# Patient Record
Sex: FEMALE | Race: WHITE | NOT HISPANIC OR LATINO | Employment: OTHER | URBAN - METROPOLITAN AREA
[De-identification: names, ages, dates, MRNs, and addresses within clinical notes are randomized per-mention and may not be internally consistent; named-entity substitution may affect disease eponyms.]

---

## 2017-01-03 DIAGNOSIS — I10 HYPERTENSION GOAL BP (BLOOD PRESSURE) < 140/90: Primary | ICD-10-CM

## 2017-01-03 RX ORDER — TRIAMTERENE AND HYDROCHLOROTHIAZIDE 37.5; 25 MG/1; MG/1
1 CAPSULE ORAL EVERY MORNING
Qty: 90 CAPSULE | Refills: 3 | Status: SHIPPED | OUTPATIENT
Start: 2017-01-03 | End: 2018-04-04

## 2017-01-03 NOTE — TELEPHONE ENCOUNTER
triamterene-hydrochlorothiazide (DYAZIDE) 37.5-25 MG per capsule    Last Written Prescription Date: 1/13/2016.  Last Fill Quantity: 90, # refills: 3  Last Office Visit with G, P or [facility] prescribing provider: 3/23/2016.       POTASSIUM   Date Value Ref Range Status   07/17/2016 3.9 3.4 - 5.3 mmol/L Final     CREATININE   Date Value Ref Range Status   07/17/2016 1.22* 0.52 - 1.04 mg/dL Final     BP Readings from Last 3 Encounters:   07/18/16 130/80   03/23/16 117/75   01/17/16 167/84

## 2017-01-03 NOTE — TELEPHONE ENCOUNTER
Prescription approved per [facility] Refill Protocol.    Patient scheduled an annual physical on 1/25/17.     Kenia Muller RN  [facility]

## 2017-02-08 DIAGNOSIS — I10 HYPERTENSION GOAL BP (BLOOD PRESSURE) < 140/90: Primary | ICD-10-CM

## 2017-02-08 DIAGNOSIS — G47.00 PERSISTENT DISORDER OF INITIATING OR MAINTAINING SLEEP: Primary | ICD-10-CM

## 2017-02-08 DIAGNOSIS — F32.5 MAJOR DEPRESSION IN COMPLETE REMISSION (H): ICD-10-CM

## 2017-02-08 RX ORDER — ZOLPIDEM TARTRATE 5 MG/1
5 TABLET ORAL
Qty: 30 TABLET | Refills: 4 | Status: CANCELLED | OUTPATIENT
Start: 2017-02-08

## 2017-02-08 RX ORDER — ZOLPIDEM TARTRATE 5 MG/1
5 TABLET ORAL
Qty: 5 TABLET | Refills: 0 | Status: SHIPPED | OUTPATIENT
Start: 2017-02-08 | End: 2017-02-13

## 2017-02-08 NOTE — TELEPHONE ENCOUNTER
"Pt states this will only last her until 2/12/2017 and is requesting Dr. Oliva review it when he returns to office because she \"hates Dr. Qiu and doesn't want him touching her medications.\" Pt informed Dr. Oliva will not be back until 2/13/2017, pt informed no other provider will manage this for him in the interim. Pt made an venkat on 2/22/2017   "

## 2017-02-08 NOTE — TELEPHONE ENCOUNTER
Parnate Tab 10Mg      Last Written Prescription Date:  1/15/16  Last Fill Quantity: 270,   # refills: 3  Last Office Visit with [facility], P or  Health prescribing provider: 3/23/16  Future Office visit:       Routing refill request to provider for review/approval because:  Drug not on the [facility], [facility] or  [email or web address] refill protocol or controlled substance        Atacand Tab 4MG      Last Written Prescription Date: 1/15/16  Last Fill Quantity: 90, # refills: 3  Last Office Visit with [facility], [facility] or  [email or web address] prescribing provider: 3/23/16       POTASSIUM   Date Value Ref Range Status   07/17/2016 3.9 3.4 - 5.3 mmol/L Final     CREATININE   Date Value Ref Range Status   07/17/2016 1.22* 0.52 - 1.04 mg/dL Final     BP Readings from Last 3 Encounters:   07/18/16 130/80   03/23/16 117/75   01/17/16 167/84

## 2017-02-08 NOTE — TELEPHONE ENCOUNTER
Ambien Oral Tablet 5 MG      Last Written Prescription Date:  8/24/16  Last Fill Quantity: 30,   # refills: 4  [facility] with [facility] or [facility] prescribing provider: 3/23/16  Future Office visit:       Routing refill request to provider for review/approval because:  Drug not on the [facility], [facility] or [facility] refill protocol or controlled substance

## 2017-02-08 NOTE — TELEPHONE ENCOUNTER
Routing refill request to provider for review/approval because:  Drug not on the [facility] refill protocol     Reviewed  and no concerns. Rx was last dispensed on 1/2/17 #30    Routing to provider pool, Dr. Oliva is out of office until 2/13/17.    Jessica Samuels RN  [facility]

## 2017-02-08 NOTE — TELEPHONE ENCOUNTER
Oxazepam Oral Capsule 15MG      Last Written Prescription Date:  11/16/16  Last Fill Quantity: 90,   # refills: 0  Last Office Visit with [facility], [facility] or [facility] prescribing provider: 3/23/16  Future Office visit:       Routing refill request to provider for review/approval because:  Drug not on the [facility], [facility] or [facility] refill protocol or controlled substance

## 2017-02-08 NOTE — TELEPHONE ENCOUNTER
Please call patient  She has not sen Dr Oliva in almost a year and Needs  to address the long term risk of this medication with him  I'll OK 5 pills but she needs a better long term plan for insomnia/

## 2017-02-09 RX ORDER — CANDESARTAN CILEXETIL 4 MG/1
4 TABLET ORAL DAILY
Qty: 90 TABLET | Refills: 0 | Status: SHIPPED | OUTPATIENT
Start: 2017-02-09 | End: 2017-03-31

## 2017-02-09 RX ORDER — TRANYLCYPROMINE SULFATE 10 MG/1
10 TABLET, FILM COATED ORAL 3 TIMES DAILY
Qty: 270 TABLET | Refills: 0 | Status: SHIPPED | OUTPATIENT
Start: 2017-02-09 | End: 2017-02-22

## 2017-02-09 NOTE — TELEPHONE ENCOUNTER
Prescription approved for atacand per [facility] Refill Protocol.    Routing refill request for parnate to provider for review/approval because:  Drug not on the [facility] refill protocol     Routing to provider pool, Dr. Oliva is out of office until 2/13/17  Pt. Has an appt scheduled with Dr. Oliva on 2/22/17 for annual.    Jessica Samuels RN  [facility]

## 2017-02-10 NOTE — TELEPHONE ENCOUNTER
Dr. Oliva, patient is requesting a refill of medication.    oxazepam (SERAX) 15 MG capsule    Last Visit: 03/23/16  Last Rx: 11/16/16      not listed on refill protocol, nurse unable to refill.      Thank you,   Kenia Muller RN  [facility]

## 2017-02-12 DIAGNOSIS — G47.00 PERSISTENT DISORDER OF INITIATING OR MAINTAINING SLEEP: Primary | ICD-10-CM

## 2017-02-12 RX ORDER — PREDNISONE 10 MG/1
TABLET ORAL
COMMUNITY
Start: 2016-03-24 | End: 2018-04-04

## 2017-02-12 RX ORDER — TRIAMTERENE/HYDROCHLOROTHIAZID 37.5-25 MG
TABLET ORAL
Refills: 3 | COMMUNITY
Start: 2017-01-04 | End: 2018-04-04

## 2017-02-12 RX ORDER — OXAZEPAM 15 MG/1
15 CAPSULE ORAL
Qty: 90 CAPSULE | Refills: 0 | Status: SHIPPED | OUTPATIENT
Start: 2017-02-12 | End: 2017-02-22

## 2017-02-12 RX ORDER — ZOLPIDEM TARTRATE 5 MG/1
5 TABLET ORAL
Qty: 30 TABLET | Refills: 0 | OUTPATIENT
Start: 2017-02-12

## 2017-02-13 DIAGNOSIS — G47.00 PERSISTENT DISORDER OF INITIATING OR MAINTAINING SLEEP: ICD-10-CM

## 2017-02-13 RX ORDER — ZOLPIDEM TARTRATE 5 MG/1
5 TABLET ORAL
Qty: 7 TABLET | Refills: 0 | Status: SHIPPED | OUTPATIENT
Start: 2017-02-13 | End: 2017-02-22

## 2017-02-13 NOTE — TELEPHONE ENCOUNTER
Pt would like to request more Ambien. She had called back on 2/8 and Dr Qiu filled it again for 5 tablets. She will be running out by Friday and won't be able to see Dr. Oliva until her appt which is scheduled for 2/22    Prior message states she had mentioned that she would be out by 2/12 (which was yesterday) but in this current phone call, she stated she will be out by Friday, 2/17    Pt can be reached @ [phone] alcon

## 2017-02-13 NOTE — TELEPHONE ENCOUNTER
Dr. Oliva,  Pt. Was given this script on 2/8/17  zolpidem (AMBIEN) 5 MG tablet 5 tablet 0 2/8/2017  No      Sig: Take 1 tablet (5 mg) by mouth nightly as needed     Class: Local Print     Route: Oral     Order: [id number]     Per  pt. Filled rc on 2/8/17.    Jessica Samuels RN  [facility]

## 2017-02-22 ENCOUNTER — OFFICE VISIT (OUTPATIENT)
Dept: FAMILY MEDICINE | Facility: CLINIC | Age: 72
End: 2017-02-22
Payer: COMMERCIAL

## 2017-02-22 VITALS
BODY MASS INDEX: 24.33 KG/M2 | OXYGEN SATURATION: 98 % | HEIGHT: 63 IN | TEMPERATURE: 97.4 F | SYSTOLIC BLOOD PRESSURE: 110 MMHG | HEART RATE: 88 BPM | DIASTOLIC BLOOD PRESSURE: 66 MMHG | WEIGHT: 137.3 LBS

## 2017-02-22 DIAGNOSIS — I10 HYPERTENSION GOAL BP (BLOOD PRESSURE) < 140/90: ICD-10-CM

## 2017-02-22 DIAGNOSIS — F32.5 MAJOR DEPRESSION IN COMPLETE REMISSION (H): ICD-10-CM

## 2017-02-22 DIAGNOSIS — L57.8 SUN-DAMAGED SKIN: ICD-10-CM

## 2017-02-22 DIAGNOSIS — G47.00 PERSISTENT DISORDER OF INITIATING OR MAINTAINING SLEEP: Chronic | ICD-10-CM

## 2017-02-22 DIAGNOSIS — Z00.00 ENCOUNTER FOR ROUTINE ADULT HEALTH EXAMINATION WITHOUT ABNORMAL FINDINGS: Primary | ICD-10-CM

## 2017-02-22 PROCEDURE — 99213 OFFICE O/P EST LOW 20 MIN: CPT | Mod: 25 | Performed by: FAMILY MEDICINE

## 2017-02-22 PROCEDURE — 99397 PER PM REEVAL EST PAT 65+ YR: CPT | Performed by: FAMILY MEDICINE

## 2017-02-22 PROCEDURE — 86803 HEPATITIS C AB TEST: CPT | Performed by: FAMILY MEDICINE

## 2017-02-22 PROCEDURE — 80061 LIPID PANEL: CPT | Performed by: FAMILY MEDICINE

## 2017-02-22 PROCEDURE — 36415 COLL VENOUS BLD VENIPUNCTURE: CPT | Performed by: FAMILY MEDICINE

## 2017-02-22 PROCEDURE — 80053 COMPREHEN METABOLIC PANEL: CPT | Performed by: FAMILY MEDICINE

## 2017-02-22 RX ORDER — OXAZEPAM 15 MG/1
15 CAPSULE ORAL
Qty: 90 CAPSULE | Refills: 1 | Status: SHIPPED | OUTPATIENT
Start: 2017-02-22 | End: 2018-01-23

## 2017-02-22 RX ORDER — TRIAMCINOLONE ACETONIDE 1 MG/G
CREAM TOPICAL
Qty: 15 G | Refills: 1 | Status: SHIPPED
Start: 2017-02-22 | End: 2019-06-05

## 2017-02-22 RX ORDER — TRANYLCYPROMINE SULFATE 10 MG/1
10 TABLET, FILM COATED ORAL 3 TIMES DAILY
Qty: 270 TABLET | Refills: 3 | Status: SHIPPED
Start: 2017-02-22 | End: 2018-04-04

## 2017-02-22 RX ORDER — ZOLPIDEM TARTRATE 5 MG/1
5 TABLET ORAL
Qty: 30 TABLET | Refills: 5 | Status: SHIPPED | OUTPATIENT
Start: 2017-02-22 | End: 2017-09-27

## 2017-02-22 NOTE — MR AVS SNAPSHOT
After Visit Summary   2/22/2017    Jann Davidson    MRN: [id number]           Patient Information     Date Of Birth          1945        Visit Information        Provider Department      2/22/2017 4:00 PM Mehran Oliva MD [facility]        Today's Diagnoses     Encounter for routine adult health examination without abnormal findings    -  1    Persistent disorder of initiating or maintaining sleep        Major depression in complete remission        Sun-damaged skin        Hypertension goal BP (blood pressure) < 140/90          Care Instructions      Preventive Health Recommendations  Female Ages 65 +    Yearly exam:     See your health care provider every year in order to  o Review health changes.   o Discuss preventive care.    o Review your medicines if your doctor has prescribed any.      You no longer need a yearly Pap test unless you've had an abnormal Pap test in the past 10 years. If you have vaginal symptoms, such as bleeding or discharge, be sure to talk with your provider about a Pap test.      Every 1 to 2 years, have a mammogram.  If you are over 69, talk with your health care provider about whether or not you want to continue having screening mammograms.      Every 10 years, have a colonoscopy. Or, have a yearly FIT test (stool test). These exams will check for colon cancer.       Have a cholesterol test every 5 years, or more often if your doctor advises it.       Have a diabetes test (fasting glucose) every three years. If you are at risk for diabetes, you should have this test more often.       At age 65, have a bone density scan (DEXA) to check for osteoporosis (brittle bone disease).    Shots:    Get a flu shot each year.    Get a tetanus shot every 10 years.    Talk to your doctor about your pneumonia vaccines. There are now two you should receive - Pneumovax (PPSV 23) and Prevnar (PCV 13).    Talk to your doctor about the shingles vaccine.    Talk to  your doctor about the hepatitis B vaccine.    Nutrition:     Eat at least 5 servings of fruits and vegetables each day.      Eat whole-grain bread, whole-wheat pasta and brown rice instead of white grains and rice.      Talk to your provider about Calcium and Vitamin D.     Lifestyle    Exercise at least 150 minutes a week (30 minutes a day, 5 days a week). This will help you control your weight and prevent disease.      Limit alcohol to one drink per day.      No smoking.       Wear sunscreen to prevent skin cancer.       See your dentist twice a year for an exam and cleaning.      See your eye doctor every 1 to 2 years to screen for conditions such as glaucoma, macular degeneration, cataracts, etc         Follow-ups after your visit        Who to contact     If you have questions or need follow up information about today's clinic visit or your schedule please contact [facility] directly at [phone].  Normal or non-critical lab and imaging results will be communicated to you by MyChart, letter or phone within 4 business days after the clinic has received the results. If you do not hear from us within 7 days, please contact the clinic through [employer] or phone. If you have a critical or abnormal lab result, we will notify you by phone as soon as possible.  Submit refill requests through [employer] or call your pharmacy and they will forward the refill request to us. Please allow 3 business days for your refill to be completed.          Additional Information About Your Visit        MyChart Information     [employer] gives you secure access to your electronic health record. If you see a primary care provider, you can also send messages to your care team and make appointments. If you have questions, please call your primary care clinic.  If you do not have a primary care provider, please call [phone] and they will assist you.        Care EveryWhere ID     This is your Care EveryWhere ID. This could  "be used by other organizations to access your [place] medical records  [id number]        Your Vitals Were     Pulse Temperature Height Pulse Oximetry BMI (Body Mass Index)       88 97.4  F (36.3  C) (Oral) 5' 3\" (1.6 m) 98% 24.32 kg/m2        Blood Pressure from Last 3 Encounters:   02/22/17 110/66   07/18/16 130/80   03/23/16 117/75    Weight from Last 3 Encounters:   02/22/17 137 lb 4.8 oz (62.3 kg)   01/17/16 130 lb (59 kg)   01/15/16 139 lb (63 kg)              We Performed the Following     Comprehensive metabolic panel     Hepatitis C antibody     Lipid panel reflex to direct LDL     OFFICE/OUTPT VISIT,SHERWIN JUDGE III          Where to get your medicines      These medications were sent to [facility]  [address] [address]     Phone:  [phone]     PARNATE 10 MG tablet    triamcinolone 0.1 % cream         Some of these will need a paper prescription and others can be bought over the counter.  Ask your nurse if you have questions.     Bring a paper prescription for each of these medications     oxazepam 15 MG capsule    zolpidem 5 MG tablet          Primary Care Provider Office Phone # Fax #    Mehran Oliva [phone]       [facility] [address]  [facility] [address]        Thank you!     Thank you for choosing [facility]  for your care. Our goal is always to provide you with excellent care. Hearing back from our patients is one way we can continue to improve our services. Please take a few minutes to complete the written survey that you may receive in the mail after your visit with us. Thank you!             Your Updated Medication List - Protect others around you: Learn how to safely use, store and throw away your medicines at www.disposemymeds.org.          This list is accurate as of: 2/22/17  7:20 PM.  Always use your most recent med list.                   Brand Name Dispense " Instructions for use    ATACAND 4 MG Tabs tablet   Generic drug:  candesartan     90 tablet    Take 1 tablet (4 mg) by mouth daily FLY       azithromycin 250 MG tablet    ZITHROMAX    6 tablet    Two tablets first day, then one tablet daily for four days.       FLUZONE HIGH-DOSE 0.5 ML injection   Generic drug:  influenza Vac Split High-Dose          HYDROcodone-acetaminophen 5-325 MG per tablet    NORCO    15 tablet    Take 1-2 tablets by mouth every 4 hours as needed       oxazepam 15 MG capsule    SERAX    90 capsule    Take 1 capsule (15 mg) by mouth nightly as needed for sleep or anxiety       PARNATE 10 MG tablet   Generic drug:  tranylcypromine     270 tablet    Take 1 tablet (10 mg) by mouth 3 times daily Needs BRAND name medication only. FLY       polyethylene glycol Packet    MIRALAX/GLYCOLAX     Take 1 packet by mouth daily       predniSONE 10 MG tablet    DELTASONE         PREVNAR 13 Susp injection   Generic drug:  pneumococcal          triamcinolone 0.1 % cream    KENALOG    15 g    Apply  topically 2 times daily as needed.       * triamterene-hydrochlorothiazide 37.5-25 MG per capsule    DYAZIDE    90 capsule    Take 1 capsule by mouth every morning Take 1 capsule by mouth every morning. Needs to be [place] [address]  for this medication. FLY.       * triamterene-hydrochlorothiazide 37.5-25 MG per tablet    MAXZIDE-25     TK 1 T PO QAM       zolpidem 5 MG tablet    AMBIEN    30 tablet    Take 1 tablet (5 mg) by mouth nightly as needed       * Notice:  This list has 2 medication(s) that are the same as other medications prescribed for you. Read the directions carefully, and ask your doctor or other care provider to review them with you.

## 2017-02-22 NOTE — NURSING NOTE
"Chief Complaint   Patient presents with     Physical       Initial /66 (BP Location: Right arm, Patient Position: Chair, Cuff Size: Adult Regular)  Pulse 88  Temp 97.4  F (36.3  C) (Oral)  Ht 5' 3\" (1.6 m)  Wt 137 lb 4.8 oz (62.3 kg)  SpO2 98%  BMI 24.32 kg/m2 Estimated body mass index is 24.32 kg/(m^2) as calculated from the following:    Height as of this encounter: 5' 3\" (1.6 m).    Weight as of this encounter: 137 lb 4.8 oz (62.3 kg).  Medication Reconciliation: complete   Ashly Díaz MA      "

## 2017-02-22 NOTE — PROGRESS NOTES
SUBJECTIVE:     CC: Jann Davidson is an 71 year old woman who presents for preventive health visit.     Patient says that she does not want a medicare physical, and that she is willing to pay for it. She would like blood work completed.     Patient says that she has been taking her zolpidem for sleep, and it has helped her to fall asleep. However, she says that she frequently wakes up in the middle of the night to urinate, and then she can't fall back asleep. She is wondering if it would be possible for her to try long acting zolpidem to see if that helps her to fall asleep if she wakes up in the middle of the night. Patient is distressed because she requested refills on the zolpidem and was only given a refill of 5 tablets, and would like a note saying that she needs her zolpidem.    Patient says that she had terrible pain in her groin and thighs that she sustained lifting 40 pounds of water and turning, and she pulled a muscle, and went to the emergency department. Patient reports that while in the hospital it was found that she had a lung nodule. History of smoking, and she stopped 44 years ago.    Patient says that she needs a refill on her kenalog cream. She says that she has been having problems with dry skin in her eye lashes and on her upper eyelids. Patient wears makeup but says that she does a good job of clearing off the makeup and washing her eyelids.    Patient has been having annual mammograms due to a family history of breast cancer through her mother.    Patient would like to discuss the benefits of medical marijuana.    Patient requests screening for hepatitis C.    Patient is up to date on her eye exam.    Healthy Habits:    Do you get at least three servings of calcium containing foods daily (dairy, green leafy vegetables, etc.)? yes    Amount of exercise or daily activities, outside of work: 1 day(s) per week    Problems taking medications regularly No    Medication side effects: No    Have  you had an eye exam in the past two years? yes    Do you see a dentist twice per year? yes    Do you have sleep apnea, excessive snoring or daytime drowsiness?no    Lab work, scar tissue in lung, medication concerns, dry skin in eyelashes, medical marijuana, Hep C test     Today's PHQ-2 Score:   PHQ-2 ( 1999 Pfizer) 2/22/2017 3/23/2016   Q1: Little interest or pleasure in doing things 0 0   Q2: Feeling down, depressed or hopeless 0 0   PHQ-2 Score 0 0       Abuse: Current or Past(Physical, Sexual or Emotional)- No  Do you feel safe in your environment - Yes    Social History   Substance Use Topics     Smoking status: Former Smoker     Types: Cigarettes     Quit date: 10/30/1972     Smokeless tobacco: Never Used     Alcohol use Yes      Comment: couple glasses of wine daily      The patient does not drink >3 drinks per day nor >7 drinks per week.    Recent Labs   Lab Test  01/15/16   1624  10/08/14   1708  04/11/12   1800   CHOL  230*  210*  257*   HDL  125  126  171*   LDL  96  76  104   TRIG  47  42  50   CHOLHDLRATIO   --   1.7  1.8   NHDL  105   --    --        Reviewed orders with patient.  Reviewed health maintenance and updated orders accordingly - Yes    Mammo Decision Support:  Patient over age 50, mutual decision to screen reflected in health maintenance.    Pertinent mammograms are reviewed under the imaging tab.  History of abnormal Pap smear: No, no longer getting them  All Histories reviewed and updated in Epic.    ROS:  Positive for insomnia. Positive for muscular groin pain.    Denies headache, chest pain, shortness of breath, cough, heartburn, bowel issues, bladder issues, neck pain, back pain, hip pain, knee pain, ankle pain, or foot pain. Remainder of ROS is negative unless otherwise noted above or in HPI.    This document serves as a record of the services and decisions personally performed and made by Mehran Oliva MD. It was created on his behalf by Junior Arrieta, a trained medical  scribe. The creation of this document is based the provider's statements to the medical scribe.  Junior Donaldlashaunpatrick 4:26 PM February 22, 2017    BP Readings from Last 3 Encounters:   02/22/17 110/66   07/18/16 130/80   03/23/16 117/75    Wt Readings from Last 3 Encounters:   02/22/17 62.3 kg (137 lb 4.8 oz)   01/17/16 59 kg (130 lb)   01/15/16 63 kg (139 lb)        Patient Active Problem List   Diagnosis     Major depression in complete remission (H)     Atopic rhinitis     Hypertension goal BP (blood pressure) < 140/90     CKD (chronic kidney disease) stage 3, GFR 30-59 ml/min     CARDIOVASCULAR SCREENING; LDL GOAL LESS THAN 130     Recurrent sinusitis     CTS (carpal tunnel syndrome)     Persistent disorder of initiating or maintaining sleep     Actinic keratosis     Multiple pulmonary nodules     Past Surgical History   Procedure Laterality Date     Colonoscopy  10/03     Breast biopsy, rt/lt       Breat Biopsy RT/LT     Reconstruct eyelid       Colonoscopy  4/8/2014     Procedure: COLONOSCOPY;  COLONOSCOPY ;  Surgeon: Gaurav Shaffer MD;  Location:  GI       Social History   Substance Use Topics     Smoking status: Former Smoker     Types: Cigarettes     Quit date: 10/30/1972     Smokeless tobacco: Never Used     Alcohol use Yes      Comment: couple glasses of wine daily      Family History   Problem Relation Age of Onset     Breast Cancer Mother      CANCER Mother      DIABETES Paternal Grandmother          Current Outpatient Prescriptions   Medication Sig Dispense Refill     zolpidem (AMBIEN) 5 MG tablet Take 1 tablet (5 mg) by mouth nightly as needed 7 tablet 0     oxazepam (SERAX) 15 MG capsule Take 1 capsule (15 mg) by mouth nightly as needed for sleep or anxiety 90 capsule 0     predniSONE (DELTASONE) 10 MG tablet        triamterene-hydrochlorothiazide (MAXZIDE-25) 37.5-25 MG per tablet TK 1 T PO QAM  3     ATACAND 4 MG TABS tablet Take 1 tablet (4 mg) by mouth daily FYL 90 tablet 0     PARNATE  "10 MG tablet Take 1 tablet (10 mg) by mouth 3 times daily Needs BRAND name medication only.  tablet 0     triamterene-hydrochlorothiazide (DYAZIDE) 37.5-25 MG per capsule Take 1 capsule by mouth every morning Take 1 capsule by mouth every morning. Needs to be [place] [id number]  for this medication. FLY. 90 capsule 3     FLUZONE HIGH-DOSE 0.5 ML injection   0     PREVNAR 13 SUSP   0     azithromycin (ZITHROMAX) 250 MG tablet Two tablets first day, then one tablet daily for four days. 6 tablet 0     HYDROcodone-acetaminophen (NORCO) 5-325 MG per tablet Take 1-2 tablets by mouth every 4 hours as needed 15 tablet 0     triamcinolone (KENALOG) 0.1 % cream Apply  topically 2 times daily as needed. 15 g 1     polyethylene glycol (MIRALAX/GLYCOLAX) packet Take 1 packet by mouth daily       Allergies   Allergen Reactions     Wheat Bran      OBJECTIVE:     /66 (BP Location: Right arm, Patient Position: Chair, Cuff Size: Adult Regular)  Pulse 88  Temp 97.4  F (36.3  C) (Oral)  Ht 5' 3\" (1.6 m)  Wt 137 lb 4.8 oz (62.3 kg)  SpO2 98%  BMI 24.32 kg/m2  EXAM:  GENERAL APPEARANCE: healthy, alert and no distress  EYES: Eyes grossly normal to inspection, PERRL and conjunctivae and sclerae normal. EOMI.  HENT: ear canals and TM's normal, nose and mouth without ulcers or lesions, oropharynx clear and oral mucous membranes moist  NECK: no adenopathy, no asymmetry, masses, or scars and thyroid normal to palpation. TMJ normal. No bruits.  RESP: lungs clear to auscultation - no rales, rhonchi or wheezes  BREAST: normal without masses, tenderness or nipple discharge and no palpable axillary masses or adenopathy  CV: regular rate and rhythm, normal S1 S2, no S3 or S4, no murmur, click or rub, no peripheral edema and peripheral pulses strong  ABDOMEN: soft, nontender, no hepatosplenomegaly, no masses and bowel sounds normal  MS: no musculoskeletal defects are noted and gait is age appropriate without ataxia. Calves " nontender.  SKIN: no suspicious lesions or rashes  NEURO: Normal strength and tone, sensory exam grossly normal, mentation intact and speech normal. Knee reflexes normal. No tremors.  PSYCH: mentation appears normal and affect normal/bright  LYMPH: No lymphadenopathy in the anterior or posterior neck, supraclavicular, axillary or inguinal areas. No hepato-splenomegaly noted.    ASSESSMENT/PLAN:     (Z00.00) Encounter for routine adult health examination without abnormal findings  (primary encounter diagnosis)  Comment: Routine physical and labs completed today.  Plan: Hepatitis C antibody        Follow up with results from lab.    (G47.00) Persistent disorder of initiating or maintaining sleep  Comment: Somewhat controlled on oxazepam and zolpidem. Patient is uninterested in changing treatment plan at this time. Discussed/ understands hi risk of tolerance/rebound.  Plan: oxazepam (SERAX) 15 MG capsule, zolpidem         (AMBIEN) 5 MG tablet        Continue on current medications. Patient will call in 6 months for refills, and should be granted them as she cannot sleep without them. Follow up in person in 1 year.    (F32.5) Major depression in complete remission  Comment: Stable and unchanged since last visit. Controlled on parnate.  Plan: PARNATE 10 MG tablet        Continue on current medication. Follow up as needed.    (L57.8) Sun-damaged skin  Comment: Controlled on triamcinolone cream.  Plan: triamcinolone (KENALOG) 0.1 % cream        Continue on current medication as needed. Follow up as needed.    (I10) Hypertension goal BP (blood pressure) < 140/90  Comment: At goal. Controlled on triamterene-hydrochlorothiazide. Labs completed today.  Plan: Comprehensive metabolic panel, Lipid panel         reflex to direct LDL        Continue on current medication. Follow up with results from lab.    Patient Instructions     Preventive Health Recommendations  Female Ages 65 +    Yearly exam:     See your health care provider  every year in order to  o Review health changes.   o Discuss preventive care.    o Review your medicines if your doctor has prescribed any.      You no longer need a yearly Pap test unless you've had an abnormal Pap test in the past 10 years. If you have vaginal symptoms, such as bleeding or discharge, be sure to talk with your provider about a Pap test.      Every 1 to 2 years, have a mammogram.  If you are over 69, talk with your health care provider about whether or not you want to continue having screening mammograms.      Every 10 years, have a colonoscopy. Or, have a yearly FIT test (stool test). These exams will check for colon cancer.       Have a cholesterol test every 5 years, or more often if your doctor advises it.       Have a diabetes test (fasting glucose) every three years. If you are at risk for diabetes, you should have this test more often.       At age 65, have a bone density scan (DEXA) to check for osteoporosis (brittle bone disease).    Shots:    Get a flu shot each year.    Get a tetanus shot every 10 years.    Talk to your doctor about your pneumonia vaccines. There are now two you should receive - Pneumovax (PPSV 23) and Prevnar (PCV 13).    Talk to your doctor about the shingles vaccine.    Talk to your doctor about the hepatitis B vaccine.    Nutrition:     Eat at least 5 servings of fruits and vegetables each day.      Eat whole-grain bread, whole-wheat pasta and brown rice instead of white grains and rice.      Talk to your provider about Calcium and Vitamin D.     Lifestyle    Exercise at least 150 minutes a week (30 minutes a day, 5 days a week). This will help you control your weight and prevent disease.      Limit alcohol to one drink per day.      No smoking.       Wear sunscreen to prevent skin cancer.       See your dentist twice a year for an exam and cleaning.      See your eye doctor every 1 to 2 years to screen for conditions such as glaucoma, macular degeneration, cataracts,  "etc       COUNSELING:   Reviewed preventive health counseling, as reflected in patient instructions     reports that she quit smoking about 44 years ago. Her smoking use included Cigarettes. She has never used smokeless tobacco.  Estimated body mass index is 24.32 kg/(m^2) as calculated from the following:    Height as of this encounter: 5' 3\" (1.6 m).    Weight as of this encounter: 137 lb 4.8 oz (62.3 kg).       Counseling Resources:  ATP IV Guidelines  Pooled Cohorts Equation Calculator  Breast Cancer Risk Calculator  FRAX Risk Assessment  ICSI Preventive Guidelines  Dietary Guidelines for Americans, 2010  USDA's MyPlate  ASA Prophylaxis  Lung CA Screening    The information in this document, created by the medical scribe for me, accurately reflects the services I personally performed and the decisions made by me. I have reviewed and approved this document for accuracy prior to leaving the patient care area.   Mehran Oliva MD 4:25 PM February 22, 2017  [facility]  "

## 2017-02-23 LAB
ALBUMIN SERPL-MCNC: 4.2 G/DL (ref 3.4–5)
ALP SERPL-CCNC: 73 U/L (ref 40–150)
ALT SERPL W P-5'-P-CCNC: 32 U/L (ref 0–50)
ANION GAP SERPL CALCULATED.3IONS-SCNC: 8 MMOL/L (ref 3–14)
AST SERPL W P-5'-P-CCNC: 37 U/L (ref 0–45)
BILIRUB SERPL-MCNC: 0.4 MG/DL (ref 0.2–1.3)
BUN SERPL-MCNC: 27 MG/DL (ref 7–30)
CALCIUM SERPL-MCNC: 9.6 MG/DL (ref 8.5–10.1)
CHLORIDE SERPL-SCNC: 111 MMOL/L (ref 94–109)
CHOLEST SERPL-MCNC: 238 MG/DL
CO2 SERPL-SCNC: 24 MMOL/L (ref 20–32)
CREAT SERPL-MCNC: 1.65 MG/DL (ref 0.52–1.04)
GFR SERPL CREATININE-BSD FRML MDRD: 31 ML/MIN/1.7M2
GLUCOSE SERPL-MCNC: 82 MG/DL (ref 70–99)
HCV AB SERPL QL IA: NORMAL
HDLC SERPL-MCNC: 132 MG/DL
LDLC SERPL CALC-MCNC: 97 MG/DL
NONHDLC SERPL-MCNC: 106 MG/DL
POTASSIUM SERPL-SCNC: 4.2 MMOL/L (ref 3.4–5.3)
PROT SERPL-MCNC: 7.8 G/DL (ref 6.8–8.8)
SODIUM SERPL-SCNC: 143 MMOL/L (ref 133–144)
TRIGL SERPL-MCNC: 43 MG/DL

## 2017-02-23 ASSESSMENT — PATIENT HEALTH QUESTIONNAIRE - PHQ9: SUM OF ALL RESPONSES TO PHQ QUESTIONS 1-9: 1

## 2017-02-27 NOTE — PROGRESS NOTES
Benjy Forte: Your recent results are back and show a slight decrease of your kidney function. I recommend scheduling a followup appointment with your nephrologist. Please let me know if you have any questions; nice to see you again.     Mehran

## 2017-03-31 DIAGNOSIS — I10 HYPERTENSION GOAL BP (BLOOD PRESSURE) < 140/90: ICD-10-CM

## 2017-03-31 RX ORDER — CANDESARTAN CILEXETIL 4 MG/1
4 TABLET ORAL DAILY
Qty: 90 TABLET | Refills: 3 | Status: SHIPPED | OUTPATIENT
Start: 2017-03-31 | End: 2018-10-30

## 2017-06-14 ENCOUNTER — HOSPITAL ENCOUNTER (OUTPATIENT)
Dept: MAMMOGRAPHY | Facility: CLINIC | Age: 72
Discharge: HOME | End: 2017-06-14
Attending: FAMILY MEDICINE | Admitting: FAMILY MEDICINE
Payer: MEDICARE

## 2017-06-14 DIAGNOSIS — Z12.31 VISIT FOR SCREENING MAMMOGRAM: ICD-10-CM

## 2017-06-14 PROCEDURE — 77063 BREAST TOMOSYNTHESIS BI: CPT

## 2017-06-28 ENCOUNTER — TRANSFERRED RECORDS (OUTPATIENT)
Dept: HEALTH INFORMATION MANAGEMENT | Facility: CLINIC | Age: 72
End: 2017-06-28

## 2017-07-12 ENCOUNTER — TRANSFERRED RECORDS (OUTPATIENT)
Dept: HEALTH INFORMATION MANAGEMENT | Facility: CLINIC | Age: 72
End: 2017-07-12

## 2017-07-12 LAB
CREAT SERPL-MCNC: 1.74 MG/DL (ref 0.6–0.93)
GFR SERPL CREATININE-BSD FRML MDRD: 29 ML/MIN/1.73M2
GLUCOSE SERPL-MCNC: 92 MG/DL (ref 65–99)
POTASSIUM SERPL-SCNC: 4.2 MMOL/L (ref 3.5–5.3)

## 2017-08-18 DIAGNOSIS — R91.8 MULTIPLE PULMONARY NODULES: Primary | ICD-10-CM

## 2017-09-06 ENCOUNTER — TRANSFERRED RECORDS (OUTPATIENT)
Dept: HEALTH INFORMATION MANAGEMENT | Facility: CLINIC | Age: 72
End: 2017-09-06

## 2017-09-06 LAB
CREAT SERPL-MCNC: 1.67 MG/DL (ref 0.6–0.93)
GFR SERPL CREATININE-BSD FRML MDRD: 30 ML/MIN/1.73M2
GLUCOSE SERPL-MCNC: 106 MG/DL (ref 65–99)
POTASSIUM SERPL-SCNC: 4.1 MMOL/L (ref 3.5–5.3)

## 2017-09-08 ENCOUNTER — TELEPHONE (OUTPATIENT)
Dept: FAMILY MEDICINE | Facility: CLINIC | Age: 72
End: 2017-09-08

## 2017-09-08 DIAGNOSIS — R91.8 MULTIPLE PULMONARY NODULES: Primary | ICD-10-CM

## 2017-09-08 NOTE — TELEPHONE ENCOUNTER
Dr. Oliva,     Patient called regarding repeat chest CT with contrast. She scheduled it as a follow up to 7/17/16 scan for pulmonary nodules.     She is calling because she has been seeing Dr. Luna, with nephrology and her Creatinine level and GFR are abnormal.     The repeat CT is ordered with contrast and she is wondering if contrast is needed or if she can do the scan without it. She is also willing to wait to have the repeat chest CT until her creatinine level is back to normal.       She denies difficulty breathing, shortness or breath or pain.       Kenia Muller RN  [facility]

## 2017-09-13 ENCOUNTER — HOSPITAL ENCOUNTER (OUTPATIENT)
Dept: CT IMAGING | Facility: CLINIC | Age: 72
Discharge: HOME | End: 2017-09-13
Attending: FAMILY MEDICINE | Admitting: FAMILY MEDICINE
Payer: MEDICARE

## 2017-09-13 DIAGNOSIS — R91.8 MULTIPLE PULMONARY NODULES: ICD-10-CM

## 2017-09-13 PROCEDURE — 71250 CT THORAX DX C-: CPT

## 2017-09-18 ENCOUNTER — TELEPHONE (OUTPATIENT)
Dept: FAMILY MEDICINE | Facility: CLINIC | Age: 72
End: 2017-09-18

## 2017-09-18 NOTE — TELEPHONE ENCOUNTER
report released to [employer]; recommendation is to repeat lung CT 3-4 months to make sure one of the nodules is not enlarging. Please notify patient, thanks Mehran

## 2017-09-18 NOTE — PROGRESS NOTES
Benjy Forte: Your recent results are stable except for possibly one of the nodules; recommend followup CT scan in 3-4 months. Contact if questions.    Mehran

## 2017-09-18 NOTE — TELEPHONE ENCOUNTER
Jann asked if you would call her as she just got the results back from her CT scan and she wants to know your thoughts on it if you think she should see a specialist or where she should go from here. She can be reached at [phone].

## 2017-09-19 NOTE — TELEPHONE ENCOUNTER
See mychart encounter regarding this encounter closed    Denise Fuentes RN   [facility]

## 2017-09-20 DIAGNOSIS — R91.8 PULMONARY NODULES: Primary | ICD-10-CM

## 2017-09-20 DIAGNOSIS — R91.8 MULTIPLE PULMONARY NODULES: Primary | ICD-10-CM

## 2017-09-22 ENCOUNTER — MYC MEDICAL ADVICE (OUTPATIENT)
Dept: FAMILY MEDICINE | Facility: CLINIC | Age: 72
End: 2017-09-22

## 2017-09-22 NOTE — TELEPHONE ENCOUNTER
Dr. Oliva,     Please see patient's mychart message below.     Kenia Muller RN  [facility]

## 2017-09-25 NOTE — TELEPHONE ENCOUNTER
Pt notified with recommendation from Dr. Oliva (verbal)    Lynette Sosa, LINHN, RN  [facility]

## 2017-09-27 DIAGNOSIS — G47.00 PERSISTENT DISORDER OF INITIATING OR MAINTAINING SLEEP: Chronic | ICD-10-CM

## 2017-09-27 RX ORDER — ZOLPIDEM TARTRATE 5 MG/1
5 TABLET ORAL
Qty: 30 TABLET | Refills: 5 | Status: SHIPPED | OUTPATIENT
Start: 2017-09-27 | End: 2018-03-07

## 2017-09-27 NOTE — TELEPHONE ENCOUNTER
Controlled Substance Refill Request for AMBIEN ORAL TAB 5MG  Problem List Complete:     checked in past 6 months?    LAST REFILL: 2/22/17 #30/5  LOV: 2/22/17

## 2017-09-27 NOTE — TELEPHONE ENCOUNTER
Routing refill request to provider for review/approval because:  Drug not on the [facility] refill protocol      check last fill was 8/16/17 for 30 tabs    Denise Fuentes RN   [facility]

## 2017-10-18 ENCOUNTER — TRANSFERRED RECORDS (OUTPATIENT)
Dept: HEALTH INFORMATION MANAGEMENT | Facility: CLINIC | Age: 72
End: 2017-10-18

## 2018-01-10 ENCOUNTER — HOSPITAL ENCOUNTER (OUTPATIENT)
Dept: CT IMAGING | Facility: CLINIC | Age: 73
Discharge: HOME | End: 2018-01-10
Attending: INTERNAL MEDICINE | Admitting: INTERNAL MEDICINE
Payer: MEDICARE

## 2018-01-10 DIAGNOSIS — R91.8 OTHER NONSPECIFIC ABNORMAL FINDING OF LUNG FIELD (CODE): ICD-10-CM

## 2018-01-10 PROCEDURE — 71250 CT THORAX DX C-: CPT

## 2018-01-15 ENCOUNTER — TELEPHONE (OUTPATIENT)
Dept: FAMILY MEDICINE | Facility: CLINIC | Age: 73
End: 2018-01-15

## 2018-01-15 ENCOUNTER — TRANSFERRED RECORDS (OUTPATIENT)
Dept: HEALTH INFORMATION MANAGEMENT | Facility: CLINIC | Age: 73
End: 2018-01-15

## 2018-01-15 NOTE — TELEPHONE ENCOUNTER
Return call to patient - attempted to provide reassurance that results are non-emergent - reviewed she needs to contact Dr. Nieto's office for interpretation and plan - she states she has an appointment 1/15/2018 but is concerned about getting there due to weather - she is extremely anxious and asks repeatedly for results even after stating and communicating this with her multiple times    She is finally agreeable to plan of follow up with Dr. Nieto in office visit or by phone    Closing encounter - no further actions needed at this time    Vahid Fernando RN

## 2018-01-15 NOTE — TELEPHONE ENCOUNTER
Reason for Call:  Request for results:    Name of test or procedure: Jann had a CT scan done on the 10th said that they were from the pulmunologist and has not heard the results yet and they have not yet been released to her my chart. She is very anxious and asked if someone from our office could give her a call with the results.     Date of test of procedure: 1/10/2018    Location of the test or procedure: at the hospital    OK to leave the result message on voice mail or with a family member? Not Applicable    Phone number Patient can be reached at:  Home number on file [phone] (home)    Additional comments: Has another appointment at 4:00 today, so if she could be called before them.    Call taken on 1/15/2018 at 11:35 AM by Karla Whiteside

## 2018-01-23 DIAGNOSIS — G47.00 PERSISTENT DISORDER OF INITIATING OR MAINTAINING SLEEP: Chronic | ICD-10-CM

## 2018-01-23 NOTE — TELEPHONE ENCOUNTER
oxazepam (SERAX) 15 MG capsule      Last Written Prescription Date:  2/22/17  Last Fill Quantity: 90,   # refills: 1  Last Office Visit: 2/22/17  Future Office visit:       Routing refill request to provider for review/approval because:  Drug not on the [facility], [facility] or [facility] refill protocol or controlled substance

## 2018-01-25 NOTE — TELEPHONE ENCOUNTER
Dr. Oliva--    Patient requesting refill of Oxazepam 15 mg capsules.    Per last office visit note on 2/2/17, patient to follow up in clinic in one year. Called patient as courtesy request to schedule, she reports she will call back to schedule an appointment.    Kenia Muller RN  [facility]     appt booked for 7/31/17 already

## 2018-01-26 RX ORDER — OXAZEPAM 15 MG/1
15 CAPSULE ORAL
Qty: 30 CAPSULE | Refills: 0 | Status: SHIPPED | OUTPATIENT
Start: 2018-01-26 | End: 2018-03-07

## 2018-03-07 DIAGNOSIS — G47.00 PERSISTENT DISORDER OF INITIATING OR MAINTAINING SLEEP: Chronic | ICD-10-CM

## 2018-03-07 RX ORDER — OXAZEPAM 15 MG/1
15 CAPSULE ORAL
Qty: 30 CAPSULE | Refills: 0 | Status: SHIPPED | OUTPATIENT
Start: 2018-03-07 | End: 2018-04-09

## 2018-03-07 RX ORDER — ZOLPIDEM TARTRATE 5 MG/1
5 TABLET ORAL
Qty: 30 TABLET | Refills: 5 | Status: SHIPPED | OUTPATIENT
Start: 2018-03-07 | End: 2018-09-10

## 2018-03-07 NOTE — TELEPHONE ENCOUNTER
Scripts for oxazepam and ambien was faxed to the [facility] in [place].

## 2018-03-07 NOTE — TELEPHONE ENCOUNTER
Controlled Substance Refill Request for zolpidem (AMBIEN) 5 MG tablet  Problem List Complete:  No     PROVIDER TO CONSIDER COMPLETION OF PROBLEM LIST AND OVERVIEW/CONTROLLED SUBSTANCE AGREEMENT    Last Written Prescription Date:  9/27/17  Last Fill Quantity: 30,   # refills: 5    Last Office Visit with [facility] primary care provider: 2/22/17    Future Office visit:   Next 5 appointments (look out 90 days)     Mar 21, 2018  4:30 PM CDT   Office Visit with Mehran Oliva MD   [facility] ([facility]    [address] [place] [id number] [phone]                  Controlled substance agreement on file: No.     Processing:  n/a   checked in past 6 months?  No, route to RN        Controlled Substance Refill Request for oxazepam (SERAX) 15 MG   Problem List Complete:  No     PROVIDER TO CONSIDER COMPLETION OF PROBLEM LIST AND OVERVIEW/CONTROLLED SUBSTANCE AGREEMENT    Last Written Prescription Date:  1/26/18  Last Fill Quantity: 30,   # refills: 0    Last Office Visit with [facility] primary care provider: 3/21/18    Future Office visit:   Next 5 appointments (look out 90 days)     Mar 21, 2018  4:30 PM CDT   Office Visit with Mehran Oliva MD   [facility] ([facility])    [address] [place] [id number] [phone]                  Controlled substance agreement on file: No.     Processing:  n/a   checked in past 6 months?  No, route to RN

## 2018-03-07 NOTE — TELEPHONE ENCOUNTER
Left detailed voice message for patient with instructions to call the clinic with any questions.    CHARMAINE Myers  [facility]

## 2018-03-28 ENCOUNTER — OFFICE VISIT (OUTPATIENT)
Dept: FAMILY MEDICINE | Facility: CLINIC | Age: 73
End: 2018-03-28
Payer: COMMERCIAL

## 2018-03-28 DIAGNOSIS — F32.5 MAJOR DEPRESSION IN COMPLETE REMISSION (H): ICD-10-CM

## 2018-03-28 DIAGNOSIS — G47.00 PERSISTENT DISORDER OF INITIATING OR MAINTAINING SLEEP: Primary | ICD-10-CM

## 2018-03-28 PROCEDURE — 99214 OFFICE O/P EST MOD 30 MIN: CPT | Performed by: FAMILY MEDICINE

## 2018-03-28 NOTE — LETTER
My Depression Action Plan  Name: Jann Davidson   Date of Birth 1945  Date: 3/6/2018    My doctor: Mehran Oliva   My clinic: [address] [address] [phone]          GREEN    ZONE   Good Control    What it looks like:     Things are going generally well. You have normal up s and down s. You may even feel depressed from time to time, but bad moods usually last less than a day.   What you need to do:  1. Continue to care for yourself (see self care plan)  2. Check your depression survival kit and update it as needed  3. Follow your physician s recommendations including any medication.  4. Do not stop taking medication unless you consult with your physician first.           YELLOW         ZONE Getting Worse    What it looks like:     Depression is starting to interfere with your life.     It may be hard to get out of bed; you may be starting to isolate yourself from others.    Symptoms of depression are starting to last most all day and this has happened for several days.     You may have suicidal thoughts but they are not constant.   What you need to do:     1. Call your care team, your response to treatment will improve if you keep your care team informed of your progress. Yellow periods are signs an adjustment may need to be made.     2. Continue your self-care, even if you have to fake it!    3. Talk to someone in your support network    4. Open up your depression survival kit           RED    ZONE Medical Alert - Get Help    What it looks like:     Depression is seriously interfering with your life.     You may experience these or other symptoms: You can t get out of bed most days, can t work or engage in other necessary activities, you have trouble taking care of basic hygiene, or basic responsibilities, thoughts of suicide or death that will not go away, self-injurious behavior.     What you need to do:  1. Call your  care team and request a same-day appointment. If they are not available (weekends or after hours) call your local crisis line, emergency room or 911.      Electronically signed by: Ellen Goldberg, March 6, 2018    Depression Self Care Plan / Survival Kit    Self-Care for Depression  Here s the deal. Your body and mind are really not as separate as most people think.  What you do and think affects how you feel and how you feel influences what you do and think. This means if you do things that people who feel good do, it will help you feel better.  Sometimes this is all it takes.  There is also a place for medication and therapy depending on how severe your depression is, so be sure to consult with your medical provider and/ or Behavioral Health Consultant if your symptoms are worsening or not improving.     In order to better manage my stress, I will:    Exercise  Get some form of exercise, every day. This will help reduce pain and release endorphins, the  feel good  chemicals in your brain. This is almost as good as taking antidepressants!  This is not the same as joining a gym and then never going! (they count on that by the way ) It can be as simple as just going for a walk or doing some gardening, anything that will get you moving.      Hygiene   Maintain good hygiene (Get out of bed in the morning, Make your bed, Brush your teeth, Take a shower, and Get dressed like you were going to work, even if you are unemployed).  If your clothes don't fit try to get ones that do.    Diet  I will strive to eat foods that are good for me, drink plenty of water, and avoid excessive sugar, caffeine, alcohol, and other mood-altering substances.  Some foods that are helpful in depression are: complex carbohydrates, B vitamins, flaxseed, fish or fish oil, fresh fruits and vegetables.    Psychotherapy  I agree to participate in Individual Therapy (if recommended).    Medication  If prescribed medications, I agree to take them.   Missing doses can result in serious side effects.  I understand that drinking alcohol, or other illicit drug use, may cause potential side effects.  I will not stop my medication abruptly without first discussing it with my provider.    Staying Connected With Others  I will stay in touch with my friends, family members, and my primary care provider/team.    Use your imagination  Be creative.  We all have a creative side; it doesn t matter if it s oil painting, sand castles, or mud pies! This will also kick up the endorphins.    Witness Beauty  (AKA stop and smell the roses) Take a look outside, even in mid-winter. Notice colors, textures. Watch the squirrels and birds.     Service to others  Be of service to others.  There is always someone else in need.  By helping others we can  get out of ourselves  and remember the really important things.  This also provides opportunities for practicing all the other parts of the program.    Humor  Laugh and be silly!  Adjust your TV habits for less news and crime-drama and more comedy.    Control your stress  Try breathing deep, massage therapy, biofeedback, and meditation. Find time to relax each day.     My support system    Clinic Contact:  Phone number:    Contact 1:  Phone number:    Contact 2:  Phone number:    Worship/:  Phone number:    Therapist:  Phone number:    Local crisis center:    Phone number:    Other community support:  Phone number:

## 2018-03-28 NOTE — MR AVS SNAPSHOT
After Visit Summary   3/28/2018    Jann Davidson    MRN: [id number]           Patient Information     Date Of Birth          1945        Today's Diagnoses     Persistent disorder of initiating or maintaining sleep    -  1    Major depression in complete remission (H)           Follow-ups after your visit        Your next 10 appointments already scheduled     Apr 04, 2018  3:00 PM CDT   Office Visit with Mehran Oliva MD   [facility] ([facility])    [address] [place] [address] [phone]           Bring a current list of meds and any records pertaining to this visit. For Physicals, please bring immunization records and any forms needing to be filled out. Please arrive 10 minutes early to complete paperwork.              Who to contact     If you have questions or need follow up information about today's clinic visit or your schedule please contact [facility] directly at [phone].  Normal or non-critical lab and imaging results will be communicated to you by Wanderahart, letter or phone within 4 business days after the clinic has received the results. If you do not hear from us within 7 days, please contact the clinic through Wanderahart or phone. If you have a critical or abnormal lab result, we will notify you by phone as soon as possible.  Submit refill requests through [employer] or call your pharmacy and they will forward the refill request to us. Please allow 3 business days for your refill to be completed.          Additional Information About Your Visit        WanderaharVoiceTrust Information     [employer] gives you secure access to your electronic health record. If you see a primary care provider, you can also send messages to your care team and make appointments. If you have questions, please call your primary care clinic.  If you do not have a primary care provider, please call [phone] and they will assist you.         Care EveryWhere ID     This is your Care EveryWhere ID. This could be used by other organizations to access your [facility] medical records  [id number]         Blood Pressure from Last 3 Encounters:   02/22/17 110/66   07/18/16 130/80   03/23/16 117/75    Weight from Last 3 Encounters:   02/22/17 137 lb 4.8 oz (62.3 kg)   01/17/16 130 lb (59 kg)   01/15/16 139 lb (63 kg)              We Performed the Following     DEPRESSION ACTION PLAN (DAP)        Primary Care Provider Office Phone # Fax #    Mehran Oliva [phone]       [address] [facility] [address]  [facility] [address]        Equal Access to Services     TRI NAVARRETE : Hadii aad ku hadasho Somelissaali, waaxda luqadaha, qaybta kaalmada adeegyada, waxay mananin haydavid khalil . So [facility] [phone].    ATENCIÓN: Si habla español, tiene a shah disposición servicios gratuitos de asistencia lingüística. Mandy al [phone].    We comply with applicable federal civil rights laws and [place] laws. We do not discriminate on the basis of race, color, national origin, age, disability, sex, sexual orientation, or gender identity.            Thank you!     Thank you for choosing [facility]  for your care. Our goal is always to provide you with excellent care. Hearing back from our patients is one way we can continue to improve our services. Please take a few minutes to complete the written survey that you may receive in the mail after your visit with us. Thank you!             Your Updated Medication List - Protect others around you: Learn how to safely use, store and throw away your medicines at www.disposemymeds.org.          This list is accurate as of 3/28/18  6:38 PM.  Always use your most recent med list.                   Brand Name Dispense Instructions for use Diagnosis    ATACAND 4 MG Tabs tablet   Generic drug:  candesartan     90 tablet    Take 1 tablet (4 mg) by mouth daily FLY    Hypertension goal BP  (blood pressure) < 140/90       azithromycin 250 MG tablet    ZITHROMAX    6 tablet    Two tablets first day, then one tablet daily for four days.    Recurrent sinusitis       FLUZONE HIGH-DOSE 0.5 ML injection   Generic drug:  influenza Vac Split High-Dose           HYDROcodone-acetaminophen 5-325 MG per tablet    NORCO    15 tablet    Take 1-2 tablets by mouth every 4 hours as needed        oxazepam 15 MG capsule    SERAX    30 capsule    Take 1 capsule (15 mg) by mouth nightly as needed for sleep or anxiety    Persistent disorder of initiating or maintaining sleep       PARNATE 10 MG tablet   Generic drug:  tranylcypromine     270 tablet    Take 1 tablet (10 mg) by mouth 3 times daily Needs BRAND name medication only. FLY    Major depression in complete remission (H)       polyethylene glycol Packet    MIRALAX/GLYCOLAX     Take 1 packet by mouth daily        predniSONE 10 MG tablet    DELTASONE          PREVNAR 13 Susp injection   Generic drug:  pneumococcal           triamcinolone 0.1 % cream    KENALOG    15 g    Apply  topically 2 times daily as needed.    Sun-damaged skin       * triamterene-hydrochlorothiazide 37.5-25 MG per capsule    DYAZIDE    90 capsule    Take 1 capsule by mouth every morning Take 1 capsule by mouth every morning. Needs to be [place] [address]  for this medication. FLY.    Hypertension goal BP (blood pressure) < 140/90       * triamterene-hydrochlorothiazide 37.5-25 MG per tablet    MAXZIDE-25     TK 1 T PO QAM        zolpidem 5 MG tablet    AMBIEN    30 tablet    Take 1 tablet (5 mg) by mouth nightly as needed    Persistent disorder of initiating or maintaining sleep       * Notice:  This list has 2 medication(s) that are the same as other medications prescribed for you. Read the directions carefully, and ask your doctor or other care provider to review them with you.

## 2018-04-03 NOTE — PROGRESS NOTES
"  SUBJECTIVE:   Jann Davidson is a 72 year old female who presents to clinic today for the following health issues:  {Provider please address medication reconciliation discrepancies--rooming staff please delete if no med/rec issues}    Medication Followup of ***    Taking Medication as prescribed: {.:392513::\"yes\"}    Side Effects:  {NONEORCHOOSE:959649::\"None\"}    Medication Helping Symptoms:  {.:743253::\"yes\"}       {additional problems for provider to add:427708}    Problem list and histories reviewed & adjusted, as indicated.  Additional history: {NONE - AS DOCUMENTED:156969::\"as documented\"}    {HIST REVIEW/ LINKS 2:711298}    Reviewed and updated as needed this visit by clinical staff       Reviewed and updated as needed this visit by Provider         {PROVIDER CHARTING PREFERENCE:401819}    "

## 2018-04-04 ENCOUNTER — OFFICE VISIT (OUTPATIENT)
Dept: FAMILY MEDICINE | Facility: CLINIC | Age: 73
End: 2018-04-04
Payer: COMMERCIAL

## 2018-04-04 VITALS
OXYGEN SATURATION: 99 % | DIASTOLIC BLOOD PRESSURE: 70 MMHG | TEMPERATURE: 98.1 F | WEIGHT: 135.9 LBS | BODY MASS INDEX: 24.07 KG/M2 | HEART RATE: 91 BPM | SYSTOLIC BLOOD PRESSURE: 130 MMHG

## 2018-04-04 DIAGNOSIS — R91.8 MULTIPLE PULMONARY NODULES: ICD-10-CM

## 2018-04-04 DIAGNOSIS — F33.1 MAJOR DEPRESSIVE DISORDER, RECURRENT EPISODE, MODERATE (H): ICD-10-CM

## 2018-04-04 DIAGNOSIS — Z00.00 MEDICARE ANNUAL WELLNESS VISIT, SUBSEQUENT: Primary | ICD-10-CM

## 2018-04-04 DIAGNOSIS — G47.00 PERSISTENT DISORDER OF INITIATING OR MAINTAINING SLEEP: ICD-10-CM

## 2018-04-04 DIAGNOSIS — L24.0 IRRITANT CONTACT DERMATITIS DUE TO DETERGENT: ICD-10-CM

## 2018-04-04 PROCEDURE — 80053 COMPREHEN METABOLIC PANEL: CPT | Performed by: FAMILY MEDICINE

## 2018-04-04 PROCEDURE — 99397 PER PM REEVAL EST PAT 65+ YR: CPT | Performed by: FAMILY MEDICINE

## 2018-04-04 PROCEDURE — 36415 COLL VENOUS BLD VENIPUNCTURE: CPT | Performed by: FAMILY MEDICINE

## 2018-04-04 PROCEDURE — 80061 LIPID PANEL: CPT | Performed by: FAMILY MEDICINE

## 2018-04-04 RX ORDER — TRANYLCYPROMINE SULFATE 10 MG/1
40 TABLET, FILM COATED ORAL DAILY
Qty: 360 TABLET | Refills: 3 | Status: SHIPPED | OUTPATIENT
Start: 2018-04-04 | End: 2019-01-16

## 2018-04-04 RX ORDER — TRIAMTERENE/HYDROCHLOROTHIAZID 37.5-25 MG
0.5 TABLET ORAL DAILY
COMMUNITY
End: 2019-06-05 | Stop reason: DRUGHIGH

## 2018-04-04 NOTE — MR AVS SNAPSHOT
After Visit Summary   4/4/2018    aJnn Davidson    MRN: [id number]           Patient Information     Date Of Birth          1945        Visit Information        Provider Department      4/4/2018 3:00 PM Mehran Oliva MD [facility]        Today's Diagnoses     Medicare annual wellness visit, subsequent    -  1    Persistent disorder of initiating or maintaining sleep        Multiple pulmonary nodules        Irritant contact dermatitis due to detergent        Major depressive disorder, recurrent episode, moderate (H)          Care Instructions      Preventive Health Recommendations    Female Ages 65 +    Yearly exam:     See your health care provider every year in order to  o Review health changes.   o Discuss preventive care.    o Review your medicines if your doctor has prescribed any.      You no longer need a yearly Pap test unless you've had an abnormal Pap test in the past 10 years. If you have vaginal symptoms, such as bleeding or discharge, be sure to talk with your provider about a Pap test.      Every 1 to 2 years, have a mammogram.  If you are over 69, talk with your health care provider about whether or not you want to continue having screening mammograms.      Every 10 years, have a colonoscopy. Or, have a yearly FIT test (stool test). These exams will check for colon cancer.       Have a cholesterol test every 5 years, or more often if your doctor advises it.       Have a diabetes test (fasting glucose) every three years. If you are at risk for diabetes, you should have this test more often.       At age 65, have a bone density scan (DEXA) to check for osteoporosis (brittle bone disease).    Shots:    Get a flu shot each year.    Get a tetanus shot every 10 years.    Talk to your doctor about your pneumonia vaccines. There are now two you should receive - Pneumovax (PPSV 23) and Prevnar (PCV 13).    Talk to your doctor about the shingles vaccine.    Talk to your  doctor about the hepatitis B vaccine.    Nutrition:     Eat at least 5 servings of fruits and vegetables each day.      Eat whole-grain bread, whole-wheat pasta and brown rice instead of white grains and rice.      Talk to your provider about Calcium and Vitamin D.     Lifestyle    Exercise at least 150 minutes a week (30 minutes a day, 5 days a week). This will help you control your weight and prevent disease.      Limit alcohol to one drink per day.      No smoking.       Wear sunscreen to prevent skin cancer.       See your dentist twice a year for an exam and cleaning.      See your eye doctor every 1 to 2 years to screen for conditions such as glaucoma, macular degeneration and cataracts.          Follow-ups after your visit        Who to contact     If you have questions or need follow up information about today's clinic visit or your schedule please contact [facility] directly at [phone].  Normal or non-critical lab and imaging results will be communicated to you by AdhereTechhart, letter or phone within 4 business days after the clinic has received the results. If you do not hear from us within 7 days, please contact the clinic through Zipong[employer] or phone. If you have a critical or abnormal lab result, we will notify you by phone as soon as possible.  Submit refill requests through [employer] or call your pharmacy and they will forward the refill request to us. Please allow 3 business days for your refill to be completed.          Additional Information About Your Visit        MyChart Information     [employer] gives you secure access to your electronic health record. If you see a primary care provider, you can also send messages to your care team and make appointments. If you have questions, please call your primary care clinic.  If you do not have a primary care provider, please call [phone] and they will assist you.        Care EveryWhere ID     This is your Care EveryWhere ID. This could be  used by other organizations to access your [facility] medical records  [id number]        Your Vitals Were     Pulse Temperature Pulse Oximetry BMI (Body Mass Index)          91 98.1  F (36.7  C) (Oral) 99% 24.07 kg/m2         Blood Pressure from Last 3 Encounters:   04/04/18 130/70   02/22/17 110/66   07/18/16 130/80    Weight from Last 3 Encounters:   04/04/18 135 lb 14.4 oz (61.6 kg)   02/22/17 137 lb 4.8 oz (62.3 kg)   01/17/16 130 lb (59 kg)              We Performed the Following     Comprehensive metabolic panel     Lipid panel reflex to direct LDL Fasting     OFFICE/OUTPT VISIT,EST,LEVL III          Today's Medication Changes          These changes are accurate as of 4/4/18  6:14 PM.  If you have any questions, ask your nurse or doctor.               These medicines have changed or have updated prescriptions.        Dose/Directions    ATACAND 4 MG Tabs tablet   This may have changed:    - how much to take  - additional instructions   Used for:  Hypertension goal BP (blood pressure) < 140/90   Generic drug:  candesartan        Dose:  4 mg   Take 1 tablet (4 mg) by mouth daily FLY   Quantity:  90 tablet   Refills:  3       PARNATE 10 MG tablet   This may have changed:    - how much to take  - when to take this   Generic drug:  tranylcypromine   Changed by:  Mehran Oliva MD        Dose:  40 mg   Take 4 tablets (40 mg) by mouth daily Needs BRAND name medication only. FLY   Quantity:  360 tablet   Refills:  3       triamterene-hydrochlorothiazide 37.5-25 MG per tablet   Commonly known as:  MAXZIDE-25   This may have changed:  Another medication with the same name was removed. Continue taking this medication, and follow the directions you see here.   Changed by:  Mehran Oliva MD        Dose:  0.5 tablet   Take 0.5 tablets by mouth daily   Refills:  0         Stop taking these medicines if you haven't already. Please contact your care team if you have questions.     azithromycin 250 MG tablet    Commonly known as:  ZITHROMAX   Stopped by:  Mehran Oliva MD           HYDROcodone-acetaminophen 5-325 MG per tablet   Commonly known as:  NORCO   Stopped by:  Mehran Oliva MD           predniSONE 10 MG tablet   Commonly known as:  DELTASONE   Stopped by:  eMhran Oliva MD           PREVNAR 13 Susp injection   Generic drug:  pneumococcal   Stopped by:  Mehran Oliva MD                Where to get your medicines      These medications were sent to [id number] - [place], [place] - [address]  [address], [facility] [address]     Phone:  [phone]     PARNATE 10 MG tablet                Primary Care Provider Office Phone # Fax #    Mehran Oliva [phone]       [address] [facility] [address]  [facility] [address]        Equal Access to Services     [facility]: Hadii aad ku hadasho Soomaali, waaxda luqadaha, qaybta kaalmada adeegyada, angelica solano haydavid khalil . So [facility] [phone].    ATENCIÓN: Si habla español, tiene a shah disposición servicios gratuitos de asistencia lingüística. [facility] [phone].    We comply with applicable federal civil rights laws and [place] laws. We do not discriminate on the basis of race, color, national origin, age, disability, sex, sexual orientation, or gender identity.            Thank you!     Thank you for choosing [facility]  for your care. Our goal is always to provide you with excellent care. Hearing back from our patients is one way we can continue to improve our services. Please take a few minutes to complete the written survey that you may receive in the mail after your visit with us. Thank you!             Your Updated Medication List - Protect others around you: Learn how to safely use, store and throw away your medicines at www.disposemymeds.org.          This list is accurate as of 4/4/18  6:14 PM.  Always use your most recent med list.                    Brand Name Dispense Instructions for use Diagnosis    ATACAND 4 MG Tabs tablet   Generic drug:  candesartan     90 tablet    Take 1 tablet (4 mg) by mouth daily FLY    Hypertension goal BP (blood pressure) < 140/90       FLUZONE HIGH-DOSE 0.5 ML injection   Generic drug:  influenza Vac Split High-Dose           oxazepam 15 MG capsule    SERAX    30 capsule    Take 1 capsule (15 mg) by mouth nightly as needed for sleep or anxiety    Persistent disorder of initiating or maintaining sleep       PARNATE 10 MG tablet   Generic drug:  tranylcypromine     360 tablet    Take 4 tablets (40 mg) by mouth daily Needs BRAND name medication only. FLY        polyethylene glycol Packet    MIRALAX/GLYCOLAX     Take 1 packet by mouth daily        triamcinolone 0.1 % cream    KENALOG    15 g    Apply  topically 2 times daily as needed.    Sun-damaged skin       triamterene-hydrochlorothiazide 37.5-25 MG per tablet    MAXZIDE-25     Take 0.5 tablets by mouth daily        zolpidem 5 MG tablet    AMBIEN    30 tablet    Take 1 tablet (5 mg) by mouth nightly as needed    Persistent disorder of initiating or maintaining sleep

## 2018-04-05 LAB
ALBUMIN SERPL-MCNC: 3.9 G/DL (ref 3.4–5)
ALP SERPL-CCNC: 81 U/L (ref 40–150)
ALT SERPL W P-5'-P-CCNC: 32 U/L (ref 0–50)
ANION GAP SERPL CALCULATED.3IONS-SCNC: 6 MMOL/L (ref 3–14)
AST SERPL W P-5'-P-CCNC: 26 U/L (ref 0–45)
BILIRUB SERPL-MCNC: 0.4 MG/DL (ref 0.2–1.3)
BUN SERPL-MCNC: 29 MG/DL (ref 7–30)
CALCIUM SERPL-MCNC: 9.3 MG/DL (ref 8.5–10.1)
CHLORIDE SERPL-SCNC: 107 MMOL/L (ref 94–109)
CHOLEST SERPL-MCNC: 219 MG/DL
CO2 SERPL-SCNC: 27 MMOL/L (ref 20–32)
CREAT SERPL-MCNC: 1.24 MG/DL (ref 0.52–1.04)
GFR SERPL CREATININE-BSD FRML MDRD: 42 ML/MIN/1.7M2
GLUCOSE SERPL-MCNC: 96 MG/DL (ref 70–99)
HDLC SERPL-MCNC: 99 MG/DL
LDLC SERPL CALC-MCNC: 111 MG/DL
NONHDLC SERPL-MCNC: 120 MG/DL
POTASSIUM SERPL-SCNC: 4.3 MMOL/L (ref 3.4–5.3)
PROT SERPL-MCNC: 7.2 G/DL (ref 6.8–8.8)
SODIUM SERPL-SCNC: 140 MMOL/L (ref 133–144)
TRIGL SERPL-MCNC: 47 MG/DL

## 2018-04-05 ASSESSMENT — PATIENT HEALTH QUESTIONNAIRE - PHQ9: SUM OF ALL RESPONSES TO PHQ QUESTIONS 1-9: 1

## 2018-04-06 ENCOUNTER — MYC MEDICAL ADVICE (OUTPATIENT)
Dept: FAMILY MEDICINE | Facility: CLINIC | Age: 73
End: 2018-04-06

## 2018-04-06 DIAGNOSIS — G47.00 PERSISTENT DISORDER OF INITIATING OR MAINTAINING SLEEP: Chronic | ICD-10-CM

## 2018-04-06 DIAGNOSIS — Z13.6 CARDIOVASCULAR SCREENING; LDL GOAL LESS THAN 130: Primary | ICD-10-CM

## 2018-04-06 NOTE — PROGRESS NOTES
Benjy Forte: Your recent results show your mild chronic kidney disease is slightly better. Although cholesterol numbers are a little better, recommend atorvastatin 10 mg to reduce your elevated risk for heart disease (low risk is < 10 %).     Mehran     The 10-year ASCVD risk score ([place] CLARK Jr, et al., 2013) is: 15.3%    Values used to calculate the score:      Age: 72 years      Sex: Female      Is Non- : No      Diabetic: No      Tobacco smoker: No      Systolic Blood Pressure: 130 mmHg      Is BP treated: Yes      HDL Cholesterol: 99 mg/dL      Total Cholesterol: 219 mg/dL

## 2018-04-07 DIAGNOSIS — G47.00 PERSISTENT DISORDER OF INITIATING OR MAINTAINING SLEEP: Chronic | ICD-10-CM

## 2018-04-07 RX ORDER — OXAZEPAM 15 MG/1
CAPSULE ORAL
Qty: 30 CAPSULE | Refills: 0 | Status: CANCELLED | OUTPATIENT
Start: 2018-04-07

## 2018-04-09 ENCOUNTER — TELEPHONE (OUTPATIENT)
Dept: FAMILY MEDICINE | Facility: CLINIC | Age: 73
End: 2018-04-09

## 2018-04-09 RX ORDER — ATORVASTATIN CALCIUM 10 MG/1
10 TABLET, FILM COATED ORAL DAILY
Qty: 90 TABLET | Refills: 3 | Status: SHIPPED | OUTPATIENT
Start: 2018-04-09 | End: 2019-03-04

## 2018-04-09 RX ORDER — OXAZEPAM 15 MG/1
15 CAPSULE ORAL
Qty: 30 CAPSULE | Refills: 5 | Status: SHIPPED | OUTPATIENT
Start: 2018-04-09 | End: 2018-09-10

## 2018-04-09 NOTE — TELEPHONE ENCOUNTER
Script for oxazepam was faxed to the [facility] [place] in [place].

## 2018-04-09 NOTE — TELEPHONE ENCOUNTER
Prior Authorization Retail Medication Request    Medication/Dose: Oxazepam  ICD code (if different than what is on RX):    Previously Tried and Failed:  unknown  Rationale:      Insurance Name:  Covermeds  Insurance ID:  [id number]      Pharmacy Information (if different than what is on RX)  [facility]  [facility]  Phone:  [phone]

## 2018-04-09 NOTE — TELEPHONE ENCOUNTER
Dr. Oliva    See pt mychart message regarding atorvastatin    atorvorastatin 10mg   Refill of oxazepam also requested    Advise    Denise Fuentes RN   [facility]

## 2018-04-10 NOTE — TELEPHONE ENCOUNTER
Central Prior Authorization Team   Phone: [phone]    PA Initiation    Medication: Oxazepam  Insurance Company: [employer] Part D - Phone [phone] Fax [phone]  Pharmacy Filling the Rx: [id number] [place], [place] - [address]  Filling Pharmacy Phone: [phone]  Filling Pharmacy Fax:    Start Date: 4/10/2018

## 2018-04-11 NOTE — TELEPHONE ENCOUNTER
Prior Authorization Approval    Authorization Effective Date: 4/9/2018  Authorization Expiration Date: 12/31/2018  Medication: Oxazepam  Approved Dose/Quantity:    Reference #:     Insurance Company: [employer] Part D - Phone [phone] Fax [phone]  Expected CoPay:       CoPay Card Available:      Foundation Assistance Needed:    Which Pharmacy is filling the prescription (Not needed for infusion/clinic administered): [id number] - [place], [place] - [address]  Pharmacy Notified: Yes  Patient Notified: Yes

## 2018-04-27 ENCOUNTER — TELEPHONE (OUTPATIENT)
Dept: FAMILY MEDICINE | Facility: CLINIC | Age: 73
End: 2018-04-27

## 2018-04-27 DIAGNOSIS — J32.9 RECURRENT SINUSITIS: ICD-10-CM

## 2018-04-27 RX ORDER — AZITHROMYCIN 250 MG/1
TABLET, FILM COATED ORAL
Qty: 6 TABLET | Refills: 0 | Status: SHIPPED | OUTPATIENT
Start: 2018-04-27 | End: 2020-08-14

## 2018-04-27 NOTE — TELEPHONE ENCOUNTER
Dr. Oliva,    Please see phone call message below.    Zithromax medication cued.    [facility] preferred if med is ordered soon because they can deliver, if ordered later, prefers [facility] in [place].    Lucia Bar RN  [facility]

## 2018-04-27 NOTE — TELEPHONE ENCOUNTER
Patient notified. Will schedule appt if no improvement after this round.    Lucia Bar RN  [facility]

## 2018-04-27 NOTE — TELEPHONE ENCOUNTER
Reason for call:  Other   Patient called regarding (reason for call): call back and prescription  Additional comments: Pt would like Dr. Oliva to prescribe her another round of antibiotics for a sinus infection that she has. She finished the 1st round yesterday, and it didn't help at all. She really doesn't want to have to to the ED, and she didn't sleep at all last night because of the pain/pressure for her sinuses and is very frustrated/upset. She stated that Dr. Oliva is aware that it usually takes 2 rounds of antibiotics for the infection to completely clear up. Pharmacy is queued, although pt stated that if the med could be ordered right away she would prefer it to go to the [facility] pharmacy because they can deliver it if it's ordered soon. Pt is going to try to sleep a little since she was unable to last night, but will keep the phone on and near her so she will hear it ring.     Phone number to reach patient:  Home number on file [phone] (home)    Best Time:  Any    Can we leave a detailed message on this number?  YES

## 2018-04-30 ENCOUNTER — HOSPITAL ENCOUNTER (EMERGENCY)
Facility: CLINIC | Age: 73
Discharge: HOME | End: 2018-04-30
Attending: EMERGENCY MEDICINE | Admitting: EMERGENCY MEDICINE
Payer: MEDICARE

## 2018-04-30 ENCOUNTER — APPOINTMENT (OUTPATIENT)
Dept: GENERAL RADIOLOGY | Facility: CLINIC | Age: 73
End: 2018-04-30
Attending: EMERGENCY MEDICINE
Payer: MEDICARE

## 2018-04-30 VITALS
RESPIRATION RATE: 20 BRPM | DIASTOLIC BLOOD PRESSURE: 82 MMHG | TEMPERATURE: 97.6 F | SYSTOLIC BLOOD PRESSURE: 150 MMHG | HEIGHT: 63 IN | HEART RATE: 82 BPM | OXYGEN SATURATION: 100 %

## 2018-04-30 DIAGNOSIS — Z87.09 HISTORY OF SINUSITIS: ICD-10-CM

## 2018-04-30 DIAGNOSIS — R07.89 ATYPICAL CHEST PAIN: ICD-10-CM

## 2018-04-30 DIAGNOSIS — F41.9 ANXIETY: ICD-10-CM

## 2018-04-30 LAB
ANION GAP SERPL CALCULATED.3IONS-SCNC: 8 MMOL/L (ref 3–14)
BASOPHILS # BLD AUTO: 0 10E9/L (ref 0–0.2)
BASOPHILS NFR BLD AUTO: 0.1 %
BUN SERPL-MCNC: 24 MG/DL (ref 7–30)
CALCIUM SERPL-MCNC: 9 MG/DL (ref 8.5–10.1)
CHLORIDE SERPL-SCNC: 106 MMOL/L (ref 94–109)
CO2 SERPL-SCNC: 23 MMOL/L (ref 20–32)
CREAT SERPL-MCNC: 1.27 MG/DL (ref 0.52–1.04)
DIFFERENTIAL METHOD BLD: NORMAL
EOSINOPHIL # BLD AUTO: 0.1 10E9/L (ref 0–0.7)
EOSINOPHIL NFR BLD AUTO: 1 %
ERYTHROCYTE [DISTWIDTH] IN BLOOD BY AUTOMATED COUNT: 13.3 % (ref 10–15)
GFR SERPL CREATININE-BSD FRML MDRD: 41 ML/MIN/1.7M2
GLUCOSE SERPL-MCNC: 100 MG/DL (ref 70–99)
HCT VFR BLD AUTO: 39.1 % (ref 35–47)
HGB BLD-MCNC: 13.1 G/DL (ref 11.7–15.7)
IMM GRANULOCYTES # BLD: 0.1 10E9/L (ref 0–0.4)
IMM GRANULOCYTES NFR BLD: 0.9 %
INTERPRETATION ECG - MUSE: NORMAL
LYMPHOCYTES # BLD AUTO: 2.6 10E9/L (ref 0.8–5.3)
LYMPHOCYTES NFR BLD AUTO: 36.2 %
MCH RBC QN AUTO: 29.7 PG (ref 26.5–33)
MCHC RBC AUTO-ENTMCNC: 33.5 G/DL (ref 31.5–36.5)
MCV RBC AUTO: 89 FL (ref 78–100)
MONOCYTES # BLD AUTO: 0.5 10E9/L (ref 0–1.3)
MONOCYTES NFR BLD AUTO: 6.4 %
NEUTROPHILS # BLD AUTO: 3.9 10E9/L (ref 1.6–8.3)
NEUTROPHILS NFR BLD AUTO: 55.4 %
NRBC # BLD AUTO: 0 10*3/UL
NRBC BLD AUTO-RTO: 0 /100
PLATELET # BLD AUTO: 268 10E9/L (ref 150–450)
POTASSIUM SERPL-SCNC: 3.5 MMOL/L (ref 3.4–5.3)
RBC # BLD AUTO: 4.41 10E12/L (ref 3.8–5.2)
SODIUM SERPL-SCNC: 137 MMOL/L (ref 133–144)
TROPONIN I SERPL-MCNC: <0.015 UG/L (ref 0–0.04)
WBC # BLD AUTO: 7 10E9/L (ref 4–11)

## 2018-04-30 PROCEDURE — 85025 COMPLETE CBC W/AUTO DIFF WBC: CPT | Performed by: EMERGENCY MEDICINE

## 2018-04-30 PROCEDURE — 99285 EMERGENCY DEPT VISIT HI MDM: CPT | Mod: 25

## 2018-04-30 PROCEDURE — 84484 ASSAY OF TROPONIN QUANT: CPT | Performed by: EMERGENCY MEDICINE

## 2018-04-30 PROCEDURE — 93005 ELECTROCARDIOGRAM TRACING: CPT

## 2018-04-30 PROCEDURE — 71046 X-RAY EXAM CHEST 2 VIEWS: CPT

## 2018-04-30 PROCEDURE — 80048 BASIC METABOLIC PNL TOTAL CA: CPT | Performed by: EMERGENCY MEDICINE

## 2018-04-30 PROCEDURE — 96360 HYDRATION IV INFUSION INIT: CPT

## 2018-04-30 PROCEDURE — 25000128 H RX IP 250 OP 636: Performed by: EMERGENCY MEDICINE

## 2018-04-30 RX ORDER — ECHINACEA PURPUREA EXTRACT 125 MG
2 TABLET ORAL 3 TIMES DAILY PRN
Qty: 1 BOTTLE | Refills: 0 | Status: SHIPPED | OUTPATIENT
Start: 2018-04-30 | End: 2021-12-03

## 2018-04-30 RX ORDER — ECHINACEA PURPUREA EXTRACT 125 MG
2 TABLET ORAL 3 TIMES DAILY PRN
Qty: 1 BOTTLE | Refills: 0 | Status: SHIPPED | OUTPATIENT
Start: 2018-04-30 | End: 2018-04-30

## 2018-04-30 RX ORDER — HYDROXYZINE PAMOATE 50 MG/1
50 CAPSULE ORAL 3 TIMES DAILY PRN
Qty: 20 CAPSULE | Refills: 0 | Status: SHIPPED | OUTPATIENT
Start: 2018-04-30 | End: 2018-04-30

## 2018-04-30 RX ORDER — HYDROXYZINE PAMOATE 50 MG/1
50 CAPSULE ORAL 3 TIMES DAILY PRN
Qty: 20 CAPSULE | Refills: 0 | Status: SHIPPED | OUTPATIENT
Start: 2018-04-30 | End: 2020-07-23

## 2018-04-30 RX ADMIN — SODIUM CHLORIDE 1000 ML: 9 INJECTION, SOLUTION INTRAVENOUS at 01:53

## 2018-04-30 ASSESSMENT — ENCOUNTER SYMPTOMS
NERVOUS/ANXIOUS: 1
HEADACHES: 1
SINUS PAIN: 1
SINUS PRESSURE: 1

## 2018-04-30 NOTE — ED PROVIDER NOTES
"  History     Chief Complaint:  Sinusitis     HPI   Jann Davidson is a 72 year old female with a history of sinusitis who presents with concern that her symptoms are not improving. The patient states that she developed a cold 3 weeks ago. She then developed a headache with sinus pain and congestion. The patient called her ENT and was prescribed Augmentin as well as prednisone. After 6 days of the antibiotics and steroids, the patient's symptoms were still ongoing. She states that Augmentin \"never works for me\" and that she felt that she had to have a \"Zpak\" and it \"usually takes two Zpaks\". At that time, she stopped the two medications and was switched to azithromycin. She completed 5 days but was concerned with persistent \"buzzing in my head\" and therefore started a second Zpak.  The patient states that her symptoms improved yesterday. However around 2100, her symptoms returned and she had chest pressure.  She states that she is feeling anxious due to the duration of her symptoms. The patient is scheduled to see ENT in two days.  The patient is concerned that \"I may have cancer or pneumonia or something else.\" When asked to describe what the nature of her persistent symptoms are she becomes frustrated and states \"its my sinusitis.\" She does not describe any other particular signs or symptoms at this time.    Allergies:  No known drug allergies.      Medications:    Atacand  Lipitor  Azithromycin   Serax  Fluzone  Parnate  Miralax  Ambien  Maxzide     Past Medical History:    Hypertension  Recurrent sinusitis    Past Surgical History:    Breast biopsy   Reconstruct eyelid    Family History:    Breast Cancer-Mother    Social History:  Marital Status:   Presents to the ED alone  Tobacco Use: Former Smoker, quit 1972  Alcohol Use: Yes  PCP: Mehran Oliva      Review of Systems   HENT: Positive for congestion, sinus pain and sinus pressure.    Cardiovascular: Positive for chest pain.   Neurological: " "Positive for headaches.   Psychiatric/Behavioral: The patient is nervous/anxious.    All other systems reviewed and are negative.      Physical Exam   First Vitals:  BP: (!) 153/99  Heart Rate: 98  Temp: 97.7  F (36.5  C)  Resp: 20  Height: 160 cm (5' 3\")  SpO2: 100 %      Physical Exam  General: Well appearing, nontoxic. Resting comfortably  Head:  Scalp, face, and head appear normal  Eyes:  Pupils are equal, round, and reactive to light    Conjunctivae non-injected and sclerae white  ENT:    The external nose is normal. Turbinates normal. No purulent drainage. No focal tenderness to palpation over the facial sinuses.     Pinnae are normal. Bilateral TMs clear without erythema, bulging, or effusion. Auditory canals normal.     The oropharynx is normal, mucous membranes moist    Posterior pharynx clear without swelling, exudates or erythema    Uvula is in the midline  Neck:  Normal range of motion. No significant cervical lymphadenopathy     There is no rigidity noted    Trachea is in the midline  CV:  Regular rate and rhythm     Normal S1/S2, no S3/S4    No murmur or rub  Resp:  Lungs are clear and equal bilaterally    There is no tachypnea    No increased work of breathing    No rales, wheezing, or rhonchi  GI:  Abdomen is soft, no rigidity or guarding    No distension, or mass    No tenderness or rebound tenderness   MS:  Normal muscular tone    Symmetric motor strength    No lower extremity edema  Skin:  No rash or acute skin lesions noted  Neuro:  Awake and alert    Speech is normal and fluent    Moves all extremities spontaneously  Psych:  Anxious affect.  Appropriate interactions.    Emergency Department Course   ECG:  @ 0141  Indication: chest pressure  Vent. Rate 76 bpm. ND interval 122 ms. QRS duration 82 ms. QT/QTc 362/407 ms. P-R-T axis 34 68 53.   Normal sinus rhythm. Normal ECG.    Read @ 0145 by Dr. Kendrick.     Imaging:  Chest x-ray, 2 views:   No evidence of active cardiopulmonary disease. "   Preliminary radiology read.     Radiographic findings were communicated with the patient who voiced understanding of the findings.    Laboratory:  (0135) Troponin I: <0.015  BMP: Glucose 100, Creatinine 1.27 (H), GFR 41 (L), otherwise WNL   CBC:  WBC 7.0, HGB 13.1, , otherwise WNL     Interventions:   (0153) Normal Saline, 1 liter, IV bolus     Emergency Department Course:  Nursing notes and vitals reviewed.  (0209) I performed an exam of the patient as documented above.    EKG was done, interpretation as above.   A peripheral IV was established. Blood was drawn from the patient. This was sent for laboratory testing, findings above.    The patient was sent for a chest x-ray while in the emergency department, findings above.    Findings and plan explained to the patient. Patient discharged home with instructions regarding supportive care, medications, and reasons to return. The importance of close follow-up was reviewed.     Impression & Plan    Medical Decision Making:  Jann Davidson is a 72 year old female with a history of recurrent sinus infection recently completed 2 courses of antibiotics as noted above. Chiefly on examination patient is clearly suffering from significant anxiety symptoms. She is afebrile. She is well appearing. She has no evidence of significant purulent rhinosinusitis or any other aggressive infection. She has no evidence of acute pharyngitis, clemencia's angina, peritonsillar abscess, retropharyngeal abscess. She denies any significant neck pain, headaches or evidence of meningitis. Patient also noted some atypical chest pain symptoms which I feel are secondary to her anxiety. Chest x-ray was negative for any acute findings. EKG was nonischemic. Initial troponin is negative although I do not feel that she has an acute coronary syndrome clinically I would not repeat this. Also her symptoms are not consistent with pulmonary embolism. I spent a significant amount of time at the bedside  providing reassurance and a thorough repeat examination with the patient. She felt improved after this. We discussed supportive measures including saline nasal spray as well as vistaril for both antihistamine effects as well as anxiety. I instructed patient to keep her ENT appointment on Wednesday and to follow up closely with her primary care physician. The patient was agreeable with the plan of care and was discharged in stable condition.       Diagnosis:    ICD-10-CM    1. Anxiety F41.9    2. Atypical chest pain R07.89    3. History of sinusitis Z87.09        Disposition:  discharged to home        Reina BRADY, am serving as a scribe on 4/30/2018 at 2:09 AM to personally document services performed by Dr. Kendrick based on my observations and the provider's statements to me.    4/30/2018    EMERGENCY DEPARTMENT       Van Kendrick MD  04/30/18 [id number]

## 2018-04-30 NOTE — ED AVS SNAPSHOT
Emergency Department    [address] [facility] [address]    Phone:  [phone]    Fax:  [phone]                                       Jann Davidson   MRN: [id number]    Department:   Emergency Department   Date of Visit:  4/30/2018           Patient Information     Date Of Birth          1945        Your diagnoses for this visit were:     Anxiety     Atypical chest pain     History of sinusitis        You were seen by Van Kendrick MD.      Follow-up Information     Follow up with Mehran Oliva MD. Schedule an appointment as soon as possible for a visit in 3 days.    Specialty:  Family Practice    Why:  For close follow up    Contact information:    [address]  [facility] [address] [phone]          Discharge Instructions       Discharge Instructions  Chest Pain    You have been seen today for chest pain or discomfort.  At this time, your provider has found no signs that your chest pain is due to a serious or life-threatening condition, (or you have declined more testing and/or admission to the hospital). However, sometimes there is a serious problem that does not show up right away. Your evaluation today may not be complete and you may need further testing and evaluation.     Generally, every Emergency Department visit should have a follow-up clinic visit with either a primary or a specialty clinic/provider. Please follow-up as instructed by your emergency provider today.  Return to the Emergency Department if:    Your chest pain changes, gets worse, starts to happen more often, or comes with less activity.    You are newly short of breath.    You get very weak or tired.    You pass out or faint.    You have any new symptoms, like fever, cough, numb legs, or you cough up blood.    You have anything else that worries you.    Until you follow-up with your regular provider, please do the following:    Take one aspirin daily unless you have an allergy or  are told not to by your provider.    If a stress test appointment has been made, go to the appointment.    If you have questions, contact your regular provider.    Follow-up with your regular provider/clinic as directed; this is very important.    If you were given a prescription for medicine here today, be sure to read all of the information (including the package insert) that comes with your prescription.  This will include important information about the medicine, its side effects, and any warnings that you need to know about.  The pharmacist who fills the prescription can provide more information and answer questions you may have about the medicine.  If you have questions or concerns that the pharmacist cannot address, please call or return to the Emergency Department.       Remember that you can always come back to the Emergency Department if you are not able to see your regular provider in the amount of time listed above, if you get any new symptoms, or if there is anything that worries you.      Anxiety Reaction  Anxiety is the feeling we all get when we think something bad might happen. It is a normal response to stress and usually causes only a mild reaction. When anxiety becomes more severe, it can interfere with daily life. In some cases, you may not even be aware of what it is you re anxious about. There may also be a genetic link or it may be a learned behavior in the home.  Both psychological and physical triggers cause stress reaction. It's often a response to fear or emotional stress, real or imagined. This stress may come from home, family, work, or social relationships.  During an anxiety reaction, you may feel:    Helpless    Nervous    Depressed    Irritable  Your body may show signs of anxiety in many ways. You may experience:    Dry mouth    Shakiness    Dizziness    Weakness    Trouble breathing    Breathing fast (hyperventilating)    Chest  pressure    Sweating    Headache    Nausea    Diarrhea    Tiredness    Inability to sleep    Sexual problems  Home care    Try to locate the sources of stress in your life. They may not be obvious. These may include:  ? Daily hassles of life (such as traffic jams, missed appointments, or car troubles)  ? Major life changes, both good (new baby or job promotion) and bad (loss of job or loss of loved one)  ? Overload: feeling that you have too many responsibilities and can't take care of all of them at once  ? Feeling helpless or feeling that your problems are beyond what you re able to solve    Notice how your body reacts to stress. Learn to listen to your body signals. This will help you take action before the stress becomes severe.    When you can, do something about the source of your stress. (Avoid hassles, limit the amount of change that happens in your life at one time and take a break when you feel overloaded).    Unfortunately, many stressful situations can't be avoided. It is necessary to learn how to better manage stress. There are many proven methods that will reduce your anxiety. These include simple things like exercise, good nutrition, and adequate rest. Also, there are certain techniques that are helpful:  ? Relaxation  ? Breathing exercises  ? Visualization  ? Biofeedback  ? Meditation  For more information about this, consult your healthcare provider or go to a local bookstore and review the many books and tapes available on this subject.  Follow-up care  If you feel that your anxiety is not responding to self-help measures, contact your healthcare provider or make an appointment with a counselor. You may need short-term psychological counseling and temporary medicine to help you manage stress.  Call 911  Call 911 if any of these happen:    Trouble breathing    Confusion    Drowsiness or trouble wakening    Fainting or loss of consciousness    Rapid heart rate    Seizure    New chest pain that  becomes more severe, lasts longer, or spreads into your shoulder, arm, neck, jaw, or back  When to seek medical advice  Call your healthcare provider right away if any of these happen:    Your symptoms get worse    Severe headache not relieved by rest and mild pain reliever  Date Last Reviewed: 10/1/2017    9945-7966 [employer]. [address] [place] [address]. All rights reserved. This information is not intended as a substitute for professional medical care. Always follow your healthcare professional's instructions.          24 Hour Appointment Hotline       To make an appointment at any [facility], call [phone] ([phone]). If you don't have a family doctor or clinic, we will help you find one. [facility] are conveniently located to serve the needs of you and your family.             Review of your medicines      START taking        Dose / Directions Last dose taken    hydrOXYzine 50 MG capsule   Commonly known as:  VISTARIL   Dose:  50 mg   Quantity:  20 capsule        Take 1 capsule (50 mg) by mouth 3 times daily as needed for anxiety   Refills:  0        sodium chloride 0.65 % nasal spray   Commonly known as:  OCEAN   Dose:  2 spray   Quantity:  1 Bottle        Spray 2 sprays into both nostrils 3 times daily as needed for congestion   Refills:  0          Our records show that you are taking the medicines listed below. If these are incorrect, please call your family doctor or clinic.        Dose / Directions Last dose taken    ATACAND 4 MG Tabs tablet   Dose:  4 mg   Quantity:  90 tablet   Generic drug:  candesartan        Take 1 tablet (4 mg) by mouth daily FLY   Refills:  3        atorvastatin 10 MG tablet   Commonly known as:  LIPITOR   Dose:  10 mg   Quantity:  90 tablet        Take 1 tablet (10 mg) by mouth daily   Refills:  3        azithromycin 250 MG tablet   Commonly known as:  ZITHROMAX   Quantity:  6 tablet        Two tablets first day, then one tablet  daily for four days.   Refills:  0        FLUZONE HIGH-DOSE 0.5 ML injection   Generic drug:  influenza Vac Split High-Dose        Refills:  0        oxazepam 15 MG capsule   Commonly known as:  SERAX   Dose:  15 mg   Quantity:  30 capsule        Take 1 capsule (15 mg) by mouth nightly as needed for sleep or anxiety   Refills:  5        PARNATE 10 MG tablet   Dose:  40 mg   Quantity:  360 tablet   Generic drug:  tranylcypromine        Take 4 tablets (40 mg) by mouth daily Needs BRAND name medication only. FLY   Refills:  3        polyethylene glycol Packet   Commonly known as:  MIRALAX/GLYCOLAX   Dose:  1 packet        Take 1 packet by mouth daily   Refills:  0        triamcinolone 0.1 % cream   Commonly known as:  KENALOG   Quantity:  15 g        Apply  topically 2 times daily as needed.   Refills:  1        triamterene-hydrochlorothiazide 37.5-25 MG per tablet   Commonly known as:  MAXZIDE-25   Dose:  0.5 tablet        Take 0.5 tablets by mouth daily   Refills:  0        zolpidem 5 MG tablet   Commonly known as:  AMBIEN   Dose:  5 mg   Quantity:  30 tablet        Take 1 tablet (5 mg) by mouth nightly as needed   Refills:  5                Prescriptions were sent or printed at these locations (2 Prescriptions)                   [facility] [address] [place] [address] [address] [place] [address]    Telephone:  [phone]   Fax:  [phone]   Hours:                  Printed at Department/Unit printer (2 of 2)         hydrOXYzine (VISTARIL) 50 MG capsule               sodium chloride (OCEAN) 0.65 % nasal spray                Procedures and tests performed during your visit     Basic metabolic panel    CBC with platelets + differential    EKG 12 lead    Troponin I    XR Chest 2 Views      Orders Needing Specimen Collection     None      Pending Results     No orders found from 4/28/2018 to 5/1/2018.            Pending Culture Results     No orders found from  4/28/2018 to 5/1/2018.            Pending Results Instructions     If you had any lab results that were not finalized at the time of your Discharge, you can call the ED Lab Result RN at [phone]. You will be contacted by this team for any positive Lab results or changes in treatment. The nurses are available 7 days a week from 10A to 6:30P.  You can leave a message 24 hours per day and they will return your call.        Test Results From Your Hospital Stay        4/30/2018  1:47 AM      Component Results     Component Value Ref Range & Units Status    WBC 7.0 4.0 - 11.0 10e9/L Final    RBC Count 4.41 3.8 - 5.2 10e12/L Final    Hemoglobin 13.1 11.7 - 15.7 g/dL Final    Hematocrit 39.1 35.0 - 47.0 % Final    MCV 89 78 - 100 fl Final    MCH 29.7 26.5 - 33.0 pg Final    MCHC 33.5 31.5 - 36.5 g/dL Final    RDW 13.3 10.0 - 15.0 % Final    Platelet Count 268 150 - 450 10e9/L Final    Diff Method Automated Method  Final    % Neutrophils 55.4 % Final    % Lymphocytes 36.2 % Final    % Monocytes 6.4 % Final    % Eosinophils 1.0 % Final    % Basophils 0.1 % Final    % Immature Granulocytes 0.9 % Final    Nucleated RBCs 0 0 /100 Final    Absolute Neutrophil 3.9 1.6 - 8.3 10e9/L Final    Absolute Lymphocytes 2.6 0.8 - 5.3 10e9/L Final    Absolute Monocytes 0.5 0.0 - 1.3 10e9/L Final    Absolute Eosinophils 0.1 0.0 - 0.7 10e9/L Final    Absolute Basophils 0.0 0.0 - 0.2 10e9/L Final    Abs Immature Granulocytes 0.1 0 - 0.4 10e9/L Final    Absolute Nucleated RBC 0.0  Final         4/30/2018  2:00 AM      Component Results     Component Value Ref Range & Units Status    Sodium 137 133 - 144 mmol/L Final    Potassium 3.5 3.4 - 5.3 mmol/L Final    Chloride 106 94 - 109 mmol/L Final    Carbon Dioxide 23 20 - 32 mmol/L Final    Anion Gap 8 3 - 14 mmol/L Final    Glucose 100 (H) 70 - 99 mg/dL Final    Urea Nitrogen 24 7 - 30 mg/dL Final    Creatinine 1.27 (H) 0.52 - 1.04 mg/dL Final    GFR Estimate 41 (L) >60 mL/min/1.7m2 Final     Non  GFR Calc    GFR Estimate If Black 50 (L) >60 mL/min/1.7m2 Final    African American GFR Calc    Calcium 9.0 8.5 - 10.1 mg/dL Final         4/30/2018  2:04 AM      Component Results     Component Value Ref Range & Units Status    Troponin I ES <0.015 0.000 - 0.045 ug/L Final    The 99th percentile for upper reference range is 0.045 ug/L.  Troponin values   in the range of 0.045 - 0.120 ug/L may be associated with risks of adverse   clinical events.           4/30/2018  2:24 AM      Narrative     CHEST 2 VIEWS   4/30/2018 2:09 AM     HISTORY: Chest pressure.    COMPARISON: 1/10/2018 - CT chest.    FINDINGS: 0.5 cm calcified granuloma in the left upper lobe. The lungs  are otherwise clear. Normal-sized cardiac silhouette.        Impression     IMPRESSION: No evidence of active cardiopulmonary disease.    KARINA RASHEED MD                Clinical Quality Measure: Blood Pressure Screening     Your blood pressure was checked while you were in the emergency department today. The last reading we obtained was  BP: (!) 153/99 . Please read the guidelines below about what these numbers mean and what you should do about them.  If your systolic blood pressure (the top number) is less than 120 and your diastolic blood pressure (the bottom number) is less than 80, then your blood pressure is normal. There is nothing more that you need to do about it.  If your systolic blood pressure (the top number) is 120-139 or your diastolic blood pressure (the bottom number) is 80-89, your blood pressure may be higher than it should be. You should have your blood pressure rechecked within a year by a primary care provider.  If your systolic blood pressure (the top number) is 140 or greater or your diastolic blood pressure (the bottom number) is 90 or greater, you may have high blood pressure. High blood pressure is treatable, but if left untreated over time it can put you at risk for heart attack, stroke, or kidney  failure. You should have your blood pressure rechecked by a primary care provider within the next 4 weeks.  If your provider in the emergency department today gave you specific instructions to follow-up with your doctor or provider even sooner than that, you should follow that instruction and not wait for up to 4 weeks for your follow-up visit.        Thank you for choosing [facility]       Thank you for choosing [facility] for your care. Our goal is always to provide you with excellent care. Hearing back from our patients is one way we can continue to improve our services. Please take a few minutes to complete the written survey that you may receive in the mail after you visit with us. Thank you!        [employer] Information     [employer] gives you secure access to your electronic health record. If you see a primary care provider, you can also send messages to your care team and make appointments. If you have questions, please call your primary care clinic.  If you do not have a primary care provider, please call [phone] and they will assist you.        Care EveryWhere ID     This is your Care EveryWhere ID. This could be used by other organizations to access your [facility] medical records  [id number]        Equal Access to Services     TRI NAVARRETE : Hadjuliana Hernandez, du boo, angelica badillo. So [place] [phone].    ATENCIÓN: Si habla español, tiene a shah disposición servicios gratuitos de asistencia lingüística. Mandy al [phone].    We comply with applicable federal civil rights laws and [place] laws. We do not discriminate on the basis of race, color, national origin, age, disability, sex, sexual orientation, or gender identity.            After Visit Summary       This is your record. Keep this with you and show to your community pharmacist(s) and doctor(s) at your next visit.

## 2018-04-30 NOTE — ED AVS SNAPSHOT
Emergency Department    [address] [place] [address]    Phone:  [phone]    Fax:  [phone]                                       Jann Davidson   MRN: [id number]    Department:   Emergency Department   Date of Visit:  4/30/2018           After Visit Summary Signature Page     I have received my discharge instructions, and my questions have been answered. I have discussed any challenges I see with this plan with the nurse or doctor.    ..........................................................................................................................................  Patient/Patient Representative Signature      ..........................................................................................................................................  Patient Representative Print Name and Relationship to Patient    ..................................................               ................................................  Date                                            Time    ..........................................................................................................................................  Reviewed by Signature/Title    ...................................................              ..............................................  Date                                                            Time

## 2018-04-30 NOTE — DISCHARGE INSTRUCTIONS
Discharge Instructions  Chest Pain    You have been seen today for chest pain or discomfort.  At this time, your provider has found no signs that your chest pain is due to a serious or life-threatening condition, (or you have declined more testing and/or admission to the hospital). However, sometimes there is a serious problem that does not show up right away. Your evaluation today may not be complete and you may need further testing and evaluation.     Generally, every Emergency Department visit should have a follow-up clinic visit with either a primary or a specialty clinic/provider. Please follow-up as instructed by your emergency provider today.  Return to the Emergency Department if:    Your chest pain changes, gets worse, starts to happen more often, or comes with less activity.    You are newly short of breath.    You get very weak or tired.    You pass out or faint.    You have any new symptoms, like fever, cough, numb legs, or you cough up blood.    You have anything else that worries you.    Until you follow-up with your regular provider, please do the following:    Take one aspirin daily unless you have an allergy or are told not to by your provider.    If a stress test appointment has been made, go to the appointment.    If you have questions, contact your regular provider.    Follow-up with your regular provider/clinic as directed; this is very important.    If you were given a prescription for medicine here today, be sure to read all of the information (including the package insert) that comes with your prescription.  This will include important information about the medicine, its side effects, and any warnings that you need to know about.  The pharmacist who fills the prescription can provide more information and answer questions you may have about the medicine.  If you have questions or concerns that the pharmacist cannot address, please call or return to the Emergency Department.       Remember that  you can always come back to the Emergency Department if you are not able to see your regular provider in the amount of time listed above, if you get any new symptoms, or if there is anything that worries you.      Anxiety Reaction  Anxiety is the feeling we all get when we think something bad might happen. It is a normal response to stress and usually causes only a mild reaction. When anxiety becomes more severe, it can interfere with daily life. In some cases, you may not even be aware of what it is you re anxious about. There may also be a genetic link or it may be a learned behavior in the home.  Both psychological and physical triggers cause stress reaction. It's often a response to fear or emotional stress, real or imagined. This stress may come from home, family, work, or social relationships.  During an anxiety reaction, you may feel:    Helpless    Nervous    Depressed    Irritable  Your body may show signs of anxiety in many ways. You may experience:    Dry mouth    Shakiness    Dizziness    Weakness    Trouble breathing    Breathing fast (hyperventilating)    Chest pressure    Sweating    Headache    Nausea    Diarrhea    Tiredness    Inability to sleep    Sexual problems  Home care    Try to locate the sources of stress in your life. They may not be obvious. These may include:  ? Daily hassles of life (such as traffic jams, missed appointments, or car troubles)  ? Major life changes, both good (new baby or job promotion) and bad (loss of job or loss of loved one)  ? Overload: feeling that you have too many responsibilities and can't take care of all of them at once  ? Feeling helpless or feeling that your problems are beyond what you re able to solve    Notice how your body reacts to stress. Learn to listen to your body signals. This will help you take action before the stress becomes severe.    When you can, do something about the source of your stress. (Avoid hassles, limit the amount of change that  happens in your life at one time and take a break when you feel overloaded).    Unfortunately, many stressful situations can't be avoided. It is necessary to learn how to better manage stress. There are many proven methods that will reduce your anxiety. These include simple things like exercise, good nutrition, and adequate rest. Also, there are certain techniques that are helpful:  ? Relaxation  ? Breathing exercises  ? Visualization  ? Biofeedback  ? Meditation  For more information about this, consult your healthcare provider or go to a local bookstore and review the many books and tapes available on this subject.  Follow-up care  If you feel that your anxiety is not responding to self-help measures, contact your healthcare provider or make an appointment with a counselor. You may need short-term psychological counseling and temporary medicine to help you manage stress.  Call 911  Call 911 if any of these happen:    Trouble breathing    Confusion    Drowsiness or trouble wakening    Fainting or loss of consciousness    Rapid heart rate    Seizure    New chest pain that becomes more severe, lasts longer, or spreads into your shoulder, arm, neck, jaw, or back  When to seek medical advice  Call your healthcare provider right away if any of these happen:    Your symptoms get worse    Severe headache not relieved by rest and mild pain reliever  Date Last Reviewed: 10/1/2017    1876-3673 [employer] [address] [place] [address], [place] [address]. All rights reserved. This information is not intended as a substitute for professional medical care. Always follow your healthcare professional's instructions.

## 2018-05-08 ENCOUNTER — MYC MEDICAL ADVICE (OUTPATIENT)
Dept: FAMILY MEDICINE | Facility: CLINIC | Age: 73
End: 2018-05-08

## 2018-05-08 DIAGNOSIS — Z91.89 AT RISK FOR HEART DISEASE: Primary | ICD-10-CM

## 2018-05-09 NOTE — TELEPHONE ENCOUNTER
Dr. Oliva,    Please see patient's mychart message. Please advise if recommended or not.    Med cued if needed.    Lucia Bar RN  [facility]

## 2018-05-10 NOTE — TELEPHONE ENCOUNTER
The 10-year ASCVD risk score (Job CLARK Jr, et al., 2013) is: 19.9%    Values used to calculate the score:      Age: 72 years      Sex: Female      Is Non- : No      Diabetic: No      Tobacco smoker: No      Systolic Blood Pressure: 150 mmHg      Is BP treated: Yes      HDL Cholesterol: 99 mg/dL      Total Cholesterol: 219 mg/dL    Yes- order signed, please notify, thanks Mehran

## 2018-05-10 NOTE — TELEPHONE ENCOUNTER
[employer] message sent to patient.     Kenia Muller, BSN RN  [facility]

## 2018-05-10 NOTE — TELEPHONE ENCOUNTER
Dr. Oliva,     Please see patient's mychart message. Please advise if recommended or not.    Medication cued if needed.      Kenia Muller RN  [facility]

## 2018-05-13 ENCOUNTER — MYC MEDICAL ADVICE (OUTPATIENT)
Dept: FAMILY MEDICINE | Facility: CLINIC | Age: 73
End: 2018-05-13

## 2018-05-14 NOTE — TELEPHONE ENCOUNTER
Dr. Oliva    See pt mychart message    Any issues with taking and her other medications    Advise    Densie Fuentes, RN   [facility]

## 2018-05-14 NOTE — TELEPHONE ENCOUNTER
[employer] message sent to patient.     Kenia Muller, BSN RN  [facility]

## 2018-07-02 DIAGNOSIS — I10 HYPERTENSION GOAL BP (BLOOD PRESSURE) < 140/90: ICD-10-CM

## 2018-07-02 NOTE — TELEPHONE ENCOUNTER
"Requested Prescriptions   Pending Prescriptions Disp Refills     triamterene-hydrochlorothiazide (MAXZIDE-25) 37.5-25 MG per tablet [Pharmacy Med Name: TRIAMTERENE/HCTZ 37.5-25MG TABLETS]    Last Written Prescription Date:  Unknown  Last Fill Quantity: N/A,  # refills: N/A   Last office visit: 4/4/2018 with prescribing provider:  -4-18   Future Office Visit:      Patient reported medication     90 tablet 0     Sig: TAKE 1 TABLET BY MOUTH EVERY MORNING    Diuretics (Including Combos) Protocol Failed    7/2/2018  3:29 AM       Failed - Blood pressure under 140/90 in past 12 months    BP Readings from Last 3 Encounters:   04/30/18 150/82   04/04/18 130/70   02/22/17 110/66                Failed - Normal serum creatinine on file in past 12 months    Recent Labs   Lab Test  04/30/18   0135   CR  1.27*             Passed - Recent (12 mo) or future (30 days) visit within the authorizing provider's specialty    Patient had office visit in the last 12 months or has a visit in the next 30 days with authorizing provider or within the authorizing provider's specialty.  See \"Patient Info\" tab in inbasket, or \"Choose Columns\" in Meds & Orders section of the refill encounter.           Passed - Patient is age 18 or older       Passed - No active pregancy on record       Passed - Normal serum potassium on file in past 12 months    Recent Labs   Lab Test  04/30/18   0135   POTASSIUM  3.5                   Passed - Normal serum sodium on file in past 12 months    Recent Labs   Lab Test  04/30/18   0135   NA  137             Passed - No positive pregnancy test in past 12 months          "

## 2018-07-03 NOTE — TELEPHONE ENCOUNTER
Dr. Oliva--    Patient sent a request for a refill of triamterene-hydrochlorothiazide 37.5-25 mg per tablet. This is a patient reported medication.     Please review/sign or advise.     Thank you!  Kenia Muller RN

## 2018-07-08 RX ORDER — PREDNISONE 10 MG/1
TABLET ORAL
COMMUNITY
Start: 2018-04-17 | End: 2019-06-05

## 2018-07-08 RX ORDER — TRIAMTERENE/HYDROCHLOROTHIAZID 37.5-25 MG
TABLET ORAL
Qty: 90 TABLET | Refills: 2 | Status: SHIPPED | OUTPATIENT
Start: 2018-07-08 | End: 2019-06-05 | Stop reason: DRUGHIGH

## 2018-07-11 ENCOUNTER — HOSPITAL ENCOUNTER (OUTPATIENT)
Dept: MAMMOGRAPHY | Facility: CLINIC | Age: 73
Discharge: HOME | End: 2018-07-11
Attending: FAMILY MEDICINE | Admitting: FAMILY MEDICINE
Payer: MEDICARE

## 2018-07-11 DIAGNOSIS — Z12.31 VISIT FOR SCREENING MAMMOGRAM: ICD-10-CM

## 2018-07-11 PROCEDURE — 77063 BREAST TOMOSYNTHESIS BI: CPT

## 2018-07-16 ENCOUNTER — MYC MEDICAL ADVICE (OUTPATIENT)
Dept: FAMILY MEDICINE | Facility: CLINIC | Age: 73
End: 2018-07-16

## 2018-07-19 ENCOUNTER — HOSPITAL ENCOUNTER (OUTPATIENT)
Dept: MAMMOGRAPHY | Facility: CLINIC | Age: 73
Discharge: HOME | End: 2018-07-19
Attending: FAMILY MEDICINE | Admitting: FAMILY MEDICINE
Payer: MEDICARE

## 2018-07-19 DIAGNOSIS — R92.8 ABNORMAL MAMMOGRAM: ICD-10-CM

## 2018-07-19 PROCEDURE — 76642 ULTRASOUND BREAST LIMITED: CPT | Mod: LT

## 2018-08-27 ENCOUNTER — TELEPHONE (OUTPATIENT)
Dept: FAMILY MEDICINE | Facility: CLINIC | Age: 73
End: 2018-08-27

## 2018-08-27 DIAGNOSIS — R91.8 OTHER NONSPECIFIC ABNORMAL FINDING OF LUNG FIELD (CODE): Primary | ICD-10-CM

## 2018-08-27 NOTE — TELEPHONE ENCOUNTER
Dr. Oliva,    Please review/sign or advise for CT Chest w/o Contrast. See phone message below.    Per results: 01/10/18, pt to repeat in 6 months for follow up study.    Lucia Bar RN  [facility]

## 2018-08-27 NOTE — TELEPHONE ENCOUNTER
Pt has a CT scan of the chest ordered by Dr. Oliva however when the pt tried to schedule, she was informed there is no order, writer called adult imaging to resolve, adult imaging staff was not able to help resolve issue. Please advise. Pt once order for CT has been resolved.

## 2018-08-29 NOTE — TELEPHONE ENCOUNTER
Patient called clinic and was notified of order placed.     Lucia Bar RN  [facility]

## 2018-09-10 DIAGNOSIS — G47.00 PERSISTENT DISORDER OF INITIATING OR MAINTAINING SLEEP: Chronic | ICD-10-CM

## 2018-09-10 NOTE — TELEPHONE ENCOUNTER
Controlled Substance Refill Request for zolpidem (AMBIEN) 5 MG tablet  Problem List Complete:  No     PROVIDER TO CONSIDER COMPLETION OF PROBLEM LIST AND OVERVIEW/CONTROLLED SUBSTANCE AGREEMENT    Last Written Prescription Date:  03/07/2018  Last Fill Quantity: 30,   # refills: 5    Last Office Visit with [facility] primary care provider: 04/04/2018    Future Office visit:     Controlled substance agreement on file: No.     Processing:  Patient will  in clinic   checked in past 3 months?  No, route to RN      Controlled Substance Refill Request for oxazepam (SERAX) 15 MG capsule  Problem List Complete:  No     PROVIDER TO CONSIDER COMPLETION OF PROBLEM LIST AND OVERVIEW/CONTROLLED SUBSTANCE AGREEMENT    Last Written Prescription Date:  04/09/2018  Last Fill Quantity: 30,   # refills: 5    Last Office Visit with [facility] primary care provider: 04/04/2018    Future Office visit:     Controlled substance agreement on file: No.     Processing:  Patient will  in clinic   checked in past 3 months?  No, route to RN

## 2018-09-11 NOTE — TELEPHONE ENCOUNTER
[place]  reviewed 9/11/2018 4:58 PM    zolpidem (AMBIEN) 5 MG tablet  Last refill: 8/17/18  Disp: 30      oxazepam (SERAX) 15 MG capsule  Last refill: 8/17/18  Disp: 30    Advised to f/u by phone - do you want phone or e-visit?

## 2018-09-12 RX ORDER — OXAZEPAM 15 MG/1
CAPSULE ORAL
Qty: 30 CAPSULE | Refills: 4 | Status: SHIPPED | OUTPATIENT
Start: 2018-09-12 | End: 2018-12-12

## 2018-09-12 RX ORDER — ZOLPIDEM TARTRATE 5 MG/1
TABLET ORAL
Qty: 30 TABLET | Refills: 4 | Status: SHIPPED | OUTPATIENT
Start: 2018-09-12 | End: 2019-02-13

## 2018-09-27 ENCOUNTER — TRANSFERRED RECORDS (OUTPATIENT)
Dept: HEALTH INFORMATION MANAGEMENT | Facility: CLINIC | Age: 73
End: 2018-09-27

## 2018-10-24 ENCOUNTER — HOSPITAL ENCOUNTER (OUTPATIENT)
Dept: CT IMAGING | Facility: CLINIC | Age: 73
Discharge: HOME | End: 2018-10-24
Attending: FAMILY MEDICINE | Admitting: FAMILY MEDICINE
Payer: MEDICARE

## 2018-10-24 DIAGNOSIS — R91.8 OTHER NONSPECIFIC ABNORMAL FINDING OF LUNG FIELD: ICD-10-CM

## 2018-10-24 PROCEDURE — 71250 CT THORAX DX C-: CPT

## 2018-10-29 NOTE — PROGRESS NOTES
Benjy Forte, good news! your recent results are back with unchanged lung nodules; no further testing is required. Please contact if any questions.   Mehran

## 2018-10-30 DIAGNOSIS — I10 HYPERTENSION GOAL BP (BLOOD PRESSURE) < 140/90: ICD-10-CM

## 2018-10-30 NOTE — TELEPHONE ENCOUNTER
Pt last seen by PCP 3/28/18 and was to see nephrology. No notes from nephrology seen after that date.    I spoke to pt. She has appt to see Dr Perry, nephrology, in December.  She is taking 1/2 tab daily of the Atacand 4mg tablets for a 2 mg dose. She would like the rx written for #90 in case he bumps her back up to one full tablet daily.     (currently taking  hydrochlorothiazide/triamterene 1/2 tablet but does not need refill on that)    Pt only needs call back if Dr Oliva cannot write for #90 days.       Romy Bailey RN

## 2018-10-30 NOTE — TELEPHONE ENCOUNTER
"Requested Prescriptions   Pending Prescriptions Disp Refills     ATACAND 4 MG TABS tablet 90 tablet 3    Last Written Prescription Date:  3/31/17  Last Fill Quantity: 90,  # refills: 3   Last office visit: 4/4/2018 with prescribing provider:  4/4/18   Future Office Visit:     Sig: Take 1 tablet (4 mg) by mouth daily LFY    Angiotensin-II Receptors Failed    10/30/2018  9:45 AM       Failed - Blood pressure under 140/90 in past 12 months    BP Readings from Last 3 Encounters:   04/30/18 150/82   04/04/18 130/70   02/22/17 110/66                Failed - Normal serum creatinine on file in past 12 months    Recent Labs   Lab Test  04/30/18   0135   CR  1.27*            Passed - Recent (12 mo) or future (30 days) visit within the authorizing provider's specialty    Patient had office visit in the last 12 months or has a visit in the next 30 days with authorizing provider or within the authorizing provider's specialty.  See \"Patient Info\" tab in inbasket, or \"Choose Columns\" in Meds & Orders section of the refill encounter.             Passed - Patient is age 18 or older       Passed - No active pregnancy on record       Passed - Normal serum potassium on file in past 12 months    Recent Labs   Lab Test  04/30/18   0135   POTASSIUM  3.5                   Passed - No positive pregnancy test in past 12 months          "

## 2018-10-31 RX ORDER — CANDESARTAN CILEXETIL 4 MG/1
4 TABLET ORAL DAILY
Qty: 90 TABLET | Refills: 3 | Status: SHIPPED | OUTPATIENT
Start: 2018-10-31 | End: 2019-06-05

## 2018-12-12 DIAGNOSIS — G47.00 PERSISTENT DISORDER OF INITIATING OR MAINTAINING SLEEP: Chronic | ICD-10-CM

## 2018-12-12 NOTE — TELEPHONE ENCOUNTER
Controlled Substance Refill Request for oxazepam (SERAX) 15 MG capsule   Problem List Complete:  No     PROVIDER TO CONSIDER COMPLETION OF PROBLEM LIST AND OVERVIEW/CONTROLLED SUBSTANCE AGREEMENT    Last Written Prescription Date:  9/12/18  Last Fill Quantity: 30,   # refills: 4    Last Office Visit with [facility] [place] care provider: 4/4/18    Future Office visit:     Controlled substance agreement on file: No.     Processing:  Fax Rx to [id number] pharmacy   checked in past 3 months?  No, route to RN

## 2018-12-13 NOTE — TELEPHONE ENCOUNTER
Dr. Oliva,    Please review/sign or advise for EARLY refill request of oxazepam (SERAX) 15 MG capsule             LOV: 04/04/2018    :  ~ Last Dispensed: 11/27/2018 #30/30 day supply prescribed by Dr. Oliva    Thank You!  Arabella Avalos, RN  Triage Nurse

## 2018-12-14 RX ORDER — OXAZEPAM 15 MG/1
15 CAPSULE ORAL
Qty: 30 CAPSULE | Refills: 0 | Status: SHIPPED | OUTPATIENT
Start: 2018-12-14 | End: 2019-02-13

## 2018-12-14 NOTE — TELEPHONE ENCOUNTER
Please check with patient- is it for holiday travel? If so please OK with pharmacy. If not and she's used extra- she'll need an appointment to discuss with me. Please notify, thanks Mehran

## 2018-12-14 NOTE — TELEPHONE ENCOUNTER
Dr. Oliva,    Spoke with patient. Patient states she has been getting up more frequently to let her dog out. Patient states she will lay in bed and then she gets anxious and is not able to fall asleep, so she takes an additional dose of medication. Patient states she has been taking 2 a day    Patient states she has 10 left. She is requesting a month's supply    Appointment scheduled 1/11/2019 for follow up    Thank You!  Arabella Avalos RN  Triage Nurse

## 2018-12-17 NOTE — TELEPHONE ENCOUNTER
Prescription for   oxazepam (SERAX) 15 MG capsule  Faxed to [facility] pharmacy at [phone]

## 2019-02-13 DIAGNOSIS — G47.00 PERSISTENT DISORDER OF INITIATING OR MAINTAINING SLEEP: Chronic | ICD-10-CM

## 2019-02-13 NOTE — TELEPHONE ENCOUNTER
Controlled Substance Refill Request for zolpidem (AMBIEN) 5 MG tablet  Problem List Complete:  No     PROVIDER TO CONSIDER COMPLETION OF PROBLEM LIST AND OVERVIEW/CONTROLLED SUBSTANCE AGREEMENT    Last Written Prescription Date:  09/12/2018  Last Fill Quantity: 30,   # refills: 1    THE MOST RECENT OFFICE VISIT MUST BE WITHIN THE PAST 3 MONTHS. AT LEAST ONE FACE TO FACE VISIT MUST OCCUR EVERY 6 MONTHS. ADDITIONAL VISITS CAN BE VIRTUAL.  (THIS STATEMENT SHOULD BE DELETED.)    Last Office Visit with [facility] primary care provider: 04/04/2018    Future Office visit:   Next 5 appointments (look out 90 days)    Mar 13, 2019  4:00 PM CDT  Office Visit with Mehran Oliva MD  [facility] ([facility]) [address] [place] [id number] [phone]          Controlled substance agreement:   Encounter-Level CSA:    There are no encounter-level csa.     Patient-Level CSA:    There are no patient-level csa.         Last Urine Drug Screen: No results found for: CDAUT, No results found for: COMDAT, No results found for: THC13, PCP13, COC13, MAMP13, OPI13, AMP13, BZO13, TCA13, MTD13, BAR13, OXY13, PPX13, BUP13     Processing:  Fax Rx to [facility] pharmacy     [email or web address]       checked in past 3 months?  No, route to RN    Controlled Substance Refill Request for oxazepam (SERAX) 15 MG capsule  Problem List Complete:  No     PROVIDER TO CONSIDER COMPLETION OF PROBLEM LIST AND OVERVIEW/CONTROLLED SUBSTANCE AGREEMENT    Last Written Prescription Date:  12/14/2018  Last Fill Quantity: 30,   # refills: 0    THE MOST RECENT OFFICE VISIT MUST BE WITHIN THE PAST 3 MONTHS. AT LEAST ONE FACE TO FACE VISIT MUST OCCUR EVERY 6 MONTHS. ADDITIONAL VISITS CAN BE VIRTUAL.  (THIS STATEMENT SHOULD BE DELETED.)    Last Office Visit with [facility] primary care provider: 04/04/2018    Future Office visit:   Next 5 appointments (look out 90 days)    Mar 13, 2019  4:00  PM CDT  Office Visit with Mehran Oliva MD  [facility] ([facility]) [address] [place] [id number] [phone]          Controlled substance agreement:   Encounter-Level CSA:    There are no encounter-level csa.     Patient-Level CSA:    There are no patient-level csa.         Last Urine Drug Screen: No results found for: CDAUT, No results found for: COMDAT, No results found for: THC13, PCP13, COC13, MAMP13, OPI13, AMP13, BZO13, TCA13, MTD13, BAR13, OXY13, PPX13, BUP13     Processing:  Fax Rx to [facility] pharmacy     [email or web address]       checked in past 3 months?  No, route to RN

## 2019-02-14 NOTE — TELEPHONE ENCOUNTER
Dr. Oliva,  Please review/sign or advise for refill request of:    - zolpidem (AMBIEN) 5 MG tablet  : 01/16/2019 #30/30 day supply prescribed by Dr. Oliva    - oxazepam (SERAX) 15 MG capsule  : 01/16/2019 #30/30 day supply prescribed by Dr. Oliva    LOV: 04/04/2018    Thank You!  Arabella Avalos, CHARMAINE  Triage Nurse

## 2019-02-15 RX ORDER — ZOLPIDEM TARTRATE 5 MG/1
TABLET ORAL
Qty: 30 TABLET | Refills: 1 | Status: SHIPPED | OUTPATIENT
Start: 2019-02-15 | End: 2019-04-15

## 2019-02-15 RX ORDER — OXAZEPAM 15 MG/1
CAPSULE ORAL
Qty: 30 CAPSULE | Refills: 1 | Status: SHIPPED | OUTPATIENT
Start: 2019-02-15 | End: 2019-04-15

## 2019-02-15 NOTE — TELEPHONE ENCOUNTER
Left detailed message for pt, scripts were faxed and that she needs to schedule her annual    Denise Fuentes RN   [facility]

## 2019-03-04 DIAGNOSIS — Z13.6 CARDIOVASCULAR SCREENING; LDL GOAL LESS THAN 130: ICD-10-CM

## 2019-03-04 RX ORDER — ATORVASTATIN CALCIUM 10 MG/1
TABLET, FILM COATED ORAL
Qty: 90 TABLET | Refills: 0 | Status: SHIPPED | OUTPATIENT
Start: 2019-03-04 | End: 2019-05-21

## 2019-03-04 NOTE — TELEPHONE ENCOUNTER
"Requested Prescriptions   Pending Prescriptions Disp Refills     atorvastatin (LIPITOR) 10 MG tablet [Pharmacy Med Name: Atorvastatin Calcium Oral Tablet 10 MG] 90 tablet 2    Last Written Prescription Date:  04/09/2018  Last Fill Quantity: 90,  # refills: 3   Last office visit: 4/4/2018 with prescribing provider:  4/04/2018   Future Office Visit:   Sig: TAKE ONE TABLET BY MOUTH ONCE DAILY    Statins Protocol Passed - 3/4/2019  4:34 AM       Passed - LDL on file in past 12 months    Recent Labs   Lab Test 04/04/18  1629   *            Passed - No abnormal creatine kinase in past 12 months    No lab results found.            Passed - Recent (12 mo) or future (30 days) visit within the authorizing provider's specialty    Patient had office visit in the last 12 months or has a visit in the next 30 days with authorizing provider or within the authorizing provider's specialty.  See \"Patient Info\" tab in inbasket, or \"Choose Columns\" in Meds & Orders section of the refill encounter.             Passed - Medication is active on med list       Passed - Patient is age 18 or older       Passed - No active pregnancy on record       Passed - No positive pregnancy test in past 12 months        "

## 2019-03-04 NOTE — TELEPHONE ENCOUNTER
Prescription approved per [facility] Refill Protocol.  LOV: 04/04/2018  Labs: up to date    Arabella Avalos, RN  Triage Nurse

## 2019-04-15 DIAGNOSIS — G47.00 PERSISTENT DISORDER OF INITIATING OR MAINTAINING SLEEP: Chronic | ICD-10-CM

## 2019-04-15 RX ORDER — ZOLPIDEM TARTRATE 5 MG/1
TABLET ORAL
Qty: 30 TABLET | Refills: 0 | Status: SHIPPED | OUTPATIENT
Start: 2019-04-15 | End: 2019-05-13

## 2019-04-15 RX ORDER — OXAZEPAM 15 MG/1
CAPSULE ORAL
Qty: 30 CAPSULE | Refills: 0 | Status: SHIPPED | OUTPATIENT
Start: 2019-04-15 | End: 2019-05-13

## 2019-04-15 NOTE — TELEPHONE ENCOUNTER
Patient returned call to clinic. Appointment scheduled 05/01/2019    Arabella Avalos, RN  Triage Nurse

## 2019-04-15 NOTE — TELEPHONE ENCOUNTER
Dr. Oliva;  Please review/sign or advise for refill request of:   - oxazepam (SERAX) 15 MG capsule  Last dispensed: 03/18/2019 #30/30 day supply prescribed by Dr. Oliva    - zolpidem (AMBIEN) 5 MG tablet  Last dispensed: 03/19/2019 #30/30 day supply prescribed by Dr. Oliva    LOV: 04/04/2018    Called patient, left voicemail to return call to clinic, patient is due for office visit    Thank You!  Arabella Avalos RN  Triage Nurse

## 2019-04-15 NOTE — TELEPHONE ENCOUNTER
Controlled Substance Refill Request for oxazepam (SERAX) 15 MG capsule  Problem List Complete:  No     PROVIDER TO CONSIDER COMPLETION OF PROBLEM LIST AND OVERVIEW/CONTROLLED SUBSTANCE AGREEMENT    Last Written Prescription Date:  02/15/2019  Last Fill Quantity: 30,   # refills: 1    THE MOST RECENT OFFICE VISIT MUST BE WITHIN THE PAST 3 MONTHS. AT LEAST ONE FACE TO FACE VISIT MUST OCCUR EVERY 6 MONTHS. ADDITIONAL VISITS CAN BE VIRTUAL.  (THIS STATEMENT SHOULD BE DELETED.)    Last Office Visit with [facility] primary care provider: 04/04/2018    Future Office visit:     Controlled substance agreement:   Encounter-Level CSA:    There are no encounter-level csa.     Patient-Level CSA:    There are no patient-level csa.         Last Urine Drug Screen: No results found for: CDAUT, No results found for: COMDAT, No results found for: THC13, PCP13, COC13, MAMP13, OPI13, AMP13, BZO13, TCA13, MTD13, BAR13, OXY13, PPX13, BUP13     Processing:  Fax Rx to [facility]     [email or web address]       checked in past 3 months?  No, route to RN    Controlled Substance Refill Request for zolpidem (AMBIEN) 5 MG tablet  Problem List Complete:  No     PROVIDER TO CONSIDER COMPLETION OF PROBLEM LIST AND OVERVIEW/CONTROLLED SUBSTANCE AGREEMENT    Last Written Prescription Date:  02/15/2019  Last Fill Quantity: 30,   # refills: 1    THE MOST RECENT OFFICE VISIT MUST BE WITHIN THE PAST 3 MONTHS. AT LEAST ONE FACE TO FACE VISIT MUST OCCUR EVERY 6 MONTHS. ADDITIONAL VISITS CAN BE VIRTUAL.  (THIS STATEMENT SHOULD BE DELETED.)    Last Office Visit with [facility] primary care provider: 04/04/2018    Future Office visit:     Controlled substance agreement:   Encounter-Level CSA:    There are no encounter-level csa.     Patient-Level CSA:    There are no patient-level csa.         Last Urine Drug Screen: No results found for: CDAUT, No results found for: COMDAT, No results found for: THC13, PCP13, COC13, MAMP13, OPI13, AMP13, BZO13,  TCA13, MTD13, BAR13, OXY13, PPX13, BUP13     Processing:  Fax Rx to [facility]     [email or web address]       checked in past 3 months?  No, route to RN

## 2019-04-16 NOTE — TELEPHONE ENCOUNTER
Prescriptions for  oxazepam (SERAX) 15 MG capsule  zolpidem (AMBIEN) 5 MG tablet  Faxed to [facility] at [phone]

## 2019-05-06 DIAGNOSIS — F33.1 MAJOR DEPRESSIVE DISORDER, RECURRENT EPISODE, MODERATE (H): Primary | ICD-10-CM

## 2019-05-06 RX ORDER — TRANYLCYPROMINE SULFATE 10 MG/1
TABLET, FILM COATED ORAL
Qty: 68 TABLET | Refills: 2 | Status: SHIPPED | OUTPATIENT
Start: 2019-05-06 | End: 2019-05-08

## 2019-05-06 NOTE — TELEPHONE ENCOUNTER
Parnate Oral Tablet 10 MG      Last Written Prescription Date:  01/18/2019  Last Fill Quantity: 270,   # refills: 0  Last Office Visit: 04/04/2018  Future Office visit:    Next 5 appointments (look out 90 days)    May 15, 2019  4:00 PM CDT  PHYSICAL with Mehran Oliva MD  [facility] [address] [place] [id number] [phone]           Routing refill request to provider for review/approval because:  Drug not on the [facility], [facility] or [facility] refill protocol or controlled substance

## 2019-05-06 NOTE — TELEPHONE ENCOUNTER
Dr. Oliva,  Please review/sign or advise for refill request of: Parnate Oral Tablet 10 MG        Routing refill request to provider for review/approval because:  Drug not on the [facility] refill protocol.     LOV: 04/04/2018 - patient has appointment scheduled 05/15/2019    Thank You!  Arabella Avalos, RN  Triage Nurse

## 2019-05-08 RX ORDER — TRANYLCYPROMINE SULFATE 10 MG/1
TABLET, FILM COATED ORAL
Qty: 360 TABLET | Refills: 3 | Status: SHIPPED | OUTPATIENT
Start: 2019-05-08 | End: 2019-06-05 | Stop reason: DRUGHIGH

## 2019-05-08 NOTE — TELEPHONE ENCOUNTER
Dr. Oliva,  Please review/sign or advise for refill request of: Parnate Oral Tablet 10 MG       Routing refill request to provider for review/approval because:  Drug not on the [facility] refill protocol     Received fax from pharmacy, patient request quantity of 360 tablets    New Rx cued    Thank You!  Arabella Avalos, RN  Triage Nurse

## 2019-05-13 DIAGNOSIS — G47.00 PERSISTENT DISORDER OF INITIATING OR MAINTAINING SLEEP: Chronic | ICD-10-CM

## 2019-05-13 RX ORDER — ZOLPIDEM TARTRATE 5 MG/1
TABLET ORAL
Qty: 30 TABLET | Refills: 0 | Status: SHIPPED | OUTPATIENT
Start: 2019-05-13 | End: 2019-06-05

## 2019-05-13 RX ORDER — OXAZEPAM 15 MG/1
CAPSULE ORAL
Qty: 30 CAPSULE | Refills: 0 | Status: SHIPPED | OUTPATIENT
Start: 2019-05-13 | End: 2019-06-05

## 2019-05-13 NOTE — TELEPHONE ENCOUNTER
Controlled Substance Refill Request for oxazepam (SERAX) 15 MG capsule   Problem List Complete:  No     PROVIDER TO CONSIDER COMPLETION OF PROBLEM LIST AND OVERVIEW/CONTROLLED SUBSTANCE AGREEMENT    Last Written Prescription Date:  04/15/2019  Last Fill Quantity: 30,   # refills: 0    THE MOST RECENT OFFICE VISIT MUST BE WITHIN THE PAST 3 MONTHS. AT LEAST ONE FACE TO FACE VISIT MUST OCCUR EVERY 6 MONTHS. ADDITIONAL VISITS CAN BE VIRTUAL.  (THIS STATEMENT SHOULD BE DELETED.)    Last Office Visit with [facility] primary care provider: 04/04/2018    Future Office visit:   Next 5 appointments (look out 90 days)    May 15, 2019  4:00 PM CDT  PHYSICAL with Mehran Oliva MD  [facility] ([facility]) [address] [place] [id number] [phone]          Controlled substance agreement:   Encounter-Level CSA:    There are no encounter-level csa.     Patient-Level CSA:    There are no patient-level csa.         Last Urine Drug Screen: No results found for: CDAUT, No results found for: COMDAT, No results found for: THC13, PCP13, COC13, MAMP13, OPI13, AMP13, BZO13, TCA13, MTD13, BAR13, OXY13, PPX13, BUP13     Processing:  [facility]     [email or web address]       checked in past 3 months?  No, route to RN       Controlled Substance Refill Request for zolpidem (AMBIEN) 5 MG tablet  Problem List Complete:  No     PROVIDER TO CONSIDER COMPLETION OF PROBLEM LIST AND OVERVIEW/CONTROLLED SUBSTANCE AGREEMENT    Last Written Prescription Date:  04/15/2019  Last Fill Quantity: 30,   # refills: 0    THE MOST RECENT OFFICE VISIT MUST BE WITHIN THE PAST 3 MONTHS. AT LEAST ONE FACE TO FACE VISIT MUST OCCUR EVERY 6 MONTHS. ADDITIONAL VISITS CAN BE VIRTUAL.  (THIS STATEMENT SHOULD BE DELETED.)    Last Office Visit with [facility] primary care provider: 04/04/2018    Future Office visit:   Next 5 appointments (look out 90 days)    May 15, 2019   4:00 PM CDT  PHYSICAL with Mehran Oliva MD  [facility] ([facility]) [address] [place] [id number] [phone]          Controlled substance agreement:   Encounter-Level CSA:    There are no encounter-level csa.     Patient-Level CSA:    There are no patient-level csa.         Last Urine Drug Screen: No results found for: CDAUT, No results found for: COMDAT, No results found for: THC13, PCP13, COC13, MAMP13, OPI13, AMP13, BZO13, TCA13, MTD13, BAR13, OXY13, PPX13, BUP13     Processing:  Fax Rx to [facility]     [email or web address]       checked in past 3 months?  No, route to RN

## 2019-05-13 NOTE — TELEPHONE ENCOUNTER
Dr. Oliva,  Please review/sign or advise for refill requests of:   - oxazepam (SERAX) 15 MG capsule  : last dispensed: 04/16/2019 #30/30 day supply prescribed by Dr. Oliva    - zolpidem (AMBIEN) 5 MG tablet  : last dispensed: 04/16/2019 #30/30 day supply prescribed by Dr. Oliva    Thank You!  Arabella Avalos, CHARMAINE  Triage Nurse

## 2019-05-13 NOTE — TELEPHONE ENCOUNTER
Script for oxazepam (SERAX) 15 MG capsule and zolpidem (AMBIEN) 5 MG tablet faxed to [facility] at [phone]

## 2019-05-21 DIAGNOSIS — Z13.6 CARDIOVASCULAR SCREENING; LDL GOAL LESS THAN 130: ICD-10-CM

## 2019-05-21 NOTE — TELEPHONE ENCOUNTER
"Requested Prescriptions   Pending Prescriptions Disp Refills     atorvastatin (LIPITOR) 10 MG tablet [Pharmacy Med Name: Atorvastatin Calcium Oral Tablet 10 MG] 90 tablet 0     Sig: TAKE ONE TABLET BY MOUTH ONCE DAILY  Last Written Prescription Date:  03/04/2019  Last Fill Quantity: 90,  # refills: 0   Last office visit: 4/4/2018 with prescribing provider:  04/04/2018   Future Office Visit:   Next 5 appointments (look out 90 days)    Jun 05, 2019  4:00 PM CDT  PHYSICAL with Mehran Oliva MD  [facility] ([facility]) [address] [place] [id number] [phone]              Statins Protocol Failed - 5/21/2019  2:11 AM        Failed - LDL on file in past 12 months     Recent Labs   Lab Test 04/04/18  1629   *             Passed - No abnormal creatine kinase in past 12 months     No lab results found.             Passed - Recent (12 mo) or future (30 days) visit within the authorizing provider's specialty     Patient had office visit in the last 12 months or has a visit in the next 30 days with authorizing provider or within the authorizing provider's specialty.  See \"Patient Info\" tab in inbasket, or \"Choose Columns\" in Meds & Orders section of the refill encounter.              Passed - Medication is active on med list        Passed - Patient is age 18 or older        Passed - No active pregnancy on record        Passed - No positive pregnancy test in past 12 months        "

## 2019-05-22 RX ORDER — ATORVASTATIN CALCIUM 10 MG/1
TABLET, FILM COATED ORAL
Qty: 30 TABLET | Refills: 0 | Status: SHIPPED | OUTPATIENT
Start: 2019-05-22 | End: 2019-06-05

## 2019-05-22 NOTE — TELEPHONE ENCOUNTER
Prescription approved per [facility] Refill Protocol.    Luica Bar RN  [facility]

## 2019-06-05 ENCOUNTER — OFFICE VISIT (OUTPATIENT)
Dept: FAMILY MEDICINE | Facility: CLINIC | Age: 74
End: 2019-06-05
Payer: COMMERCIAL

## 2019-06-05 VITALS
RESPIRATION RATE: 14 BRPM | OXYGEN SATURATION: 98 % | TEMPERATURE: 98.6 F | SYSTOLIC BLOOD PRESSURE: 126 MMHG | WEIGHT: 136.5 LBS | DIASTOLIC BLOOD PRESSURE: 74 MMHG | HEART RATE: 93 BPM | HEIGHT: 64 IN | BODY MASS INDEX: 23.31 KG/M2

## 2019-06-05 DIAGNOSIS — I10 HYPERTENSION GOAL BP (BLOOD PRESSURE) < 140/90: ICD-10-CM

## 2019-06-05 DIAGNOSIS — F33.1 MAJOR DEPRESSIVE DISORDER, RECURRENT EPISODE, MODERATE (H): ICD-10-CM

## 2019-06-05 DIAGNOSIS — G47.00 PERSISTENT DISORDER OF INITIATING OR MAINTAINING SLEEP: ICD-10-CM

## 2019-06-05 DIAGNOSIS — Z00.00 ROUTINE GENERAL MEDICAL EXAMINATION AT A HEALTH CARE FACILITY: Primary | ICD-10-CM

## 2019-06-05 PROCEDURE — 80061 LIPID PANEL: CPT | Performed by: FAMILY MEDICINE

## 2019-06-05 PROCEDURE — 36415 COLL VENOUS BLD VENIPUNCTURE: CPT | Performed by: FAMILY MEDICINE

## 2019-06-05 PROCEDURE — 80053 COMPREHEN METABOLIC PANEL: CPT | Performed by: FAMILY MEDICINE

## 2019-06-05 PROCEDURE — 99397 PER PM REEVAL EST PAT 65+ YR: CPT | Performed by: FAMILY MEDICINE

## 2019-06-05 PROCEDURE — 99213 OFFICE O/P EST LOW 20 MIN: CPT | Mod: 25 | Performed by: FAMILY MEDICINE

## 2019-06-05 RX ORDER — METHOCARBAMOL 500 MG/1
500 TABLET, FILM COATED ORAL
Qty: 90 TABLET | Refills: 3 | Status: SHIPPED | OUTPATIENT
Start: 2019-06-05 | End: 2019-06-12

## 2019-06-05 RX ORDER — ATORVASTATIN CALCIUM 10 MG/1
10 TABLET, FILM COATED ORAL DAILY
Qty: 90 TABLET | Refills: 3 | Status: SHIPPED | OUTPATIENT
Start: 2019-06-05 | End: 2020-07-01

## 2019-06-05 RX ORDER — TRANYLCYPROMINE SULFATE 10 MG/1
TABLET, FILM COATED ORAL
Qty: 270 TABLET | Refills: 3 | Status: SHIPPED | OUTPATIENT
Start: 2019-06-05 | End: 2020-08-14

## 2019-06-05 RX ORDER — TRIAMCINOLONE ACETONIDE 1 MG/G
CREAM TOPICAL
Qty: 15 G | Refills: 1 | Status: SHIPPED | OUTPATIENT
Start: 2019-06-05 | End: 2021-05-21

## 2019-06-05 RX ORDER — CANDESARTAN CILEXETIL 4 MG/1
2 TABLET ORAL DAILY
Qty: 45 TABLET | Refills: 3 | Status: SHIPPED | OUTPATIENT
Start: 2019-06-05 | End: 2019-12-03

## 2019-06-05 RX ORDER — TRIAMTERENE/HYDROCHLOROTHIAZID 37.5-25 MG
0.5 TABLET ORAL
Qty: 90 TABLET | Refills: 3 | Status: SHIPPED | OUTPATIENT
Start: 2019-06-05 | End: 2020-11-24

## 2019-06-05 RX ORDER — OXAZEPAM 15 MG/1
CAPSULE ORAL
Qty: 30 CAPSULE | Refills: 5 | Status: SHIPPED | OUTPATIENT
Start: 2019-06-05 | End: 2019-12-16

## 2019-06-05 ASSESSMENT — MIFFLIN-ST. JEOR: SCORE: 1105.03

## 2019-06-05 ASSESSMENT — PATIENT HEALTH QUESTIONNAIRE - PHQ9: SUM OF ALL RESPONSES TO PHQ QUESTIONS 1-9: 0

## 2019-06-05 NOTE — PATIENT INSTRUCTIONS
The 10-year ASCVD risk score (Job CLARK Jr., et al., 2013) is: 16.3%    Values used to calculate the score:      Age: 73 years      Sex: Female      Is Non- : No      Diabetic: No      Tobacco smoker: No      Systolic Blood Pressure: 126 mmHg      Is BP treated: Yes      HDL Cholesterol: 99 mg/dL      Total Cholesterol: 219 mg/dL    Preventive Health Recommendations    See your health care provider every year to    Review health changes.     Discuss preventive care.      Review your medicines if your doctor has prescribed any.      You no longer need a yearly Pap test unless you've had an abnormal Pap test in the past 10 years. If you have vaginal symptoms, such as bleeding or discharge, be sure to talk with your provider about a Pap test.      Every 1 to 2 years, have a mammogram.  If you are over 69, talk with your health care provider about whether or not you want to continue having screening mammograms.      Every 10 years, have a colonoscopy. Or, have a yearly FIT test (stool test). These exams will check for colon cancer.       Have a cholesterol test every 5 years, or more often if your doctor advises it.       Have a diabetes test (fasting glucose) every three years. If you are at risk for diabetes, you should have this test more often.       At age 65, have a bone density scan (DEXA) to check for osteoporosis (brittle bone disease).    Shots:    Get a flu shot each year.    Get a tetanus shot every 10 years.    Talk to your doctor about your pneumonia vaccines. There are now two you should receive - Pneumovax (PPSV 23) and Prevnar (PCV 13).    Talk to your pharmacist about the shingles vaccine.    Talk to your doctor about the hepatitis B vaccine.    Nutrition:     Eat at least 5 servings of fruits and vegetables each day.      Eat whole-grain bread, whole-wheat pasta and brown rice instead of white grains and rice.      Get adequate about Calcium and Vitamin D.      Lifestyle    Exercise at least 150 minutes a week (30 minutes a day, 5 days a week). This will help you control your weight and prevent disease.      Limit alcohol to one drink per day.      No smoking.       Wear sunscreen to prevent skin cancer.       See your dentist twice a year for an exam and cleaning.      See your eye doctor every 1 to 2 years to screen for conditions such as glaucoma, macular degeneration, cataracts, etc.    Personalized Prevention Plan  You are due for the preventive services outlined below.  Your care team is available to assist you in scheduling these services.  If you have already completed any of these items, please share that information with your care team to update in your medical record.    Health Maintenance Due   Topic Date Due     Discuss Advance Directive Planning  1945     Osteoporosis Screening  10/06/2002     Zoster (Shingles) Vaccine (2 of 3) 07/10/2013     Depression Assessment  10/04/2018     Annual Wellness Visit  04/04/2019

## 2019-06-05 NOTE — PROGRESS NOTES
SUBJECTIVE:   CC: Jann Davidson is an 73 year old woman who presents for preventive health visit.     Healthy Habits:    Do you get at least three servings of calcium containing foods daily (dairy, green leafy vegetables, etc.)? yes    Amount of exercise or daily activities, outside of work: 1 hour a week    Problems taking medications regularly No    Medication side effects: No    Have you had an eye exam in the past two years? no    Do you see a dentist twice per year? yes    Do you have sleep apnea, excessive snoring or daytime drowsiness?no    Musculoskeletal  Patient states that she aggravated her back while gardening. She tried to make an appointment with her long time chiropractor but found out that he passed away.     Sleep  Patient reports having trouble sleeping. She is often bothered by her elderly dog at night which has been exacerbating this. She has been taking 5 mg Ambien, but states that it has not been working well. Patient is wondering if a higher dose of Oxazepam would be helpful.    Medications  She is taking 30 mg Parnate daily. Patient would like a refill for Triamcinolone cream.      Today's PHQ-2 Score:   PHQ-2 (  [employer]) 2018   Q1: Little interest or pleasure in doing things 0 0   Q2: Feeling down, depressed or hopeless 3 0   PHQ-2 Score 3 0       Abuse: Current or Past(Physical, Sexual or Emotional)- No  Do you feel safe in your environment? Yes    Social History     Tobacco Use     Smoking status: Former Smoker     Types: Cigarettes     Last attempt to quit: 10/30/1972     Years since quittin.6     Smokeless tobacco: Never Used   Substance Use Topics     Alcohol use: Yes     Comment: couple glasses of wine daily      If you drink alcohol do you typically have >3 drinks per day or >7 drinks per week? No                     Reviewed orders with patient.  Reviewed health maintenance and updated orders accordingly - Yes  Labs reviewed in EPIC  BP Readings from Last 3  Encounters:   19 126/74   18 150/82   18 130/70    Wt Readings from Last 3 Encounters:   19 61.9 kg (136 lb 8 oz)   18 61.6 kg (135 lb 14.4 oz)   17 62.3 kg (137 lb 4.8 oz)                  Patient Active Problem List   Diagnosis     Major depression in complete remission (H)     Atopic rhinitis     Hypertension goal BP (blood pressure) < 140/90     CKD (chronic kidney disease) stage 3, GFR 30-59 ml/min (H)     CARDIOVASCULAR SCREENING; LDL GOAL LESS THAN 130     Recurrent sinusitis     CTS (carpal tunnel syndrome)     Persistent disorder of initiating or maintaining sleep     Actinic keratosis     Multiple pulmonary nodules     Major depressive disorder, recurrent episode, moderate (H)     Past Surgical History:   Procedure Laterality Date     BREAST BIOPSY, RT/LT      Breat Biopsy RT/LT     COLONOSCOPY  10/03     COLONOSCOPY  2014    Procedure: COLONOSCOPY;  COLONOSCOPY ;  Surgeon: Gaurav Shaffer MD;  Location: [facility]     RECONSTRUCT EYELID         Social History     Tobacco Use     Smoking status: Former Smoker     Types: Cigarettes     Last attempt to quit: 10/30/1972     Years since quittin.6     Smokeless tobacco: Never Used   Substance Use Topics     Alcohol use: Yes     Comment: couple glasses of wine daily      Family History   Problem Relation Age of Onset     Breast Cancer Mother      Cancer Mother      Diabetes Paternal Grandmother          Current Outpatient Medications   Medication Sig Dispense Refill     amoxicillin-clavulanate (AUGMENTIN) 875-125 MG per tablet        aspirin 81 MG tablet Take 1 tablet (81 mg) by mouth daily 90 tablet 1     ATACAND 4 MG TABS tablet Take 1 tablet (4 mg) by mouth daily FLY 90 tablet 3     atorvastatin (LIPITOR) 10 MG tablet TAKE ONE TABLET BY MOUTH ONCE DAILY  30 tablet 0     azithromycin (ZITHROMAX) 250 MG tablet Two tablets first day, then one tablet daily for four days. 6 tablet 0     FLUZONE HIGH-DOSE 0.5 ML  injection   0     hydrOXYzine (VISTARIL) 50 MG capsule Take 1 capsule (50 mg) by mouth 3 times daily as needed for anxiety 20 capsule 0     oxazepam (SERAX) 15 MG capsule TAKE ONE CAPSULE BY MOUTH IN THE EVENING AS NEEDED FOR ANXIETY. 30 capsule 0     PARNATE 10 MG tablet TAKE FOUR TABLETS BY MOUTH DAILY 360 tablet 3     PARNATE 10 MG tablet TAKE 1 TABLET BY MOUTH 3  TIMES DAILY 270 tablet 0     polyethylene glycol (MIRALAX/GLYCOLAX) packet Take 1 packet by mouth daily       predniSONE (DELTASONE) 10 MG tablet        sodium chloride (OCEAN) 0.65 % nasal spray Spray 2 sprays into both nostrils 3 times daily as needed for congestion 1 Bottle 0     triamcinolone (KENALOG) 0.1 % cream Apply  topically 2 times daily as needed. 15 g 1     triamterene-hydrochlorothiazide (MAXZIDE-25) 37.5-25 MG per tablet TAKE 1 TABLET BY MOUTH EVERY MORNING 90 tablet 2     triamterene-hydrochlorothiazide (MAXZIDE-25) 37.5-25 MG per tablet Take 0.5 tablets by mouth daily       zolpidem (AMBIEN) 5 MG tablet TAKE ONE TABLET BY MOUTH IN THE EVENING AS NEEDED. 30 tablet 0     Allergies   Allergen Reactions     Wheat Bran        Pertinent mammograms are reviewed under the imaging tab.  History of abnormal Pap smear: NO - age 65 - see link Cervical Cytology Screening Guidelines     Reviewed and updated as needed this visit by clinical staff  Tobacco  Allergies  Meds  Med Hx  Surg Hx  Fam Hx  Soc Hx        Reviewed and updated as needed this visit by Provider        Past Medical History:   Diagnosis Date     Hypertension goal BP (blood pressure) < 140/90      Recurrent sinusitis 12/2/2013      Past Surgical History:   Procedure Laterality Date     BREAST BIOPSY, RT/LT      Breat Biopsy RT/LT     COLONOSCOPY  10/03     COLONOSCOPY  4/8/2014    Procedure: COLONOSCOPY;  COLONOSCOPY ;  Surgeon: Gaurav Shaffer MD;  Location: [facility]     RECONSTRUCT EYELID         ROS:  POSITIVE back pain    Denies headache, insomnia, chest pain, shortness  "of breath, cough, heartburn, bowel issues, bladder issues, neck pain, hip pain, knee pain, ankle pain, or foot pain. Remainder of ROS is negative unless otherwise noted above or in HPI.    This document serves as a record of the services and decisions personally performed and made by Mehran Oliva MD. It was created on his behalf by Shakeel Lares, trained medical scribe. The creation of this document is based on the provider's statements to the medical scribe.  Shakeel Lares 12:43 PM June 5, 2019    OBJECTIVE:   /74   Pulse 93   Temp 98.6  F (37  C) (Temporal)   Resp 14   Ht 1.619 m (5' 3.74\")   Wt 61.9 kg (136 lb 8 oz)   SpO2 98%   BMI 23.62 kg/m    EXAM:  GENERAL APPEARANCE: healthy, alert and no distress  EYES: Eyes grossly normal to inspection, PERRL and conjunctivae and sclerae normal  HENT: ear canals and TM's normal, nose and mouth without ulcers or lesions, oropharynx clear and oral mucous membranes moist  NECK: no adenopathy, no asymmetry, masses, or scars and thyroid normal to palpation  RESP: lungs clear to auscultation - no rales, rhonchi or wheezes  BREAST: normal without masses, tenderness or nipple discharge and no palpable axillary masses or adenopathy  CV: regular rate and rhythm, normal S1 S2, no S3 or S4, no murmur, click or rub, no peripheral edema and peripheral pulses strong  ABDOMEN: soft, nontender, no hepatosplenomegaly, no masses and bowel sounds normal  MS: no musculoskeletal defects are noted and gait is age appropriate without ataxia  SKIN: no suspicious lesions or rashes  NEURO: Normal strength and tone, sensory exam grossly normal, mentation intact and speech normal, reflexes normal, no tremor  PSYCH: mentation appears normal and affect normal/bright    Diagnostic Test Results:  Labs reviewed in Epic  No results found for this or any previous visit (from the past 24 hour(s)).    ASSESSMENT/PLAN:   (Z00.00) Routine general medical examination at a health care facility  " (primary encounter diagnosis)  Comment: Patient is doing well. Routine physical and lab work completed.  Plan: Will notify with results. Follow up as needed.    (G47.00) Persistent disorder of initiating or maintaining sleep  Comment: Patient has an elderly dog that keeps her awake.  Plan: Begin taking Flexeril. Follow up as needed.    (Z13.6) CARDIOVASCULAR SCREENING; LDL GOAL LESS THAN 130  Comment: Pending lab work.  Plan: Will notify with results. Follow up as needed.    (I10) Hypertension goal BP (blood pressure) < 140/90  Comment: <140/90 at goal.  Plan: Continue taking medication. Follow up as needed.    (L57.8) Sun-damaged skin  Comment: Stable. Controlled by current medication.  Plan: Continue taking medication. Follow up as needed.        Patient Instructions       Preventive Health Recommendations    See your health care provider every year to    Review health changes.     Discuss preventive care.      Review your medicines if your doctor has prescribed any.      You no longer need a yearly Pap test unless you've had an abnormal Pap test in the past 10 years. If you have vaginal symptoms, such as bleeding or discharge, be sure to talk with your provider about a Pap test.      Every 1 to 2 years, have a mammogram.  If you are over 69, talk with your health care provider about whether or not you want to continue having screening mammograms.      Every 10 years, have a colonoscopy. Or, have a yearly FIT test (stool test). These exams will check for colon cancer.       Have a cholesterol test every 5 years, or more often if your doctor advises it.       Have a diabetes test (fasting glucose) every three years. If you are at risk for diabetes, you should have this test more often.       At age 65, have a bone density scan (DEXA) to check for osteoporosis (brittle bone disease).    Shots:    Get a flu shot each year.    Get a tetanus shot every 10 years.    Talk to your doctor about your pneumonia vaccines.  "There are now two you should receive - Pneumovax (PPSV 23) and Prevnar (PCV 13).    Talk to your pharmacist about the shingles vaccine.    Talk to your doctor about the hepatitis B vaccine.    Nutrition:     Eat at least 5 servings of fruits and vegetables each day.      Eat whole-grain bread, whole-wheat pasta and brown rice instead of white grains and rice.      Get adequate about Calcium and Vitamin D.     Lifestyle    Exercise at least 150 minutes a week (30 minutes a day, 5 days a week). This will help you control your weight and prevent disease.      Limit alcohol to one drink per day.      No smoking.       Wear sunscreen to prevent skin cancer.       See your dentist twice a year for an exam and cleaning.      See your eye doctor every 1 to 2 years to screen for conditions such as glaucoma, macular degeneration, cataracts, etc.    Personalized Prevention Plan  You are due for the preventive services outlined below.  Your care team is available to assist you in scheduling these services.  If you have already completed any of these items, please share that information with your care team to update in your medical record.    Health Maintenance Due   Topic Date Due     Discuss Advance Directive Planning  1945     Osteoporosis Screening  10/06/2002     Zoster (Shingles) Vaccine (2 of 3) 07/10/2013     Depression Assessment  10/04/2018     Annual Wellness Visit  04/04/2019       COUNSELING:   Reviewed preventive health counseling, as reflected in patient instructions    Estimated body mass index is 23.62 kg/m  as calculated from the following:    Height as of this encounter: 1.619 m (5' 3.74\").    Weight as of this encounter: 61.9 kg (136 lb 8 oz).       reports that she quit smoking about 46 years ago. Her smoking use included cigarettes. She has never used smokeless tobacco.      The information in this document, created by the medical scribe for me, accurately reflects the services I personally performed " and the decisions made by me. I have reviewed and approved this document for accuracy prior to leaving the patient care area.  June 5, 2019 12:43 PM    Mehran Oliva MD  [facility]

## 2019-06-06 ENCOUNTER — TELEPHONE (OUTPATIENT)
Dept: FAMILY MEDICINE | Facility: CLINIC | Age: 74
End: 2019-06-06

## 2019-06-06 LAB
ALBUMIN SERPL-MCNC: 3.9 G/DL (ref 3.4–5)
ALP SERPL-CCNC: 85 U/L (ref 40–150)
ALT SERPL W P-5'-P-CCNC: 33 U/L (ref 0–50)
ANION GAP SERPL CALCULATED.3IONS-SCNC: 8 MMOL/L (ref 3–14)
AST SERPL W P-5'-P-CCNC: 33 U/L (ref 0–45)
BILIRUB SERPL-MCNC: 0.4 MG/DL (ref 0.2–1.3)
BUN SERPL-MCNC: 32 MG/DL (ref 7–30)
CALCIUM SERPL-MCNC: 9.2 MG/DL (ref 8.5–10.1)
CHLORIDE SERPL-SCNC: 110 MMOL/L (ref 94–109)
CHOLEST SERPL-MCNC: 186 MG/DL
CO2 SERPL-SCNC: 24 MMOL/L (ref 20–32)
CREAT SERPL-MCNC: 1.15 MG/DL (ref 0.52–1.04)
GFR SERPL CREATININE-BSD FRML MDRD: 47 ML/MIN/{1.73_M2}
GLUCOSE SERPL-MCNC: 93 MG/DL (ref 70–99)
HDLC SERPL-MCNC: 109 MG/DL
LDLC SERPL CALC-MCNC: 67 MG/DL
NONHDLC SERPL-MCNC: 77 MG/DL
POTASSIUM SERPL-SCNC: 3.7 MMOL/L (ref 3.4–5.3)
PROT SERPL-MCNC: 7.3 G/DL (ref 6.8–8.8)
SODIUM SERPL-SCNC: 142 MMOL/L (ref 133–144)
TRIGL SERPL-MCNC: 49 MG/DL

## 2019-06-06 NOTE — TELEPHONE ENCOUNTER
Prior Authorization Retail Medication Request    Medication/Dose: methocarbamol (ROBAXIN) 500 MG tablet  ICD code (if different than what is on RX):    Previously Tried and Failed:    Rationale:      Insurance Name:  Covermeds  Insurance ID:  Key: [id number]      Pharmacy Information (if different than what is on RX)  [facility]:  [facility]  Phone:  [phone]  Fax: [phone]

## 2019-06-07 NOTE — RESULT ENCOUNTER NOTE
Please notify patient: send letter and copy of labs. Labs unchanged- recommend drink more fluids.     Thanks Mehran Oliva MD

## 2019-06-10 ENCOUNTER — TELEPHONE (OUTPATIENT)
Dept: FAMILY MEDICINE | Facility: CLINIC | Age: 74
End: 2019-06-10

## 2019-06-10 DIAGNOSIS — G47.00 PERSISTENT DISORDER OF INITIATING OR MAINTAINING SLEEP: Chronic | ICD-10-CM

## 2019-06-10 NOTE — TELEPHONE ENCOUNTER
Central Prior Authorization Team  Phone: [phone]    PA Initiation    Medication: methocarbamol (ROBAXIN) 500 MG tablet  Insurance Company: [employer] ([facility]) - Phone [phone] Fax [phone]  Pharmacy Filling the Rx: [id number] [place], [place] - [address]  Filling Pharmacy Phone: [phone]  Filling Pharmacy Fax:    Start Date: 6/10/2019

## 2019-06-10 NOTE — TELEPHONE ENCOUNTER
Prior Authorization Approval    Authorization Effective Date: 6/10/2019  Authorization Expiration Date: 12/31/2019  Medication: methocarbamol (ROBAXIN) 500 MG tablet- APPROVED   Approved Dose/Quantity:   Reference #:     Insurance Company: [employer] ([facility]) - Phone [phone] Fax [phone]  Expected CoPay:       CoPay Card Available:      Foundation Assistance Needed:    Which Pharmacy is filling the prescription (Not needed for infusion/clinic administered): [id number] - [place], [place] - [address]  Pharmacy Notified: Yes  Patient Notified: Comment:  **Instructed pharmacy to notify patient when script is ready to /ship.**

## 2019-06-10 NOTE — TELEPHONE ENCOUNTER
Dr. Oliva,  Please see phone message below. Patient states she had the opposite effect. She has tried medication for 4-nights and has been unable to sleep.    Patient is requesting to re-start Zolpidem    Please advise    Thank You!  Arabella Avalos RN  Triage Nurse

## 2019-06-12 ENCOUNTER — TRANSFERRED RECORDS (OUTPATIENT)
Dept: HEALTH INFORMATION MANAGEMENT | Facility: CLINIC | Age: 74
End: 2019-06-12

## 2019-06-12 RX ORDER — ZOLPIDEM TARTRATE 5 MG/1
TABLET ORAL
Qty: 30 TABLET | Refills: 0 | OUTPATIENT
Start: 2019-06-12

## 2019-06-12 RX ORDER — ZOLPIDEM TARTRATE 5 MG/1
TABLET ORAL
Qty: 30 TABLET | Refills: 5 | Status: SHIPPED | OUTPATIENT
Start: 2019-06-12 | End: 2019-12-16

## 2019-06-12 NOTE — TELEPHONE ENCOUNTER
Per  [place] , last script written and filled 5/13/19.  Will route to PCP for review. Melissa Gillespie RN June 12, 2019 2:42 PM

## 2019-06-12 NOTE — TELEPHONE ENCOUNTER
Controlled Substance Refill Request for Zolpidem Tartrate Oral Tablet 5 MG  Problem List Complete:  No     PROVIDER TO CONSIDER COMPLETION OF PROBLEM LIST AND OVERVIEW/CONTROLLED SUBSTANCE AGREEMENT    Last Written Prescription Date:  06/12/2019  Last Fill Quantity: 30,   # refills: 5    THE MOST RECENT OFFICE VISIT MUST BE WITHIN THE PAST 3 MONTHS. AT LEAST ONE FACE TO FACE VISIT MUST OCCUR EVERY 6 MONTHS. ADDITIONAL VISITS CAN BE VIRTUAL.  (THIS STATEMENT SHOULD BE DELETED.)    Last Office Visit with [facility] primary care provider: 06/05/2019    Future Office visit:     Controlled substance agreement:   Encounter-Level CSA:    There are no encounter-level csa.     Patient-Level CSA:    There are no patient-level csa.         Last Urine Drug Screen: No results found for: CDAUT, No results found for: COMDAT, No results found for: THC13, PCP13, COC13, MAMP13, OPI13, AMP13, BZO13, TCA13, MTD13, BAR13, OXY13, PPX13, BUP13     Processing:  [facility]     [email or web address]       checked in past 3 months?  No, route to RN

## 2019-06-12 NOTE — TELEPHONE ENCOUNTER
Script for   zolpidem (AMBIEN) 5 MG tablet 30 tablet 5 6/12/2019  No   Sig: TAKE ONE TABLET BY MOUTH IN THE EVENING AS NEEDED.     Called to pt shahnaz Fuentes RN   [facility]

## 2019-10-08 ENCOUNTER — TELEPHONE (OUTPATIENT)
Dept: FAMILY MEDICINE | Facility: CLINIC | Age: 74
End: 2019-10-08

## 2019-10-08 DIAGNOSIS — R92.8 ABNORMAL MAMMOGRAM: ICD-10-CM

## 2019-10-08 DIAGNOSIS — R92.30 DENSE BREASTS: Primary | ICD-10-CM

## 2019-10-08 DIAGNOSIS — Z12.31 ENCOUNTER FOR SCREENING MAMMOGRAM FOR MALIGNANT NEOPLASM OF BREAST: ICD-10-CM

## 2019-10-08 NOTE — TELEPHONE ENCOUNTER
Reason for call:  Order   Order or referral being requested: breast U/S  Reason for request: dense breast tissue  Date needed: as soon as possible  Has the patient been seen by the PCP for this problem? pt has had to have this every year and wants to get both scheduled     Additional comments: n/a    Phone number to reach patient:  Home number on file [phone] (home)    Best Time:  n/a    Can we leave a detailed message on this number?  YES

## 2019-10-08 NOTE — TELEPHONE ENCOUNTER
Dr. Oliva,  Please see phone message below. Per chart review, patient had  mammogram done 07/11/2018 that was abnormal, patient needed to follow up with breast US.    Breast US done 07/19/2018 and this was normal    Cued order for mammogram and US per patient request    Does patient need both US and mammogram?    Please advise    Thank You!  rAabella Avalos, CHARMAINE  Triage Nurse

## 2019-10-09 NOTE — TELEPHONE ENCOUNTER
Pt contacted regarding referrals placed for US and mammogram per Dr. Oliva.    Ruby Espinosa, RN   [facility]

## 2019-10-24 ENCOUNTER — TELEPHONE (OUTPATIENT)
Dept: FAMILY MEDICINE | Facility: CLINIC | Age: 74
End: 2019-10-24

## 2019-10-24 DIAGNOSIS — F33.1 MAJOR DEPRESSIVE DISORDER, RECURRENT EPISODE, MODERATE (H): ICD-10-CM

## 2019-10-24 NOTE — TELEPHONE ENCOUNTER
[facility] sent a request regarding  PARNATE 10 MG tablet  Requesting Generic substitution prescription permitted.    Please call pharmacy to give permission for generic

## 2019-10-28 NOTE — TELEPHONE ENCOUNTER
Left vm for pt to return call to clinic    Denise Fuentes RN   [facility]

## 2019-10-28 NOTE — TELEPHONE ENCOUNTER
Pt states she has always been given brand name Parnate and has tried generic in the past and didn't work. This was ordered as Parnate when ordered last by DR Oliva on 6/5/19 with refills to last one year. She [facility] She said that [facility] did just fill this with the brand name Parnate    [facility] was called and told that she must get the brand name.     Romy Bailey, RN, BSN

## 2019-11-20 ENCOUNTER — HOSPITAL ENCOUNTER (OUTPATIENT)
Dept: MAMMOGRAPHY | Facility: CLINIC | Age: 74
Discharge: HOME | End: 2019-11-20
Attending: FAMILY MEDICINE | Admitting: FAMILY MEDICINE
Payer: MEDICARE

## 2019-11-20 DIAGNOSIS — R92.30 DENSE BREASTS: ICD-10-CM

## 2019-11-20 DIAGNOSIS — R92.8 ABNORMAL MAMMOGRAM: ICD-10-CM

## 2019-11-20 PROCEDURE — G0279 TOMOSYNTHESIS, MAMMO: HCPCS

## 2019-11-20 PROCEDURE — 77066 DX MAMMO INCL CAD BI: CPT

## 2019-12-16 ENCOUNTER — TELEPHONE (OUTPATIENT)
Dept: FAMILY MEDICINE | Facility: CLINIC | Age: 74
End: 2019-12-16

## 2019-12-16 DIAGNOSIS — G47.00 PERSISTENT DISORDER OF INITIATING OR MAINTAINING SLEEP: Chronic | ICD-10-CM

## 2019-12-16 DIAGNOSIS — G47.00 PERSISTENT DISORDER OF INITIATING OR MAINTAINING SLEEP: ICD-10-CM

## 2019-12-16 RX ORDER — ZOLPIDEM TARTRATE 5 MG/1
TABLET ORAL
Qty: 30 TABLET | Refills: 0 | Status: SHIPPED | OUTPATIENT
Start: 2019-12-16 | End: 2020-01-15

## 2019-12-16 RX ORDER — OXAZEPAM 15 MG/1
CAPSULE ORAL
Qty: 30 CAPSULE | Refills: 0 | Status: SHIPPED | OUTPATIENT
Start: 2019-12-16 | End: 2020-01-15

## 2019-12-16 NOTE — TELEPHONE ENCOUNTER
Writer attempted to check , unable to check  do to site malfunction. Justyna Garcia RN on 12/16/2019 at 12:47 PM

## 2019-12-16 NOTE — TELEPHONE ENCOUNTER
Controlled Substance Refill Request for oxazepam (SERAX) 15 MG capsule  Problem List Complete:  No     PROVIDER TO CONSIDER COMPLETION OF PROBLEM LIST AND OVERVIEW/CONTROLLED SUBSTANCE AGREEMENT    Last Written Prescription Date:  06/05/2019  Last Fill Quantity: 30,   # refills: 5    THE MOST RECENT OFFICE VISIT MUST BE WITHIN THE PAST 3 MONTHS. AT LEAST ONE FACE TO FACE VISIT MUST OCCUR EVERY 6 MONTHS. ADDITIONAL VISITS CAN BE VIRTUAL.  (THIS STATEMENT SHOULD BE DELETED.)    Last Office Visit with [facility] primary care provider: 06/05/2019    Future Office visit:     Controlled substance agreement:   Encounter-Level CSA:    There are no encounter-level csa.     Patient-Level CSA:    There are no patient-level csa.         Last Urine Drug Screen: No results found for: CDAUT, No results found for: COMDAT, No results found for: THC13, PCP13, COC13, MAMP13, OPI13, AMP13, BZO13, TCA13, MTD13, BAR13, OXY13, PPX13, BUP13     Processing:  Rx to be electronically transmitted to pharmacy by provider      [email or web address]       checked in past 3 months?  No, route to RN     Controlled Substance Refill Request for zolpidem (AMBIEN) 5 MG tablet  Problem List Complete:  No     PROVIDER TO CONSIDER COMPLETION OF PROBLEM LIST AND OVERVIEW/CONTROLLED SUBSTANCE AGREEMENT    Last Written Prescription Date:  06/12/2019  Last Fill Quantity: 30,   # refills: 5    THE MOST RECENT OFFICE VISIT MUST BE WITHIN THE PAST 3 MONTHS. AT LEAST ONE FACE TO FACE VISIT MUST OCCUR EVERY 6 MONTHS. ADDITIONAL VISITS CAN BE VIRTUAL.  (THIS STATEMENT SHOULD BE DELETED.)    Last Office Visit with [facility] primary care provider: 06/05/2019    Future Office visit:     Controlled substance agreement:   Encounter-Level CSA:    There are no encounter-level csa.     Patient-Level CSA:    There are no patient-level csa.         Last Urine Drug Screen: No results found for: CDAUT, No results found for: COMDAT, No results found for: THC13, PCP13,  COC13, MAMP13, OPI13, AMP13, BZO13, TCA13, MTD13, BAR13, OXY13, PPX13, BUP13     Processing:  Rx to be electronically transmitted to pharmacy by provider      [email or web address]       checked in past 3 months?  No, route to RN

## 2020-01-13 DIAGNOSIS — G47.00 PERSISTENT DISORDER OF INITIATING OR MAINTAINING SLEEP: Chronic | ICD-10-CM

## 2020-01-13 DIAGNOSIS — G47.00 PERSISTENT DISORDER OF INITIATING OR MAINTAINING SLEEP: ICD-10-CM

## 2020-01-13 NOTE — TELEPHONE ENCOUNTER
Requested Prescriptions   Pending Prescriptions Disp Refills     oxazepam (SERAX) 15 MG capsule [Pharmacy Med Name: OXAZEPAM 15MG CAPSULES] 30 capsule      Sig: TAKE 1 CAPSULE BY MOUTH IN THE EVENING FOR ANXIETY       There is no refill protocol information for this order         Last Written Prescription Date:  12/16/19  Last Fill Quantity: 30,   # refills: 0    Routing refill request to provider for review/approval because:  Drug not on the G, P or  Health refill protocol or controlled substance     CHECKED LAST FILLED ON 12/17/19, 30 capsules, 0 refills by Dr. Oliva.      zolpidem (AMBIEN) 5 MG tablet [Pharmacy Med Name: ZOLPIDEM 5MG TABLETS] 30 tablet      Sig: TAKE 1 TABLET BY MOUTH IN THE EVENING AS NEEDED       There is no refill protocol information for this order         Last Written Prescription Date:  12/16/19  Last Fill Quantity: 30,   # refills: 0    Routing refill request to provider for review/approval because:  Drug not on the G, P or  Health refill protocol or controlled substance     CHECKED LAST FILLED on 12/1619, 30 tablets, 0 refills by Dr. Oliva.

## 2020-01-15 RX ORDER — OXAZEPAM 15 MG/1
CAPSULE ORAL
Qty: 30 CAPSULE | Refills: 5 | Status: SHIPPED | OUTPATIENT
Start: 2020-01-15 | End: 2020-07-22

## 2020-01-15 RX ORDER — ZOLPIDEM TARTRATE 5 MG/1
TABLET ORAL
Qty: 30 TABLET | Refills: 5 | Status: SHIPPED | OUTPATIENT
Start: 2020-01-15 | End: 2020-07-22

## 2020-03-16 DIAGNOSIS — I10 HYPERTENSION GOAL BP (BLOOD PRESSURE) < 140/90: ICD-10-CM

## 2020-03-16 RX ORDER — TRIAMTERENE/HYDROCHLOROTHIAZID 37.5-25 MG
TABLET ORAL
Qty: 90 TABLET | Refills: 0 | Status: SHIPPED | OUTPATIENT
Start: 2020-03-16 | End: 2020-11-24

## 2020-03-16 RX ORDER — TRIAMTERENE/HYDROCHLOROTHIAZID 37.5-25 MG
TABLET ORAL
Qty: 30 TABLET | Refills: 0 | Status: SHIPPED | OUTPATIENT
Start: 2020-03-16 | End: 2020-03-16

## 2020-06-16 ENCOUNTER — TELEPHONE (OUTPATIENT)
Dept: FAMILY MEDICINE | Facility: CLINIC | Age: 75
End: 2020-06-16

## 2020-06-16 NOTE — TELEPHONE ENCOUNTER
Central Prior Authorization Team   Phone: [phone]      PA Initiation    Medication: oxazepam (SERAX) 15 MG capsule - INITIATED  Insurance Company: [employer] Part D - Phone [phone] Fax [phone]  Pharmacy Filling the Rx: [id number] - [place], [place] - [address] & [facility]  Filling Pharmacy Phone: [phone]  Filling Pharmacy Fax: [phone]  Start Date: 6/16/2020

## 2020-06-16 NOTE — TELEPHONE ENCOUNTER
Prior Authorization Retail Medication Request    Medication/Dose: oxazepam (SERAX) 15 MG capsule  ICD code (if different than what is on RX):    Previously Tried and Failed:    Rationale:      Insurance Name:  [employer]eds  Insurance Key: [id number]      Pharmacy Information (if different than what is on RX)  Name:  [facility]    Phone:  [phone]  Fax: [phone]  Email: [email or web address]

## 2020-06-18 NOTE — TELEPHONE ENCOUNTER
Central Prior Authorization Team   Phone: [phone]      Prior Authorization Approval    Authorization Effective Date: 1/1/2020  Authorization Expiration Date: 12/31/2020  Medication: oxazepam (SERAX) 15 MG capsule - APPROVED  Approved Dose/Quantity: 30 FOR 30  Reference #:     Insurance Company: [employer] Part D - Phone [phone] Fax [phone]  Expected CoPay:       CoPay Card Available:      Foundation Assistance Needed:    Which Pharmacy is filling the prescription (Not needed for infusion/clinic administered): [id number] - [place], [place] - [address]  Pharmacy Notified: Yes  Patient Notified: Yes (**Instructed pharmacy to notify patient when script is ready to /ship.**)

## 2020-06-30 DIAGNOSIS — I10 HYPERTENSION GOAL BP (BLOOD PRESSURE) < 140/90: ICD-10-CM

## 2020-06-30 DIAGNOSIS — Z13.6 CARDIOVASCULAR SCREENING; LDL GOAL LESS THAN 130: Primary | ICD-10-CM

## 2020-06-30 NOTE — TELEPHONE ENCOUNTER
Pharmacy queued and pt is not out of medication. She would like a phone call once they have been sent to the pharmacy

## 2020-07-01 RX ORDER — ATORVASTATIN CALCIUM 10 MG/1
10 TABLET, FILM COATED ORAL DAILY
Qty: 90 TABLET | Refills: 3 | Status: SHIPPED | OUTPATIENT
Start: 2020-07-01 | End: 2021-06-16

## 2020-07-01 RX ORDER — CANDESARTAN CILEXETIL 4 MG/1
4 TABLET ORAL DAILY
Qty: 90 TABLET | Refills: 3 | Status: SHIPPED | OUTPATIENT
Start: 2020-07-01 | End: 2020-12-11

## 2020-07-01 NOTE — TELEPHONE ENCOUNTER
"Requested Prescriptions   Pending Prescriptions Disp Refills     ATACAND 4 MG tablet 90 tablet 0     Sig: Take 1 tablet (4 mg) by mouth daily       Angiotensin-II Receptors Failed - 6/30/2020  8:16 AM        Failed - Last blood pressure under 140/90 in past 12 months     BP Readings from Last 3 Encounters:   06/05/19 126/74   04/30/18 150/82   04/04/18 130/70                 Failed - Recent (12 mo) or future (30 days) visit within the authorizing provider's specialty     Patient has had an office visit with the authorizing provider or a provider within the authorizing providers department within the previous 12 mos or has a future within next 30 days. See \"Patient Info\" tab in inbasket, or \"Choose Columns\" in Meds & Orders section of the refill encounter.              Failed - Normal serum creatinine on file in past 12 months     Recent Labs   Lab Test 06/05/19  1719   CR 1.15*       Ok to refill medication if creatinine is low          Failed - Normal serum potassium on file in past 12 months     Recent Labs   Lab Test 06/05/19  1719   POTASSIUM 3.7                    Passed - Medication is active on med list        Passed - Patient is age 18 or older        Passed - No active pregnancy on record        Passed - No positive pregnancy test in past 12 months          Last Written Prescription Date:  12/3/19  Last Fill Quantity: 90,   # refills: 0  Last Office Visit: 6/5/19  Future Office visit:       Routing refill request to provider for review/approval because:  BP's do not meet RN refill protocol, patient due for labs and appt     atorvastatin (LIPITOR) 10 MG tablet 90 tablet 3     Sig: Take 1 tablet (10 mg) by mouth daily       Statins Protocol Failed - 6/30/2020  8:16 AM        Failed - LDL on file in past 12 months     Recent Labs   Lab Test 06/05/19  1719   LDL 67             Failed - Recent (12 mo) or future (30 days) visit within the authorizing provider's specialty     Patient has had an office visit with " "the authorizing provider or a provider within the authorizing providers department within the previous 12 mos or has a future within next 30 days. See \"Patient Info\" tab in inbasket, or \"Choose Columns\" in Meds & Orders section of the refill encounter.              Passed - No abnormal creatine kinase in past 12 months     No lab results found.             Passed - Medication is active on med list        Passed - Patient is age 18 or older        Passed - No active pregnancy on record        Passed - No positive pregnancy test in past 12 months        Last Written Prescription Date:  6/5/19  Last Fill Quantity: 90,   # refills: 3  Last Office Visit: 6/5/19  Future Office visit:       Routing refill request to provider for review/approval because:  Due for visit and labs   Labs cued.  Ruby Espinosa RN   [facility]   "

## 2020-07-18 DIAGNOSIS — G47.00 PERSISTENT DISORDER OF INITIATING OR MAINTAINING SLEEP: Chronic | ICD-10-CM

## 2020-07-18 DIAGNOSIS — G47.00 PERSISTENT DISORDER OF INITIATING OR MAINTAINING SLEEP: ICD-10-CM

## 2020-07-18 NOTE — TELEPHONE ENCOUNTER
Requested Prescriptions   Pending Prescriptions Disp Refills     oxazepam (SERAX) 15 MG capsule [Pharmacy Med Name: OXAZEPAM 15MG CAP 15 CAP] 30 capsule 0     Sig: TAKE 1 CAPSULE BY MOUTH EVERY EVENING FOR ANXIETY       Last Written Prescription Date:  1/15/20  Last Fill Quantity: 30,   # refills: 5        There is no refill protocol information for this order        zolpidem (AMBIEN) 5 MG tablet [Pharmacy Med Name: ZOLPIDEM 5MG TAB 5 TAB] 30 tablet 0     Sig: TAKE 1 TABLET BY MOUTH EVERY EVENING AS NEEDED       There is no refill protocol information for this order        Problem List Complete:  Yes    Last Written Prescription Date:  1/15/20  Last Fill Quantity: 30,   # refills: 5      Last Office Visit with [facility] primary care provider: 6/5/19    Future Office visit:     Controlled substance agreement:   Encounter-Level CSA:    There are no encounter-level csa.     Patient-Level CSA:    There are no patient-level csa.         Last Urine Drug Screen: No results found for: CDAUT, No results found for: COMDAT, No results found for: THC13, PCP13, COC13, MAMP13, OPI13, AMP13, BZO13, TCA13, MTD13, BAR13, OXY13, PPX13, BUP13     Processing:  eprescribe    [email or web address]   checked in past 3 months?  No, route to RN [place]  reviewed 7/18/2020 5:14 PM    Both presciptions  Last refill: 6/15/20  Disp:30    Concerns: none

## 2020-07-22 ENCOUNTER — TELEPHONE (OUTPATIENT)
Dept: FAMILY MEDICINE | Facility: CLINIC | Age: 75
End: 2020-07-22

## 2020-07-22 RX ORDER — OXAZEPAM 15 MG/1
CAPSULE ORAL
Qty: 30 CAPSULE | Refills: 0 | Status: SHIPPED | OUTPATIENT
Start: 2020-07-22 | End: 2020-08-14

## 2020-07-22 RX ORDER — ZOLPIDEM TARTRATE 5 MG/1
TABLET ORAL
Qty: 30 TABLET | Refills: 0 | Status: SHIPPED | OUTPATIENT
Start: 2020-07-22 | End: 2020-08-14

## 2020-07-22 NOTE — TELEPHONE ENCOUNTER
Needs virtual appointment for annual discussion of insomnia medications; please notify, thanks Mehran

## 2020-07-22 NOTE — TELEPHONE ENCOUNTER
Prior Authorization Not Needed per Insurance    Medication: oxazepam (SERAX) 15 MG capsule  Insurance Company: [employer] ([facility]) - Phone [phone] Fax [phone]  Expected CoPay:      Pharmacy Filling the Rx: [facility] [place], [place] - [address] [place] [address]  Pharmacy Notified: Yes  Patient Notified: Yes  **Instructed pharmacy to notify patient when script is ready to /ship.**

## 2020-07-22 NOTE — TELEPHONE ENCOUNTER
Prior Authorization Retail Medication Request    Medication/Dose: oxazepam (SERAX) 15 MG capsule  ICD code (if different than what is on RX):    Previously Tried and Failed:    Rationale:      Insurance Name:  [facility]  Insurance ID:  [id number]      Pharmacy Information (if different than what is on RX)  Name:  [facility]  Phone:  [phone]  Fax: [phone]

## 2020-07-22 NOTE — TELEPHONE ENCOUNTER
Central Prior Authorization Team   Phone: [phone]      PA Initiation    Medication: oxazepam (SERAX) 15 MG capsule  Insurance Company: [employer] ([facility]) - Phone [phone] Fax [phone]  Pharmacy Filling the Rx: [facility] - [place], [place] - [address] [place] [address]  Filling Pharmacy Phone: [phone]  Filling Pharmacy Fax:    Start Date: 7/22/2020

## 2020-07-24 ENCOUNTER — TELEPHONE (OUTPATIENT)
Dept: FAMILY MEDICINE | Facility: CLINIC | Age: 75
End: 2020-07-24

## 2020-07-24 NOTE — TELEPHONE ENCOUNTER
Reason for call:  Other   Patient called regarding (reason for call): call back  Additional comments:     Patient called in this morning at 7:40AM to cancel phone appointment scheduled for today at 8:30AM. Stated that she was too tired to do a phone visit and looking to reschedule. I informed the patient that BENNIE is here next week Monday 07/27/2020 and next Wednesday 07/29/2020 but that he was booked for virtual visit, and that he wouldn't be back until the second week of August. Patient stated that she will be out of town for a month starting in August and was wondering if there was any way BENNIE could squeeze her in for a phone visit sometime when he's here next week. Patient was scheduled for an appointment regarding medication refill.    Phone number to reach patient:  Home number on file [phone] (home)    Best Time:  any    Can we leave a detailed message on this number?  YES    Travel screening: Negative

## 2020-07-24 NOTE — TELEPHONE ENCOUNTER
Reception will reach out to schedule patient today at 4:45    Ruby Espinosa RN   [facility]

## 2020-07-24 NOTE — TELEPHONE ENCOUNTER
Pt indicated that she cannot do a visit today at 4:45, asking if she can be worked in next week.     Previous notes:   Patient called in this morning at 7:40AM to cancel phone appointment scheduled for today at 8:30AM. Stated that she was too tired to do a phone visit and looking to reschedule. I informed the patient that BENNIE is here next week Monday 07/27/2020 and next Wednesday 07/29/2020 but that he was booked for virtual visit, and that he wouldn't be back until the second week of August. Patient stated that she will be out of town for a month starting in August and was wondering if there was any way BENNIE could squeeze her in for a phone visit sometime when he's here next week. Patient was scheduled for an appointment regarding medication refill.      Nguyen Azar RN   [facility]

## 2020-07-24 NOTE — TELEPHONE ENCOUNTER
Patient called back and stated that time will not work for her and is wondering if she can be squeezed in sometime next week.

## 2020-07-27 NOTE — TELEPHONE ENCOUNTER
Called and left voice mail for patient to call back. Patient returned call stating that time would not work for her and that she will just keep visit scheduled on 8/14

## 2020-07-27 NOTE — TELEPHONE ENCOUNTER
Josephine,    Please call patient to see if she can do a visit on Wednesday at 11:45 with Dr. Oliva.    Thanks  Ruby Espinosa RN   [facility]

## 2020-08-14 ENCOUNTER — VIRTUAL VISIT (OUTPATIENT)
Dept: FAMILY MEDICINE | Facility: CLINIC | Age: 75
End: 2020-08-14
Payer: MEDICARE

## 2020-08-14 DIAGNOSIS — F41.9 ANXIETY: ICD-10-CM

## 2020-08-14 DIAGNOSIS — G47.00 PERSISTENT DISORDER OF INITIATING OR MAINTAINING SLEEP: ICD-10-CM

## 2020-08-14 DIAGNOSIS — F33.1 MAJOR DEPRESSIVE DISORDER, RECURRENT EPISODE, MODERATE (H): ICD-10-CM

## 2020-08-14 DIAGNOSIS — G47.00 PERSISTENT DISORDER OF INITIATING OR MAINTAINING SLEEP: Primary | Chronic | ICD-10-CM

## 2020-08-14 PROCEDURE — 99443 ZZC PHYSICIAN TELEPHONE EVALUATION 21-30 MIN: CPT | Performed by: FAMILY MEDICINE

## 2020-08-14 RX ORDER — ZOLPIDEM TARTRATE 5 MG/1
5 TABLET ORAL DAILY
COMMUNITY
Start: 2020-06-15 | End: 2020-11-24

## 2020-08-14 RX ORDER — OXAZEPAM 15 MG/1
15 CAPSULE ORAL DAILY
COMMUNITY
Start: 2020-06-15 | End: 2020-11-24

## 2020-08-14 RX ORDER — ZOLPIDEM TARTRATE 5 MG/1
TABLET ORAL
Qty: 31 TABLET | Refills: 5 | Status: SHIPPED | OUTPATIENT
Start: 2020-08-14 | End: 2021-02-17

## 2020-08-14 RX ORDER — OXAZEPAM 15 MG/1
CAPSULE ORAL
Qty: 31 CAPSULE | Refills: 5 | Status: SHIPPED | OUTPATIENT
Start: 2020-08-14 | End: 2021-02-17

## 2020-08-14 RX ORDER — TRANYLCYPROMINE SULFATE 10 MG/1
TABLET, FILM COATED ORAL
Qty: 270 TABLET | Refills: 3 | Status: SHIPPED | OUTPATIENT
Start: 2020-08-14 | End: 2021-09-21

## 2020-08-14 ASSESSMENT — PATIENT HEALTH QUESTIONNAIRE - PHQ9: SUM OF ALL RESPONSES TO PHQ QUESTIONS 1-9: 0

## 2020-08-14 NOTE — PROGRESS NOTES
"Jann Davidson is a 75 year old female who is being evaluated via a billable telephone visit.      The patient has been notified of following:     \"This telephone visit will be conducted via a call between you and your physician/provider. We have found that certain health care needs can be provided without the need for a physical exam.  This service lets us provide the care you need with a short phone conversation.  If a prescription is necessary we can send it directly to your pharmacy.  If lab work is needed we can place an order for that and you can then stop by our lab to have the test done at a later time.    Telephone visits are billed at different rates depending on your insurance coverage. During this emergency period, for some insurers they may be billed the same as an in-person visit.  Please reach out to your insurance provider with any questions.    If during the course of the call the physician/provider feels a telephone visit is not appropriate, you will not be charged for this service.\"    Patient has given verbal consent for Telephone visit?  Yes    What phone number would you like to be contacted at? [phone]    How would you like to obtain your AVS? Bee Rodríguez MA      Subjective     Jann Davidson is a 75 year old female who presents via phone visit today for the following health issues:    HPI    Medication Refills of Parnate and Zolpidem    Taking Medication as prescribed: yes    Side Effects:  None    Medication Helping Symptoms:  yes        Depression and Anxiety Follow-Up    How are you doing with your depression since your last visit? No change    How are you doing with your anxiety since your last visit?  No change    Are you having other symptoms that might be associated with depression or anxiety? No    Have you had a significant life event? OTHER: COVID     Do you have any concerns with your use of alcohol or other drugs? No    Social History     Tobacco Use     " Smoking status: Former Smoker     Types: Cigarettes     Last attempt to quit: 10/30/1972     Years since quittin.8     Smokeless tobacco: Never Used   Substance Use Topics     Alcohol use: Yes     Comment: couple glasses of wine daily      Drug use: No     PHQ 2018   PHQ-9 Total Score 1 0 0   Q9: Thoughts of better off dead/self-harm past 2 weeks Not at all Not at all Not at all     No flowsheet data found.  Last PHQ-9 2020   1.  Little interest or pleasure in doing things 0   2.  Feeling down, depressed, or hopeless 0   3.  Trouble falling or staying asleep, or sleeping too much 0   4.  Feeling tired or having little energy 0   5.  Poor appetite or overeating 0   6.  Feeling bad about yourself 0   7.  Trouble concentrating 0   8.  Moving slowly or restless 0   Q9: Thoughts of better off dead/self-harm past 2 weeks 0   PHQ-9 Total Score 0   Difficulty at work, home, or with people Not difficult at all     No flowsheet data found.    How many days per week do you miss taking your medication? 0    Insomnia  Onset: many years     Description:   Time to fall asleep (sleep latency): varies  Middle of night awakening:  YES  Early morning awakening:  YES    Progression of Symptoms:  constant    Accompanying Signs & Symptoms:  Daytime sleepiness/napping: no  Excessive snoring/apnea: no  Restless legs: no  Frequent urination: no  Chronic pain:  YES    History:  Prior Insomnia: YES    Precipitating factors:   New stressful situation: YES- COVID  Caffeine intake: YES  OTC decongestants: no  Any new medications: no    Alleviating factors:  Self medicating (alcohol, etc.):  no    Therapies Tried and outcome: current medications look     I have reviewed and updated the patient's Past Medical History, Social History, Family History and Medication List    Reviewed and updated as needed this visit by Provider         Review of Systems   Constitutional, HEENT, cardiovascular, pulmonary, gi and gu  systems are negative, except as otherwise noted.       Objective          Vitals:  No vitals were obtained today due to virtual visit.    healthy, alert and no distress  PSYCH: Alert and oriented times 3; coherent speech, normal   rate and volume, able to articulate logical thoughts, able   to abstract reason, no tangential thoughts, no hallucinations   or delusions  Her affect is normal  RESP: No cough, no audible wheezing, able to talk in full sentences  Remainder of exam unable to be completed due to telephone visits    Diagnostic Test Results:  Labs reviewed in Epic        Assessment/Plan:    Assessment & Plan     1. 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 Anxiety  stable  - oxazepam (SERAX) 15 MG capsule; TAKE 1 CAPSULE BY MOUTH EVERY EVENING FOR ANXIETY  Dispense: 31 capsule; Refill: 5    3. Major depressive disorder, recurrent episode, moderate (H)  stable  - PARNATE 10 MG tablet; TAKE 1 TABLET BY MOUTH 3  TIMES DAILY  Dispense: 270 tablet; Refill: 3       Patient Instructions   Continue current medications for insomnia, anxiety.       Return in about 1 year (around 8/14/2021).    Mehran Oliva MD  [facility]    Phone call duration:  22 minutes

## 2020-08-18 RX ORDER — OXAZEPAM 15 MG/1
CAPSULE ORAL
Qty: 30 CAPSULE | Refills: 0 | OUTPATIENT
Start: 2020-08-18

## 2020-11-25 ENCOUNTER — TELEPHONE (OUTPATIENT)
Dept: FAMILY MEDICINE | Facility: CLINIC | Age: 75
End: 2020-11-25

## 2020-11-25 NOTE — TELEPHONE ENCOUNTER
Pt, said she got 15 pills before she run out of triamterene-HCTZ (MAXZIDE-25) 37.5-25 MG tablet [70067] (Order [id number])  Pharmacy has been attached.

## 2020-12-10 NOTE — PROGRESS NOTES
"Jann Davidson is a 75 year old female who is being evaluated via a billable telephone visit.      The patient has been notified of following:     \"This telephone visit will be conducted via a call between you and your physician/provider. We have found that certain health care needs can be provided without the need for a physical exam.  This service lets us provide the care you need with a short phone conversation.  If a prescription is necessary we can send it directly to your pharmacy.  If lab work is needed we can place an order for that and you can then stop by our lab to have the test done at a later time.    Telephone visits are billed at different rates depending on your insurance coverage. During this emergency period, for some insurers they may be billed the same as an in-person visit.  Please reach out to your insurance provider with any questions.    If during the course of the call the physician/provider feels a telephone visit is not appropriate, you will not be charged for this service.\"    Patient has given verbal consent for Telephone visit?  Yes    What phone number would you like to be contacted at? [phone]     How would you like to obtain your AVS? Vivi Grewal     Jann Davidson is a 75 year old female who presents via phone visit today for the following health issues:    HPI     Annual Wellness Visit    Patient has been advised of split billing requirements and indicates understanding:      Are you in the first 12 months of your Medicare Part B coverage?  No    Physical Health:    In general, how would you rate your overall physical health? excellent    Outside of work, how many days during the week do you exercise?1 day/week    Outside of work, approximately how many minutes a day do you exercise?15-30 minutes    If you drink alcohol do you typically have >3 drinks per day or >7 drinks per week?     Do you usually eat at least 4 servings of fruit and vegetables a day, include whole " "grains & fiber and avoid regularly eating high fat or \"junk\" foods? Yes    Do you have any problems taking medications regularly? No    Do you have any side effects from medications? none    Needs assistance for the following daily activities: no assistance needed    Which of the following safety concerns are present in your home?  none identified     Hearing impairment: No    In the past 6 months, have you been bothered by leaking of urine? no    There were no vitals taken for this visit.  Weight: see nephrology note  Height: \"                                 \"  BMI: \"                                     \"  Blood Pressure: \"                     \"    Mental Health:    In general, how would you rate your overall mental or emotional health? fair  PHQ-2 Score:      Do you feel safe in your environment? Yes    Have you ever done Advance Care Planning? (For example, a Health Directive, POLST, or a discussion with a medical provider or your loved ones about your wishes)? No, advance care planning information given to patient to review.  Patient declined advance care planning discussion at this time.    Fall risk:  Fallen 2 or more times in the past year?: No  Any fall with injury in the past year?: No    Cognitive Screening:     Do you have sleep apnea, excessive snoring or daytime drowsiness?: no    Current providers sharing in care for this patient include:   Patient Care Team:  Mehran Oliva MD as PCP - General (Family Practice)  Mehran Oliva MD as Assigned PCP    Patient has been advised of split billing requirements and indicates understanding:        Depression and Anxiety Follow-Up    How are you doing with your depression since your last visit? No change    How are you doing with your anxiety since your last visit?  No change    Are you having other symptoms that might be associated with depression or anxiety? Yes:  insomnia    Have you had a significant life event? OTHER: COVID     Do you have " any concerns with your use of alcohol or other drugs? No    Social History     Tobacco Use     Smoking status: Former Smoker     Types: Cigarettes     Quit date: 10/30/1972     Years since quittin.1     Smokeless tobacco: Never Used   Substance Use Topics     Alcohol use: Yes     Comment: couple glasses of wine daily      Drug use: No     PHQ 2018   PHQ-9 Total Score 1 0 0   Q9: Thoughts of better off dead/self-harm past 2 weeks Not at all Not at all Not at all     No flowsheet data found.  Last PHQ-9 2020   1.  Little interest or pleasure in doing things 0   2.  Feeling down, depressed, or hopeless 0   3.  Trouble falling or staying asleep, or sleeping too much 0   4.  Feeling tired or having little energy 0   5.  Poor appetite or overeating 0   6.  Feeling bad about yourself 0   7.  Trouble concentrating 0   8.  Moving slowly or restless 0   Q9: Thoughts of better off dead/self-harm past 2 weeks 0   PHQ-9 Total Score 0   Difficulty at work, home, or with people Not difficult at all       Chronic Kidney Disease Follow-up      Do you take any over the counter pain medicine?: No        How many days per week do you miss taking your medication? 0    Review of Systems   Constitutional, HEENT, cardiovascular, pulmonary, gi and gu systems are negative, except as otherwise noted.       Objective          Vitals:  No vitals were obtained today due to virtual visit.    healthy, alert and mild distress  PSYCH: Alert and oriented times 3; coherent speech, normal   rate and volume, able to articulate logical thoughts, able   to abstract reason, no tangential thoughts, no hallucinations   or delusions  Her affect is pleasant and anxious  RESP: No cough, no audible wheezing, able to talk in full sentences  Remainder of exam unable to be completed due to telephone visits       Assessment & Plan     Essential hypertension with goal blood pressure less than 130/80  At goal     Major depressive  disorder, recurrent episode, moderate (H)  At goal     Other mixed anxiety disorders  At goal; uses oxazepam many years without side effects tolerance    Persistent disorder of initiating or maintaining sleep  At goal: uses zolpidem without side effects or tolerance        Patient Instructions   Continue followup with nephrology, will check their labs      Return in about 6 months (around 6/11/2021).    Mehran Oliva MD  [facility]    Phone call duration:  32 minutes

## 2020-12-11 ENCOUNTER — VIRTUAL VISIT (OUTPATIENT)
Dept: FAMILY MEDICINE | Facility: CLINIC | Age: 75
End: 2020-12-11
Payer: MEDICARE

## 2020-12-11 DIAGNOSIS — I10 ESSENTIAL HYPERTENSION WITH GOAL BLOOD PRESSURE LESS THAN 130/80: Primary | ICD-10-CM

## 2020-12-11 DIAGNOSIS — Z00.00 ENCOUNTER FOR MEDICARE ANNUAL WELLNESS EXAM: ICD-10-CM

## 2020-12-11 DIAGNOSIS — F33.1 MAJOR DEPRESSIVE DISORDER, RECURRENT EPISODE, MODERATE (H): ICD-10-CM

## 2020-12-11 DIAGNOSIS — G47.00 PERSISTENT DISORDER OF INITIATING OR MAINTAINING SLEEP: ICD-10-CM

## 2020-12-11 DIAGNOSIS — F41.3 OTHER MIXED ANXIETY DISORDERS: ICD-10-CM

## 2020-12-11 PROCEDURE — 99214 OFFICE O/P EST MOD 30 MIN: CPT | Mod: 95 | Performed by: FAMILY MEDICINE

## 2020-12-11 RX ORDER — TRIAMTERENE/HYDROCHLOROTHIAZID 37.5-25 MG
0.5 TABLET ORAL EVERY MORNING
Qty: 45 TABLET | Refills: 3 | Status: SHIPPED | OUTPATIENT
Start: 2020-12-11 | End: 2022-02-08

## 2020-12-11 RX ORDER — CANDESARTAN CILEXETIL 4 MG/1
2 TABLET ORAL DAILY
Qty: 45 TABLET | Refills: 3 | Status: SHIPPED | OUTPATIENT
Start: 2020-12-11 | End: 2022-02-08

## 2020-12-12 NOTE — PATIENT INSTRUCTIONS
Continue followup with nephrology, will check their labs  Patient Education   Personalized Prevention Plan  You are due for the preventive services outlined below.  Your care team is available to assist you in scheduling these services.  If you have already completed any of these items, please share that information with your care team to update in your medical record.  Health Maintenance Due   Topic Date Due     Osteoporosis Screening  10/06/2015     Flu Vaccine (1) 09/01/2020

## 2021-01-15 ENCOUNTER — HEALTH MAINTENANCE LETTER (OUTPATIENT)
Age: 76
End: 2021-01-15

## 2021-02-16 DIAGNOSIS — F41.9 ANXIETY: ICD-10-CM

## 2021-02-16 DIAGNOSIS — G47.00 PERSISTENT DISORDER OF INITIATING OR MAINTAINING SLEEP: Chronic | ICD-10-CM

## 2021-02-17 RX ORDER — OXAZEPAM 15 MG/1
CAPSULE ORAL
Qty: 31 CAPSULE | Refills: 5 | Status: SHIPPED | OUTPATIENT
Start: 2021-02-17 | End: 2021-08-30

## 2021-02-17 RX ORDER — ZOLPIDEM TARTRATE 5 MG/1
TABLET ORAL
Qty: 31 TABLET | Refills: 5 | Status: SHIPPED | OUTPATIENT
Start: 2021-02-17 | End: 2021-08-30

## 2021-02-17 NOTE — TELEPHONE ENCOUNTER
Requested Prescriptions   Pending Prescriptions Disp Refills     oxazepam (SERAX) 15 MG capsule [Pharmacy Med Name: OXAZEPAM 15MG CAP 15 Capsule] 31 capsule 5     Sig: TAKE 1 CAPSULE BY MOUTH EVERY EVENING FOR ANXIETY       There is no refill protocol information for this order        zolpidem (AMBIEN) 5 MG tablet [Pharmacy Med Name: ZOLPIDEM 5MG TAB 5 Tablet] 31 tablet 5     Sig: TAKE 1 TABLET BY MOUTH EVERY EVENING AS NEEDED       There is no refill protocol information for this order        Routing refill request to provider for review/approval because:  Drug not on the [facility] refill protocol   [facility] reviewed 2/16/2021    oxazepam  Last refill: 1/18/21  Disp: 31  Concerns: none    Zolpidem  Last refill: 1/18/21  Disp: 31  Concerns: none

## 2021-02-23 ENCOUNTER — IMMUNIZATION (OUTPATIENT)
Dept: NURSING | Facility: CLINIC | Age: 76
End: 2021-02-23
Payer: MEDICARE

## 2021-02-23 PROCEDURE — 91300 PR COVID VAC PFIZER DIL RECON 30 MCG/0.3 ML IM: CPT

## 2021-02-23 PROCEDURE — 0001A PR COVID VAC PFIZER DIL RECON 30 MCG/0.3 ML IM: CPT

## 2021-03-16 ENCOUNTER — IMMUNIZATION (OUTPATIENT)
Dept: NURSING | Facility: CLINIC | Age: 76
End: 2021-03-16
Attending: INTERNAL MEDICINE
Payer: MEDICARE

## 2021-03-16 PROCEDURE — 91300 PR COVID VAC PFIZER DIL RECON 30 MCG/0.3 ML IM: CPT

## 2021-03-16 PROCEDURE — 0002A PR COVID VAC PFIZER DIL RECON 30 MCG/0.3 ML IM: CPT

## 2021-05-21 ENCOUNTER — TELEPHONE (OUTPATIENT)
Dept: FAMILY MEDICINE | Facility: CLINIC | Age: 76
End: 2021-05-21

## 2021-05-21 DIAGNOSIS — L57.8 SUN-DAMAGED SKIN: Primary | ICD-10-CM

## 2021-05-21 RX ORDER — TRIAMCINOLONE ACETONIDE 1 MG/G
CREAM TOPICAL
Qty: 15 G | Refills: 1 | Status: SHIPPED | OUTPATIENT
Start: 2021-05-21 | End: 2024-01-12

## 2021-05-21 NOTE — TELEPHONE ENCOUNTER
"Requested Prescriptions   Signed Prescriptions Disp Refills    triamcinolone (KENALOG) 0.1 % external cream 15 g 1     Sig: Apply  topically 2 times daily as needed.       Topical Steroids and Nonsteroidals Protocol Passed - 5/21/2021  4:51 PM        Passed - Patient is age 6 or older        Passed - Authorizing prescriber's most recent note related to this medication read.     If refill request is for ophthalmic use, please forward request to provider for approval.          Passed - High potency steroid not ordered        Passed - Recent (12 mo) or future (30 days) visit within the authorizing provider's specialty     Patient has had an office visit with the authorizing provider or a provider within the authorizing providers department within the previous 12 mos or has a future within next 30 days. See \"Patient Info\" tab in inbasket, or \"Choose Columns\" in Meds & Orders section of the refill encounter.              Passed - Medication is active on med list           Swetha Rodriguez RN  [facility]    "

## 2021-05-21 NOTE — TELEPHONE ENCOUNTER
Reason for Call:  Medication or medication refill:    Do you use a [facility]?  Name of the pharmacy and phone number for the current request:     [facility] - [place], [place] - [address] [place] [address]      Name of the medication requested: triamcinolone    Other request:     Can we leave a detailed message on this number? YES    Phone number patient can be reached at: Home number on file [phone] (home)    Best Time: Any    Call taken on 5/21/2021 at 4:40 PM by Tayler Olmedo

## 2021-06-14 DIAGNOSIS — I10 HYPERTENSION GOAL BP (BLOOD PRESSURE) < 140/90: ICD-10-CM

## 2021-06-16 RX ORDER — ATORVASTATIN CALCIUM 10 MG/1
TABLET, FILM COATED ORAL
Qty: 90 TABLET | Refills: 0 | Status: SHIPPED | OUTPATIENT
Start: 2021-06-16 | End: 2021-09-21

## 2021-06-16 NOTE — TELEPHONE ENCOUNTER
Approved per [facility] protocol.    Swetha Rodriguez RN  [facility]

## 2021-08-28 DIAGNOSIS — F41.9 ANXIETY: ICD-10-CM

## 2021-08-28 DIAGNOSIS — G47.00 PERSISTENT DISORDER OF INITIATING OR MAINTAINING SLEEP: Chronic | ICD-10-CM

## 2021-08-30 RX ORDER — ZOLPIDEM TARTRATE 5 MG/1
5 TABLET ORAL
Qty: 31 TABLET | Refills: 2 | Status: SHIPPED | OUTPATIENT
Start: 2021-08-30 | End: 2021-12-08

## 2021-08-30 RX ORDER — OXAZEPAM 15 MG/1
15 CAPSULE ORAL
Qty: 31 CAPSULE | Refills: 2 | Status: SHIPPED | OUTPATIENT
Start: 2021-08-30 | End: 2021-12-08

## 2021-08-30 NOTE — TELEPHONE ENCOUNTER
Pending Prescriptions:                       Disp   Refills    oxazepam (SERAX) 15 MG capsule [Pharmacy *31 cap*5            Sig: TAKE 1 CAPSULE BY MOUTH EVERY EVENING FOR ANXIETY    zolpidem (AMBIEN) 5 MG tablet [Pharmacy M*31 tab*5            Sig: TAKE 1 TABLET BY MOUTH EVERY EVENING AS NEEDED     Routing refill request to provider for review/approval because:  Drug not on the [facility] refill protocol       Nguyen Azar RN   [facility]

## 2021-09-05 ENCOUNTER — HEALTH MAINTENANCE LETTER (OUTPATIENT)
Age: 76
End: 2021-09-05

## 2021-09-21 DIAGNOSIS — F33.1 MAJOR DEPRESSIVE DISORDER, RECURRENT EPISODE, MODERATE (H): ICD-10-CM

## 2021-09-21 DIAGNOSIS — I10 HYPERTENSION GOAL BP (BLOOD PRESSURE) < 140/90: ICD-10-CM

## 2021-09-21 RX ORDER — ATORVASTATIN CALCIUM 10 MG/1
TABLET, FILM COATED ORAL
Qty: 90 TABLET | Refills: 0 | Status: SHIPPED | OUTPATIENT
Start: 2021-09-21 | End: 2022-01-25

## 2021-09-21 RX ORDER — TRANYLCYPROMINE SULFATE 10 MG/1
TABLET, FILM COATED ORAL
Qty: 90 TABLET | Refills: 0 | Status: SHIPPED | OUTPATIENT
Start: 2021-09-21 | End: 2021-10-27

## 2021-09-21 NOTE — TELEPHONE ENCOUNTER
"Dr. Oliva,    Requested Prescriptions   Pending Prescriptions Disp Refills     PARNATE 10 MG tablet 270 tablet 3     Sig: TAKE 1 TABLET BY MOUTH 3  TIMES DAILY       There is no refill protocol information for this order        atorvastatin (LIPITOR) 10 MG tablet 90 tablet 0     Sig: TAKE 1 TABLET (10 MG) BY MOUTH ONCE DAILY       Statins Protocol Failed - 9/21/2021 11:23 AM        Failed - LDL on file in past 12 months     Recent Labs   Lab Test 06/05/19  1719   LDL 67             Passed - No abnormal creatine kinase in past 12 months     No lab results found.             Passed - Recent (12 mo) or future (30 days) visit within the authorizing provider's specialty     Patient has had an office visit with the authorizing provider or a provider within the authorizing providers department within the previous 12 mos or has a future within next 30 days. See \"Patient Info\" tab in inbasket, or \"Choose Columns\" in Meds & Orders section of the refill encounter.              Passed - Medication is active on med list        Passed - Patient is age 18 or older        Passed - No active pregnancy on record        Passed - No positive pregnancy test in past 12 months           Routing refill request to provider for review/approval because:  Drug not on the [facility] refill protocol   Labs not current:  Lipids    Swetha Rodriguez RN  [facility]          "

## 2021-09-23 ENCOUNTER — TELEPHONE (OUTPATIENT)
Dept: FAMILY MEDICINE | Facility: CLINIC | Age: 76
End: 2021-09-23
Payer: MEDICARE

## 2021-09-23 DIAGNOSIS — I10 HYPERTENSION GOAL BP (BLOOD PRESSURE) < 140/90: Primary | ICD-10-CM

## 2021-09-23 DIAGNOSIS — Z13.6 CARDIOVASCULAR SCREENING; LDL GOAL LESS THAN 130: ICD-10-CM

## 2021-09-23 DIAGNOSIS — N18.30 STAGE 3 CHRONIC KIDNEY DISEASE, UNSPECIFIED WHETHER STAGE 3A OR 3B CKD (H): ICD-10-CM

## 2021-09-23 NOTE — TELEPHONE ENCOUNTER
Pt declines to come into the clinic for an annual visit. She does not want to be in the health facility with other that flynn be sick during covid.   She is requesting Dr. Oliva order any additional labs be ordered and [facility] to get drawn at the same time as her nephrology labs. Her nephrolgist will not accept any labs from [facility].     Pt informed that the covid-19 3rd dose has only been approved for immunocompromised per CDC. She can check the CDC for updates on booster.          Nguyen Azar RN   [facility]

## 2021-09-23 NOTE — TELEPHONE ENCOUNTER
Patient called to see why she only received 1 month worth of meds. Explained she is due for a visit. Patient stated she is not coming into this clinic right now with COVID..   Did labs at nephrologist 3 weeks ago. Creatine 1.550.    Would like to do rest of lab work  at nephrologist visit on 9/28 and will schedule virtual visit after labs are completed. Any other labs to add at this time?  Labs can be faxed to [facility] at [phone]    Patient also has questions regarding the COVID 19 vaccine booster.

## 2021-09-27 NOTE — TELEPHONE ENCOUNTER
F[facility]ed [facility] to [facility] 9/27/2021 - left message updating patient and discussing fasting requirements

## 2021-10-26 ENCOUNTER — HOSPITAL ENCOUNTER (OUTPATIENT)
Dept: MAMMOGRAPHY | Facility: CLINIC | Age: 76
Discharge: HOME | End: 2021-10-26
Attending: FAMILY MEDICINE | Admitting: FAMILY MEDICINE
Payer: MEDICARE

## 2021-10-26 DIAGNOSIS — Z12.31 VISIT FOR SCREENING MAMMOGRAM: ICD-10-CM

## 2021-10-26 PROCEDURE — 77063 BREAST TOMOSYNTHESIS BI: CPT

## 2021-10-28 ENCOUNTER — TRANSFERRED RECORDS (OUTPATIENT)
Dept: HEALTH INFORMATION MANAGEMENT | Facility: CLINIC | Age: 76
End: 2021-10-28
Payer: MEDICARE

## 2021-11-17 ENCOUNTER — HOSPITAL ENCOUNTER (OUTPATIENT)
Dept: MAMMOGRAPHY | Facility: CLINIC | Age: 76
Discharge: HOME | End: 2021-11-17
Attending: FAMILY MEDICINE | Admitting: FAMILY MEDICINE
Payer: MEDICARE

## 2021-11-17 DIAGNOSIS — R92.8 ABNORMAL MAMMOGRAM: ICD-10-CM

## 2021-11-17 PROCEDURE — 77065 DX MAMMO INCL CAD UNI: CPT | Mod: LT

## 2021-12-03 ENCOUNTER — HOSPITAL ENCOUNTER (OUTPATIENT)
Dept: MAMMOGRAPHY | Facility: CLINIC | Age: 76
End: 2021-12-03
Attending: FAMILY MEDICINE
Payer: MEDICARE

## 2021-12-03 DIAGNOSIS — R92.1 BREAST CALCIFICATION, LEFT: ICD-10-CM

## 2021-12-03 DIAGNOSIS — R92.8 ABNORMAL MAMMOGRAM: ICD-10-CM

## 2021-12-03 PROCEDURE — 88377 M/PHMTRC ALYS ISHQUANT/SEMIQ: CPT | Performed by: FAMILY MEDICINE

## 2021-12-03 PROCEDURE — 272N000715 MA STEREOTACTIC BREAST BIOPSY VACUUM LT

## 2021-12-03 PROCEDURE — 88342 IMHCHEM/IMCYTCHM 1ST ANTB: CPT | Mod: TC,XU | Performed by: FAMILY MEDICINE

## 2021-12-03 PROCEDURE — 250N000009 HC RX 250: Performed by: FAMILY MEDICINE

## 2021-12-03 PROCEDURE — 999N000065 MA POST PROCEDURE LEFT

## 2021-12-03 PROCEDURE — 88360 TUMOR IMMUNOHISTOCHEM/MANUAL: CPT | Mod: TC | Performed by: FAMILY MEDICINE

## 2021-12-03 RX ORDER — LIDOCAINE HYDROCHLORIDE AND EPINEPHRINE 10; 10 MG/ML; UG/ML
20 INJECTION, SOLUTION INFILTRATION; PERINEURAL ONCE
Status: COMPLETED | OUTPATIENT
Start: 2021-12-03 | End: 2021-12-03

## 2021-12-03 RX ADMIN — LIDOCAINE HYDROCHLORIDE 5 ML: 10 INJECTION, SOLUTION INFILTRATION; PERINEURAL at 13:27

## 2021-12-03 RX ADMIN — LIDOCAINE HYDROCHLORIDE AND EPINEPHRINE 20 ML: 10; 10 INJECTION, SOLUTION INFILTRATION; PERINEURAL at 13:27

## 2021-12-03 NOTE — DISCHARGE INSTRUCTIONS
After Your Breast Biopsy    Bleeding or bruising: Slight bruising is normal.  If you bleed through the bandage, put direct pressure on the breast.  If you are still bleeding after 20 minutes, call the doctor who ordered the exam.    Bandages: Keep your bandage in place until tomorrow morning.  Do not get it wet.  Leave the tape in place for two days.  On the second day, cover it with a Band-Aid.    Activity: You may shower the morning after the exam.  No heavy activity (lifting, vacuuming) for 24 hours.    Discomfort: Wear your bra overnight to support the breast.  You may take Tylenol (acetaminophen) for pain.  If you had a stereotactic of MR-directed biopsy, you may take aspirin or ibuprofen (Advil, Motrin) the morning after your biopsy, unless your doctor tells you not to.    Infection: Infection is rare.  Symptoms include fever, redness, increasing pain and fluid draining from the biopsy site.  If you have any of these symptoms, please call the doctor who ordered your exam.    Results: Results may take up to three business days.  If you have not heard your results in three days, call the Breast Center Nurse at [phone] or [phone].  In rare cases, we may need to do another biopsy.    Call the doctor who ordered your exam if:    You have bleeding that lasts more than 20 minutes.    You have pain that cannot be controlled.    You have signs of infection (fever, redness, drainage or other signs).    You have not had your results within three days.    Nurse navigator: Our nurse navigator is here to answer your questions and help you set up future clinic visits.  Please call [phone].    Thank you for choosing [facility].  Please call us if you have questions or concerns about your biopsy.

## 2021-12-06 DIAGNOSIS — F41.9 ANXIETY: ICD-10-CM

## 2021-12-06 DIAGNOSIS — G47.00 PERSISTENT DISORDER OF INITIATING OR MAINTAINING SLEEP: Chronic | ICD-10-CM

## 2021-12-07 LAB
PATH REPORT.COMMENTS IMP SPEC: ABNORMAL
PATH REPORT.COMMENTS IMP SPEC: ABNORMAL
PATH REPORT.COMMENTS IMP SPEC: YES
PATH REPORT.FINAL DX SPEC: ABNORMAL
PATH REPORT.GROSS SPEC: ABNORMAL
PATH REPORT.MICROSCOPIC SPEC OTHER STN: ABNORMAL
PATH REPORT.RELEVANT HX SPEC: ABNORMAL
PATHOLOGY SYNOPTIC REPORT: ABNORMAL
PHOTO IMAGE: ABNORMAL

## 2021-12-07 PROCEDURE — 88305 TISSUE EXAM BY PATHOLOGIST: CPT | Mod: 26 | Performed by: PATHOLOGY

## 2021-12-07 PROCEDURE — 88360 TUMOR IMMUNOHISTOCHEM/MANUAL: CPT | Mod: 26 | Performed by: PATHOLOGY

## 2021-12-07 PROCEDURE — 88342 IMHCHEM/IMCYTCHM 1ST ANTB: CPT | Mod: 26 | Performed by: PATHOLOGY

## 2021-12-07 NOTE — TELEPHONE ENCOUNTER
Requested Prescriptions   Pending Prescriptions Disp Refills     oxazepam (SERAX) 15 MG capsule [Pharmacy Med Name: OXAZEPAM 15 MG CAPS 15 Capsule] 31 capsule 2     Sig: TAKE 1 CAPSULE (15 MG) BY MOUTH NIGHTLY AS NEEDED FOR ANXIETY OR SLEEP REDUCED REFILL AMT: NEEDS APPOINTMENT/LABS SCHEDULE DEC2021       There is no refill protocol information for this order        zolpidem (AMBIEN) 5 MG tablet [Pharmacy Med Name: ZOLPIDEM 5MG TAB 5 Tablet] 31 tablet 2     Sig: TAKE 1 TABLET (5 MG) BY MOUTH NIGHTLY AS NEEDED FOR SLEEP REDUCED REFILL AMT: NEEDS APPOINTMENT/LABS SCHEDULE DEC2021       There is no refill protocol information for this order        Routing refill request to provider for review/approval because:  Drug not on the [facility] refill protocol     Carlita Lopez RN  [facility]

## 2021-12-07 NOTE — PROGRESS NOTES
Malignant Path:  Pathology report reviewed with our breast radiologist Dr. Ray Pichardo, who confirmed the recent breast imaging is concordant with the final surgical pathology(12/3/21) results below.    I phoned Ms. Jann Davidson, confirmed her full name, date of birth, and notified patient of Stereotactic Guided Left Breast Biopsy results showing Invasive Ductal Carcinoma, grade 2.  Estrogen/ Progesterone Receptors are (-) negative, and HER2 is still pending.  Informed patient we will call her once results are available.    Patient states no problems with biopsy site.  Recommended follow up is Surgical Consult.  Surgical Consult has been arranged with Dr Jonathan Murphy on 12/8/21 at 9:15a.m. at the [facility].   Patient has directions and phone numbers.    Questions were answered and I explained my role as Breast Care Nurse Coordinator in assisting her with appointments, resources and social support.  New diagnosis information packet will be available for patient at surgical consult.  I will follow up with the patient. She has my phone number if she has further questions.  Patient verbalized understanding and agrees with the plan of care.  Ordering provider- Dr. Mehran Oliva has been notified of the results, recommendations for follow up, and scheduled surgical consultation.  I will forward this note, along with the pathology results.   Marcia Lyon RN, BSN    Marcia Lyon RN, BSN  Breast Care Nurse Coordinator  [facility] [place]  [phone]        Jann Davidson [id number]  F, 1945  Surgical Pathology Report (Final result) [id number]  Authorizing Provider: Mehran Oliva MD Ordering Provider: Mehran Oliva MD  Ordering Location: [facility]  Collected: 12/03/2021 01:42 PM  Pathologist: Samir Feliciano MD Received: 12/03/2021 02:51 PM  .  Specimens  A  Breast, Left, Breast, left  .  .  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

## 2021-12-08 ENCOUNTER — OFFICE VISIT (OUTPATIENT)
Dept: SURGERY | Facility: CLINIC | Age: 76
End: 2021-12-08
Payer: MEDICARE

## 2021-12-08 ENCOUNTER — TELEPHONE (OUTPATIENT)
Dept: SURGERY | Facility: CLINIC | Age: 76
End: 2021-12-08

## 2021-12-08 VITALS — DIASTOLIC BLOOD PRESSURE: 80 MMHG | SYSTOLIC BLOOD PRESSURE: 120 MMHG | HEART RATE: 108 BPM

## 2021-12-08 DIAGNOSIS — C50.512 MALIGNANT NEOPLASM OF LOWER-OUTER QUADRANT OF LEFT BREAST OF FEMALE, ESTROGEN RECEPTOR NEGATIVE (H): ICD-10-CM

## 2021-12-08 DIAGNOSIS — F41.9 ANXIETY: Primary | ICD-10-CM

## 2021-12-08 DIAGNOSIS — Z17.1 MALIGNANT NEOPLASM OF LOWER-OUTER QUADRANT OF LEFT BREAST OF FEMALE, ESTROGEN RECEPTOR NEGATIVE (H): ICD-10-CM

## 2021-12-08 PROCEDURE — 99204 OFFICE O/P NEW MOD 45 MIN: CPT | Performed by: SURGERY

## 2021-12-08 RX ORDER — OXAZEPAM 15 MG/1
15 CAPSULE ORAL
Qty: 31 CAPSULE | Refills: 2 | Status: SHIPPED | OUTPATIENT
Start: 2021-12-08 | End: 2022-01-11

## 2021-12-08 RX ORDER — ZOLPIDEM TARTRATE 5 MG/1
5 TABLET ORAL
Qty: 31 TABLET | Refills: 2 | Status: SHIPPED | OUTPATIENT
Start: 2021-12-08 | End: 2022-01-11

## 2021-12-08 RX ORDER — ALPRAZOLAM 0.5 MG
0.5 TABLET ORAL 3 TIMES DAILY PRN
Qty: 20 TABLET | Refills: 0 | Status: SHIPPED | OUTPATIENT
Start: 2021-12-08 | End: 2022-01-03

## 2021-12-08 NOTE — NURSING NOTE
Breast Patients    BREAST PATIENTS (ALL)    1-Do you have any of the following symptoms? Lump(s) or Mass(es)  2-In which breast are you having the symptoms? left  3-Have you had a Mammogram? [facility] - Date:  10/26/21  4-Have you ever had a breast cyst drained? Yes   Date: 2005  5-Have you ever had a breast biopsy? Yes:  Left   -   Date:  12/3/21  6-Have you ever had a Breast Cancer? No   7-Is there a history of Breast Cancer in your family? Yes   Relationship to you:    Mother  8-Have you ever had Ovarian Cancer? No  9-Is there a history of Ovarian Cancer in your family? No  10-Summarize your caffeine intake (i.e. coffee, tea, chocolate, soda etc.): None     BREAST PATIENTS (FEMALE)    11-What age did your periods begin? 11  12-Date your last menstrual period began? 1995  13-Number of full-term pregnancies: 1  14-Your age when your first child was born? 19  15-Did you nurse your children? Yes  16-Are you pregnant now? No  17-Have you begun menopause? Yes  Age Menopause began:  50  18-Have you had either ovary removed?No  19-Do you have breast implants? No   20-Do you use hormone replacement therapy?  No  21-Have you taken oral contraceptive pills?  Yes, For how many years?  10 years   22-Have you had an intrauterine device (IUD) placed?  Yes, For how many years?  5 years   23-What is your current bra size?  36 TRISTEN Diaz MA

## 2021-12-08 NOTE — PROGRESS NOTES
[facility]  Surgery Consultation    HPI: Patient is a 76 year old female who is here for consultation requested by Mehran Oliva [phone] for left breast cancer The lesion was discovered on the patients recent mammogram.  She was found to have some microcalcifications.  A biopsy showed invasive ductal carcinoma ER/DE negative.  HER-2 pending.  Also showed lymphovascular invasion with DCIS and LCIS.  The patients last mammogram was in 2019. She has had a lumpectomy on the right for a benign lesion a number of years ago. . Her menarche was at age 11. Patients last menstrual cycle was in 1995. She denies taking hormone replacement in the past. She has had 1 pregnancies and has 1 living children. Her first pregnancy was at age 19. Her family history is significant for mother with breast cancer at age 50. She denies all other complaints today. Patient denies fevers, chills, nausea, vomiting, SOB, chest pain, abdominal pain.    PMH:  Jann Davidson  has a past medical history of Hypertension goal BP (blood pressure) < 140/90 and Recurrent sinusitis (12/2/2013).  PSH:  Jann Davidson  has a past surgical history that includes colonoscopy (10/03); breast biopsy, rt/lt; Reconstruct eyelid; and Colonoscopy (4/8/2014).  Social History:  Jann Davidson  reports that she quit smoking about 49 years ago. Her smoking use included cigarettes. She has never used smokeless tobacco. She reports current alcohol use. She reports that she does not use drugs.  Family History:  Jann Davidson family history includes Breast Cancer in her mother; Cancer in her mother; Diabetes in her paternal grandmother.  Medications/Allergies: Home medications and allergies reviewed.    ROS:  The 12 point Review of Systems is negative other than noted in the HPI.    Physical Exam:  /80   Pulse 108   GENERAL: Generally appears well.  Psych: Alert and Oriented.  Normal affect  Eyes: Sclera clear  Respiratory:  Lungs with good  air excursion  Cardiovascular:  Normal peripheral pulses  Breast: A bilateral breast exam was performed in the sitting and supine position. The hands were placed at this side and above the head. Bilateral breasts were palpated in a circumferential clockwise fashion including the supraclavicular and axillary areas. Right breast-no masses palpated.  Previous incision site seen.  No axillary adenopathy.. Left breast-no masses palpated.  Questionable positive axillary adenopathy.  GI: Abdomen Soft Non-Tender   Lymphatic/Hematologic/Immune:  No cervical lymphadenopathy.  Integumentary:  No rashes  Neurological: grossly intact  Chaperoned by Marcia MONTANO    All new lab and imaging data was reviewed.     Impression and Plan:  Patient is a 76 year old female with left breast cancer    PLAN:   I spent over 60 minutes discussing the pathophysiology of breast cancer and surgical options.  She was found to have microcalcifications that were biopsied as invasive ductal carcinoma.  ER/CT negative and HER-2 pending.  Also showed extensive lymph vascular invasion.  Given these more aggressive findings comparative to the mammogram neck findings, I feel she should go forward with an MRI for further delineation.  The patient is extremely anxious in office today and had a difficult time accepting the lack of current information.  I described that to give any definitive answers regarding future treatment that we would need the results of the HER-2 as well as the MRI.  Multiple scenarios were briefly discussed but I informed the patient that with the lack of information that I will know more when the results are available.  I will call the patient with an update as soon as possible.  She also has severe anxiety and requested Xanax.  She also admits to drinking wine and taking a sedative at night to sleep.  I adamantly advised the patient that she should only take this in the daytime and not to mix any combination of these drugs or alcohol as  her could be serious consequences.  She agreed.       Thank you very much for this consult.    Jonathan Murphy M.D.  [facility]  [phone]    Please route or send letter to:  Primary Care Provider (PCP) and Referring Provider

## 2021-12-10 ENCOUNTER — HOSPITAL ENCOUNTER (EMERGENCY)
Facility: CLINIC | Age: 76
End: 2021-12-10
Payer: MEDICARE

## 2021-12-10 ENCOUNTER — HOSPITAL ENCOUNTER (OUTPATIENT)
Dept: MRI IMAGING | Facility: CLINIC | Age: 76
Discharge: HOME | End: 2021-12-10
Attending: SURGERY | Admitting: SURGERY
Payer: MEDICARE

## 2021-12-10 DIAGNOSIS — C50.512 MALIGNANT NEOPLASM OF LOWER-OUTER QUADRANT OF LEFT BREAST OF FEMALE, ESTROGEN RECEPTOR NEGATIVE (H): ICD-10-CM

## 2021-12-10 DIAGNOSIS — Z17.1 MALIGNANT NEOPLASM OF LOWER-OUTER QUADRANT OF LEFT BREAST OF FEMALE, ESTROGEN RECEPTOR NEGATIVE (H): ICD-10-CM

## 2021-12-10 PROCEDURE — 77049 MRI BREAST C-+ W/CAD BI: CPT

## 2021-12-10 PROCEDURE — A9585 GADOBUTROL INJECTION: HCPCS | Performed by: SURGERY

## 2021-12-10 PROCEDURE — 255N000002 HC RX 255 OP 636: Performed by: SURGERY

## 2021-12-10 RX ORDER — GADOBUTROL 604.72 MG/ML
6 INJECTION INTRAVENOUS ONCE
Status: COMPLETED | OUTPATIENT
Start: 2021-12-10 | End: 2021-12-10

## 2021-12-10 RX ADMIN — GADOBUTROL 6 ML: 604.72 INJECTION INTRAVENOUS at 19:26

## 2021-12-13 ENCOUNTER — DOCUMENTATION ONLY (OUTPATIENT)
Dept: MAMMOGRAPHY | Facility: CLINIC | Age: 76
End: 2021-12-13
Payer: MEDICARE

## 2021-12-13 LAB — INTERPRETATION: NORMAL

## 2021-12-13 PROCEDURE — 88377 M/PHMTRC ALYS ISHQUANT/SEMIQ: CPT | Mod: 26 | Performed by: MEDICAL GENETICS

## 2021-12-13 NOTE — TELEPHONE ENCOUNTER
Message sent to Dr. Murphy to inform of Bilateral Breast MRI(12/10/21) results are now available for him to review and notify patient.     HER2 is still pending.   Marcia Lyon, RN, BSN      Study Result    Narrative & Impression   MRI BREASTS, BILATERAL, ENHANCED - 12/10/2021 8:01 PM     HISTORY: Newly diagnosed left breast cancer. Evaluate for extent of  disease.      COMPARISON: Prior mammograms on November 17, 2021.      FINDINGS:      Right Breast: There is mild background parenchymal enhancement.     There are no areas of suspicious enhancement or lymphadenopathy.     Left Breast: There is mild background parenchymal enhancement.     In the left breast at 4:00 6 cm from the nipple, there is a  heterogeneously enhancing oval mass measuring 2.0 cm AP by 2.0 cm ML  by 1.5 cm SI, reflecting the biopsy cavity with postbiopsy changes,  although residual peripheral disease cannot be excluded.     There is a prominent 2.5 cm low left level 1 axillary lymph node.                                                                       IMPRESSION:   BI-RADS 0, INCOMPLETE: NEED ADDITIONAL IMAGING EVALUATION,  LEFT.  Known biopsy-proven malignancy in the left breast at 4:00. Prominent  left axillary lymph node.       RECOMMENDED FOLLOW-UP:  Targeted ultrasound evaluation left axillary lymph node, followed by  ultrasound-guided needle biopsy if clinically warranted.      CATARINO MURCIA MD

## 2021-12-14 ENCOUNTER — TELEPHONE (OUTPATIENT)
Dept: SURGERY | Facility: CLINIC | Age: 76
End: 2021-12-14
Payer: MEDICARE

## 2021-12-14 ENCOUNTER — TELEPHONE (OUTPATIENT)
Dept: ONCOLOGY | Facility: CLINIC | Age: 76
End: 2021-12-14
Payer: MEDICARE

## 2021-12-14 ENCOUNTER — PATIENT OUTREACH (OUTPATIENT)
Dept: ONCOLOGY | Facility: CLINIC | Age: 76
End: 2021-12-14
Payer: MEDICARE

## 2021-12-14 DIAGNOSIS — Z17.1 MALIGNANT NEOPLASM OF LEFT BREAST IN FEMALE, ESTROGEN RECEPTOR NEGATIVE, UNSPECIFIED SITE OF BREAST (H): Primary | ICD-10-CM

## 2021-12-14 DIAGNOSIS — C50.912 MALIGNANT NEOPLASM OF LEFT BREAST IN FEMALE, ESTROGEN RECEPTOR NEGATIVE, UNSPECIFIED SITE OF BREAST (H): Primary | ICD-10-CM

## 2021-12-14 NOTE — PROGRESS NOTES
Jann is a 76 year old who is being evaluated via a billable telephone visit.  Patient reviewed medications and allergies via [employer] e-check in.       What phone number would you like to be contacted at? [phone]  How would you like to obtain your AVS? [facility] [place]    Hematology/Oncology New Patient Note      Today's Date: 12/16/21    Reason for Consult:  Left breast cancer, triple negative.    Phone visit duration: 59 minutes    HISTORY OF PRESENT ILLNESS: Jann Davidson is a 76 year old female who presents with the following oncologic history:  1. 10/26/2021: Mammogram showed calcifications in the left lateral breast; right breast negative.  2. 11/17/2021: Left diagnostic mammogram showed cluster of pleomorphic calcifications at 4:00, 6 cm from nipple, measuring 1.3 cm.  3. 12/3/2021: Stereotactic left breast biopsy at 4:00, 6 cm from nipple showed grade 2 invasive ductal carcinoma with micropapillary features, extensive lymphovascular invasion present, grade 2-3 focal DCIS, LCIS, ER negative, IN negative, HER-2/maxime FISH negative.  4. 12/10/2021: Breast MRI showed oval mass 2 x 2 x 1.5 cm at 4:00, 6 cm from nipple in left breast reflecting biopsy cavity with post-biopsy changes; prominent 2.5 cm low left level 1 axillary lymph node.    Jann reports feeling quite stressed as she is taking care of her 19-year old dog that has dementia.  She is independent with respect to household chores.    Mother had breast cancer age 50.    REVIEW OF SYSTEMS:   14 point ROS was reviewed and is negative other than as noted above in HPI.       HOME MEDICATIONS:  Current Outpatient Medications   Medication Sig Dispense Refill     ALPRAZolam (XANAX) 0.5 MG tablet Take 1 tablet (0.5 mg) by mouth 3 times daily as needed for anxiety 20 tablet 0     aspirin 81 MG tablet Take 1 tablet (81 mg) by mouth daily 90 tablet 1     ATACAND 4 MG tablet Take 0.5 tablets (2 mg) by mouth daily 45  tablet 3     atorvastatin (LIPITOR) 10 MG tablet TAKE 1 TABLET (10 MG) BY MOUTH ONCE DAILY 90 tablet 0     FLUZONE HIGH-DOSE 0.5 ML injection   0     oxazepam (SERAX) 15 MG capsule TAKE 1 CAPSULE (15 MG) BY MOUTH NIGHTLY AS NEEDED FOR ANXIETY OR SLEEP REDUCED REFILL AMT: NEEDS APPOINTMENT/LABS SCHEDULE  capsule 2     PARNATE 10 MG tablet TAKE 1 TABLET BY MOUTH 3 TIMES DAILY 90 tablet 1     polyethylene glycol (MIRALAX/GLYCOLAX) packet Take 1 packet by mouth daily       triamcinolone (KENALOG) 0.1 % external cream Apply  topically 2 times daily as needed. 15 g 1     triamterene-HCTZ (MAXZIDE-25) 37.5-25 MG tablet Take 0.5 tablets by mouth every morning 45 tablet 3     zolpidem (AMBIEN) 5 MG tablet TAKE 1 TABLET (5 MG) BY MOUTH NIGHTLY AS NEEDED FOR SLEEP REDUCED REFILL AMT: NEEDS APPOINTMENT/LABS SCHEDULE  tablet 2         ALLERGIES:  Allergies   Allergen Reactions     Wheat Bran          PAST MEDICAL HISTORY:  Past Medical History:   Diagnosis Date     Hypertension goal BP (blood pressure) < 140/90      Recurrent sinusitis 2013     Gynecologic history:  Age of menarche at 11; LMP ;  (one son), 1st pregnancy at age 19; no HRT.    PAST SURGICAL HISTORY:  Past Surgical History:   Procedure Laterality Date     BREAST BIOPSY, RT/LT      Breat Biopsy RT/LT     COLONOSCOPY  10/03     COLONOSCOPY  2014    Procedure: COLONOSCOPY;  COLONOSCOPY ;  Surgeon: Gaurav Shaffer MD;  Location:  GI     RECONSTRUCT EYELID           SOCIAL HISTORY:  Social History     Socioeconomic History     Marital status:      Spouse name: Not on file     Number of children: Not on file     Years of education: Not on file     Highest education level: Not on file   Occupational History     Not on file   Tobacco Use     Smoking status: Former Smoker     Types: Cigarettes     Quit date: 10/30/1972     Years since quittin.1     Smokeless tobacco: Never Used   Substance and Sexual Activity      Alcohol use: Yes     Comment: couple glasses of wine daily      Drug use: No     Sexual activity: Never   Other Topics Concern     Parent/sibling w/ CABG, MI or angioplasty before 65F 55M? Not Asked   Social History Narrative     Not on file     Social Determinants of Health     Financial Resource Strain: Not on file   Food Insecurity: Not on file   Transportation Needs: Not on file   Physical Activity: Not on file   Stress: Not on file   Social Connections: Not on file   Intimate Partner Violence: Not on file   Housing Stability: Not on file         FAMILY HISTORY:  Family History   Problem Relation Age of Onset     Cancer Mother         uterine     Breast Cancer Mother      Diabetes Paternal Grandmother          PHYSICAL EXAM:  Vital signs:  There were no vitals taken for this visit.   Not performed as this was a phone visit.    LABS:  CBC RESULTS:   Recent Labs   Lab Test 04/30/18  0135   WBC 7.0   RBC 4.41   HGB 13.1   HCT 39.1   MCV 89   MCH 29.7   MCHC 33.5   RDW 13.3          Recent Labs   Lab Test 06/05/19  1719 04/30/18  0135    137   POTASSIUM 3.7 3.5   CHLORIDE 110* 106   CO2 24 23   ANIONGAP 8 8   GLC 93 100*   BUN 32* 24   CR 1.15* 1.27*   AMY 9.2 9.0         PATHOLOGY:  Reviewed as per HPI.    IMAGING:  Reviewed as per HPI.    ASSESSMENT/PLAN:  Jann Davidson is a 76 year old female with the following issues:  1. 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  She has a triple negative breast cancer with extensive lymphovascular invasion and suspicious axillary lymph node.   --I recommended biopsy of the left axillary lymph node and staging studies with PET/CT scan.  If PET is negative for distant metastatic disease and the axillary lymph node is positive for  involvement, then she would have clinical prognostic stage IIB disease. Given this extent of disease, I recommended neoadjuvant chemotherapy with paclitaxel and carboplatin for 12 weeks followed by Adriamycin and Cytoxan every 3 weeks for 4 cycles with the addition of pembrolizumab every 3 weeks as per KEYNOTE-522 trial which showed an improvement in pathologic complete response, 3-year event free survival independent of PD-L1 expression (and independent of pCR). I reviewed the KEYNOTE-522 trial results and update with her.  --I discussed the potential adverse effects of the above regimen, including but not limited to: alopecia, nausea, emesis, cytopenias, increased risk for infection, peripheral neuropathy, bowel and bladder dysfunction, infusion reactions, myalgias, fatigue.  I also discussed the potential side effects of pembrolizumab, including autoimmune toxicity to any organ in the body.  --She agrees to proceed with the above treatment regimen as she would like to do as much as possible to eradicate her disease.  --Will refer to genetics given that her cancer is triple negative and mother had breast and uterine cancer. This could have implications if she might be a candidate for adjuvant olaparib if she has a BRCA gene mutation.  --Will obtain prechemotherapy echocardiogram and baseline CBC and CMP.     2. Chronic kidney disease, stage 3  --She follows with Dr. Luna.  --Discussed that chemo dosing would be in accordance with her kidney function as clinically appropriate.    Neetu Mcmullen MD  Hematology/Oncology  [facility]    Total time spent: 75 minutes in patient evaluation, counseling, documentation, and coordination of care.

## 2021-12-14 NOTE — TELEPHONE ENCOUNTER
"I received a call from Jann stating she was transferred to me to schedule a video visit for12/16/21 at 11am with Dr Mcmullen. I scheduled/confirmed the appt details with the patient & sent the virtual visit instructions to her via email. Jann stated that she is having a very hard time comprehending her diagnosis, and feels that her brain has \"shut off\" due to previous chemotherapy. She is having a difficult time trying to find the \"right\" questions to ask. Jann stated she is taking care of her 19 year old dog who has dimentia, and does not have any close family or other support system. I suggested Dr Darin Jimenes as an option to consider for support. Jann was appreciative and stated she will wait to talk to Dr Mcmullen prior to scheduling with Dr Jimenes, just so she can have a better understanding of what to expect. I provided Jann with my contact information in the event she would like to schedule with Dr Jimenes, or to help direct any additional questions to the appropriate resource.  "

## 2021-12-14 NOTE — TELEPHONE ENCOUNTER
Dr. Murphy informed patient and she is now scheduled for MRI f/u Left Axillary US and possible US left axillary lymph node biopsy on 12/23/21.  Marcia Lyon, RN, BSN

## 2021-12-14 NOTE — PROGRESS NOTES
RECORDS STATUS - BREAST    RECORDS REQUESTED FROM: EPIC   DATE REQUESTED: 12/16/2021   NOTES DETAILS STATUS   OFFICE NOTE from referring provider Complete Epic   , ref by Dr Murphy,   OFFICE NOTE from medical oncologist N/A    OFFICE NOTE from surgeon Complete See Breast Biopsy in EPIC   OFFICE NOTE from radiation oncologist     DISCHARGE SUMMARY from hospital N/A    DISCHARGE REPORT from the ER     OPERATIVE REPORT Complete See Breast Biopsy  In EPIC   MEDICATION LIST Complete [facility]   CLINICAL TRIAL TREATMENTS TO DATE     LABS     REQUEST BLOCKS FOR ALL BREAST CANCER PTS     PATHOLOGY REPORTS  (Tissue diagnosis, Stage, ER/WY percentage positive and intensity of staining, HER2 IHC, FISH, and all biopsies from breast and any distant metastasis)                 Complete 12/3/2021 Breast Biopsy    GENONOMIC TESTING     TYPE:   (Next Generation Sequencing, including Foundation One testing, and Oncotype score) Complete Her 2 Tomy Fish 12/3/2021   IMAGING (NEED IMAGES & REPORT)     CT SCANS     MRI Complete MRI Breast 12/10/2021   MAMMO Complete 12/3/2021, 11/17/2021, 10/26/2021 more in PACS   ULTRASOUND     PET     BONE SCAN     BRAIN MRI

## 2021-12-14 NOTE — TELEPHONE ENCOUNTER
Spoke to patient regarding MRI and HER-2 status. There is a suspicious node seen on MRI that was also palpated. Will plan to get US biopsy and set her up with oncology appointment given triple negative and lymphovascular invasion. Patient agrees to plan.

## 2021-12-14 NOTE — PROGRESS NOTES
New Patient Oncology Nurse Navigator Note     Referred to (specialty: Medical Oncology      Date Referral Received: December 14, 2021     Evaluation for:  Breast cancer     Clinical History (per Nurse review of records provided):    A bilateral screening mammogram was performed on 10/26 identifying calcifications in the left lateral breast.  A diagnostic left breast mammogram showed a cluster or pleomorphic calcifications at 4:00 position 6 cm from the nipple measuring 1.3 cm in maximum dimension. 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 That is a heterogeneously enhancing oval mass measuring 2.0 cm AP by 2.0 cm ML by 1.5 cm SI, reflecting the biopsy cavity with postbiopsy changes, although residual peripheral disease cannot be excluded.  There is a prominent 2.5 cm low left level 1 axillary lymph node.     An US-guided lymph node biopsy has been ordered to evaluate left axillary lymph node.      Records Location:  Additional testing needed prior to consult:  An US-guided lymph node biopsy has been ordered to evaluate left axillary lymph node.

## 2021-12-16 ENCOUNTER — PATIENT OUTREACH (OUTPATIENT)
Dept: ONCOLOGY | Facility: CLINIC | Age: 76
End: 2021-12-16
Payer: MEDICARE

## 2021-12-16 ENCOUNTER — VIRTUAL VISIT (OUTPATIENT)
Dept: ONCOLOGY | Facility: CLINIC | Age: 76
End: 2021-12-16
Attending: SURGERY
Payer: MEDICARE

## 2021-12-16 ENCOUNTER — PRE VISIT (OUTPATIENT)
Dept: ONCOLOGY | Facility: CLINIC | Age: 76
End: 2021-12-16

## 2021-12-16 ENCOUNTER — PATIENT OUTREACH (OUTPATIENT)
Dept: ONCOLOGY | Facility: CLINIC | Age: 76
End: 2021-12-16

## 2021-12-16 DIAGNOSIS — T45.1X5A CHEMOTHERAPY-INDUCED NEUTROPENIA (H): ICD-10-CM

## 2021-12-16 DIAGNOSIS — Z17.1 MALIGNANT NEOPLASM OF LOWER-OUTER QUADRANT OF LEFT BREAST OF FEMALE, ESTROGEN RECEPTOR NEGATIVE (H): Primary | ICD-10-CM

## 2021-12-16 DIAGNOSIS — D70.1 CHEMOTHERAPY-INDUCED NEUTROPENIA (H): ICD-10-CM

## 2021-12-16 DIAGNOSIS — Z01.818 ENCOUNTER FOR OTHER PREPROCEDURAL EXAMINATION: ICD-10-CM

## 2021-12-16 DIAGNOSIS — C50.512 MALIGNANT NEOPLASM OF LOWER-OUTER QUADRANT OF LEFT BREAST OF FEMALE, ESTROGEN RECEPTOR NEGATIVE (H): Primary | ICD-10-CM

## 2021-12-16 PROCEDURE — 99205 OFFICE O/P NEW HI 60 MIN: CPT | Mod: 95 | Performed by: INTERNAL MEDICINE

## 2021-12-16 NOTE — LETTER
12/16/2021         RE: Jann Davidson  [address]  [facility] [address]        Dear Colleague,    Thank you for referring your patient, Jann Davidson, to the [facility]CER [facility]. Please see a copy of my visit note below.    Jann is a 76 year old who is being evaluated via a billable telephone visit.  Patient reviewed medications and allergies via [employer] e-check in.       What phone number would you like to be contacted at? [phone]  How would you like to obtain your AVS? Vivi Weber      [facility]    Hematology/Oncology New Patient Note      Today's Date: 12/16/21    Reason for Consult:  Left breast cancer, triple negative.    Phone visit duration: 59 minutes    HISTORY OF PRESENT ILLNESS: Jann Davidson is a 76 year old female who presents with the following oncologic history:  1. 10/26/2021: Mammogram showed calcifications in the left lateral breast; right breast negative.  2. 11/17/2021: Left diagnostic mammogram showed cluster of pleomorphic calcifications at 4:00, 6 cm from nipple, measuring 1.3 cm.  3. 12/3/2021: Stereotactic left breast biopsy at 4:00, 6 cm from nipple showed grade 2 invasive ductal carcinoma with micropapillary features, extensive lymphovascular invasion present, grade 2-3 focal DCIS, LCIS, ER negative, OK negative, HER-2/maxime FISH negative.  4. 12/10/2021: Breast MRI showed oval mass 2 x 2 x 1.5 cm at 4:00, 6 cm from nipple in left breast reflecting biopsy cavity with post-biopsy changes; prominent 2.5 cm low left level 1 axillary lymph node.    Jann reports feeling quite stressed as she is taking care of her 19-year old dog that has dementia.  She is independent with respect to household chores.    Mother had breast cancer age 50.    REVIEW OF SYSTEMS:   14 point ROS was reviewed and is negative other than as noted above in HPI.       HOME MEDICATIONS:  Current Outpatient Medications   Medication Sig Dispense Refill      ALPRAZolam (XANAX) 0.5 MG tablet Take 1 tablet (0.5 mg) by mouth 3 times daily as needed for anxiety 20 tablet 0     aspirin 81 MG tablet Take 1 tablet (81 mg) by mouth daily 90 tablet 1     ATACAND 4 MG tablet Take 0.5 tablets (2 mg) by mouth daily 45 tablet 3     atorvastatin (LIPITOR) 10 MG tablet TAKE 1 TABLET (10 MG) BY MOUTH ONCE DAILY 90 tablet 0     FLUZONE HIGH-DOSE 0.5 ML injection   0     oxazepam (SERAX) 15 MG capsule TAKE 1 CAPSULE (15 MG) BY MOUTH NIGHTLY AS NEEDED FOR ANXIETY OR SLEEP REDUCED REFILL AMT: NEEDS APPOINTMENT/LABS SCHEDULE  capsule 2     PARNATE 10 MG tablet TAKE 1 TABLET BY MOUTH 3 TIMES DAILY 90 tablet 1     polyethylene glycol (MIRALAX/GLYCOLAX) packet Take 1 packet by mouth daily       triamcinolone (KENALOG) 0.1 % external cream Apply  topically 2 times daily as needed. 15 g 1     triamterene-HCTZ (MAXZIDE-25) 37.5-25 MG tablet Take 0.5 tablets by mouth every morning 45 tablet 3     zolpidem (AMBIEN) 5 MG tablet TAKE 1 TABLET (5 MG) BY MOUTH NIGHTLY AS NEEDED FOR SLEEP REDUCED REFILL AMT: NEEDS APPOINTMENT/LABS SCHEDULE  tablet 2         ALLERGIES:  Allergies   Allergen Reactions     Wheat Bran          PAST MEDICAL HISTORY:  Past Medical History:   Diagnosis Date     Hypertension goal BP (blood pressure) < 140/90      Recurrent sinusitis 2013     Gynecologic history:  Age of menarche at 11; LMP ;  (one son), 1st pregnancy at age 19; no HRT.    PAST SURGICAL HISTORY:  Past Surgical History:   Procedure Laterality Date     BREAST BIOPSY, RT/LT      Breat Biopsy RT/LT     COLONOSCOPY  10/03     COLONOSCOPY  2014    Procedure: COLONOSCOPY;  COLONOSCOPY ;  Surgeon: Gaurav Shaffer MD;  Location:  GI     RECONSTRUCT EYELID           SOCIAL HISTORY:  Social History     Socioeconomic History     Marital status:      Spouse name: Not on file     Number of children: Not on file     Years of education: Not on file     Highest education  level: Not on file   Occupational History     Not on file   Tobacco Use     Smoking status: Former Smoker     Types: Cigarettes     Quit date: 10/30/1972     Years since quittin.1     Smokeless tobacco: Never Used   Substance and Sexual Activity     Alcohol use: Yes     Comment: couple glasses of wine daily      Drug use: No     Sexual activity: Never   Other Topics Concern     Parent/sibling w/ CABG, MI or angioplasty before 65F 55M? Not Asked   Social History Narrative     Not on file     Social Determinants of Health     Financial Resource Strain: Not on file   Food Insecurity: Not on file   Transportation Needs: Not on file   Physical Activity: Not on file   Stress: Not on file   Social Connections: Not on file   Intimate Partner Violence: Not on file   Housing Stability: Not on file         FAMILY HISTORY:  Family History   Problem Relation Age of Onset     Cancer Mother         uterine     Breast Cancer Mother      Diabetes Paternal Grandmother          PHYSICAL EXAM:  Vital signs:  There were no vitals taken for this visit.   Not performed as this was a phone visit.    LABS:  CBC RESULTS:   Recent Labs   Lab Test 18  0135   WBC 7.0   RBC 4.41   HGB 13.1   HCT 39.1   MCV 89   MCH 29.7   MCHC 33.5   RDW 13.3          Recent Labs   Lab Test 19  1719 18  0135    137   POTASSIUM 3.7 3.5   CHLORIDE 110* 106   CO2 24 23   ANIONGAP 8 8   GLC 93 100*   BUN 32* 24   CR 1.15* 1.27*   AMY 9.2 9.0         PATHOLOGY:  Reviewed as per HPI.    IMAGING:  Reviewed as per HPI.    ASSESSMENT/PLAN:  Jann Davidson is a 76 year old female with the following issues:  1. 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  She has a triple negative breast cancer with  extensive lymphovascular invasion and suspicious axillary lymph node.   --I recommended biopsy of the left axillary lymph node and staging studies with PET/CT scan.  If PET is negative for distant metastatic disease and the axillary lymph node is positive for involvement, then she would have clinical prognostic stage IIB disease. Given this extent of disease, I recommended neoadjuvant chemotherapy with paclitaxel and carboplatin for 12 weeks followed by Adriamycin and Cytoxan every 3 weeks for 4 cycles with the addition of pembrolizumab every 3 weeks as per KEYNOTE-522 trial which showed an improvement in pathologic complete response, 3-year event free survival independent of PD-L1 expression (and independent of pCR). I reviewed the KEYNOTE-522 trial results and update with her.  --I discussed the potential adverse effects of the above regimen, including but not limited to: alopecia, nausea, emesis, cytopenias, increased risk for infection, peripheral neuropathy, bowel and bladder dysfunction, infusion reactions, myalgias, fatigue.  I also discussed the potential side effects of pembrolizumab, including autoimmune toxicity to any organ in the body.  --She agrees to proceed with the above treatment regimen as she would like to do as much as possible to eradicate her disease.  --Will refer to genetics given that her cancer is triple negative and mother had breast and uterine cancer. This could have implications if she might be a candidate for adjuvant olaparib if she has a BRCA gene mutation.  --Will obtain prechemotherapy echocardiogram and baseline CBC and CMP.     2. Chronic kidney disease, stage 3  --She follows with Dr. Luna.  --Discussed that chemo dosing would be in accordance with her kidney function as clinically appropriate.    Neetu Mcmullen MD  Hematology/Oncology  [facility]    Total time spent: 75 minutes in patient evaluation, counseling, documentation, and coordination of  care.      Again, thank you for allowing me to participate in the care of your patient.        Sincerely,        Neetu Mcmullen MD

## 2021-12-16 NOTE — LETTER
12/16/2021         RE: Jann Davidson  [address]  [facility] [address]        Dear Colleague,    Thank you for referring your patient, Jann Davidson, to the [facility]CER [facility]. Please see a copy of my visit note below.    Jann is a 76 year old who is being evaluated via a billable telephone visit.  Patient reviewed medications and allergies via [employer] e-check in.       What phone number would you like to be contacted at? [phone]  How would you like to obtain your AVS? Vivi Weber      [facility]    Hematology/Oncology New Patient Note      Today's Date: 12/16/21    Reason for Consult:  Left breast cancer, triple negative.    Phone visit duration: 59 minutes    HISTORY OF PRESENT ILLNESS: Jann Davidson is a 76 year old female who presents with the following oncologic history:  1. 10/26/2021: Mammogram showed calcifications in the left lateral breast; right breast negative.  2. 11/17/2021: Left diagnostic mammogram showed cluster of pleomorphic calcifications at 4:00, 6 cm from nipple, measuring 1.3 cm.  3. 12/3/2021: Stereotactic left breast biopsy at 4:00, 6 cm from nipple showed grade 2 invasive ductal carcinoma with micropapillary features, extensive lymphovascular invasion present, grade 2-3 focal DCIS, LCIS, ER negative, IA negative, HER-2/maxime FISH negative.  4. 12/10/2021: Breast MRI showed oval mass 2 x 2 x 1.5 cm at 4:00, 6 cm from nipple in left breast reflecting biopsy cavity with post-biopsy changes; prominent 2.5 cm low left level 1 axillary lymph node.    Jann reports feeling quite stressed as she is taking care of her 19-year old dog that has dementia.  She is independent with respect to household chores.    Mother had breast cancer age 50.    REVIEW OF SYSTEMS:   14 point ROS was reviewed and is negative other than as noted above in HPI.       HOME MEDICATIONS:  Current Outpatient Medications   Medication Sig Dispense Refill      ALPRAZolam (XANAX) 0.5 MG tablet Take 1 tablet (0.5 mg) by mouth 3 times daily as needed for anxiety 20 tablet 0     aspirin 81 MG tablet Take 1 tablet (81 mg) by mouth daily 90 tablet 1     ATACAND 4 MG tablet Take 0.5 tablets (2 mg) by mouth daily 45 tablet 3     atorvastatin (LIPITOR) 10 MG tablet TAKE 1 TABLET (10 MG) BY MOUTH ONCE DAILY 90 tablet 0     FLUZONE HIGH-DOSE 0.5 ML injection   0     oxazepam (SERAX) 15 MG capsule TAKE 1 CAPSULE (15 MG) BY MOUTH NIGHTLY AS NEEDED FOR ANXIETY OR SLEEP REDUCED REFILL AMT: NEEDS APPOINTMENT/LABS SCHEDULE  capsule 2     PARNATE 10 MG tablet TAKE 1 TABLET BY MOUTH 3 TIMES DAILY 90 tablet 1     polyethylene glycol (MIRALAX/GLYCOLAX) packet Take 1 packet by mouth daily       triamcinolone (KENALOG) 0.1 % external cream Apply  topically 2 times daily as needed. 15 g 1     triamterene-HCTZ (MAXZIDE-25) 37.5-25 MG tablet Take 0.5 tablets by mouth every morning 45 tablet 3     zolpidem (AMBIEN) 5 MG tablet TAKE 1 TABLET (5 MG) BY MOUTH NIGHTLY AS NEEDED FOR SLEEP REDUCED REFILL AMT: NEEDS APPOINTMENT/LABS SCHEDULE  tablet 2         ALLERGIES:  Allergies   Allergen Reactions     Wheat Bran          PAST MEDICAL HISTORY:  Past Medical History:   Diagnosis Date     Hypertension goal BP (blood pressure) < 140/90      Recurrent sinusitis 2013     Gynecologic history:  Age of menarche at 11; LMP ;  (one son), 1st pregnancy at age 19; no HRT.    PAST SURGICAL HISTORY:  Past Surgical History:   Procedure Laterality Date     BREAST BIOPSY, RT/LT      Breat Biopsy RT/LT     COLONOSCOPY  10/03     COLONOSCOPY  2014    Procedure: COLONOSCOPY;  COLONOSCOPY ;  Surgeon: Gaurav Shaffer MD;  Location:  GI     RECONSTRUCT EYELID           SOCIAL HISTORY:  Social History     Socioeconomic History     Marital status:      Spouse name: Not on file     Number of children: Not on file     Years of education: Not on file     Highest education  [employer] [facility]PH[facility], [facility]  NEPHROLOGY AND HYPERTENSION  [address]  [address]  [place] [address]  [phone]    MD BEENA STOCK MD ANDREY GONCHARUK, MD MADHU KORRAPATI, MD YELENA ROSENBERG, MD BINNY KOSHY, MD CHRISTOPHER CAPUTO, MD EDWARD BOVER, MD          Patient events noted    MEDICATIONS  (STANDING):  amLODIPine   Tablet 5 milliGRAM(s) Oral daily  aspirin enteric coated 81 milliGRAM(s) Oral daily  carvedilol 25 milliGRAM(s) Oral every 12 hours  ciprofloxacin   IVPB 400 milliGRAM(s) IV Intermittent every 12 hours  dextrose 5%. 1000 milliLiter(s) (50 mL/Hr) IV Continuous <Continuous>  dextrose 50% Injectable 12.5 Gram(s) IV Push once  dextrose 50% Injectable 25 Gram(s) IV Push once  dextrose 50% Injectable 25 Gram(s) IV Push once  doxazosin 2 milliGRAM(s) Oral at bedtime  insulin glargine Injectable (LANTUS) 15 Unit(s) SubCutaneous at bedtime  insulin lispro (HumaLOG) corrective regimen sliding scale   SubCutaneous three times a day before meals  insulin lispro (HumaLOG) corrective regimen sliding scale   SubCutaneous at bedtime  metroNIDAZOLE  IVPB 500 milliGRAM(s) IV Intermittent every 8 hours  mirtazapine 30 milliGRAM(s) Oral at bedtime  potassium chloride    Tablet ER 10 milliEquivalent(s) Oral three times a day    MEDICATIONS  (PRN):  dextrose 40% Gel 15 Gram(s) Oral once PRN Blood Glucose LESS THAN 70 milliGRAM(s)/deciliter  dextrose 40% Gel 15 Gram(s) Oral once PRN Blood Glucose LESS THAN 70 milliGRAM(s)/deciliter  glucagon  Injectable 1 milliGRAM(s) IntraMuscular once PRN Glucose LESS THAN 70 milligrams/deciliter  LORazepam     Tablet 0.5 milliGRAM(s) Oral two times a day PRN Anxiety  ondansetron Injectable 4 milliGRAM(s) IV Push every 6 hours PRN Nausea and/or Vomiting      08-31-20 @ 07:01  -  09-01-20 @ 07:00  --------------------------------------------------------  IN: 599 mL / OUT: 0 mL / NET: 599 mL      PHYSICAL EXAM:      T(C): 36.6 (09-01-20 @ 10:56), Max: 36.9 (08-31-20 @ 17:12)  HR: 70 (09-01-20 @ 13:30) (65 - 74)  BP: 160/66 (09-01-20 @ 13:30) (154/56 - 182/65)  RR: 18 (09-01-20 @ 13:30) (18 - 18)  SpO2: 98% (09-01-20 @ 13:30) (98% - 100%)  Wt(kg): --  Lungs clear  Heart S1S2  Abd soft NT ND  Extremities:   tr edema                                    9.3    6.33  )-----------( 228      ( 01 Sep 2020 06:28 )             28.2     09-01    143  |  111<H>  |  8   ----------------------------<  121<H>  4.1   |  23  |  1.16    Ca    7.9<L>      01 Sep 2020 06:28  Phos  2.9     09-01  Mg     1.8     09-01          Creatinine Trend: 1.16<--, 1.12<--, 1.05<--, 1.24<--, 1.51<--, 1.73<--      Impression:  NICK -- pre-renal azotemia. Resolved  CKD3    Recommendations:   Monitor off IVFs  Resume ACE inhibitor            Luis Pedraza MD level: Not on file   Occupational History     Not on file   Tobacco Use     Smoking status: Former Smoker     Types: Cigarettes     Quit date: 10/30/1972     Years since quittin.1     Smokeless tobacco: Never Used   Substance and Sexual Activity     Alcohol use: Yes     Comment: couple glasses of wine daily      Drug use: No     Sexual activity: Never   Other Topics Concern     Parent/sibling w/ CABG, MI or angioplasty before 65F 55M? Not Asked   Social History Narrative     Not on file     Social Determinants of Health     Financial Resource Strain: Not on file   Food Insecurity: Not on file   Transportation Needs: Not on file   Physical Activity: Not on file   Stress: Not on file   Social Connections: Not on file   Intimate Partner Violence: Not on file   Housing Stability: Not on file         FAMILY HISTORY:  Family History   Problem Relation Age of Onset     Cancer Mother         uterine     Breast Cancer Mother      Diabetes Paternal Grandmother          PHYSICAL EXAM:  Vital signs:  There were no vitals taken for this visit.   Not performed as this was a phone visit.    LABS:  CBC RESULTS:   Recent Labs   Lab Test 18  0135   WBC 7.0   RBC 4.41   HGB 13.1   HCT 39.1   MCV 89   MCH 29.7   MCHC 33.5   RDW 13.3          Recent Labs   Lab Test 19  1719 18  0135    137   POTASSIUM 3.7 3.5   CHLORIDE 110* 106   CO2 24 23   ANIONGAP 8 8   GLC 93 100*   BUN 32* 24   CR 1.15* 1.27*   AMY 9.2 9.0         PATHOLOGY:  Reviewed as per HPI.    IMAGING:  Reviewed as per HPI.    ASSESSMENT/PLAN:  Jann Davidson is a 76 year old female with the following issues:  1. 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  She has a triple negative breast cancer with  extensive lymphovascular invasion and suspicious axillary lymph node.   --I recommended biopsy of the left axillary lymph node and staging studies with PET/CT scan.  If PET is negative for distant metastatic disease and the axillary lymph node is positive for involvement, then she would have clinical prognostic stage IIB disease. Given this extent of disease, I recommended neoadjuvant chemotherapy with paclitaxel and carboplatin for 12 weeks followed by Adriamycin and Cytoxan every 3 weeks for 4 cycles with the addition of pembrolizumab every 3 weeks as per KEYNOTE-522 trial which showed an improvement in pathologic complete response, 3-year event free survival independent of PD-L1 expression (and independent of pCR). I reviewed the KEYNOTE-522 trial results and update with her.  --I discussed the potential adverse effects of the above regimen, including but not limited to: alopecia, nausea, emesis, cytopenias, increased risk for infection, peripheral neuropathy, bowel and bladder dysfunction, infusion reactions, myalgias, fatigue.  I also discussed the potential side effects of pembrolizumab, including autoimmune toxicity to any organ in the body.  --She agrees to proceed with the above treatment regimen as she would like to do as much as possible to eradicate her disease.  --Will refer to genetics given that her cancer is triple negative and mother had breast and uterine cancer. This could have implications if she might be a candidate for adjuvant olaparib if she has a BRCA gene mutation.  --Will obtain prechemotherapy echocardiogram and baseline CBC and CMP.     2. Chronic kidney disease, stage 3  --She follows with Dr. Luna.  --Discussed that chemo dosing would be in accordance with her kidney function as clinically appropriate.    Neetu Mcmullen MD  Hematology/Oncology  [facility]    Total time spent: 75 minutes in patient evaluation, counseling, documentation, and coordination of  care.      Again, thank you for allowing me to participate in the care of your patient.        Sincerely,        Neetu Mcmullen MD

## 2021-12-16 NOTE — PATIENT INSTRUCTIONS
1. Arrange for lab draw, echocardiogram, and PET scan.  2. RTC MD after above tests.  3. Refer to genetics.  4. Arrange for Taxol + carboplatin weekly x 12 weeks and pembrolizumab every 3 weeks -- chemo start after port placement.  5. Prechemo lab draw weekly x 12.

## 2021-12-16 NOTE — PROGRESS NOTES
Spoke to Jann about her Petscan, Echo and new chemo regimen.  Patient was emailed all her appointments and information on each drug included in her chemo regimen.

## 2021-12-17 ENCOUNTER — PREP FOR PROCEDURE (OUTPATIENT)
Dept: SURGERY | Facility: CLINIC | Age: 76
End: 2021-12-17
Payer: MEDICARE

## 2021-12-17 DIAGNOSIS — C50.912 MALIGNANT NEOPLASM OF LEFT FEMALE BREAST, UNSPECIFIED ESTROGEN RECEPTOR STATUS, UNSPECIFIED SITE OF BREAST (H): Primary | ICD-10-CM

## 2021-12-20 ENCOUNTER — PATIENT OUTREACH (OUTPATIENT)
Dept: ONCOLOGY | Facility: CLINIC | Age: 76
End: 2021-12-20
Payer: MEDICARE

## 2021-12-22 ENCOUNTER — TELEPHONE (OUTPATIENT)
Dept: INTERVENTIONAL RADIOLOGY/VASCULAR | Facility: CLINIC | Age: 76
End: 2021-12-22
Payer: MEDICARE

## 2021-12-22 DIAGNOSIS — C50.512 MALIGNANT NEOPLASM OF LOWER-OUTER QUADRANT OF LEFT BREAST OF FEMALE, ESTROGEN RECEPTOR NEGATIVE (H): Primary | ICD-10-CM

## 2021-12-22 DIAGNOSIS — Z17.1 MALIGNANT NEOPLASM OF LOWER-OUTER QUADRANT OF LEFT BREAST OF FEMALE, ESTROGEN RECEPTOR NEGATIVE (H): Primary | ICD-10-CM

## 2021-12-22 NOTE — TELEPHONE ENCOUNTER
Call complete.  Pt will arrive at 830 for a 1000 procedure on 12/29.  Pt will be NPO at 430 and will have a .  The covid lab scheduling number was left on the voicemail.

## 2021-12-25 ENCOUNTER — LAB (OUTPATIENT)
Dept: LAB | Facility: CLINIC | Age: 76
End: 2021-12-25
Attending: PHYSICIAN ASSISTANT
Payer: MEDICARE

## 2021-12-25 DIAGNOSIS — C50.512 MALIGNANT NEOPLASM OF LOWER-OUTER QUADRANT OF LEFT BREAST OF FEMALE, ESTROGEN RECEPTOR NEGATIVE (H): ICD-10-CM

## 2021-12-25 DIAGNOSIS — Z17.1 MALIGNANT NEOPLASM OF LOWER-OUTER QUADRANT OF LEFT BREAST OF FEMALE, ESTROGEN RECEPTOR NEGATIVE (H): ICD-10-CM

## 2021-12-25 LAB — SARS-COV-2 RNA RESP QL NAA+PROBE: NEGATIVE

## 2021-12-25 PROCEDURE — U0003 INFECTIOUS AGENT DETECTION BY NUCLEIC ACID (DNA OR RNA); SEVERE ACUTE RESPIRATORY SYNDROME CORONAVIRUS 2 (SARS-COV-2) (CORONAVIRUS DISEASE [COVID-19]), AMPLIFIED PROBE TECHNIQUE, MAKING USE OF HIGH THROUGHPUT TECHNOLOGIES AS DESCRIBED BY CMS-2020-01-R: HCPCS

## 2021-12-28 ENCOUNTER — HOSPITAL ENCOUNTER (OUTPATIENT)
Dept: PET IMAGING | Facility: CLINIC | Age: 76
Setting detail: NUCLEAR MEDICINE
Discharge: HOME | End: 2021-12-28
Attending: INTERNAL MEDICINE | Admitting: INTERNAL MEDICINE
Payer: MEDICARE

## 2021-12-28 DIAGNOSIS — C50.512 MALIGNANT NEOPLASM OF LOWER-OUTER QUADRANT OF LEFT BREAST OF FEMALE, ESTROGEN RECEPTOR NEGATIVE (H): ICD-10-CM

## 2021-12-28 DIAGNOSIS — Z17.1 MALIGNANT NEOPLASM OF LOWER-OUTER QUADRANT OF LEFT BREAST OF FEMALE, ESTROGEN RECEPTOR NEGATIVE (H): ICD-10-CM

## 2021-12-28 LAB
CREAT BLD-MCNC: 1.6 MG/DL (ref 0.5–1)
GFR SERPL CREATININE-BSD FRML MDRD: 33 ML/MIN/1.73M2

## 2021-12-28 PROCEDURE — 78816 PET IMAGE W/CT FULL BODY: CPT | Mod: 26 | Performed by: RADIOLOGY

## 2021-12-28 PROCEDURE — 74177 CT ABD & PELVIS W/CONTRAST: CPT | Mod: 26 | Performed by: RADIOLOGY

## 2021-12-28 PROCEDURE — A9552 F18 FDG: HCPCS | Performed by: INTERNAL MEDICINE

## 2021-12-28 PROCEDURE — 82565 ASSAY OF CREATININE: CPT

## 2021-12-28 PROCEDURE — 71260 CT THORAX DX C+: CPT | Mod: 26 | Performed by: RADIOLOGY

## 2021-12-28 PROCEDURE — 78816 PET IMAGE W/CT FULL BODY: CPT | Mod: PI

## 2021-12-28 PROCEDURE — 250N000011 HC RX IP 250 OP 636: Performed by: INTERNAL MEDICINE

## 2021-12-28 PROCEDURE — 343N000001 HC RX 343: Performed by: INTERNAL MEDICINE

## 2021-12-28 RX ORDER — IOPAMIDOL 755 MG/ML
20-135 INJECTION, SOLUTION INTRAVASCULAR ONCE
Status: COMPLETED | OUTPATIENT
Start: 2021-12-28 | End: 2021-12-28

## 2021-12-28 RX ADMIN — IOPAMIDOL 84 ML: 755 INJECTION, SOLUTION INTRAVENOUS at 14:54

## 2021-12-28 RX ADMIN — FLUDEOXYGLUCOSE F-18 10.07 MCI.: 500 INJECTION, SOLUTION INTRAVENOUS at 14:54

## 2021-12-29 ENCOUNTER — APPOINTMENT (OUTPATIENT)
Dept: INTERVENTIONAL RADIOLOGY/VASCULAR | Facility: CLINIC | Age: 76
End: 2021-12-29
Attending: INTERNAL MEDICINE
Payer: MEDICARE

## 2021-12-29 ENCOUNTER — HOSPITAL ENCOUNTER (OUTPATIENT)
Facility: CLINIC | Age: 76
Discharge: HOME | End: 2021-12-29
Attending: RADIOLOGY | Admitting: RADIOLOGY
Payer: MEDICARE

## 2021-12-29 VITALS
BODY MASS INDEX: 23.04 KG/M2 | TEMPERATURE: 97.8 F | DIASTOLIC BLOOD PRESSURE: 54 MMHG | HEART RATE: 78 BPM | RESPIRATION RATE: 16 BRPM | WEIGHT: 130 LBS | SYSTOLIC BLOOD PRESSURE: 121 MMHG | OXYGEN SATURATION: 100 % | HEIGHT: 63 IN

## 2021-12-29 DIAGNOSIS — C50.512 MALIGNANT NEOPLASM OF LOWER-OUTER QUADRANT OF LEFT BREAST OF FEMALE, ESTROGEN RECEPTOR NEGATIVE (H): ICD-10-CM

## 2021-12-29 DIAGNOSIS — Z17.1 MALIGNANT NEOPLASM OF LOWER-OUTER QUADRANT OF LEFT BREAST OF FEMALE, ESTROGEN RECEPTOR NEGATIVE (H): ICD-10-CM

## 2021-12-29 LAB
ERYTHROCYTE [DISTWIDTH] IN BLOOD BY AUTOMATED COUNT: 13.2 % (ref 10–15)
HCT VFR BLD AUTO: 39.4 % (ref 35–47)
HGB BLD-MCNC: 12.3 G/DL (ref 11.7–15.7)
MCH RBC QN AUTO: 30.2 PG (ref 26.5–33)
MCHC RBC AUTO-ENTMCNC: 31.2 G/DL (ref 31.5–36.5)
MCV RBC AUTO: 97 FL (ref 78–100)
PLATELET # BLD AUTO: 299 10E3/UL (ref 150–450)
RBC # BLD AUTO: 4.07 10E6/UL (ref 3.8–5.2)
WBC # BLD AUTO: 4.7 10E3/UL (ref 4–11)

## 2021-12-29 PROCEDURE — 250N000011 HC RX IP 250 OP 636: Performed by: NURSE PRACTITIONER

## 2021-12-29 PROCEDURE — 272N000196 HC ACCESSORY CR5

## 2021-12-29 PROCEDURE — 85027 COMPLETE CBC AUTOMATED: CPT | Performed by: NURSE PRACTITIONER

## 2021-12-29 PROCEDURE — 36415 COLL VENOUS BLD VENIPUNCTURE: CPT | Performed by: NURSE PRACTITIONER

## 2021-12-29 PROCEDURE — 99152 MOD SED SAME PHYS/QHP 5/>YRS: CPT

## 2021-12-29 PROCEDURE — 250N000011 HC RX IP 250 OP 636: Performed by: RADIOLOGY

## 2021-12-29 PROCEDURE — 36561 INSERT TUNNELED CV CATH: CPT

## 2021-12-29 PROCEDURE — C1788 PORT, INDWELLING, IMP: HCPCS

## 2021-12-29 PROCEDURE — 999N000163 HC STATISTIC SIMPLE TUBE INSERTION/CHARGE, PORT, CATH, FISTULOGRAM

## 2021-12-29 PROCEDURE — 272N000602 HC WOUND GLUE CR1

## 2021-12-29 PROCEDURE — 250N000009 HC RX 250: Performed by: RADIOLOGY

## 2021-12-29 RX ORDER — NALOXONE HYDROCHLORIDE 0.4 MG/ML
0.4 INJECTION, SOLUTION INTRAMUSCULAR; INTRAVENOUS; SUBCUTANEOUS
Status: DISCONTINUED | OUTPATIENT
Start: 2021-12-29 | End: 2021-12-29 | Stop reason: HOSPADM

## 2021-12-29 RX ORDER — NALOXONE HYDROCHLORIDE 0.4 MG/ML
0.2 INJECTION, SOLUTION INTRAMUSCULAR; INTRAVENOUS; SUBCUTANEOUS
Status: DISCONTINUED | OUTPATIENT
Start: 2021-12-29 | End: 2021-12-29 | Stop reason: HOSPADM

## 2021-12-29 RX ORDER — FENTANYL CITRATE 50 UG/ML
25-50 INJECTION, SOLUTION INTRAMUSCULAR; INTRAVENOUS EVERY 5 MIN PRN
Status: DISCONTINUED | OUTPATIENT
Start: 2021-12-29 | End: 2021-12-29 | Stop reason: HOSPADM

## 2021-12-29 RX ORDER — HEPARIN SODIUM (PORCINE) LOCK FLUSH IV SOLN 100 UNIT/ML 100 UNIT/ML
500 SOLUTION INTRAVENOUS EVERY 8 HOURS
Status: DISCONTINUED | OUTPATIENT
Start: 2021-12-29 | End: 2021-12-29 | Stop reason: HOSPADM

## 2021-12-29 RX ORDER — FLUMAZENIL 0.1 MG/ML
0.2 INJECTION, SOLUTION INTRAVENOUS
Status: DISCONTINUED | OUTPATIENT
Start: 2021-12-29 | End: 2021-12-29 | Stop reason: HOSPADM

## 2021-12-29 RX ORDER — CEFAZOLIN SODIUM 2 G/100ML
2 INJECTION, SOLUTION INTRAVENOUS
Status: COMPLETED | OUTPATIENT
Start: 2021-12-29 | End: 2021-12-29

## 2021-12-29 RX ADMIN — FENTANYL CITRATE 25 MCG: 50 INJECTION INTRAMUSCULAR; INTRAVENOUS at 10:25

## 2021-12-29 RX ADMIN — FENTANYL CITRATE 25 MCG: 50 INJECTION INTRAMUSCULAR; INTRAVENOUS at 10:33

## 2021-12-29 RX ADMIN — MIDAZOLAM HYDROCHLORIDE 0.5 MG: 1 INJECTION, SOLUTION INTRAMUSCULAR; INTRAVENOUS at 10:34

## 2021-12-29 RX ADMIN — LIDOCAINE HYDROCHLORIDE 10 ML: 10; .005 INJECTION, SOLUTION EPIDURAL; INFILTRATION; INTRACAUDAL; PERINEURAL at 10:42

## 2021-12-29 RX ADMIN — MIDAZOLAM HYDROCHLORIDE 0.5 MG: 1 INJECTION, SOLUTION INTRAMUSCULAR; INTRAVENOUS at 10:41

## 2021-12-29 RX ADMIN — MIDAZOLAM HYDROCHLORIDE 0.5 MG: 1 INJECTION, SOLUTION INTRAMUSCULAR; INTRAVENOUS at 10:26

## 2021-12-29 RX ADMIN — CEFAZOLIN SODIUM 2 G: 2 INJECTION, SOLUTION INTRAVENOUS at 09:58

## 2021-12-29 RX ADMIN — HEPARIN SODIUM (PORCINE) LOCK FLUSH IV SOLN 100 UNIT/ML 500 UNITS: 100 SOLUTION at 10:42

## 2021-12-29 RX ADMIN — FENTANYL CITRATE 25 MCG: 50 INJECTION INTRAMUSCULAR; INTRAVENOUS at 10:41

## 2021-12-29 ASSESSMENT — MIFFLIN-ST. JEOR: SCORE: 1048.81

## 2021-12-29 NOTE — PROGRESS NOTES
"  Interventional Radiology - Pre-Procedure Note:  12/29/2021    Procedure Requested: Port placement  Requested by: Neetu Mcmullen MD    History and Physical Reviewed: H&P documented within 30 days (by Dr Mcmullen on 12/16/21).  I have personally reviewed the patient's medical history and have updated the medical record as necessary.    Brief HPI: Jann Davidson is a 76 year old female with biopsy proven left breast CA. Patient is followed by oncology who is recommending initiation of chemotherapy. Patient was referred to IR for port placement. No complaints at this time. Underwent PET CT yesterday 0     IMAGING:  MRI breast bilateral 12/10/21:  \"IMPRESSION:   BI-RADS 0, INCOMPLETE: NEED ADDITIONAL IMAGING EVALUATION,  LEFT.  Known biopsy-proven malignancy in the left breast at 4:00. Prominent  left axillary lymph node.\"    NPO: YES since MN  ANTICOAGULANTS: NONE  ANTIBIOTICS: Ancef 2g IV for periprocedural prophylaxis      ALLERGIES  Allergies   Allergen Reactions     Wheat Bran          LABS:  No results found for: INR   Hemoglobin   Date Value Ref Range Status   04/30/2018 13.1 11.7 - 15.7 g/dL Final   ]  Platelet Count   Date Value Ref Range Status   04/30/2018 268 150 - 450 10e9/L Final     Creatinine   Date Value Ref Range Status   06/05/2019 1.15 (H) 0.52 - 1.04 mg/dL Final     Creatinine POCT   Date Value Ref Range Status   12/28/2021 1.6 (H) 0.5 - 1.0 mg/dL Final     Potassium   Date Value Ref Range Status   06/05/2019 3.7 3.4 - 5.3 mmol/L Final         EXAM:  VS pending  General:  Stable.  In no acute distress.    Neuro:  A&O x 3. Moves all extremities equally.  Neck: No gross JVD  Heart: RRR  Lungs: No increased work of breathing  Chest: No scarring or deformity    Pre-Sedation Code Status Assessment:  Code Status: Full Code intra procedure, per discussion with patient      ASSESSMENT/PLAN:   Left breast CA    -Right chest port placement today as scheduled    Procedure, risks/benefits, details, alternatives, " and sedation reviewed with patient and patient verbalized understanding. All questions answered. OK to proceed with above radiology procedure.     Lamont Oseguera PA-C  Interventional Radiology  [phone] (IR)  *[phone] ([facility] Office)      Total time spent on the date of the encounter is 30 minutes, including time spent counseling the patient, performing a medically appropriate evaluation, reviewing prior medical history, ordering medications and tests, documenting clinical information in the medical record, and communication of results.

## 2021-12-29 NOTE — DISCHARGE INSTRUCTIONS
Port Insertion Discharge Instructions     After you go home:      Have an adult stay with you for the first 6 hours    You may resume your normal diet       For 24 hours - due to the sedation you received:    Relax and take it easy    Do NOT make any important or legal decisions    Do NOT drive or operate machines at home or at work    Do NOT drink alcohol    Care of Puncture Sites:      Keep the dressings on your sites clean & dry for 3 days. Change it only if it gets wet or dirty.    You may shower after the dressing comes off in 3 days    Do not remove the small white strips of tape, if present. Allow them to fall off on their own.     You may cover the wound with a bandaid after the dressing is removed if needed for comfort      Activity       Avoid heavy lifting (greater than 10 pounds) or the overuse of your shoulder for 3 days    Bleeding:      If you start bleeding from the incision sites in your chest or neck - or have swelling in your neck, sit down and press on the site for 5-10 minutes.     If bleeding has not stopped after 10 minutes, call your provider.        Call 911 right away if you have heavy bleeding or bleeding that does not stop.      Medicines:      You may resume all medications    For minor pain, you may take Acetaminophen (Tylenol) or Ibuprofen (Advil)    Call the provider who ordered this procedure if:      You have swelling in your neck or over your port site    The incision area is red, swollen, hot or tender    You have chills or a fever greater than 101 F (38 C)    Any questions or concerns    Call  911 or go to the Emergency Room if you have:      Severe chest pain or trouble breathing    Bleeding that you cannot control    Additional Information:      Your port may be accessed right away.     You will need to have your port flushed every 30 days or after each use.      If you have questions call:          [facility] Radiology Dept @ [phone]

## 2021-12-29 NOTE — PROGRESS NOTES
Care Suites Post Procedure Note    Patient Information  Name: Jann Davidson  Age: 76 year old    Post Procedure  Time patient returned to Care Suites: 1045  Concerns/abnormal assessment: No immediate  If abnormal assessment, provider notified: N/A  Plan/Other: Continue post procedure plan of care.  Port site dressing CDI.  VS stable. Denies any pain or discomfort.  Taking po fluid well. Anticipate discharge by 1145  Reinforce discharge instruction.    Ady Loya RN     Care Suites Discharge Nursing Note    Patient Information  Name: Jann Davidson  Age: 76 year old    Discharge Education:  Discharge instructions reviewed: Yes  Additional education/resources provided: NA  Patient/patient representative verbalizes understanding: Yes  Patient discharging on new medications: NA  Medication education completed: N/A    Discharge Plans:   Discharge location: home  Discharge ride contacted: Yes  Approximate discharge time: 1145      Discharge Criteria:  Discharge criteria met and vital signs stable: Yes    Patient Belongs:  Patient belongings returned to patient: Yes    Ady Loya RN

## 2021-12-29 NOTE — PRE-PROCEDURE
GENERAL PRE-PROCEDURE:   Procedure:  Right chest port placement with moderate sedation  Date/Time:  12/29/2021 9:16 AM    Written consent obtained?: Yes    Risks and benefits: Risks, benefits and alternatives were discussed    Consent given by:  Patient  Patient states understanding of procedure being performed: Yes    Patient's understanding of procedure matches consent: Yes    Procedure consent matches procedure scheduled: Yes    Expected level of sedation:  Moderate  Appropriately NPO:  Yes  ASA Class:  3  Mallampati  :  Grade 3- soft palate visible, posterior pharyngeal wall not visible  Lungs:  Lungs clear with good breath sounds bilaterally  Heart:  Normal heart sounds and rate  History & Physical reviewed:  History and physical reviewed and no updates needed  Statement of review:  I have reviewed the lab findings, diagnostic data, medications, and the plan for sedation

## 2021-12-29 NOTE — PROGRESS NOTES
[facility]    Hematology/Oncology Established Patient Note      Today's Date: 1/3/2022    Reason for follow-up:  Left breast cancer, triple negative.    HISTORY OF PRESENT ILLNESS: Jann Davidson is a 76 year old female who presents with the following oncologic history:  1. 10/26/2021: Mammogram showed calcifications in the left lateral breast; right breast negative.  2. 11/17/2021: Left diagnostic mammogram showed cluster of pleomorphic calcifications at 4:00, 6 cm from nipple, measuring 1.3 cm.  3. 12/3/2021: Stereotactic left breast biopsy at 4:00, 6 cm from nipple showed grade 2 invasive ductal carcinoma with micropapillary features, extensive lymphovascular invasion present, grade 2-3 focal DCIS, LCIS, ER negative, [place] negative, HER-2/maxime FISH negative.  4. 12/10/2021: Breast MRI showed oval mass 2 x 2 x 1.5 cm at 4:00, 6 cm from nipple in left breast reflecting biopsy cavity with post-biopsy changes; prominent 2.5 cm low left level 1 axillary lymph node.  5.  12/28/2021: PET/CT scan showed FDG avid focus in left lower outer breast, hypermetabolic left axillary adenopathy, moderate FDG uptake at level off anus, exophytic right renal 1.1 cm mass, slowly increasing in size since 2016 concerning for growing renal cell carcinoma.    INTERVAL HISTORY:  Jann reports some discomfort at the port site. She denies any flank pain, hematuria, hematochezia, or melena.    REVIEW OF SYSTEMS:   14 point ROS was reviewed and is negative other than as noted above in HPI.       HOME MEDICATIONS:  Current Outpatient Medications   Medication Sig Dispense Refill     ALPRAZolam (XANAX) 0.5 MG tablet Take 1 tablet (0.5 mg) by mouth 3 times daily as needed for anxiety 20 tablet 0     ATACAND 4 MG tablet Take 0.5 tablets (2 mg) by mouth daily 45 tablet 3     atorvastatin (LIPITOR) 10 MG tablet TAKE 1 TABLET (10 MG) BY MOUTH ONCE DAILY 90 tablet 0     FLUZONE HIGH-DOSE 0.5 ML injection   0     oxazepam (SERAX) 15 MG  capsule TAKE 1 CAPSULE (15 MG) BY MOUTH NIGHTLY AS NEEDED FOR ANXIETY OR SLEEP REDUCED REFILL AMT: NEEDS APPOINTMENT/LABS SCHEDULE  capsule 2     PARNATE 10 MG tablet TAKE 1 TABLET BY MOUTH 3 TIMES DAILY 90 tablet 1     polyethylene glycol (MIRALAX/GLYCOLAX) packet Take 1 packet by mouth daily       triamcinolone (KENALOG) 0.1 % external cream Apply  topically 2 times daily as needed. 15 g 1     triamterene-HCTZ (MAXZIDE-25) 37.5-25 MG tablet Take 0.5 tablets by mouth every morning 45 tablet 3     zolpidem (AMBIEN) 5 MG tablet TAKE 1 TABLET (5 MG) BY MOUTH NIGHTLY AS NEEDED FOR SLEEP REDUCED REFILL AMT: NEEDS APPOINTMENT/LABS SCHEDULE  tablet 2         ALLERGIES:  Allergies   Allergen Reactions     Wheat Bran          PAST MEDICAL HISTORY:  Past Medical History:   Diagnosis Date     Hypertension goal BP (blood pressure) < 140/90      Recurrent sinusitis 2013     Gynecologic history:  Age of menarche at 11; LMP ;  (one son), 1st pregnancy at age 19; no HRT.    PAST SURGICAL HISTORY:  Past Surgical History:   Procedure Laterality Date     BREAST BIOPSY, RT/LT      Breat Biopsy RT/LT     COLONOSCOPY  10/03     COLONOSCOPY  2014    Procedure: COLONOSCOPY;  COLONOSCOPY ;  Surgeon: Gaurav Shaffer MD;  Location: [facility]     RECONSTRUCT EYELID           SOCIAL HISTORY:  Social History     Socioeconomic History     Marital status:      Spouse name: Not on file     Number of children: Not on file     Years of education: Not on file     Highest education level: Not on file   Occupational History     Not on file   Tobacco Use     Smoking status: Former Smoker     Types: Cigarettes     Quit date: 10/30/1972     Years since quittin.1     Smokeless tobacco: Never Used   Substance and Sexual Activity     Alcohol use: Yes     Comment: couple glasses of wine daily      Drug use: No     Sexual activity: Never   Other Topics Concern     Parent/sibling w/ CABG, MI or angioplasty  "before 65F 55M? Not Asked   Social History Narrative     Not on file     Social Determinants of Health     Financial Resource Strain: Not on file   Food Insecurity: Not on file   Transportation Needs: Not on file   Physical Activity: Not on file   Stress: Not on file   Social Connections: Not on file   Intimate Partner Violence: Not on file   Housing Stability: Not on file         FAMILY HISTORY:  Family History   Problem Relation Age of Onset     Cancer Mother         uterine     Breast Cancer Mother      Diabetes Paternal Grandmother          PHYSICAL EXAM:  Vital signs:  /74   Pulse 94   Resp 16   Ht 1.6 m (5' 3\")   Wt 60.5 kg (133 lb 6.4 oz)   SpO2 100%   BMI 23.63 kg/m     ECO  GENERAL/CONSTITUTIONAL: No acute distress.  EYES: No erythema or scleral icterus.  ENT/MOUTH: Neck supple. Mask in place.  LYMPH: No cervical, supraclavicular, adenopathy. Left axillary lymphadenopathy palpable.  BREAST: 2-cm mass palpable and mobile inf the left lower outer breast. No masses in right breast. No dimpling or ulceration.  RESPIRATORY: Clear to auscultation bilaterally. No crackles or wheezing.   CARDIOVASCULAR: Regular rate and rhythm without murmurs.  GASTROINTESTINAL: No hepatosplenomegaly, masses, or tenderness. No guarding.  No distention.  MUSCULOSKELETAL: Warm and well-perfused, no cyanosis, clubbing, or edema.  NEUROLOGIC: No focal motor deficits. Alert, oriented, answers questions appropriately.  INTEGUMENTARY: No rashes or jaundice. Ecchymosis at port site.  GAIT: Steady, does not use assistive device    LABS:  CBC RESULTS: Recent Labs   Lab Test 21  0913   WBC 4.7   RBC 4.07   HGB 12.3   HCT 39.4   MCV 97   MCH 30.2   MCHC 31.2*   RDW 13.2        Last Comprehensive Metabolic Panel:  Sodium   Date Value Ref Range Status   2019 142 133 - 144 mmol/L Final     Potassium   Date Value Ref Range Status   2019 3.7 3.4 - 5.3 mmol/L Final     Chloride   Date Value Ref Range Status "   06/05/2019 110 (H) 94 - 109 mmol/L Final     Carbon Dioxide   Date Value Ref Range Status   06/05/2019 24 20 - 32 mmol/L Final     Anion Gap   Date Value Ref Range Status   06/05/2019 8 3 - 14 mmol/L Final     Glucose   Date Value Ref Range Status   06/05/2019 93 70 - 99 mg/dL Final     Comment:     Fasting specimen     Urea Nitrogen   Date Value Ref Range Status   06/05/2019 32 (H) 7 - 30 mg/dL Final     Creatinine   Date Value Ref Range Status   06/05/2019 1.15 (H) 0.52 - 1.04 mg/dL Final     Creatinine POCT   Date Value Ref Range Status   12/28/2021 1.6 (H) 0.5 - 1.0 mg/dL Final     GFR Estimate   Date Value Ref Range Status   06/05/2019 47 (L) >60 mL/min/[1.73_m2] Final     Comment:     Non  GFR Calc  Starting 12/18/2018, serum creatinine based estimated GFR (eGFR) will be   calculated using the Chronic Kidney Disease Epidemiology Collaboration   (CKD-EPI) equation.       GFR, ESTIMATED POCT   Date Value Ref Range Status   12/28/2021 33 (L) >60 mL/min/1.73m2 Final     Calcium   Date Value Ref Range Status   06/05/2019 9.2 8.5 - 10.1 mg/dL Final     Bilirubin Total   Date Value Ref Range Status   06/05/2019 0.4 0.2 - 1.3 mg/dL Final     Alkaline Phosphatase   Date Value Ref Range Status   06/05/2019 85 40 - 150 U/L Final     ALT   Date Value Ref Range Status   06/05/2019 33 0 - 50 U/L Final     AST   Date Value Ref Range Status   06/05/2019 33 0 - 45 U/L Final       PATHOLOGY:  Reviewed as per HPI.    IMAGING:  Reviewed as per HPI.    ASSESSMENT/PLAN:  Jann Davidson is a 76 year old female with the following issues:  1. 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  --Plan for biopsy of the left  axillary lymph node on 1/6/2022.  --Discussed with Jann that she has clinical prognostic stage IIB disease. Given this extent of disease, I recommended neoadjuvant chemotherapy with paclitaxel and carboplatin for 12 weeks followed by Adriamycin and Cytoxan every 3 weeks for 4 cycles with the addition of pembrolizumab every 3 weeks as per KEYNOTE-522 trial which showed an improvement in pathologic complete response, 3-year event free survival independent of PD-L1 expression (and independent of pCR). I reviewed the KEYNOTE-522 trial results and update with her.  --I discussed the potential adverse effects of the above regimen, including but not limited to: alopecia, nausea, emesis, cytopenias, increased risk for infection, peripheral neuropathy, bowel and bladder dysfunction, infusion reactions, myalgias, fatigue.  I also discussed the potential side effects of pembrolizumab, including autoimmune toxicity to any organ in the body.  --She agrees to proceed with the above treatment regimen.  --Genetics consult scheduled for 3/2022 -- recommended given that her cancer is triple negative and mother had breast and uterine cancer. This could have implications if she might be a candidate for adjuvant olaparib if she has a BRCA gene mutation.  --Echocardiogram scheduled for 1/18/2022 prior to eventual use of Adriamycin. However, if any cardiac dysfunction, will omit AC and have her go to surgery after completion of carboplatin, paclitaxel, and pembrolizumab. Will also consider omitting AC if she has difficulty tolerating carboplatin and paclitaxel.    2. Chronic kidney disease, stage 3  --She follows with Dr. Luna.  --Discussed that chemo dosing would be in accordance with her kidney function as clinically appropriate.    3. FDG uptake at anal canal  --She has history of anal fissure from 4/8/2014 colonoscopy.  --Discussed with Jann that PET scan showed uptake at level of anus and could represent hemorrhoid, fissure,  malignancy, or be physiologic.  --I recommended anoscopy and referral to colorectal. She is asymptomatic and would like to delay for now. Discussed we can delay until completion of chemo for her breast cancer.    4. Right renal mass  --Discussed with Jann that PET scan showed a 1.1 cm mass in the right kidney that has been reportedly slowly growing since 2016 concerning for renal cell carcinoma.  --Will not be able to further evaluate with contrast MRI due to her degree of chronic kidney disease.  Will refer her to urology for surveillance/intervention plan after completion of chemo for her breast cancer.    5. Pulmonary nodules  --Discussed with Jann that PET scan also showed scattered small bilateral pulmonary nodules that are unchanged since 10/24/2018 and most likely benign.    6. Depression/anxiety  --Worsened lately due to cancer diagnosis.  --She would like to continue on Parnate and not change her antidepressant.  --I offered referral to Dr. Darin Jimenes and she declined this today.  --She takes oxazepam together with zolpidem.    --Discussed that she could use lorazepam (Ativan) as long as she takes this 6 hours from her other benzodiazepenes. Prescription issued.    Neetu Mcmullen MD  Hematology/Oncology  [facility]    Total time spent: 49 minutes in patient evaluation, counseling, documentation, and coordination of care.

## 2021-12-29 NOTE — PROCEDURES
[facility]    Procedure: Imaging Procedure Note    Date/Time: 12/29/2021 10:56 AM  Performed by: Sonu Ferrari MD  Authorized by: Sonu Ferrari MD       UNIVERSAL PROTOCOL   Site Marked: Yes  Prior Images Obtained and Reviewed:  Yes  Required items: Required blood products, implants, devices and special equipment available    Patient identity confirmed:  Verbally with patient  Patient was reevaluated immediately before administering moderate or deep sedation or anesthesia  Confirmation Checklist:  Patient's identity using two indicators, relevant allergies, procedure was appropriate and matched the consent or emergent situation and correct equipment/implants were available  Time out: Immediately prior to the procedure a time out was called    Universal Protocol: the Joint Commission Universal Protocol was followed    Preparation: Patient was prepped and draped in usual sterile fashion      SEDATION  Patient Sedated: Yes    Vital signs: Vital signs monitored during sedation    See dictated procedure note for full details.    PROCEDURE  Describe Procedure: Successful port placement  Patient Tolerance:  Patient tolerated the procedure well with no immediate complications  Length of time physician/provider present for 1:1 monitoring during sedation: 15

## 2021-12-29 NOTE — IR NOTE
Interventional Radiology Intra-procedural Nursing Note    Patient Name: Jann Davidson  Medical Record Number: [id number]  Today's Date: December 29, 2021    Start Time: 1033  End of procedure time: 1048  Procedure: Port placement with moderate sedation  Report given to: Manohar MONTANO  Time pt departs:  1055    Other Notes: Pt into IR suite 1 via cart. Pt awake and alert. To table in supine position. Monitoring equipment applied. VSS. Tele SR. Dr. Ferrari in room. Time out and procedure started. Pt tolerated procedure well. Debrief with Dr. Ferrari. Port placed and pressure held until hemostasis achieved. Dressing CDI. No complications. Pt transferred back to Care Suites.    Medications:    Versed 1,5 mg  Fentanyl 75 mcg  Lidocaine 1% with EPI 10 ml  Heparin 500 units    Jesse May RN

## 2021-12-29 NOTE — PROGRESS NOTES
Care Suites Admission Nursing Note    Patient Information  Name: Jann Davidson  Age: 76 year old  Reason for admission: port placement  Care Suites arrival time: 0900      Patient Admission/Assessment   Pre-procedure assessment complete: Yes  If abnormal assessment/labs, provider notified: N/A  NPO: Yes  Medications held per instructions/orders: Yes  Consent: obtained  If applicable, pregnancy test status: declined  Patient oriented to room: Yes  Education/questions answered: Yes  Plan/other: Review labs, obtain consent and proceed with scheduled treatment or intervention      Discharge Planning  Discharge name/phone number: Jose Armando hernandez - [phone]  Overnight post sedation caregiver: Jose Armando hernandez - [phone]  Discharge location: [place]    Tony Saravia RN         PATIENT/VISITOR WELLNESS SCREENING    Step 1 Patient Screening    1. In the last month, have you been in contact with someone who was confirmed or suspected to have Coronavirus/COVID-19? No    2. Do you have the following symptoms?  Fever/Chills? No   Cough? No   Shortness of breath? No   New loss of taste or smell? No  Sore throat? No  Muscle or body aches? No  Headaches? No  Fatigue? No  Vomiting or diarrhea? No

## 2022-01-03 ENCOUNTER — ONCOLOGY VISIT (OUTPATIENT)
Dept: ONCOLOGY | Facility: CLINIC | Age: 77
End: 2022-01-03
Attending: INTERNAL MEDICINE
Payer: MEDICARE

## 2022-01-03 VITALS
BODY MASS INDEX: 23.64 KG/M2 | SYSTOLIC BLOOD PRESSURE: 120 MMHG | HEIGHT: 63 IN | HEART RATE: 94 BPM | DIASTOLIC BLOOD PRESSURE: 74 MMHG | WEIGHT: 133.4 LBS | OXYGEN SATURATION: 100 % | RESPIRATION RATE: 16 BRPM

## 2022-01-03 DIAGNOSIS — Z17.1 MALIGNANT NEOPLASM OF LOWER-OUTER QUADRANT OF LEFT BREAST OF FEMALE, ESTROGEN RECEPTOR NEGATIVE (H): Primary | ICD-10-CM

## 2022-01-03 DIAGNOSIS — D70.1 CHEMOTHERAPY-INDUCED NEUTROPENIA (H): ICD-10-CM

## 2022-01-03 DIAGNOSIS — F41.9 ANXIETY: ICD-10-CM

## 2022-01-03 DIAGNOSIS — N28.89 RIGHT KIDNEY MASS: ICD-10-CM

## 2022-01-03 DIAGNOSIS — K60.2 ANAL FISSURE: ICD-10-CM

## 2022-01-03 DIAGNOSIS — C50.512 MALIGNANT NEOPLASM OF LOWER-OUTER QUADRANT OF LEFT BREAST OF FEMALE, ESTROGEN RECEPTOR NEGATIVE (H): Primary | ICD-10-CM

## 2022-01-03 DIAGNOSIS — T45.1X5A CHEMOTHERAPY-INDUCED NEUTROPENIA (H): ICD-10-CM

## 2022-01-03 PROCEDURE — 99215 OFFICE O/P EST HI 40 MIN: CPT | Performed by: INTERNAL MEDICINE

## 2022-01-03 PROCEDURE — G0463 HOSPITAL OUTPT CLINIC VISIT: HCPCS

## 2022-01-03 RX ORDER — LORAZEPAM 0.5 MG/1
0.5 TABLET ORAL EVERY 6 HOURS PRN
Qty: 30 TABLET | Refills: 0 | Status: SHIPPED | OUTPATIENT
Start: 2022-01-03 | End: 2022-02-16

## 2022-01-03 ASSESSMENT — MIFFLIN-ST. JEOR: SCORE: 1064.23

## 2022-01-03 ASSESSMENT — PAIN SCALES - GENERAL: PAINLEVEL: NO PAIN (0)

## 2022-01-03 NOTE — PROGRESS NOTES
"Oncology Rooming Note    January 3, 2022 4:10 PM   Jann Davidson is a 76 year old female who presents for:    Chief Complaint   Patient presents with     Oncology Clinic Visit     Initial Vitals: /74   Pulse 94   Resp 16   Ht 1.6 m (5' 3\")   Wt 60.5 kg (133 lb 6.4 oz)   SpO2 100%   BMI 23.63 kg/m   Estimated body mass index is 23.63 kg/m  as calculated from the following:    Height as of this encounter: 1.6 m (5' 3\").    Weight as of this encounter: 60.5 kg (133 lb 6.4 oz). Body surface area is 1.64 meters squared.  No Pain (0) Comment: Data Unavailable   No LMP recorded. Patient is postmenopausal.  Allergies reviewed: Yes  Medications reviewed: Yes    Medications: Medication refills not needed today.  Pharmacy name entered into EPIC:    [id number] [place], [place] - [address]  [facility] - [address] [id number] [place], [place] - [address]    Clinical concerns: no      Rosa Palomino CMA            "

## 2022-01-03 NOTE — LETTER
1/3/2022         RE: Jann Davidson  [address]  [facility] [address]        Dear Colleague,    Thank you for referring your patient, Jann Davidson, to the [facility]. Please see a copy of my visit note below.    [facility]    Hematology/Oncology Established Patient Note      Today's Date: 1/3/2022    Reason for follow-up:  Left breast cancer, triple negative.    HISTORY OF PRESENT ILLNESS: Jann Davidson is a 76 year old female who presents with the following oncologic history:  1. 10/26/2021: Mammogram showed calcifications in the left lateral breast; right breast negative.  2. 11/17/2021: Left diagnostic mammogram showed cluster of pleomorphic calcifications at 4:00, 6 cm from nipple, measuring 1.3 cm.  3. 12/3/2021: Stereotactic left breast biopsy at 4:00, 6 cm from nipple showed grade 2 invasive ductal carcinoma with micropapillary features, extensive lymphovascular invasion present, grade 2-3 focal DCIS, LCIS, ER negative, MS negative, HER-2/maxime FISH negative.  4. 12/10/2021: Breast MRI showed oval mass 2 x 2 x 1.5 cm at 4:00, 6 cm from nipple in left breast reflecting biopsy cavity with post-biopsy changes; prominent 2.5 cm low left level 1 axillary lymph node.  5.  12/28/2021: PET/CT scan showed FDG avid focus in left lower outer breast, hypermetabolic left axillary adenopathy, moderate FDG uptake at level off anus, exophytic right renal 1.1 cm mass, slowly increasing in size since 2016 concerning for growing renal cell carcinoma.    INTERVAL HISTORY:  Jann reports some discomfort at the port site. She denies any flank pain, hematuria, hematochezia, or melena.    REVIEW OF SYSTEMS:   14 point ROS was reviewed and is negative other than as noted above in HPI.       HOME MEDICATIONS:  Current Outpatient Medications   Medication Sig Dispense Refill     ALPRAZolam (XANAX) 0.5 MG tablet Take 1 tablet (0.5 mg) by mouth 3 times daily as needed for anxiety 20  tablet 0     ATACAND 4 MG tablet Take 0.5 tablets (2 mg) by mouth daily 45 tablet 3     atorvastatin (LIPITOR) 10 MG tablet TAKE 1 TABLET (10 MG) BY MOUTH ONCE DAILY 90 tablet 0     FLUZONE HIGH-DOSE 0.5 ML injection   0     oxazepam (SERAX) 15 MG capsule TAKE 1 CAPSULE (15 MG) BY MOUTH NIGHTLY AS NEEDED FOR ANXIETY OR SLEEP REDUCED REFILL AMT: NEEDS APPOINTMENT/LABS SCHEDULE  capsule 2     PARNATE 10 MG tablet TAKE 1 TABLET BY MOUTH 3 TIMES DAILY 90 tablet 1     polyethylene glycol (MIRALAX/GLYCOLAX) packet Take 1 packet by mouth daily       triamcinolone (KENALOG) 0.1 % external cream Apply  topically 2 times daily as needed. 15 g 1     triamterene-HCTZ (MAXZIDE-25) 37.5-25 MG tablet Take 0.5 tablets by mouth every morning 45 tablet 3     zolpidem (AMBIEN) 5 MG tablet TAKE 1 TABLET (5 MG) BY MOUTH NIGHTLY AS NEEDED FOR SLEEP REDUCED REFILL AMT: NEEDS APPOINTMENT/LABS SCHEDULE  tablet 2         ALLERGIES:  Allergies   Allergen Reactions     Wheat Bran          PAST MEDICAL HISTORY:  Past Medical History:   Diagnosis Date     Hypertension goal BP (blood pressure) < 140/90      Recurrent sinusitis 2013     Gynecologic history:  Age of menarche at 11; LMP ;  (one son), 1st pregnancy at age 19; no HRT.    PAST SURGICAL HISTORY:  Past Surgical History:   Procedure Laterality Date     BREAST BIOPSY, RT/LT      Breat Biopsy RT/LT     COLONOSCOPY  10/03     COLONOSCOPY  2014    Procedure: COLONOSCOPY;  COLONOSCOPY ;  Surgeon: Gaurav Shaffer MD;  Location:  GI     RECONSTRUCT EYELID           SOCIAL HISTORY:  Social History     Socioeconomic History     Marital status:      Spouse name: Not on file     Number of children: Not on file     Years of education: Not on file     Highest education level: Not on file   Occupational History     Not on file   Tobacco Use     Smoking status: Former Smoker     Types: Cigarettes     Quit date: 10/30/1972     Years since quitting:  "49.1     Smokeless tobacco: Never Used   Substance and Sexual Activity     Alcohol use: Yes     Comment: couple glasses of wine daily      Drug use: No     Sexual activity: Never   Other Topics Concern     Parent/sibling w/ CABG, MI or angioplasty before 65F 55M? Not Asked   Social History Narrative     Not on file     Social Determinants of Health     Financial Resource Strain: Not on file   Food Insecurity: Not on file   Transportation Needs: Not on file   Physical Activity: Not on file   Stress: Not on file   Social Connections: Not on file   Intimate Partner Violence: Not on file   Housing Stability: Not on file         FAMILY HISTORY:  Family History   Problem Relation Age of Onset     Cancer Mother         uterine     Breast Cancer Mother      Diabetes Paternal Grandmother          PHYSICAL EXAM:  Vital signs:  /74   Pulse 94   Resp 16   Ht 1.6 m (5' 3\")   Wt 60.5 kg (133 lb 6.4 oz)   SpO2 100%   BMI 23.63 kg/m     ECO  GENERAL/CONSTITUTIONAL: No acute distress.  EYES: No erythema or scleral icterus.  ENT/MOUTH: Neck supple. Mask in place.  LYMPH: No cervical, supraclavicular, adenopathy. Left axillary lymphadenopathy palpable.  BREAST: 2-cm mass palpable and mobile inf the left lower outer breast. No masses in right breast. No dimpling or ulceration.  RESPIRATORY: Clear to auscultation bilaterally. No crackles or wheezing.   CARDIOVASCULAR: Regular rate and rhythm without murmurs.  GASTROINTESTINAL: No hepatosplenomegaly, masses, or tenderness. No guarding.  No distention.  MUSCULOSKELETAL: Warm and well-perfused, no cyanosis, clubbing, or edema.  NEUROLOGIC: No focal motor deficits. Alert, oriented, answers questions appropriately.  INTEGUMENTARY: No rashes or jaundice. Ecchymosis at port site.  GAIT: Steady, does not use assistive device    LABS:  CBC RESULTS: Recent Labs   Lab Test 21  0913   WBC 4.7   RBC 4.07   HGB 12.3   HCT 39.4   MCV 97   MCH 30.2   MCHC 31.2*   RDW 13.2   PLT " 299     Last Comprehensive Metabolic Panel:  Sodium   Date Value Ref Range Status   06/05/2019 142 133 - 144 mmol/L Final     Potassium   Date Value Ref Range Status   06/05/2019 3.7 3.4 - 5.3 mmol/L Final     Chloride   Date Value Ref Range Status   06/05/2019 110 (H) 94 - 109 mmol/L Final     Carbon Dioxide   Date Value Ref Range Status   06/05/2019 24 20 - 32 mmol/L Final     Anion Gap   Date Value Ref Range Status   06/05/2019 8 3 - 14 mmol/L Final     Glucose   Date Value Ref Range Status   06/05/2019 93 70 - 99 mg/dL Final     Comment:     Fasting specimen     Urea Nitrogen   Date Value Ref Range Status   06/05/2019 32 (H) 7 - 30 mg/dL Final     Creatinine   Date Value Ref Range Status   06/05/2019 1.15 (H) 0.52 - 1.04 mg/dL Final     Creatinine POCT   Date Value Ref Range Status   12/28/2021 1.6 (H) 0.5 - 1.0 mg/dL Final     GFR Estimate   Date Value Ref Range Status   06/05/2019 47 (L) >60 mL/min/[1.73_m2] Final     Comment:     Non  GFR Calc  Starting 12/18/2018, serum creatinine based estimated GFR (eGFR) will be   calculated using the Chronic Kidney Disease Epidemiology Collaboration   (CKD-EPI) equation.       GFR, ESTIMATED POCT   Date Value Ref Range Status   12/28/2021 33 (L) >60 mL/min/1.73m2 Final     Calcium   Date Value Ref Range Status   06/05/2019 9.2 8.5 - 10.1 mg/dL Final     Bilirubin Total   Date Value Ref Range Status   06/05/2019 0.4 0.2 - 1.3 mg/dL Final     Alkaline Phosphatase   Date Value Ref Range Status   06/05/2019 85 40 - 150 U/L Final     ALT   Date Value Ref Range Status   06/05/2019 33 0 - 50 U/L Final     AST   Date Value Ref Range Status   06/05/2019 33 0 - 45 U/L Final       PATHOLOGY:  Reviewed as per HPI.    IMAGING:  Reviewed as per HPI.    ASSESSMENT/PLAN:  Jann Davidson is a 76 year old female with the following issues:  1. 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  --Plan for biopsy of the left axillary lymph node on 1/6/2022.  --Discussed with Jann that she has clinical prognostic stage IIB disease. Given this extent of disease, I recommended neoadjuvant chemotherapy with paclitaxel and carboplatin for 12 weeks followed by Adriamycin and Cytoxan every 3 weeks for 4 cycles with the addition of pembrolizumab every 3 weeks as per KEYNOTE-522 trial which showed an improvement in pathologic complete response, 3-year event free survival independent of PD-L1 expression (and independent of pCR). I reviewed the KEYNOTE-522 trial results and update with her.  --I discussed the potential adverse effects of the above regimen, including but not limited to: alopecia, nausea, emesis, cytopenias, increased risk for infection, peripheral neuropathy, bowel and bladder dysfunction, infusion reactions, myalgias, fatigue.  I also discussed the potential side effects of pembrolizumab, including autoimmune toxicity to any organ in the body.  --She agrees to proceed with the above treatment regimen.  --Genetics consult scheduled for 3/2022 -- recommended given that her cancer is triple negative and mother had breast and uterine cancer. This could have implications if she might be a candidate for adjuvant olaparib if she has a BRCA gene mutation.  --Echocardiogram scheduled for 1/18/2022 prior to eventual use of Adriamycin. However, if any cardiac dysfunction, will omit AC and have her go to surgery after completion of carboplatin, paclitaxel, and pembrolizumab. Will also consider omitting AC if she has difficulty tolerating carboplatin and paclitaxel.    2. Chronic kidney disease, stage 3  --She follows with Dr. Luna.  --Discussed that chemo dosing would be in accordance  "with her kidney function as clinically appropriate.    3. FDG uptake at anal canal  --She has history of anal fissure from 4/8/2014 colonoscopy.  --Discussed with Jann that PET scan showed uptake at level of anus and could represent hemorrhoid, fissure, malignancy, or be physiologic.  --I recommended anoscopy and referral to colorectal. She is asymptomatic and would like to delay for now. Discussed we can delay until completion of chemo for her breast cancer.    4. Right renal mass  --Discussed with Jann that PET scan showed a 1.1 cm mass in the right kidney that has been reportedly slowly growing since 2016 concerning for renal cell carcinoma.  --Will not be able to further evaluate with contrast MRI due to her degree of chronic kidney disease.  Will refer her to urology for surveillance/intervention plan after completion of chemo for her breast cancer.    5. Pulmonary nodules  --Discussed with Jann that PET scan also showed scattered small bilateral pulmonary nodules that are unchanged since 10/24/2018 and most likely benign.    6. Depression/anxiety  --Worsened lately due to cancer diagnosis.  --She would like to continue on Parnate and not change her antidepressant.  --I offered referral to Dr. Darin Jimenes and she declined this today.  --She takes oxazepam together with zolpidem.    --Discussed that she could use lorazepam (Ativan) as long as she takes this 6 hours from her other benzodiazepenes. Prescription issued.    Neetu Mcmullen MD  Hematology/Oncology  [facility]    Total time spent: 49 minutes in patient evaluation, counseling, documentation, and coordination of care.    Oncology Rooming Note    January 3, 2022 4:10 PM   Jann Davidson is a 76 year old female who presents for:    Chief Complaint   Patient presents with     Oncology Clinic Visit     Initial Vitals: /74   Pulse 94   Resp 16   Ht 1.6 m (5' 3\")   Wt 60.5 kg (133 lb 6.4 oz)   SpO2 100%   BMI 23.63 kg/m   " "Estimated body mass index is 23.63 kg/m  as calculated from the following:    Height as of this encounter: 1.6 m (5' 3\").    Weight as of this encounter: 60.5 kg (133 lb 6.4 oz). Body surface area is 1.64 meters squared.  No Pain (0) Comment: Data Unavailable   No LMP recorded. Patient is postmenopausal.  Allergies reviewed: Yes  Medications reviewed: Yes    Medications: Medication refills not needed today.  Pharmacy name entered into EPIC:    [id number] [place], [place] - [address]  [facility] - [place], [place] - [address] [facility][id number] - [place], [place] - [address]    Clinical concerns: no      Rosa Palomino [facility]                Again, thank you for allowing me to participate in the care of your patient.        Sincerely,        Neetu Mcmullen MD    "

## 2022-01-04 DIAGNOSIS — F33.1 MAJOR DEPRESSIVE DISORDER, RECURRENT EPISODE, MODERATE (H): ICD-10-CM

## 2022-01-04 RX ORDER — TRANYLCYPROMINE SULFATE 10 MG/1
TABLET, FILM COATED ORAL
Qty: 90 TABLET | Refills: 5 | Status: SHIPPED | OUTPATIENT
Start: 2022-01-04 | End: 2022-01-06

## 2022-01-04 NOTE — TELEPHONE ENCOUNTER
Reason for Call:  Medication or medication refill:    Do you use a [facility]?  Name of the pharmacy and phone number for the current request: [facility] - [place], [place] - [address] (Pharmacy)    Name of the medication requested: PARNATE 10 MG tablet    Other request: NA    Can we leave a detailed message on this number? YES    Phone number patient can be reached at: Home number on file [phone] (home)    Best Time: ANY    Call taken on 1/4/2022 at 8:35 AM by Ashlee Venegas

## 2022-01-04 NOTE — PATIENT INSTRUCTIONS
1. Arrange for paclitaxel + carboplatin weekly x 12 weeks with pembrolizumab every 3 weeks x 4 -- regimen to start after 1/6/2022.  2. Lab draw weekly x 12.  3. RTC NP in 2 weeks and 6 weeks.  4. RTC MD in 4 weeks and 8 weeks.

## 2022-01-04 NOTE — TELEPHONE ENCOUNTER
Dr. Oliva,    Requested Prescriptions   Pending Prescriptions Disp Refills     PARNATE 10 MG tablet 90 tablet 5     Sig: TAKE 1 TABLET BY MOUTH 3 TIMES DAILY       There is no refill protocol information for this order        Routing refill request to provider for review/approval because:  Drug not on the [facility] refill protocol     Swetha Rodriguez RN  [facility]

## 2022-01-06 ENCOUNTER — HOSPITAL ENCOUNTER (OUTPATIENT)
Dept: MAMMOGRAPHY | Facility: CLINIC | Age: 77
End: 2022-01-06
Attending: SURGERY
Payer: MEDICARE

## 2022-01-06 DIAGNOSIS — R93.89 ABNORMAL MRI: ICD-10-CM

## 2022-01-06 DIAGNOSIS — R59.1 GENERALIZED ENLARGED LYMPH NODES: ICD-10-CM

## 2022-01-06 PROCEDURE — 250N000009 HC RX 250: Performed by: SURGERY

## 2022-01-06 PROCEDURE — 88377 M/PHMTRC ALYS ISHQUANT/SEMIQ: CPT | Performed by: SURGERY

## 2022-01-06 PROCEDURE — 38505 NEEDLE BIOPSY LYMPH NODES: CPT

## 2022-01-06 PROCEDURE — 88305 TISSUE EXAM BY PATHOLOGIST: CPT | Mod: TC | Performed by: SURGERY

## 2022-01-06 PROCEDURE — 76882 US LMTD JT/FCL EVL NVASC XTR: CPT | Mod: LT

## 2022-01-06 RX ADMIN — LIDOCAINE HYDROCHLORIDE 5 ML: 10 INJECTION, SOLUTION INFILTRATION; PERINEURAL at 13:57

## 2022-01-06 NOTE — DISCHARGE INSTRUCTIONS
After Your Breast Biopsy    Bleeding or bruising: Slight bruising is normal.  If you bleed through the bandage, put direct pressure on the breast.  If you are still bleeding after 20 minutes, call the doctor who ordered the exam.    Bandages: Keep your bandage in place until tomorrow morning.  Do not get it wet. On the second day, replace it with a new Band-Aid.    Activity: You may shower the morning after the exam.  No heavy activity (lifting, vacuuming) for 24 hours.    Discomfort: Wear your bra overnight to support the breast.  You may take Tylenol (acetaminophen) for pain.  If you had a stereotactic of MR-directed biopsy, you may take aspirin or ibuprofen (Advil, Motrin) the morning after your biopsy, unless your doctor tells you not to.    Infection: Infection is rare.  Symptoms include fever, redness, increasing pain and fluid draining from the biopsy site.  If you have any of these symptoms, please call the doctor who ordered your exam.    Results: Results may take up to three business days.  If you have not heard your results in three days, call the Breast Center Nurse at [phone] or [phone].  In rare cases, we may need to do another biopsy.    Call the doctor who ordered your exam if:    You have bleeding that lasts more than 20 minutes.    You have pain that cannot be controlled.    You have signs of infection (fever, redness, drainage or other signs).    You have not had your results within three days.    Nurse navigator: Our nurse navigator is here to answer your questions and help you set up future clinic visits.  Please call [phone].    Thank you for choosing [facility].  Please call us if you have questions or concerns about your biopsy.

## 2022-01-07 PROCEDURE — 88305 TISSUE EXAM BY PATHOLOGIST: CPT | Mod: 26 | Performed by: PATHOLOGY

## 2022-01-10 ENCOUNTER — TELEPHONE (OUTPATIENT)
Dept: ONCOLOGY | Facility: CLINIC | Age: 77
End: 2022-01-10
Payer: MEDICARE

## 2022-01-10 ENCOUNTER — TELEPHONE (OUTPATIENT)
Dept: MAMMOGRAPHY | Facility: CLINIC | Age: 77
End: 2022-01-10
Payer: MEDICARE

## 2022-01-10 DIAGNOSIS — F33.1 MAJOR DEPRESSIVE DISORDER, RECURRENT EPISODE, MODERATE (H): ICD-10-CM

## 2022-01-10 DIAGNOSIS — F41.9 ANXIETY: ICD-10-CM

## 2022-01-10 DIAGNOSIS — G47.00 PERSISTENT DISORDER OF INITIATING OR MAINTAINING SLEEP: Chronic | ICD-10-CM

## 2022-01-10 RX ORDER — PROCHLORPERAZINE MALEATE 10 MG
10 TABLET ORAL EVERY 6 HOURS PRN
Qty: 30 TABLET | Refills: 2 | Status: CANCELLED | OUTPATIENT
Start: 2022-01-14

## 2022-01-10 RX ORDER — TRANYLCYPROMINE SULFATE 10 MG/1
TABLET, FILM COATED ORAL
Qty: 270 TABLET | Refills: 0 | Status: CANCELLED | OUTPATIENT
Start: 2022-01-10

## 2022-01-10 RX ORDER — PROCHLORPERAZINE MALEATE 10 MG
10 TABLET ORAL EVERY 6 HOURS PRN
Qty: 30 TABLET | Refills: 2 | Status: SHIPPED | OUTPATIENT
Start: 2022-01-14 | End: 2022-04-09

## 2022-01-10 NOTE — PROGRESS NOTES
Malignant Path:  Pathology report reviewed with our breast radiologist Philip Serna, who confirmed the recent breast imaging is concordant with the final surgical pathology results below.    Dr. Tania Mcmullen called patient to inform of Ultrasound Guided Left Axillarly lymph node Biopsy results showing Metastatic Carcinoma.       Recommended follow up is continue with Oncology/Surgery Management    Marcia Lyon RN, BSN    Marcia Lyon RN, BSN  Breast Care Nurse Coordinator  [facility] [place]  [phone]    Jann Davidson [id number]  F, 1945  Surgical Pathology Report (Final result) [id number]  Authorizing Provider: Jonathan Murphy MD Ordering Provider: Jonathan Murphy MD  Ordering Location: [facility]  Collected: 01/06/2022 01:55 PM  Pathologist: Guillermo Bradford MD Received: 01/06/2022 02:33 PM  .  Specimens  A Axilla, Left  .  .  Final Diagnosis  A. Left axillary lymph node biopsy:  - Metastatic high-grade ductal carcinoma (known breast primary invasive ductal carcinoma).  Electronically signed by Guillermo Bradford MD on 1/7/2022 at 12:42 PM

## 2022-01-10 NOTE — TELEPHONE ENCOUNTER
Spoke to Jann - let her know in order to get her going on her chemotherapy here at the [facility] we will have to get her set up for labs on 1/17/22 then she will see Tadeo GAYLE and infusion on Tuesday 1/18/22.

## 2022-01-10 NOTE — TELEPHONE ENCOUNTER
Patient calling back wondering if there is a different sleep aid and/or anti-anxiety that is not a back order..    Patient is extremely anxious and crying. Went in to details of cancer diagnosis and other concerns.

## 2022-01-10 NOTE — TELEPHONE ENCOUNTER
Message sent to Dr. Jonathan Murphy and Dr. Neetu Mcmullen to inform patient's US guided Left Axillary Lymph Node Biopsy(1/6/22) results below are available for review and to inform patient.  Patient has a known Left Breast IDC/DCIS.  Marcia Lyon, RN BSN  Breast Nurse Care Coordinator  [facility] [place]  [phone]      Jann Davidson RAUL [id number]  F, 1945  Surgical Pathology Report (Final result) [id number]  Authorizing Provider: Jonathan Murphy MD Ordering Provider: Jonathan Murphy MD  Ordering Location: [facility]  Collected: 01/06/2022 01:55 PM  Pathologist: Guillermo Bradford MD Received: 01/06/2022 02:33 PM  .  Specimens  A Axilla, Left  .  .  Final Diagnosis  A. Left axillary lymph node biopsy:  - Metastatic high-grade ductal carcinoma (known breast primary invasive ductal carcinoma).  Electronically signed by Guillermo Bradford MD on 1/7/2022 at 12:42 PM

## 2022-01-10 NOTE — TELEPHONE ENCOUNTER
Reason for Call:  Medication or medication refill:    Do you use a [facility]?  Name of the pharmacy and phone number for the current request:    [facility] - [place], [place] - [address] [facility][id number] - [place], [place] - [address]    Name of the medication requested:   zolpidem (AMBIEN) 5 MG tablet  oxazepam (SERAX) 15 MG capsule  PARNATE 10 MG tablet    Other request: Approve generic because current meds are on back order   Pt is very concerned, increased anxiety and panic attacks    Can we leave a detailed message on this number? YES    Phone number patient can be reached at: Home number on file [phone] (home)    Best Time: Anytime     Call taken on 1/10/2022 at 3:47 PM by Cathie Long MA

## 2022-01-10 NOTE — TELEPHONE ENCOUNTER
Dr. Neetu Mcmullen will reach out to patient to inform of biopsy results and the plan of care.  Marcia Lyon RN, BSN

## 2022-01-11 ENCOUNTER — PATIENT OUTREACH (OUTPATIENT)
Dept: ONCOLOGY | Facility: CLINIC | Age: 77
End: 2022-01-11
Payer: MEDICARE

## 2022-01-11 LAB
INTERPRETATION: NORMAL
PATH REPORT.ADDENDUM SPEC: NORMAL
PATH REPORT.ADDENDUM SPEC: NORMAL
PATH REPORT.COMMENTS IMP SPEC: NORMAL
PATH REPORT.FINAL DX SPEC: NORMAL
PATH REPORT.GROSS SPEC: NORMAL
PATH REPORT.MICROSCOPIC SPEC OTHER STN: NORMAL
PATH REPORT.RELEVANT HX SPEC: NORMAL
PHOTO IMAGE: NORMAL

## 2022-01-11 PROCEDURE — 88377 M/PHMTRC ALYS ISHQUANT/SEMIQ: CPT | Mod: 26 | Performed by: MEDICAL GENETICS

## 2022-01-11 PROCEDURE — 88360 TUMOR IMMUNOHISTOCHEM/MANUAL: CPT | Mod: 26 | Performed by: PATHOLOGY

## 2022-01-11 RX ORDER — OXAZEPAM 15 MG/1
15 CAPSULE ORAL
Qty: 31 CAPSULE | Refills: 2 | Status: SHIPPED | OUTPATIENT
Start: 2022-01-11 | End: 2022-01-12

## 2022-01-11 RX ORDER — ZOLPIDEM TARTRATE 5 MG/1
5 TABLET ORAL
Qty: 31 TABLET | Refills: 2 | Status: SHIPPED | OUTPATIENT
Start: 2022-01-11 | End: 2022-01-12

## 2022-01-11 NOTE — PROGRESS NOTES
Social Work Progress Note      Data/Intervention:  Patient Name:  Jann Davidson  /Age:  1945 (76 year old)    Reason for Follow-Up:  Jann is a 76-year-old woman with a diagnosis of breast cancer who is followed by Dr. Mcmullen. This clinician received referral from RN[facility] Alondra as Jann has limited social supports.     Intervention:   Jann is a single woman who reports that her son, daughter-in-law, and grandson reside in [place], [place]. Jann reports that she resides by herself, with 19-year-old special needs dog Pascale. Jann reports that Pascale does well for a few hours independently, but that she is uncertain how she will manage on treatment days, and with side effects ongoing care of Pascale, which is of utmost priority to her.     SW oriented Jann to role of oncology social worker as a part of care team and available support person. SW discussed general resources available. Jann endorsed that she would appreciative having resources mailed to her that she can review independently.     Resources Provided: (mailed)  Onc SW contact information  [employer]  Hair Loss Resources  [facility]  [employer]  Breast Cancer Support Groups  Financial Resources for Breast Cancer  Pet resources  Private Duty Home Care Agencies    Plan:  Provided patient/family with writer's contact information and availability. This clinician will plan for outreach in 2 weeks. Jann knows to call prior in case of concern or need.     Please call or page if needs or concerns arise.     SOWMYA Lepe, [facility]  Direct Phone: [phone]  Pager: [phone]

## 2022-01-12 ENCOUNTER — MEDICAL CORRESPONDENCE (OUTPATIENT)
Dept: HEALTH INFORMATION MANAGEMENT | Facility: CLINIC | Age: 77
End: 2022-01-12

## 2022-01-12 ENCOUNTER — VIRTUAL VISIT (OUTPATIENT)
Dept: FAMILY MEDICINE | Facility: CLINIC | Age: 77
End: 2022-01-12
Payer: MEDICARE

## 2022-01-12 DIAGNOSIS — G47.00 PERSISTENT DISORDER OF INITIATING OR MAINTAINING SLEEP: Chronic | ICD-10-CM

## 2022-01-12 DIAGNOSIS — N18.30 STAGE 3 CHRONIC KIDNEY DISEASE, UNSPECIFIED WHETHER STAGE 3A OR 3B CKD (H): ICD-10-CM

## 2022-01-12 DIAGNOSIS — F41.9 ANXIETY: ICD-10-CM

## 2022-01-12 DIAGNOSIS — F33.1 MAJOR DEPRESSIVE DISORDER, RECURRENT EPISODE, MODERATE (H): ICD-10-CM

## 2022-01-12 PROCEDURE — 99443 PR PHYSICIAN TELEPHONE EVALUATION 21-30 MIN: CPT | Mod: 95 | Performed by: FAMILY MEDICINE

## 2022-01-12 RX ORDER — TRANYLCYPROMINE SULFATE 10 MG/1
TABLET, FILM COATED ORAL
Qty: 270 TABLET | Refills: 0 | Status: SHIPPED | OUTPATIENT
Start: 2022-01-12 | End: 2022-01-12

## 2022-01-12 RX ORDER — ZOLPIDEM TARTRATE 5 MG/1
5 TABLET ORAL
Qty: 31 TABLET | Refills: 1 | Status: SHIPPED | OUTPATIENT
Start: 2022-01-12 | End: 2022-05-11

## 2022-01-12 RX ORDER — OXAZEPAM 15 MG/1
15 CAPSULE ORAL
Qty: 31 CAPSULE | Refills: 1 | Status: SHIPPED | OUTPATIENT
Start: 2022-01-12 | End: 2022-05-11

## 2022-01-12 RX ORDER — TRANYLCYPROMINE SULFATE 10 MG/1
TABLET, FILM COATED ORAL
Qty: 270 TABLET | Refills: 3 | Status: SHIPPED | OUTPATIENT
Start: 2022-01-12 | End: 2022-09-23

## 2022-01-12 NOTE — TELEPHONE ENCOUNTER
Please ask patient to schedule a virtual appointment to discuss sleep medications/efficacy    For current medications, order signed, please notify, thanks Mehran

## 2022-01-12 NOTE — TELEPHONE ENCOUNTER
Patient called back, very rude over phone and upset, TC spoke with PCP and PCP approved to have her scheduled today and will give her a call.     Future Appointments   Date Time Provider Department Center   1/12/2022 11:40 AM Mehran Oliva MD [facility]     Jacqueline Antoine,      [facility]

## 2022-01-12 NOTE — TELEPHONE ENCOUNTER
[facility]. [facility] [phone] option 2.      Jacqueline Antoine,      [facility]

## 2022-01-13 ENCOUNTER — DOCUMENTATION ONLY (OUTPATIENT)
Dept: ONCOLOGY | Facility: CLINIC | Age: 77
End: 2022-01-13
Payer: MEDICARE

## 2022-01-13 ENCOUNTER — TELEPHONE (OUTPATIENT)
Dept: ONCOLOGY | Facility: CLINIC | Age: 77
End: 2022-01-13
Payer: MEDICARE

## 2022-01-13 NOTE — TELEPHONE ENCOUNTER
----- Message from Neetu Mcmullen MD sent at 1/12/2022  2:37 PM CST -----  Regarding: Axillary biopsy results  Hi Alondra,    Could you please call Jann and inform her that her axillary lymph node biopsy result confirmed breast cancer that has spread to the lymph nodes? (This will not be a surprise since I reviewed with her that the PET scan shows disease there).    Thank you!  Neetu

## 2022-01-13 NOTE — TELEPHONE ENCOUNTER
Spoke to Jann let her know the results of her biopsy and what Dr. Mcmullen said regarding the findings.

## 2022-01-17 ENCOUNTER — LAB (OUTPATIENT)
Dept: INFUSION THERAPY | Facility: CLINIC | Age: 77
End: 2022-01-17
Attending: INTERNAL MEDICINE
Payer: MEDICARE

## 2022-01-17 DIAGNOSIS — C50.512 MALIGNANT NEOPLASM OF LOWER-OUTER QUADRANT OF LEFT BREAST OF FEMALE, ESTROGEN RECEPTOR NEGATIVE (H): Primary | ICD-10-CM

## 2022-01-17 DIAGNOSIS — Z17.1 MALIGNANT NEOPLASM OF LOWER-OUTER QUADRANT OF LEFT BREAST OF FEMALE, ESTROGEN RECEPTOR NEGATIVE (H): Primary | ICD-10-CM

## 2022-01-17 DIAGNOSIS — N18.30 CKD (CHRONIC KIDNEY DISEASE) STAGE 3, GFR 30-59 ML/MIN (H): ICD-10-CM

## 2022-01-17 PROBLEM — T45.1X5A CHEMOTHERAPY-INDUCED NEUTROPENIA (H): Status: ACTIVE | Noted: 2022-01-17

## 2022-01-17 PROBLEM — D70.1 CHEMOTHERAPY-INDUCED NEUTROPENIA (H): Status: ACTIVE | Noted: 2022-01-17

## 2022-01-17 LAB
ALBUMIN SERPL-MCNC: 3.7 G/DL (ref 3.4–5)
ALP SERPL-CCNC: 72 U/L (ref 40–150)
ALT SERPL W P-5'-P-CCNC: 33 U/L (ref 0–50)
ANION GAP SERPL CALCULATED.3IONS-SCNC: 2 MMOL/L (ref 3–14)
AST SERPL W P-5'-P-CCNC: 25 U/L (ref 0–45)
BASOPHILS # BLD AUTO: 0 10E3/UL (ref 0–0.2)
BASOPHILS NFR BLD AUTO: 0 %
BILIRUB SERPL-MCNC: 0.7 MG/DL (ref 0.2–1.3)
BUN SERPL-MCNC: 42 MG/DL (ref 7–30)
CALCIUM SERPL-MCNC: 9.3 MG/DL (ref 8.5–10.1)
CHLORIDE BLD-SCNC: 111 MMOL/L (ref 94–109)
CO2 SERPL-SCNC: 25 MMOL/L (ref 20–32)
CREAT SERPL-MCNC: 1.28 MG/DL (ref 0.52–1.04)
EOSINOPHIL # BLD AUTO: 0.1 10E3/UL (ref 0–0.7)
EOSINOPHIL NFR BLD AUTO: 2 %
ERYTHROCYTE [DISTWIDTH] IN BLOOD BY AUTOMATED COUNT: 13.2 % (ref 10–15)
GFR SERPL CREATININE-BSD FRML MDRD: 43 ML/MIN/1.73M2
GLUCOSE BLD-MCNC: 112 MG/DL (ref 70–99)
HCT VFR BLD AUTO: 34.8 % (ref 35–47)
HGB BLD-MCNC: 10.9 G/DL (ref 11.7–15.7)
IMM GRANULOCYTES # BLD: 0 10E3/UL
IMM GRANULOCYTES NFR BLD: 0 %
LYMPHOCYTES # BLD AUTO: 1.3 10E3/UL (ref 0.8–5.3)
LYMPHOCYTES NFR BLD AUTO: 24 %
MAGNESIUM SERPL-MCNC: 2.1 MG/DL (ref 1.6–2.3)
MCH RBC QN AUTO: 30.2 PG (ref 26.5–33)
MCHC RBC AUTO-ENTMCNC: 31.3 G/DL (ref 31.5–36.5)
MCV RBC AUTO: 96 FL (ref 78–100)
MONOCYTES # BLD AUTO: 0.5 10E3/UL (ref 0–1.3)
MONOCYTES NFR BLD AUTO: 9 %
NEUTROPHILS # BLD AUTO: 3.4 10E3/UL (ref 1.6–8.3)
NEUTROPHILS NFR BLD AUTO: 65 %
NRBC # BLD AUTO: 0 10E3/UL
NRBC BLD AUTO-RTO: 0 /100
PLATELET # BLD AUTO: 274 10E3/UL (ref 150–450)
POTASSIUM BLD-SCNC: 4.5 MMOL/L (ref 3.4–5.3)
PROT SERPL-MCNC: 7.1 G/DL (ref 6.8–8.8)
RBC # BLD AUTO: 3.61 10E6/UL (ref 3.8–5.2)
SODIUM SERPL-SCNC: 138 MMOL/L (ref 133–144)
TSH SERPL DL<=0.005 MIU/L-ACNC: 1.43 MU/L (ref 0.4–4)
WBC # BLD AUTO: 5.2 10E3/UL (ref 4–11)

## 2022-01-17 PROCEDURE — 85025 COMPLETE CBC W/AUTO DIFF WBC: CPT | Performed by: INTERNAL MEDICINE

## 2022-01-17 PROCEDURE — 84443 ASSAY THYROID STIM HORMONE: CPT | Performed by: INTERNAL MEDICINE

## 2022-01-17 PROCEDURE — 83735 ASSAY OF MAGNESIUM: CPT | Performed by: INTERNAL MEDICINE

## 2022-01-17 PROCEDURE — 36591 DRAW BLOOD OFF VENOUS DEVICE: CPT | Performed by: INTERNAL MEDICINE

## 2022-01-17 PROCEDURE — 82040 ASSAY OF SERUM ALBUMIN: CPT | Performed by: INTERNAL MEDICINE

## 2022-01-17 PROCEDURE — 80053 COMPREHEN METABOLIC PANEL: CPT | Performed by: INTERNAL MEDICINE

## 2022-01-17 PROCEDURE — 250N000011 HC RX IP 250 OP 636: Performed by: INTERNAL MEDICINE

## 2022-01-17 RX ORDER — ALBUTEROL SULFATE 90 UG/1
1-2 AEROSOL, METERED RESPIRATORY (INHALATION)
Status: CANCELLED
Start: 2022-01-25

## 2022-01-17 RX ORDER — EPINEPHRINE 1 MG/ML
0.3 INJECTION, SOLUTION, CONCENTRATE INTRAVENOUS EVERY 5 MIN PRN
Status: CANCELLED | OUTPATIENT
Start: 2022-01-25

## 2022-01-17 RX ORDER — EPINEPHRINE 1 MG/ML
0.3 INJECTION, SOLUTION, CONCENTRATE INTRAVENOUS EVERY 5 MIN PRN
Status: CANCELLED | OUTPATIENT
Start: 2022-01-18

## 2022-01-17 RX ORDER — EPINEPHRINE 1 MG/ML
0.3 INJECTION, SOLUTION, CONCENTRATE INTRAVENOUS EVERY 5 MIN PRN
Status: CANCELLED | OUTPATIENT
Start: 2022-02-01

## 2022-01-17 RX ORDER — HEPARIN SODIUM,PORCINE 10 UNIT/ML
5 VIAL (ML) INTRAVENOUS
Status: CANCELLED | OUTPATIENT
Start: 2022-01-25

## 2022-01-17 RX ORDER — ALBUTEROL SULFATE 0.83 MG/ML
2.5 SOLUTION RESPIRATORY (INHALATION)
Status: CANCELLED | OUTPATIENT
Start: 2022-02-01

## 2022-01-17 RX ORDER — HEPARIN SODIUM (PORCINE) LOCK FLUSH IV SOLN 100 UNIT/ML 100 UNIT/ML
5 SOLUTION INTRAVENOUS
Status: DISCONTINUED | OUTPATIENT
Start: 2022-01-17 | End: 2022-01-17 | Stop reason: HOSPADM

## 2022-01-17 RX ORDER — MEPERIDINE HYDROCHLORIDE 25 MG/ML
25 INJECTION INTRAMUSCULAR; INTRAVENOUS; SUBCUTANEOUS EVERY 30 MIN PRN
Status: CANCELLED | OUTPATIENT
Start: 2022-01-25

## 2022-01-17 RX ORDER — NALOXONE HYDROCHLORIDE 0.4 MG/ML
0.2 INJECTION, SOLUTION INTRAMUSCULAR; INTRAVENOUS; SUBCUTANEOUS
Status: CANCELLED | OUTPATIENT
Start: 2022-02-01

## 2022-01-17 RX ORDER — DIPHENHYDRAMINE HCL 25 MG
50 CAPSULE ORAL
Status: CANCELLED
Start: 2022-02-01

## 2022-01-17 RX ORDER — DIPHENHYDRAMINE HYDROCHLORIDE 50 MG/ML
50 INJECTION INTRAMUSCULAR; INTRAVENOUS
Status: CANCELLED
Start: 2022-01-18

## 2022-01-17 RX ORDER — ALBUTEROL SULFATE 0.83 MG/ML
2.5 SOLUTION RESPIRATORY (INHALATION)
Status: CANCELLED | OUTPATIENT
Start: 2022-01-25

## 2022-01-17 RX ORDER — LIDOCAINE/PRILOCAINE 2.5 %-2.5%
CREAM (GRAM) TOPICAL
Qty: 30 G | Refills: 1 | Status: SHIPPED | OUTPATIENT
Start: 2022-01-17 | End: 2022-04-09

## 2022-01-17 RX ORDER — MEPERIDINE HYDROCHLORIDE 25 MG/ML
25 INJECTION INTRAMUSCULAR; INTRAVENOUS; SUBCUTANEOUS EVERY 30 MIN PRN
Status: CANCELLED | OUTPATIENT
Start: 2022-01-18

## 2022-01-17 RX ORDER — DIPHENHYDRAMINE HYDROCHLORIDE 50 MG/ML
50 INJECTION INTRAMUSCULAR; INTRAVENOUS
Status: CANCELLED
Start: 2022-01-25

## 2022-01-17 RX ORDER — NALOXONE HYDROCHLORIDE 0.4 MG/ML
0.2 INJECTION, SOLUTION INTRAMUSCULAR; INTRAVENOUS; SUBCUTANEOUS
Status: CANCELLED | OUTPATIENT
Start: 2022-01-18

## 2022-01-17 RX ORDER — ALBUTEROL SULFATE 90 UG/1
1-2 AEROSOL, METERED RESPIRATORY (INHALATION)
Status: CANCELLED
Start: 2022-01-18

## 2022-01-17 RX ORDER — MEPERIDINE HYDROCHLORIDE 25 MG/ML
25 INJECTION INTRAMUSCULAR; INTRAVENOUS; SUBCUTANEOUS EVERY 30 MIN PRN
Status: CANCELLED | OUTPATIENT
Start: 2022-02-01

## 2022-01-17 RX ORDER — HEPARIN SODIUM,PORCINE 10 UNIT/ML
5 VIAL (ML) INTRAVENOUS
Status: CANCELLED | OUTPATIENT
Start: 2022-02-01

## 2022-01-17 RX ORDER — HEPARIN SODIUM (PORCINE) LOCK FLUSH IV SOLN 100 UNIT/ML 100 UNIT/ML
5 SOLUTION INTRAVENOUS
Status: CANCELLED | OUTPATIENT
Start: 2022-01-18

## 2022-01-17 RX ORDER — DIPHENHYDRAMINE HCL 50 MG
50 CAPSULE ORAL ONCE
Status: CANCELLED
Start: 2022-01-25

## 2022-01-17 RX ORDER — HEPARIN SODIUM (PORCINE) LOCK FLUSH IV SOLN 100 UNIT/ML 100 UNIT/ML
5 SOLUTION INTRAVENOUS
Status: CANCELLED | OUTPATIENT
Start: 2022-02-01

## 2022-01-17 RX ORDER — LORAZEPAM 2 MG/ML
0.5 INJECTION INTRAMUSCULAR EVERY 4 HOURS PRN
Status: CANCELLED | OUTPATIENT
Start: 2022-01-25

## 2022-01-17 RX ORDER — DIPHENHYDRAMINE HYDROCHLORIDE 50 MG/ML
50 INJECTION INTRAMUSCULAR; INTRAVENOUS
Status: CANCELLED
Start: 2022-02-01

## 2022-01-17 RX ORDER — HEPARIN SODIUM,PORCINE 10 UNIT/ML
5 VIAL (ML) INTRAVENOUS
Status: CANCELLED | OUTPATIENT
Start: 2022-01-17

## 2022-01-17 RX ORDER — HEPARIN SODIUM,PORCINE 10 UNIT/ML
5 VIAL (ML) INTRAVENOUS
Status: CANCELLED | OUTPATIENT
Start: 2022-01-18

## 2022-01-17 RX ORDER — DIPHENHYDRAMINE HCL 50 MG
50 CAPSULE ORAL ONCE
Status: CANCELLED
Start: 2022-01-18

## 2022-01-17 RX ORDER — ALBUTEROL SULFATE 90 UG/1
1-2 AEROSOL, METERED RESPIRATORY (INHALATION)
Status: CANCELLED
Start: 2022-02-01

## 2022-01-17 RX ORDER — HEPARIN SODIUM,PORCINE 10 UNIT/ML
5 VIAL (ML) INTRAVENOUS
Status: DISCONTINUED | OUTPATIENT
Start: 2022-01-17 | End: 2022-01-17 | Stop reason: HOSPADM

## 2022-01-17 RX ORDER — LORAZEPAM 2 MG/ML
0.5 INJECTION INTRAMUSCULAR EVERY 4 HOURS PRN
Status: CANCELLED | OUTPATIENT
Start: 2022-01-18

## 2022-01-17 RX ORDER — HEPARIN SODIUM (PORCINE) LOCK FLUSH IV SOLN 100 UNIT/ML 100 UNIT/ML
5 SOLUTION INTRAVENOUS
Status: CANCELLED | OUTPATIENT
Start: 2022-01-25

## 2022-01-17 RX ORDER — HEPARIN SODIUM (PORCINE) LOCK FLUSH IV SOLN 100 UNIT/ML 100 UNIT/ML
5 SOLUTION INTRAVENOUS
Status: CANCELLED | OUTPATIENT
Start: 2022-01-17

## 2022-01-17 RX ORDER — LORAZEPAM 2 MG/ML
0.5 INJECTION INTRAMUSCULAR EVERY 4 HOURS PRN
Status: CANCELLED | OUTPATIENT
Start: 2022-02-01

## 2022-01-17 RX ORDER — NALOXONE HYDROCHLORIDE 0.4 MG/ML
0.2 INJECTION, SOLUTION INTRAMUSCULAR; INTRAVENOUS; SUBCUTANEOUS
Status: CANCELLED | OUTPATIENT
Start: 2022-01-25

## 2022-01-17 RX ORDER — ALBUTEROL SULFATE 0.83 MG/ML
2.5 SOLUTION RESPIRATORY (INHALATION)
Status: CANCELLED | OUTPATIENT
Start: 2022-01-18

## 2022-01-17 RX ADMIN — Medication 5 ML: at 13:31

## 2022-01-17 NOTE — PROGRESS NOTES
Nursing Note:  Jann Davidson presents today for port labs.    Patient seen by provider today: No   present during visit today: Not Applicable.    Note: N/A.    Intravenous Access:  Labs drawn without difficulty.  Implanted Port.    Discharge Plan:   Patient was sent to home for tomorrow's appointment.    Benita Greenfield RN

## 2022-01-18 ENCOUNTER — ONCOLOGY VISIT (OUTPATIENT)
Dept: ONCOLOGY | Facility: CLINIC | Age: 77
End: 2022-01-18
Attending: INTERNAL MEDICINE
Payer: MEDICARE

## 2022-01-18 ENCOUNTER — INFUSION THERAPY VISIT (OUTPATIENT)
Dept: INFUSION THERAPY | Facility: CLINIC | Age: 77
End: 2022-01-18
Attending: INTERNAL MEDICINE
Payer: MEDICARE

## 2022-01-18 ENCOUNTER — HOSPITAL ENCOUNTER (OUTPATIENT)
Dept: CARDIOLOGY | Facility: CLINIC | Age: 77
Discharge: HOME | End: 2022-01-18
Attending: INTERNAL MEDICINE | Admitting: INTERNAL MEDICINE
Payer: MEDICARE

## 2022-01-18 ENCOUNTER — DOCUMENTATION ONLY (OUTPATIENT)
Dept: PHARMACY | Facility: CLINIC | Age: 77
End: 2022-01-18
Payer: MEDICARE

## 2022-01-18 VITALS
RESPIRATION RATE: 16 BRPM | TEMPERATURE: 98.3 F | BODY MASS INDEX: 23.24 KG/M2 | SYSTOLIC BLOOD PRESSURE: 127 MMHG | DIASTOLIC BLOOD PRESSURE: 81 MMHG | HEART RATE: 93 BPM | OXYGEN SATURATION: 100 % | WEIGHT: 131.2 LBS

## 2022-01-18 VITALS — DIASTOLIC BLOOD PRESSURE: 84 MMHG | RESPIRATION RATE: 16 BRPM | SYSTOLIC BLOOD PRESSURE: 134 MMHG | HEART RATE: 76 BPM

## 2022-01-18 DIAGNOSIS — Z17.1 MALIGNANT NEOPLASM OF LOWER-OUTER QUADRANT OF LEFT BREAST OF FEMALE, ESTROGEN RECEPTOR NEGATIVE (H): ICD-10-CM

## 2022-01-18 DIAGNOSIS — D70.1 CHEMOTHERAPY-INDUCED NEUTROPENIA (H): ICD-10-CM

## 2022-01-18 DIAGNOSIS — D70.1 CHEMOTHERAPY-INDUCED NEUTROPENIA (H): Primary | ICD-10-CM

## 2022-01-18 DIAGNOSIS — T45.1X5A CHEMOTHERAPY-INDUCED NEUTROPENIA (H): Primary | ICD-10-CM

## 2022-01-18 DIAGNOSIS — C50.512 MALIGNANT NEOPLASM OF LOWER-OUTER QUADRANT OF LEFT BREAST OF FEMALE, ESTROGEN RECEPTOR NEGATIVE (H): ICD-10-CM

## 2022-01-18 DIAGNOSIS — T45.1X5A CHEMOTHERAPY-INDUCED NEUTROPENIA (H): ICD-10-CM

## 2022-01-18 DIAGNOSIS — Z01.818 ENCOUNTER FOR OTHER PREPROCEDURAL EXAMINATION: ICD-10-CM

## 2022-01-18 PROBLEM — F41.9 ANXIETY: Status: ACTIVE | Noted: 2017-06-28

## 2022-01-18 LAB — LVEF ECHO: NORMAL

## 2022-01-18 PROCEDURE — 250N000011 HC RX IP 250 OP 636: Performed by: INTERNAL MEDICINE

## 2022-01-18 PROCEDURE — 258N000003 HC RX IP 258 OP 636: Performed by: INTERNAL MEDICINE

## 2022-01-18 PROCEDURE — 93306 TTE W/DOPPLER COMPLETE: CPT | Mod: 26 | Performed by: INTERNAL MEDICINE

## 2022-01-18 PROCEDURE — 96417 CHEMO IV INFUS EACH ADDL SEQ: CPT

## 2022-01-18 PROCEDURE — 96413 CHEMO IV INFUSION 1 HR: CPT

## 2022-01-18 PROCEDURE — 96375 TX/PRO/DX INJ NEW DRUG ADDON: CPT

## 2022-01-18 PROCEDURE — 250N000009 HC RX 250: Performed by: INTERNAL MEDICINE

## 2022-01-18 PROCEDURE — 99214 OFFICE O/P EST MOD 30 MIN: CPT | Performed by: NURSE PRACTITIONER

## 2022-01-18 PROCEDURE — 255N000002 HC RX 255 OP 636: Performed by: INTERNAL MEDICINE

## 2022-01-18 PROCEDURE — 999N000208 ECHOCARDIOGRAM COMPLETE

## 2022-01-18 PROCEDURE — 96367 TX/PROPH/DG ADDL SEQ IV INF: CPT

## 2022-01-18 RX ORDER — HEPARIN SODIUM (PORCINE) LOCK FLUSH IV SOLN 100 UNIT/ML 100 UNIT/ML
5 SOLUTION INTRAVENOUS
Status: DISCONTINUED | OUTPATIENT
Start: 2022-01-18 | End: 2022-01-18 | Stop reason: HOSPADM

## 2022-01-18 RX ORDER — CALCIUM CARBONATE 300MG(750)
2 TABLET,CHEWABLE ORAL DAILY
COMMUNITY

## 2022-01-18 RX ADMIN — FAMOTIDINE 20 MG: 10 INJECTION INTRAVENOUS at 10:58

## 2022-01-18 RX ADMIN — SODIUM CHLORIDE 200 MG: 9 INJECTION, SOLUTION INTRAVENOUS at 11:40

## 2022-01-18 RX ADMIN — Medication 5 ML: at 13:52

## 2022-01-18 RX ADMIN — DIPHENHYDRAMINE HYDROCHLORIDE 50 MG: 50 INJECTION, SOLUTION INTRAMUSCULAR; INTRAVENOUS at 11:26

## 2022-01-18 RX ADMIN — CARBOPLATIN 90 MG: 10 INJECTION, SOLUTION INTRAVENOUS at 13:20

## 2022-01-18 RX ADMIN — SODIUM CHLORIDE 250 ML: 9 INJECTION, SOLUTION INTRAVENOUS at 10:57

## 2022-01-18 RX ADMIN — HUMAN ALBUMIN MICROSPHERES AND PERFLUTREN 9 ML: 10; .22 INJECTION, SOLUTION INTRAVENOUS at 16:04

## 2022-01-18 RX ADMIN — DEXAMETHASONE SODIUM PHOSPHATE: 10 INJECTION, SOLUTION INTRAMUSCULAR; INTRAVENOUS at 10:59

## 2022-01-18 RX ADMIN — PACLITAXEL 130 MG: 6 INJECTION, SOLUTION INTRAVENOUS at 12:17

## 2022-01-18 ASSESSMENT — PAIN SCALES - GENERAL: PAINLEVEL: NO PAIN (0)

## 2022-01-18 NOTE — PROGRESS NOTES
Oncology/Hematology Visit Note  Jan 18, 2022    Reason for Visit: follow up of left breast cancer  Triple negative  Stage IIb  Patient of Dr. Mcmlulen    Patient comes here today to start neoadjuvant chemotherapy with paclitaxel and carboplatin for 12 weeks followed by dose dense AC  With addition of pembrolizumab every 3 weeks      Interval History:  Patient reports she is feeling well denies fever chills sweats cough shortness of breath chest pain nausea vomiting diarrhea abdominal pain or bleeding    Review of Systems:  14 point ROS of systems including Constitutional, Eyes, Respiratory, Cardiovascular, Gastroenterology, Genitourinary, Integumentary, Muscularskeletal, Psychiatric were all negative except for pertinent positives noted in my HPI.    Physical Examination:  General: The patient is a pleasant female in no acute distress.  /81   Pulse 93   Temp 98.3  F (36.8  C) (Oral)   Resp 16   Wt 59.5 kg (131 lb 3.2 oz)   SpO2 100%   BMI 23.24 kg/m    HEENT: EOMI, PERRL. Sclerae are anicteric. Oral mucosa is pink and moist with no lesions or thrush.   Lymph: Neck is supple with no lymphadenopathy in the cervical or supraclavicular areas.   Heart: Regular rate and rhythm.   Lungs: Clear to auscultation bilaterally.   GI: Bowel sounds present, soft, nontender with no palpable hepatosplenomegaly or masses.   Extremities: No lower extremity edema noted bilaterally.   Skin: No rashes, petechiae, or bruising noted on exposed skin.    Laboratory Data:  CBC CMP results reviewed      Assessment and Plan:    This is a 76-year-old female with     left breast cancer  Triple negative  Stage IIb  Patient of Dr. Mcmullen    Patient comes here today to start neoadjuvant chemotherapy with paclitaxel and carboplatin for 12 weeks followed by dose dense AC  With addition of pembrolizumab every 3 weeks  -Regimen and side effects of the treatment discussed in detail with patient patient verbalized understanding and agrees to  proceed with treatment  Labs reviewed abnormalities discussed  Okay to proceed with treatment  Schedule with me next week for follow-up       Chronic kidney disease  Creatinine at baseline  Continue follow-up with nephrologist    FDG uptake at the anal canal  -Patient wants to wait for anoscopy and referral to colorectal since she is asymptomatic  -Plan to see colorectal after completion of the chemo for breast cancer    Right renal mass  Management per urology    Depression anxiety  Patient declined appointment with Nazia Jimenes  I discussed different coping mechanisms      Call clinic with any changes in health condition or questions        HUBER Damon [facility]     Chart documentation with Dragon Voice recognition Software. Although reviewed after completion, some words and grammatical errors may remain.

## 2022-01-18 NOTE — PROGRESS NOTES
Take Home Medication Teaching    Patient was educated on the following oral medications: Compazine on January 18, 2022. Teaching provided to patient included indication, dose, administration, adverse effects and side effect management. Written materials were provided and patient was given the opportunity to ask questions. The patient started lorazepam recently for anxiety. She had felt really sleepy with this medication and so we talked about moving this to bedtime if needed. Education was provided on taking lorazepam and oxazepam 6 hours apart. Education was provided on zolpidem, oxazepam, lorazepam and Compazine when used in combination. Patient verbalized understanding of the information presented.     Nicol Gtz, (Pharmacy Intern)

## 2022-01-18 NOTE — PROGRESS NOTES
"Oncology Rooming Note    January 18, 2022 10:03 AM   Jann Davidson is a 76 year old female who presents for:    Chief Complaint   Patient presents with     Oncology Clinic Visit     Initial Vitals: There were no vitals taken for this visit. Estimated body mass index is 23.63 kg/m  as calculated from the following:    Height as of 1/3/22: 1.6 m (5' 3\").    Weight as of 1/3/22: 60.5 kg (133 lb 6.4 oz). There is no height or weight on file to calculate BSA.  Data Unavailable Comment: Data Unavailable   No LMP recorded. Patient is postmenopausal.  Allergies reviewed: Yes  Medications reviewed: Yes    Medications: Medication refills not needed today.  Pharmacy name entered into EPIC:    [email or web address][id number] [place], [place] - [address]  [facility] - [address] [facility] #[id number] [place], [place] - [address]    Clinical concerns:  doctor was notified.      Isabela Padron CMA            "

## 2022-01-18 NOTE — PROGRESS NOTES
Jann is a 76 year old who is being evaluated via a billable telephone visit.      What phone number would you like to be contacted at? mobile  How would you like to obtain your AVS? MyChart    Assessment & Plan     Major depressive disorder, recurrent episode, moderate (H)  Worse, not suicidal  - PARNATE 10 MG tablet; TAKE 1 TABLET BY MOUTH 3 TIMES DAILY    Persistent disorder of initiating or maintaining sleep  Severe  - zolpidem (AMBIEN) 5 MG tablet; Take 1 tablet (5 mg) by mouth nightly as needed for sleep OK for generic please notify patient regarding supply thanks    Anxiety  Worse  - oxazepam (SERAX) 15 MG capsule; Take 1 capsule (15 mg) by mouth nightly as needed for anxiety or sleep OK for generic please notify patient regarding supply thanks    Stage 3 chronic kidney disease, unspecified whether stage 3a or 3b CKD (H)  Stable    Review of external notes as documented elsewhere in note  Ordering of each unique test  Prescription drug management  30 minutes spent on the date of the encounter doing chart review, history and exam, documentation and further activities per the note     Patient Instructions   If stress becomes any worse regarding caring for her pet in the middle of the night with serious effects on her sleep,  Consider talking with a therapist.    Continue current medications; consider E-consult with psychiatry regarding medication if need be.        Return in about 3 months (around 4/12/2022), or if symptoms worsen or fail to improve.    Mehran Oliva MD  [facility]    Carmina Forte is a 76 year old who presents for the following health issues    HPI     Insomnia  Onset/Duration: Many years  Description:   Frequency of insomnia:  nightly  Time to fall asleep (sleep latency): Varies  Middle of night awakening:  YES-helps elderly dog eat and pee  Early morning awakening:  YES-sometimes  Progression of Symptoms:  worsening and constant  Accompanying Signs &  Symptoms:  Daytime sleepiness/napping: YES  Excessive snoring/apnea: not applicable  Restless legs: no  Waking to urinate: YES-sometimes  Chronic pain:  no  Depression symptoms (if yes, do PHQ9): YES  Anxiety symptoms (if yes, do RUT-7): YES  History:  Prior Insomnia: YES  New stressful situation: YES-see above  Precipitating factors:   Caffeine intake: YES-some  OTC decongestants: no  Any new medications: no  Alleviating factors:  Self medicating (alcohol, etc.):  no  Stress-reduction (exercise, yoga, meditation etc): no  Therapies tried and outcome: Ambien -  not effective and oxazepam-  not effective    Depression and Anxiety Follow-Up    How are you doing with your depression since your last visit? Worsened     How are you doing with your anxiety since your last visit?  Worsened     Are you having other symptoms that might be associated with depression or anxiety? Yes:  Insomnia    Have you had a significant life event? Grief or Loss and Health Concerns     Do you have any concerns with your use of alcohol or other drugs? No    Social History     Tobacco Use     Smoking status: Former Smoker     Types: Cigarettes     Quit date: 10/30/1972     Years since quittin.2     Smokeless tobacco: Never Used   Substance Use Topics     Alcohol use: Yes     Comment: couple glasses of wine daily      Drug use: No     PHQ 2018   PHQ-9 Total Score 1 0 0   Q9: Thoughts of better off dead/self-harm past 2 weeks Not at all Not at all Not at all     No flowsheet data found.  Chronic Kidney Disease Follow-up      Do you take any over the counter pain medicine?: No    Review of Systems   Constitutional, HEENT, cardiovascular, pulmonary, gi and gu systems are negative, except as otherwise noted.      Objective         Physical Exam   severe distress and fatigued  PSYCH: Alert and oriented times 3; coherent speech, normal   rate and volume, able to articulate logical thoughts, able   to abstract reason, no  tangential thoughts, no hallucinations   or delusions  Her affect is anxious, tearful, angry and sad  RESP: No cough, no audible wheezing, able to talk in full sentences  Remainder of exam unable to be completed due to telephone visits        Phone call duration: 30 minutes

## 2022-01-18 NOTE — LETTER
"    1/18/2022         RE: Jann Davidson  [address]  [facility] [address]        Dear Colleague,    Thank you for referring your patient, Jann Davidson, to the [facility]. Please see a copy of my visit note below.    Oncology Rooming Note    January 18, 2022 10:03 AM   Jann Davidson is a 76 year old female who presents for:    Chief Complaint   Patient presents with     Oncology Clinic Visit     Initial Vitals: There were no vitals taken for this visit. Estimated body mass index is 23.63 kg/m  as calculated from the following:    Height as of 1/3/22: 1.6 m (5' 3\").    Weight as of 1/3/22: 60.5 kg (133 lb 6.4 oz). There is no height or weight on file to calculate BSA.  Data Unavailable Comment: Data Unavailable   No LMP recorded. Patient is postmenopausal.  Allergies reviewed: Yes  Medications reviewed: Yes    Medications: Medication refills not needed today.  Pharmacy name entered into EPIC:    [email or web address][id number] [place], [place] - [address] [facility] - [place], [place] - [address] [facility] #[id number] - [place], [place] - [address]    Clinical concerns:  doctor was notified.      Isabela Padron [facility]              Oncology/Hematology Visit Note  Jan 18, 2022    Reason for Visit: follow up of left breast cancer  Triple negative  Stage IIb  Patient of Dr. Mcmullen    Patient comes here today to start neoadjuvant chemotherapy with paclitaxel and carboplatin for 12 weeks followed by dose dense AC  With addition of pembrolizumab every 3 weeks      Interval History:  Patient reports she is feeling well denies fever chills sweats cough shortness of breath chest pain nausea vomiting diarrhea abdominal pain or bleeding    Review of Systems:  14 point ROS of systems including Constitutional, Eyes, Respiratory, Cardiovascular, Gastroenterology, Genitourinary, Integumentary, Muscularskeletal, Psychiatric were all negative except for " pertinent positives noted in my HPI.    Physical Examination:  General: The patient is a pleasant female in no acute distress.  /81   Pulse 93   Temp 98.3  F (36.8  C) (Oral)   Resp 16   Wt 59.5 kg (131 lb 3.2 oz)   SpO2 100%   BMI 23.24 kg/m    HEENT: EOMI, PERRL. Sclerae are anicteric. Oral mucosa is pink and moist with no lesions or thrush.   Lymph: Neck is supple with no lymphadenopathy in the cervical or supraclavicular areas.   Heart: Regular rate and rhythm.   Lungs: Clear to auscultation bilaterally.   GI: Bowel sounds present, soft, nontender with no palpable hepatosplenomegaly or masses.   Extremities: No lower extremity edema noted bilaterally.   Skin: No rashes, petechiae, or bruising noted on exposed skin.    Laboratory Data:  CBC CMP results reviewed      Assessment and Plan:    This is a 76-year-old female with     left breast cancer  Triple negative  Stage IIb  Patient of Dr. Mcmullen    Patient comes here today to start neoadjuvant chemotherapy with paclitaxel and carboplatin for 12 weeks followed by dose dense AC  With addition of pembrolizumab every 3 weeks  -Regimen and side effects of the treatment discussed in detail with patient patient verbalized understanding and agrees to proceed with treatment  Labs reviewed abnormalities discussed  Okay to proceed with treatment  Schedule with me next week for follow-up       Chronic kidney disease  Creatinine at baseline  Continue follow-up with nephrologist    FDG uptake at the anal canal  -Patient wants to wait for anoscopy and referral to colorectal since she is asymptomatic  -Plan to see colorectal after completion of the chemo for breast cancer    Right renal mass  Management per urology    Depression anxiety  Patient declined appointment with Nazia Jimenes  I discussed different coping mechanisms      Call clinic with any changes in health condition or questions        HUBER Damon CNP  [facility]      Chart documentation with Dragon Voice recognition Software. Although reviewed after completion, some words and grammatical errors may remain.            Again, thank you for allowing me to participate in the care of your patient.        Sincerely,        HUBER Damon CNP

## 2022-01-18 NOTE — PROGRESS NOTES
Infusion Nursing Note:  Jann Davidson presents today for C1D1 keytruda, taxol, carbo  Patient seen by provider today: Yes: Tadeo   present during visit today: Not Applicable.    Note: pt oriented to infusion room. Pt reports feeling fearful and is teary, process explained, reassurance provided. All pt questions answered.      Intravenous Access:  Implanted Port.    Treatment Conditions:  Lab Results   Component Value Date    HGB 10.9 (L) 01/17/2022    WBC 5.2 01/17/2022    ANEU 3.9 04/30/2018    ANEUTAUTO 3.4 01/17/2022     01/17/2022      Lab Results   Component Value Date     01/17/2022    POTASSIUM 4.5 01/17/2022    MAG 2.1 01/17/2022    CR 1.28 (H) 01/17/2022    AMY 9.3 01/17/2022    BILITOTAL 0.7 01/17/2022    ALBUMIN 3.7 01/17/2022    ALT 33 01/17/2022    AST 25 01/17/2022     Results reviewed, labs MET treatment parameters, ok to proceed with treatment.      Post Infusion Assessment:  Patient tolerated infusion without incident.  Blood return noted pre and post infusion.  Site patent and intact, free from redness, edema or discomfort.  No evidence of extravasations.  Access discontinued per protocol.       Discharge Plan:   Discharge instructions reviewed with: Patient.  Patient and/or family verbalized understanding of discharge instructions and all questions answered.  Patient discharged in stable condition accompanied by: self.  Departure Mode: Ambulatory.      Edith Meraz RN

## 2022-01-18 NOTE — PATIENT INSTRUCTIONS
If stress becomes any worse regarding caring for her pet in the middle of the night with serious effects on her sleep,  Consider talking with a therapist.    Continue current medications; consider E-consult with psychiatry regarding medication if need be.

## 2022-01-19 ENCOUNTER — PATIENT OUTREACH (OUTPATIENT)
Dept: ONCOLOGY | Facility: CLINIC | Age: 77
End: 2022-01-19
Payer: MEDICARE

## 2022-01-19 NOTE — PROGRESS NOTES
".Social Work Progress Note      Data/Intervention:  Patient Name:  Jann Davidson  /Age:  1945 (76 year old)    Reason for Follow-Up:  Jann is a 76-year-old woman with a diagnosis of breast cancer who is followed by Dr. Mcmullen. This clinician received referral from infusion RN who worked with Jann for C1D1 22.     Intervention:   Jann reports that she is doing well from an emotional and physical perspective today. Jann reports that she chemotherapy went, \"fine\" yesterday and that she feels \"less frightened\" as she has a good understanding now of what to expect. Jann with questions about echo results and would appreciate connection with RNCC.     Provided emotional support surrounding impact that treatment has had on dog, and Jann's creativity in finding solutions to support dog's coping.     Jann thinking about accessing resources for Hair Loss, namely It's [employer] Me. Appreciative of packet received.     Plan:  1) SW collaborated with RNCC regarding pt's question as to echo results  2) This clinician will plan for psychosocial check-in and emotional support 22. Jann knows to reach out prior in the event of psychosocial distress or need.     Please call or page if needs or concerns arise.     SOWMYA Lepe, [facility] Hospital[facility]  Direct Phone: [phone]  Pager: [phone]  "

## 2022-01-20 ENCOUNTER — TELEPHONE (OUTPATIENT)
Dept: ONCOLOGY | Facility: CLINIC | Age: 77
End: 2022-01-20
Payer: MEDICARE

## 2022-01-20 NOTE — TELEPHONE ENCOUNTER
Called to Jann [place] social workers request re ECHO results from 1/18/22. Left message and relayed results and let her know that anything 50-75% is normal and hers is 65%. To call back with questions.    Regina Arango RN

## 2022-01-24 DIAGNOSIS — I10 HYPERTENSION GOAL BP (BLOOD PRESSURE) < 140/90: ICD-10-CM

## 2022-01-25 ENCOUNTER — PATIENT OUTREACH (OUTPATIENT)
Dept: ONCOLOGY | Facility: CLINIC | Age: 77
End: 2022-01-25

## 2022-01-25 ENCOUNTER — LAB (OUTPATIENT)
Dept: INFUSION THERAPY | Facility: CLINIC | Age: 77
End: 2022-01-25
Attending: INTERNAL MEDICINE
Payer: MEDICARE

## 2022-01-25 ENCOUNTER — ONCOLOGY VISIT (OUTPATIENT)
Dept: ONCOLOGY | Facility: CLINIC | Age: 77
End: 2022-01-25
Attending: NURSE PRACTITIONER
Payer: MEDICARE

## 2022-01-25 VITALS
HEART RATE: 88 BPM | DIASTOLIC BLOOD PRESSURE: 80 MMHG | BODY MASS INDEX: 23.49 KG/M2 | OXYGEN SATURATION: 100 % | TEMPERATURE: 98.3 F | WEIGHT: 132.6 LBS | RESPIRATION RATE: 16 BRPM | SYSTOLIC BLOOD PRESSURE: 135 MMHG

## 2022-01-25 DIAGNOSIS — C50.512 MALIGNANT NEOPLASM OF LOWER-OUTER QUADRANT OF LEFT BREAST OF FEMALE, ESTROGEN RECEPTOR NEGATIVE (H): ICD-10-CM

## 2022-01-25 DIAGNOSIS — D70.1 CHEMOTHERAPY-INDUCED NEUTROPENIA (H): Primary | ICD-10-CM

## 2022-01-25 DIAGNOSIS — Z17.1 MALIGNANT NEOPLASM OF LOWER-OUTER QUADRANT OF LEFT BREAST OF FEMALE, ESTROGEN RECEPTOR NEGATIVE (H): Primary | ICD-10-CM

## 2022-01-25 DIAGNOSIS — C50.512 MALIGNANT NEOPLASM OF LOWER-OUTER QUADRANT OF LEFT BREAST OF FEMALE, ESTROGEN RECEPTOR NEGATIVE (H): Primary | ICD-10-CM

## 2022-01-25 DIAGNOSIS — Z17.1 MALIGNANT NEOPLASM OF LOWER-OUTER QUADRANT OF LEFT BREAST OF FEMALE, ESTROGEN RECEPTOR NEGATIVE (H): ICD-10-CM

## 2022-01-25 DIAGNOSIS — T45.1X5A CHEMOTHERAPY-INDUCED NEUTROPENIA (H): Primary | ICD-10-CM

## 2022-01-25 LAB
BASOPHILS # BLD AUTO: 0 10E3/UL (ref 0–0.2)
BASOPHILS NFR BLD AUTO: 0 %
CREAT SERPL-MCNC: 1.46 MG/DL (ref 0.52–1.04)
EOSINOPHIL # BLD AUTO: 0.1 10E3/UL (ref 0–0.7)
EOSINOPHIL NFR BLD AUTO: 2 %
ERYTHROCYTE [DISTWIDTH] IN BLOOD BY AUTOMATED COUNT: 12.2 % (ref 10–15)
GFR SERPL CREATININE-BSD FRML MDRD: 37 ML/MIN/1.73M2
HCT VFR BLD AUTO: 34.4 % (ref 35–47)
HGB BLD-MCNC: 11 G/DL (ref 11.7–15.7)
IMM GRANULOCYTES # BLD: 0 10E3/UL
IMM GRANULOCYTES NFR BLD: 1 %
IRON SATN MFR SERPL: 16 % (ref 15–46)
IRON SERPL-MCNC: 52 UG/DL (ref 35–180)
LYMPHOCYTES # BLD AUTO: 1.9 10E3/UL (ref 0.8–5.3)
LYMPHOCYTES NFR BLD AUTO: 29 %
MCH RBC QN AUTO: 30.4 PG (ref 26.5–33)
MCHC RBC AUTO-ENTMCNC: 32 G/DL (ref 31.5–36.5)
MCV RBC AUTO: 95 FL (ref 78–100)
MONOCYTES # BLD AUTO: 0.3 10E3/UL (ref 0–1.3)
MONOCYTES NFR BLD AUTO: 4 %
NEUTROPHILS # BLD AUTO: 4.2 10E3/UL (ref 1.6–8.3)
NEUTROPHILS NFR BLD AUTO: 64 %
NRBC # BLD AUTO: 0 10E3/UL
NRBC BLD AUTO-RTO: 0 /100
PLATELET # BLD AUTO: 286 10E3/UL (ref 150–450)
RBC # BLD AUTO: 3.62 10E6/UL (ref 3.8–5.2)
TIBC SERPL-MCNC: 334 UG/DL (ref 240–430)
WBC # BLD AUTO: 6.5 10E3/UL (ref 4–11)

## 2022-01-25 PROCEDURE — 85004 AUTOMATED DIFF WBC COUNT: CPT | Performed by: INTERNAL MEDICINE

## 2022-01-25 PROCEDURE — 99214 OFFICE O/P EST MOD 30 MIN: CPT | Performed by: NURSE PRACTITIONER

## 2022-01-25 PROCEDURE — 36591 DRAW BLOOD OFF VENOUS DEVICE: CPT | Performed by: INTERNAL MEDICINE

## 2022-01-25 PROCEDURE — 250N000011 HC RX IP 250 OP 636: Performed by: INTERNAL MEDICINE

## 2022-01-25 PROCEDURE — 250N000009 HC RX 250: Performed by: INTERNAL MEDICINE

## 2022-01-25 PROCEDURE — 250N000013 HC RX MED GY IP 250 OP 250 PS 637: Performed by: INTERNAL MEDICINE

## 2022-01-25 PROCEDURE — 82565 ASSAY OF CREATININE: CPT | Performed by: INTERNAL MEDICINE

## 2022-01-25 PROCEDURE — 258N000003 HC RX IP 258 OP 636: Performed by: INTERNAL MEDICINE

## 2022-01-25 PROCEDURE — 258N000003 HC RX IP 258 OP 636: Performed by: NURSE PRACTITIONER

## 2022-01-25 PROCEDURE — 96413 CHEMO IV INFUSION 1 HR: CPT

## 2022-01-25 PROCEDURE — G0463 HOSPITAL OUTPT CLINIC VISIT: HCPCS | Mod: 25

## 2022-01-25 PROCEDURE — 96367 TX/PROPH/DG ADDL SEQ IV INF: CPT

## 2022-01-25 PROCEDURE — 96375 TX/PRO/DX INJ NEW DRUG ADDON: CPT

## 2022-01-25 PROCEDURE — 83550 IRON BINDING TEST: CPT | Performed by: INTERNAL MEDICINE

## 2022-01-25 PROCEDURE — 96417 CHEMO IV INFUS EACH ADDL SEQ: CPT

## 2022-01-25 RX ORDER — ATORVASTATIN CALCIUM 10 MG/1
TABLET, FILM COATED ORAL
Qty: 90 TABLET | Refills: 3 | Status: SHIPPED | OUTPATIENT
Start: 2022-01-25 | End: 2022-05-06

## 2022-01-25 RX ORDER — HEPARIN SODIUM (PORCINE) LOCK FLUSH IV SOLN 100 UNIT/ML 100 UNIT/ML
5 SOLUTION INTRAVENOUS
Status: DISCONTINUED | OUTPATIENT
Start: 2022-01-25 | End: 2022-01-25 | Stop reason: HOSPADM

## 2022-01-25 RX ORDER — DIPHENHYDRAMINE HCL 25 MG
50 CAPSULE ORAL ONCE
Status: COMPLETED | OUTPATIENT
Start: 2022-01-25 | End: 2022-01-25

## 2022-01-25 RX ADMIN — DEXAMETHASONE SODIUM PHOSPHATE: 10 INJECTION, SOLUTION INTRAMUSCULAR; INTRAVENOUS at 11:17

## 2022-01-25 RX ADMIN — PACLITAXEL 130 MG: 6 INJECTION, SOLUTION INTRAVENOUS at 11:38

## 2022-01-25 RX ADMIN — FAMOTIDINE 20 MG: 10 INJECTION INTRAVENOUS at 11:14

## 2022-01-25 RX ADMIN — DIPHENHYDRAMINE HYDROCHLORIDE 50 MG: 25 CAPSULE ORAL at 11:12

## 2022-01-25 RX ADMIN — CARBOPLATIN 90 MG: 10 INJECTION, SOLUTION INTRAVENOUS at 12:49

## 2022-01-25 RX ADMIN — SODIUM CHLORIDE 250 ML: 9 INJECTION, SOLUTION INTRAVENOUS at 11:12

## 2022-01-25 ASSESSMENT — PAIN SCALES - GENERAL: PAINLEVEL: NO PAIN (0)

## 2022-01-25 NOTE — PROGRESS NOTES
.Social Work Progress Note      Data/Intervention:  Patient Name:  Jann Davidson  /Age:  1945 (76 year old)    Reason for Follow-Up:  Jann is a 76-year-old woman with a diagnosis of breast cancer who is followed by Dr. Mcmullen. This clinician met with Jann today according to psychosocial plan of care.     Intervention:   Extensive discussion with Jann today who is quite distressed in thinking about putting dog down and the emotional impact this could have while she is undergoing chemotherapy. SW supported Jann in thinking through what would support her in making a decision, and how she knows if her dog is having good quality of life. SW encouraged her to not make a decision about whether or not to put dog down on a chemotherapy day, as they are emotionally more distressing to Jann. Engaged with Jann in life review of dog and encouraged more discussion with vet and son for support.    Jann reported desire for additional support with home cleaning. Appreciative of SW resources.     Resources Provided:   Cleaning for a Reason  Senior Linkage Line    Plan:  1) This clinician will plan for psychosocial check-in and emotional support in 2 weeks. Jann knows to reach out prior in case of concern or need.   2) Ongoing collaboration with multidisciplinary care team. SW to submit question about medical marijuana to MD.     Please call or page if needs or concerns arise.     SOWMYA Lepe, [facility]  Direct Phone: [phone]  Pager: [phone]

## 2022-01-25 NOTE — PROGRESS NOTES
Nursing Note:  Jann Davidson presents today for port labs for tx today.    Patient seen by provider today: Yes: Tadeo Dugan NP   present during visit today: Not Applicable.    Note: N/A.    Intravenous Access:  Labs drawn without difficulty.  Implanted Port.    Discharge Plan:   Patient was sent to [facility] for Tadeo Dugan NP appointment.    Saskia Evans RN

## 2022-01-25 NOTE — TELEPHONE ENCOUNTER
"Dr. Oliva,    Requested Prescriptions   Pending Prescriptions Disp Refills     atorvastatin (LIPITOR) 10 MG tablet [Pharmacy Med Name: ATORVASTATIN 10MG TAB 10 Tablet] 90 tablet 0     Sig: TAKE 1 TABLET (10 MG) BY MOUTH ONCE DAILY       Statins Protocol Failed - 1/24/2022  4:59 PM        Failed - LDL on file in past 12 months     Recent Labs   Lab Test 06/05/19  1719   LDL 67             Passed - No abnormal creatine kinase in past 12 months     No lab results found.             Passed - Recent (12 mo) or future (30 days) visit within the authorizing provider's specialty     Patient has had an office visit with the authorizing provider or a provider within the authorizing providers department within the previous 12 mos or has a future within next 30 days. See \"Patient Info\" tab in inbasket, or \"Choose Columns\" in Meds & Orders section of the refill encounter.              Passed - Medication is active on med list        Passed - Patient is age 18 or older        Passed - No active pregnancy on record        Passed - No positive pregnancy test in past 12 months           Routing refill request to provider for review/approval because:  Labs not current:  Lipids    Swetha Rodriguez RN  [facility]          "

## 2022-01-25 NOTE — PROGRESS NOTES
Infusion Nursing Note:  Jann Davidson presents today for C1D8 taxol and carbo.    Patient seen by provider today: Yes: IRWIN Piedra   present during visit today: Not Applicable.    Note: N/A.      Intravenous Access:  Implanted Port.    Treatment Conditions:  Lab Results   Component Value Date    HGB 11.0 (L) 01/25/2022    WBC 6.5 01/25/2022    ANEU 3.9 04/30/2018    ANEUTAUTO 4.2 01/25/2022     01/25/2022      Lab Results   Component Value Date     01/17/2022    POTASSIUM 4.5 01/17/2022    MAG 2.1 01/17/2022    CR 1.46 (H) 01/25/2022    AMY 9.3 01/17/2022    BILITOTAL 0.7 01/17/2022    ALBUMIN 3.7 01/17/2022    ALT 33 01/17/2022    AST 25 01/17/2022     Results reviewed, labs MET treatment parameters, ok to proceed with treatment.    Ok per Tadeo with increased creatinine.      Post Infusion Assessment:  Patient tolerated infusion without incident.  Blood return noted pre and post infusion.  Site patent and intact, free from redness, edema or discomfort.  No evidence of extravasations.  Access discontinued per protocol.       Discharge Plan:   Copy of AVS reviewed with patient and/or family.  Patient will return in one week as prev maria de jesus for next appointment.  Patient discharged in stable condition accompanied by: self.  Departure Mode: Ambulatory.      Tico Tyler RN

## 2022-01-25 NOTE — PROGRESS NOTES
Oncology/Hematology Visit Note  Jan 25, 2022    Reason for Visit: follow up of left breast cancer  Triple negative  Stage IIb  Patient of Dr. Mcmullen    01/18-started neoadjuvant chemotherapy with paclitaxel and carboplatin for 12 weeks followed by dose dense AC  With addition of pembrolizumab every 3 weeks      Interval History:  Patient reports she is feeling well denies fever chills sweats cough shortness of breath chest pain nausea vomiting diarrhea abdominal pain or bleeding.      Review of Systems:  14 point ROS of systems including Constitutional, Eyes, Respiratory, Cardiovascular, Gastroenterology, Genitourinary, Integumentary, Muscularskeletal, Psychiatric were all negative except for pertinent positives noted in my HPI.    Physical Examination:  General: The patient is a pleasant female in no acute distress.  /80   Pulse 88   Temp 98.3  F (36.8  C) (Oral)   Resp 16   Wt 60.1 kg (132 lb 9.6 oz)   SpO2 100%   BMI 23.49 kg/m    HEENT: EOMI, PERRL. Sclerae are anicteric. Oral mucosa is pink and moist with no lesions or thrush.   Lymph: Neck is supple with no lymphadenopathy in the cervical or supraclavicular areas.   Heart: Regular rate and rhythm.   Lungs: Clear to auscultation bilaterally.   GI: Bowel sounds present, soft, nontender with no palpable hepatosplenomegaly or masses.   Extremities: No lower extremity edema noted bilaterally.   Skin: No rashes, petechiae, or bruising noted on exposed skin.    Laboratory Data:  CBC CMP results reviewed      Assessment and Plan:    This is a 76-year-old female with     left breast cancer  Triple negative  Stage IIb  Patient of Dr. Mcmullen    01/18/2022-started neoadjuvant chemotherapy with paclitaxel and carboplatin for 12 weeks followed by dose dense AC  With addition of pembrolizumab every 3 weeks  -Regimen and side effects of the treatment discussed in detail with patient   Labs reviewed abnormalities discussed  Echocardiogram reviewed normal heart  function  Okay to proceed with treatment  Patient has follow-up appointment with me 2 weeks and in 4 weeks with Dr. Mcmullen        Chronic kidney disease  Creatinine at baseline  Continue follow-up with nephrologist    FDG uptake at the anal canal  -Patient wants to wait for anoscopy and referral to colorectal since she is asymptomatic  -Plan to see colorectal after completion of the chemo for breast cancer    Right renal mass  Management per urology    Depression anxiety  Patient declined appointment with Dr Darin Jimenes  I discussed different coping mechanisms      Call clinic with any changes in health condition or questions        HUBER Damon [facility]     Chart documentation with Dragon Voice recognition Software. Although reviewed after completion, some words and grammatical errors may remain.

## 2022-01-25 NOTE — PROGRESS NOTES
"Oncology Rooming Note    January 25, 2022 10:20 AM   Jann Davidson is a 76 year old female who presents for:    Chief Complaint   Patient presents with     Oncology Clinic Visit     Initial Vitals: /80   Pulse 88   Temp 98.3  F (36.8  C) (Oral)   Resp 16   Wt 60.1 kg (132 lb 9.6 oz)   SpO2 100%   BMI 23.49 kg/m   Estimated body mass index is 23.49 kg/m  as calculated from the following:    Height as of 1/3/22: 1.6 m (5' 3\").    Weight as of this encounter: 60.1 kg (132 lb 9.6 oz). Body surface area is 1.63 meters squared.  No Pain (0) Comment: Data Unavailable   No LMP recorded. Patient is postmenopausal.  Allergies reviewed: Yes  Medications reviewed: Yes    Medications: Medication refills not needed today.  Pharmacy name entered into EPIC:    [facility][id number] [place], [place] - [address]  [facility] [address] [facility][id number] [place], [place] - [address]    Clinical concerns: Reactions to 1st treatment - muscle aches.  Also restless legs - hard to sleep. Heartburn, HA's.   GK was notified.      Shari J. Schoenberger, [facility]            "

## 2022-01-26 ENCOUNTER — TELEPHONE (OUTPATIENT)
Dept: ONCOLOGY | Facility: CLINIC | Age: 77
End: 2022-01-26
Payer: MEDICARE

## 2022-01-26 DIAGNOSIS — R11.0 NAUSEA: ICD-10-CM

## 2022-01-26 DIAGNOSIS — Z17.1 MALIGNANT NEOPLASM OF LOWER-OUTER QUADRANT OF LEFT BREAST OF FEMALE, ESTROGEN RECEPTOR NEGATIVE (H): Primary | ICD-10-CM

## 2022-01-26 DIAGNOSIS — C50.512 MALIGNANT NEOPLASM OF LOWER-OUTER QUADRANT OF LEFT BREAST OF FEMALE, ESTROGEN RECEPTOR NEGATIVE (H): Primary | ICD-10-CM

## 2022-01-26 NOTE — TELEPHONE ENCOUNTER
Left message for Jann to let her know that medical marijuana for nausea is typically not prescribed until all anti nausea medications have been exhausted.  I requested Jann call me back to discuss her Compazine and if there is a need for an additional anti nausea medication such as Zolfran.

## 2022-01-31 ENCOUNTER — LAB (OUTPATIENT)
Dept: INFUSION THERAPY | Facility: CLINIC | Age: 77
End: 2022-01-31
Attending: INTERNAL MEDICINE
Payer: MEDICARE

## 2022-01-31 VITALS
TEMPERATURE: 98.1 F | DIASTOLIC BLOOD PRESSURE: 67 MMHG | HEIGHT: 63 IN | HEART RATE: 98 BPM | RESPIRATION RATE: 16 BRPM | SYSTOLIC BLOOD PRESSURE: 118 MMHG | WEIGHT: 132.28 LBS | BODY MASS INDEX: 23.44 KG/M2

## 2022-01-31 DIAGNOSIS — Z17.1 MALIGNANT NEOPLASM OF LOWER-OUTER QUADRANT OF LEFT BREAST OF FEMALE, ESTROGEN RECEPTOR NEGATIVE (H): ICD-10-CM

## 2022-01-31 DIAGNOSIS — T45.1X5A CHEMOTHERAPY-INDUCED NEUTROPENIA (H): Primary | ICD-10-CM

## 2022-01-31 DIAGNOSIS — D70.1 CHEMOTHERAPY-INDUCED NEUTROPENIA (H): Primary | ICD-10-CM

## 2022-01-31 DIAGNOSIS — J02.9 SORE THROAT: ICD-10-CM

## 2022-01-31 DIAGNOSIS — C50.512 MALIGNANT NEOPLASM OF LOWER-OUTER QUADRANT OF LEFT BREAST OF FEMALE, ESTROGEN RECEPTOR NEGATIVE (H): ICD-10-CM

## 2022-01-31 DIAGNOSIS — T45.1X5A CHEMOTHERAPY-INDUCED NEUTROPENIA (H): ICD-10-CM

## 2022-01-31 DIAGNOSIS — D70.1 CHEMOTHERAPY-INDUCED NEUTROPENIA (H): ICD-10-CM

## 2022-01-31 DIAGNOSIS — Z11.59 SPECIAL SCREENING EXAMINATION FOR VIRAL DISEASE: Primary | ICD-10-CM

## 2022-01-31 LAB
BASOPHILS # BLD AUTO: 0 10E3/UL (ref 0–0.2)
BASOPHILS NFR BLD AUTO: 1 %
CREAT SERPL-MCNC: 1.16 MG/DL (ref 0.52–1.04)
EOSINOPHIL # BLD AUTO: 0.1 10E3/UL (ref 0–0.7)
EOSINOPHIL NFR BLD AUTO: 2 %
ERYTHROCYTE [DISTWIDTH] IN BLOOD BY AUTOMATED COUNT: 12.5 % (ref 10–15)
GFR SERPL CREATININE-BSD FRML MDRD: 49 ML/MIN/1.73M2
HCT VFR BLD AUTO: 30.2 % (ref 35–47)
HGB BLD-MCNC: 9.4 G/DL (ref 11.7–15.7)
IMM GRANULOCYTES # BLD: 0 10E3/UL
IMM GRANULOCYTES NFR BLD: 1 %
LYMPHOCYTES # BLD AUTO: 1.3 10E3/UL (ref 0.8–5.3)
LYMPHOCYTES NFR BLD AUTO: 44 %
MCH RBC QN AUTO: 30.7 PG (ref 26.5–33)
MCHC RBC AUTO-ENTMCNC: 31.1 G/DL (ref 31.5–36.5)
MCV RBC AUTO: 99 FL (ref 78–100)
MONOCYTES # BLD AUTO: 0.2 10E3/UL (ref 0–1.3)
MONOCYTES NFR BLD AUTO: 8 %
NEUTROPHILS # BLD AUTO: 1.3 10E3/UL (ref 1.6–8.3)
NEUTROPHILS NFR BLD AUTO: 44 %
NRBC # BLD AUTO: 0 10E3/UL
NRBC BLD AUTO-RTO: 0 /100
PLATELET # BLD AUTO: 247 10E3/UL (ref 150–450)
RBC # BLD AUTO: 3.06 10E6/UL (ref 3.8–5.2)
SARS-COV-2 RNA RESP QL NAA+PROBE: NEGATIVE
WBC # BLD AUTO: 2.9 10E3/UL (ref 4–11)

## 2022-01-31 PROCEDURE — 85025 COMPLETE CBC W/AUTO DIFF WBC: CPT | Performed by: INTERNAL MEDICINE

## 2022-01-31 PROCEDURE — U0005 INFEC AGEN DETEC AMPLI PROBE: HCPCS | Performed by: INTERNAL MEDICINE

## 2022-01-31 PROCEDURE — 96413 CHEMO IV INFUSION 1 HR: CPT

## 2022-01-31 PROCEDURE — 250N000011 HC RX IP 250 OP 636: Performed by: INTERNAL MEDICINE

## 2022-01-31 PROCEDURE — 258N000003 HC RX IP 258 OP 636: Performed by: INTERNAL MEDICINE

## 2022-01-31 PROCEDURE — 96417 CHEMO IV INFUS EACH ADDL SEQ: CPT

## 2022-01-31 PROCEDURE — 96367 TX/PROPH/DG ADDL SEQ IV INF: CPT

## 2022-01-31 PROCEDURE — 82565 ASSAY OF CREATININE: CPT | Performed by: INTERNAL MEDICINE

## 2022-01-31 RX ORDER — METOCLOPRAMIDE 5 MG/1
5 TABLET ORAL 4 TIMES DAILY PRN
Qty: 90 TABLET | Refills: 1 | Status: SHIPPED | OUTPATIENT
Start: 2022-01-31 | End: 2022-04-09

## 2022-01-31 RX ORDER — HEPARIN SODIUM (PORCINE) LOCK FLUSH IV SOLN 100 UNIT/ML 100 UNIT/ML
5 SOLUTION INTRAVENOUS
Status: DISCONTINUED | OUTPATIENT
Start: 2022-01-31 | End: 2022-01-31 | Stop reason: HOSPADM

## 2022-01-31 RX ADMIN — CARBOPLATIN 100 MG: 10 INJECTION, SOLUTION INTRAVENOUS at 14:33

## 2022-01-31 RX ADMIN — DEXAMETHASONE SODIUM PHOSPHATE: 10 INJECTION, SOLUTION INTRAMUSCULAR; INTRAVENOUS at 13:04

## 2022-01-31 RX ADMIN — PACLITAXEL 130 MG: 6 INJECTION, SOLUTION INTRAVENOUS at 13:30

## 2022-01-31 RX ADMIN — Medication 5 ML: at 15:09

## 2022-01-31 RX ADMIN — SODIUM CHLORIDE 250 ML: 9 INJECTION, SOLUTION INTRAVENOUS at 13:04

## 2022-01-31 ASSESSMENT — MIFFLIN-ST. JEOR: SCORE: 1059

## 2022-01-31 NOTE — PROGRESS NOTES
Nursing Note:  Jann Davidson presents today for port draw.    Patient seen by provider today: No   present during visit today: Not Applicable.    Note: N/A.    Intravenous Access:  Labs drawn without difficulty.  Implanted Port.    Discharge Plan:   Patient was sent to infusion for treatment appointment.    Arminda Danielle RN

## 2022-01-31 NOTE — PROGRESS NOTES
Infusion Nursing Note:  Jann Davidson presents today for C1D15 Taxol/Carbo.    Patient seen by provider today: No   present during visit today: Not Applicable.    Note: ANC 1.3 today.  Patient states she also has a new sore throat that started yesterday.  Denies any close covid contant, fever, chills, cough or headache.  RN swabbed pt for covid.  Awaiting to discuss with Dr. Mcmullen.    OK to treat today per Dr. Mcmullen.  Patient will get neupogen on days 16, 17 and 18 for the remainder of all Carbo/Taxol infusions.    Patient also states she had severe constipation with the compazine and is requesting a different antiemetic to have at home.  Dr. Mcmullen will prescribe Reglan.  Alondra will send to patient's pharmacy.      Intravenous Access:  Implanted Port.  Accessed in fast track.    Treatment Conditions:  Lab Results   Component Value Date    HGB 9.4 (L) 01/31/2022    WBC 2.9 (L) 01/31/2022    ANEU 3.9 04/30/2018    ANEUTAUTO 1.3 (L) 01/31/2022     01/31/2022      Lab Results   Component Value Date     01/17/2022    POTASSIUM 4.5 01/17/2022    MAG 2.1 01/17/2022    CR 1.16 (H) 01/31/2022    AMY 9.3 01/17/2022    BILITOTAL 0.7 01/17/2022    ALBUMIN 3.7 01/17/2022    ALT 33 01/17/2022    AST 25 01/17/2022     Results reviewed, labs did NOT meet treatment parameters: ANC 1.3.      Post Infusion Assessment:  Patient tolerated infusion without incident.  Blood return noted pre and post infusion.  Site patent and intact, free from redness, edema or discomfort.  No evidence of extravasations.  Access discontinued per protocol.       Discharge Plan:   Discharge instructions reviewed with: Patient.  Patient and/or family verbalized understanding of discharge instructions and all questions answered.  AVS to patient via [employer].  Patient will return 2/1/22 for next appointment.   Patient discharged in stable condition accompanied by: self.  Departure Mode: Ambulatory.      Deric Mondragon  RN

## 2022-02-01 ENCOUNTER — ALLIED HEALTH/NURSE VISIT (OUTPATIENT)
Dept: INFUSION THERAPY | Facility: CLINIC | Age: 77
End: 2022-02-01
Attending: INTERNAL MEDICINE
Payer: MEDICARE

## 2022-02-01 VITALS
RESPIRATION RATE: 18 BRPM | DIASTOLIC BLOOD PRESSURE: 74 MMHG | TEMPERATURE: 98 F | HEART RATE: 86 BPM | SYSTOLIC BLOOD PRESSURE: 123 MMHG

## 2022-02-01 DIAGNOSIS — C50.512 MALIGNANT NEOPLASM OF LOWER-OUTER QUADRANT OF LEFT BREAST OF FEMALE, ESTROGEN RECEPTOR NEGATIVE (H): ICD-10-CM

## 2022-02-01 DIAGNOSIS — T45.1X5A CHEMOTHERAPY-INDUCED NEUTROPENIA (H): Primary | ICD-10-CM

## 2022-02-01 DIAGNOSIS — Z17.1 MALIGNANT NEOPLASM OF LOWER-OUTER QUADRANT OF LEFT BREAST OF FEMALE, ESTROGEN RECEPTOR NEGATIVE (H): ICD-10-CM

## 2022-02-01 DIAGNOSIS — D70.1 CHEMOTHERAPY-INDUCED NEUTROPENIA (H): Primary | ICD-10-CM

## 2022-02-01 PROCEDURE — 96372 THER/PROPH/DIAG INJ SC/IM: CPT | Performed by: INTERNAL MEDICINE

## 2022-02-01 PROCEDURE — 250N000011 HC RX IP 250 OP 636: Performed by: INTERNAL MEDICINE

## 2022-02-01 PROCEDURE — 96377 APPLICATON ON-BODY INJECTOR: CPT

## 2022-02-01 RX ADMIN — FILGRASTIM 300 MCG: 300 INJECTION, SOLUTION INTRAVENOUS; SUBCUTANEOUS at 15:07

## 2022-02-01 ASSESSMENT — PAIN SCALES - GENERAL: PAINLEVEL: NO PAIN (0)

## 2022-02-01 NOTE — PROGRESS NOTES
Nursing Note:  Jann Davidson presents today for neupogen.    Patient seen by provider today: No   present during visit today: Not Applicable.    Note: N/A.    Intravenous Access:  No Intravenous access/labs at this visit.    Discharge Plan:   Patient was discharged    Edith Meraz RN

## 2022-02-02 ENCOUNTER — ALLIED HEALTH/NURSE VISIT (OUTPATIENT)
Dept: INFUSION THERAPY | Facility: CLINIC | Age: 77
End: 2022-02-02
Attending: FAMILY MEDICINE
Payer: MEDICARE

## 2022-02-02 VITALS
DIASTOLIC BLOOD PRESSURE: 80 MMHG | TEMPERATURE: 98 F | HEART RATE: 91 BPM | OXYGEN SATURATION: 99 % | SYSTOLIC BLOOD PRESSURE: 133 MMHG | RESPIRATION RATE: 18 BRPM

## 2022-02-02 DIAGNOSIS — Z17.1 MALIGNANT NEOPLASM OF LOWER-OUTER QUADRANT OF LEFT BREAST OF FEMALE, ESTROGEN RECEPTOR NEGATIVE (H): ICD-10-CM

## 2022-02-02 DIAGNOSIS — C50.512 MALIGNANT NEOPLASM OF LOWER-OUTER QUADRANT OF LEFT BREAST OF FEMALE, ESTROGEN RECEPTOR NEGATIVE (H): ICD-10-CM

## 2022-02-02 DIAGNOSIS — T45.1X5A CHEMOTHERAPY-INDUCED NEUTROPENIA (H): Primary | ICD-10-CM

## 2022-02-02 DIAGNOSIS — D70.1 CHEMOTHERAPY-INDUCED NEUTROPENIA (H): Primary | ICD-10-CM

## 2022-02-02 PROCEDURE — 96372 THER/PROPH/DIAG INJ SC/IM: CPT | Performed by: INTERNAL MEDICINE

## 2022-02-02 PROCEDURE — 250N000011 HC RX IP 250 OP 636: Mod: JB | Performed by: INTERNAL MEDICINE

## 2022-02-02 RX ADMIN — FILGRASTIM 300 MCG: 300 INJECTION, SOLUTION INTRAVENOUS; SUBCUTANEOUS at 13:53

## 2022-02-02 ASSESSMENT — PAIN SCALES - GENERAL: PAINLEVEL: NO PAIN (0)

## 2022-02-02 NOTE — PROGRESS NOTES
Infusion Nursing Note:  Jann Davidson presents today for neupogen.    Patient seen by provider today: No   present during visit today: Not Applicable.    Note: Patient denies fevers or bone pain with neupogen. She reports a lot of nasal congestion, postnasal drip, cough related to this and difficulty sleeping with these symptoms. She is concerned due to history of sinus infections and would like to talk with someone about getting a prescription to help. Alondra, clinic RN notified and is able to meet and discuss with patient while here in clinic. Patient discharged to [facility] after neupogen injection to speak with Alondra.    Intravenous Access:  No Intravenous access/labs at this visit.    Treatment Conditions:  Not Applicable.      Post Infusion Assessment:  Patient tolerated injection without incident.  Site patent and intact, free from redness, edema or discomfort.       Discharge Plan:   AVS to patient via MYCHART.  Patient will return 2/3/22 for next appointment.   Patient discharged in stable condition accompanied by: self.  Departure Mode: Ambulatory to [facility].      Brigid Payne RN

## 2022-02-03 ENCOUNTER — ALLIED HEALTH/NURSE VISIT (OUTPATIENT)
Dept: INFUSION THERAPY | Facility: CLINIC | Age: 77
End: 2022-02-03
Attending: FAMILY MEDICINE
Payer: MEDICARE

## 2022-02-03 VITALS
DIASTOLIC BLOOD PRESSURE: 80 MMHG | SYSTOLIC BLOOD PRESSURE: 142 MMHG | RESPIRATION RATE: 16 BRPM | HEART RATE: 102 BPM | TEMPERATURE: 97.6 F

## 2022-02-03 DIAGNOSIS — T45.1X5A CHEMOTHERAPY-INDUCED NEUTROPENIA (H): Primary | ICD-10-CM

## 2022-02-03 DIAGNOSIS — Z17.1 MALIGNANT NEOPLASM OF LOWER-OUTER QUADRANT OF LEFT BREAST OF FEMALE, ESTROGEN RECEPTOR NEGATIVE (H): ICD-10-CM

## 2022-02-03 DIAGNOSIS — D70.1 CHEMOTHERAPY-INDUCED NEUTROPENIA (H): Primary | ICD-10-CM

## 2022-02-03 DIAGNOSIS — C50.512 MALIGNANT NEOPLASM OF LOWER-OUTER QUADRANT OF LEFT BREAST OF FEMALE, ESTROGEN RECEPTOR NEGATIVE (H): ICD-10-CM

## 2022-02-03 PROCEDURE — 96372 THER/PROPH/DIAG INJ SC/IM: CPT | Performed by: INTERNAL MEDICINE

## 2022-02-03 PROCEDURE — 250N000011 HC RX IP 250 OP 636: Performed by: INTERNAL MEDICINE

## 2022-02-03 RX ADMIN — FILGRASTIM 300 MCG: 300 INJECTION, SOLUTION INTRAVENOUS; SUBCUTANEOUS at 14:51

## 2022-02-03 ASSESSMENT — PAIN SCALES - GENERAL: PAINLEVEL: NO PAIN (0)

## 2022-02-03 NOTE — PROGRESS NOTES
Infusion Nursing Note:  Jann Cuiton presents today for neupogen.    Patient seen by provider today: No   present during visit today: Not Applicable.    Note: N/A.      Intravenous Access:  No Intravenous access/labs at this visit.    Treatment Conditions:  Not Applicable.      Post Infusion Assessment:  Patient tolerated injection without incident.       Discharge Plan:   Patient declined prescription refills.  Discharge instructions reviewed with: Patient.  Patient and/or family verbalized understanding of discharge instructions and all questions answered.  AVS to patient via [employer].  Patient will return 2/7/22 for next appointment.   Patient discharged in stable condition accompanied by: self.  Departure Mode: Ambulatory.      Saskia Evans RN

## 2022-02-04 ENCOUNTER — TELEPHONE (OUTPATIENT)
Dept: FAMILY MEDICINE | Facility: CLINIC | Age: 77
End: 2022-02-04
Payer: MEDICARE

## 2022-02-04 DIAGNOSIS — J32.9 RECURRENT SINUSITIS: Primary | ICD-10-CM

## 2022-02-04 NOTE — TELEPHONE ENCOUNTER
Dr. Oliva,    Patient called stated she is concerned as she has breast cancer and has been given 3 x Neupogen however she is now having very bad sinus cold.     Took COVID test last Monday and it was negative.     Still having congestion, has small nasal passages, every time she gets a cold she gets a sinus infection. Patient asking for low dose prednisone to relieve the inflammation and trying not to blow nose due to nose bleed. Pt attempted to call oncology and they stated they are unable to help.     Pharm cued.     Thanks,  CHARMAINE Zazueta  [facility]

## 2022-02-05 ENCOUNTER — MYC MEDICAL ADVICE (OUTPATIENT)
Dept: FAMILY MEDICINE | Facility: CLINIC | Age: 77
End: 2022-02-05
Payer: MEDICARE

## 2022-02-05 RX ORDER — PREDNISONE 10 MG/1
10 TABLET ORAL DAILY
Qty: 5 TABLET | Refills: 0 | Status: SHIPPED | OUTPATIENT
Start: 2022-02-05 | End: 2022-02-05

## 2022-02-05 RX ORDER — PREDNISONE 10 MG/1
10 TABLET ORAL DAILY
Qty: 5 TABLET | Refills: 0 | Status: SHIPPED | OUTPATIENT
Start: 2022-02-05 | End: 2022-04-01

## 2022-02-07 ENCOUNTER — ONCOLOGY VISIT (OUTPATIENT)
Dept: ONCOLOGY | Facility: CLINIC | Age: 77
End: 2022-02-07
Attending: NURSE PRACTITIONER
Payer: MEDICARE

## 2022-02-07 ENCOUNTER — INFUSION THERAPY VISIT (OUTPATIENT)
Dept: INFUSION THERAPY | Facility: CLINIC | Age: 77
End: 2022-02-07
Attending: INTERNAL MEDICINE
Payer: MEDICARE

## 2022-02-07 VITALS
RESPIRATION RATE: 18 BRPM | SYSTOLIC BLOOD PRESSURE: 130 MMHG | WEIGHT: 134.2 LBS | BODY MASS INDEX: 23.78 KG/M2 | TEMPERATURE: 98.2 F | OXYGEN SATURATION: 99 % | DIASTOLIC BLOOD PRESSURE: 77 MMHG | HEART RATE: 91 BPM

## 2022-02-07 DIAGNOSIS — T45.1X5A CHEMOTHERAPY-INDUCED NEUTROPENIA (H): Primary | ICD-10-CM

## 2022-02-07 DIAGNOSIS — R94.6 THYROID FUNCTION TEST ABNORMAL: Primary | ICD-10-CM

## 2022-02-07 DIAGNOSIS — Z17.1 MALIGNANT NEOPLASM OF LOWER-OUTER QUADRANT OF LEFT BREAST OF FEMALE, ESTROGEN RECEPTOR NEGATIVE (H): Primary | ICD-10-CM

## 2022-02-07 DIAGNOSIS — C50.512 MALIGNANT NEOPLASM OF LOWER-OUTER QUADRANT OF LEFT BREAST OF FEMALE, ESTROGEN RECEPTOR NEGATIVE (H): Primary | ICD-10-CM

## 2022-02-07 DIAGNOSIS — D70.1 CHEMOTHERAPY-INDUCED NEUTROPENIA (H): Primary | ICD-10-CM

## 2022-02-07 DIAGNOSIS — C50.512 MALIGNANT NEOPLASM OF LOWER-OUTER QUADRANT OF LEFT BREAST OF FEMALE, ESTROGEN RECEPTOR NEGATIVE (H): ICD-10-CM

## 2022-02-07 DIAGNOSIS — Z17.1 MALIGNANT NEOPLASM OF LOWER-OUTER QUADRANT OF LEFT BREAST OF FEMALE, ESTROGEN RECEPTOR NEGATIVE (H): ICD-10-CM

## 2022-02-07 LAB
ALBUMIN SERPL-MCNC: 3.6 G/DL (ref 3.4–5)
ALP SERPL-CCNC: 114 U/L (ref 40–150)
ALT SERPL W P-5'-P-CCNC: 99 U/L (ref 0–50)
ANION GAP SERPL CALCULATED.3IONS-SCNC: 7 MMOL/L (ref 3–14)
AST SERPL W P-5'-P-CCNC: 83 U/L (ref 0–45)
BASOPHILS # BLD MANUAL: 0 10E3/UL (ref 0–0.2)
BASOPHILS NFR BLD MANUAL: 0 %
BILIRUB SERPL-MCNC: 0.1 MG/DL (ref 0.2–1.3)
BUN SERPL-MCNC: 31 MG/DL (ref 7–30)
CALCIUM SERPL-MCNC: 9 MG/DL (ref 8.5–10.1)
CHLORIDE BLD-SCNC: 105 MMOL/L (ref 94–109)
CO2 SERPL-SCNC: 25 MMOL/L (ref 20–32)
CREAT SERPL-MCNC: 1.01 MG/DL (ref 0.52–1.04)
EOSINOPHIL # BLD MANUAL: 0 10E3/UL (ref 0–0.7)
EOSINOPHIL NFR BLD MANUAL: 0 %
ERYTHROCYTE [DISTWIDTH] IN BLOOD BY AUTOMATED COUNT: 12.9 % (ref 10–15)
GFR SERPL CREATININE-BSD FRML MDRD: 57 ML/MIN/1.73M2
GLUCOSE BLD-MCNC: 110 MG/DL (ref 70–99)
HCT VFR BLD AUTO: 31 % (ref 35–47)
HGB BLD-MCNC: 9.7 G/DL (ref 11.7–15.7)
LYMPHOCYTES # BLD MANUAL: 3.6 10E3/UL (ref 0.8–5.3)
LYMPHOCYTES NFR BLD MANUAL: 49 %
MCH RBC QN AUTO: 30.8 PG (ref 26.5–33)
MCHC RBC AUTO-ENTMCNC: 31.3 G/DL (ref 31.5–36.5)
MCV RBC AUTO: 98 FL (ref 78–100)
METAMYELOCYTES # BLD MANUAL: 0.5 10E3/UL
METAMYELOCYTES NFR BLD MANUAL: 7 %
MONOCYTES # BLD MANUAL: 0.2 10E3/UL (ref 0–1.3)
MONOCYTES NFR BLD MANUAL: 3 %
MYELOCYTES # BLD MANUAL: 0.2 10E3/UL
MYELOCYTES NFR BLD MANUAL: 3 %
NEUTROPHILS # BLD MANUAL: 2.8 10E3/UL (ref 1.6–8.3)
NEUTROPHILS NFR BLD MANUAL: 38 %
PLAT MORPH BLD: ABNORMAL
PLATELET # BLD AUTO: 353 10E3/UL (ref 150–450)
POTASSIUM BLD-SCNC: 3.8 MMOL/L (ref 3.4–5.3)
PROT SERPL-MCNC: 6.9 G/DL (ref 6.8–8.8)
RBC # BLD AUTO: 3.15 10E6/UL (ref 3.8–5.2)
RBC MORPH BLD: ABNORMAL
SMUDGE CELLS BLD QL SMEAR: PRESENT
SODIUM SERPL-SCNC: 137 MMOL/L (ref 133–144)
T4 FREE SERPL-MCNC: 1.21 NG/DL (ref 0.76–1.46)
TSH SERPL DL<=0.005 MIU/L-ACNC: 4.3 MU/L (ref 0.4–4)
WBC # BLD AUTO: 7.3 10E3/UL (ref 4–11)

## 2022-02-07 PROCEDURE — 250N000009 HC RX 250: Performed by: NURSE PRACTITIONER

## 2022-02-07 PROCEDURE — 96413 CHEMO IV INFUSION 1 HR: CPT

## 2022-02-07 PROCEDURE — 96375 TX/PRO/DX INJ NEW DRUG ADDON: CPT

## 2022-02-07 PROCEDURE — 84439 ASSAY OF FREE THYROXINE: CPT | Performed by: INTERNAL MEDICINE

## 2022-02-07 PROCEDURE — G0463 HOSPITAL OUTPT CLINIC VISIT: HCPCS | Mod: 25

## 2022-02-07 PROCEDURE — 82040 ASSAY OF SERUM ALBUMIN: CPT | Performed by: INTERNAL MEDICINE

## 2022-02-07 PROCEDURE — 258N000003 HC RX IP 258 OP 636: Performed by: NURSE PRACTITIONER

## 2022-02-07 PROCEDURE — 85014 HEMATOCRIT: CPT | Performed by: INTERNAL MEDICINE

## 2022-02-07 PROCEDURE — 84443 ASSAY THYROID STIM HORMONE: CPT | Performed by: INTERNAL MEDICINE

## 2022-02-07 PROCEDURE — 80053 COMPREHEN METABOLIC PANEL: CPT | Performed by: INTERNAL MEDICINE

## 2022-02-07 PROCEDURE — 96417 CHEMO IV INFUS EACH ADDL SEQ: CPT

## 2022-02-07 PROCEDURE — 250N000011 HC RX IP 250 OP 636: Performed by: NURSE PRACTITIONER

## 2022-02-07 PROCEDURE — 99214 OFFICE O/P EST MOD 30 MIN: CPT | Performed by: NURSE PRACTITIONER

## 2022-02-07 RX ORDER — METHYLPREDNISOLONE SODIUM SUCCINATE 125 MG/2ML
125 INJECTION, POWDER, LYOPHILIZED, FOR SOLUTION INTRAMUSCULAR; INTRAVENOUS
Status: CANCELLED
Start: 2022-02-07

## 2022-02-07 RX ORDER — ALBUTEROL SULFATE 90 UG/1
1-2 AEROSOL, METERED RESPIRATORY (INHALATION)
Status: CANCELLED
Start: 2022-02-14

## 2022-02-07 RX ORDER — NALOXONE HYDROCHLORIDE 0.4 MG/ML
0.2 INJECTION, SOLUTION INTRAMUSCULAR; INTRAVENOUS; SUBCUTANEOUS
Status: CANCELLED | OUTPATIENT
Start: 2022-02-07

## 2022-02-07 RX ORDER — HEPARIN SODIUM (PORCINE) LOCK FLUSH IV SOLN 100 UNIT/ML 100 UNIT/ML
5 SOLUTION INTRAVENOUS
Status: DISCONTINUED | OUTPATIENT
Start: 2022-02-07 | End: 2022-02-07 | Stop reason: HOSPADM

## 2022-02-07 RX ORDER — HEPARIN SODIUM (PORCINE) LOCK FLUSH IV SOLN 100 UNIT/ML 100 UNIT/ML
5 SOLUTION INTRAVENOUS
Status: CANCELLED | OUTPATIENT
Start: 2022-02-14

## 2022-02-07 RX ORDER — HEPARIN SODIUM (PORCINE) LOCK FLUSH IV SOLN 100 UNIT/ML 100 UNIT/ML
5 SOLUTION INTRAVENOUS
Status: CANCELLED | OUTPATIENT
Start: 2022-02-07

## 2022-02-07 RX ORDER — NALOXONE HYDROCHLORIDE 0.4 MG/ML
0.2 INJECTION, SOLUTION INTRAMUSCULAR; INTRAVENOUS; SUBCUTANEOUS
Status: CANCELLED | OUTPATIENT
Start: 2022-03-01

## 2022-02-07 RX ORDER — LORAZEPAM 2 MG/ML
0.5 INJECTION INTRAMUSCULAR EVERY 4 HOURS PRN
Status: CANCELLED | OUTPATIENT
Start: 2022-03-01

## 2022-02-07 RX ORDER — DIPHENHYDRAMINE HCL 25 MG
50 CAPSULE ORAL
Status: CANCELLED
Start: 2022-02-07

## 2022-02-07 RX ORDER — METHYLPREDNISOLONE SODIUM SUCCINATE 125 MG/2ML
125 INJECTION, POWDER, LYOPHILIZED, FOR SOLUTION INTRAMUSCULAR; INTRAVENOUS
Status: CANCELLED
Start: 2022-02-14

## 2022-02-07 RX ORDER — NALOXONE HYDROCHLORIDE 0.4 MG/ML
0.2 INJECTION, SOLUTION INTRAMUSCULAR; INTRAVENOUS; SUBCUTANEOUS
Status: CANCELLED | OUTPATIENT
Start: 2022-02-14

## 2022-02-07 RX ORDER — ALBUTEROL SULFATE 0.83 MG/ML
2.5 SOLUTION RESPIRATORY (INHALATION)
Status: CANCELLED | OUTPATIENT
Start: 2022-02-14

## 2022-02-07 RX ORDER — ALBUTEROL SULFATE 90 UG/1
1-2 AEROSOL, METERED RESPIRATORY (INHALATION)
Status: CANCELLED
Start: 2022-02-07

## 2022-02-07 RX ORDER — LORAZEPAM 2 MG/ML
0.5 INJECTION INTRAMUSCULAR EVERY 4 HOURS PRN
Status: CANCELLED | OUTPATIENT
Start: 2022-02-14

## 2022-02-07 RX ORDER — EPINEPHRINE 1 MG/ML
0.3 INJECTION, SOLUTION INTRAMUSCULAR; SUBCUTANEOUS EVERY 5 MIN PRN
Status: CANCELLED | OUTPATIENT
Start: 2022-03-01

## 2022-02-07 RX ORDER — ALBUTEROL SULFATE 0.83 MG/ML
2.5 SOLUTION RESPIRATORY (INHALATION)
Status: CANCELLED | OUTPATIENT
Start: 2022-03-01

## 2022-02-07 RX ORDER — DIPHENHYDRAMINE HYDROCHLORIDE 50 MG/ML
50 INJECTION INTRAMUSCULAR; INTRAVENOUS
Status: CANCELLED
Start: 2022-02-07

## 2022-02-07 RX ORDER — HEPARIN SODIUM (PORCINE) LOCK FLUSH IV SOLN 100 UNIT/ML 100 UNIT/ML
5 SOLUTION INTRAVENOUS
Status: CANCELLED | OUTPATIENT
Start: 2022-03-01

## 2022-02-07 RX ORDER — METHYLPREDNISOLONE SODIUM SUCCINATE 125 MG/2ML
125 INJECTION, POWDER, LYOPHILIZED, FOR SOLUTION INTRAMUSCULAR; INTRAVENOUS
Status: CANCELLED
Start: 2022-03-01

## 2022-02-07 RX ORDER — MEPERIDINE HYDROCHLORIDE 25 MG/ML
25 INJECTION INTRAMUSCULAR; INTRAVENOUS; SUBCUTANEOUS EVERY 30 MIN PRN
Status: CANCELLED | OUTPATIENT
Start: 2022-02-07

## 2022-02-07 RX ORDER — DIPHENHYDRAMINE HYDROCHLORIDE 50 MG/ML
50 INJECTION INTRAMUSCULAR; INTRAVENOUS
Status: CANCELLED
Start: 2022-02-14

## 2022-02-07 RX ORDER — ALBUTEROL SULFATE 0.83 MG/ML
2.5 SOLUTION RESPIRATORY (INHALATION)
Status: CANCELLED | OUTPATIENT
Start: 2022-02-07

## 2022-02-07 RX ORDER — EPINEPHRINE 1 MG/ML
0.3 INJECTION, SOLUTION INTRAMUSCULAR; SUBCUTANEOUS EVERY 5 MIN PRN
Status: CANCELLED | OUTPATIENT
Start: 2022-02-07

## 2022-02-07 RX ORDER — HEPARIN SODIUM,PORCINE 10 UNIT/ML
5 VIAL (ML) INTRAVENOUS
Status: CANCELLED | OUTPATIENT
Start: 2022-03-01

## 2022-02-07 RX ORDER — ALBUTEROL SULFATE 90 UG/1
1-2 AEROSOL, METERED RESPIRATORY (INHALATION)
Status: CANCELLED
Start: 2022-03-01

## 2022-02-07 RX ORDER — HEPARIN SODIUM,PORCINE 10 UNIT/ML
5 VIAL (ML) INTRAVENOUS
Status: CANCELLED | OUTPATIENT
Start: 2022-02-14

## 2022-02-07 RX ORDER — HEPARIN SODIUM,PORCINE 10 UNIT/ML
5 VIAL (ML) INTRAVENOUS
Status: CANCELLED | OUTPATIENT
Start: 2022-02-07

## 2022-02-07 RX ORDER — MEPERIDINE HYDROCHLORIDE 25 MG/ML
25 INJECTION INTRAMUSCULAR; INTRAVENOUS; SUBCUTANEOUS EVERY 30 MIN PRN
Status: CANCELLED | OUTPATIENT
Start: 2022-02-14

## 2022-02-07 RX ORDER — DIPHENHYDRAMINE HCL 25 MG
50 CAPSULE ORAL
Status: CANCELLED
Start: 2022-03-01

## 2022-02-07 RX ORDER — LORAZEPAM 2 MG/ML
0.5 INJECTION INTRAMUSCULAR EVERY 4 HOURS PRN
Status: CANCELLED | OUTPATIENT
Start: 2022-02-07

## 2022-02-07 RX ORDER — MEPERIDINE HYDROCHLORIDE 25 MG/ML
25 INJECTION INTRAMUSCULAR; INTRAVENOUS; SUBCUTANEOUS EVERY 30 MIN PRN
Status: CANCELLED | OUTPATIENT
Start: 2022-03-01

## 2022-02-07 RX ORDER — DIPHENHYDRAMINE HCL 25 MG
50 CAPSULE ORAL
Status: CANCELLED
Start: 2022-02-14

## 2022-02-07 RX ORDER — EPINEPHRINE 1 MG/ML
0.3 INJECTION, SOLUTION INTRAMUSCULAR; SUBCUTANEOUS EVERY 5 MIN PRN
Status: CANCELLED | OUTPATIENT
Start: 2022-02-14

## 2022-02-07 RX ORDER — DIPHENHYDRAMINE HYDROCHLORIDE 50 MG/ML
50 INJECTION INTRAMUSCULAR; INTRAVENOUS
Status: CANCELLED
Start: 2022-03-01

## 2022-02-07 RX ADMIN — SODIUM CHLORIDE 200 MG: 9 INJECTION, SOLUTION INTRAVENOUS at 11:11

## 2022-02-07 RX ADMIN — FAMOTIDINE 20 MG: 10 INJECTION, SOLUTION INTRAVENOUS at 11:06

## 2022-02-07 RX ADMIN — Medication 5 ML: at 13:38

## 2022-02-07 RX ADMIN — SODIUM CHLORIDE 250 ML: 9 INJECTION, SOLUTION INTRAVENOUS at 11:06

## 2022-02-07 RX ADMIN — DEXAMETHASONE SODIUM PHOSPHATE: 10 INJECTION, SOLUTION INTRAMUSCULAR; INTRAVENOUS at 11:50

## 2022-02-07 RX ADMIN — CARBOPLATIN 100 MG: 10 INJECTION, SOLUTION INTRAVENOUS at 13:05

## 2022-02-07 RX ADMIN — PACLITAXEL 130 MG: 6 INJECTION, SOLUTION INTRAVENOUS at 12:05

## 2022-02-07 ASSESSMENT — PAIN SCALES - GENERAL: PAINLEVEL: NO PAIN (0)

## 2022-02-07 NOTE — PROGRESS NOTES
Nursing Note:  Jann RAUL Stuart presents today for port labs for tx today.    Patient seen by provider today: Yes: Tadeo Dugan NP   present during visit today: Not Applicable.    Note: N/A.    Intravenous Access:  Labs drawn without difficulty.  Implanted Port.    Discharge Plan:   Patient was sent to [facility] for NP appointment.    Saskia Evans RN

## 2022-02-07 NOTE — LETTER
"    2/7/2022         RE: Jann Davidson  [address] [place] [facility] [address]        Dear Colleague,    Thank you for referring your patient, Jann Davidson, to the [facility]. Please see a copy of my visit note below.    Oncology Rooming Note    February 7, 2022 9:48 AM   Jann Davidson is a 76 year old female who presents for:    Chief Complaint   Patient presents with     Oncology Clinic Visit     Initial Vitals: Resp 18   Wt 60.9 kg (134 lb 3.2 oz)   BMI 23.78 kg/m   Estimated body mass index is 23.78 kg/m  as calculated from the following:    Height as of 1/31/22: 1.6 m (5' 2.99\").    Weight as of this encounter: 60.9 kg (134 lb 3.2 oz). Body surface area is 1.65 meters squared.  No Pain (0) Comment: Data Unavailable   No LMP recorded. Patient is postmenopausal.  Allergies reviewed: Yes  Medications reviewed: Yes    Medications: Medication refills not needed today.  Pharmacy name entered into EPIC:    [facility] #[id number] - [place], [place] - [address] [facility] - [place], [place] - [address] [place] [address]    Clinical concerns: no      Nesha Duran              Oncology/Hematology Visit Note  Feb 7, 2022    Reason for Visit: follow up of left breast cancer  Triple negative  Stage IIb  Patient of Dr. Mcmullen    01/18-started neoadjuvant chemotherapy with paclitaxel and carboplatin for 12 weeks followed by dose dense AC  With addition of pembrolizumab every 3 weeks      Interval History:  Patient denies fever chills sweats cough shortness of breath chest pain nausea vomiting diarrhea abdominal pain or bleeding        Review of Systems:  14 point ROS of systems including Constitutional, Eyes, Respiratory, Cardiovascular, Gastroenterology, Genitourinary, Integumentary, Muscularskeletal, Psychiatric were all negative except for pertinent positives noted in my HPI.    Physical Examination:  General: The patient is a pleasant female in no " acute distress.  /77   Pulse 91   Temp 98.2  F (36.8  C) (Oral)   Resp 18   Wt 60.9 kg (134 lb 3.2 oz)   SpO2 99%   BMI 23.78 kg/m    HEENT: EOMI, PERRL. Sclerae are anicteric. Oral mucosa is pink and moist with no lesions or thrush.   Lymph: Neck is supple with no lymphadenopathy in the cervical or supraclavicular areas.   Heart: Regular rate and rhythm.   Lungs: Clear to auscultation bilaterally.   GI: Bowel sounds present, soft, nontender with no palpable hepatosplenomegaly or masses.   Extremities: No lower extremity edema noted bilaterally.   Skin: No rashes, petechiae, or bruising noted on exposed skin.    Laboratory Data:  CBC CMP results reviewed      Assessment and Plan:    This is a 76-year-old female with     left breast cancer  Triple negative  Stage IIb  Patient of Dr. Mcmullen    01/18/2022-started neoadjuvant chemotherapy with paclitaxel and carboplatin for 12 weeks followed by dose dense AC  With addition of pembrolizumab every 3 weeks  -Regimen and side effects of the treatment discussed in detail with patient   Labs reviewed abnormalities discussed  Echocardiogram reviewed normal heart function  Okay to proceed with treatment  -Patient did have neutropenia-patient is scheduled for Neupogen shots for 3 days after day 15  -  -Schedule with Dr. Mcmullen  with cycle 3       Chronic kidney disease  Creatinine at baseline  Continue follow-up with nephrologist    Anemia  Mild continue to monitor closely    Elevated TSH  Normal T4  Continue to monitor closely    Elevated ALT and AST-most likely secondary to chemo   Normal alk phos  Patient is asymptomatic  Avoid Tylenol  -Recheck liver enzymes next week      FDG uptake at the anal canal  -Patient wants to wait for anoscopy and referral to colorectal since she is asymptomatic  -Plan to see colorectal after completion of the chemo for breast cancer    Right renal mass  Management per urology    Depression anxiety  Patient declined appointment with   Darin Jimenes  I discussed different coping mechanisms      Call clinic with any changes in health condition or questions        HUBER Damon CNP  [facility]     Chart documentation with Dragon Voice recognition Software. Although reviewed after completion, some words and grammatical errors may remain.            Again, thank you for allowing me to participate in the care of your patient.        Sincerely,        HUBER Damon CNP

## 2022-02-07 NOTE — PROGRESS NOTES
"Oncology Rooming Note    February 7, 2022 9:48 AM   Jann Davidson is a 76 year old female who presents for:    Chief Complaint   Patient presents with     Oncology Clinic Visit     Initial Vitals: Resp 18   Wt 60.9 kg (134 lb 3.2 oz)   BMI 23.78 kg/m   Estimated body mass index is 23.78 kg/m  as calculated from the following:    Height as of 1/31/22: 1.6 m (5' 2.99\").    Weight as of this encounter: 60.9 kg (134 lb 3.2 oz). Body surface area is 1.65 meters squared.  No Pain (0) Comment: Data Unavailable   No LMP recorded. Patient is postmenopausal.  Allergies reviewed: Yes  Medications reviewed: Yes    Medications: Medication refills not needed today.  Pharmacy name entered into EPIC:    [facility] #[id number] - [place], [place] - [address]  [facility] - [place], [place] - [address] [place] [address]    Clinical concerns: no      Nesha Duran            "

## 2022-02-07 NOTE — PROGRESS NOTES
Infusion Nursing Note:  Jann Davidson presents today for carbo, taxol, keytruda.    Patient seen by provider today: Yes: EZEQUIEL Dugan   present during visit today: Not Applicable.    Note: N/A.      Intravenous Access:  PICC.    Treatment Conditions:  Lab Results   Component Value Date    HGB 9.7 (L) 02/07/2022    WBC 7.3 02/07/2022    ANEU 3.9 04/30/2018    ANEUTAUTO 1.3 (L) 01/31/2022     02/07/2022      Lab Results   Component Value Date     02/07/2022    POTASSIUM 3.8 02/07/2022    MAG 2.1 01/17/2022    CR 1.01 02/07/2022    AMY 9.0 02/07/2022    BILITOTAL 0.1 (L) 02/07/2022    ALBUMIN 3.6 02/07/2022    ALT 99 (H) 02/07/2022    AST 83 (H) 02/07/2022     Results reviewed, labs MET treatment parameters, ok to proceed with treatment.      Post Infusion Assessment:  Patient tolerated infusion without incident.  Site patent and intact, free from redness, edema or discomfort.  No evidence of extravasations.  Access discontinued per protocol.       Discharge Plan:   Patient and/or family verbalized understanding of discharge instructions and all questions answered.  AVS to patient via [employer].  Patient will return 2/14 for next appointment.   Patient discharged in stable condition accompanied by: self.  Departure Mode: Ambulatory.      Swetha Means RN

## 2022-02-07 NOTE — PROGRESS NOTES
Oncology/Hematology Visit Note  Feb 7, 2022    Reason for Visit: follow up of left breast cancer  Triple negative  Stage IIb  Patient of Dr. Mcmullen    01/18-started neoadjuvant chemotherapy with paclitaxel and carboplatin for 12 weeks followed by dose dense AC  With addition of pembrolizumab every 3 weeks      Interval History:  Patient denies fever chills sweats cough shortness of breath chest pain nausea vomiting diarrhea abdominal pain or bleeding        Review of Systems:  14 point ROS of systems including Constitutional, Eyes, Respiratory, Cardiovascular, Gastroenterology, Genitourinary, Integumentary, Muscularskeletal, Psychiatric were all negative except for pertinent positives noted in my HPI.    Physical Examination:  General: The patient is a pleasant female in no acute distress.  /77   Pulse 91   Temp 98.2  F (36.8  C) (Oral)   Resp 18   Wt 60.9 kg (134 lb 3.2 oz)   SpO2 99%   BMI 23.78 kg/m    HEENT: EOMI, PERRL. Sclerae are anicteric. Oral mucosa is pink and moist with no lesions or thrush.   Lymph: Neck is supple with no lymphadenopathy in the cervical or supraclavicular areas.   Heart: Regular rate and rhythm.   Lungs: Clear to auscultation bilaterally.   GI: Bowel sounds present, soft, nontender with no palpable hepatosplenomegaly or masses.   Extremities: No lower extremity edema noted bilaterally.   Skin: No rashes, petechiae, or bruising noted on exposed skin.    Laboratory Data:  CBC CMP results reviewed      Assessment and Plan:    This is a 76-year-old female with     left breast cancer  Triple negative  Stage IIb  Patient of Dr. Mcmullen    01/18/2022-started neoadjuvant chemotherapy with paclitaxel and carboplatin for 12 weeks followed by dose dense AC  With addition of pembrolizumab every 3 weeks  -Regimen and side effects of the treatment discussed in detail with patient   Labs reviewed abnormalities discussed  Echocardiogram reviewed normal heart function  Okay to proceed with  treatment  -Patient did have neutropenia-patient is scheduled for Neupogen shots for 3 days after day 15  -  -Schedule with Dr. Mcmullen  with cycle 3       Chronic kidney disease  Creatinine at baseline  Continue follow-up with nephrologist    Anemia  Mild continue to monitor closely    Elevated TSH  Normal T4  Continue to monitor closely    Elevated ALT and AST-most likely secondary to treatment   Normal alk phos  Patient is asymptomatic  Avoid Tylenol  Monitor closely since patient is on  Keytruda  -Recheck liver enzymes next week      FDG uptake at the anal canal  -Patient wants to wait for anoscopy and referral to colorectal since she is asymptomatic  -Plan to see colorectal after completion of the chemo for breast cancer    Right renal mass  Management per urology    Depression anxiety  Patient declined appointment with Dr Darin Jimenes  I discussed different coping mechanisms      Call clinic with any changes in health condition or questions        HUBER Damon [facility]     Chart documentation with Dragon Voice recognition Software. Although reviewed after completion, some words and grammatical errors may remain.

## 2022-02-08 ENCOUNTER — TELEPHONE (OUTPATIENT)
Dept: ONCOLOGY | Facility: CLINIC | Age: 77
End: 2022-02-08
Payer: MEDICARE

## 2022-02-08 ENCOUNTER — PATIENT OUTREACH (OUTPATIENT)
Dept: ONCOLOGY | Facility: CLINIC | Age: 77
End: 2022-02-08
Payer: MEDICARE

## 2022-02-08 NOTE — PROGRESS NOTES
.Social Work Progress Note      Data/Intervention:  Patient Name:  Jann Davidson  /Age:  1945 (76 year old)    Reason for Follow-Up:  Jann is a 76-year-old woman with a diagnosis of breast cancer who is followed by Dr. Mcmullen. This clinician called Jann today with goal of providing psychosocial check-in and emotional support.     Intervention:   Left detailed voicemail for Jann with social work contact information and availability.     Resources Provided:   Onc SW contact information    Plan:  1) This clinician will plan for psychosocial check-in and emotional support in 1 week if no return call is made prior. Jann knows to reach out prior in case of concern or need.   2) Ongoing collaboration with multidisciplinary care team.      Please call or page if needs or concerns arise.     SOWMYA Lepe, LICSW  Direct Phone: [phone]  Pager: [phone]

## 2022-02-08 NOTE — TELEPHONE ENCOUNTER
Spoke to Jann - let her know that Dr. Mcmullen has put in a referral to Endocrine since her Thyroid level is now outside the normal range. Gave patient scheduling number [phone].

## 2022-02-10 ENCOUNTER — TELEPHONE (OUTPATIENT)
Dept: ONCOLOGY | Facility: CLINIC | Age: 77
End: 2022-02-10
Payer: MEDICARE

## 2022-02-10 NOTE — TELEPHONE ENCOUNTER
Left message for Jann that I did not hear back from the Endocrine department about possible sooner appointments Dr. Mcmullen stated we will just have to wait for her endocrinology appt, but we will continue to monitor TSH and if it continues to increase, then I will hold her Keytruda.

## 2022-02-14 ENCOUNTER — INFUSION THERAPY VISIT (OUTPATIENT)
Dept: INFUSION THERAPY | Facility: CLINIC | Age: 77
End: 2022-02-14
Attending: INTERNAL MEDICINE
Payer: MEDICARE

## 2022-02-14 VITALS
OXYGEN SATURATION: 98 % | WEIGHT: 134.4 LBS | DIASTOLIC BLOOD PRESSURE: 61 MMHG | BODY MASS INDEX: 23.81 KG/M2 | RESPIRATION RATE: 16 BRPM | TEMPERATURE: 98 F | SYSTOLIC BLOOD PRESSURE: 122 MMHG | HEART RATE: 87 BPM

## 2022-02-14 DIAGNOSIS — T45.1X5A CHEMOTHERAPY-INDUCED NEUTROPENIA (H): Primary | ICD-10-CM

## 2022-02-14 DIAGNOSIS — Z17.1 MALIGNANT NEOPLASM OF LOWER-OUTER QUADRANT OF LEFT BREAST OF FEMALE, ESTROGEN RECEPTOR NEGATIVE (H): ICD-10-CM

## 2022-02-14 DIAGNOSIS — C50.512 MALIGNANT NEOPLASM OF LOWER-OUTER QUADRANT OF LEFT BREAST OF FEMALE, ESTROGEN RECEPTOR NEGATIVE (H): ICD-10-CM

## 2022-02-14 DIAGNOSIS — D70.1 CHEMOTHERAPY-INDUCED NEUTROPENIA (H): Primary | ICD-10-CM

## 2022-02-14 LAB
ALBUMIN SERPL-MCNC: 3.3 G/DL (ref 3.4–5)
ALP SERPL-CCNC: 82 U/L (ref 40–150)
ALT SERPL W P-5'-P-CCNC: 44 U/L (ref 0–50)
ANION GAP SERPL CALCULATED.3IONS-SCNC: 7 MMOL/L (ref 3–14)
AST SERPL W P-5'-P-CCNC: 28 U/L (ref 0–45)
BASOPHILS # BLD AUTO: 0 10E3/UL (ref 0–0.2)
BASOPHILS NFR BLD AUTO: 1 %
BILIRUB SERPL-MCNC: 0.1 MG/DL (ref 0.2–1.3)
BUN SERPL-MCNC: 23 MG/DL (ref 7–30)
CALCIUM SERPL-MCNC: 8.8 MG/DL (ref 8.5–10.1)
CHLORIDE BLD-SCNC: 106 MMOL/L (ref 94–109)
CO2 SERPL-SCNC: 23 MMOL/L (ref 20–32)
CREAT SERPL-MCNC: 1 MG/DL (ref 0.52–1.04)
EOSINOPHIL # BLD AUTO: 0.1 10E3/UL (ref 0–0.7)
EOSINOPHIL NFR BLD AUTO: 1 %
ERYTHROCYTE [DISTWIDTH] IN BLOOD BY AUTOMATED COUNT: 13.1 % (ref 10–15)
GFR SERPL CREATININE-BSD FRML MDRD: 58 ML/MIN/1.73M2
GLUCOSE BLD-MCNC: 101 MG/DL (ref 70–99)
HCT VFR BLD AUTO: 26.8 % (ref 35–47)
HGB BLD-MCNC: 8.8 G/DL (ref 11.7–15.7)
IMM GRANULOCYTES # BLD: 0.1 10E3/UL
IMM GRANULOCYTES NFR BLD: 1 %
LYMPHOCYTES # BLD AUTO: 1.9 10E3/UL (ref 0.8–5.3)
LYMPHOCYTES NFR BLD AUTO: 35 %
MCH RBC QN AUTO: 31.3 PG (ref 26.5–33)
MCHC RBC AUTO-ENTMCNC: 32.8 G/DL (ref 31.5–36.5)
MCV RBC AUTO: 95 FL (ref 78–100)
MONOCYTES # BLD AUTO: 0.3 10E3/UL (ref 0–1.3)
MONOCYTES NFR BLD AUTO: 6 %
NEUTROPHILS # BLD AUTO: 2.9 10E3/UL (ref 1.6–8.3)
NEUTROPHILS NFR BLD AUTO: 56 %
NRBC # BLD AUTO: 0 10E3/UL
NRBC BLD AUTO-RTO: 0 /100
PLATELET # BLD AUTO: 271 10E3/UL (ref 150–450)
POTASSIUM BLD-SCNC: 3.4 MMOL/L (ref 3.4–5.3)
PROT SERPL-MCNC: 6.8 G/DL (ref 6.8–8.8)
RBC # BLD AUTO: 2.81 10E6/UL (ref 3.8–5.2)
SODIUM SERPL-SCNC: 136 MMOL/L (ref 133–144)
WBC # BLD AUTO: 5.3 10E3/UL (ref 4–11)

## 2022-02-14 PROCEDURE — 96375 TX/PRO/DX INJ NEW DRUG ADDON: CPT

## 2022-02-14 PROCEDURE — 250N000011 HC RX IP 250 OP 636: Performed by: NURSE PRACTITIONER

## 2022-02-14 PROCEDURE — 258N000003 HC RX IP 258 OP 636: Performed by: NURSE PRACTITIONER

## 2022-02-14 PROCEDURE — 85025 COMPLETE CBC W/AUTO DIFF WBC: CPT | Performed by: INTERNAL MEDICINE

## 2022-02-14 PROCEDURE — 82040 ASSAY OF SERUM ALBUMIN: CPT | Performed by: NURSE PRACTITIONER

## 2022-02-14 PROCEDURE — 96413 CHEMO IV INFUSION 1 HR: CPT

## 2022-02-14 PROCEDURE — 80053 COMPREHEN METABOLIC PANEL: CPT | Performed by: NURSE PRACTITIONER

## 2022-02-14 PROCEDURE — 96417 CHEMO IV INFUS EACH ADDL SEQ: CPT

## 2022-02-14 PROCEDURE — 96367 TX/PROPH/DG ADDL SEQ IV INF: CPT

## 2022-02-14 PROCEDURE — 250N000009 HC RX 250: Performed by: NURSE PRACTITIONER

## 2022-02-14 RX ORDER — HEPARIN SODIUM (PORCINE) LOCK FLUSH IV SOLN 100 UNIT/ML 100 UNIT/ML
5 SOLUTION INTRAVENOUS
Status: DISCONTINUED | OUTPATIENT
Start: 2022-02-14 | End: 2022-02-14 | Stop reason: HOSPADM

## 2022-02-14 RX ADMIN — FAMOTIDINE 20 MG: 10 INJECTION, SOLUTION INTRAVENOUS at 12:00

## 2022-02-14 RX ADMIN — PACLITAXEL 130 MG: 6 INJECTION, SOLUTION INTRAVENOUS at 12:27

## 2022-02-14 RX ADMIN — SODIUM CHLORIDE 250 ML: 9 INJECTION, SOLUTION INTRAVENOUS at 11:59

## 2022-02-14 RX ADMIN — DEXAMETHASONE SODIUM PHOSPHATE: 10 INJECTION, SOLUTION INTRAMUSCULAR; INTRAVENOUS at 11:59

## 2022-02-14 RX ADMIN — Medication 5 ML: at 14:15

## 2022-02-14 RX ADMIN — CARBOPLATIN 100 MG: 10 INJECTION, SOLUTION INTRAVENOUS at 13:27

## 2022-02-14 ASSESSMENT — PAIN SCALES - GENERAL: PAINLEVEL: NO PAIN (0)

## 2022-02-14 NOTE — PROGRESS NOTES
Nursing Note:  Jann Davidson presents today for port labs.    Patient seen by provider today: No   present during visit today: Not Applicable.    Note: pt will have infusion appt following.    Intravenous Access:  Implanted Port.    Discharge Plan:   Patient was sent to [facility] for chemo appointment.    Karol Sanchez RN

## 2022-02-14 NOTE — PROGRESS NOTES
Infusion Nursing Note:  Jann Davidson presents today for C2 D8 Taxol/Carbo.    Patient seen by provider today: No   present during visit today: Not Applicable.    Note: Patient reports having congested sinuses and frequent bloody noses d/t dryness in nose. Writer encouraged patient to continue with saline nasal spray, neti pot and vaseline to inside of nose. Patient unable to use humidifier d/t heating elements in home and h/o mold. Advised patient to contact PMD if continues.       Intravenous Access:  Implanted Port.    Treatment Conditions:  Lab Results   Component Value Date    HGB 8.8 (L) 02/14/2022    WBC 5.3 02/14/2022    ANEU 2.8 02/07/2022    ANEUTAUTO 2.9 02/14/2022     02/14/2022      Lab Results   Component Value Date     02/14/2022    POTASSIUM 3.4 02/14/2022    MAG 2.1 01/17/2022    CR 1.00 02/14/2022    AMY 8.8 02/14/2022    BILITOTAL 0.1 (L) 02/14/2022    ALBUMIN 3.3 (L) 02/14/2022    ALT 44 02/14/2022    AST 28 02/14/2022     Results reviewed, labs MET treatment parameters, ok to proceed with treatment.      Post Infusion Assessment:  Patient tolerated infusion without incident.  Blood return noted pre and post infusion.  Site patent and intact, free from redness, edema or discomfort.  No evidence of extravasations.  Access discontinued per protocol.       Discharge Plan:   Patient and/or family verbalized understanding of discharge instructions and all questions answered.  AVS to patient via [employer].  Patient will return 2/21 for next appointment.   Patient discharged in stable condition accompanied by: self.  Departure Mode: Ambulatory.      Marquita Weaver RN

## 2022-02-15 ENCOUNTER — PATIENT OUTREACH (OUTPATIENT)
Dept: ONCOLOGY | Facility: CLINIC | Age: 77
End: 2022-02-15
Payer: MEDICARE

## 2022-02-15 NOTE — PROGRESS NOTES
.Social Work Progress Note      Data/Intervention:  Patient Name:  Jann Davidson  /Age:  1945 (76 year old)    Reason for Follow-Up:  Jann is a 76-year-old woman with a diagnosis of breast cancer who is followed by Dr. Mcmullen. This clinician called Jann today with goal of providing psychosocial check-in and emotional support.     Intervention:   Outreach attempt x2. Left detailed voicemail for Jann with social work contact information and availability.     Resources Provided:   Onc SW contact information    Plan:  1) This clinician will continue to be available as needed for ongoing psychosocial support. Jann knows to reach out in case of psychosocial distress or need.  2) Ongoing collaboration with multidisciplinary care team.      Please call or page if needs or concerns arise.     SOWMYA Lepe, LICSW  Direct Phone: [phone]  Pager: [phone]

## 2022-02-16 DIAGNOSIS — F41.9 ANXIETY: ICD-10-CM

## 2022-02-16 DIAGNOSIS — T45.1X5A CHEMOTHERAPY-INDUCED NEUTROPENIA (H): ICD-10-CM

## 2022-02-16 DIAGNOSIS — D70.1 CHEMOTHERAPY-INDUCED NEUTROPENIA (H): ICD-10-CM

## 2022-02-16 RX ORDER — LORAZEPAM 0.5 MG/1
0.5 TABLET ORAL EVERY 6 HOURS PRN
Qty: 30 TABLET | Refills: 0 | Status: SHIPPED | OUTPATIENT
Start: 2022-02-16 | End: 2022-03-01

## 2022-02-16 NOTE — TELEPHONE ENCOUNTER
"Last prescribing provider: Dr. Mcmullen    Last clinic visit date: 2/7/22    Any missed appointments or no-shows since last clinic visit?: No    Recommendations for requested medication (if none, N/A): Copied from chart note 1/3 from Dr. Mcmullen, \"She takes oxazepam together with zolpidem.   --Discussed that she could use lorazepam (Ativan) as long as she takes this 6 hours from her other benzodiazepenes. Prescription issued.\"    Next clinic visit date: 2/21/22    Any other pertinent information (if none, N/A): N/A    Tricia Tavares RN  Triage Nurse Advisor  [facility]    "

## 2022-02-18 NOTE — PROGRESS NOTES
[facility]    Hematology/Oncology Established Patient Note      Today's Date: 3/3/2022    Reason for follow-up:  Left breast cancer, triple negative.    HISTORY OF PRESENT ILLNESS: Jann Davidson is a 76 year old female who presents with the following oncologic history:  1. 10/26/2021: Mammogram showed calcifications in the left lateral breast; right breast negative.  2. 11/17/2021: Left diagnostic mammogram showed cluster of pleomorphic calcifications at 4:00, 6 cm from nipple, measuring 1.3 cm.  3. 12/3/2021: Stereotactic left breast biopsy at 4:00, 6 cm from nipple showed grade 2 invasive ductal carcinoma with micropapillary features, extensive lymphovascular invasion present, grade 2-3 focal DCIS, LCIS, ER negative, MD negative, HER-2/maxime FISH negative.  4. 12/10/2021: Breast MRI showed oval mass 2 x 2 x 1.5 cm at 4:00, 6 cm from nipple in left breast reflecting biopsy cavity with post-biopsy changes; prominent 2.5 cm low left level 1 axillary lymph node.  5.  12/28/2021: PET/CT scan showed FDG avid focus in left lower outer breast, hypermetabolic left axillary adenopathy, moderate FDG uptake at level off anus, exophytic right renal 1.1 cm mass, slowly increasing in size since 2016 concerning for growing renal cell carcinoma.  6. 1/18/2022: Started neoadjuvant chemotherapy with weekly paclitaxel + carboplatin and every 3-week pembrolizumab as per KEYNOTE-522 study.    INTERVAL HISTORY:  Jann reports nasal congestion improved after azithromycin. She denies fevers, chills, or sore throat. She tested negative for COVID-19 on 1/31/2022. She is grieving from the loss of her 19-year-old dog from last week.      REVIEW OF SYSTEMS:   14 point ROS was reviewed and is negative other than as noted above in HPI.       HOME MEDICATIONS:  Current Outpatient Medications   Medication Sig Dispense Refill     atorvastatin (LIPITOR) 10 MG tablet TAKE 1 TABLET (10 MG) BY MOUTH ONCE DAILY 90 tablet 3      candesartan (ATACAND) 4 MG tablet Take 0.5 tablets (2 mg) by mouth 2 times daily DISPENSE AS WRITTEN, Brand name only 45 tablet 2     lidocaine-prilocaine (EMLA) 2.5-2.5 % external cream Apply topically once as needed for moderate pain Patient to apply to chest port site 30 minutes prior to port access. 30 g 1     LORazepam (ATIVAN) 0.5 MG tablet Take 1 tablet (0.5 mg) by mouth every 6 hours as needed for anxiety, nausea or sleep 30 tablet 0     Magnesium 400 MG TABS Take 400 mg by mouth       metoclopramide (REGLAN) 5 MG tablet Take 1 tablet (5 mg) by mouth 4 times daily as needed (Nausea) 90 tablet 1     oxazepam (SERAX) 15 MG capsule Take 1 capsule (15 mg) by mouth nightly as needed for anxiety or sleep OK for generic please notify patient regarding supply thanks 31 capsule 1     PARNATE 10 MG tablet TAKE 1 TABLET BY MOUTH 3 TIMES DAILY 270 tablet 3     polyethylene glycol (MIRALAX/GLYCOLAX) packet Take 1 packet by mouth daily       predniSONE (DELTASONE) 10 MG tablet Take 1 tablet (10 mg) by mouth daily 5 tablet 0     prochlorperazine (COMPAZINE) 10 MG tablet Take 1 tablet (10 mg) by mouth every 6 hours as needed (Nausea/Vomiting) 30 tablet 2     triamcinolone (KENALOG) 0.1 % external cream Apply  topically 2 times daily as needed. 15 g 1     triamterene-HCTZ (MAXZIDE-25) 37.5-25 MG tablet TAKE 1 TABLET BY MOUTH EVERY MORNING 90 tablet 2     zolpidem (AMBIEN) 5 MG tablet Take 1 tablet (5 mg) by mouth nightly as needed for sleep OK for generic please notify patient regarding supply thanks 31 tablet 1         ALLERGIES:  Allergies   Allergen Reactions     Wheat Bran          PAST MEDICAL HISTORY:  Past Medical History:   Diagnosis Date     Hypertension goal BP (blood pressure) < 140/90      Recurrent sinusitis 2013     Gynecologic history:  Age of menarche at 11; LMP ;  (one son), 1st pregnancy at age 19; no HRT.    PAST SURGICAL HISTORY:  Past Surgical History:   Procedure Laterality Date     BREAST  BIOPSY, RT/LT      Breat Biopsy RT/LT     COLONOSCOPY  10/03     COLONOSCOPY  2014    Procedure: COLONOSCOPY;  COLONOSCOPY ;  Surgeon: Gaurav Shaffer MD;  Location: [facility]     IR CHEST PORT PLACEMENT > 5 YRS OF AGE  2021     RECONSTRUCT EYELID           SOCIAL HISTORY:  Social History     Socioeconomic History     Marital status:      Spouse name: Not on file     Number of children: Not on file     Years of education: Not on file     Highest education level: Not on file   Occupational History     Not on file   Tobacco Use     Smoking status: Former Smoker     Types: Cigarettes     Quit date: 10/30/1972     Years since quittin.3     Smokeless tobacco: Never Used   Substance and Sexual Activity     Alcohol use: Yes     Comment: couple glasses of wine daily      Drug use: No     Sexual activity: Never   Other Topics Concern     Parent/sibling w/ CABG, MI or angioplasty before 65F 55M? Not Asked   Social History Narrative     Not on file     Social Determinants of Health     Financial Resource Strain: Not on file   Food Insecurity: Not on file   Transportation Needs: Not on file   Physical Activity: Not on file   Stress: Not on file   Social Connections: Not on file   Intimate Partner Violence: Not on file   Housing Stability: Not on file         FAMILY HISTORY:  Family History   Problem Relation Age of Onset     Cancer Mother         uterine     Breast Cancer Mother      Diabetes Paternal Grandmother          PHYSICAL EXAM:  Vital signs:  BP (!) 142/84   Pulse 96   Temp 97.4  F (36.3  C) (Oral)   Resp 18   Wt 60.3 kg (133 lb)   SpO2 100%   BMI 23.57 kg/m     ECO  GENERAL/CONSTITUTIONAL: No acute distress.  EYES: No erythema or scleral icterus.  ENT/MOUTH: Neck supple. Mask in place.  LYMPH: No cervical, supraclavicular, adenopathy. Left axillary lymphadenopathy no longer palpable.  BREAST: 1.5-cm mass palpable and mobile inf the left lower outer breast. No masses in right  breast. No dimpling or ulceration.  RESPIRATORY: Clear to auscultation bilaterally. No crackles or wheezing.   CARDIOVASCULAR: Regular rate and rhythm without murmurs.  GASTROINTESTINAL: No hepatosplenomegaly, masses, or tenderness. No guarding.  No distention.  MUSCULOSKELETAL: Warm and well-perfused, no cyanosis, clubbing, or edema.  NEUROLOGIC: No focal motor deficits. Alert, oriented, answers questions appropriately.  INTEGUMENTARY: No rashes or jaundice. Ecchymosis at port site.  GAIT: Steady, does not use assistive device    LABS:  CBC RESULTS: Recent Labs   Lab Test 03/01/22  1056   WBC 4.5   RBC 3.11*   HGB 10.0*   HCT 30.3*   MCV 97   MCH 32.2   MCHC 33.0   RDW 16.1*        Last Comprehensive Metabolic Panel:  Sodium   Date Value Ref Range Status   03/01/2022 135 133 - 144 mmol/L Final   06/05/2019 142 133 - 144 mmol/L Final     Potassium   Date Value Ref Range Status   03/01/2022 4.1 3.4 - 5.3 mmol/L Final   06/05/2019 3.7 3.4 - 5.3 mmol/L Final     Chloride   Date Value Ref Range Status   03/01/2022 101 94 - 109 mmol/L Final   06/05/2019 110 (H) 94 - 109 mmol/L Final     Carbon Dioxide   Date Value Ref Range Status   06/05/2019 24 20 - 32 mmol/L Final     Carbon Dioxide (CO2)   Date Value Ref Range Status   03/01/2022 27 20 - 32 mmol/L Final     Anion Gap   Date Value Ref Range Status   03/01/2022 7 3 - 14 mmol/L Final   06/05/2019 8 3 - 14 mmol/L Final     Glucose   Date Value Ref Range Status   03/01/2022 117 (H) 70 - 99 mg/dL Final   06/05/2019 93 70 - 99 mg/dL Final     Comment:     Fasting specimen     Urea Nitrogen   Date Value Ref Range Status   03/01/2022 39 (H) 7 - 30 mg/dL Final   06/05/2019 32 (H) 7 - 30 mg/dL Final     Creatinine   Date Value Ref Range Status   03/01/2022 1.28 (H) 0.52 - 1.04 mg/dL Final   06/05/2019 1.15 (H) 0.52 - 1.04 mg/dL Final     GFR Estimate   Date Value Ref Range Status   03/01/2022 43 (L) >60 mL/min/1.73m2 Final     Comment:     Effective December 21, 2021  eGFRcr in adults is calculated using the 2021 CKD-EPI creatinine equation which includes age and gender (Meliton et al., NEJ, DOI: 10.1056/DELOmc3416626)   06/05/2019 47 (L) >60 mL/min/[1.73_m2] Final     Comment:     Non  GFR Calc  Starting 12/18/2018, serum creatinine based estimated GFR (eGFR) will be   calculated using the Chronic Kidney Disease Epidemiology Collaboration   (CKD-EPI) equation.       GFR, ESTIMATED POCT   Date Value Ref Range Status   12/28/2021 33 (L) >60 mL/min/1.73m2 Final     Calcium   Date Value Ref Range Status   03/01/2022 10.2 (H) 8.5 - 10.1 mg/dL Final   06/05/2019 9.2 8.5 - 10.1 mg/dL Final     Bilirubin Total   Date Value Ref Range Status   03/01/2022 0.2 0.2 - 1.3 mg/dL Final   06/05/2019 0.4 0.2 - 1.3 mg/dL Final     Alkaline Phosphatase   Date Value Ref Range Status   03/01/2022 114 40 - 150 U/L Final   06/05/2019 85 40 - 150 U/L Final     ALT   Date Value Ref Range Status   03/01/2022 28 0 - 50 U/L Final   06/05/2019 33 0 - 50 U/L Final     AST   Date Value Ref Range Status   03/01/2022 23 0 - 45 U/L Final   06/05/2019 33 0 - 45 U/L Final         PATHOLOGY:  None new.    IMAGING:  None new.    ASSESSMENT/PLAN:  Jann Davidson is a 76 year old female with the following issues:  1. 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 Abnormal thyroid function test  --Jann is on neoadjuvant chemotherapy with paclitaxel and carboplatin for 12 weeks with every 3-week pembrolizumab, tolerating this with some sinus congestion and fatigue.  --She has developed elevated TSH with normal free T4 -- will continue to monitor this to ensure she is not developing thyroiditis from pembrolizumab.  She will be seeing Endocrinology on 3/30/2022 for further evaluation.  --Plan to repeat breast MRI after 4/13/2022 -- if there is significant radiographic residual  disease, I will recommend continuing with chemotherapy with Adriamycin and Cytoxan every 3 weeks for 4 cycles with the addition of pembrolizumab every 3 weeks unless she has significant difficulty tolerating carboplatin and paclitaxel. Echocardiogram 1/18/2022 showed normal LVEF.  --Genetics consult scheduled for 3/8/2022 -- recommended given that her cancer is triple negative and mother had breast and uterine cancer. This could have implications if she might be a candidate for adjuvant olaparib if she has a BRCA gene mutation.    3. Chronic kidney disease, stage 3  --She follows with Dr. Luna.  --Chemo dosing would be in accordance with her kidney function as clinically appropriate.    4. FDG uptake at anal canal  --She has history of anal fissure from 4/8/2014 colonoscopy.  --PET scan showed uptake at level of anus and could represent hemorrhoid, fissure, malignancy, or be physiologic.  --She is asymptomatic and would like to delay anoscopy and referral to colorectal for now. Discussed we can delay until completion of chemo for her breast cancer.    5. Right renal mass  --Discussed with Jann that PET scan showed a 1.1 cm mass in the right kidney that has been reportedly slowly growing since 2016 concerning for renal cell carcinoma.  --Will not be able to further evaluate with contrast MRI due to her degree of chronic kidney disease.  Will refer her to urology for surveillance/intervention plan after completion of chemo for her breast cancer.    6. Pulmonary nodules  --PET scan showed scattered small bilateral pulmonary nodules that are unchanged since 10/24/2018 and most likely benign.    7. Depression/anxiety  --Worsened lately due to cancer diagnosis.  --She would like to continue on Parnate and not change her antidepressant.  --I offered referral to Dr. Darin Jimenes and she declined this today.  --She takes oxazepam together with zolpidem.    --She could use lorazepam (Ativan) as long as she takes this 6  hours from her other benzodiazepenes. Prescription issued.    Will have her return to see me after 4/13/2022 with repeat breast MRI.     Neetu Mcmullen MD  Hematology/Oncology  [facility]    Total time spent: 30 minutes in patient evaluation, counseling, documentation, and coordination of care.

## 2022-02-20 ENCOUNTER — HEALTH MAINTENANCE LETTER (OUTPATIENT)
Age: 77
End: 2022-02-20

## 2022-02-21 ENCOUNTER — TELEPHONE (OUTPATIENT)
Dept: ONCOLOGY | Facility: CLINIC | Age: 77
End: 2022-02-21
Payer: MEDICARE

## 2022-02-21 NOTE — TELEPHONE ENCOUNTER
Spoke to Jann - who is hysterically crying and dog howling in background. She has not slept in days - was planning in bringing her 19 yr old dog in over the weekend to be put to sleep but was told the [facility] hospital was too full.   She is now unable to attend her appts today due to pure exhaustion and emotional state.  I have helped her register with [facility] so that a Vet can come out to her home tomorrow Tuesday 2/22/22 to help put her dog Pascale to sleep.  I have cancelled her appointments for this week and will follow up with Dr. Mcmullen on timing of her next cycle of chemo.  Patient has also requested Z-pack sent in for her sinuses which Tadeo Dugan has sent to her mail order pharmacy.

## 2022-02-24 ENCOUNTER — PATIENT OUTREACH (OUTPATIENT)
Dept: ONCOLOGY | Facility: CLINIC | Age: 77
End: 2022-02-24
Payer: MEDICARE

## 2022-02-24 NOTE — PROGRESS NOTES
.Social Work Progress Note      Data/Intervention:  Patient Name:  Jann Davidson  /Age:  1945 (76 year old)    Reason for Follow-Up:  Jann is a 76-year-old woman with a diagnosis of breast cancer who is followed by Dr. Mcmullen. This clinician called Jann today with goal of providing psychosocial check-in and emotional support in context of dog's death this week.     Intervention:   Jann reported having had a difficult night, is actively grieving dog's death. Jann requested SW outreach 22 in late PM, appreciative of phone call.     Plan:  1) This clinician will plan to outreach again tomorrow for emotional support.   2) Ongoing collaboration with multidisciplinary care team.      Please call or page if needs or concerns arise.     SOWMYA Lepe, [facility]. [facility] HospitalSW  Direct Phone: [phone]  Pager: [phone]

## 2022-02-25 ENCOUNTER — PATIENT OUTREACH (OUTPATIENT)
Dept: ONCOLOGY | Facility: CLINIC | Age: 77
End: 2022-02-25
Payer: MEDICARE

## 2022-02-25 NOTE — PROGRESS NOTES
.Social Work Progress Note      Data/Intervention:  Patient Name:  Jann Davidson  /Age:  1945 (76 year old)    Reason for Follow-Up:  Jann is a 76-year-old woman with a diagnosis of breast cancer who is followed by Dr. Mcmullen. This clinician called Jann today according to psychosocial plan of care.     Intervention:   This clinician called and left detailed voicemail for Jann with social work contact information and availability.     Plan:  1) This clinician will plan to see Jann in-person 3/2/22. Jann knows to reach out prior in the case of psychosocial distress or need.   2) Ongoing collaboration with multidisciplinary care team.      Please call or page if needs or concerns arise.     SOWMYA Lepe, LICSW  Direct Phone: [phone]  Pager: [phone]

## 2022-03-01 ENCOUNTER — ONCOLOGY VISIT (OUTPATIENT)
Dept: ONCOLOGY | Facility: CLINIC | Age: 77
End: 2022-03-01
Attending: NURSE PRACTITIONER
Payer: MEDICARE

## 2022-03-01 ENCOUNTER — INFUSION THERAPY VISIT (OUTPATIENT)
Dept: INFUSION THERAPY | Facility: CLINIC | Age: 77
End: 2022-03-01
Attending: INTERNAL MEDICINE
Payer: MEDICARE

## 2022-03-01 VITALS
OXYGEN SATURATION: 99 % | TEMPERATURE: 98.2 F | SYSTOLIC BLOOD PRESSURE: 113 MMHG | DIASTOLIC BLOOD PRESSURE: 74 MMHG | RESPIRATION RATE: 16 BRPM | WEIGHT: 134 LBS | BODY MASS INDEX: 23.74 KG/M2 | HEART RATE: 91 BPM

## 2022-03-01 VITALS
BODY MASS INDEX: 23.81 KG/M2 | RESPIRATION RATE: 20 BRPM | DIASTOLIC BLOOD PRESSURE: 74 MMHG | WEIGHT: 134.4 LBS | TEMPERATURE: 98.2 F | HEART RATE: 91 BPM | OXYGEN SATURATION: 99 % | SYSTOLIC BLOOD PRESSURE: 113 MMHG

## 2022-03-01 DIAGNOSIS — Z17.1 MALIGNANT NEOPLASM OF LOWER-OUTER QUADRANT OF LEFT BREAST OF FEMALE, ESTROGEN RECEPTOR NEGATIVE (H): Primary | ICD-10-CM

## 2022-03-01 DIAGNOSIS — T45.1X5A CHEMOTHERAPY-INDUCED NEUTROPENIA (H): ICD-10-CM

## 2022-03-01 DIAGNOSIS — D70.1 CHEMOTHERAPY-INDUCED NEUTROPENIA (H): Primary | ICD-10-CM

## 2022-03-01 DIAGNOSIS — C50.512 MALIGNANT NEOPLASM OF LOWER-OUTER QUADRANT OF LEFT BREAST OF FEMALE, ESTROGEN RECEPTOR NEGATIVE (H): Primary | ICD-10-CM

## 2022-03-01 DIAGNOSIS — T45.1X5A CHEMOTHERAPY-INDUCED NEUTROPENIA (H): Primary | ICD-10-CM

## 2022-03-01 DIAGNOSIS — C50.512 MALIGNANT NEOPLASM OF LOWER-OUTER QUADRANT OF LEFT BREAST OF FEMALE, ESTROGEN RECEPTOR NEGATIVE (H): ICD-10-CM

## 2022-03-01 DIAGNOSIS — F41.9 ANXIETY: ICD-10-CM

## 2022-03-01 DIAGNOSIS — D70.1 CHEMOTHERAPY-INDUCED NEUTROPENIA (H): ICD-10-CM

## 2022-03-01 DIAGNOSIS — Z17.1 MALIGNANT NEOPLASM OF LOWER-OUTER QUADRANT OF LEFT BREAST OF FEMALE, ESTROGEN RECEPTOR NEGATIVE (H): ICD-10-CM

## 2022-03-01 LAB
ALBUMIN SERPL-MCNC: 3.7 G/DL (ref 3.4–5)
ALP SERPL-CCNC: 114 U/L (ref 40–150)
ALT SERPL W P-5'-P-CCNC: 28 U/L (ref 0–50)
ANION GAP SERPL CALCULATED.3IONS-SCNC: 7 MMOL/L (ref 3–14)
AST SERPL W P-5'-P-CCNC: 23 U/L (ref 0–45)
BASOPHILS # BLD AUTO: 0 10E3/UL (ref 0–0.2)
BASOPHILS NFR BLD AUTO: 1 %
BILIRUB SERPL-MCNC: 0.2 MG/DL (ref 0.2–1.3)
BUN SERPL-MCNC: 39 MG/DL (ref 7–30)
CALCIUM SERPL-MCNC: 10.2 MG/DL (ref 8.5–10.1)
CHLORIDE BLD-SCNC: 101 MMOL/L (ref 94–109)
CO2 SERPL-SCNC: 27 MMOL/L (ref 20–32)
CREAT SERPL-MCNC: 1.28 MG/DL (ref 0.52–1.04)
EOSINOPHIL # BLD AUTO: 0.1 10E3/UL (ref 0–0.7)
EOSINOPHIL NFR BLD AUTO: 1 %
ERYTHROCYTE [DISTWIDTH] IN BLOOD BY AUTOMATED COUNT: 16.1 % (ref 10–15)
GFR SERPL CREATININE-BSD FRML MDRD: 43 ML/MIN/1.73M2
GLUCOSE BLD-MCNC: 117 MG/DL (ref 70–99)
HCT VFR BLD AUTO: 30.3 % (ref 35–47)
HGB BLD-MCNC: 10 G/DL (ref 11.7–15.7)
IMM GRANULOCYTES # BLD: 0.1 10E3/UL
IMM GRANULOCYTES NFR BLD: 1 %
LYMPHOCYTES # BLD AUTO: 1.7 10E3/UL (ref 0.8–5.3)
LYMPHOCYTES NFR BLD AUTO: 37 %
MCH RBC QN AUTO: 32.2 PG (ref 26.5–33)
MCHC RBC AUTO-ENTMCNC: 33 G/DL (ref 31.5–36.5)
MCV RBC AUTO: 97 FL (ref 78–100)
MONOCYTES # BLD AUTO: 0.6 10E3/UL (ref 0–1.3)
MONOCYTES NFR BLD AUTO: 14 %
NEUTROPHILS # BLD AUTO: 2 10E3/UL (ref 1.6–8.3)
NEUTROPHILS NFR BLD AUTO: 46 %
NRBC # BLD AUTO: 0 10E3/UL
NRBC BLD AUTO-RTO: 0 /100
PLATELET # BLD AUTO: 255 10E3/UL (ref 150–450)
POTASSIUM BLD-SCNC: 4.1 MMOL/L (ref 3.4–5.3)
PROT SERPL-MCNC: 7.3 G/DL (ref 6.8–8.8)
RBC # BLD AUTO: 3.11 10E6/UL (ref 3.8–5.2)
SODIUM SERPL-SCNC: 135 MMOL/L (ref 133–144)
WBC # BLD AUTO: 4.5 10E3/UL (ref 4–11)

## 2022-03-01 PROCEDURE — 250N000011 HC RX IP 250 OP 636

## 2022-03-01 PROCEDURE — 96375 TX/PRO/DX INJ NEW DRUG ADDON: CPT

## 2022-03-01 PROCEDURE — 99214 OFFICE O/P EST MOD 30 MIN: CPT | Performed by: NURSE PRACTITIONER

## 2022-03-01 PROCEDURE — 96417 CHEMO IV INFUS EACH ADDL SEQ: CPT

## 2022-03-01 PROCEDURE — 36591 DRAW BLOOD OFF VENOUS DEVICE: CPT | Performed by: INTERNAL MEDICINE

## 2022-03-01 PROCEDURE — 80053 COMPREHEN METABOLIC PANEL: CPT | Performed by: INTERNAL MEDICINE

## 2022-03-01 PROCEDURE — 96413 CHEMO IV INFUSION 1 HR: CPT

## 2022-03-01 PROCEDURE — 250N000011 HC RX IP 250 OP 636: Performed by: NURSE PRACTITIONER

## 2022-03-01 PROCEDURE — 258N000003 HC RX IP 258 OP 636

## 2022-03-01 PROCEDURE — G0463 HOSPITAL OUTPT CLINIC VISIT: HCPCS | Mod: 25

## 2022-03-01 PROCEDURE — 82040 ASSAY OF SERUM ALBUMIN: CPT | Performed by: INTERNAL MEDICINE

## 2022-03-01 PROCEDURE — 85004 AUTOMATED DIFF WBC COUNT: CPT | Performed by: INTERNAL MEDICINE

## 2022-03-01 PROCEDURE — 258N000003 HC RX IP 258 OP 636: Performed by: NURSE PRACTITIONER

## 2022-03-01 RX ORDER — AZITHROMYCIN 250 MG/1
TABLET, FILM COATED ORAL
Qty: 6 TABLET | Refills: 0 | Status: SHIPPED | OUTPATIENT
Start: 2022-03-01 | End: 2022-03-15

## 2022-03-01 RX ORDER — LORAZEPAM 0.5 MG/1
0.5 TABLET ORAL EVERY 6 HOURS PRN
Qty: 45 TABLET | Refills: 0 | Status: SHIPPED | OUTPATIENT
Start: 2022-03-01 | End: 2022-04-12

## 2022-03-01 RX ORDER — HEPARIN SODIUM (PORCINE) LOCK FLUSH IV SOLN 100 UNIT/ML 100 UNIT/ML
5 SOLUTION INTRAVENOUS
Status: DISCONTINUED | OUTPATIENT
Start: 2022-03-01 | End: 2022-03-01 | Stop reason: HOSPADM

## 2022-03-01 RX ADMIN — CARBOPLATIN 100 MG: 10 INJECTION, SOLUTION INTRAVENOUS at 13:26

## 2022-03-01 RX ADMIN — PACLITAXEL 130 MG: 6 INJECTION, SOLUTION INTRAVENOUS at 12:19

## 2022-03-01 RX ADMIN — SODIUM CHLORIDE 1000 ML: 9 INJECTION, SOLUTION INTRAVENOUS at 11:57

## 2022-03-01 RX ADMIN — Medication 5 ML: at 14:20

## 2022-03-01 ASSESSMENT — PAIN SCALES - GENERAL
PAINLEVEL: NO PAIN (0)
PAINLEVEL: NO PAIN (0)

## 2022-03-01 NOTE — PROGRESS NOTES
"Oncology Rooming Note    March 1, 2022 11:10 AM   Jann Davidson is a 76 year old female who presents for:    No chief complaint on file.    Initial Vitals: /74   Pulse 91   Temp 98.2  F (36.8  C) (Oral)   Resp 16   Wt 60.8 kg (134 lb)   SpO2 99%   BMI 23.74 kg/m   Estimated body mass index is 23.74 kg/m  as calculated from the following:    Height as of 1/31/22: 1.6 m (5' 2.99\").    Weight as of this encounter: 60.8 kg (134 lb). Body surface area is 1.64 meters squared.  No Pain (0) Comment: Data Unavailable   No LMP recorded. Patient is postmenopausal.  Allergies reviewed: Yes  Medications reviewed: Yes    Medications: Medication refills not needed today.  Pharmacy name entered into EPIC:    [id number] - [place], [place] - [address]  [facility] - [place], [place] - [address] [place] [address] [facility] - [place], [place] - [address]    Clinical concerns: Patient hoping a prescription for Lorazepam can be filled for greater than 10 days.       Maryellen Ge MA                "

## 2022-03-01 NOTE — PROGRESS NOTES
Oncology/Hematology Visit Note  Mar 1, 2022    Reason for Visit: follow up of left breast cancer  Triple negative  Stage IIb  Patient of Dr. Mcmullen    01/18-started neoadjuvant chemotherapy with paclitaxel and carboplatin for 12 weeks followed by dose dense AC  With addition of pembrolizumab every 3 weeks      Patient canceled appointment last week    Interval History:  Patient reports overall feeling well.  She has sinus pressure she took Z-Erwin for it reports some improvement she would like me to order Z-Erwin in case she needs to take it again  Patient denies fever chills sweats cough shortness of breath chest pain nausea vomiting diarrhea abdominal pain.      Review of Systems:  14 point ROS of systems including Constitutional, Eyes, Respiratory, Cardiovascular, Gastroenterology, Genitourinary, Integumentary, Muscularskeletal, Psychiatric were all negative except for pertinent positives noted in my HPI.    Physical Examination:  General: The patient is a pleasant female in no acute distress.  /74   Pulse 91   Temp 98.2  F (36.8  C) (Oral)   Resp 16   Wt 60.8 kg (134 lb)   SpO2 99%   BMI 23.74 kg/m    HEENT: EOMI, PERRL. Sclerae are anicteric. Oral mucosa is pink and moist with no lesions or thrush.   Lymph: Neck is supple with no lymphadenopathy in the cervical or supraclavicular areas.   Heart: Regular rate and rhythm.   Lungs: Clear to auscultation bilaterally.   GI: Bowel sounds present, soft, nontender with no palpable hepatosplenomegaly or masses.   Extremities: No lower extremity edema noted bilaterally.   Skin: No rashes, petechiae, or bruising noted on exposed skin.    Laboratory Data:  CBC CMP results reviewed      Assessment and Plan:    This is a 76-year-old female with    left breast cancer  Triple negative  Stage IIb  Patient of Dr. Mcmullen  01/18/2022-started neoadjuvant chemotherapy with paclitaxel and carboplatin for 12 weeks followed by dose dense AC  With addition of pembrolizumab every 3  weeks  -Regimen and side effects of the treatment discussed in detail with patient   Labs reviewed abnormalities discussed  Echocardiogram reviewed normal heart function  -Last week patient canceled chemo because of emotional distress she was dealing with her dog  Patient comes here today for cycle 2-day 15  Labs reviewed abnormalities discussed okay to proceed with treatment  Patient has follow-up appointment with Dr. Lipscomb on 03/03   Patient follow-up has follow-up appointment with genetics next week       Chronic kidney disease  Creatinine at baseline  We will give NS bolus hydration bolus hydration   Continue follow-up with nephrologist    Hypercalcemia  Mild  As above we will give NS bolus hydration  Recheck calcium next week    Anemia  Mild continue to monitor closely      Elevated TSH-most likely secondary to pembrolizumab  Normal T4  Continue to monitor closely  Patient has follow-up appointment with endocrinology      FDG uptake at the anal canal  -Patient wants to wait for anoscopy and referral to colorectal since she is asymptomatic  -Plan to see colorectal after completion of the chemo for breast cancer    Right renal mass  Management per urology    Depression anxiety  Patient declined appointment with Dr Darin Jimenes  I discussed different coping mechanisms    Sinus pressure-improved  Feeling better after Z-Erwin  -Per patient request order Z-Erwin now in case sinus pressure returns in the future  Start McLennan spray      Call clinic with any changes in health condition or questions        HUBER Damon [facility]     Chart documentation with Dragon Voice recognition Software. Although reviewed after completion, some words and grammatical errors may remain.

## 2022-03-01 NOTE — PROGRESS NOTES
Infusion Nursing Note:  Jann Davidson presents today for Cycle 2 Day 15 Taxol, Carboplatin.    Patient seen by provider today: Yes: Tadeo Dugan NP.   present during visit today: Not Applicable.    Note: Patient given 1L NS per orders from Tadeo Dugan NP for elevated creatinine and elevated calcium.      Intravenous Access:  Implanted Port.    Treatment Conditions:  Lab Results   Component Value Date    HGB 10.0 (L) 03/01/2022    WBC 4.5 03/01/2022    ANEU 2.8 02/07/2022    ANEUTAUTO 2.0 03/01/2022     03/01/2022      Lab Results   Component Value Date     03/01/2022    POTASSIUM 4.1 03/01/2022    MAG 2.1 01/17/2022    CR 1.28 (H) 03/01/2022    AMY 10.2 (H) 03/01/2022    BILITOTAL 0.2 03/01/2022    ALBUMIN 3.7 03/01/2022    ALT 28 03/01/2022    AST 23 03/01/2022     Results reviewed, labs MET treatment parameters, ok to proceed with treatment.      Post Infusion Assessment:  Patient tolerated infusion without incident.  Blood return noted pre and post infusion.  Site patent and intact, free from redness, edema or discomfort.  No evidence of extravasations.  Access discontinued per protocol.       Discharge Plan:   Patient received prescription refills for Ativan and Azithromycin to outside pharmacy.  Discharge instructions reviewed with: Patient.  Patient verbalized understanding of discharge instructions and all questions answered.  AVS to patient via [facility].  Patient will return 3/2/22 for next appointment.   Patient discharged in stable condition accompanied by: self.  Departure Mode: Ambulatory.      Jessica Ellis RN

## 2022-03-01 NOTE — LETTER
"    3/1/2022         RE: Jann Davidson  [address]  [facility] [address]        Dear Colleague,    Thank you for referring your patient, Jann Davidson, to the [facility]. Please see a copy of my visit note below.    Oncology Rooming Note    March 1, 2022 11:10 AM   Jann Davidson is a 76 year old female who presents for:    No chief complaint on file.    Initial Vitals: /74   Pulse 91   Temp 98.2  F (36.8  C) (Oral)   Resp 16   Wt 60.8 kg (134 lb)   SpO2 99%   BMI 23.74 kg/m   Estimated body mass index is 23.74 kg/m  as calculated from the following:    Height as of 1/31/22: 1.6 m (5' 2.99\").    Weight as of this encounter: 60.8 kg (134 lb). Body surface area is 1.64 meters squared.  No Pain (0) Comment: Data Unavailable   No LMP recorded. Patient is postmenopausal.  Allergies reviewed: Yes  Medications reviewed: Yes    Medications: Medication refills not needed today.  Pharmacy name entered into EPIC:    [facility][id number] - [place], [place] - [address]  [facility] - [place], [place] - [address] [place] [address] [facility] - [place], [place] - [address]    Clinical concerns: Patient hoping a prescription for Lorazepam can be filled for greater than 10 days.       Maryellen Ge MA                  Oncology/Hematology Visit Note  Mar 1, 2022    Reason for Visit: follow up of left breast cancer  Triple negative  Stage IIb  Patient of Dr. Mcmullen    01/18-started neoadjuvant chemotherapy with paclitaxel and carboplatin for 12 weeks followed by dose dense AC  With addition of pembrolizumab every 3 weeks      Patient canceled appointment last week    Interval History:  Patient reports overall feeling well.  She has sinus pressure she took Z-Erwin for it reports some improvement she would like me to order Z-Erwin in case she needs to take it again  Patient denies fever chills sweats cough shortness of " breath chest pain nausea vomiting diarrhea abdominal pain.      Review of Systems:  14 point ROS of systems including Constitutional, Eyes, Respiratory, Cardiovascular, Gastroenterology, Genitourinary, Integumentary, Muscularskeletal, Psychiatric were all negative except for pertinent positives noted in my HPI.    Physical Examination:  General: The patient is a pleasant female in no acute distress.  /74   Pulse 91   Temp 98.2  F (36.8  C) (Oral)   Resp 16   Wt 60.8 kg (134 lb)   SpO2 99%   BMI 23.74 kg/m    HEENT: EOMI, PERRL. Sclerae are anicteric. Oral mucosa is pink and moist with no lesions or thrush.   Lymph: Neck is supple with no lymphadenopathy in the cervical or supraclavicular areas.   Heart: Regular rate and rhythm.   Lungs: Clear to auscultation bilaterally.   GI: Bowel sounds present, soft, nontender with no palpable hepatosplenomegaly or masses.   Extremities: No lower extremity edema noted bilaterally.   Skin: No rashes, petechiae, or bruising noted on exposed skin.    Laboratory Data:  CBC CMP results reviewed      Assessment and Plan:    This is a 76-year-old female with    left breast cancer  Triple negative  Stage IIb  Patient of Dr. Mcmullen  01/18/2022-started neoadjuvant chemotherapy with paclitaxel and carboplatin for 12 weeks followed by dose dense AC  With addition of pembrolizumab every 3 weeks  -Regimen and side effects of the treatment discussed in detail with patient   Labs reviewed abnormalities discussed  Echocardiogram reviewed normal heart function  -Last week patient canceled chemo because of emotional distress she was dealing with her dog  Patient comes here today for cycle 2-day 15  Labs reviewed abnormalities discussed okay to proceed with treatment  Patient has follow-up appointment with Dr. Lipscomb on 03/03   Patient follow-up has follow-up appointment with genetics next week       Chronic kidney disease  Creatinine at baseline  Continue follow-up with  nephrologist    Anemia  Mild continue to monitor closely      Elevated TSH-most likely secondary to pembrolizumab  Normal T4  Continue to monitor closely  Patient has follow-up appointment with endocrinology      FDG uptake at the anal canal  -Patient wants to wait for anoscopy and referral to colorectal since she is asymptomatic  -Plan to see colorectal after completion of the chemo for breast cancer    Right renal mass  Management per urology    Depression anxiety  Patient declined appointment with Dr Darin Jimenes  I discussed different coping mechanisms    Sinus pressure-improved  Feeling better after Z-Erwin  -Per patient request order Z-Erwin now in case sinus pressure returns in the future  Start Pence spray      Call clinic with any changes in health condition or questions        HUBER Damon CNP  [facility]     Chart documentation with Dragon Voice recognition Software. Although reviewed after completion, some words and grammatical errors may remain.            Again, thank you for allowing me to participate in the care of your patient.        Sincerely,        HUBER Damon CNP

## 2022-03-02 ENCOUNTER — INFUSION THERAPY VISIT (OUTPATIENT)
Dept: INFUSION THERAPY | Facility: CLINIC | Age: 77
End: 2022-03-02
Attending: INTERNAL MEDICINE
Payer: MEDICARE

## 2022-03-02 VITALS
TEMPERATURE: 98.4 F | SYSTOLIC BLOOD PRESSURE: 128 MMHG | OXYGEN SATURATION: 100 % | HEART RATE: 99 BPM | RESPIRATION RATE: 18 BRPM | DIASTOLIC BLOOD PRESSURE: 70 MMHG

## 2022-03-02 DIAGNOSIS — D70.1 CHEMOTHERAPY-INDUCED NEUTROPENIA (H): Primary | ICD-10-CM

## 2022-03-02 DIAGNOSIS — C50.512 MALIGNANT NEOPLASM OF LOWER-OUTER QUADRANT OF LEFT BREAST OF FEMALE, ESTROGEN RECEPTOR NEGATIVE (H): ICD-10-CM

## 2022-03-02 DIAGNOSIS — T45.1X5A CHEMOTHERAPY-INDUCED NEUTROPENIA (H): Primary | ICD-10-CM

## 2022-03-02 DIAGNOSIS — Z17.1 MALIGNANT NEOPLASM OF LOWER-OUTER QUADRANT OF LEFT BREAST OF FEMALE, ESTROGEN RECEPTOR NEGATIVE (H): ICD-10-CM

## 2022-03-02 PROCEDURE — 96372 THER/PROPH/DIAG INJ SC/IM: CPT | Performed by: NURSE PRACTITIONER

## 2022-03-02 PROCEDURE — 250N000011 HC RX IP 250 OP 636: Performed by: NURSE PRACTITIONER

## 2022-03-02 RX ADMIN — FILGRASTIM 300 MCG: 300 INJECTION, SOLUTION INTRAVENOUS; SUBCUTANEOUS at 14:53

## 2022-03-02 NOTE — PROGRESS NOTES
Infusion Nursing Note:  Jann Davidson presents today for Neupogen.    Patient seen by provider today: No   present during visit today: Not Applicable.    Note: Pt continues to have sinus/nasal congestion with blood clots when she blows her nose. No epistaxis. Pt on Azithromycin.      Intravenous Access:  No Intravenous access/labs at this visit.    Treatment Conditions:  Not Applicable.      Post Infusion Assessment:  Patient tolerated injection without incident.  Site patent and intact, free from redness, edema or discomfort.       Discharge Plan:   Discharge instructions reviewed with: Patient.  Patient and/or family verbalized understanding of discharge instructions and all questions answered.  AVS to patient via Exanet.  Patient will return 3/3/22 for next appointment.   Patient discharged in stable condition accompanied by: self.  Departure Mode: Ambulatory.      Arminda Danielle RN

## 2022-03-03 ENCOUNTER — ONCOLOGY VISIT (OUTPATIENT)
Dept: ONCOLOGY | Facility: CLINIC | Age: 77
End: 2022-03-03
Attending: NURSE PRACTITIONER
Payer: MEDICARE

## 2022-03-03 ENCOUNTER — DOCUMENTATION ONLY (OUTPATIENT)
Dept: ONCOLOGY | Facility: CLINIC | Age: 77
End: 2022-03-03
Payer: MEDICARE

## 2022-03-03 VITALS
OXYGEN SATURATION: 100 % | SYSTOLIC BLOOD PRESSURE: 142 MMHG | BODY MASS INDEX: 23.57 KG/M2 | DIASTOLIC BLOOD PRESSURE: 84 MMHG | WEIGHT: 133 LBS | HEART RATE: 96 BPM | RESPIRATION RATE: 18 BRPM | TEMPERATURE: 97.4 F

## 2022-03-03 DIAGNOSIS — R94.6 THYROID FUNCTION TEST ABNORMAL: ICD-10-CM

## 2022-03-03 DIAGNOSIS — C50.512 MALIGNANT NEOPLASM OF LOWER-OUTER QUADRANT OF LEFT BREAST OF FEMALE, ESTROGEN RECEPTOR NEGATIVE (H): Primary | ICD-10-CM

## 2022-03-03 DIAGNOSIS — D64.81 ANEMIA DUE TO ANTINEOPLASTIC CHEMOTHERAPY: ICD-10-CM

## 2022-03-03 DIAGNOSIS — D70.1 CHEMOTHERAPY-INDUCED NEUTROPENIA (H): ICD-10-CM

## 2022-03-03 DIAGNOSIS — T45.1X5A CHEMOTHERAPY-INDUCED NEUTROPENIA (H): ICD-10-CM

## 2022-03-03 DIAGNOSIS — T45.1X5A ANEMIA DUE TO ANTINEOPLASTIC CHEMOTHERAPY: ICD-10-CM

## 2022-03-03 DIAGNOSIS — Z17.1 MALIGNANT NEOPLASM OF LOWER-OUTER QUADRANT OF LEFT BREAST OF FEMALE, ESTROGEN RECEPTOR NEGATIVE (H): Primary | ICD-10-CM

## 2022-03-03 PROCEDURE — G0463 HOSPITAL OUTPT CLINIC VISIT: HCPCS | Mod: 25

## 2022-03-03 PROCEDURE — 250N000011 HC RX IP 250 OP 636: Performed by: NURSE PRACTITIONER

## 2022-03-03 PROCEDURE — 96372 THER/PROPH/DIAG INJ SC/IM: CPT | Performed by: NURSE PRACTITIONER

## 2022-03-03 PROCEDURE — 99214 OFFICE O/P EST MOD 30 MIN: CPT | Performed by: INTERNAL MEDICINE

## 2022-03-03 RX ADMIN — FILGRASTIM 300 MCG: 300 INJECTION, SOLUTION INTRAVENOUS; SUBCUTANEOUS at 16:20

## 2022-03-03 ASSESSMENT — PAIN SCALES - GENERAL: PAINLEVEL: NO PAIN (0)

## 2022-03-03 NOTE — PROGRESS NOTES
"Oncology Rooming Note    March 3, 2022 3:34 PM   Jann Davidson is a 76 year old female who presents for:    Chief Complaint   Patient presents with     Oncology Clinic Visit     Initial Vitals: BP (!) 142/84   Pulse 96   Temp 97.4  F (36.3  C) (Oral)   Resp 18   Wt 60.3 kg (133 lb)   SpO2 100%   BMI 23.57 kg/m   Estimated body mass index is 23.57 kg/m  as calculated from the following:    Height as of 1/31/22: 1.6 m (5' 2.99\").    Weight as of this encounter: 60.3 kg (133 lb). Body surface area is 1.64 meters squared.  No Pain (0) Comment: Data Unavailable   No LMP recorded. Patient is postmenopausal.  Allergies reviewed: Yes  Medications reviewed: Yes    Medications: Medication refills not needed today.  Pharmacy name entered into EPIC:    [id number] - [place], [place] - [address]  [facility] - [place], [place] - [address] [place] [address] [facility] - [place], [place] - [address]    Clinical concerns: can't breath through nose Dr. Mcmullen was notified.      Shari J. Schoenberger, [facility]            "

## 2022-03-03 NOTE — PATIENT INSTRUCTIONS
1. Arrange for Taxol + carboplatin weekly through 4/13/2022 with weekly lab draw.  2. Neupogen for 3 days after 3/23 and 4/13.  3. Arrange for breast MRI 4/19/2022.  4. RTC MD 4/21/2022 at 1:10PM.

## 2022-03-03 NOTE — LETTER
3/3/2022         RE: aJnn Davidson  [address] [facility] [address]        Dear Colleague,    Thank you for referring your patient, Jann Davidson, to the [facility]. Please see a copy of my visit note below.    [facility]    Hematology/Oncology Established Patient Note      Today's Date: 3/3/2022    Reason for follow-up:  Left breast cancer, triple negative.    HISTORY OF PRESENT ILLNESS: Jann Davidson is a 76 year old female who presents with the following oncologic history:  1. 10/26/2021: Mammogram showed calcifications in the left lateral breast; right breast negative.  2. 11/17/2021: Left diagnostic mammogram showed cluster of pleomorphic calcifications at 4:00, 6 cm from nipple, measuring 1.3 cm.  3. 12/3/2021: Stereotactic left breast biopsy at 4:00, 6 cm from nipple showed grade 2 invasive ductal carcinoma with micropapillary features, extensive lymphovascular invasion present, grade 2-3 focal DCIS, LCIS, ER negative, CT negative, HER-2/maxime FISH negative.  4. 12/10/2021: Breast MRI showed oval mass 2 x 2 x 1.5 cm at 4:00, 6 cm from nipple in left breast reflecting biopsy cavity with post-biopsy changes; prominent 2.5 cm low left level 1 axillary lymph node.  5.  12/28/2021: PET/CT scan showed FDG avid focus in left lower outer breast, hypermetabolic left axillary adenopathy, moderate FDG uptake at level off anus, exophytic right renal 1.1 cm mass, slowly increasing in size since 2016 concerning for growing renal cell carcinoma.  6. 1/18/2022: Started neoadjuvant chemotherapy with weekly paclitaxel + carboplatin and every 3-week pembrolizumab as per KEYNOTE-522 study.    INTERVAL HISTORY:  Jann reports nasal congestion improved after azithromycin. She denies fevers, chills, or sore throat. She tested negative for COVID-19 on 1/31/2022. She is grieving from the loss of her 19-year-old dog from last week.      REVIEW OF SYSTEMS:   14 point ROS was reviewed  and is negative other than as noted above in HPI.       HOME MEDICATIONS:  Current Outpatient Medications   Medication Sig Dispense Refill     atorvastatin (LIPITOR) 10 MG tablet TAKE 1 TABLET (10 MG) BY MOUTH ONCE DAILY 90 tablet 3     candesartan (ATACAND) 4 MG tablet Take 0.5 tablets (2 mg) by mouth 2 times daily DISPENSE AS WRITTEN, Brand name only 45 tablet 2     lidocaine-prilocaine (EMLA) 2.5-2.5 % external cream Apply topically once as needed for moderate pain Patient to apply to chest port site 30 minutes prior to port access. 30 g 1     LORazepam (ATIVAN) 0.5 MG tablet Take 1 tablet (0.5 mg) by mouth every 6 hours as needed for anxiety, nausea or sleep 30 tablet 0     Magnesium 400 MG TABS Take 400 mg by mouth       metoclopramide (REGLAN) 5 MG tablet Take 1 tablet (5 mg) by mouth 4 times daily as needed (Nausea) 90 tablet 1     oxazepam (SERAX) 15 MG capsule Take 1 capsule (15 mg) by mouth nightly as needed for anxiety or sleep OK for generic please notify patient regarding supply thanks 31 capsule 1     PARNATE 10 MG tablet TAKE 1 TABLET BY MOUTH 3 TIMES DAILY 270 tablet 3     polyethylene glycol (MIRALAX/GLYCOLAX) packet Take 1 packet by mouth daily       predniSONE (DELTASONE) 10 MG tablet Take 1 tablet (10 mg) by mouth daily 5 tablet 0     prochlorperazine (COMPAZINE) 10 MG tablet Take 1 tablet (10 mg) by mouth every 6 hours as needed (Nausea/Vomiting) 30 tablet 2     triamcinolone (KENALOG) 0.1 % external cream Apply  topically 2 times daily as needed. 15 g 1     triamterene-HCTZ (MAXZIDE-25) 37.5-25 MG tablet TAKE 1 TABLET BY MOUTH EVERY MORNING 90 tablet 2     zolpidem (AMBIEN) 5 MG tablet Take 1 tablet (5 mg) by mouth nightly as needed for sleep OK for generic please notify patient regarding supply thanks 31 tablet 1         ALLERGIES:  Allergies   Allergen Reactions     Wheat Bran          PAST MEDICAL HISTORY:  Past Medical History:   Diagnosis Date     Hypertension goal BP (blood pressure) <  140/90      Recurrent sinusitis 2013     Gynecologic history:  Age of menarche at 11; LMP ;  (one son), 1st pregnancy at age 19; no HRT.    PAST SURGICAL HISTORY:  Past Surgical History:   Procedure Laterality Date     BREAST BIOPSY, RT/LT      Breat Biopsy RT/LT     COLONOSCOPY  10/03     COLONOSCOPY  2014    Procedure: COLONOSCOPY;  COLONOSCOPY ;  Surgeon: Gaurav Shaffer MD;  Location: [facility]     IR CHEST PORT PLACEMENT > 5 YRS OF AGE  2021     RECONSTRUCT EYELID           SOCIAL HISTORY:  Social History     Socioeconomic History     Marital status:      Spouse name: Not on file     Number of children: Not on file     Years of education: Not on file     Highest education level: Not on file   Occupational History     Not on file   Tobacco Use     Smoking status: Former Smoker     Types: Cigarettes     Quit date: 10/30/1972     Years since quittin.3     Smokeless tobacco: Never Used   Substance and Sexual Activity     Alcohol use: Yes     Comment: couple glasses of wine daily      Drug use: No     Sexual activity: Never   Other Topics Concern     Parent/sibling w/ CABG, MI or angioplasty before 65F 55M? Not Asked   Social History Narrative     Not on file     Social Determinants of Health     Financial Resource Strain: Not on file   Food Insecurity: Not on file   Transportation Needs: Not on file   Physical Activity: Not on file   Stress: Not on file   Social Connections: Not on file   Intimate Partner Violence: Not on file   Housing Stability: Not on file         FAMILY HISTORY:  Family History   Problem Relation Age of Onset     Cancer Mother         uterine     Breast Cancer Mother      Diabetes Paternal Grandmother          PHYSICAL EXAM:  Vital signs:  BP (!) 142/84   Pulse 96   Temp 97.4  F (36.3  C) (Oral)   Resp 18   Wt 60.3 kg (133 lb)   SpO2 100%   BMI 23.57 kg/m     ECO  GENERAL/CONSTITUTIONAL: No acute distress.  EYES: No erythema or scleral  icterus.  ENT/MOUTH: Neck supple. Mask in place.  LYMPH: No cervical, supraclavicular, adenopathy. Left axillary lymphadenopathy no longer palpable.  BREAST: 1.5-cm mass palpable and mobile inf the left lower outer breast. No masses in right breast. No dimpling or ulceration.  RESPIRATORY: Clear to auscultation bilaterally. No crackles or wheezing.   CARDIOVASCULAR: Regular rate and rhythm without murmurs.  GASTROINTESTINAL: No hepatosplenomegaly, masses, or tenderness. No guarding.  No distention.  MUSCULOSKELETAL: Warm and well-perfused, no cyanosis, clubbing, or edema.  NEUROLOGIC: No focal motor deficits. Alert, oriented, answers questions appropriately.  INTEGUMENTARY: No rashes or jaundice. Ecchymosis at port site.  GAIT: Steady, does not use assistive device    LABS:  CBC RESULTS: Recent Labs   Lab Test 03/01/22  1056   WBC 4.5   RBC 3.11*   HGB 10.0*   HCT 30.3*   MCV 97   MCH 32.2   MCHC 33.0   RDW 16.1*        Last Comprehensive Metabolic Panel:  Sodium   Date Value Ref Range Status   03/01/2022 135 133 - 144 mmol/L Final   06/05/2019 142 133 - 144 mmol/L Final     Potassium   Date Value Ref Range Status   03/01/2022 4.1 3.4 - 5.3 mmol/L Final   06/05/2019 3.7 3.4 - 5.3 mmol/L Final     Chloride   Date Value Ref Range Status   03/01/2022 101 94 - 109 mmol/L Final   06/05/2019 110 (H) 94 - 109 mmol/L Final     Carbon Dioxide   Date Value Ref Range Status   06/05/2019 24 20 - 32 mmol/L Final     Carbon Dioxide (CO2)   Date Value Ref Range Status   03/01/2022 27 20 - 32 mmol/L Final     Anion Gap   Date Value Ref Range Status   03/01/2022 7 3 - 14 mmol/L Final   06/05/2019 8 3 - 14 mmol/L Final     Glucose   Date Value Ref Range Status   03/01/2022 117 (H) 70 - 99 mg/dL Final   06/05/2019 93 70 - 99 mg/dL Final     Comment:     Fasting specimen     Urea Nitrogen   Date Value Ref Range Status   03/01/2022 39 (H) 7 - 30 mg/dL Final   06/05/2019 32 (H) 7 - 30 mg/dL Final     Creatinine   Date Value Ref  Range Status   03/01/2022 1.28 (H) 0.52 - 1.04 mg/dL Final   06/05/2019 1.15 (H) 0.52 - 1.04 mg/dL Final     GFR Estimate   Date Value Ref Range Status   03/01/2022 43 (L) >60 mL/min/1.73m2 Final     Comment:     Effective December 21, 2021 eGFRcr in adults is calculated using the 2021 CKD-EPI creatinine equation which includes age and gender (Meliton et al., NE, DOI: 10.1056/SBEWoa2233463)   06/05/2019 47 (L) >60 mL/min/[1.73_m2] Final     Comment:     Non  GFR Calc  Starting 12/18/2018, serum creatinine based estimated GFR (eGFR) will be   calculated using the Chronic Kidney Disease Epidemiology Collaboration   (CKD-EPI) equation.       GFR, ESTIMATED POCT   Date Value Ref Range Status   12/28/2021 33 (L) >60 mL/min/1.73m2 Final     Calcium   Date Value Ref Range Status   03/01/2022 10.2 (H) 8.5 - 10.1 mg/dL Final   06/05/2019 9.2 8.5 - 10.1 mg/dL Final     Bilirubin Total   Date Value Ref Range Status   03/01/2022 0.2 0.2 - 1.3 mg/dL Final   06/05/2019 0.4 0.2 - 1.3 mg/dL Final     Alkaline Phosphatase   Date Value Ref Range Status   03/01/2022 114 40 - 150 U/L Final   06/05/2019 85 40 - 150 U/L Final     ALT   Date Value Ref Range Status   03/01/2022 28 0 - 50 U/L Final   06/05/2019 33 0 - 50 U/L Final     AST   Date Value Ref Range Status   03/01/2022 23 0 - 45 U/L Final   06/05/2019 33 0 - 45 U/L Final         PATHOLOGY:  None new.    IMAGING:  None new.    ASSESSMENT/PLAN:  Jann Davidson is a 76 year old female with the following issues:  1. 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 Abnormal thyroid function test  --Jann is on neoadjuvant chemotherapy with paclitaxel and carboplatin for 12 weeks with every 3-week pembrolizumab, tolerating this with some sinus congestion and fatigue.  --She has developed elevated TSH with normal free T4 -- will continue to  monitor this to ensure she is not developing thyroiditis from pembrolizumab.  She will be seeing Endocrinology on 3/30/2022 for further evaluation.  --Plan to repeat breast MRI after 4/13/2022 -- if there is significant radiographic residual disease, I will recommend continuing with chemotherapy with Adriamycin and Cytoxan every 3 weeks for 4 cycles with the addition of pembrolizumab every 3 weeks unless she has significant difficulty tolerating carboplatin and paclitaxel. Echocardiogram 1/18/2022 showed normal LVEF.  --Genetics consult scheduled for 3/8/2022 -- recommended given that her cancer is triple negative and mother had breast and uterine cancer. This could have implications if she might be a candidate for adjuvant olaparib if she has a BRCA gene mutation.    3. Chronic kidney disease, stage 3  --She follows with Dr. Luna.  --Chemo dosing would be in accordance with her kidney function as clinically appropriate.    4. FDG uptake at anal canal  --She has history of anal fissure from 4/8/2014 colonoscopy.  --PET scan showed uptake at level of anus and could represent hemorrhoid, fissure, malignancy, or be physiologic.  --She is asymptomatic and would like to delay anoscopy and referral to colorectal for now. Discussed we can delay until completion of chemo for her breast cancer.    5. Right renal mass  --Discussed with Jann that PET scan showed a 1.1 cm mass in the right kidney that has been reportedly slowly growing since 2016 concerning for renal cell carcinoma.  --Will not be able to further evaluate with contrast MRI due to her degree of chronic kidney disease.  Will refer her to urology for surveillance/intervention plan after completion of chemo for her breast cancer.    6. Pulmonary nodules  --PET scan showed scattered small bilateral pulmonary nodules that are unchanged since 10/24/2018 and most likely benign.    7. Depression/anxiety  --Worsened lately due to cancer diagnosis.  --She would like to  "continue on Parnate and not change her antidepressant.  --I offered referral to Dr. Darin Jimenes and she declined this today.  --She takes oxazepam together with zolpidem.    --She could use lorazepam (Ativan) as long as she takes this 6 hours from her other benzodiazepenes. Prescription issued.    Will have her return to see me after 4/13/2022 with repeat breast MRI.     Neetu Mcmullen MD  Hematology/Oncology  [facility]    Total time spent: 30 minutes in patient evaluation, counseling, documentation, and coordination of care.    Oncology Rooming Note    March 3, 2022 3:34 PM   Jann Davidson is a 76 year old female who presents for:    Chief Complaint   Patient presents with     Oncology Clinic Visit     Initial Vitals: BP (!) 142/84   Pulse 96   Temp 97.4  F (36.3  C) (Oral)   Resp 18   Wt 60.3 kg (133 lb)   SpO2 100%   BMI 23.57 kg/m   Estimated body mass index is 23.57 kg/m  as calculated from the following:    Height as of 1/31/22: 1.6 m (5' 2.99\").    Weight as of this encounter: 60.3 kg (133 lb). Body surface area is 1.64 meters squared.  No Pain (0) Comment: Data Unavailable   No LMP recorded. Patient is postmenopausal.  Allergies reviewed: Yes  Medications reviewed: Yes    Medications: Medication refills not needed today.  Pharmacy name entered into EPIC:    [facility][id number] - [place], [place] - [address]  [facility] - [place], [place] - [address] [place] [address] [facility]CIALTY [facility]ACY - [place], [place] - [address]    Clinical concerns: can't breath through nose Dr. Mcmullen was notified.      Shari J. Schoenberger, [facility]                Again, thank you for allowing me to participate in the care of your patient.        Sincerely,        Neetu Mcmullen MD    "

## 2022-03-04 ENCOUNTER — INFUSION THERAPY VISIT (OUTPATIENT)
Dept: INFUSION THERAPY | Facility: CLINIC | Age: 77
End: 2022-03-04
Attending: INTERNAL MEDICINE
Payer: MEDICARE

## 2022-03-04 VITALS
DIASTOLIC BLOOD PRESSURE: 67 MMHG | SYSTOLIC BLOOD PRESSURE: 125 MMHG | RESPIRATION RATE: 16 BRPM | OXYGEN SATURATION: 100 % | HEART RATE: 89 BPM | TEMPERATURE: 98.3 F

## 2022-03-04 DIAGNOSIS — C50.512 MALIGNANT NEOPLASM OF LOWER-OUTER QUADRANT OF LEFT BREAST OF FEMALE, ESTROGEN RECEPTOR NEGATIVE (H): ICD-10-CM

## 2022-03-04 DIAGNOSIS — T45.1X5A CHEMOTHERAPY-INDUCED NEUTROPENIA (H): Primary | ICD-10-CM

## 2022-03-04 DIAGNOSIS — D70.1 CHEMOTHERAPY-INDUCED NEUTROPENIA (H): Primary | ICD-10-CM

## 2022-03-04 DIAGNOSIS — Z17.1 MALIGNANT NEOPLASM OF LOWER-OUTER QUADRANT OF LEFT BREAST OF FEMALE, ESTROGEN RECEPTOR NEGATIVE (H): ICD-10-CM

## 2022-03-04 PROCEDURE — 96372 THER/PROPH/DIAG INJ SC/IM: CPT | Performed by: NURSE PRACTITIONER

## 2022-03-04 PROCEDURE — 250N000011 HC RX IP 250 OP 636: Performed by: NURSE PRACTITIONER

## 2022-03-04 RX ADMIN — FILGRASTIM 300 MCG: 300 INJECTION, SOLUTION INTRAVENOUS; SUBCUTANEOUS at 14:42

## 2022-03-04 ASSESSMENT — PAIN SCALES - GENERAL: PAINLEVEL: NO PAIN (0)

## 2022-03-04 NOTE — PROGRESS NOTES
Infusion Nursing Note:  Jann Davidson presents today for Neupogen.    Patient seen by provider today: No   present during visit today: Not Applicable.    Note: Patient reports increased upper leg bone pain last night.  Pain was severe enough to cause patient to not sleep.  Denies any pain today.  This RN recommended that patient try to use Claritin to help with pain control.  Patient reports understanding if the plan.        Intravenous Access:  No Intravenous access/labs at this visit.    Treatment Conditions:  Not Applicable.      Post Infusion Assessment:  Patient tolerated one Neupogen injection without incident to LLQ of the abdomen.       Discharge Plan:   Patient declined prescription refills.  Discharge instructions reviewed with: Patient.  Patient and/or family verbalized understanding of discharge instructions and all questions answered.  AVS to patient via Carma.  Patient will return 3/8/22 for labs and C3D1 Taxol /Carbo for next appointment.   Patient discharged in stable condition accompanied by: self.  Departure Mode: Ambulatory.      Reba Carrion RN

## 2022-03-06 DIAGNOSIS — D70.1 CHEMOTHERAPY-INDUCED NEUTROPENIA (H): Primary | ICD-10-CM

## 2022-03-06 DIAGNOSIS — C50.512 MALIGNANT NEOPLASM OF LOWER-OUTER QUADRANT OF LEFT BREAST OF FEMALE, ESTROGEN RECEPTOR NEGATIVE (H): ICD-10-CM

## 2022-03-06 DIAGNOSIS — Z17.1 MALIGNANT NEOPLASM OF LOWER-OUTER QUADRANT OF LEFT BREAST OF FEMALE, ESTROGEN RECEPTOR NEGATIVE (H): ICD-10-CM

## 2022-03-06 DIAGNOSIS — T45.1X5A CHEMOTHERAPY-INDUCED NEUTROPENIA (H): Primary | ICD-10-CM

## 2022-03-06 RX ORDER — ALBUTEROL SULFATE 90 UG/1
1-2 AEROSOL, METERED RESPIRATORY (INHALATION)
Status: CANCELLED
Start: 2022-03-09

## 2022-03-06 RX ORDER — MEPERIDINE HYDROCHLORIDE 25 MG/ML
25 INJECTION INTRAMUSCULAR; INTRAVENOUS; SUBCUTANEOUS EVERY 30 MIN PRN
Status: CANCELLED | OUTPATIENT
Start: 2022-03-09

## 2022-03-06 RX ORDER — EPINEPHRINE 1 MG/ML
0.3 INJECTION, SOLUTION, CONCENTRATE INTRAVENOUS EVERY 5 MIN PRN
Status: CANCELLED | OUTPATIENT
Start: 2022-03-23

## 2022-03-06 RX ORDER — NALOXONE HYDROCHLORIDE 0.4 MG/ML
0.2 INJECTION, SOLUTION INTRAMUSCULAR; INTRAVENOUS; SUBCUTANEOUS
Status: CANCELLED | OUTPATIENT
Start: 2022-03-16

## 2022-03-06 RX ORDER — NALOXONE HYDROCHLORIDE 0.4 MG/ML
0.2 INJECTION, SOLUTION INTRAMUSCULAR; INTRAVENOUS; SUBCUTANEOUS
Status: CANCELLED | OUTPATIENT
Start: 2022-03-09

## 2022-03-06 RX ORDER — EPINEPHRINE 1 MG/ML
0.3 INJECTION, SOLUTION, CONCENTRATE INTRAVENOUS EVERY 5 MIN PRN
Status: CANCELLED | OUTPATIENT
Start: 2022-03-09

## 2022-03-06 RX ORDER — LORAZEPAM 2 MG/ML
0.5 INJECTION INTRAMUSCULAR EVERY 4 HOURS PRN
Status: CANCELLED | OUTPATIENT
Start: 2022-03-09

## 2022-03-06 RX ORDER — HEPARIN SODIUM,PORCINE 10 UNIT/ML
5 VIAL (ML) INTRAVENOUS
Status: CANCELLED | OUTPATIENT
Start: 2022-03-23

## 2022-03-06 RX ORDER — HEPARIN SODIUM,PORCINE 10 UNIT/ML
5 VIAL (ML) INTRAVENOUS
Status: CANCELLED | OUTPATIENT
Start: 2022-03-09

## 2022-03-06 RX ORDER — HEPARIN SODIUM (PORCINE) LOCK FLUSH IV SOLN 100 UNIT/ML 100 UNIT/ML
5 SOLUTION INTRAVENOUS
Status: CANCELLED | OUTPATIENT
Start: 2022-03-16

## 2022-03-06 RX ORDER — DIPHENHYDRAMINE HCL 25 MG
50 CAPSULE ORAL
Status: CANCELLED
Start: 2022-03-16

## 2022-03-06 RX ORDER — DIPHENHYDRAMINE HYDROCHLORIDE 50 MG/ML
50 INJECTION INTRAMUSCULAR; INTRAVENOUS
Status: CANCELLED
Start: 2022-03-09

## 2022-03-06 RX ORDER — NALOXONE HYDROCHLORIDE 0.4 MG/ML
0.2 INJECTION, SOLUTION INTRAMUSCULAR; INTRAVENOUS; SUBCUTANEOUS
Status: CANCELLED | OUTPATIENT
Start: 2022-03-23

## 2022-03-06 RX ORDER — DIPHENHYDRAMINE HCL 25 MG
50 CAPSULE ORAL
Status: CANCELLED
Start: 2022-03-23

## 2022-03-06 RX ORDER — ALBUTEROL SULFATE 0.83 MG/ML
2.5 SOLUTION RESPIRATORY (INHALATION)
Status: CANCELLED | OUTPATIENT
Start: 2022-03-09

## 2022-03-06 RX ORDER — HEPARIN SODIUM (PORCINE) LOCK FLUSH IV SOLN 100 UNIT/ML 100 UNIT/ML
5 SOLUTION INTRAVENOUS
Status: CANCELLED | OUTPATIENT
Start: 2022-03-09

## 2022-03-06 RX ORDER — ALBUTEROL SULFATE 90 UG/1
1-2 AEROSOL, METERED RESPIRATORY (INHALATION)
Status: CANCELLED
Start: 2022-03-16

## 2022-03-06 RX ORDER — DIPHENHYDRAMINE HYDROCHLORIDE 50 MG/ML
50 INJECTION INTRAMUSCULAR; INTRAVENOUS
Status: CANCELLED
Start: 2022-03-16

## 2022-03-06 RX ORDER — ALBUTEROL SULFATE 0.83 MG/ML
2.5 SOLUTION RESPIRATORY (INHALATION)
Status: CANCELLED | OUTPATIENT
Start: 2022-03-16

## 2022-03-06 RX ORDER — EPINEPHRINE 1 MG/ML
0.3 INJECTION, SOLUTION, CONCENTRATE INTRAVENOUS EVERY 5 MIN PRN
Status: CANCELLED | OUTPATIENT
Start: 2022-03-16

## 2022-03-06 RX ORDER — HEPARIN SODIUM (PORCINE) LOCK FLUSH IV SOLN 100 UNIT/ML 100 UNIT/ML
5 SOLUTION INTRAVENOUS
Status: CANCELLED | OUTPATIENT
Start: 2022-03-23

## 2022-03-06 RX ORDER — ALBUTEROL SULFATE 0.83 MG/ML
2.5 SOLUTION RESPIRATORY (INHALATION)
Status: CANCELLED | OUTPATIENT
Start: 2022-03-23

## 2022-03-06 RX ORDER — HEPARIN SODIUM,PORCINE 10 UNIT/ML
5 VIAL (ML) INTRAVENOUS
Status: CANCELLED | OUTPATIENT
Start: 2022-03-16

## 2022-03-06 RX ORDER — DIPHENHYDRAMINE HCL 25 MG
50 CAPSULE ORAL
Status: CANCELLED
Start: 2022-03-09

## 2022-03-06 RX ORDER — LORAZEPAM 2 MG/ML
0.5 INJECTION INTRAMUSCULAR EVERY 4 HOURS PRN
Status: CANCELLED | OUTPATIENT
Start: 2022-03-23

## 2022-03-06 RX ORDER — DIPHENHYDRAMINE HYDROCHLORIDE 50 MG/ML
50 INJECTION INTRAMUSCULAR; INTRAVENOUS
Status: CANCELLED
Start: 2022-03-23

## 2022-03-06 RX ORDER — ALBUTEROL SULFATE 90 UG/1
1-2 AEROSOL, METERED RESPIRATORY (INHALATION)
Status: CANCELLED
Start: 2022-03-23

## 2022-03-06 RX ORDER — LORAZEPAM 2 MG/ML
0.5 INJECTION INTRAMUSCULAR EVERY 4 HOURS PRN
Status: CANCELLED | OUTPATIENT
Start: 2022-03-16

## 2022-03-06 RX ORDER — MEPERIDINE HYDROCHLORIDE 25 MG/ML
25 INJECTION INTRAMUSCULAR; INTRAVENOUS; SUBCUTANEOUS EVERY 30 MIN PRN
Status: CANCELLED | OUTPATIENT
Start: 2022-03-16

## 2022-03-06 RX ORDER — MEPERIDINE HYDROCHLORIDE 25 MG/ML
25 INJECTION INTRAMUSCULAR; INTRAVENOUS; SUBCUTANEOUS EVERY 30 MIN PRN
Status: CANCELLED | OUTPATIENT
Start: 2022-03-23

## 2022-03-07 ENCOUNTER — PATIENT OUTREACH (OUTPATIENT)
Dept: CARE COORDINATION | Facility: CLINIC | Age: 77
End: 2022-03-07
Payer: MEDICARE

## 2022-03-07 NOTE — PROGRESS NOTES
.Social Work Progress Note      Data/Intervention:  Patient Name:  Jann Davidson  /Age:  1945 (76 year old)    Reason for Follow-Up:  Jann is a 76-year-old woman with a diagnosis of breast cancer who is followed by Dr. Mcmullen. This clinician called Jann today for psychosocial check-in.    Intervention:   SW was unable to see Jann 3/4/22 due to appointment time and this clinician's needing to be out of clinic.     This clinician called and left detailed voicemail for Jann with social work contact information and availability.     Plan:  1) This clinician will continue to be available as needed for ongoing psychosocial support. Jann knows to ask nurse when she comes to clinic if she would like to be seen by  for emotional support.    2) Ongoing collaboration with multidisciplinary care team.      Please call or page if needs or concerns arise.     SOWMYA Lepe, LICSW  Direct Phone: [phone]  Pager: [phone]

## 2022-03-08 ENCOUNTER — LAB (OUTPATIENT)
Dept: INFUSION THERAPY | Facility: CLINIC | Age: 77
End: 2022-03-08
Attending: INTERNAL MEDICINE
Payer: MEDICARE

## 2022-03-08 VITALS
BODY MASS INDEX: 23.71 KG/M2 | RESPIRATION RATE: 16 BRPM | WEIGHT: 133.8 LBS | TEMPERATURE: 98.3 F | SYSTOLIC BLOOD PRESSURE: 115 MMHG | OXYGEN SATURATION: 98 % | DIASTOLIC BLOOD PRESSURE: 73 MMHG | HEART RATE: 104 BPM

## 2022-03-08 DIAGNOSIS — T45.1X5A CHEMOTHERAPY-INDUCED NEUTROPENIA (H): Primary | ICD-10-CM

## 2022-03-08 DIAGNOSIS — D70.1 CHEMOTHERAPY-INDUCED NEUTROPENIA (H): Primary | ICD-10-CM

## 2022-03-08 DIAGNOSIS — C50.512 MALIGNANT NEOPLASM OF LOWER-OUTER QUADRANT OF LEFT BREAST OF FEMALE, ESTROGEN RECEPTOR NEGATIVE (H): ICD-10-CM

## 2022-03-08 DIAGNOSIS — Z17.1 MALIGNANT NEOPLASM OF LOWER-OUTER QUADRANT OF LEFT BREAST OF FEMALE, ESTROGEN RECEPTOR NEGATIVE (H): ICD-10-CM

## 2022-03-08 LAB
ALBUMIN SERPL-MCNC: 3.6 G/DL (ref 3.4–5)
ALP SERPL-CCNC: 164 U/L (ref 40–150)
ALT SERPL W P-5'-P-CCNC: 65 U/L (ref 0–50)
ANION GAP SERPL CALCULATED.3IONS-SCNC: 8 MMOL/L (ref 3–14)
AST SERPL W P-5'-P-CCNC: 62 U/L (ref 0–45)
BASOPHILS # BLD AUTO: 0 10E3/UL (ref 0–0.2)
BASOPHILS NFR BLD AUTO: 0 %
BILIRUB SERPL-MCNC: <0.1 MG/DL (ref 0.2–1.3)
BUN SERPL-MCNC: 33 MG/DL (ref 7–30)
CALCIUM SERPL-MCNC: 8.8 MG/DL (ref 8.5–10.1)
CHLORIDE BLD-SCNC: 104 MMOL/L (ref 94–109)
CO2 SERPL-SCNC: 22 MMOL/L (ref 20–32)
CREAT SERPL-MCNC: 1.5 MG/DL (ref 0.52–1.04)
EOSINOPHIL # BLD AUTO: 0.1 10E3/UL (ref 0–0.7)
EOSINOPHIL NFR BLD AUTO: 1 %
ERYTHROCYTE [DISTWIDTH] IN BLOOD BY AUTOMATED COUNT: 15.3 % (ref 10–15)
GFR SERPL CREATININE-BSD FRML MDRD: 36 ML/MIN/1.73M2
GLUCOSE BLD-MCNC: 145 MG/DL (ref 70–99)
HCT VFR BLD AUTO: 30.3 % (ref 35–47)
HGB BLD-MCNC: 9.9 G/DL (ref 11.7–15.7)
IMM GRANULOCYTES # BLD: 0.1 10E3/UL
IMM GRANULOCYTES NFR BLD: 2 %
LYMPHOCYTES # BLD AUTO: 2.5 10E3/UL (ref 0.8–5.3)
LYMPHOCYTES NFR BLD AUTO: 49 %
MCH RBC QN AUTO: 31.9 PG (ref 26.5–33)
MCHC RBC AUTO-ENTMCNC: 32.7 G/DL (ref 31.5–36.5)
MCV RBC AUTO: 98 FL (ref 78–100)
MONOCYTES # BLD AUTO: 0.6 10E3/UL (ref 0–1.3)
MONOCYTES NFR BLD AUTO: 11 %
NEUTROPHILS # BLD AUTO: 1.9 10E3/UL (ref 1.6–8.3)
NEUTROPHILS NFR BLD AUTO: 37 %
NRBC # BLD AUTO: 0 10E3/UL
NRBC BLD AUTO-RTO: 0 /100
PLATELET # BLD AUTO: 215 10E3/UL (ref 150–450)
POTASSIUM BLD-SCNC: 3.6 MMOL/L (ref 3.4–5.3)
PROT SERPL-MCNC: 7 G/DL (ref 6.8–8.8)
RBC # BLD AUTO: 3.1 10E6/UL (ref 3.8–5.2)
SODIUM SERPL-SCNC: 134 MMOL/L (ref 133–144)
TSH SERPL DL<=0.005 MIU/L-ACNC: 2.7 MU/L (ref 0.4–4)
WBC # BLD AUTO: 5.1 10E3/UL (ref 4–11)

## 2022-03-08 PROCEDURE — 80053 COMPREHEN METABOLIC PANEL: CPT | Performed by: INTERNAL MEDICINE

## 2022-03-08 PROCEDURE — 258N000003 HC RX IP 258 OP 636: Performed by: NURSE PRACTITIONER

## 2022-03-08 PROCEDURE — 84443 ASSAY THYROID STIM HORMONE: CPT | Performed by: INTERNAL MEDICINE

## 2022-03-08 PROCEDURE — 258N000003 HC RX IP 258 OP 636: Performed by: INTERNAL MEDICINE

## 2022-03-08 PROCEDURE — 96367 TX/PROPH/DG ADDL SEQ IV INF: CPT

## 2022-03-08 PROCEDURE — 82040 ASSAY OF SERUM ALBUMIN: CPT | Performed by: INTERNAL MEDICINE

## 2022-03-08 PROCEDURE — 96413 CHEMO IV INFUSION 1 HR: CPT

## 2022-03-08 PROCEDURE — 250N000011 HC RX IP 250 OP 636: Performed by: INTERNAL MEDICINE

## 2022-03-08 PROCEDURE — 96417 CHEMO IV INFUS EACH ADDL SEQ: CPT

## 2022-03-08 PROCEDURE — 85025 COMPLETE CBC W/AUTO DIFF WBC: CPT | Performed by: INTERNAL MEDICINE

## 2022-03-08 RX ORDER — HEPARIN SODIUM (PORCINE) LOCK FLUSH IV SOLN 100 UNIT/ML 100 UNIT/ML
5 SOLUTION INTRAVENOUS
Status: CANCELLED | OUTPATIENT
Start: 2022-03-08

## 2022-03-08 RX ORDER — SODIUM CHLORIDE 9 MG/ML
INJECTION, SOLUTION INTRAVENOUS CONTINUOUS
Status: CANCELLED
Start: 2022-03-08

## 2022-03-08 RX ORDER — HEPARIN SODIUM,PORCINE 10 UNIT/ML
5 VIAL (ML) INTRAVENOUS
Status: CANCELLED | OUTPATIENT
Start: 2022-03-08

## 2022-03-08 RX ORDER — SODIUM CHLORIDE 9 MG/ML
INJECTION, SOLUTION INTRAVENOUS CONTINUOUS
Status: DISCONTINUED | OUTPATIENT
Start: 2022-03-08 | End: 2022-03-08 | Stop reason: HOSPADM

## 2022-03-08 RX ORDER — HEPARIN SODIUM (PORCINE) LOCK FLUSH IV SOLN 100 UNIT/ML 100 UNIT/ML
5 SOLUTION INTRAVENOUS
Status: DISCONTINUED | OUTPATIENT
Start: 2022-03-08 | End: 2022-03-08 | Stop reason: HOSPADM

## 2022-03-08 RX ADMIN — SODIUM CHLORIDE: 9 INJECTION, SOLUTION INTRAVENOUS at 12:30

## 2022-03-08 RX ADMIN — SODIUM CHLORIDE 250 ML: 9 INJECTION, SOLUTION INTRAVENOUS at 11:36

## 2022-03-08 RX ADMIN — DEXAMETHASONE SODIUM PHOSPHATE: 10 INJECTION, SOLUTION INTRAMUSCULAR; INTRAVENOUS at 11:36

## 2022-03-08 RX ADMIN — PACLITAXEL 130 MG: 6 INJECTION, SOLUTION INTRAVENOUS at 12:38

## 2022-03-08 RX ADMIN — CARBOPLATIN 85 MG: 10 INJECTION, SOLUTION INTRAVENOUS at 13:47

## 2022-03-08 RX ADMIN — SODIUM CHLORIDE 200 MG: 9 INJECTION, SOLUTION INTRAVENOUS at 12:00

## 2022-03-08 RX ADMIN — Medication 5 ML: at 14:51

## 2022-03-08 ASSESSMENT — PAIN SCALES - GENERAL: PAINLEVEL: NO PAIN (0)

## 2022-03-08 NOTE — PROGRESS NOTES
Nursing Note:  Jann Davidson presents today for port labs.    Patient seen by provider today: No   present during visit today: Not Applicable.    Note: N/A.    Intravenous Access:  Labs drawn without difficulty.  Implanted Port.    Discharge Plan:   Patient was sent to [facility] for infusion appointment.    Saskia Evans RN

## 2022-03-08 NOTE — PROGRESS NOTES
Infusion Nursing Note:  Jann Davidson presents today for C3 D1 Taxol/Carbo/Keytruda.    Patient seen by provider today: No   present during visit today: Not Applicable.    Note: no new concerns.      Intravenous Access:  Implanted Port.    Treatment Conditions:  Lab Results   Component Value Date    HGB 9.9 (L) 03/08/2022    WBC 5.1 03/08/2022    ANEU 2.8 02/07/2022    ANEUTAUTO 1.9 03/08/2022     03/08/2022      Lab Results   Component Value Date     03/08/2022    POTASSIUM 3.6 03/08/2022    MAG 2.1 01/17/2022    CR 1.50 (H) 03/08/2022    AMY 8.8 03/08/2022    BILITOTAL <0.1 (L) 03/08/2022    ALBUMIN 3.6 03/08/2022    ALT 65 (H) 03/08/2022    AST 62 (H) 03/08/2022     Results reviewed, labs MET treatment parameters, ok to proceed with treatment.      Post Infusion Assessment:  Patient tolerated infusion without incident.  Blood return noted pre and post infusion.  Site patent and intact, free from redness, edema or discomfort.  No evidence of extravasations.  Access discontinued per protocol.       Discharge Plan:   Discharge instructions reviewed with: Patient.  Patient and/or family verbalized understanding of discharge instructions and all questions answered.  AVS to patient via [employer].  Patient will return 3/15 for next appointment.   Patient discharged in stable condition accompanied by: self.  Departure Mode: Ambulatory.      Marquita Weaver RN

## 2022-03-14 ENCOUNTER — VIRTUAL VISIT (OUTPATIENT)
Dept: ONCOLOGY | Facility: CLINIC | Age: 77
End: 2022-03-14
Attending: INTERNAL MEDICINE
Payer: MEDICARE

## 2022-03-14 DIAGNOSIS — C50.512 MALIGNANT NEOPLASM OF LOWER-OUTER QUADRANT OF LEFT BREAST OF FEMALE, ESTROGEN RECEPTOR NEGATIVE (H): Primary | ICD-10-CM

## 2022-03-14 DIAGNOSIS — Z17.1 MALIGNANT NEOPLASM OF LOWER-OUTER QUADRANT OF LEFT BREAST OF FEMALE, ESTROGEN RECEPTOR NEGATIVE (H): Primary | ICD-10-CM

## 2022-03-14 DIAGNOSIS — Z80.3 FAMILY HISTORY OF MALIGNANT NEOPLASM OF BREAST: ICD-10-CM

## 2022-03-14 DIAGNOSIS — Z80.49 FAMILY HISTORY OF UTERINE CANCER: ICD-10-CM

## 2022-03-14 PROCEDURE — 96040 HC GENETIC COUNSELING, EACH 30 MINUTES: CPT | Mod: TEL | Performed by: GENETIC COUNSELOR, MS

## 2022-03-14 NOTE — PATIENT INSTRUCTIONS
Assessing Cancer Risk  Only about 5-10% of cancers are thought to be due to an inherited cancer susceptibility gene.    These families often have:    Several people with the same or related types of cancer    Cancers diagnosed at a young age (before age 50)    Individuals with more than one primary cancer    Multiple generations of the family affected with cancer    Some people may be candidates for genetic testing of more than one gene.  For these families, genetic testing using a cancer panel may be offered.  These panels will test different genes known to increase the risk for breast, ovarian, uterine, and/or other cancers. All of the genes discussed below have published clinical management guidelines for individuals who are found to carry a mutation. The purpose of this handout is to serve as a brief summary of the genes analyzed by the panels used to inquire about hereditary breast and gynecologic cancer:  CICI, BRCA1, BRCA2, BRIP1, CDH1, CHEK2, MLH1, MSH2, MSH6, PMS2, EPCAM, PTEN, PALB2, RAD51C, RAD51D, and TP53.  ______________________________________________________________________________  Hereditary Breast and Ovarian Cancer Syndrome   (BRCA1 and BRCA2)  A single mutation in one of the copies of BRCA1 or BRCA2 increases the risk for breast and ovarian cancer, among others.  The risk for pancreatic cancer and melanoma may also be slightly increased in some families.  The chart below shows the chance that someone with a BRCA mutation would develop cancer in his or her lifetime1,2,3,4.        A person s ethnic background is also important to consider, as individuals of Ashkenazi Scientology ancestry have a higher chance of having a BRCA gene mutation.  There are three BRCA mutations that occur more frequently in this population.    Quintana Syndrome   (MLH1, MSH2, MSH6, PMS2, and EPCAM)  Currently five genes are known to cause Quintana Syndrome: MLH1, MSH2, MSH6, PMS2, and EPCAM.  A single mutation in one of the  Quintana Syndrome genes increases the risk for colon, endometrial, ovarian, and stomach cancers.  Other cancers that occur less commonly in Quintana Syndrome include urinary tract, skin, and brain cancers.  The chart below shows the chance that a person with Quintana syndrome would develop cancer in his or her lifetime5.      *Cancer risk varies depending on Quintana syndrome gene found    Cowden Syndrome   (PTEN)  Cowden syndrome is a hereditary condition that increases the risk for breast, thyroid, endometrial, colon, and kidney cancer.  Cowden syndrome is caused by a mutation in the PTEN gene.  A single mutation in one of the copies of PTEN causes Cowden syndrome and increases cancer risk.  The chart below shows the chance that someone with a PTEN mutation would develop cancer in their lifetime6,7.  Other benign features seen in some individuals with Cowden syndrome include benign skin lesions (facial papules, keratoses, lipomas), learning disability, autism, thyroid nodules, colon polyps, and larger head size.      *One recent study found breast cancer risk to be increased to 85%    Li-Fraumeni Syndrome   (TP53)  Li-Fraumeni Syndrome (LFS) is a cancer predisposition syndrome caused by a mutation in the TP53 gene. A single mutation in one of the copies of TP53 increases the risk for multiple cancers. Individuals with LFS are at an increased risk for developing cancer at a young age. The lifetime risk for development of a LFS-associated cancer is 50% by age 30 and 90% by age 60.   Core Cancers: Sarcomas, Breast, Brain, Lung, Leukemias/Lymphomas, Adrenocortical carcinomas  Other Cancers: Gastrointestinal, Thyroid, Skin, Genitourinary    Hereditary Diffuse Gastric Cancer   (CDH1)  Currently, one gene is known to cause hereditary diffuse gastric cancer (HDGC): CDH1.  Individuals with HDGC are at increased risk for diffuse gastric cancer and lobular breast cancer. Of people diagnosed with HDGC, 30-50% have a mutation in the CDH1  gene.  This suggests there are likely other genes that may cause HDGC that have not been identified yet.      Lifetime Cancer Risks    General Population HDGC    Diffuse Gastric  <1% ~80%   Breast 12% 39-52%         Additional Genes  CICI  CICI is a moderate-risk breast cancer gene. Women who have a mutation in CICI can have between a 2-4 fold increased risk for breast cancer compared to the general population8. CICI mutations have also been associated with increased risk for pancreatic cancer, however an estimate of this cancer risk is not well understood9. Individuals who inherit two CICI mutations have a condition called ataxia-telangiectasia (AT).  This rare autosomal recessive condition affects the nervous system and immune system, and is associated with progressive cerebellar ataxia beginning in childhood.  Individuals with ataxia-telangiectasia often have a weakened immune system and have an increased risk for childhood cancers.    PALB2  Mutations in PALB2 have been shown to increase the risk of breast cancer up to 33-58% in some families; where individuals fall within this risk range is dependent upon family [id number]. PALB2 mutations have also been associated with increased risk for pancreatic cancer, although this risk has not been quantified yet.  Individuals who inherit two PALB2 mutations--one from their mother and one from their father--have a condition called Fanconi Anemia.  This rare autosomal recessive condition is associated with short stature, developmental delay, bone marrow failure, and increased risk for childhood cancers.    CHEK2   CHEK2 is a moderate-risk breast cancer gene.  Women who have a mutation in CHEK2 have around a 2-fold increased risk for breast cancer compared to the general population, and this risk may be higher depending upon family history.11,12,13 Mutations in CHEK2 have also been shown to increase the risk of a number of other cancers, including colon and prostate, however  these cancer risks are currently not well understood.    BRIP1, RAD51C and RAD51D  Mutations in BRIP1, RAD51C, and RAD51D have been shown to increase the risk of ovarian cancer and possibly female breast cancer as well14,15 .       Lifetime Cancer Risk    General Population BRIP1 RAD51C RAD51D   Ovarian 1-2% ~5-8% ~5-9% ~7-15%           Inheritance  All of the cancer syndromes reviewed above are inherited in an autosomal dominant pattern.  This means that if a parent has a mutation, each of his or her children will have a 50% chance of inheriting that same mutation.  Therefore, each child--male or female--would have a 50% chance of being at increased risk for developing cancer.      Image obtained from Genetics Home Reference, 2013     Mutations in some genes can occur de igor, which means that a person s mutation occurred for the first time in them and was not inherited from a parent.  Now that they have the mutation, however, it can be passed on to future generations.    Genetic Testing  Genetic testing involves a blood test and will look at the genetic information in the CICI, BRCA1, BRCA2, BRIP1, CDH1, CHEK2, MLH1, MSH2, MSH6, PMS2, EPCAM, PTEN, PALB2, RAD51C, RAD51D, and TP53 genes for any harmful mutations that are associated with increased cancer risk.  If possible, it is recommended that the person(s) who has had cancer be tested before other family members.  That person will give us the most useful information about whether or not a specific gene is associated with the cancer in the family.    Results  There are three possible results of genetic testing:    Positive--a harmful mutation was identified in one or more of the genes    Negative--no mutation was identified in any of the genes on this panel    Variant of unknown significance--a variation in one of the genes was identified, but it is unclear how this impacts cancer risk in the family    Advantages and Disadvantages   There are advantages and  disadvantages to genetic testing.    Advantages    May clarify your cancer risk    Can help you make medical decisions    May explain the cancers in your family    May give useful information to your family members (if you share your results)    Disadvantages    Possible negative emotional impact of learning about inherited cancer risk    Uncertainty in interpreting a negative test result in some situations    Possible genetic discrimination concerns (see below)    Genetic Information Nondiscrimination Act (GURVINDER)  GURVINDER is a federal law that protects individuals from health insurance or employment discrimination based on a genetic test result alone.  Although rare, there are currently no legal discrimination protections in terms of life insurance, long term care, or disability insurances.  Visit the [employer] website to learn more.    Reducing Cancer Risk  All of the genes described above have nationally recognized cancer screening guidelines that would be recommended for individuals who test positive.  In addition to increased cancer screening, surgeries may be offered or recommended to reduce cancer risk.  Recommendations are based upon an individual s genetic test result as well as their personal and family history of cancer.    Questions to Think About Regarding Genetic Testing:    What effect will the test result have on me and my relationship with my family members if I have an inherited gene mutation?  If I don t have a gene mutation?    Should I share my test results, and how will my family react to this news, which may also affect them?    Are my children ready to learn new information that may one day affect their own health?    Hereditary Cancer Resources    FORCE: Facing Our Risk of Cancer Empowered facingourrisk.org   Bright Pink bebrightpink.org   Li-Fraumeni Syndrome Association lfsassociation.org   PTEN World PTENworld.com   No stomach for cancer, Inc.  nostomachforcancer.org   Stomach cancer relief network Scrnet.org   Collaborative Group of the Americas on Inherited Colorectal Cancer (CGA) cgaicc.com    Cancer Care cancercare.org   American Cancer Society (ACS) cancer.org   National Cancer [place] (NCI) cancer.gov     Please call us if you have any questions or concerns.   Cancer Risk Management Program [phone] ([phone])  ? Trevor Deshpande, MS, [facility] [phone]  ? Juana Ashley, MS, [facility]  [phone]  ? Barbara Bullard, MS, [facility]  [phone]  ? Libby Al, MS, [facility] [phone]  ? Aliza Rupal, MS, [facility] [phone]  ? Elsie Lavon, MS, [facility]  [phone]  ? Kenia Galeas, MS  [phone]    References  1. Shar RAMON, Lisa PDP, Rochelle S, Guevara SULLIVAN, Deshawn JE, Shaggy JL, Michelle N, Jackie H, Mary O, Sosa A, Mari B, Radimacario P, Mannelsy S, Yazan DM, Mei N, Peggy E, Cachorro H, Dexter E, Amanda J, Gronhugo J, Brady B, Joshua H, Thorlacius S, Eerola H, Nevcorazonna H, Jesusita K, Edyta OP. Average risks of breast and ovarian cancer associated with BRCA1 or BRCA2 mutations detected in case series unselected for family history: a combined analysis of 222 studies. Am J Hum Tarah. 2003;72:1117-30.  2. Andres N, Ailyn M, Consuelo G.  BRCA1 and BRCA2 Hereditary Breast and Ovarian Cancer. Gene Reviews online. 2013.  3. Pino YC, Norma S, Finn G, Ahumada S. Breast cancer risk among male BRCA1 and BRCA2 mutation carriers. J Natl Cancer Inst. 2007;99:1811-4.  4. Teo BAGLEY, Jorden I, Didier J, Emiliana E, Meliton ER, Marichuy F. Risk of breast cancer in male BRCA2 carriers. J Med Tarah. 2010;47:710-1.  5. National Comprehensive Cancer Network. Clinical practice guidelines in oncology, colorectal cancer screening. Available online (registration required). 2015.  6. Joseph EATON, Dillan J, Rahat J, Jesus LA, Erasto ESQUIVEL, Eng C. Lifetime cancer risks in individuals with germline PTEN mutations. Clin Cancer Res. 2012;18:400-7.  7. Pilarski R. Cowden  Syndrome: A Critical Review of the Clinical Literature. J Tarah . 2009:18:13-27.  8. Mounika A, Kem D, Luz Elena S, Hazel P, Flynn T, Rand M, Dionisio B, Lakesha H, Quin R, Mariaelena K, Sherron L, Teo DG, Yazan D, Doe DF, Ana MR, The Breast Cancer Susceptibility Collaboration (UK) & Wesley STOREY. CICI mutations that cause ataxia-telangiectasia are breast cancer susceptibility alleles. Nature Genetics. 2006;38:873-875  9. Anil N , Avni Y, Anne Marie J, Remigio L, Imelda GM , Sp ML, Gallinger S, Muller AG, Syngal S, Kacie ML, Tere J , Solitario R, Felisha SZ, Nicol JR, Shelby VE, Stefan M, Vomartha B, Antoinette N, Jacquelin RH, Denise KW, and Ashu AP. CICI mutations in patients with hereditary pancreatic cancer. Cancer Discover. 2012;2:41-46  10. Shar DE JESUS, et al. Breast-Cancer Risk in Families with Mutations in PALB2. NEJM. 2014; 371(6):497-506.  11. CHEK2 Breast Cancer Case-Control Consortium. CHEK2*1100delC and susceptibility to breast cancer: A collaborative analysis involving 10,860 breast cancer cases and 9,065 controls from 10 studies. Am J Hum Tarah, 74 (2004), pp. 2995-7786  12. Bhavana T, Kaleb S, Michelle K, et al. Spectrum of Mutations in BRCA1, BRCA2, CHEK2, and TP53 in Families at High Risk of Breast Cancer. MADDY. 2006;295(12):3571-7781.   13. Britney C, Oren D, Kwan A, et al. Risk of breast cancer in women with a CHEK2 mutation with and without a family history of breast cancer. J Clin Oncol. 2011;29:0641-1932.  14. Collin H, Saritha E, Nieves SJ, et al. Contribution of germline mutations in the RAD51B, RAD51C, and RAD51D genes to ovarian cancer in the population. J Clin Oncol. 2015;33(26):2566-6693. Doi:10.1200/JCO.2015.61.2408.  15. Donald T, Seb COUCH, Marcio P, et al. Mutations in BRIP1 confer high risk of ovarian cancer. Sabina Tarah. 2011;43(11):1696-2683. doi:10.1038/ng.955.

## 2022-03-14 NOTE — LETTER
"    Cancer Risk Management  Program Locations    [facility]  [facility]  [facility]  Mailing Address  Cancer Risk Management Program  [facility]  [address]  [place] [address]    New patient appointments  [phone]  March 14, 2022    Jann Davidson  [address]  [place] [facility] [address]      Dear Jann,    It was a pleasure speaking with you over the phone for genetic counseling on 3/14/2022. Here is a copy of the progress note from our discussion. If you have any additional questions, please feel free to call.    Referring Provider: Neetu Mcmullen MD    Presenting Information:   I spoke with Jann Davidson over the phone today for genetic counseling to discuss her personal and family history of cancer. With her permission, this appointment was conducted virtually due to COVID-19 precautions. We talked today to review this history, cancer screening recommendations, and available genetic testing options.    Personal History:  Jann is a 76 year old female. She was recently diagnosed with a left breast cancer at age 76 in December 2021 (IDC, triple negative). She is currently having neoadjuvant chemotherapy.    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    She had her first menstrual period at age 11, her first child at age 19, and is postmenopausal. Jann has her ovaries, fallopian tubes and uterus in place. She reports that she has not used hormone replacement therapy. She has used oral contraceptives for a few years in the past. Her most recent colonoscopy on 4/8/14 detected no polyps " and follow-up was recommended in 10 years. A previous colonoscopy on 10/16/03 also detected no polyps. She reports one previous sigmoidoscopy. Jann reported former tobacco use (quit in 1972) and moderate alcohol use (wine). 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    Family History: (Please see scanned pedigree for detailed family history information)  Maternal:    Her mother was diagnosed with breast cancer around age 45 and then with uterine cancer around age 65. She was a lifelong smoker. She passed away at age 90.    Her mother was an only child. She is not aware of any cancers in her grandparents.   Paternal:    Her father passed away at age 87 with no known history of cancer.      Her uncle was diagnosed with cancer, she thinks leukemia, in his late 30s-mid 40s and passed away in his mid 40s.    His daughter (Jann's cousin) is 76 years old and was possibly diagnosed with uterine cancer in her late 20s-early 30s.     Her maternal and paternal ethnicity is [place] . There is no known Ashkenazi Anabaptist ancestry on either side of her family.     Discussion:    Jann's personal and family history of cancer is suggestive of a hereditary cancer syndrome.    We reviewed the features of sporadic, familial, and hereditary cancers. In looking at Jann's family history, it is possible that a cancer susceptibility gene is present due to her recent diagnosis of triple negative breast cancer, as well as her family history of breast cancer in her mother under age 50. .    We discussed the natural history and genetics of hereditary cancer. Based on her personal and family history, we discussed the BRCA1 and BRCA2 genes. Mutations in these genes cause a condition known as Hereditary Breast and Ovarian Cancer syndrome (HBOC). Women with a mutation in either of these genes are at increased risk for breast and ovarian cancer. There is also an increased risk for a second primary breast cancer. Men with a  mutation in either of these genes are at increased risk for breast and prostate cancer. Both women and men may also be at increased risk for pancreatic cancer and melanoma. A detailed handout regarding these genes and other genes in which mutations are associated with an increased risk for breast cancer will be provided to Jann via [employer] and can be found in the after visit summary. Topics included: inheritance pattern, cancer risks, cancer screening recommendations, and also risks, benefits and limitations of testing.    Based on her personal and family history, Jann meets current National Comprehensive Cancer Network (NCCN) criteria for genetic testing of high-penetrance breast cancer susceptibility genes, specifically, BRCA1, BRCA2, CDH1, PALB2, PTEN, and TP53.     We discussed that there are additional genes that could cause increased risk for breast, uterine, and other cancers. As many of these genes present with overlapping features in a family and accurate cancer risk cannot always be established based upon the pedigree analysis alone, it would be reasonable for Jann to consider panel genetic testing to analyze multiple genes at once.    We reviewed genetic testing options for Jann based on her personal and family history: a panel of genes associated with an increased risk for certain cancers, or larger panel options to include genes associated with increased risk for multiple different cancer types. Jann expressed an interest in learning as much information as possible from the testing. We discussed expanded panel options including the CancerNext panel and the CustomNext-Cancer panel. She opted for an expanded CustomNext-Cancer panel (85 genes associated with an increased risk for multiple different types of cancer).  We discussed that many genes on this panel are associated with specific hereditary cancer syndromes and have published management guidelines. Other genes have medical management guidelines  available to screen for certain cancers. The remaining genes are associated with increased cancer risk and may allow us to make medical recommendations when mutations are identified. Some of the genes on this expanded panel may have limited information available about specific cancer risks and therefore, there may be limited screening guidelines available. She stated that she understood potential limitations of a larger gene panel.     Due to COVID-19 precautions consent was obtained over the phone/video today. Genetic testing via the CustomNext-Cancer panel (85 genes associated with an increased risk for multiple different types of cancer) will be sent to [facility]. Jann opted to have her blood drawn for testing tomorrow, 3/15, with her other treatment labs. Turnaround time from date when sample is received at the lab: approximately 3-4 weeks.    Medical Management: For Jann, we reviewed that the information from genetic testing may determine:    surgery to treat Jann's active cancer diagnosis (i.e. lumpectomy versus bilateral mastectomy),    additional cancer screening for which Jann may qualify (i.e. mammogram and breast MRI, more frequent colonoscopies, more frequent dermatologic exams, etc.),    options for risk reducing surgeries Jann could consider (i.e. bilateral mastectomy, surgery to remove her ovaries and/or uterus, etc.),      and targeted chemotherapies for Jann's active cancer, or if she were to develop certain cancers in the future (i.e. immunotherapy for individuals with Quintana syndrome, PARP inhibitors, etc.).     These recommendations will be discussed in detail once genetic testing is completed.     Plan:  1) Today Jann elected to proceed with genetic testing via the CustomNext-Cancer panel (85 genes associated with an increased risk for multiple different types of cancer) offered by [facility].  2) This information should be available in 3-4 weeks.  3) Jann will be scheduled  for a virtual visit (phone or video) to discuss the results.    Elsie Doll MS, [facility]  Licensed, Certified Genetic Counselor  Office: [phone]  Email: [email or web address]

## 2022-03-14 NOTE — PROGRESS NOTES
"3/14/2022    Jann is a 76 year old who is being evaluated via a billable telephone visit.     Phone call duration: 37 minutes    Referring Provider: Neetu Mcmullen MD    Presenting Information:   I spoke with Jann Davidson over the phone today for genetic counseling to discuss her personal and family history of cancer. With her permission, this appointment was conducted virtually due to COVID-19 precautions. We talked today to review this history, cancer screening recommendations, and available genetic testing options.    Personal History:  Jann is a 76 year old female. She was recently diagnosed with a left breast cancer at age 76 in December 2021 (IDC, triple negative). She is currently having neoadjuvant chemotherapy.    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    She had her first menstrual period at age 11, her first child at age 19, and is postmenopausal. Jann has her ovaries, fallopian tubes and uterus in place. She reports that she has not used hormone replacement therapy. She has used oral contraceptives for a few years in the past. Her most recent colonoscopy on 4/8/14 detected no polyps and follow-up was recommended in 10 years. A previous colonoscopy on 10/16/03 also detected no polyps. She reports one previous sigmoidoscopy. Jann reported former tobacco use (quit in 1972) and moderate alcohol use (wine). 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    Family History: (Please see scanned pedigree for detailed family history information)  Maternal:    Her mother was diagnosed with breast cancer around age 45 and then with uterine cancer around age 65. She was a lifelong smoker. She passed away at age 90.    Her mother was an only child. " She is not aware of any cancers in her grandparents.   Paternal:    Her father passed away at age 87 with no known history of cancer.      Her uncle was diagnosed with cancer, she thinks leukemia, in his late 30s-mid 40s and passed away in his mid 40s.    His daughter (Jann's cousin) is 76 years old and was possibly diagnosed with uterine cancer in her late 20s-early 30s.     Her maternal and paternal ethnicity is [place] . There is no known Ashkenazi Mandaeism ancestry on either side of her family.     Discussion:    Jann's personal and family history of cancer is suggestive of a hereditary cancer syndrome.    We reviewed the features of sporadic, familial, and hereditary cancers. In looking at Jann's family history, it is possible that a cancer susceptibility gene is present due to her recent diagnosis of triple negative breast cancer, as well as her family history of breast cancer in her mother under age 50. .    We discussed the natural history and genetics of hereditary cancer. Based on her personal and family history, we discussed the BRCA1 and BRCA2 genes. Mutations in these genes cause a condition known as Hereditary Breast and Ovarian Cancer syndrome (HBOC). Women with a mutation in either of these genes are at increased risk for breast and ovarian cancer. There is also an increased risk for a second primary breast cancer. Men with a mutation in either of these genes are at increased risk for breast and prostate cancer. Both women and men may also be at increased risk for pancreatic cancer and melanoma. A detailed handout regarding these genes and other genes in which mutations are associated with an increased risk for breast cancer will be provided to Jann via [employer] and can be found in the after visit summary. Topics included: inheritance pattern, cancer risks, cancer screening recommendations, and also risks, benefits and limitations of testing.    Based on her personal and family history, Jann  meets current National Comprehensive Cancer Network (NCCN) criteria for genetic testing of high-penetrance breast cancer susceptibility genes, specifically, BRCA1, BRCA2, CDH1, PALB2, PTEN, and TP53.     We discussed that there are additional genes that could cause increased risk for breast, uterine, and other cancers. As many of these genes present with overlapping features in a family and accurate cancer risk cannot always be established based upon the pedigree analysis alone, it would be reasonable for Jann to consider panel genetic testing to analyze multiple genes at once.    We reviewed genetic testing options for Jann based on her personal and family history: a panel of genes associated with an increased risk for certain cancers, or larger panel options to include genes associated with increased risk for multiple different cancer types. Jann expressed an interest in learning as much information as possible from the testing. We discussed expanded panel options including the CancerNext panel and the CustomNext-Cancer panel. She opted for an expanded CustomNext-Cancer panel (85 genes associated with an increased risk for multiple different types of cancer).  We discussed that many genes on this panel are associated with specific hereditary cancer syndromes and have published management guidelines. Other genes have medical management guidelines available to screen for certain cancers. The remaining genes are associated with increased cancer risk and may allow us to make medical recommendations when mutations are identified. Some of the genes on this expanded panel may have limited information available about specific cancer risks and therefore, there may be limited screening guidelines available. She stated that she understood potential limitations of a larger gene panel.     Due to COVID-19 precautions consent was obtained over the phone/video today. Genetic testing via the CustomNext-Cancer panel (85 genes  associated with an increased risk for multiple different types of cancer) will be sent to [facility]. Jann opted to have her blood drawn for testing tomorrow, 3/15, with her other treatment labs. Turnaround time from date when sample is received at the lab: approximately 3-4 weeks.    Medical Management: For Jann, we reviewed that the information from genetic testing may determine:    surgery to treat Jann's active cancer diagnosis (i.e. lumpectomy versus bilateral mastectomy),    additional cancer screening for which Jann may qualify (i.e. mammogram and breast MRI, more frequent colonoscopies, more frequent dermatologic exams, etc.),    options for risk reducing surgeries Jann could consider (i.e. bilateral mastectomy, surgery to remove her ovaries and/or uterus, etc.),      and targeted chemotherapies for Jann's active cancer, or if she were to develop certain cancers in the future (i.e. immunotherapy for individuals with Quintana syndrome, PARP inhibitors, etc.).     These recommendations will be discussed in detail once genetic testing is completed.     Plan:  1) Today Jann elected to proceed with genetic testing via the CustomNext-Cancer panel (85 genes associated with an increased risk for multiple different types of cancer) offered by [employer]  2) This information should be available in 3-4 weeks.  3) Jann will be scheduled for a virtual visit (phone or video) to discuss the results.    Elsie Doll MS, [facility]  Licensed, Certified Genetic Counselor  Office: [phone]  Email: [email or web address]

## 2022-03-15 ENCOUNTER — INFUSION THERAPY VISIT (OUTPATIENT)
Dept: INFUSION THERAPY | Facility: CLINIC | Age: 77
End: 2022-03-15
Attending: INTERNAL MEDICINE
Payer: MEDICARE

## 2022-03-15 ENCOUNTER — DOCUMENTATION ONLY (OUTPATIENT)
Dept: ONCOLOGY | Facility: CLINIC | Age: 77
End: 2022-03-15

## 2022-03-15 ENCOUNTER — LAB (OUTPATIENT)
Dept: INFUSION THERAPY | Facility: CLINIC | Age: 77
End: 2022-03-15
Attending: NURSE PRACTITIONER
Payer: MEDICARE

## 2022-03-15 VITALS
TEMPERATURE: 97.9 F | SYSTOLIC BLOOD PRESSURE: 116 MMHG | DIASTOLIC BLOOD PRESSURE: 67 MMHG | BODY MASS INDEX: 23.81 KG/M2 | WEIGHT: 134.4 LBS | HEART RATE: 94 BPM | RESPIRATION RATE: 16 BRPM | OXYGEN SATURATION: 98 %

## 2022-03-15 DIAGNOSIS — D70.1 CHEMOTHERAPY-INDUCED NEUTROPENIA (H): Primary | ICD-10-CM

## 2022-03-15 DIAGNOSIS — T45.1X5A CHEMOTHERAPY-INDUCED NEUTROPENIA (H): Primary | ICD-10-CM

## 2022-03-15 DIAGNOSIS — Z17.1 MALIGNANT NEOPLASM OF LOWER-OUTER QUADRANT OF LEFT BREAST OF FEMALE, ESTROGEN RECEPTOR NEGATIVE (H): ICD-10-CM

## 2022-03-15 DIAGNOSIS — Z80.49 FAMILY HISTORY OF UTERINE CANCER: ICD-10-CM

## 2022-03-15 DIAGNOSIS — Z80.3 FAMILY HISTORY OF MALIGNANT NEOPLASM OF BREAST: ICD-10-CM

## 2022-03-15 DIAGNOSIS — C50.512 MALIGNANT NEOPLASM OF LOWER-OUTER QUADRANT OF LEFT BREAST OF FEMALE, ESTROGEN RECEPTOR NEGATIVE (H): ICD-10-CM

## 2022-03-15 LAB
BASOPHILS # BLD AUTO: 0 10E3/UL (ref 0–0.2)
BASOPHILS NFR BLD AUTO: 1 %
CREAT SERPL-MCNC: 1.12 MG/DL (ref 0.52–1.04)
EOSINOPHIL # BLD AUTO: 0 10E3/UL (ref 0–0.7)
EOSINOPHIL NFR BLD AUTO: 1 %
ERYTHROCYTE [DISTWIDTH] IN BLOOD BY AUTOMATED COUNT: 15.4 % (ref 10–15)
GFR SERPL CREATININE-BSD FRML MDRD: 51 ML/MIN/1.73M2
HCT VFR BLD AUTO: 27.3 % (ref 35–47)
HGB BLD-MCNC: 8.7 G/DL (ref 11.7–15.7)
IMM GRANULOCYTES # BLD: 0 10E3/UL
IMM GRANULOCYTES NFR BLD: 0 %
LYMPHOCYTES # BLD AUTO: 1.5 10E3/UL (ref 0.8–5.3)
LYMPHOCYTES NFR BLD AUTO: 46 %
MCH RBC QN AUTO: 32.1 PG (ref 26.5–33)
MCHC RBC AUTO-ENTMCNC: 31.9 G/DL (ref 31.5–36.5)
MCV RBC AUTO: 101 FL (ref 78–100)
MONOCYTES # BLD AUTO: 0.1 10E3/UL (ref 0–1.3)
MONOCYTES NFR BLD AUTO: 5 %
NEUTROPHILS # BLD AUTO: 1.5 10E3/UL (ref 1.6–8.3)
NEUTROPHILS NFR BLD AUTO: 47 %
NRBC # BLD AUTO: 0 10E3/UL
NRBC BLD AUTO-RTO: 0 /100
PLATELET # BLD AUTO: 210 10E3/UL (ref 150–450)
RBC # BLD AUTO: 2.71 10E6/UL (ref 3.8–5.2)
WBC # BLD AUTO: 3.1 10E3/UL (ref 4–11)

## 2022-03-15 PROCEDURE — 96367 TX/PROPH/DG ADDL SEQ IV INF: CPT

## 2022-03-15 PROCEDURE — 250N000011 HC RX IP 250 OP 636: Performed by: INTERNAL MEDICINE

## 2022-03-15 PROCEDURE — 96417 CHEMO IV INFUS EACH ADDL SEQ: CPT

## 2022-03-15 PROCEDURE — 36591 DRAW BLOOD OFF VENOUS DEVICE: CPT

## 2022-03-15 PROCEDURE — 96413 CHEMO IV INFUSION 1 HR: CPT

## 2022-03-15 PROCEDURE — 258N000003 HC RX IP 258 OP 636: Performed by: INTERNAL MEDICINE

## 2022-03-15 PROCEDURE — 85014 HEMATOCRIT: CPT | Performed by: INTERNAL MEDICINE

## 2022-03-15 PROCEDURE — 82565 ASSAY OF CREATININE: CPT | Performed by: INTERNAL MEDICINE

## 2022-03-15 RX ORDER — HEPARIN SODIUM (PORCINE) LOCK FLUSH IV SOLN 100 UNIT/ML 100 UNIT/ML
5 SOLUTION INTRAVENOUS
Status: DISCONTINUED | OUTPATIENT
Start: 2022-03-15 | End: 2022-03-15 | Stop reason: HOSPADM

## 2022-03-15 RX ADMIN — Medication 5 ML: at 13:36

## 2022-03-15 RX ADMIN — SODIUM CHLORIDE 250 ML: 9 INJECTION, SOLUTION INTRAVENOUS at 11:22

## 2022-03-15 RX ADMIN — CARBOPLATIN 100 MG: 10 INJECTION, SOLUTION INTRAVENOUS at 13:00

## 2022-03-15 RX ADMIN — DEXAMETHASONE SODIUM PHOSPHATE: 10 INJECTION, SOLUTION INTRAMUSCULAR; INTRAVENOUS at 11:22

## 2022-03-15 RX ADMIN — PACLITAXEL 130 MG: 6 INJECTION, SOLUTION INTRAVENOUS at 11:53

## 2022-03-15 NOTE — PROGRESS NOTES
Infusion Nursing Note:  Jann Davidson presents today for C3 D8 Taxol/Carbo.    Patient seen by provider today: No   present during visit today: Not Applicable.    Note: no new concerns.      Intravenous Access:  Implanted Port.    Treatment Conditions:  Lab Results   Component Value Date    HGB 8.7 (L) 03/15/2022    WBC 3.1 (L) 03/15/2022    ANEU 2.8 02/07/2022    ANEUTAUTO 1.5 (L) 03/15/2022     03/15/2022      Lab Results   Component Value Date     03/08/2022    POTASSIUM 3.6 03/08/2022    MAG 2.1 01/17/2022    CR 1.12 (H) 03/15/2022    AMY 8.8 03/08/2022    BILITOTAL <0.1 (L) 03/08/2022    ALBUMIN 3.6 03/08/2022    ALT 65 (H) 03/08/2022    AST 62 (H) 03/08/2022     Results reviewed, labs MET treatment parameters, ok to proceed with treatment.      Post Infusion Assessment:  Patient tolerated infusion without incident.  Blood return noted pre and post infusion.  Site patent and intact, free from redness, edema or discomfort.  No evidence of extravasations.  Access discontinued per protocol.       Discharge Plan:   Discharge instructions reviewed with: Patient.  Patient and/or family verbalized understanding of discharge instructions and all questions answered.  Copy of AVS reviewed with patient and/or family.  Patient will return 3/22 for next appointment.  Patient discharged in stable condition accompanied by: self.  Departure Mode: Ambulatory.      Marquita Weaver RN

## 2022-03-15 NOTE — PROGRESS NOTES
ANC 1.5   -Okay to proceed with chemotherapy tomorrow  Schedule for Neupogen shot 03/16, 03/17 and 03/18  -We will ask Alondra Mcmullen's nurse to schedule Neupogen shots      HUBER Damon CNP

## 2022-03-15 NOTE — PROGRESS NOTES
Nursing Note:  Jann Davidson presents today for port labs    Patient seen by provider today: No   present during visit today: Not Applicable.    Note: N/A.    Intravenous Access:  Implanted Port.    Discharge Plan:   Patient was sent to infusion room  for treatment appointment.    Edith Meraz RN

## 2022-03-22 ENCOUNTER — ONCOLOGY VISIT (OUTPATIENT)
Dept: ONCOLOGY | Facility: CLINIC | Age: 77
End: 2022-03-22
Attending: NURSE PRACTITIONER
Payer: MEDICARE

## 2022-03-22 ENCOUNTER — INFUSION THERAPY VISIT (OUTPATIENT)
Dept: INFUSION THERAPY | Facility: CLINIC | Age: 77
End: 2022-03-22
Attending: INTERNAL MEDICINE
Payer: MEDICARE

## 2022-03-22 VITALS
HEART RATE: 90 BPM | DIASTOLIC BLOOD PRESSURE: 69 MMHG | OXYGEN SATURATION: 100 % | WEIGHT: 136.2 LBS | TEMPERATURE: 97.6 F | BODY MASS INDEX: 24.13 KG/M2 | SYSTOLIC BLOOD PRESSURE: 107 MMHG | RESPIRATION RATE: 16 BRPM

## 2022-03-22 DIAGNOSIS — Z51.89 ENCOUNTER FOR OTHER SPECIFIED AFTERCARE: ICD-10-CM

## 2022-03-22 DIAGNOSIS — T45.1X5A CHEMOTHERAPY-INDUCED NEUTROPENIA (H): Primary | ICD-10-CM

## 2022-03-22 DIAGNOSIS — C50.512 MALIGNANT NEOPLASM OF LOWER-OUTER QUADRANT OF LEFT BREAST OF FEMALE, ESTROGEN RECEPTOR NEGATIVE (H): ICD-10-CM

## 2022-03-22 DIAGNOSIS — D70.1 CHEMOTHERAPY-INDUCED NEUTROPENIA (H): Primary | ICD-10-CM

## 2022-03-22 DIAGNOSIS — Z17.1 MALIGNANT NEOPLASM OF LOWER-OUTER QUADRANT OF LEFT BREAST OF FEMALE, ESTROGEN RECEPTOR NEGATIVE (H): ICD-10-CM

## 2022-03-22 LAB
BASOPHILS # BLD AUTO: 0 10E3/UL (ref 0–0.2)
BASOPHILS NFR BLD AUTO: 0 %
CREAT SERPL-MCNC: 1.45 MG/DL (ref 0.52–1.04)
EOSINOPHIL # BLD AUTO: 0 10E3/UL (ref 0–0.7)
EOSINOPHIL NFR BLD AUTO: 1 %
ERYTHROCYTE [DISTWIDTH] IN BLOOD BY AUTOMATED COUNT: 15.7 % (ref 10–15)
GFR SERPL CREATININE-BSD FRML MDRD: 37 ML/MIN/1.73M2
HCT VFR BLD AUTO: 25.6 % (ref 35–47)
HGB BLD-MCNC: 8.2 G/DL (ref 11.7–15.7)
IMM GRANULOCYTES # BLD: 0 10E3/UL
IMM GRANULOCYTES NFR BLD: 0 %
LYMPHOCYTES # BLD AUTO: 2 10E3/UL (ref 0.8–5.3)
LYMPHOCYTES NFR BLD AUTO: 69 %
MCH RBC QN AUTO: 32.3 PG (ref 26.5–33)
MCHC RBC AUTO-ENTMCNC: 32 G/DL (ref 31.5–36.5)
MCV RBC AUTO: 101 FL (ref 78–100)
MONOCYTES # BLD AUTO: 0.2 10E3/UL (ref 0–1.3)
MONOCYTES NFR BLD AUTO: 8 %
NEUTROPHILS # BLD AUTO: 0.7 10E3/UL (ref 1.6–8.3)
NEUTROPHILS NFR BLD AUTO: 22 %
NRBC # BLD AUTO: 0 10E3/UL
NRBC BLD AUTO-RTO: 0 /100
PLATELET # BLD AUTO: 309 10E3/UL (ref 150–450)
RBC # BLD AUTO: 2.54 10E6/UL (ref 3.8–5.2)
WBC # BLD AUTO: 3 10E3/UL (ref 4–11)

## 2022-03-22 PROCEDURE — 250N000011 HC RX IP 250 OP 636: Mod: JB | Performed by: NURSE PRACTITIONER

## 2022-03-22 PROCEDURE — 96372 THER/PROPH/DIAG INJ SC/IM: CPT | Performed by: NURSE PRACTITIONER

## 2022-03-22 PROCEDURE — 82565 ASSAY OF CREATININE: CPT | Performed by: INTERNAL MEDICINE

## 2022-03-22 PROCEDURE — 85025 COMPLETE CBC W/AUTO DIFF WBC: CPT | Performed by: INTERNAL MEDICINE

## 2022-03-22 PROCEDURE — 250N000011 HC RX IP 250 OP 636: Performed by: INTERNAL MEDICINE

## 2022-03-22 PROCEDURE — 99214 OFFICE O/P EST MOD 30 MIN: CPT | Performed by: NURSE PRACTITIONER

## 2022-03-22 RX ORDER — NALOXONE HYDROCHLORIDE 0.4 MG/ML
0.2 INJECTION, SOLUTION INTRAMUSCULAR; INTRAVENOUS; SUBCUTANEOUS
Status: CANCELLED | OUTPATIENT
Start: 2022-03-29

## 2022-03-22 RX ORDER — EPINEPHRINE 1 MG/ML
0.3 INJECTION, SOLUTION INTRAMUSCULAR; SUBCUTANEOUS EVERY 5 MIN PRN
Status: CANCELLED | OUTPATIENT
Start: 2022-03-29

## 2022-03-22 RX ORDER — METHYLPREDNISOLONE SODIUM SUCCINATE 125 MG/2ML
125 INJECTION, POWDER, LYOPHILIZED, FOR SOLUTION INTRAMUSCULAR; INTRAVENOUS
Status: CANCELLED
Start: 2022-03-29

## 2022-03-22 RX ORDER — DIPHENHYDRAMINE HYDROCHLORIDE 50 MG/ML
50 INJECTION INTRAMUSCULAR; INTRAVENOUS
Status: CANCELLED
Start: 2022-03-29

## 2022-03-22 RX ORDER — HEPARIN SODIUM,PORCINE 10 UNIT/ML
5 VIAL (ML) INTRAVENOUS
Status: CANCELLED | OUTPATIENT
Start: 2022-03-29

## 2022-03-22 RX ORDER — HEPARIN SODIUM (PORCINE) LOCK FLUSH IV SOLN 100 UNIT/ML 100 UNIT/ML
5 SOLUTION INTRAVENOUS
Status: CANCELLED | OUTPATIENT
Start: 2022-03-29

## 2022-03-22 RX ORDER — LORAZEPAM 2 MG/ML
0.5 INJECTION INTRAMUSCULAR EVERY 4 HOURS PRN
Status: CANCELLED | OUTPATIENT
Start: 2022-03-29

## 2022-03-22 RX ORDER — HEPARIN SODIUM (PORCINE) LOCK FLUSH IV SOLN 100 UNIT/ML 100 UNIT/ML
5 SOLUTION INTRAVENOUS
Status: CANCELLED | OUTPATIENT
Start: 2022-03-22

## 2022-03-22 RX ORDER — DIPHENHYDRAMINE HCL 25 MG
50 CAPSULE ORAL
Status: CANCELLED
Start: 2022-03-29

## 2022-03-22 RX ORDER — SODIUM CHLORIDE 9 MG/ML
INJECTION, SOLUTION INTRAVENOUS CONTINUOUS
Status: CANCELLED
Start: 2022-03-22

## 2022-03-22 RX ORDER — ALBUTEROL SULFATE 0.83 MG/ML
2.5 SOLUTION RESPIRATORY (INHALATION)
Status: CANCELLED | OUTPATIENT
Start: 2022-03-29

## 2022-03-22 RX ORDER — MEPERIDINE HYDROCHLORIDE 25 MG/ML
25 INJECTION INTRAMUSCULAR; INTRAVENOUS; SUBCUTANEOUS EVERY 30 MIN PRN
Status: CANCELLED | OUTPATIENT
Start: 2022-03-29

## 2022-03-22 RX ORDER — HEPARIN SODIUM,PORCINE 10 UNIT/ML
5 VIAL (ML) INTRAVENOUS
Status: CANCELLED | OUTPATIENT
Start: 2022-03-22

## 2022-03-22 RX ORDER — HEPARIN SODIUM (PORCINE) LOCK FLUSH IV SOLN 100 UNIT/ML 100 UNIT/ML
5 SOLUTION INTRAVENOUS
Status: DISCONTINUED | OUTPATIENT
Start: 2022-03-22 | End: 2022-03-22 | Stop reason: HOSPADM

## 2022-03-22 RX ORDER — ALBUTEROL SULFATE 90 UG/1
1-2 AEROSOL, METERED RESPIRATORY (INHALATION)
Status: CANCELLED
Start: 2022-03-29

## 2022-03-22 RX ADMIN — FILGRASTIM 480 MCG: 480 INJECTION, SOLUTION INTRAVENOUS; SUBCUTANEOUS at 12:17

## 2022-03-22 RX ADMIN — Medication 5 ML: at 12:21

## 2022-03-22 ASSESSMENT — PAIN SCALES - GENERAL: PAINLEVEL: NO PAIN (1)

## 2022-03-22 NOTE — PROGRESS NOTES
"Oncology Rooming Note    March 22, 2022 11:08 AM   Jann Davidson is a 76 year old female who presents for:    Chief Complaint   Patient presents with     Oncology Clinic Visit     Initial Vitals: /69   Pulse 90   Temp 97.6  F (36.4  C) (Oral)   Resp 16   Wt 61.8 kg (136 lb 3.2 oz)   SpO2 100%   BMI 24.13 kg/m   Estimated body mass index is 24.13 kg/m  as calculated from the following:    Height as of 1/31/22: 1.6 m (5' 2.99\").    Weight as of this encounter: 61.8 kg (136 lb 3.2 oz). Body surface area is 1.66 meters squared.  No Pain (1) Comment: Data Unavailable   No LMP recorded. Patient is postmenopausal.  Allergies reviewed: Yes  Medications reviewed: Yes    Medications: Medication refills not needed today.  Pharmacy name entered into EPIC:    [id number] - [place], [place] - [address]  [facility] - [place], [place] - [address] [place] [address]    Clinical concerns:  NP was notified.      Isabela Padron CMA            "

## 2022-03-22 NOTE — PROGRESS NOTES
Infusion Nursing Note:  Jann Davidson presents today for port labs.    Patient seen by provider today: Yes: Tadeo Dugan NP   present during visit today: Not Applicable.    Note: N/A.      Intravenous Access:  Labs drawn without difficulty.  Implanted Port.    Treatment Conditions:  Not Applicable. Labs pending at time of discharge.      Post Infusion Assessment:  Site patent and intact, free from redness, edema or discomfort.  No evidence of extravasations.   Port access left in place for infusion today.    Discharge Plan:   Patient discharged in stable condition accompanied by: self.  Departure Mode: Ambulatory to [facility] for clinic exam.      Brigid Payne RN

## 2022-03-22 NOTE — LETTER
"    3/22/2022         RE: Jann Davidson  [address] [facility] [address]        Dear Colleague,    Thank you for referring your patient, Jann Davidson, to the [facility]. Please see a copy of my visit note below.    Oncology Rooming Note    March 22, 2022 11:08 AM   Jann Davidson is a 76 year old female who presents for:    Chief Complaint   Patient presents with     Oncology Clinic Visit     Initial Vitals: /69   Pulse 90   Temp 97.6  F (36.4  C) (Oral)   Resp 16   Wt 61.8 kg (136 lb 3.2 oz)   SpO2 100%   BMI 24.13 kg/m   Estimated body mass index is 24.13 kg/m  as calculated from the following:    Height as of 1/31/22: 1.6 m (5' 2.99\").    Weight as of this encounter: 61.8 kg (136 lb 3.2 oz). Body surface area is 1.66 meters squared.  No Pain (1) Comment: Data Unavailable   No LMP recorded. Patient is postmenopausal.  Allergies reviewed: Yes  Medications reviewed: Yes    Medications: Medication refills not needed today.  Pharmacy name entered into EPIC:    [id number] - [place], [place] - [address] [facility] - [place], [place] - [address] [place] [address]    Clinical concerns:  NP was notified.      Isabela Padron [facility]              Oncology/Hematology Visit Note  Mar 22, 2022    Reason for Visit: follow up of left breast cancer  Triple negative  Stage IIb  Patient of Dr. Mcmullen    01/18-started neoadjuvant chemotherapy with paclitaxel and carboplatin for 12 weeks with addition of pembrolizumab every 3 weeks followed by dose dense AC every 3 weeks for 4 cycles with addition of pembrolizumab every 3 weeks      Interval History:  Patient denies fever chills sweats cough shortness of breath chest pain nausea vomiting diarrhea abdominal pain or bleeding      Review of Systems:  14 point ROS of systems including Constitutional, Eyes, Respiratory, Cardiovascular, Gastroenterology, Genitourinary, Integumentary, " Muscularskeletal, Psychiatric were all negative except for pertinent positives noted in my HPI.    Physical Examination:  General: The patient is a pleasant female in no acute distress.  /69   Pulse 90   Temp 97.6  F (36.4  C) (Oral)   Resp 16   Wt 61.8 kg (136 lb 3.2 oz)   SpO2 100%   BMI 24.13 kg/m    HEENT: EOMI, PERRL. Sclerae are anicteric. Oral mucosa is pink and moist with no lesions or thrush.   Lymph: Neck is supple with no lymphadenopathy in the cervical or supraclavicular areas.   Heart: Regular rate and rhythm.   Lungs: Clear to auscultation bilaterally.   GI: Bowel sounds present, soft, nontender with no palpable hepatosplenomegaly or masses.   Extremities: No lower extremity edema noted bilaterally.   Skin: No rashes, petechiae, or bruising noted on exposed skin.    Laboratory Data:  CBC CMP results reviewed      Assessment and Plan:    This is a 76-year-old female with    left breast cancer  Triple negative  Stage IIb  Patient of Dr. Mcmullen  01/18-started neoadjuvant chemotherapy with paclitaxel and carboplatin for 12 weeks with addition of pembrolizumab every 3 weeks followed by dose dense AC every 3 weeks for 4 cycles with addition of pembrolizumab every 3 weeks    -Labs reviewed abnormalities discussed  -ANC 0.7 hold treatment.  Deferr treatment by 1 week  -Give Neupogen shot daily for 3 days  -Schedule with me next week with chemo  -Plan for repeat MRI of the breasts after 04/19(after paclitaxel and carboplatin)-schedule with Benedict in April       Neutropenia  Patient is afebrile and asymptomatic  As above defer chemotherapy by 1 week  Give Neupogen shots daily for 3 days starting today  Neutropenic precautions go to ER in the event of fever chills sweats or any signs symptoms of infection    Transaminitis-mild for now  Monitor closely  Most likely secondary to treatment  Recheck liver enzymes next week      Chronic kidney disease  Creatinine at baseline  Continue follow-up with  nephrologist      Anemia  Mild continue to monitor closely      Elevated TSH-most likely secondary to pembrolizumab  Normal T4  TSH is normal now  -Continue to monitor      FDG uptake at the anal canal  -Patient wants to wait for anoscopy and referral to colorectal since she is asymptomatic  -Plan to see colorectal after completion of the chemo for breast cancer    Right renal mass  Management per urology    Depression anxiety  Patient declined appointment with Dr Darin Jimenes  I discussed different coping mechanisms      Call clinic with any changes in health condition or questions        HUBER Damon CNP  [facility]     Chart documentation with Dragon Voice recognition Software. Although reviewed after completion, some words and grammatical errors may remain.            Again, thank you for allowing me to participate in the care of your patient.        Sincerely,        HUBER Damon CNP

## 2022-03-22 NOTE — PROGRESS NOTES
Oncology/Hematology Visit Note  Mar 22, 2022    Reason for Visit: follow up of left breast cancer  Triple negative  Stage IIb  Patient of Dr. Mcmullen    01/18-started neoadjuvant chemotherapy with paclitaxel and carboplatin for 12 weeks with addition of pembrolizumab every 3 weeks followed by dose dense AC every 3 weeks for 4 cycles with addition of pembrolizumab every 3 weeks      Interval History:  Patient denies fever chills sweats cough shortness of breath chest pain nausea vomiting diarrhea abdominal pain or bleeding      Review of Systems:  14 point ROS of systems including Constitutional, Eyes, Respiratory, Cardiovascular, Gastroenterology, Genitourinary, Integumentary, Muscularskeletal, Psychiatric were all negative except for pertinent positives noted in my HPI.    Physical Examination:  General: The patient is a pleasant female in no acute distress.  /69   Pulse 90   Temp 97.6  F (36.4  C) (Oral)   Resp 16   Wt 61.8 kg (136 lb 3.2 oz)   SpO2 100%   BMI 24.13 kg/m    HEENT: EOMI, PERRL. Sclerae are anicteric. Oral mucosa is pink and moist with no lesions or thrush.   Lymph: Neck is supple with no lymphadenopathy in the cervical or supraclavicular areas.   Heart: Regular rate and rhythm.   Lungs: Clear to auscultation bilaterally.   GI: Bowel sounds present, soft, nontender with no palpable hepatosplenomegaly or masses.   Extremities: No lower extremity edema noted bilaterally.   Skin: No rashes, petechiae, or bruising noted on exposed skin.    Laboratory Data:  CBC CMP results reviewed      Assessment and Plan:    This is a 76-year-old female with    left breast cancer  Triple negative  Stage IIb  Patient of Dr. Mcmullen  01/18-started neoadjuvant chemotherapy with paclitaxel and carboplatin for 12 weeks with addition of pembrolizumab every 3 weeks followed by dose dense AC every 3 weeks for 4 cycles with addition of pembrolizumab every 3 weeks    -Labs reviewed abnormalities discussed  -ANC 0.7 hold  treatment.  Deferr treatment by 1 week  -Give Neupogen shot daily for 3 days  -Schedule with me next week with chemo  -Plan for repeat MRI of the breasts after 04/19(after paclitaxel and carboplatin)-schedule with Benedict in April       Neutropenia  Patient is afebrile and asymptomatic  As above defer chemotherapy by 1 week  Give Neupogen shots daily for 3 days starting today  Neutropenic precautions go to ER in the event of fever chills sweats or any signs symptoms of infection    Transaminitis-mild for now  Monitor closely  Most likely secondary to treatment  Recheck liver enzymes next week      Chronic kidney disease  Creatinine at baseline  Continue follow-up with nephrologist      Anemia  Mild continue to monitor closely      Elevated TSH-most likely secondary to pembrolizumab  Normal T4  TSH is normal now  -Continue to monitor      FDG uptake at the anal canal  -Patient wants to wait for anoscopy and referral to colorectal since she is asymptomatic  -Plan to see colorectal after completion of the chemo for breast cancer    Right renal mass  Management per urology    Depression anxiety  Patient declined appointment with Dr Darin Jimenes  I discussed different coping mechanisms      Call clinic with any changes in health condition or questions        HUBER Damon [facility]     Chart documentation with Dragon Voice recognition Software. Although reviewed after completion, some words and grammatical errors may remain.

## 2022-03-22 NOTE — PROGRESS NOTES
Infusion Nursing Note:  Jann Davidson presents today for neupogen.  Taxol/Carbo held today d/t neutropenia.    Patient seen by provider today: Yes: Tadeo Dugan NP   present during visit today: Not Applicable.    Note: N/A.      Intravenous Access:  No Intravenous access/labs at this visit.    Treatment Conditions:  Lab Results   Component Value Date    HGB 8.2 (L) 03/22/2022    WBC 3.0 (L) 03/22/2022    ANEU 2.8 02/07/2022    ANEUTAUTO 0.7 (L) 03/22/2022     03/22/2022      Results reviewed, labs did NOT meet treatment parameters: give neupogen x3, RTC next week for labs/chemo.    Post Infusion Assessment:  Patient tolerated injection without incident.       Discharge Plan:   Patient declined prescription refills.  Discharge instructions reviewed with: Patient.  Patient and/or family verbalized understanding of discharge instructions and all questions answered.  Copy of AVS reviewed with patient and/or family.  Patient will return 3/23/22 for next appointment.  Patient discharged in stable condition accompanied by: self.  Departure Mode: Ambulatory.      Saskia Evans RN

## 2022-03-23 ENCOUNTER — ALLIED HEALTH/NURSE VISIT (OUTPATIENT)
Dept: INFUSION THERAPY | Facility: CLINIC | Age: 77
End: 2022-03-23
Attending: INTERNAL MEDICINE
Payer: MEDICARE

## 2022-03-23 ENCOUNTER — PATIENT OUTREACH (OUTPATIENT)
Dept: CARE COORDINATION | Facility: CLINIC | Age: 77
End: 2022-03-23

## 2022-03-23 VITALS
DIASTOLIC BLOOD PRESSURE: 75 MMHG | HEART RATE: 97 BPM | TEMPERATURE: 98.5 F | OXYGEN SATURATION: 100 % | RESPIRATION RATE: 18 BRPM | SYSTOLIC BLOOD PRESSURE: 123 MMHG

## 2022-03-23 DIAGNOSIS — D70.1 CHEMOTHERAPY-INDUCED NEUTROPENIA (H): ICD-10-CM

## 2022-03-23 DIAGNOSIS — Z51.89 ENCOUNTER FOR OTHER SPECIFIED AFTERCARE: Primary | ICD-10-CM

## 2022-03-23 DIAGNOSIS — C50.512 MALIGNANT NEOPLASM OF LOWER-OUTER QUADRANT OF LEFT BREAST OF FEMALE, ESTROGEN RECEPTOR NEGATIVE (H): ICD-10-CM

## 2022-03-23 DIAGNOSIS — Z17.1 MALIGNANT NEOPLASM OF LOWER-OUTER QUADRANT OF LEFT BREAST OF FEMALE, ESTROGEN RECEPTOR NEGATIVE (H): ICD-10-CM

## 2022-03-23 DIAGNOSIS — T45.1X5A CHEMOTHERAPY-INDUCED NEUTROPENIA (H): ICD-10-CM

## 2022-03-23 PROCEDURE — 250N000011 HC RX IP 250 OP 636: Performed by: NURSE PRACTITIONER

## 2022-03-23 PROCEDURE — 96372 THER/PROPH/DIAG INJ SC/IM: CPT | Performed by: NURSE PRACTITIONER

## 2022-03-23 RX ORDER — SODIUM CHLORIDE 9 MG/ML
INJECTION, SOLUTION INTRAVENOUS CONTINUOUS
Status: CANCELLED
Start: 2022-03-24

## 2022-03-23 RX ORDER — HEPARIN SODIUM,PORCINE 10 UNIT/ML
5 VIAL (ML) INTRAVENOUS
Status: CANCELLED | OUTPATIENT
Start: 2022-03-24

## 2022-03-23 RX ORDER — HEPARIN SODIUM (PORCINE) LOCK FLUSH IV SOLN 100 UNIT/ML 100 UNIT/ML
5 SOLUTION INTRAVENOUS
Status: CANCELLED | OUTPATIENT
Start: 2022-03-24

## 2022-03-23 RX ADMIN — FILGRASTIM 480 MCG: 480 INJECTION, SOLUTION INTRAVENOUS; SUBCUTANEOUS at 12:56

## 2022-03-23 ASSESSMENT — PAIN SCALES - GENERAL: PAINLEVEL: NO PAIN (0)

## 2022-03-23 NOTE — PROGRESS NOTES
Oncology Distress Screening Follow-up  Clinical Social Work  [facility]    Identified Concern and Score From Distress Screenin. How concerned are you about your ability to eat?  0       2. How concerned are you about unintended weight loss or your current weight?  0       3. How concerned are you about feeling depressed or very sad?  0       4. How concerned are you about feeling anxious or very scared?  7 Abnormal        5. Do you struggle with the loss of meaning and graciela in your life?  Not at all       6. How concerned are you about work and home life issues that may be affected by your cancer?  0       7. How concerned are you about knowing what resources are available to help you?  0       8. Do you currently have what you would describe as Druze or spiritual struggles?             Not at all         Date of Distress Screening: 3/22/2022    Intervention:   Jann is a 76-year-old woman with a diagnosis of breast cancer who is followed by Dr. Mcmullen. This clinician called Jann today for psychosocial check-in after elevated distress screen from visit yesterday with Tadeo Dugan.     Provided safe place for Jann to voice frustration with scheduling error that, per Jann, lead to delay of infusion. Jann acknowledged feeling frustrated to learn that Dr. Mcmullen is out of office for 1 month, and wonders if another oncologist can make a determination on changing her treatment regimen in the meantime so that her counts are not so impacted. Emotional support provided.     Jann acknowledges some fear of what will come in the future, reporting that she will hear results from genetic test in 4-6 weeks and has concerns that this might mean that she will need more involved surgery. Jann receptive to emotional check-in next month for continued follow-up regarding this concern.     Follow-up Required:   1) This clinician sent message to RN Manager Shakeel Del Real, NP Tadeo Dugan, and RNCC Alondra Torres regarding pt concerns,  requesting Shakeel Del Real meet with pt in-person or by phone to discuss further.   2) This clinician will plan for social work outreach in 1 month. Jann knows to outreach prior if in the case of concern or need.   3) Ongoing collaboration with multidisciplinary care team.     SOWMYA Lepe, LICSW  Phone: [phone]  [facility]: M, Thu  *every other Tue, 8am-4:30pm  [facility]: W, F, *every other Tue, 8am-4:30pm

## 2022-03-23 NOTE — PROGRESS NOTES
Infusion Nursing Note:  Jann Davidson presents today for Neupogen.    Patient seen by provider today: No   present during visit today: Not Applicable.    Note: N/A.      Intravenous Access:  No Intravenous access/labs at this visit.    Treatment Conditions:  Not Applicable.      Post Infusion Assessment:  Patient tolerated injection without incident.  Site patent and intact, free from redness, edema or discomfort.       Discharge Plan:   [facility]  Patient will return 3/24/22 for next appointment.   Patient discharged in stable condition accompanied by: self.  Departure Mode: Ambulatory.      Amanda Dean RN

## 2022-03-24 ENCOUNTER — TELEPHONE (OUTPATIENT)
Dept: ONCOLOGY | Facility: CLINIC | Age: 77
End: 2022-03-24

## 2022-03-24 ENCOUNTER — ALLIED HEALTH/NURSE VISIT (OUTPATIENT)
Dept: INFUSION THERAPY | Facility: CLINIC | Age: 77
End: 2022-03-24
Attending: INTERNAL MEDICINE
Payer: MEDICARE

## 2022-03-24 VITALS
RESPIRATION RATE: 18 BRPM | TEMPERATURE: 97.5 F | OXYGEN SATURATION: 100 % | DIASTOLIC BLOOD PRESSURE: 73 MMHG | HEART RATE: 94 BPM | SYSTOLIC BLOOD PRESSURE: 129 MMHG

## 2022-03-24 DIAGNOSIS — C50.512 MALIGNANT NEOPLASM OF LOWER-OUTER QUADRANT OF LEFT BREAST OF FEMALE, ESTROGEN RECEPTOR NEGATIVE (H): ICD-10-CM

## 2022-03-24 DIAGNOSIS — T45.1X5A CHEMOTHERAPY-INDUCED NEUTROPENIA (H): ICD-10-CM

## 2022-03-24 DIAGNOSIS — Z51.89 ENCOUNTER FOR OTHER SPECIFIED AFTERCARE: Primary | ICD-10-CM

## 2022-03-24 DIAGNOSIS — Z17.1 MALIGNANT NEOPLASM OF LOWER-OUTER QUADRANT OF LEFT BREAST OF FEMALE, ESTROGEN RECEPTOR NEGATIVE (H): ICD-10-CM

## 2022-03-24 DIAGNOSIS — D70.1 CHEMOTHERAPY-INDUCED NEUTROPENIA (H): ICD-10-CM

## 2022-03-24 PROCEDURE — 250N000011 HC RX IP 250 OP 636: Performed by: NURSE PRACTITIONER

## 2022-03-24 PROCEDURE — 96372 THER/PROPH/DIAG INJ SC/IM: CPT | Performed by: NURSE PRACTITIONER

## 2022-03-24 RX ORDER — SODIUM CHLORIDE 9 MG/ML
INJECTION, SOLUTION INTRAVENOUS CONTINUOUS
Status: CANCELLED
Start: 2022-03-24

## 2022-03-24 RX ORDER — HEPARIN SODIUM (PORCINE) LOCK FLUSH IV SOLN 100 UNIT/ML 100 UNIT/ML
5 SOLUTION INTRAVENOUS
Status: CANCELLED | OUTPATIENT
Start: 2022-03-24

## 2022-03-24 RX ORDER — HEPARIN SODIUM,PORCINE 10 UNIT/ML
5 VIAL (ML) INTRAVENOUS
Status: CANCELLED | OUTPATIENT
Start: 2022-03-24

## 2022-03-24 RX ADMIN — FILGRASTIM 480 MCG: 480 INJECTION, SOLUTION INTRAVENOUS; SUBCUTANEOUS at 13:43

## 2022-03-24 ASSESSMENT — PAIN SCALES - GENERAL: PAINLEVEL: NO PAIN (0)

## 2022-03-24 NOTE — TELEPHONE ENCOUNTER
Call to patient as follow up from SW visit regarding plan of care.  Patient would like to better understand why Neupogen visits were canceled and the impact to her treatment. Will review chart with provider and connect with patient at next visit on Tuesday 3/29/22.

## 2022-03-24 NOTE — PROGRESS NOTES
Infusion Nursing Note:  Jann RAUL Davidson presents today for neupogen.    Patient seen by provider today: No   present during visit today: Not Applicable.    Note: N/A.      Intravenous Access:  No Intravenous access/labs at this visit.    Treatment Conditions:  Lab Results   Component Value Date    HGB 8.2 (L) 03/22/2022    WBC 3.0 (L) 03/22/2022    ANEU 2.8 02/07/2022    ANEUTAUTO 0.7 (L) 03/22/2022     03/22/2022          Post Infusion Assessment:  Patient tolerated injection without incident.  Site patent and intact, free from redness, edema or discomfort.       Discharge Plan:   AVS to patient via MYCHART.  Patient will return as prev maria de jesus  for next appointment.   Patient discharged in stable condition accompanied by: self.  Departure Mode: Ambulatory.      Tico Tyler RN

## 2022-03-27 LAB
Lab: NORMAL
PERFORMING LABORATORY: NORMAL
SPECIMEN STATUS: NORMAL
TEST NAME: NORMAL

## 2022-03-29 ENCOUNTER — INFUSION THERAPY VISIT (OUTPATIENT)
Dept: INFUSION THERAPY | Facility: CLINIC | Age: 77
End: 2022-03-29
Attending: INTERNAL MEDICINE
Payer: MEDICARE

## 2022-03-29 ENCOUNTER — ONCOLOGY VISIT (OUTPATIENT)
Dept: ONCOLOGY | Facility: CLINIC | Age: 77
End: 2022-03-29
Attending: NURSE PRACTITIONER
Payer: MEDICARE

## 2022-03-29 VITALS
BODY MASS INDEX: 24.24 KG/M2 | TEMPERATURE: 98.3 F | WEIGHT: 136.8 LBS | RESPIRATION RATE: 16 BRPM | DIASTOLIC BLOOD PRESSURE: 83 MMHG | SYSTOLIC BLOOD PRESSURE: 143 MMHG | OXYGEN SATURATION: 100 % | HEART RATE: 94 BPM

## 2022-03-29 DIAGNOSIS — C50.512 MALIGNANT NEOPLASM OF LOWER-OUTER QUADRANT OF LEFT BREAST OF FEMALE, ESTROGEN RECEPTOR NEGATIVE (H): ICD-10-CM

## 2022-03-29 DIAGNOSIS — T45.1X5A CHEMOTHERAPY-INDUCED NEUTROPENIA (H): Primary | ICD-10-CM

## 2022-03-29 DIAGNOSIS — C50.512 MALIGNANT NEOPLASM OF LOWER-OUTER QUADRANT OF LEFT BREAST OF FEMALE, ESTROGEN RECEPTOR NEGATIVE (H): Primary | ICD-10-CM

## 2022-03-29 DIAGNOSIS — Z17.1 MALIGNANT NEOPLASM OF LOWER-OUTER QUADRANT OF LEFT BREAST OF FEMALE, ESTROGEN RECEPTOR NEGATIVE (H): ICD-10-CM

## 2022-03-29 DIAGNOSIS — Z17.1 MALIGNANT NEOPLASM OF LOWER-OUTER QUADRANT OF LEFT BREAST OF FEMALE, ESTROGEN RECEPTOR NEGATIVE (H): Primary | ICD-10-CM

## 2022-03-29 DIAGNOSIS — D70.1 CHEMOTHERAPY-INDUCED NEUTROPENIA (H): Primary | ICD-10-CM

## 2022-03-29 LAB
ALBUMIN SERPL-MCNC: 3.5 G/DL (ref 3.4–5)
ALP SERPL-CCNC: 188 U/L (ref 40–150)
ALT SERPL W P-5'-P-CCNC: 120 U/L (ref 0–50)
ANION GAP SERPL CALCULATED.3IONS-SCNC: 5 MMOL/L (ref 3–14)
AST SERPL W P-5'-P-CCNC: 120 U/L (ref 0–45)
BASOPHILS # BLD MANUAL: 0 10E3/UL (ref 0–0.2)
BASOPHILS NFR BLD MANUAL: 0 %
BILIRUB SERPL-MCNC: 0.1 MG/DL (ref 0.2–1.3)
BLASTS # BLD MANUAL: 0.1 10E3/UL
BLASTS NFR BLD MANUAL: 1 %
BUN SERPL-MCNC: 39 MG/DL (ref 7–30)
CALCIUM SERPL-MCNC: 9.4 MG/DL (ref 8.5–10.1)
CHLORIDE BLD-SCNC: 107 MMOL/L (ref 94–109)
CO2 SERPL-SCNC: 26 MMOL/L (ref 20–32)
CREAT SERPL-MCNC: 1.28 MG/DL (ref 0.52–1.04)
EOSINOPHIL # BLD MANUAL: 0 10E3/UL (ref 0–0.7)
EOSINOPHIL NFR BLD MANUAL: 0 %
ERYTHROCYTE [DISTWIDTH] IN BLOOD BY AUTOMATED COUNT: 17.2 % (ref 10–15)
GFR SERPL CREATININE-BSD FRML MDRD: 43 ML/MIN/1.73M2
GLUCOSE BLD-MCNC: 118 MG/DL (ref 70–99)
HCT VFR BLD AUTO: 28.1 % (ref 35–47)
HGB BLD-MCNC: 9.1 G/DL (ref 11.7–15.7)
LYMPHOCYTES # BLD MANUAL: 2.8 10E3/UL (ref 0.8–5.3)
LYMPHOCYTES NFR BLD MANUAL: 45 %
MCH RBC QN AUTO: 32.9 PG (ref 26.5–33)
MCHC RBC AUTO-ENTMCNC: 32.4 G/DL (ref 31.5–36.5)
MCV RBC AUTO: 101 FL (ref 78–100)
METAMYELOCYTES # BLD MANUAL: 0.3 10E3/UL
METAMYELOCYTES NFR BLD MANUAL: 5 %
MONOCYTES # BLD MANUAL: 0.6 10E3/UL (ref 0–1.3)
MONOCYTES NFR BLD MANUAL: 9 %
MYELOCYTES # BLD MANUAL: 0.1 10E3/UL
MYELOCYTES NFR BLD MANUAL: 2 %
NEUTROPHILS # BLD MANUAL: 2.4 10E3/UL (ref 1.6–8.3)
NEUTROPHILS NFR BLD MANUAL: 38 %
NRBC # BLD AUTO: 0.1 10E3/UL
NRBC BLD MANUAL-RTO: 1 %
PLAT MORPH BLD: ABNORMAL
PLATELET # BLD AUTO: 205 10E3/UL (ref 150–450)
POTASSIUM BLD-SCNC: 4.8 MMOL/L (ref 3.4–5.3)
PROT SERPL-MCNC: 7 G/DL (ref 6.8–8.8)
RBC # BLD AUTO: 2.77 10E6/UL (ref 3.8–5.2)
RBC MORPH BLD: ABNORMAL
SODIUM SERPL-SCNC: 138 MMOL/L (ref 133–144)
WBC # BLD AUTO: 6.2 10E3/UL (ref 4–11)

## 2022-03-29 PROCEDURE — 96413 CHEMO IV INFUSION 1 HR: CPT

## 2022-03-29 PROCEDURE — 80053 COMPREHEN METABOLIC PANEL: CPT | Performed by: NURSE PRACTITIONER

## 2022-03-29 PROCEDURE — 96417 CHEMO IV INFUS EACH ADDL SEQ: CPT

## 2022-03-29 PROCEDURE — 85027 COMPLETE CBC AUTOMATED: CPT | Performed by: NURSE PRACTITIONER

## 2022-03-29 PROCEDURE — 250N000011 HC RX IP 250 OP 636: Performed by: NURSE PRACTITIONER

## 2022-03-29 PROCEDURE — 258N000003 HC RX IP 258 OP 636: Performed by: NURSE PRACTITIONER

## 2022-03-29 PROCEDURE — G0463 HOSPITAL OUTPT CLINIC VISIT: HCPCS | Mod: 25

## 2022-03-29 PROCEDURE — 96367 TX/PROPH/DG ADDL SEQ IV INF: CPT

## 2022-03-29 PROCEDURE — 99214 OFFICE O/P EST MOD 30 MIN: CPT | Performed by: NURSE PRACTITIONER

## 2022-03-29 RX ORDER — HEPARIN SODIUM (PORCINE) LOCK FLUSH IV SOLN 100 UNIT/ML 100 UNIT/ML
5 SOLUTION INTRAVENOUS
Status: DISCONTINUED | OUTPATIENT
Start: 2022-03-29 | End: 2022-03-29 | Stop reason: HOSPADM

## 2022-03-29 RX ADMIN — Medication 5 ML: at 14:38

## 2022-03-29 RX ADMIN — SODIUM CHLORIDE 250 ML: 9 INJECTION, SOLUTION INTRAVENOUS at 12:41

## 2022-03-29 RX ADMIN — PACLITAXEL 130 MG: 6 INJECTION, SOLUTION INTRAVENOUS at 12:58

## 2022-03-29 RX ADMIN — DEXAMETHASONE SODIUM PHOSPHATE: 10 INJECTION, SOLUTION INTRAMUSCULAR; INTRAVENOUS at 12:41

## 2022-03-29 RX ADMIN — CARBOPLATIN 100 MG: 10 INJECTION, SOLUTION INTRAVENOUS at 14:03

## 2022-03-29 ASSESSMENT — PAIN SCALES - GENERAL: PAINLEVEL: NO PAIN (0)

## 2022-03-29 NOTE — PROGRESS NOTES
"Oncology Rooming Note    March 29, 2022 11:20 AM   Jann Davidson is a 76 year old female who presents for:    Chief Complaint   Patient presents with     Oncology Clinic Visit     Initial Vitals: BP (!) 143/83   Pulse 94   Temp 98.3  F (36.8  C) (Oral)   Resp 16   Wt 62.1 kg (136 lb 12.8 oz)   SpO2 100%   BMI 24.24 kg/m   Estimated body mass index is 24.24 kg/m  as calculated from the following:    Height as of 1/31/22: 1.6 m (5' 2.99\").    Weight as of this encounter: 62.1 kg (136 lb 12.8 oz). Body surface area is 1.66 meters squared.  No Pain (0) Comment: Data Unavailable   No LMP recorded. Patient is postmenopausal.  Allergies reviewed: Yes  Medications reviewed: Yes    Medications: Medication refills not needed today.  Pharmacy name entered into EPIC:    [id number] - [place], [place] - [address]  [facility] - [place], [place] - [address] [place] [address]    Clinical concerns: no      Rosa Palomino CMA            "

## 2022-03-29 NOTE — PROGRESS NOTES
Oncology/Hematology Visit Note  Mar 29, 2022    Reason for Visit: follow up of left breast cancer  Triple negative  Stage IIb  Patient of Dr. Mcmullen    01/18-started neoadjuvant chemotherapy with paclitaxel and carboplatin for 12 weeks with addition of pembrolizumab every 3 weeks followed by dose dense AC every 3 weeks for 4 cycles with addition of pembrolizumab every 3 weeks      Interval History:  Patient denies fever chills sweats cough shortness of breath chest pain nausea vomiting diarrhea abdominal pain or bleeding  Patient reports she drinks 3 glasses of wine every night      Review of Systems:  14 point ROS of systems including Constitutional, Eyes, Respiratory, Cardiovascular, Gastroenterology, Genitourinary, Integumentary, Muscularskeletal, Psychiatric were all negative except for pertinent positives noted in my HPI.    Physical Examination:  General: The patient is a pleasant female in no acute distress.  BP (!) 143/83   Pulse 94   Temp 98.3  F (36.8  C) (Oral)   Resp 16   Wt 62.1 kg (136 lb 12.8 oz)   SpO2 100%   BMI 24.24 kg/m    HEENT: EOMI, PERRL. Sclerae are anicteric. Oral mucosa is pink and moist with no lesions or thrush.   Lymph: Neck is supple with no lymphadenopathy in the cervical or supraclavicular areas.   Heart: Regular rate and rhythm.   Lungs: Clear to auscultation bilaterally.   GI: Bowel sounds present, soft, nontender with no palpable hepatosplenomegaly or masses.   Extremities: No lower extremity edema noted bilaterally.   Skin: No rashes, petechiae, or bruising noted on exposed skin.    Laboratory Data:  CBC CMP results reviewed      Assessment and Plan:    This is a 76-year-old female with    left breast cancer  Triple negative  Stage IIb  Patient of Dr. Mcmullen  01/18-started neoadjuvant chemotherapy with paclitaxel and carboplatin for 12 weeks with addition of pembrolizumab every 3 weeks followed by dose dense AC every 3 weeks for 4 cycles with addition of pembrolizumab every 3  weeks  Treatment held last week due to neutropenia  Patient received Neupogen shot x3 days  Labs reviewed abnormalities discussed with patient  Neutropenia reviewed with Dr. Gibbs and Dr. Mcmullen's absence  Okay to proceed with treatment.  If patient has persistent neutropenia then we can consider discontinuation of the carboplatin  -Patient is scheduled for Neupogen shots 03/30. 03/31 and  04/01   Schedule with me next week prior to treatment  04/19-patient is scheduled for the MRI of the breast  Scheduled with Dr. Mcmullen after the MRI of the breast to review results and plan of care      Transaminitis-possible pembrolizumab  Patient also reports she drinks 3 glasses of wine every night  Advised patient to refrain from drinking alcohol  Monitor closely  Recheck liver enzymes prior pembrolizumab      Chronic kidney disease  Creatinine at baseline  Continue follow-up with nephrologist      Anemia  Mild continue to monitor closely      Elevated TSH-most likely secondary to pembrolizumab  Normal T4  TSH is normal now  -Continue to monitor      FDG uptake at the anal canal  -Patient wants to wait for anoscopy and referral to colorectal since she is asymptomatic  -Plan to see colorectal after completion of the chemo for breast cancer    Right renal mass  Management per urology    Depression anxiety  Patient declined appointment with Dr Darin Jimenes  I discussed different coping mechanisms      Call clinic with any changes in health condition or questions        HUBER Damon [facility]     Chart documentation with Dragon Voice recognition Software. Although reviewed after completion, some words and grammatical errors may remain.

## 2022-03-29 NOTE — LETTER
"    3/29/2022         RE: Jann Davidson  [address]  [facility] [address]        Dear Colleague,    Thank you for referring your patient, Jann Davidson, to the [facility]. Please see a copy of my visit note below.    Oncology Rooming Note    March 29, 2022 11:20 AM   Jann Davidson is a 76 year old female who presents for:    Chief Complaint   Patient presents with     Oncology Clinic Visit     Initial Vitals: BP (!) 143/83   Pulse 94   Temp 98.3  F (36.8  C) (Oral)   Resp 16   Wt 62.1 kg (136 lb 12.8 oz)   SpO2 100%   BMI 24.24 kg/m   Estimated body mass index is 24.24 kg/m  as calculated from the following:    Height as of 1/31/22: 1.6 m (5' 2.99\").    Weight as of this encounter: 62.1 kg (136 lb 12.8 oz). Body surface area is 1.66 meters squared.  No Pain (0) Comment: Data Unavailable   No LMP recorded. Patient is postmenopausal.  Allergies reviewed: Yes  Medications reviewed: Yes    Medications: Medication refills not needed today.  Pharmacy name entered into EPIC:    [id number] - [place], [place] - [address] [facility] - [place], [place] - [address] [place] [address]    Clinical concerns: no      Rosa Palomino CMA              Oncology/Hematology Visit Note  Mar 29, 2022    Reason for Visit: follow up of left breast cancer  Triple negative  Stage IIb  Patient of Dr. Mcmullen    01/18-started neoadjuvant chemotherapy with paclitaxel and carboplatin for 12 weeks with addition of pembrolizumab every 3 weeks followed by dose dense AC every 3 weeks for 4 cycles with addition of pembrolizumab every 3 weeks      Interval History:  Patient denies fever chills sweats cough shortness of breath chest pain nausea vomiting diarrhea abdominal pain or bleeding  Patient reports she drinks 3 glasses of wine every night      Review of Systems:  14 point ROS of systems including Constitutional, Eyes, Respiratory, Cardiovascular, " Gastroenterology, Genitourinary, Integumentary, Muscularskeletal, Psychiatric were all negative except for pertinent positives noted in my HPI.    Physical Examination:  General: The patient is a pleasant female in no acute distress.  BP (!) 143/83   Pulse 94   Temp 98.3  F (36.8  C) (Oral)   Resp 16   Wt 62.1 kg (136 lb 12.8 oz)   SpO2 100%   BMI 24.24 kg/m    HEENT: EOMI, PERRL. Sclerae are anicteric. Oral mucosa is pink and moist with no lesions or thrush.   Lymph: Neck is supple with no lymphadenopathy in the cervical or supraclavicular areas.   Heart: Regular rate and rhythm.   Lungs: Clear to auscultation bilaterally.   GI: Bowel sounds present, soft, nontender with no palpable hepatosplenomegaly or masses.   Extremities: No lower extremity edema noted bilaterally.   Skin: No rashes, petechiae, or bruising noted on exposed skin.    Laboratory Data:  CBC CMP results reviewed      Assessment and Plan:    This is a 76-year-old female with    left breast cancer  Triple negative  Stage IIb  Patient of Dr. Mcmullen  01/18-started neoadjuvant chemotherapy with paclitaxel and carboplatin for 12 weeks with addition of pembrolizumab every 3 weeks followed by dose dense AC every 3 weeks for 4 cycles with addition of pembrolizumab every 3 weeks  Treatment held last week due to neutropenia  Patient received Neupogen shot x3 days  Labs reviewed abnormalities discussed with patient  Okay to proceed with treatment  -Patient is scheduled for Neupogen shots 03/30. 03/31 and  04/01   Schedule with me next week prior to treatment  04/19-patient is scheduled for the MRI of the breast  Scheduled with Dr. Mcmullen after the MRI of the breast to review results and plan of care      Transaminitis-possible pembrolizumab  Patient also reports she drinks 3 glasses of wine every night  Advised patient to refrain from drinking alcohol  Monitor closely  Recheck liver enzymes prior pembrolizumab      Chronic kidney disease  Creatinine at  baseline  Continue follow-up with nephrologist      Anemia  Mild continue to monitor closely      Elevated TSH-most likely secondary to pembrolizumab  Normal T4  TSH is normal now  -Continue to monitor      FDG uptake at the anal canal  -Patient wants to wait for anoscopy and referral to colorectal since she is asymptomatic  -Plan to see colorectal after completion of the chemo for breast cancer    Right renal mass  Management per urology    Depression anxiety  Patient declined appointment with Dr Darin Jimenes  I discussed different coping mechanisms      Call clinic with any changes in health condition or questions        HUBER Damon CNP  [facility]     Chart documentation with Dragon Voice recognition Software. Although reviewed after completion, some words and grammatical errors may remain.            Again, thank you for allowing me to participate in the care of your patient.        Sincerely,        HUBER Damon CNP

## 2022-03-29 NOTE — PROGRESS NOTES
Nursing Note:  Jann Davidson presents today for port labs.    Patient seen by provider today: Yes: Tadeo Dugan NP   present during visit today: Not Applicable.    Note: to infusion following exam.    Intravenous Access:  Implanted Port.    Discharge Plan:   Patient was sent to [facility] for clinic appointment.    Karol Sanchez RN

## 2022-03-29 NOTE — PROGRESS NOTES
Infusion Nursing Note:  Jann Davidson presents today for Cycle 3 Day 15 Taxol, Carboplatin.    Patient seen by provider today: Yes: Tadeo Dugan NP.   present during visit today: Not Applicable.    Note:NA.      Intravenous Access:  Implanted Port.    Treatment Conditions:  Lab Results   Component Value Date    HGB 9.1 (L) 03/29/2022    WBC 6.2 03/29/2022    ANEU 2.4 03/29/2022    ANEUTAUTO 0.7 (L) 03/22/2022     03/29/2022      Lab Results   Component Value Date     03/29/2022    POTASSIUM 4.8 03/29/2022    MAG 2.1 01/17/2022    CR 1.28 (H) 03/29/2022    AMY 9.4 03/29/2022    BILITOTAL 0.1 (L) 03/29/2022    ALBUMIN 3.5 03/29/2022     (H) 03/29/2022     (H) 03/29/2022     Results reviewed, labs MET treatment parameters, ok to proceed with treatment.      Post Infusion Assessment:  Patient tolerated infusion without incident.  Blood return noted pre and post infusion.  Site patent and intact, free from redness, edema or discomfort.  No evidence of extravasations.  Access discontinued per protocol.       Discharge Plan:   Patient declined prescription refills.  Discharge instructions reviewed with: Patient.  Patient verbalized understanding of discharge instructions and all questions answered.  AVS to patient via TenlegsT.  Patient will return 3/30/22 for next appointment.   Patient discharged in stable condition accompanied by: self.  Departure Mode: Ambulatory.      Jessica Ellis RN

## 2022-03-30 ENCOUNTER — INFUSION THERAPY VISIT (OUTPATIENT)
Dept: INFUSION THERAPY | Facility: CLINIC | Age: 77
End: 2022-03-30
Attending: NURSE PRACTITIONER
Payer: MEDICARE

## 2022-03-30 VITALS
HEART RATE: 92 BPM | OXYGEN SATURATION: 100 % | DIASTOLIC BLOOD PRESSURE: 79 MMHG | SYSTOLIC BLOOD PRESSURE: 118 MMHG | RESPIRATION RATE: 18 BRPM | TEMPERATURE: 97.8 F

## 2022-03-30 DIAGNOSIS — C50.512 MALIGNANT NEOPLASM OF LOWER-OUTER QUADRANT OF LEFT BREAST OF FEMALE, ESTROGEN RECEPTOR NEGATIVE (H): ICD-10-CM

## 2022-03-30 DIAGNOSIS — Z17.1 MALIGNANT NEOPLASM OF LOWER-OUTER QUADRANT OF LEFT BREAST OF FEMALE, ESTROGEN RECEPTOR NEGATIVE (H): ICD-10-CM

## 2022-03-30 DIAGNOSIS — T45.1X5A CHEMOTHERAPY-INDUCED NEUTROPENIA (H): Primary | ICD-10-CM

## 2022-03-30 DIAGNOSIS — D70.1 CHEMOTHERAPY-INDUCED NEUTROPENIA (H): Primary | ICD-10-CM

## 2022-03-30 PROCEDURE — 250N000011 HC RX IP 250 OP 636: Mod: JB | Performed by: INTERNAL MEDICINE

## 2022-03-30 PROCEDURE — 96372 THER/PROPH/DIAG INJ SC/IM: CPT | Performed by: INTERNAL MEDICINE

## 2022-03-30 RX ADMIN — FILGRASTIM 300 MCG: 300 INJECTION, SOLUTION INTRAVENOUS; SUBCUTANEOUS at 14:40

## 2022-03-30 ASSESSMENT — PAIN SCALES - GENERAL: PAINLEVEL: NO PAIN (0)

## 2022-03-30 NOTE — PROGRESS NOTES
Infusion Nursing Note:  Jann Cuiton presents today for Neupogen injection.    Patient seen by provider today: No   present during visit today: Not Applicable.    Note: no new concerns.      Intravenous Access:  No Intravenous access/labs at this visit.    Treatment Conditions:  Not Applicable.      Post Infusion Assessment:  Patient tolerated injection without incident.       Discharge Plan:   Patient and/or family verbalized understanding of discharge instructions and all questions answered.  AVS to patient via PressT.  Patient will return 3/31 for next appointment.   Patient discharged in stable condition accompanied by: self.  Departure Mode: Ambulatory.      Marquita Weaver RN

## 2022-03-31 ENCOUNTER — INFUSION THERAPY VISIT (OUTPATIENT)
Dept: INFUSION THERAPY | Facility: CLINIC | Age: 77
End: 2022-03-31
Attending: NURSE PRACTITIONER
Payer: MEDICARE

## 2022-03-31 ENCOUNTER — TELEPHONE (OUTPATIENT)
Dept: ONCOLOGY | Facility: CLINIC | Age: 77
End: 2022-03-31
Payer: MEDICARE

## 2022-03-31 VITALS
OXYGEN SATURATION: 100 % | TEMPERATURE: 98.2 F | HEART RATE: 93 BPM | DIASTOLIC BLOOD PRESSURE: 71 MMHG | SYSTOLIC BLOOD PRESSURE: 114 MMHG | RESPIRATION RATE: 16 BRPM

## 2022-03-31 DIAGNOSIS — Z17.1 MALIGNANT NEOPLASM OF LOWER-OUTER QUADRANT OF LEFT BREAST OF FEMALE, ESTROGEN RECEPTOR NEGATIVE (H): ICD-10-CM

## 2022-03-31 DIAGNOSIS — C50.512 MALIGNANT NEOPLASM OF LOWER-OUTER QUADRANT OF LEFT BREAST OF FEMALE, ESTROGEN RECEPTOR NEGATIVE (H): ICD-10-CM

## 2022-03-31 DIAGNOSIS — T45.1X5A CHEMOTHERAPY-INDUCED NEUTROPENIA (H): Primary | ICD-10-CM

## 2022-03-31 DIAGNOSIS — D70.1 CHEMOTHERAPY-INDUCED NEUTROPENIA (H): Primary | ICD-10-CM

## 2022-03-31 PROCEDURE — 250N000011 HC RX IP 250 OP 636: Mod: JB | Performed by: INTERNAL MEDICINE

## 2022-03-31 PROCEDURE — 96372 THER/PROPH/DIAG INJ SC/IM: CPT | Performed by: INTERNAL MEDICINE

## 2022-03-31 RX ADMIN — FILGRASTIM 300 MCG: 300 INJECTION, SOLUTION INTRAVENOUS; SUBCUTANEOUS at 14:29

## 2022-03-31 ASSESSMENT — PAIN SCALES - GENERAL: PAINLEVEL: MILD PAIN (2)

## 2022-03-31 NOTE — PROGRESS NOTES
Infusion Nursing Note:  Jann Davidson presents today for neupogen.    Patient seen by provider today: No   present during visit today: Not Applicable.    Note: Pt is interested in covid booster shot. Plan to give the vaccine on 4/5 when she comes in for chemo.     Pt reports having constant epigastric pain which seems worsening after she ate. Tried pepcid but did not feel it helped. In basket message sent to Tadeo CARTER NP and Alondra Torres RNCC.      Intravenous Access:  No Intravenous access/labs at this visit.    Treatment Conditions:  Not Applicable.      Post Infusion Assessment:  Patient tolerated injection without incident.  Site patent and intact, free from redness, edema or discomfort.       Discharge Plan:   Discharge instructions reviewed with: Patient.  Patient and/or family verbalized understanding of discharge instructions and all questions answered.  AVS to patient via Sparkcentral.  Patient will return 4/1/22 for next appointment.   Patient discharged in stable condition accompanied by: self.  Departure Mode: Ambulatory.      Arminda Danielle RN

## 2022-03-31 NOTE — TELEPHONE ENCOUNTER
3/31/22 M to call [phone] opt5, opt2 to schedule return virtual visit with Elsie Doll for genetic testing results. -[facility]     PLEASE DO NOT SCHEDULE NEXT DAY AFTER 4:00PM

## 2022-04-01 ENCOUNTER — PATIENT OUTREACH (OUTPATIENT)
Dept: ONCOLOGY | Facility: CLINIC | Age: 77
End: 2022-04-01

## 2022-04-01 ENCOUNTER — ALLIED HEALTH/NURSE VISIT (OUTPATIENT)
Dept: INFUSION THERAPY | Facility: CLINIC | Age: 77
End: 2022-04-01
Attending: NURSE PRACTITIONER
Payer: MEDICARE

## 2022-04-01 VITALS
TEMPERATURE: 97.3 F | OXYGEN SATURATION: 100 % | HEART RATE: 96 BPM | SYSTOLIC BLOOD PRESSURE: 108 MMHG | DIASTOLIC BLOOD PRESSURE: 66 MMHG

## 2022-04-01 DIAGNOSIS — C50.512 MALIGNANT NEOPLASM OF LOWER-OUTER QUADRANT OF LEFT BREAST OF FEMALE, ESTROGEN RECEPTOR NEGATIVE (H): ICD-10-CM

## 2022-04-01 DIAGNOSIS — D70.1 CHEMOTHERAPY-INDUCED NEUTROPENIA (H): Primary | ICD-10-CM

## 2022-04-01 DIAGNOSIS — T45.1X5A CHEMOTHERAPY-INDUCED NEUTROPENIA (H): Primary | ICD-10-CM

## 2022-04-01 DIAGNOSIS — Z17.1 MALIGNANT NEOPLASM OF LOWER-OUTER QUADRANT OF LEFT BREAST OF FEMALE, ESTROGEN RECEPTOR NEGATIVE (H): ICD-10-CM

## 2022-04-01 PROCEDURE — 250N000011 HC RX IP 250 OP 636: Mod: JB | Performed by: INTERNAL MEDICINE

## 2022-04-01 PROCEDURE — 96372 THER/PROPH/DIAG INJ SC/IM: CPT | Performed by: INTERNAL MEDICINE

## 2022-04-01 RX ADMIN — FILGRASTIM 300 MCG: 300 INJECTION, SOLUTION INTRAVENOUS; SUBCUTANEOUS at 12:14

## 2022-04-01 NOTE — PROGRESS NOTES
Infusion Nursing Note:  Jann Davidson presents today for neupogen.    Patient seen by provider today: No   present during visit today: Not Applicable.    Note: N/A.      Intravenous Access:  No Intravenous access/labs at this visit.    Treatment Conditions:  Not Applicable.      Post Infusion Assessment:  Patient tolerated injection without incident.  Site patent and intact, free from redness, edema or discomfort.       Discharge Plan:   AVS to patient via MYCHART.  Patient will return as prev maria de jesus for next week for next appointment.   Patient discharged in stable condition accompanied by: self.  Departure Mode: Ambulatory.      Tico Tyler RN

## 2022-04-04 ENCOUNTER — VIRTUAL VISIT (OUTPATIENT)
Dept: ONCOLOGY | Facility: CLINIC | Age: 77
End: 2022-04-04
Attending: GENETIC COUNSELOR, MS
Payer: MEDICARE

## 2022-04-04 DIAGNOSIS — Z80.49 FAMILY HISTORY OF UTERINE CANCER: ICD-10-CM

## 2022-04-04 DIAGNOSIS — C50.512 MALIGNANT NEOPLASM OF LOWER-OUTER QUADRANT OF LEFT BREAST OF FEMALE, ESTROGEN RECEPTOR NEGATIVE (H): Primary | ICD-10-CM

## 2022-04-04 DIAGNOSIS — Z17.1 MALIGNANT NEOPLASM OF LOWER-OUTER QUADRANT OF LEFT BREAST OF FEMALE, ESTROGEN RECEPTOR NEGATIVE (H): Primary | ICD-10-CM

## 2022-04-04 DIAGNOSIS — Z80.3 FAMILY HISTORY OF MALIGNANT NEOPLASM OF BREAST: ICD-10-CM

## 2022-04-04 PROCEDURE — 999N000069 HC STATISTIC GENETIC COUNSELING, < 16 MIN: Mod: TEL | Performed by: GENETIC COUNSELOR, MS

## 2022-04-04 NOTE — LETTER
Cancer Risk Management  Program [facility]  [facility]  [facility]  [facility]  Mailing Address  Cancer Risk Management Program  [facility]  [address]  [place] [address]    New patient appointments  [phone]  April 4, 2022    Jann Davidson  [address] [facility] [address]      Dear Jann,    It was a pleasure speaking with you over the phone for genetic counseling on 4/4/2022. Here is a copy of the progress note from our discussion. If you have any additional questions, please feel free to call.    Referring Provider: Neetu Mcmullen MD    Presenting Information:  I spoke to Jann over the phone today to discuss her genetic testing results. Her blood was drawn on 3/15/22. The [employer]-Cancer panel (85 genes associated with an increased risk for multiple different types of cancer) was ordered from [employer]. This testing was done because of Jann's personal and family history of cancer.    Genetic Testing Result: NEGATIVE  Jann is negative for mutations in the 85 genes analyzed: AIP, ALK, APC, CICI, AXIN2, BAP1, BARD1, BLM, BMPR1A, BRCA1, BRCA2, BRIP1, CDC73, CDH1, CDK4, CDKN1B, CDKN2A, CHEK2, CTNNA1, DICER1, FANCC, FH, FLCN, GALNT12, KIF1B, LZTR1, MAX, MEN1, MET, MLH1, MRE11A, MSH2, MSH3, MSH6, MUTYH, NBN, NF1, NF2, NTHL1, PALB2, PHOX2B, PMS2, POT1, LAKCA3K, PTCH1, PTEN, RAD50, RAD51C, RAD51D, RB1, RECQL, RET, SDHA, SDHAF2, SDHB, SDHC, SDHD, SMAD4, SMARCA4, SMARCB1, SMARCE1, STK11, SUFU, RAVQ060, TP53, TSC1, TSC2, VHL and XRCC2 (sequencing and deletion/duplication); EGFR, EGLN1, HBL710L, HOXB13, KIT, MITF, MLH3, PALLD, PDGFRA, POLD1, POLE, RINT1, RPS20 and TERT (sequencing only); EPCAM and GREM1 (deletion/duplication only).      Interpretation:  We discussed several different interpretations of this negative test  result.    1. One explanation may be that there is a different gene or combination of genes and environment that are associated with the cancers in this family.  2. It is possible that her relatives have a mutation in one of these genes and she did not inherit it.  3. There is also a small possibility that there is a mutation in one of these genes, and the testing laboratory could not find it with their current testing methods.       Screening:  Based on this negative test result, it is important for Jann and her relatives to refer back to the family history for appropriate cancer screening.      She should continue with her breast cancer treatment as recommended by her oncology team.    Due to Jann's family history of uterine cancer in her mother and possibly in her paternal cousin, Jann and other close female relatives remain at slightly increased risk for uterine cancer. We discussed available uterine cancer screening (pelvic exams, endometrial sampling, transvaginal ultrasounds) as well as the significant limitations of this screening. As such, this screening is not typically recommended. That being said, women in this family should discuss their family history, this screening, and the signs and symptoms of uterine cancer with their primary OB/GYN provider, as they may have individualized recommendations.    Other population cancer screening options, such as those recommended by the American Cancer Society and the National Comprehensive Cancer Network (NCCN), are also appropriate for Jann and her family. These screening recommendations may change if there are changes to Jann's personal and/or family history of cancer. Final screening recommendations should be made by each individual's primary care provider.      Inheritance:  We reviewed the autosomal dominant inheritance of mutations in these genes. We discussed that Jann cannot/did not pass on an identifiable mutation in these genes to her children based on  this test result. Mutations in these genes do not skip generations.      Additional Testing Considerations:  Although Jann's genetic testing result was negative, other relatives may still carry a gene mutation associated with an increased risk for cancer. Genetic counseling is recommended for her paternal cousin who possibly had young uterine cancer to discuss genetic testing options. If any of these relatives do pursue genetic testing, Jann is encouraged to contact me so that we may review the impact of their test results on her.    Summary:  We do not have an explanation for Jann's personal or family history of cancer. While no genetic changes were identified, Jann may still be at risk for certain cancers due to family history, environmental factors, or other genetic causes not identified by this test. Because of that, it is important that she continue with cancer screening based on her personal and family history as discussed above.    Genetic testing is rapidly advancing, and new cancer susceptibility genes will most likely be identified in the future. Therefore, I encouraged Jann to contact me annually or if there are changes in her personal or family history. This may change how we assess her cancer risk, screening, and the testing we would offer.    Plan:  1. A copy of the test results will be mailed to Jann along with this letter. A copy of her results was also released to her today via the online [employer] portal.  2. She plans to follow-up with her physicians.  3. She should contact me regularly, or sooner if her family history changes.    If Jann has any further questions, I encouraged her to contact me at [phone].    Elsie Doll MS, [facility]  Licensed, Certified Genetic Counselor  Office: [phone]  Email: [email or web address]

## 2022-04-04 NOTE — PROGRESS NOTES
Jann is a 76 year old who is being evaluated via a billable telephone visit.      What phone number would you like to be contacted at? [phone]  How would you like to obtain your AVS? Scoobyhart

## 2022-04-04 NOTE — PROGRESS NOTES
"4/4/2022    Jann is a 76 year old who is being evaluated via a billable telephone visit.     Phone call duration: 7 minutes    Referring Provider: Neetu Mcmullen MD    Presenting Information:  I spoke to Jann over the phone today to discuss her genetic testing results. Her blood was drawn on 3/15/22. The CustomNext-Cancer panel (85 genes associated with an increased risk for multiple different types of cancer) was ordered from [employer]. This testing was done because of Jann's personal and family history of cancer.    Genetic Testing Result: NEGATIVE  Jann is negative for mutations in the 85 genes analyzed: AIP, ALK, APC, CICI, AXIN2, BAP1, BARD1, BLM, BMPR1A, BRCA1, BRCA2, BRIP1, CDC73, CDH1, CDK4, CDKN1B, CDKN2A, CHEK2, CTNNA1, DICER1, FANCC, FH, FLCN, GALNT12, KIF1B, LZTR1, MAX, MEN1, MET, MLH1, MRE11A, MSH2, MSH3, MSH6, MUTYH, NBN, NF1, NF2, NTHL1, PALB2, PHOX2B, PMS2, POT1, CEASF1V, PTCH1, PTEN, RAD50, RAD51C, RAD51D, RB1, RECQL, RET, SDHA, SDHAF2, SDHB, SDHC, SDHD, SMAD4, SMARCA4, SMARCB1, SMARCE1, STK11, SUFU, JOFN690, TP53, TSC1, TSC2, VHL and XRCC2 (sequencing and deletion/duplication); EGFR, EGLN1, XUE179Y, HOXB13, KIT, MITF, MLH3, PALLD, PDGFRA, POLD1, POLE, RINT1, RPS20 and TERT (sequencing only); EPCAM and GREM1 (deletion/duplication only).      A copy of the test report can be found in the Laboratory tab, dated 3/15/22, and named \"LABORATORY MISCELLANEOUS ORDER\". The report is scanned in as a linked document.    Interpretation:  We discussed several different interpretations of this negative test result.    1. One explanation may be that there is a different gene or combination of genes and environment that are associated with the cancers in this family.  2. It is possible that her relatives have a mutation in one of these genes and she did not inherit it.  3. There is also a small possibility that there is a mutation in one of these genes, and the testing laboratory could not find it with their " current testing methods.       Screening:  Based on this negative test result, it is important for Jann and her relatives to refer back to the family history for appropriate cancer screening.      She should continue with her breast cancer treatment as recommended by her oncology team.    Due to Jann's family history of uterine cancer in her mother and possibly in her paternal cousin, Jann and other close female relatives remain at slightly increased risk for uterine cancer. We discussed available uterine cancer screening (pelvic exams, endometrial sampling, transvaginal ultrasounds) as well as the significant limitations of this screening. As such, this screening is not typically recommended. That being said, women in this family should discuss their family history, this screening, and the signs and symptoms of uterine cancer with their primary OB/GYN provider, as they may have individualized recommendations.    Other population cancer screening options, such as those recommended by the American Cancer Society and the National Comprehensive Cancer Network (NCCN), are also appropriate for Jann and her family. These screening recommendations may change if there are changes to Jann's personal and/or family history of cancer. Final screening recommendations should be made by each individual's primary care provider.      Inheritance:  We reviewed the autosomal dominant inheritance of mutations in these genes. We discussed that Jann cannot/did not pass on an identifiable mutation in these genes to her children based on this test result. Mutations in these genes do not skip generations.      Additional Testing Considerations:  Although Jann's genetic testing result was negative, other relatives may still carry a gene mutation associated with an increased risk for cancer. Genetic counseling is recommended for her paternal cousin who possibly had young uterine cancer to discuss genetic testing options. If any of these  relatives do pursue genetic testing, Jann is encouraged to contact me so that we may review the impact of their test results on her.    Summary:  We do not have an explanation for Jann's personal or family history of cancer. While no genetic changes were identified, Jann may still be at risk for certain cancers due to family history, environmental factors, or other genetic causes not identified by this test. Because of that, it is important that she continue with cancer screening based on her personal and family history as discussed above.    Genetic testing is rapidly advancing, and new cancer susceptibility genes will most likely be identified in the future. Therefore, I encouraged Jann to contact me annually or if there are changes in her personal or family history. This may change how we assess her cancer risk, screening, and the testing we would offer.    Plan:  1. A copy of the test results will be mailed to Jann along with this letter. A copy of her results was also released to her today via the online [employer] portal.  2. She plans to follow-up with her physicians.  3. She should contact me regularly, or sooner if her family history changes.    If Jann has any further questions, I encouraged her to contact me at [phone].    Elsie Doll MS, [facility] [place]  Licensed, Certified Genetic Counselor  Office: [phone]  Email: [email or web address]

## 2022-04-05 ENCOUNTER — PATIENT OUTREACH (OUTPATIENT)
Dept: ONCOLOGY | Facility: CLINIC | Age: 77
End: 2022-04-05
Payer: MEDICARE

## 2022-04-05 NOTE — PROGRESS NOTES
"Pt called in today saying that she wanted to cancel her chemotherapy, lab, and provider appointments today.  Pt said she can't make it in to her appointments because she has been up all night with diarrhea.  Pt said her diarrhea started late yesterday evening.  She said she had more than 5 stools since yesterday evening, but was unable to give an exact number.  Pt said her stools are liquid with some solid material.  She denies blood in the stool.  She denies fevers.  She said she feels nauseated, but denies any emesis.  Pt said she has not tried taking any antidiarrheals.  Pt said she felt dizzy and said she fainted X 2 this morning.  She denied any injuries when she fainted.  Pt said that she feels better lying down.  She said she has been trying to drink plenty of fluids this morning.  She reported drinking 20 oz while on the phone with her.  She said she has decreased urine output since the diarrhea started yesterday evening.     Explained to patient that from what she is describing, she is dehydrated and needs to be evaluated.  Since patient fainted X 2 this morning, explained to her that is dangerous as she said she lives alone and has no one to check in on her.  Explained that the advise recommended would be for writer to call ambulance for patient to take her to ED for evaluation.  Patient got very upset and adamantly refused for writer to call 911 for her.  Asked patient if there is anyway she could get a ride to her appointment so at least she could be evaluated, have labs drawn, and get some IVF.  Pt again adamantly refused and got upset with writer. Patient voiced \"I  know what is best for me and that I just needs to have appointments cancelled and I will drink water.\"  Again, told patient this is against writer's advice and recommendations, but ultimately, it is up to patient.  Patient again said she wants her appointments cancelled and refused any further help.  Writer told patient she would update " IRWIN Piedra and Dr. Mcmullen.  Writer will also route message to Dr. Mcmullen's RNCC, Alondra, to follow.    Reny Summers, RN, BSN    RN Care Coordinator/Triage RN  [facility]  [phone]

## 2022-04-06 NOTE — PROGRESS NOTES
"Writer called pt to follow up on symptom call from yesterday.    Pt stated she was so anxious about her genetic counsleing apt that she was forgetting to eat and drink. Once she got the results that alleviated her anxiety and she was able to start eating and drinking again.    Pt states yesterday she did fall twice but did not sustain any injury. Advised pt that she should be seen for evaluation by [facility], but pt repeatedly said \"I am completely fine today\".    Pt plans to keep increasing her fluid intake today. She denies any lightheadedness or dizziness, and has not vomited. No fever or other signs of infection. Pt agreeable to change positions slowly to avoid a drop in blood pressure.    Pt will call EMS if she should start to experience any emergent symptoms which were reviewed with pt.    Pt wanted [facility] to know that lorazepam has not been effective for her anxiety. She would like to schedule follow up apt with [facility] on 4/12 when she comes in for an infusion and labs.    Pt will call triage with further symptom concerns.    Will route to care team as an update.  "

## 2022-04-10 NOTE — PROGRESS NOTES
[facility]    Hematology/Oncology Established Patient Note      Today's Date: 4/20/2022    Reason for follow-up:  Left breast cancer, triple negative.    HISTORY OF PRESENT ILLNESS: Jann Davidson is a 76 year old female who presents with the following oncologic history:  1. 10/26/2021: Mammogram showed calcifications in the left lateral breast; right breast negative.  2. 11/17/2021: Left diagnostic mammogram showed cluster of pleomorphic calcifications at 4:00, 6 cm from nipple, measuring 1.3 cm.  3. 12/3/2021: Stereotactic left breast biopsy at 4:00, 6 cm from nipple showed grade 2 invasive ductal carcinoma with micropapillary features, extensive lymphovascular invasion present, grade 2-3 focal DCIS, LCIS, ER negative, OR negative, HER-2/maxime FISH negative.  4. 12/10/2021: Breast MRI showed oval mass 2 x 2 x 1.5 cm at 4:00, 6 cm from nipple in left breast reflecting biopsy cavity with post-biopsy changes; prominent 2.5 cm low left level 1 axillary lymph node.  5.  12/28/2021: PET/CT scan showed FDG avid focus in left lower outer breast, hypermetabolic left axillary adenopathy, moderate FDG uptake at level off anus, exophytic right renal 1.1 cm mass, slowly increasing in size since 2016 concerning for growing renal cell carcinoma.  6. 1/18/2022: Started neoadjuvant chemotherapy with weekly paclitaxel + carboplatin and every 3-week pembrolizumab as per KEYNOTE-522 study.    INTERVAL HISTORY:  Jann reports occasional diarrhea she attributes to Miralax as she has had constipation from her antidepressant. She denies any paresthesias.      REVIEW OF SYSTEMS:   14 point ROS was reviewed and is negative other than as noted above in HPI.       HOME MEDICATIONS:  Current Outpatient Medications   Medication Sig Dispense Refill     atorvastatin (LIPITOR) 10 MG tablet TAKE 1 TABLET (10 MG) BY MOUTH ONCE DAILY 90 tablet 3     candesartan (ATACAND) 4 MG tablet Take 0.5 tablets (2 mg) by mouth 2 times daily  DISPENSE AS WRITTEN, Brand name only 45 tablet 2     LORazepam (ATIVAN) 0.5 MG tablet Take 1 tablet (0.5 mg) by mouth every 6 hours as needed for anxiety, nausea or sleep 45 tablet 0     Magnesium 400 MG TABS Take 400 mg by mouth       oxazepam (SERAX) 15 MG capsule Take 1 capsule (15 mg) by mouth nightly as needed for anxiety or sleep OK for generic please notify patient regarding supply thanks 31 capsule 1     PARNATE 10 MG tablet TAKE 1 TABLET BY MOUTH 3 TIMES DAILY 270 tablet 3     polyethylene glycol (MIRALAX/GLYCOLAX) packet Take 1 packet by mouth daily       triamcinolone (KENALOG) 0.1 % external cream Apply  topically 2 times daily as needed. 15 g 1     triamterene-HCTZ (MAXZIDE-25) 37.5-25 MG tablet TAKE 1 TABLET BY MOUTH EVERY MORNING 90 tablet 2     zolpidem (AMBIEN) 5 MG tablet Take 1 tablet (5 mg) by mouth nightly as needed for sleep OK for generic please notify patient regarding supply thanks 31 tablet 1         ALLERGIES:  Allergies   Allergen Reactions     Wheat Bran          PAST MEDICAL HISTORY:  Past Medical History:   Diagnosis Date     Hypertension goal BP (blood pressure) < 140/90      Recurrent sinusitis 2013     Gynecologic history:  Age of menarche at 11; LMP ;  (one son), 1st pregnancy at age 19; no HRT.    PAST SURGICAL HISTORY:  Past Surgical History:   Procedure Laterality Date     BREAST BIOPSY, RT/LT      Breat Biopsy RT/LT     COLONOSCOPY  10/03     COLONOSCOPY  2014    Procedure: COLONOSCOPY;  COLONOSCOPY ;  Surgeon: Gaurav Shaffer MD;  Location:  GI     IR CHEST PORT PLACEMENT > 5 YRS OF AGE  2021     RECONSTRUCT EYELID           SOCIAL HISTORY:  Social History     Socioeconomic History     Marital status:      Spouse name: Not on file     Number of children: Not on file     Years of education: Not on file     Highest education level: Not on file   Occupational History     Not on file   Tobacco Use     Smoking status: Former Smoker      Types: Cigarettes     Quit date: 10/30/1972     Years since quittin.4     Smokeless tobacco: Never Used   Substance and Sexual Activity     Alcohol use: Yes     Comment: couple glasses of wine daily      Drug use: No     Sexual activity: Never   Other Topics Concern     Parent/sibling w/ CABG, MI or angioplasty before 65F 55M? Not Asked   Social History Narrative     Not on file     Social Determinants of Health     Financial Resource Strain: Not on file   Food Insecurity: Not on file   Transportation Needs: Not on file   Physical Activity: Not on file   Stress: Not on file   Social Connections: Not on file   Intimate Partner Violence: Not on file   Housing Stability: Not on file         FAMILY HISTORY:  Family History   Problem Relation Age of Onset     Cancer Mother         uterine     Breast Cancer Mother      Diabetes Paternal Grandmother          PHYSICAL EXAM:  Vital signs:  /53   Pulse 107   Resp 16   Wt 61.2 kg (135 lb)   SpO2 99%   BMI 23.92 kg/m     ECO  GENERAL/CONSTITUTIONAL: No acute distress but tearful at times.  EYES: No erythema or scleral icterus.  ENT/MOUTH: Neck supple. Mask in place.  LYMPH: No cervical, supraclavicular, adenopathy. Left axillary lymphadenopathy no longer palpable.  BREAST: No palpable masses in left breast. Left nipple everted with no discharge. No dimpling or ulceration.  RESPIRATORY: Clear to auscultation bilaterally. No crackles or wheezing.   CARDIOVASCULAR: Regular rate and rhythm without murmurs.  GASTROINTESTINAL: No hepatosplenomegaly, masses, or tenderness. No guarding.  No distention.  MUSCULOSKELETAL: Warm and well-perfused, no cyanosis, clubbing, or edema.  NEUROLOGIC: No focal motor deficits. Alert, oriented, answers questions appropriately.  INTEGUMENTARY: No rashes or jaundice.  GAIT: Steady, does not use assistive device.    LABS:  CBC RESULTS: Recent Labs   Lab Test 04/15/22  0820   WBC 32.5*   RBC 2.71*   HGB 9.0*   HCT 28.1*   *    MCH 33.2*   MCHC 32.0   RDW 15.6*        Last Comprehensive Metabolic Panel:  Sodium   Date Value Ref Range Status   04/15/2022 136 133 - 144 mmol/L Final   06/05/2019 142 133 - 144 mmol/L Final     Potassium   Date Value Ref Range Status   04/15/2022 3.3 (L) 3.4 - 5.3 mmol/L Final   06/05/2019 3.7 3.4 - 5.3 mmol/L Final     Chloride   Date Value Ref Range Status   04/15/2022 106 94 - 109 mmol/L Final   06/05/2019 110 (H) 94 - 109 mmol/L Final     Carbon Dioxide   Date Value Ref Range Status   06/05/2019 24 20 - 32 mmol/L Final     Carbon Dioxide (CO2)   Date Value Ref Range Status   04/15/2022 25 20 - 32 mmol/L Final     Anion Gap   Date Value Ref Range Status   04/15/2022 5 3 - 14 mmol/L Final   06/05/2019 8 3 - 14 mmol/L Final     Glucose   Date Value Ref Range Status   04/15/2022 84 70 - 99 mg/dL Final   06/05/2019 93 70 - 99 mg/dL Final     Comment:     Fasting specimen     Urea Nitrogen   Date Value Ref Range Status   04/15/2022 24 7 - 30 mg/dL Final   06/05/2019 32 (H) 7 - 30 mg/dL Final     Creatinine   Date Value Ref Range Status   04/15/2022 1.32 (H) 0.52 - 1.04 mg/dL Final   06/05/2019 1.15 (H) 0.52 - 1.04 mg/dL Final     GFR Estimate   Date Value Ref Range Status   04/15/2022 42 (L) >60 mL/min/1.73m2 Final     Comment:     Effective December 21, 2021 eGFRcr in adults is calculated using the 2021 CKD-EPI creatinine equation which includes age and gender (Meliton et al., NEJM, DOI: 10.1056/LWHHql5177867)   06/05/2019 47 (L) >60 mL/min/[1.73_m2] Final     Comment:     Non  GFR Calc  Starting 12/18/2018, serum creatinine based estimated GFR (eGFR) will be   calculated using the Chronic Kidney Disease Epidemiology Collaboration   (CKD-EPI) equation.       GFR, ESTIMATED POCT   Date Value Ref Range Status   12/28/2021 33 (L) >60 mL/min/1.73m2 Final     Calcium   Date Value Ref Range Status   04/15/2022 9.8 8.5 - 10.1 mg/dL Final   06/05/2019 9.2 8.5 - 10.1 mg/dL Final     Bilirubin Total    Date Value Ref Range Status   04/15/2022 0.5 0.2 - 1.3 mg/dL Final   2019 0.4 0.2 - 1.3 mg/dL Final     Alkaline Phosphatase   Date Value Ref Range Status   04/15/2022 171 (H) 40 - 150 U/L Final   2019 85 40 - 150 U/L Final     ALT   Date Value Ref Range Status   04/15/2022 24 0 - 50 U/L Final   2019 33 0 - 50 U/L Final     AST   Date Value Ref Range Status   04/15/2022 19 0 - 45 U/L Final   2019 33 0 - 45 U/L Final         PATHOLOGY:  None new.    IMAGIN2022 Breast MRI showed no residual enhancement.    ASSESSMENT/PLAN:  Jann Davidson is a 76 year old female with the following issues:  1. 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 Abnormal thyroid function test  --Jann is on neoadjuvant chemotherapy with paclitaxel and carboplatin for 12 weeks with every 3-week pembrolizumab.  --She initially developed elevated TSH with normal free T4 but this has normalized as of 3/8/2022.  --Reviewed and discussed that her 2022 breast MRI showed no residual enhancement -- therefore, I recommended foregoing chemotherapy with Adriamycin and Cytoxan.  --She will have one last Taxol (and pembrolizumab) on  and will tentatively schedule for another pembrolizumab on  if breast surgery is scheduled farther out than 4 weeks post last Taxol.  --Discussed that I would recommend 9 cycles of pembrolizumab post-surgery as per the KEYNOTE-522 trial.  --Genetic testing negative.  --Will have her meet back with Dr. Murphy to discuss surgical planning.  Discussed that I would typically recommend surgery about 4 weeks post last Taxol (4 weeks post ) and no more than 6 weeks post last Taxol if possible.    3. Chronic kidney disease, stage 3  --Creatinine stable.  --She follows with Dr. Luna.    4. FDG uptake at anal canal  --She has history of anal fissure  from 4/8/2014 colonoscopy.  --PET scan showed uptake at level of anus and could represent hemorrhoid, fissure, malignancy, or be physiologic.  --She is asymptomatic and would like to delay anoscopy and referral to colorectal for now. Discussed we can delay until completion of chemo for her breast cancer.    5. Right renal mass  --Discussed with Jann that PET scan showed a 1.1 cm mass in the right kidney that has been reportedly slowly growing since 2016 concerning for renal cell carcinoma.  --Will not be able to further evaluate with contrast MRI due to her degree of chronic kidney disease.  Will refer her to urology for surveillance/intervention plan after completion of chemo for her breast cancer.    6. Pulmonary nodules  --PET scan showed scattered small bilateral pulmonary nodules that are unchanged since 10/24/2018 and most likely benign.    7. Depression/anxiety  --Worsened lately due to cancer diagnosis and passing of her dog.  --She would like to continue on Parnate and not change her antidepressant.  --She takes oxazepam together with zolpidem.    --She could use lorazepam (Ativan) as long as she takes this 6 hours from her other benzodiazepenes.    8. Transaminitis  --Now resolved based on 4/15 labs which show ALT 24 and AST 19.    9. Anemia of chemotherapy  --No indication to transfuse unless Hgb < 7 g/dL.    Neetu Mcmullen MD  Hematology/Oncology  [facility]    Total time spent: 40 minutes in patient evaluation, counseling, documentation, and coordination of care.

## 2022-04-12 ENCOUNTER — ONCOLOGY VISIT (OUTPATIENT)
Dept: ONCOLOGY | Facility: CLINIC | Age: 77
End: 2022-04-12
Attending: NURSE PRACTITIONER
Payer: MEDICARE

## 2022-04-12 ENCOUNTER — INFUSION THERAPY VISIT (OUTPATIENT)
Dept: INFUSION THERAPY | Facility: CLINIC | Age: 77
End: 2022-04-12
Attending: NURSE PRACTITIONER
Payer: MEDICARE

## 2022-04-12 VITALS
HEART RATE: 87 BPM | BODY MASS INDEX: 23.46 KG/M2 | SYSTOLIC BLOOD PRESSURE: 115 MMHG | RESPIRATION RATE: 16 BRPM | DIASTOLIC BLOOD PRESSURE: 83 MMHG | OXYGEN SATURATION: 100 % | TEMPERATURE: 98.4 F | WEIGHT: 132.4 LBS

## 2022-04-12 DIAGNOSIS — T45.1X5A CHEMOTHERAPY-INDUCED NEUTROPENIA (H): ICD-10-CM

## 2022-04-12 DIAGNOSIS — C50.512 MALIGNANT NEOPLASM OF LOWER-OUTER QUADRANT OF LEFT BREAST OF FEMALE, ESTROGEN RECEPTOR NEGATIVE (H): ICD-10-CM

## 2022-04-12 DIAGNOSIS — D70.1 CHEMOTHERAPY-INDUCED NEUTROPENIA (H): Primary | ICD-10-CM

## 2022-04-12 DIAGNOSIS — Z17.1 MALIGNANT NEOPLASM OF LOWER-OUTER QUADRANT OF LEFT BREAST OF FEMALE, ESTROGEN RECEPTOR NEGATIVE (H): ICD-10-CM

## 2022-04-12 DIAGNOSIS — Z51.89 ENCOUNTER FOR OTHER SPECIFIED AFTERCARE: Primary | ICD-10-CM

## 2022-04-12 DIAGNOSIS — D70.1 CHEMOTHERAPY-INDUCED NEUTROPENIA (H): ICD-10-CM

## 2022-04-12 DIAGNOSIS — T45.1X5A CHEMOTHERAPY-INDUCED NEUTROPENIA (H): Primary | ICD-10-CM

## 2022-04-12 DIAGNOSIS — F41.9 ANXIETY: Primary | ICD-10-CM

## 2022-04-12 LAB
ALBUMIN SERPL-MCNC: 3.8 G/DL (ref 3.4–5)
ALP SERPL-CCNC: 109 U/L (ref 40–150)
ALT SERPL W P-5'-P-CCNC: 32 U/L (ref 0–50)
ANION GAP SERPL CALCULATED.3IONS-SCNC: 8 MMOL/L (ref 3–14)
AST SERPL W P-5'-P-CCNC: 24 U/L (ref 0–45)
BASOPHILS # BLD AUTO: 0 10E3/UL (ref 0–0.2)
BASOPHILS NFR BLD AUTO: 1 %
BILIRUB SERPL-MCNC: 0.3 MG/DL (ref 0.2–1.3)
BUN SERPL-MCNC: 34 MG/DL (ref 7–30)
CALCIUM SERPL-MCNC: 10 MG/DL (ref 8.5–10.1)
CHLORIDE BLD-SCNC: 102 MMOL/L (ref 94–109)
CO2 SERPL-SCNC: 25 MMOL/L (ref 20–32)
CREAT SERPL-MCNC: 1.27 MG/DL (ref 0.52–1.04)
EOSINOPHIL # BLD AUTO: 0 10E3/UL (ref 0–0.7)
EOSINOPHIL NFR BLD AUTO: 1 %
ERYTHROCYTE [DISTWIDTH] IN BLOOD BY AUTOMATED COUNT: 15.2 % (ref 10–15)
GFR SERPL CREATININE-BSD FRML MDRD: 44 ML/MIN/1.73M2
GLUCOSE BLD-MCNC: 96 MG/DL (ref 70–99)
HCT VFR BLD AUTO: 28 % (ref 35–47)
HGB BLD-MCNC: 9.3 G/DL (ref 11.7–15.7)
IMM GRANULOCYTES # BLD: 0 10E3/UL
IMM GRANULOCYTES NFR BLD: 0 %
LYMPHOCYTES # BLD AUTO: 1.4 10E3/UL (ref 0.8–5.3)
LYMPHOCYTES NFR BLD AUTO: 49 %
MCH RBC QN AUTO: 32.9 PG (ref 26.5–33)
MCHC RBC AUTO-ENTMCNC: 33.2 G/DL (ref 31.5–36.5)
MCV RBC AUTO: 99 FL (ref 78–100)
MONOCYTES # BLD AUTO: 0.3 10E3/UL (ref 0–1.3)
MONOCYTES NFR BLD AUTO: 12 %
NEUTROPHILS # BLD AUTO: 1 10E3/UL (ref 1.6–8.3)
NEUTROPHILS NFR BLD AUTO: 37 %
NRBC # BLD AUTO: 0 10E3/UL
NRBC BLD AUTO-RTO: 0 /100
PLAT MORPH BLD: NORMAL
PLATELET # BLD AUTO: 234 10E3/UL (ref 150–450)
POTASSIUM BLD-SCNC: 3.5 MMOL/L (ref 3.4–5.3)
PROT SERPL-MCNC: 7.2 G/DL (ref 6.8–8.8)
RBC # BLD AUTO: 2.83 10E6/UL (ref 3.8–5.2)
RBC MORPH BLD: NORMAL
SODIUM SERPL-SCNC: 135 MMOL/L (ref 133–144)
TSH SERPL DL<=0.005 MIU/L-ACNC: 1.5 MU/L (ref 0.4–4)
WBC # BLD AUTO: 2.8 10E3/UL (ref 4–11)

## 2022-04-12 PROCEDURE — 99215 OFFICE O/P EST HI 40 MIN: CPT | Performed by: NURSE PRACTITIONER

## 2022-04-12 PROCEDURE — 250N000011 HC RX IP 250 OP 636: Performed by: NURSE PRACTITIONER

## 2022-04-12 PROCEDURE — 84443 ASSAY THYROID STIM HORMONE: CPT | Performed by: NURSE PRACTITIONER

## 2022-04-12 PROCEDURE — 80053 COMPREHEN METABOLIC PANEL: CPT | Performed by: NURSE PRACTITIONER

## 2022-04-12 PROCEDURE — 250N000011 HC RX IP 250 OP 636: Performed by: INTERNAL MEDICINE

## 2022-04-12 PROCEDURE — 85025 COMPLETE CBC W/AUTO DIFF WBC: CPT | Performed by: NURSE PRACTITIONER

## 2022-04-12 PROCEDURE — 96372 THER/PROPH/DIAG INJ SC/IM: CPT | Performed by: NURSE PRACTITIONER

## 2022-04-12 RX ORDER — HEPARIN SODIUM (PORCINE) LOCK FLUSH IV SOLN 100 UNIT/ML 100 UNIT/ML
5 SOLUTION INTRAVENOUS
Status: CANCELLED | OUTPATIENT
Start: 2022-04-13

## 2022-04-12 RX ORDER — HEPARIN SODIUM,PORCINE 10 UNIT/ML
5 VIAL (ML) INTRAVENOUS
Status: CANCELLED | OUTPATIENT
Start: 2022-04-13

## 2022-04-12 RX ORDER — SODIUM CHLORIDE 9 MG/ML
INJECTION, SOLUTION INTRAVENOUS CONTINUOUS
Status: CANCELLED
Start: 2022-04-13

## 2022-04-12 RX ORDER — LORAZEPAM 0.5 MG/1
0.5 TABLET ORAL EVERY 6 HOURS PRN
Qty: 45 TABLET | Refills: 0 | Status: SHIPPED | OUTPATIENT
Start: 2022-04-12 | End: 2022-06-08

## 2022-04-12 RX ORDER — HEPARIN SODIUM (PORCINE) LOCK FLUSH IV SOLN 100 UNIT/ML 100 UNIT/ML
5 SOLUTION INTRAVENOUS
Status: DISCONTINUED | OUTPATIENT
Start: 2022-04-12 | End: 2022-04-12 | Stop reason: HOSPADM

## 2022-04-12 RX ADMIN — FILGRASTIM 480 MCG: 480 INJECTION, SOLUTION INTRAVENOUS; SUBCUTANEOUS at 11:22

## 2022-04-12 RX ADMIN — Medication 5 ML: at 11:28

## 2022-04-12 ASSESSMENT — PAIN SCALES - GENERAL: PAINLEVEL: NO PAIN (0)

## 2022-04-12 NOTE — PROGRESS NOTES
Oncology/Hematology Visit Note  Apr 12, 2022    Reason for Visit: follow up of left breast cancer  Triple negative  Stage IIb  Patient of Dr. Mcmullen    01/18/2021-started neoadjuvant chemotherapy with paclitaxel and carboplatin for 12 weeks with addition of pembrolizumab every 3 weeks followed by dose dense AC every 3 weeks for 4 cycles with addition of pembrolizumab every 3 weeks      Interval History:  Patient reports overall feeling well.  Reports she has been tolerating treatment well denies fever chills sweats cough shortness of breath chest pain nausea vomiting diarrhea abdominal pain or bleeding      Review of Systems:  14 point ROS of systems including Constitutional, Eyes, Respiratory, Cardiovascular, Gastroenterology, Genitourinary, Integumentary, Muscularskeletal, Psychiatric were all negative except for pertinent positives noted in my HPI.    Physical Examination:  General: The patient is a pleasant female in no acute distress.  /83   Pulse 87   Temp 98.4  F (36.9  C) (Oral)   Resp 16   Wt 60.1 kg (132 lb 6.4 oz)   SpO2 100%   BMI 23.46 kg/m    HEENT: EOMI, PERRL. Sclerae are anicteric. Oral mucosa is pink and moist with no lesions or thrush.   Lymph: Neck is supple with no lymphadenopathy in the cervical or supraclavicular areas.   Heart: Regular rate and rhythm.   Lungs: Clear to auscultation bilaterally.   GI: Bowel sounds present, soft, nontender with no palpable hepatosplenomegaly or masses.   Extremities: No lower extremity edema noted bilaterally.   Skin: No rashes, petechiae, or bruising noted on exposed skin.    Laboratory Data:  CBC CMP results reviewed      Assessment and Plan:    This is a 76-year-old female with    left breast cancer  Triple negative  Stage IIb  Patient of Dr. Mcmullen  01/18-started neoadjuvant chemotherapy with paclitaxel and carboplatin for 12 weeks with addition of pembrolizumab every 3 weeks followed by dose dense AC every 3 weeks for 4 cycles with addition of  pembrolizumab every 3 weeks  03/29-patient completed cycle 3-day 15  -Patient canceled chemotherapy last week  -Patient comes here for cycle 4  -Labs reviewed abnormalities discussed  ANC is 1.  Patient would like to get chemotherapy this week  -Hold chemo today.  Daily Neupogen shots x3days   Reschedule chemo on Friday 04/15  -I will message Dr. Mcmullen if okay to discontinue carboplatin  4/19-MRI of the breast  04/2024-[id number] with Dr. Mcmullen        Neutropenia  Patient is afebrile and symptomatic  Neutropenic precautions reviewed   -Neupogen shot daily for 3 days  As above we will see if carboplatin can be discontinued  Check temperature frequently in the event of fever chills sweats or assessment of infection go to ER      Anemia  Mild continue to monitor closely      Chronic kidney disease  Creatinine at baseline  Continue follow-up with nephrologist      Elevated TSH-most likely secondary to pembrolizumab  Normal T4  TSH is normal now  -Continue to monitor      FDG uptake at the anal canal  -Patient wants to wait for anoscopy and referral to colorectal since she is asymptomatic  -Plan to see colorectal after completion of the chemo for breast cancer    Right renal mass  Management per urology    Depression anxiety  Patient declined appointment with Dr Darin Jimenes  I also discussed referral palliative care  I discussed different coping mechanisms      Addendum  Per Dr. Mcmullen-okay to discontinue carboplatin      Call clinic with any changes in health condition or questions        HUBER Damon CNP  [facility]     Chart documentation with Dragon Voice recognition Software. Although reviewed after completion, some words and grammatical errors may remain.

## 2022-04-12 NOTE — PROGRESS NOTES
"Oncology Rooming Note    April 12, 2022 10:12 AM   Jann Davidson is a 76 year old female who presents for:    No chief complaint on file.    Initial Vitals: Resp 16   Wt 60.1 kg (132 lb 6.4 oz)   BMI 23.46 kg/m   Estimated body mass index is 23.46 kg/m  as calculated from the following:    Height as of 1/31/22: 1.6 m (5' 2.99\").    Weight as of this encounter: 60.1 kg (132 lb 6.4 oz). Body surface area is 1.63 meters squared.  Data Unavailable Comment: Data Unavailable   No LMP recorded. Patient is postmenopausal.  Allergies reviewed: Yes  Medications reviewed: Yes    Medications: Medication refills not needed today.  Pharmacy name entered into EPIC:    [id number] - [place], [place] - [address]  [facility] - [place], [place] - [address] [place] [address]    Clinical concerns: no       Nesha Duran              "

## 2022-04-12 NOTE — PROGRESS NOTES
Infusion Nursing Note:  Jann Davidson presents today for Neupogen injection 1/3.    Patient seen by provider today: Yes: Tadeo Dugan CNP   present during visit today: Not Applicable.    Note: C8 D2 deferred d/t neutropenia.      Intravenous Access:  Implanted Port.    Treatment Conditions:  Lab Results   Component Value Date    HGB 9.3 (L) 04/12/2022    WBC 2.8 (L) 04/12/2022    ANEU 2.4 03/29/2022    ANEUTAUTO 1.0 (L) 04/12/2022     04/12/2022          Post Infusion Assessment:  Patient tolerated injection without incident.  Site patent and intact, free from redness, edema or discomfort.  No evidence of extravasations.  Access discontinued per protocol.       Discharge Plan:   Discharge instructions reviewed with: Patient.  Patient and/or family verbalized understanding of discharge instructions and all questions answered.  AVS to patient via BONDT.  Patient will return 4/13 for next appointment.   Patient discharged in stable condition accompanied by: self.  Departure Mode: Ambulatory.      Marquita Weaver RN

## 2022-04-12 NOTE — PROGRESS NOTES
Infusion Nursing Note:  Jann Davidson presents today for port labs.    Patient seen by provider today: Yes: Tadeo Dugan Np   present during visit today: Not Applicable.    Note: N/A.      Intravenous Access:  Labs drawn without difficulty.  Implanted Port.    Treatment Conditions:  Not Applicable.Labs pending at time of discharge.      Post Infusion Assessment:  Site patent and intact, free from redness, edema or discomfort.  No evidence of extravasations.   Port access left in place for infusion today.      Discharge Plan:   Patient discharged in stable condition accompanied by: self.  Departure Mode: Ambulatory to [facility] for clinic exam.      Brigid Payne RN

## 2022-04-12 NOTE — LETTER
"    4/12/2022         RE: Jann Davidson  [address] [facility] [address]        Dear Colleague,    Thank you for referring your patient, Jann Davidson, to the [facility]. Please see a copy of my visit note below.    Oncology Rooming Note    April 12, 2022 10:12 AM   Jann Davidson is a 76 year old female who presents for:    No chief complaint on file.    Initial Vitals: Resp 16   Wt 60.1 kg (132 lb 6.4 oz)   BMI 23.46 kg/m   Estimated body mass index is 23.46 kg/m  as calculated from the following:    Height as of 1/31/22: 1.6 m (5' 2.99\").    Weight as of this encounter: 60.1 kg (132 lb 6.4 oz). Body surface area is 1.63 meters squared.  Data Unavailable Comment: Data Unavailable   No LMP recorded. Patient is postmenopausal.  Allergies reviewed: Yes  Medications reviewed: Yes    Medications: Medication refills not needed today.  Pharmacy name entered into EPIC:    [id number] - [place], [place] - [address] [facility] - [place], [place] - [address] [place] [address]    Clinical concerns: no       Nesha Duran                Oncology/Hematology Visit Note  Apr 12, 2022    Reason for Visit: follow up of left breast cancer  Triple negative  Stage IIb  Patient of Dr. Mcmullen    01/18/2021-started neoadjuvant chemotherapy with paclitaxel and carboplatin for 12 weeks with addition of pembrolizumab every 3 weeks followed by dose dense AC every 3 weeks for 4 cycles with addition of pembrolizumab every 3 weeks      Interval History:  Patient reports overall feeling well.  Reports she has been tolerating treatment well denies fever chills sweats cough shortness of breath chest pain nausea vomiting diarrhea abdominal pain or bleeding      Review of Systems:  14 point ROS of systems including Constitutional, Eyes, Respiratory, Cardiovascular, Gastroenterology, Genitourinary, Integumentary, Muscularskeletal, Psychiatric were all negative " except for pertinent positives noted in my HPI.    Physical Examination:  General: The patient is a pleasant female in no acute distress.  /83   Pulse 87   Temp 98.4  F (36.9  C) (Oral)   Resp 16   Wt 60.1 kg (132 lb 6.4 oz)   SpO2 100%   BMI 23.46 kg/m    HEENT: EOMI, PERRL. Sclerae are anicteric. Oral mucosa is pink and moist with no lesions or thrush.   Lymph: Neck is supple with no lymphadenopathy in the cervical or supraclavicular areas.   Heart: Regular rate and rhythm.   Lungs: Clear to auscultation bilaterally.   GI: Bowel sounds present, soft, nontender with no palpable hepatosplenomegaly or masses.   Extremities: No lower extremity edema noted bilaterally.   Skin: No rashes, petechiae, or bruising noted on exposed skin.    Laboratory Data:  CBC CMP results reviewed      Assessment and Plan:    This is a 76-year-old female with    left breast cancer  Triple negative  Stage IIb  Patient of Dr. Mcmullen  01/18-started neoadjuvant chemotherapy with paclitaxel and carboplatin for 12 weeks with addition of pembrolizumab every 3 weeks followed by dose dense AC every 3 weeks for 4 cycles with addition of pembrolizumab every 3 weeks  03/29-patient completed cycle 3-day 15  -Patient canceled chemotherapy last week  -Patient comes here for cycle 4  -Labs reviewed abnormalities discussed  ANC is 1.  Patient would like to get chemotherapy this week  -Hold chemo today.  Daily Neupogen shots x3days   Reschedule chemo on Friday 04/15  -I will message Dr. Mcmullen if okay to discontinue carboplatin  4/19-MRI of the breast  04/2031-[id number] with Dr. Mcmullen        Neutropenia  Patient is afebrile and symptomatic  Neutropenic precautions reviewed   -Neupogen shot daily for 3 days  As above we will see if carboplatin can be discontinued  Check temperature frequently in the event of fever chills sweats or assessment of infection go to ER      Anemia  Mild continue to monitor closely      Chronic kidney disease  Creatinine  at baseline  Continue follow-up with nephrologist      Elevated TSH-most likely secondary to pembrolizumab  Normal T4  TSH is normal now  -Continue to monitor      FDG uptake at the anal canal  -Patient wants to wait for anoscopy and referral to colorectal since she is asymptomatic  -Plan to see colorectal after completion of the chemo for breast cancer    Right renal mass  Management per urology    Depression anxiety  Patient declined appointment with Dr Darin Jimenes  I also discussed referral palliative care  I discussed different coping mechanisms      Call clinic with any changes in health condition or questions        HUBER Damon CNP  [facility] [place]     Chart documentation with Dragon Voice recognition Software. Although reviewed after completion, some words and grammatical errors may remain.            Again, thank you for allowing me to participate in the care of your patient.        Sincerely,        HUBER Damon CNP

## 2022-04-13 ENCOUNTER — ALLIED HEALTH/NURSE VISIT (OUTPATIENT)
Dept: INFUSION THERAPY | Facility: CLINIC | Age: 77
End: 2022-04-13
Attending: INTERNAL MEDICINE
Payer: MEDICARE

## 2022-04-13 VITALS
SYSTOLIC BLOOD PRESSURE: 95 MMHG | DIASTOLIC BLOOD PRESSURE: 53 MMHG | RESPIRATION RATE: 18 BRPM | TEMPERATURE: 98.1 F | HEART RATE: 83 BPM

## 2022-04-13 DIAGNOSIS — Z51.89 ENCOUNTER FOR OTHER SPECIFIED AFTERCARE: Primary | ICD-10-CM

## 2022-04-13 DIAGNOSIS — T45.1X5A CHEMOTHERAPY-INDUCED NEUTROPENIA (H): ICD-10-CM

## 2022-04-13 DIAGNOSIS — C50.512 MALIGNANT NEOPLASM OF LOWER-OUTER QUADRANT OF LEFT BREAST OF FEMALE, ESTROGEN RECEPTOR NEGATIVE (H): ICD-10-CM

## 2022-04-13 DIAGNOSIS — D70.1 CHEMOTHERAPY-INDUCED NEUTROPENIA (H): ICD-10-CM

## 2022-04-13 DIAGNOSIS — Z17.1 MALIGNANT NEOPLASM OF LOWER-OUTER QUADRANT OF LEFT BREAST OF FEMALE, ESTROGEN RECEPTOR NEGATIVE (H): ICD-10-CM

## 2022-04-13 PROCEDURE — 96372 THER/PROPH/DIAG INJ SC/IM: CPT | Performed by: NURSE PRACTITIONER

## 2022-04-13 PROCEDURE — 250N000011 HC RX IP 250 OP 636: Performed by: NURSE PRACTITIONER

## 2022-04-13 RX ORDER — DIPHENHYDRAMINE HYDROCHLORIDE 50 MG/ML
50 INJECTION INTRAMUSCULAR; INTRAVENOUS
Status: CANCELLED
Start: 2022-04-15

## 2022-04-13 RX ORDER — METHYLPREDNISOLONE SODIUM SUCCINATE 125 MG/2ML
125 INJECTION, POWDER, LYOPHILIZED, FOR SOLUTION INTRAMUSCULAR; INTRAVENOUS
Status: CANCELLED
Start: 2022-04-15

## 2022-04-13 RX ORDER — HEPARIN SODIUM,PORCINE 10 UNIT/ML
5 VIAL (ML) INTRAVENOUS
Status: CANCELLED | OUTPATIENT
Start: 2022-04-15

## 2022-04-13 RX ORDER — ALBUTEROL SULFATE 0.83 MG/ML
2.5 SOLUTION RESPIRATORY (INHALATION)
Status: CANCELLED | OUTPATIENT
Start: 2022-04-15

## 2022-04-13 RX ORDER — EPINEPHRINE 1 MG/ML
0.3 INJECTION, SOLUTION INTRAMUSCULAR; SUBCUTANEOUS EVERY 5 MIN PRN
Status: CANCELLED | OUTPATIENT
Start: 2022-04-15

## 2022-04-13 RX ORDER — HEPARIN SODIUM,PORCINE 10 UNIT/ML
5 VIAL (ML) INTRAVENOUS
Status: CANCELLED | OUTPATIENT
Start: 2022-04-14

## 2022-04-13 RX ORDER — ALBUTEROL SULFATE 90 UG/1
1-2 AEROSOL, METERED RESPIRATORY (INHALATION)
Status: CANCELLED
Start: 2022-04-15

## 2022-04-13 RX ORDER — MEPERIDINE HYDROCHLORIDE 25 MG/ML
25 INJECTION INTRAMUSCULAR; INTRAVENOUS; SUBCUTANEOUS EVERY 30 MIN PRN
Status: CANCELLED | OUTPATIENT
Start: 2022-04-15

## 2022-04-13 RX ORDER — SODIUM CHLORIDE 9 MG/ML
INJECTION, SOLUTION INTRAVENOUS CONTINUOUS
Status: CANCELLED
Start: 2022-04-14

## 2022-04-13 RX ORDER — DIPHENHYDRAMINE HCL 25 MG
50 CAPSULE ORAL
Status: CANCELLED
Start: 2022-04-15

## 2022-04-13 RX ORDER — NALOXONE HYDROCHLORIDE 0.4 MG/ML
0.2 INJECTION, SOLUTION INTRAMUSCULAR; INTRAVENOUS; SUBCUTANEOUS
Status: CANCELLED | OUTPATIENT
Start: 2022-04-15

## 2022-04-13 RX ORDER — HEPARIN SODIUM (PORCINE) LOCK FLUSH IV SOLN 100 UNIT/ML 100 UNIT/ML
5 SOLUTION INTRAVENOUS
Status: CANCELLED | OUTPATIENT
Start: 2022-04-14

## 2022-04-13 RX ORDER — HEPARIN SODIUM (PORCINE) LOCK FLUSH IV SOLN 100 UNIT/ML 100 UNIT/ML
5 SOLUTION INTRAVENOUS
Status: CANCELLED | OUTPATIENT
Start: 2022-04-15

## 2022-04-13 RX ORDER — LORAZEPAM 2 MG/ML
0.5 INJECTION INTRAMUSCULAR EVERY 4 HOURS PRN
Status: CANCELLED | OUTPATIENT
Start: 2022-04-15

## 2022-04-13 RX ADMIN — FILGRASTIM 480 MCG: 480 INJECTION, SOLUTION INTRAVENOUS; SUBCUTANEOUS at 12:51

## 2022-04-13 ASSESSMENT — PAIN SCALES - GENERAL: PAINLEVEL: NO PAIN (0)

## 2022-04-13 NOTE — PROGRESS NOTES
Nursing Note:  Jann Davidson presents today for neupogen.    Patient seen by provider today: No   present during visit today: Not Applicable.    Note: reports feeling lightheaded at times, bp today 95/53. Advised pt could stay for a liter IVF, pt declined at this time and stated she would drink a lot of water the rest of the day.     Intravenous Access:  No Intravenous access/labs at this visit.    Discharge Plan:   Patient was discharged    Edith Meraz RN

## 2022-04-14 ENCOUNTER — ALLIED HEALTH/NURSE VISIT (OUTPATIENT)
Dept: INFUSION THERAPY | Facility: CLINIC | Age: 77
End: 2022-04-14
Attending: INTERNAL MEDICINE
Payer: MEDICARE

## 2022-04-14 VITALS
OXYGEN SATURATION: 99 % | SYSTOLIC BLOOD PRESSURE: 117 MMHG | RESPIRATION RATE: 16 BRPM | TEMPERATURE: 98 F | HEART RATE: 91 BPM | DIASTOLIC BLOOD PRESSURE: 72 MMHG

## 2022-04-14 DIAGNOSIS — C50.512 MALIGNANT NEOPLASM OF LOWER-OUTER QUADRANT OF LEFT BREAST OF FEMALE, ESTROGEN RECEPTOR NEGATIVE (H): ICD-10-CM

## 2022-04-14 DIAGNOSIS — Z17.1 MALIGNANT NEOPLASM OF LOWER-OUTER QUADRANT OF LEFT BREAST OF FEMALE, ESTROGEN RECEPTOR NEGATIVE (H): ICD-10-CM

## 2022-04-14 DIAGNOSIS — T45.1X5A CHEMOTHERAPY-INDUCED NEUTROPENIA (H): ICD-10-CM

## 2022-04-14 DIAGNOSIS — D70.1 CHEMOTHERAPY-INDUCED NEUTROPENIA (H): ICD-10-CM

## 2022-04-14 DIAGNOSIS — Z51.89 ENCOUNTER FOR OTHER SPECIFIED AFTERCARE: Primary | ICD-10-CM

## 2022-04-14 PROCEDURE — 96372 THER/PROPH/DIAG INJ SC/IM: CPT | Performed by: NURSE PRACTITIONER

## 2022-04-14 PROCEDURE — 250N000011 HC RX IP 250 OP 636: Mod: JB | Performed by: NURSE PRACTITIONER

## 2022-04-14 RX ORDER — SODIUM CHLORIDE 9 MG/ML
INJECTION, SOLUTION INTRAVENOUS CONTINUOUS
Status: CANCELLED
Start: 2022-04-14

## 2022-04-14 RX ORDER — HEPARIN SODIUM (PORCINE) LOCK FLUSH IV SOLN 100 UNIT/ML 100 UNIT/ML
5 SOLUTION INTRAVENOUS
Status: CANCELLED | OUTPATIENT
Start: 2022-04-14

## 2022-04-14 RX ORDER — HEPARIN SODIUM,PORCINE 10 UNIT/ML
5 VIAL (ML) INTRAVENOUS
Status: CANCELLED | OUTPATIENT
Start: 2022-04-14

## 2022-04-14 RX ADMIN — FILGRASTIM 480 MCG: 480 INJECTION, SOLUTION INTRAVENOUS; SUBCUTANEOUS at 12:32

## 2022-04-14 ASSESSMENT — PAIN SCALES - GENERAL: PAINLEVEL: NO PAIN (0)

## 2022-04-14 NOTE — PROGRESS NOTES
Infusion Nursing Note:  Jann Davidson presents today for neupogen.    Patient seen by provider today: No   present during visit today: Not Applicable.    Note: N/A.      Intravenous Access:  No Intravenous access/labs at this visit.    Treatment Conditions:  Not Applicable.      Post Infusion Assessment:  Patient tolerated injection without incident.  Site patent and intact, free from redness, edema or discomfort.  No evidence of extravasations.       Discharge Plan:   AVS to patient via MYCHART.  Patient will return 4/15/22 for next appointment.   Patient discharged in stable condition accompanied by: self.  Departure Mode: Ambulatory.      Amanda Dean RN

## 2022-04-15 ENCOUNTER — TELEPHONE (OUTPATIENT)
Dept: MEDSURG UNIT | Facility: CLINIC | Age: 77
End: 2022-04-15

## 2022-04-15 ENCOUNTER — INFUSION THERAPY VISIT (OUTPATIENT)
Dept: INFUSION THERAPY | Facility: CLINIC | Age: 77
End: 2022-04-15
Attending: INTERNAL MEDICINE
Payer: MEDICARE

## 2022-04-15 VITALS
WEIGHT: 133.4 LBS | TEMPERATURE: 98.2 F | OXYGEN SATURATION: 100 % | RESPIRATION RATE: 18 BRPM | DIASTOLIC BLOOD PRESSURE: 80 MMHG | HEART RATE: 90 BPM | SYSTOLIC BLOOD PRESSURE: 132 MMHG | BODY MASS INDEX: 23.64 KG/M2

## 2022-04-15 DIAGNOSIS — C50.512 MALIGNANT NEOPLASM OF LOWER-OUTER QUADRANT OF LEFT BREAST OF FEMALE, ESTROGEN RECEPTOR NEGATIVE (H): ICD-10-CM

## 2022-04-15 DIAGNOSIS — T45.1X5A CHEMOTHERAPY-INDUCED NEUTROPENIA (H): Primary | ICD-10-CM

## 2022-04-15 DIAGNOSIS — D70.1 CHEMOTHERAPY-INDUCED NEUTROPENIA (H): Primary | ICD-10-CM

## 2022-04-15 DIAGNOSIS — E87.6 HYPOKALEMIA: ICD-10-CM

## 2022-04-15 DIAGNOSIS — Z17.1 MALIGNANT NEOPLASM OF LOWER-OUTER QUADRANT OF LEFT BREAST OF FEMALE, ESTROGEN RECEPTOR NEGATIVE (H): ICD-10-CM

## 2022-04-15 LAB
ALBUMIN SERPL-MCNC: 3.7 G/DL (ref 3.4–5)
ALP SERPL-CCNC: 171 U/L (ref 40–150)
ALT SERPL W P-5'-P-CCNC: 24 U/L (ref 0–50)
ANION GAP SERPL CALCULATED.3IONS-SCNC: 5 MMOL/L (ref 3–14)
AST SERPL W P-5'-P-CCNC: 19 U/L (ref 0–45)
BASOPHILS # BLD MANUAL: 0 10E3/UL (ref 0–0.2)
BASOPHILS NFR BLD MANUAL: 0 %
BILIRUB SERPL-MCNC: 0.5 MG/DL (ref 0.2–1.3)
BUN SERPL-MCNC: 24 MG/DL (ref 7–30)
CALCIUM SERPL-MCNC: 9.8 MG/DL (ref 8.5–10.1)
CHLORIDE BLD-SCNC: 106 MMOL/L (ref 94–109)
CO2 SERPL-SCNC: 25 MMOL/L (ref 20–32)
CREAT SERPL-MCNC: 1.32 MG/DL (ref 0.52–1.04)
EOSINOPHIL # BLD MANUAL: 0 10E3/UL (ref 0–0.7)
EOSINOPHIL NFR BLD MANUAL: 0 %
ERYTHROCYTE [DISTWIDTH] IN BLOOD BY AUTOMATED COUNT: 15.6 % (ref 10–15)
GFR SERPL CREATININE-BSD FRML MDRD: 42 ML/MIN/1.73M2
GLUCOSE BLD-MCNC: 84 MG/DL (ref 70–99)
HCT VFR BLD AUTO: 28.1 % (ref 35–47)
HGB BLD-MCNC: 9 G/DL (ref 11.7–15.7)
LYMPHOCYTES # BLD MANUAL: 1.6 10E3/UL (ref 0.8–5.3)
LYMPHOCYTES NFR BLD MANUAL: 5 %
MCH RBC QN AUTO: 33.2 PG (ref 26.5–33)
MCHC RBC AUTO-ENTMCNC: 32 G/DL (ref 31.5–36.5)
MCV RBC AUTO: 104 FL (ref 78–100)
METAMYELOCYTES # BLD MANUAL: 0.7 10E3/UL
METAMYELOCYTES NFR BLD MANUAL: 2 %
MONOCYTES # BLD MANUAL: 1 10E3/UL (ref 0–1.3)
MONOCYTES NFR BLD MANUAL: 3 %
MYELOCYTES # BLD MANUAL: 0.3 10E3/UL
MYELOCYTES NFR BLD MANUAL: 1 %
NEUTROPHILS # BLD MANUAL: 28.9 10E3/UL (ref 1.6–8.3)
NEUTROPHILS NFR BLD MANUAL: 89 %
PLAT MORPH BLD: ABNORMAL
PLATELET # BLD AUTO: 301 10E3/UL (ref 150–450)
POTASSIUM BLD-SCNC: 3.3 MMOL/L (ref 3.4–5.3)
PROT SERPL-MCNC: 6.9 G/DL (ref 6.8–8.8)
RBC # BLD AUTO: 2.71 10E6/UL (ref 3.8–5.2)
RBC MORPH BLD: ABNORMAL
SODIUM SERPL-SCNC: 136 MMOL/L (ref 133–144)
WBC # BLD AUTO: 32.5 10E3/UL (ref 4–11)

## 2022-04-15 PROCEDURE — 96417 CHEMO IV INFUS EACH ADDL SEQ: CPT

## 2022-04-15 PROCEDURE — 258N000003 HC RX IP 258 OP 636: Performed by: NURSE PRACTITIONER

## 2022-04-15 PROCEDURE — 96413 CHEMO IV INFUSION 1 HR: CPT

## 2022-04-15 PROCEDURE — 250N000011 HC RX IP 250 OP 636: Performed by: NURSE PRACTITIONER

## 2022-04-15 PROCEDURE — 85014 HEMATOCRIT: CPT | Performed by: NURSE PRACTITIONER

## 2022-04-15 PROCEDURE — 250N000013 HC RX MED GY IP 250 OP 250 PS 637: Performed by: INTERNAL MEDICINE

## 2022-04-15 PROCEDURE — 80053 COMPREHEN METABOLIC PANEL: CPT | Performed by: NURSE PRACTITIONER

## 2022-04-15 RX ORDER — POTASSIUM CHLORIDE 1500 MG/1
40 TABLET, EXTENDED RELEASE ORAL ONCE
Status: DISCONTINUED | OUTPATIENT
Start: 2022-04-15 | End: 2022-04-15

## 2022-04-15 RX ORDER — HEPARIN SODIUM (PORCINE) LOCK FLUSH IV SOLN 100 UNIT/ML 100 UNIT/ML
5 SOLUTION INTRAVENOUS
Status: DISCONTINUED | OUTPATIENT
Start: 2022-04-15 | End: 2022-04-15 | Stop reason: HOSPADM

## 2022-04-15 RX ORDER — POTASSIUM CHLORIDE 750 MG/1
40 TABLET, EXTENDED RELEASE ORAL ONCE
Status: COMPLETED | OUTPATIENT
Start: 2022-04-15 | End: 2022-04-15

## 2022-04-15 RX ADMIN — SODIUM CHLORIDE 1000 ML: 9 INJECTION, SOLUTION INTRAVENOUS at 08:30

## 2022-04-15 RX ADMIN — POTASSIUM CHLORIDE 40 MEQ: 750 TABLET, FILM COATED, EXTENDED RELEASE ORAL at 09:58

## 2022-04-15 RX ADMIN — SODIUM CHLORIDE 200 MG: 9 INJECTION, SOLUTION INTRAVENOUS at 09:59

## 2022-04-15 RX ADMIN — Medication 5 ML: at 11:39

## 2022-04-15 RX ADMIN — PACLITAXEL 130 MG: 6 INJECTION, SOLUTION INTRAVENOUS at 10:36

## 2022-04-15 ASSESSMENT — PAIN SCALES - GENERAL: PAINLEVEL: NO PAIN (0)

## 2022-04-15 NOTE — PROGRESS NOTES
Infusion Nursing Note:  Jann Davidson presents today for C4D1 labs, taxol and keytruda (held this week)  Patient seen by provider today: No   present during visit today: Not Applicable.    Note: Potassium replaced orally per Tadeo. Tadeo added neupogen 4/19 and 4/20; this is scheduled. Patient to receive taxol next Thursday or Friday; we are full currently but will monitor for cancellations and get her on the schedule. Patient aware of this.      Intravenous Access:  Labs drawn without difficulty.  Implanted Port.    Treatment Conditions:  Lab Results   Component Value Date    HGB 9.0 (L) 04/15/2022    WBC 32.5 (H) 04/15/2022    ANEU 28.9 (H) 04/15/2022    ANEUTAUTO 1.0 (L) 04/12/2022     04/15/2022      Lab Results   Component Value Date     04/15/2022    POTASSIUM 3.3 (L) 04/15/2022    MAG 2.1 01/17/2022    CR 1.32 (H) 04/15/2022    AMY 9.8 04/15/2022    BILITOTAL 0.5 04/15/2022    ALBUMIN 3.7 04/15/2022    ALT 24 04/15/2022    AST 19 04/15/2022     Results reviewed, labs MET treatment parameters, ok to proceed with treatment.      Post Infusion Assessment:  Patient tolerated infusion without incident.  Blood return noted pre and post infusion.  Site patent and intact, free from redness, edema or discomfort.  No evidence of extravasations.  Access discontinued per protocol.       Discharge Plan:   Copy of AVS reviewed with patient and/or family.  Patient will return as [facility] for next appointment.  Patient discharged in stable condition accompanied by: self.  Departure Mode: Ambulatory.      Tico Tyler RN

## 2022-04-19 ENCOUNTER — HOSPITAL ENCOUNTER (OUTPATIENT)
Facility: CLINIC | Age: 77
Discharge: HOME | End: 2022-04-19
Admitting: RADIOLOGY
Payer: MEDICARE

## 2022-04-19 ENCOUNTER — HOSPITAL ENCOUNTER (OUTPATIENT)
Dept: MRI IMAGING | Facility: CLINIC | Age: 77
Discharge: HOME | End: 2022-04-19
Attending: INTERNAL MEDICINE
Payer: MEDICARE

## 2022-04-19 ENCOUNTER — INFUSION THERAPY VISIT (OUTPATIENT)
Dept: INFUSION THERAPY | Facility: CLINIC | Age: 77
End: 2022-04-19
Attending: INTERNAL MEDICINE
Payer: MEDICARE

## 2022-04-19 VITALS
DIASTOLIC BLOOD PRESSURE: 70 MMHG | SYSTOLIC BLOOD PRESSURE: 110 MMHG | OXYGEN SATURATION: 100 % | HEART RATE: 94 BPM | TEMPERATURE: 97.5 F | RESPIRATION RATE: 18 BRPM

## 2022-04-19 DIAGNOSIS — Z17.1 MALIGNANT NEOPLASM OF LOWER-OUTER QUADRANT OF LEFT BREAST OF FEMALE, ESTROGEN RECEPTOR NEGATIVE (H): ICD-10-CM

## 2022-04-19 DIAGNOSIS — C50.512 MALIGNANT NEOPLASM OF LOWER-OUTER QUADRANT OF LEFT BREAST OF FEMALE, ESTROGEN RECEPTOR NEGATIVE (H): ICD-10-CM

## 2022-04-19 DIAGNOSIS — D70.1 CHEMOTHERAPY-INDUCED NEUTROPENIA (H): ICD-10-CM

## 2022-04-19 DIAGNOSIS — Z51.89 ENCOUNTER FOR OTHER SPECIFIED AFTERCARE: Primary | ICD-10-CM

## 2022-04-19 DIAGNOSIS — T45.1X5A CHEMOTHERAPY-INDUCED NEUTROPENIA (H): ICD-10-CM

## 2022-04-19 PROCEDURE — 250N000011 HC RX IP 250 OP 636: Performed by: NURSE PRACTITIONER

## 2022-04-19 PROCEDURE — 250N000011 HC RX IP 250 OP 636: Performed by: INTERNAL MEDICINE

## 2022-04-19 PROCEDURE — A9585 GADOBUTROL INJECTION: HCPCS | Performed by: INTERNAL MEDICINE

## 2022-04-19 PROCEDURE — 255N000002 HC RX 255 OP 636: Performed by: INTERNAL MEDICINE

## 2022-04-19 PROCEDURE — 999N000154 HC STATISTIC RADIOLOGY XRAY, US, CT, MAR, NM

## 2022-04-19 PROCEDURE — 96372 THER/PROPH/DIAG INJ SC/IM: CPT | Performed by: NURSE PRACTITIONER

## 2022-04-19 PROCEDURE — 77049 MRI BREAST C-+ W/CAD BI: CPT

## 2022-04-19 RX ORDER — GADOBUTROL 604.72 MG/ML
6 INJECTION INTRAVENOUS ONCE
Status: COMPLETED | OUTPATIENT
Start: 2022-04-19 | End: 2022-04-19

## 2022-04-19 RX ORDER — SODIUM CHLORIDE 9 MG/ML
INJECTION, SOLUTION INTRAVENOUS CONTINUOUS
Status: CANCELLED
Start: 2022-04-20

## 2022-04-19 RX ORDER — HEPARIN SODIUM (PORCINE) LOCK FLUSH IV SOLN 100 UNIT/ML 100 UNIT/ML
5 SOLUTION INTRAVENOUS
Status: CANCELLED | OUTPATIENT
Start: 2022-04-20

## 2022-04-19 RX ORDER — HEPARIN SODIUM (PORCINE) LOCK FLUSH IV SOLN 100 UNIT/ML 100 UNIT/ML
5 SOLUTION INTRAVENOUS
Status: DISCONTINUED | OUTPATIENT
Start: 2022-04-19 | End: 2022-04-19 | Stop reason: HOSPADM

## 2022-04-19 RX ORDER — HEPARIN SODIUM,PORCINE 10 UNIT/ML
5 VIAL (ML) INTRAVENOUS
Status: DISCONTINUED | OUTPATIENT
Start: 2022-04-19 | End: 2022-04-19 | Stop reason: HOSPADM

## 2022-04-19 RX ORDER — HEPARIN SODIUM,PORCINE 10 UNIT/ML
5 VIAL (ML) INTRAVENOUS
Status: CANCELLED | OUTPATIENT
Start: 2022-04-20

## 2022-04-19 RX ADMIN — HEPARIN SODIUM (PORCINE) LOCK FLUSH IV SOLN 100 UNIT/ML 5 ML: 100 SOLUTION at 10:07

## 2022-04-19 RX ADMIN — GADOBUTROL 6 ML: 604.72 INJECTION INTRAVENOUS at 08:57

## 2022-04-19 RX ADMIN — FILGRASTIM 300 MCG: 300 INJECTION, SOLUTION INTRAVENOUS; SUBCUTANEOUS at 08:10

## 2022-04-19 ASSESSMENT — PAIN SCALES - GENERAL: PAINLEVEL: NO PAIN (0)

## 2022-04-19 NOTE — PROGRESS NOTES
Infusion Nursing Note:  Jann Davidson presents today for neupogen.    Patient seen by provider today: No   present during visit today: Not Applicable.    Note: Patient has no new concerns today, feeling fatigued but denies fevers or bone pain.      Intravenous Access:  No Intravenous access/labs at this visit.    Treatment Conditions:  Not Applicable.      Post Infusion Assessment:  Patient tolerated injection without incident.  Site patent and intact, free from redness, edema or discomfort.       Discharge Plan:   Discharge instructions reviewed with: Patient.  Patient and/or family verbalized understanding of discharge instructions and all questions answered.  AVS to patient via [employer].  Patient will return 4/20/22 for next appointment.   Patient discharged in stable condition accompanied by: self.  Departure Mode: Ambulatory.      Brigid Payne RN

## 2022-04-20 ENCOUNTER — ONCOLOGY VISIT (OUTPATIENT)
Dept: ONCOLOGY | Facility: CLINIC | Age: 77
End: 2022-04-20
Attending: INTERNAL MEDICINE
Payer: MEDICARE

## 2022-04-20 VITALS
BODY MASS INDEX: 23.92 KG/M2 | WEIGHT: 135 LBS | SYSTOLIC BLOOD PRESSURE: 118 MMHG | DIASTOLIC BLOOD PRESSURE: 53 MMHG | HEART RATE: 107 BPM | OXYGEN SATURATION: 99 % | RESPIRATION RATE: 16 BRPM

## 2022-04-20 DIAGNOSIS — Z17.1 MALIGNANT NEOPLASM OF LOWER-OUTER QUADRANT OF LEFT BREAST OF FEMALE, ESTROGEN RECEPTOR NEGATIVE (H): Primary | ICD-10-CM

## 2022-04-20 DIAGNOSIS — T45.1X5A ANEMIA DUE TO ANTINEOPLASTIC CHEMOTHERAPY: ICD-10-CM

## 2022-04-20 DIAGNOSIS — R74.01 TRANSAMINITIS: ICD-10-CM

## 2022-04-20 DIAGNOSIS — D64.81 ANEMIA DUE TO ANTINEOPLASTIC CHEMOTHERAPY: ICD-10-CM

## 2022-04-20 DIAGNOSIS — C50.512 MALIGNANT NEOPLASM OF LOWER-OUTER QUADRANT OF LEFT BREAST OF FEMALE, ESTROGEN RECEPTOR NEGATIVE (H): Primary | ICD-10-CM

## 2022-04-20 DIAGNOSIS — D70.1 CHEMOTHERAPY-INDUCED NEUTROPENIA (H): ICD-10-CM

## 2022-04-20 DIAGNOSIS — T45.1X5A CHEMOTHERAPY-INDUCED NEUTROPENIA (H): ICD-10-CM

## 2022-04-20 PROCEDURE — G0463 HOSPITAL OUTPT CLINIC VISIT: HCPCS

## 2022-04-20 PROCEDURE — 99215 OFFICE O/P EST HI 40 MIN: CPT | Performed by: INTERNAL MEDICINE

## 2022-04-20 ASSESSMENT — PAIN SCALES - GENERAL: PAINLEVEL: NO PAIN (0)

## 2022-04-20 NOTE — PROGRESS NOTES
"Oncology Rooming Note    April 20, 2022 11:10 AM   Jann Davidson is a 76 year old female who presents for:    Chief Complaint   Patient presents with     Oncology Clinic Visit     Initial Vitals: There were no vitals taken for this visit. Estimated body mass index is 23.64 kg/m  as calculated from the following:    Height as of 1/31/22: 1.6 m (5' 2.99\").    Weight as of 4/15/22: 60.5 kg (133 lb 6.4 oz). There is no height or weight on file to calculate BSA.  Data Unavailable Comment: Data Unavailable   No LMP recorded. Patient is postmenopausal.  Allergies reviewed: Yes  Medications reviewed: Yes    Medications: Medication refills not needed today.  Pharmacy name entered into EPIC:    [facility] #[id number] - [place], [place] - [address]  [facility] - [address]    Clinical concerns: no      Rosa Palomino CMA            "

## 2022-04-20 NOTE — LETTER
4/20/2022         RE: Jann Davidson  [address] [facility] [address]        Dear Colleague,    Thank you for referring your patient, Jann Davidson, to the [facility]. Please see a copy of my visit note below.    [facility]    Hematology/Oncology Established Patient Note      Today's Date: 4/20/2022    Reason for follow-up:  Left breast cancer, triple negative.    HISTORY OF PRESENT ILLNESS: Jann Davidson is a 76 year old female who presents with the following oncologic history:  1. 10/26/2021: Mammogram showed calcifications in the left lateral breast; right breast negative.  2. 11/17/2021: Left diagnostic mammogram showed cluster of pleomorphic calcifications at 4:00, 6 cm from nipple, measuring 1.3 cm.  3. 12/3/2021: Stereotactic left breast biopsy at 4:00, 6 cm from nipple showed grade 2 invasive ductal carcinoma with micropapillary features, extensive lymphovascular invasion present, grade 2-3 focal DCIS, LCIS, ER negative, PA negative, HER-2/maxime FISH negative.  4. 12/10/2021: Breast MRI showed oval mass 2 x 2 x 1.5 cm at 4:00, 6 cm from nipple in left breast reflecting biopsy cavity with post-biopsy changes; prominent 2.5 cm low left level 1 axillary lymph node.  5.  12/28/2021: PET/CT scan showed FDG avid focus in left lower outer breast, hypermetabolic left axillary adenopathy, moderate FDG uptake at level off anus, exophytic right renal 1.1 cm mass, slowly increasing in size since 2016 concerning for growing renal cell carcinoma.  6. 1/18/2022: Started neoadjuvant chemotherapy with weekly paclitaxel + carboplatin and every 3-week pembrolizumab as per KEYNOTE-522 study.    INTERVAL HISTORY:  Jann reports occasional diarrhea she attributes to Miralax as she has had constipation from her antidepressant. She denies any paresthesias.      REVIEW OF SYSTEMS:   14 point ROS was reviewed and is negative other than as noted above in HPI.       HOME  MEDICATIONS:  Current Outpatient Medications   Medication Sig Dispense Refill     atorvastatin (LIPITOR) 10 MG tablet TAKE 1 TABLET (10 MG) BY MOUTH ONCE DAILY 90 tablet 3     candesartan (ATACAND) 4 MG tablet Take 0.5 tablets (2 mg) by mouth 2 times daily DISPENSE AS WRITTEN, Brand name only 45 tablet 2     LORazepam (ATIVAN) 0.5 MG tablet Take 1 tablet (0.5 mg) by mouth every 6 hours as needed for anxiety, nausea or sleep 45 tablet 0     Magnesium 400 MG TABS Take 400 mg by mouth       oxazepam (SERAX) 15 MG capsule Take 1 capsule (15 mg) by mouth nightly as needed for anxiety or sleep OK for generic please notify patient regarding supply thanks 31 capsule 1     PARNATE 10 MG tablet TAKE 1 TABLET BY MOUTH 3 TIMES DAILY 270 tablet 3     polyethylene glycol (MIRALAX/GLYCOLAX) packet Take 1 packet by mouth daily       triamcinolone (KENALOG) 0.1 % external cream Apply  topically 2 times daily as needed. 15 g 1     triamterene-HCTZ (MAXZIDE-25) 37.5-25 MG tablet TAKE 1 TABLET BY MOUTH EVERY MORNING 90 tablet 2     zolpidem (AMBIEN) 5 MG tablet Take 1 tablet (5 mg) by mouth nightly as needed for sleep OK for generic please notify patient regarding supply thanks 31 tablet 1         ALLERGIES:  Allergies   Allergen Reactions     Wheat Bran          PAST MEDICAL HISTORY:  Past Medical History:   Diagnosis Date     Hypertension goal BP (blood pressure) < 140/90      Recurrent sinusitis 2013     Gynecologic history:  Age of menarche at 11; LMP ;  (one son), 1st pregnancy at age 19; no HRT.    PAST SURGICAL HISTORY:  Past Surgical History:   Procedure Laterality Date     BREAST BIOPSY, RT/LT      Breat Biopsy RT/LT     COLONOSCOPY  10/03     COLONOSCOPY  2014    Procedure: COLONOSCOPY;  COLONOSCOPY ;  Surgeon: Gaurav Shaffer MD;  Location:  GI     IR CHEST PORT PLACEMENT > 5 YRS OF AGE  2021     RECONSTRUCT EYELID           SOCIAL HISTORY:  Social History     Socioeconomic History      Marital status:      Spouse name: Not on file     Number of children: Not on file     Years of education: Not on file     Highest education level: Not on file   Occupational History     Not on file   Tobacco Use     Smoking status: Former Smoker     Types: Cigarettes     Quit date: 10/30/1972     Years since quittin.4     Smokeless tobacco: Never Used   Substance and Sexual Activity     Alcohol use: Yes     Comment: couple glasses of wine daily      Drug use: No     Sexual activity: Never   Other Topics Concern     Parent/sibling w/ CABG, MI or angioplasty before 65F 55M? Not Asked   Social History Narrative     Not on file     Social Determinants of Health     Financial Resource Strain: Not on file   Food Insecurity: Not on file   Transportation Needs: Not on file   Physical Activity: Not on file   Stress: Not on file   Social Connections: Not on file   Intimate Partner Violence: Not on file   Housing Stability: Not on file         FAMILY HISTORY:  Family History   Problem Relation Age of Onset     Cancer Mother         uterine     Breast Cancer Mother      Diabetes Paternal Grandmother          PHYSICAL EXAM:  Vital signs:  /53   Pulse 107   Resp 16   Wt 61.2 kg (135 lb)   SpO2 99%   BMI 23.92 kg/m     ECO  GENERAL/CONSTITUTIONAL: No acute distress but tearful at times.  EYES: No erythema or scleral icterus.  ENT/MOUTH: Neck supple. Mask in place.  LYMPH: No cervical, supraclavicular, adenopathy. Left axillary lymphadenopathy no longer palpable.  BREAST: No palpable masses in left breast. Left nipple everted with no discharge. No dimpling or ulceration.  RESPIRATORY: Clear to auscultation bilaterally. No crackles or wheezing.   CARDIOVASCULAR: Regular rate and rhythm without murmurs.  GASTROINTESTINAL: No hepatosplenomegaly, masses, or tenderness. No guarding.  No distention.  MUSCULOSKELETAL: Warm and well-perfused, no cyanosis, clubbing, or edema.  NEUROLOGIC: No focal motor deficits.  Alert, oriented, answers questions appropriately.  INTEGUMENTARY: No rashes or jaundice.  GAIT: Steady, does not use assistive device.    LABS:  CBC RESULTS: Recent Labs   Lab Test 04/15/22  0820   WBC 32.5*   RBC 2.71*   HGB 9.0*   HCT 28.1*   *   MCH 33.2*   MCHC 32.0   RDW 15.6*        Last Comprehensive Metabolic Panel:  Sodium   Date Value Ref Range Status   04/15/2022 136 133 - 144 mmol/L Final   06/05/2019 142 133 - 144 mmol/L Final     Potassium   Date Value Ref Range Status   04/15/2022 3.3 (L) 3.4 - 5.3 mmol/L Final   06/05/2019 3.7 3.4 - 5.3 mmol/L Final     Chloride   Date Value Ref Range Status   04/15/2022 106 94 - 109 mmol/L Final   06/05/2019 110 (H) 94 - 109 mmol/L Final     Carbon Dioxide   Date Value Ref Range Status   06/05/2019 24 20 - 32 mmol/L Final     Carbon Dioxide (CO2)   Date Value Ref Range Status   04/15/2022 25 20 - 32 mmol/L Final     Anion Gap   Date Value Ref Range Status   04/15/2022 5 3 - 14 mmol/L Final   06/05/2019 8 3 - 14 mmol/L Final     Glucose   Date Value Ref Range Status   04/15/2022 84 70 - 99 mg/dL Final   06/05/2019 93 70 - 99 mg/dL Final     Comment:     Fasting specimen     Urea Nitrogen   Date Value Ref Range Status   04/15/2022 24 7 - 30 mg/dL Final   06/05/2019 32 (H) 7 - 30 mg/dL Final     Creatinine   Date Value Ref Range Status   04/15/2022 1.32 (H) 0.52 - 1.04 mg/dL Final   06/05/2019 1.15 (H) 0.52 - 1.04 mg/dL Final     GFR Estimate   Date Value Ref Range Status   04/15/2022 42 (L) >60 mL/min/1.73m2 Final     Comment:     Effective December 21, 2021 eGFRcr in adults is calculated using the 2021 CKD-EPI creatinine equation which includes age and gender (Meliton rosario al., NEJM, DOI: 10.1056/CGBGmw6095559)   06/05/2019 47 (L) >60 mL/min/[1.73_m2] Final     Comment:     Non  GFR Calc  Starting 12/18/2018, serum creatinine based estimated GFR (eGFR) will be   calculated using the Chronic Kidney Disease Epidemiology Collaboration    (CKD-EPI) equation.       GFR, ESTIMATED POCT   Date Value Ref Range Status   2021 33 (L) >60 mL/min/1.73m2 Final     Calcium   Date Value Ref Range Status   04/15/2022 9.8 8.5 - 10.1 mg/dL Final   2019 9.2 8.5 - 10.1 mg/dL Final     Bilirubin Total   Date Value Ref Range Status   04/15/2022 0.5 0.2 - 1.3 mg/dL Final   2019 0.4 0.2 - 1.3 mg/dL Final     Alkaline Phosphatase   Date Value Ref Range Status   04/15/2022 171 (H) 40 - 150 U/L Final   2019 85 40 - 150 U/L Final     ALT   Date Value Ref Range Status   04/15/2022 24 0 - 50 U/L Final   2019 33 0 - 50 U/L Final     AST   Date Value Ref Range Status   04/15/2022 19 0 - 45 U/L Final   2019 33 0 - 45 U/L Final         PATHOLOGY:  None new.    IMAGIN2022 Breast MRI showed no residual enhancement.    ASSESSMENT/PLAN:  Jann Davidson is a 76 year old female with the following issues:  1. Clinical prognostic stage IIB, [id number], grade 2 invasive ductal carcinoma of the left lower outer breast, ER negative, IA negative, HER-2/maxime FISH negative (triple negative)  2. Abnormal thyroid function test  --Jann is on neoadjuvant chemotherapy with paclitaxel and carboplatin for 12 weeks with every 3-week pembrolizumab.  --She initially developed elevated TSH with normal free T4 but this has normalized as of 3/8/2022.  --Reviewed and discussed that her 2022 breast MRI showed no residual enhancement -- therefore, I recommended foregoing chemotherapy with Adriamycin and Cytoxan.  --She will have one last Taxol (and pembrolizumab) on  and will tentatively schedule for another pembrolizumab on  if breast surgery is scheduled farther out than 4 weeks post last Taxol.  --Discussed that I would recommend 9 cycles of pembrolizumab post-surgery as per the KEYNOTE-522 trial.  --Genetic testing negative.  --Will have her meet back with Dr. Murphy to discuss surgical planning.  Discussed that I would typically recommend surgery  about 4 weeks post last Taxol (4 weeks post 4/28) and no more than 6 weeks post last Taxol if possible.    3. Chronic kidney disease, stage 3  --Creatinine stable.  --She follows with Dr. Luna.    4. FDG uptake at anal canal  --She has history of anal fissure from 4/8/2014 colonoscopy.  --PET scan showed uptake at level of anus and could represent hemorrhoid, fissure, malignancy, or be physiologic.  --She is asymptomatic and would like to delay anoscopy and referral to colorectal for now. Discussed we can delay until completion of chemo for her breast cancer.    5. Right renal mass  --Discussed with Jann that PET scan showed a 1.1 cm mass in the right kidney that has been reportedly slowly growing since 2016 concerning for renal cell carcinoma.  --Will not be able to further evaluate with contrast MRI due to her degree of chronic kidney disease.  Will refer her to urology for surveillance/intervention plan after completion of chemo for her breast cancer.    6. Pulmonary nodules  --PET scan showed scattered small bilateral pulmonary nodules that are unchanged since 10/24/2018 and most likely benign.    7. Depression/anxiety  --Worsened lately due to cancer diagnosis and passing of her dog.  --She would like to continue on Parnate and not change her antidepressant.  --She takes oxazepam together with zolpidem.    --She could use lorazepam (Ativan) as long as she takes this 6 hours from her other benzodiazepenes.    8. Transaminitis  --Now resolved based on 4/15 labs which show ALT 24 and AST 19.    9. Anemia of chemotherapy  --No indication to transfuse unless Hgb < 7 g/dL.    Neetu Mcmullen MD  Hematology/Oncology  [facility]    Total time spent: 40 minutes in patient evaluation, counseling, documentation, and coordination of care.    Oncology Rooming Note    April 20, 2022 11:10 AM   Jann Davidson is a 76 year old female who presents for:    Chief Complaint   Patient presents with      "Oncology Clinic Visit     Initial Vitals: There were no vitals taken for this visit. Estimated body mass index is 23.64 kg/m  as calculated from the following:    Height as of 1/31/22: 1.6 m (5' 2.99\").    Weight as of 4/15/22: 60.5 kg (133 lb 6.4 oz). There is no height or weight on file to calculate BSA.  Data Unavailable Comment: Data Unavailable   No LMP recorded. Patient is postmenopausal.  Allergies reviewed: Yes  Medications reviewed: Yes    Medications: Medication refills not needed today.  Pharmacy name entered into EPIC:    [id number] - [place], [place] - [address]  [facility] - [place], [place] - [address] [place] [address]    Clinical concerns: no      Rosa Palomino [facility]                Again, thank you for allowing me to participate in the care of your patient.        Sincerely,        Neetu Mcmullen MD    "

## 2022-04-20 NOTE — PATIENT INSTRUCTIONS
Change lab draw to 4/28/2022.  Taxol + Keytruda on 4/28/2022.  Arrange for lab draw and Keytruda 5/6 (no AC).  RTC NP on 5/6.  RTC MD 1-2 weeks after breast surgery (date to be determined).  Will arrange for return with Dr. Murphy at [facility].

## 2022-04-21 ENCOUNTER — PATIENT OUTREACH (OUTPATIENT)
Dept: CARE COORDINATION | Facility: CLINIC | Age: 77
End: 2022-04-21
Payer: MEDICARE

## 2022-04-21 NOTE — PROGRESS NOTES
.Social Work Progress Note      Data/Intervention:  Patient Name:  Jann Davidson  /Age:  1945 (76 year old)    Reason for Follow-Up:  Jann is a 76-year-old woman with a diagnosis of breast cancer who is followed by Dr. Mcmullen. This clinician called Jann today for planned psychosocial check-in.    Intervention:   This clinician left voicemail with social work contact information and availability.     Plan:  1) This clinician will continue to be available as needed for ongoing psychosocial support. Jann previously provided social work contact information and knows how to access SW services when at clinic.   2) Ongoing collaboration with multidisciplinary care team.      Please call or page if needs or concerns arise.     SOWMYA Lepe, LICSW  Direct Phone: [phone]  Pager: [phone]

## 2022-04-25 DIAGNOSIS — T45.1X5A CHEMOTHERAPY-INDUCED NEUTROPENIA (H): Primary | ICD-10-CM

## 2022-04-25 DIAGNOSIS — Z17.1 MALIGNANT NEOPLASM OF LOWER-OUTER QUADRANT OF LEFT BREAST OF FEMALE, ESTROGEN RECEPTOR NEGATIVE (H): ICD-10-CM

## 2022-04-25 DIAGNOSIS — C50.512 MALIGNANT NEOPLASM OF LOWER-OUTER QUADRANT OF LEFT BREAST OF FEMALE, ESTROGEN RECEPTOR NEGATIVE (H): ICD-10-CM

## 2022-04-25 DIAGNOSIS — D70.1 CHEMOTHERAPY-INDUCED NEUTROPENIA (H): Primary | ICD-10-CM

## 2022-04-25 RX ORDER — EPINEPHRINE 1 MG/ML
0.3 INJECTION, SOLUTION INTRAMUSCULAR; SUBCUTANEOUS EVERY 5 MIN PRN
Status: CANCELLED | OUTPATIENT
Start: 2022-04-28

## 2022-04-25 RX ORDER — DIPHENHYDRAMINE HCL 25 MG
50 CAPSULE ORAL
Status: CANCELLED
Start: 2022-05-05

## 2022-04-25 RX ORDER — ALBUTEROL SULFATE 90 UG/1
1-2 AEROSOL, METERED RESPIRATORY (INHALATION)
Status: CANCELLED
Start: 2022-04-28

## 2022-04-25 RX ORDER — ALBUTEROL SULFATE 90 UG/1
1-2 AEROSOL, METERED RESPIRATORY (INHALATION)
Status: CANCELLED
Start: 2022-05-05

## 2022-04-25 RX ORDER — HEPARIN SODIUM,PORCINE 10 UNIT/ML
5 VIAL (ML) INTRAVENOUS
Status: CANCELLED | OUTPATIENT
Start: 2022-05-05

## 2022-04-25 RX ORDER — DIPHENHYDRAMINE HCL 25 MG
50 CAPSULE ORAL
Status: CANCELLED
Start: 2022-04-28

## 2022-04-25 RX ORDER — MEPERIDINE HYDROCHLORIDE 25 MG/ML
25 INJECTION INTRAMUSCULAR; INTRAVENOUS; SUBCUTANEOUS EVERY 30 MIN PRN
Status: CANCELLED | OUTPATIENT
Start: 2022-04-28

## 2022-04-25 RX ORDER — HEPARIN SODIUM,PORCINE 10 UNIT/ML
5 VIAL (ML) INTRAVENOUS
Status: CANCELLED | OUTPATIENT
Start: 2022-04-28

## 2022-04-25 RX ORDER — METHYLPREDNISOLONE SODIUM SUCCINATE 125 MG/2ML
125 INJECTION, POWDER, LYOPHILIZED, FOR SOLUTION INTRAMUSCULAR; INTRAVENOUS
Status: CANCELLED
Start: 2022-05-05

## 2022-04-25 RX ORDER — DIPHENHYDRAMINE HYDROCHLORIDE 50 MG/ML
50 INJECTION INTRAMUSCULAR; INTRAVENOUS
Status: CANCELLED
Start: 2022-04-28

## 2022-04-25 RX ORDER — ALBUTEROL SULFATE 0.83 MG/ML
2.5 SOLUTION RESPIRATORY (INHALATION)
Status: CANCELLED | OUTPATIENT
Start: 2022-05-05

## 2022-04-25 RX ORDER — LORAZEPAM 2 MG/ML
0.5 INJECTION INTRAMUSCULAR EVERY 4 HOURS PRN
Status: CANCELLED | OUTPATIENT
Start: 2022-05-05

## 2022-04-25 RX ORDER — ALBUTEROL SULFATE 0.83 MG/ML
2.5 SOLUTION RESPIRATORY (INHALATION)
Status: CANCELLED | OUTPATIENT
Start: 2022-04-28

## 2022-04-25 RX ORDER — HEPARIN SODIUM (PORCINE) LOCK FLUSH IV SOLN 100 UNIT/ML 100 UNIT/ML
5 SOLUTION INTRAVENOUS
Status: CANCELLED | OUTPATIENT
Start: 2022-04-28

## 2022-04-25 RX ORDER — MEPERIDINE HYDROCHLORIDE 25 MG/ML
25 INJECTION INTRAMUSCULAR; INTRAVENOUS; SUBCUTANEOUS EVERY 30 MIN PRN
Status: CANCELLED | OUTPATIENT
Start: 2022-05-05

## 2022-04-25 RX ORDER — NALOXONE HYDROCHLORIDE 0.4 MG/ML
0.2 INJECTION, SOLUTION INTRAMUSCULAR; INTRAVENOUS; SUBCUTANEOUS
Status: CANCELLED | OUTPATIENT
Start: 2022-05-05

## 2022-04-25 RX ORDER — EPINEPHRINE 1 MG/ML
0.3 INJECTION, SOLUTION INTRAMUSCULAR; SUBCUTANEOUS EVERY 5 MIN PRN
Status: CANCELLED | OUTPATIENT
Start: 2022-05-05

## 2022-04-25 RX ORDER — LORAZEPAM 2 MG/ML
0.5 INJECTION INTRAMUSCULAR EVERY 4 HOURS PRN
Status: CANCELLED | OUTPATIENT
Start: 2022-04-28

## 2022-04-25 RX ORDER — HEPARIN SODIUM (PORCINE) LOCK FLUSH IV SOLN 100 UNIT/ML 100 UNIT/ML
5 SOLUTION INTRAVENOUS
Status: CANCELLED | OUTPATIENT
Start: 2022-05-05

## 2022-04-25 RX ORDER — METHYLPREDNISOLONE SODIUM SUCCINATE 125 MG/2ML
125 INJECTION, POWDER, LYOPHILIZED, FOR SOLUTION INTRAMUSCULAR; INTRAVENOUS
Status: CANCELLED
Start: 2022-04-28

## 2022-04-25 RX ORDER — DIPHENHYDRAMINE HYDROCHLORIDE 50 MG/ML
50 INJECTION INTRAMUSCULAR; INTRAVENOUS
Status: CANCELLED
Start: 2022-05-05

## 2022-04-25 RX ORDER — NALOXONE HYDROCHLORIDE 0.4 MG/ML
0.2 INJECTION, SOLUTION INTRAMUSCULAR; INTRAVENOUS; SUBCUTANEOUS
Status: CANCELLED | OUTPATIENT
Start: 2022-04-28

## 2022-04-27 ENCOUNTER — OFFICE VISIT (OUTPATIENT)
Dept: SURGERY | Facility: CLINIC | Age: 77
End: 2022-04-27
Payer: MEDICARE

## 2022-04-27 VITALS — OXYGEN SATURATION: 100 % | HEART RATE: 109 BPM | DIASTOLIC BLOOD PRESSURE: 60 MMHG | SYSTOLIC BLOOD PRESSURE: 100 MMHG

## 2022-04-27 DIAGNOSIS — C50.512 MALIGNANT NEOPLASM OF LOWER-OUTER QUADRANT OF LEFT BREAST OF FEMALE, ESTROGEN RECEPTOR NEGATIVE (H): Primary | ICD-10-CM

## 2022-04-27 DIAGNOSIS — Z17.1 MALIGNANT NEOPLASM OF LOWER-OUTER QUADRANT OF LEFT BREAST OF FEMALE, ESTROGEN RECEPTOR NEGATIVE (H): Primary | ICD-10-CM

## 2022-04-27 PROCEDURE — 99214 OFFICE O/P EST MOD 30 MIN: CPT | Performed by: SURGERY

## 2022-04-27 NOTE — LETTER
April 28, 2022          Mehran Oliva MD  [address]  [place] [address]      RE:   Jann Davidson 1945      Dear Colleague,    Thank you for referring your patient, Jann Davidson, to [facility]. Please see a copy of my visit note below.    Jann Davidson is a 76 year old female who is seen in consultation at the request of Dr. 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  The patient was undergoing a screening mammogram in October 2021 when she was noted to have calcifications of the left breast.  Stereotactic biopsy was completed and pathology demonstrated triple negative invasive ductal carcinoma.  Bilateral breast MRI revealed a 2 cm mass at the 4 o'clock position of the left breast, 6 cm from the nipple.  No significant right breast findings were noted.  The patient was discovered to have an enlarged 2.5 cm left axillary lymph node.  This was biopsied and found to be positive for metastatic carcinoma.  Neoadjuvant treatment was recommended.  Genetic testing is negative.  The patient is planning to undergo her last round of neoadjuvant chemotherapy on 4/28/2022.  Repeat bilateral breast MRI completed recently demonstrated no evidence of residual left axillary mass or left axillary lymphadenopathy.     REVIEW OF SYSTEMS:  Constitutional: No fevers or chills  Eyes: No blurred or double vision  HENT: Denies headaches, No rhinorrhea, No sore throat  Respiratory: No cough or shortness of breath  Cardiovascular: Denies chest pain or palpitations  Gastrointestinal: No abdominal pain or nausea/vomiting  Genitourinary: No hematuria or dysuria  Musculoskeletal: Denies arthralgias or myalgias  Neurologic: No numbness or tingling  Integumentary: No skin rashes     Past Medical History        Past Medical History:   Diagnosis Date     Hypertension goal BP (blood pressure) < 140/90       Recurrent sinusitis 12/2/2013             Past Surgical History         Past Surgical History:   Procedure Laterality Date     BREAST BIOPSY, RT/LT         Breat Biopsy RT/LT     COLONOSCOPY   10/03     COLONOSCOPY   2014     Procedure: COLONOSCOPY;  COLONOSCOPY ;  Surgeon: Gaurav Shaffer MD;  Location: [facility]     IR CHEST PORT PLACEMENT > 5 YRS OF AGE   2021     RECONSTRUCT EYELID                Family History         Family History   Problem Relation Age of Onset     Cancer Mother           uterine     Breast Cancer Mother       Diabetes Paternal Grandmother              Social History            Tobacco Use     Smoking status: Former Smoker       Types: Cigarettes       Quit date: 10/30/1972       Years since quittin.5     Smokeless tobacco: Never Used   Substance Use Topics     Alcohol use: Yes       Comment: couple glasses of wine daily          Patient Active Problem List   Diagnosis     Atopic rhinitis     Essential hypertension with goal blood pressure less than 130/80     CKD (chronic kidney disease) stage 3, GFR 30-59 ml/min (H)     CARDIOVASCULAR SCREENING; LDL GOAL LESS THAN 130     Recurrent sinusitis     CTS (carpal tunnel syndrome)     Persistent disorder of initiating or maintaining sleep     Actinic keratosis     Multiple pulmonary nodules     Major depressive disorder, recurrent episode, moderate (H)     Anxiety     Malignant neoplasm of lower-outer quadrant of left breast of female, estrogen receptor negative (H)     Chemotherapy-induced neutropenia (H)     Encounter for other specified aftercare              Allergies   Allergen Reactions     Wheat Bran           Current Outpatient Prescriptions   Current Outpatient Medications   Medication Sig Dispense Refill     atorvastatin (LIPITOR) 10 MG tablet TAKE 1 TABLET (10 MG) BY MOUTH ONCE DAILY 90 tablet 3     candesartan (ATACAND) 4 MG tablet Take 0.5 tablets (2 mg) by mouth 2 times daily DISPENSE AS WRITTEN, Brand name only 45 tablet 2     LORazepam (ATIVAN)  0.5 MG tablet Take 1 tablet (0.5 mg) by mouth every 6 hours as needed for anxiety, nausea or sleep 45 tablet 0     Magnesium 400 MG TABS Take 400 mg by mouth         oxazepam (SERAX) 15 MG capsule Take 1 capsule (15 mg) by mouth nightly as needed for anxiety or sleep OK for generic please notify patient regarding supply thanks 31 capsule 1     PARNATE 10 MG tablet TAKE 1 TABLET BY MOUTH 3 TIMES DAILY 270 tablet 3     polyethylene glycol (MIRALAX/GLYCOLAX) packet Take 1 packet by mouth daily         triamcinolone (KENALOG) 0.1 % external cream Apply  topically 2 times daily as needed. 15 g 1     triamterene-HCTZ (MAXZIDE-25) 37.5-25 MG tablet TAKE 1 TABLET BY MOUTH EVERY MORNING 90 tablet 2     zolpidem (AMBIEN) 5 MG tablet Take 1 tablet (5 mg) by mouth nightly as needed for sleep OK for generic please notify patient regarding supply thanks 31 tablet 1            Vitals: /60   Pulse 109   SpO2 100%   BMI= There is no height or weight on file to calculate BMI.     EXAM:  General: Vital signs reviewed, in no apparent distress  Eyes: Anicteric  HENT: Normocephalic, atraumatic, trachea midline   Respiratory: Breathing nonlabored  Cardiovascular: Regular rate and rhythm  Breast: No evidence of palpable breast mass bilaterally.  Lymph: No palpable axillary lymphadenopathy bilaterally  Musculoskeletal: No gross deformities  Neurologic: Grossly nonfocal exam  Psychiatric: Normal mood, affect and insight  Integumentary: Warm and dry     All labs and imaging personally reviewed and significant for:   MRI BREASTS, BILATERAL, ENHANCED - 12/10/2021 8:01 PM     HISTORY: Newly diagnosed left breast cancer. Evaluate for extent of  disease.      COMPARISON: Prior mammograms on November 17, 2021.      FINDINGS:      Right Breast: There is mild background parenchymal enhancement.     There are no areas of suspicious enhancement or lymphadenopathy.     Left Breast: There is mild background parenchymal enhancement.     In the  left breast at 4:00 6 cm from the nipple, there is a  heterogeneously enhancing oval mass measuring 2.0 cm AP by 2.0 cm ML  by 1.5 cm SI, reflecting the biopsy cavity with postbiopsy changes,  although residual peripheral disease cannot be excluded.     There is a prominent 2.5 cm low left level 1 axillary lymph node.                                                                       IMPRESSION:   BI-RADS 0, INCOMPLETE: NEED ADDITIONAL IMAGING EVALUATION,  LEFT.  Known biopsy-proven malignancy in the left breast at 4:00. Prominent  left axillary lymph node.       Final Diagnosis   Breast, left, 4:00, 6 cm from nipple, microcalcifications: Stereotactic core biopsy:  - Invasive ductal carcinoma with micropapillary features, Lori grade 2  - Extensive lymphovascular invasion present  - Focal ductal carcinoma in situ, nuclear grade 2-3, cribriform type  - Lobular carcinoma in situ  - ER negative and VT negative  - HER-2 studies negative          Final Diagnosis   A.  Left axillary lymph node biopsy:  - Metastatic high-grade ductal carcinoma (known breast primary invasive ductal carcinoma).            MRI BREASTS, BILATERAL, WITH AND WITHOUT CONTRAST        FINDINGS:      Background enhancement: Minimal     Right Breast: Nothing for malignancy.      Left Breast: No residual enhancement at the site of biopsy-proven  IDC/DCIS at 4:00 anterior/middle depth. Clip on target.     Lymph nodes: No residual left axillary adenopathy. Complete resolution  of prior enlarged node, which was biopsied and yielded krzysztof  involvement. Clip is not visible on MR but was on target by  ultrasound.  No right axillary or internal mammary chain  lymphadenopathy.                                                                      IMPRESSION:     1. Marked MR response to treatment. No residual enhancement at the  site of primary malignancy at in the left breast at 4:00. No residual  left axillary adenopathy.     2. Nothing for malignancy  in the right breast.     ASSESSMENT:  Jann Davidson is a 76 year old who presents with triple negative left breast invasive ductal carcinoma.  Significant pertinent co-morbidities include: None.         PLAN:  Following a thorough discussion with the patient, the decision was made to proceed with tag localized left breast lumpectomy with tag localized excision of previously biopsied left axillary lymph node and left axillary sentinel lymph node biopsy, possible limited left axillary dissection.  The risks and benefits of surgery will be discussed.  The patient's port will be left in place, as she will continue on Keytruda postoperatively.  Surgery will be tentatively scheduled for approximately 3 weeks from the patient's last neoadjuvant chemotherapy dose.          Again, thank you for allowing me to participate in the care of your patient.      Sincerely,      Tatianna Rai MD

## 2022-04-27 NOTE — PROGRESS NOTES
[facility] General Surgery Clinic Consultation    CHIEF COMPLAINT:  Chief Complaint   Patient presents with     Consult     IDC left breast        HISTORY OF PRESENT ILLNESS:  Jann Davidson is a 76 year old female who is seen in consultation at the request of Dr. 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  The patient was undergoing a screening mammogram in October 2021 when she was noted to have calcifications of the left breast.  Stereotactic biopsy was completed and pathology demonstrated triple negative invasive ductal carcinoma.  Bilateral breast MRI revealed a 2 cm mass at the 4 o'clock position of the left breast, 6 cm from the nipple.  No significant right breast findings were noted.  The patient was discovered to have an enlarged 2.5 cm left axillary lymph node.  This was biopsied and found to be positive for metastatic carcinoma.  Neoadjuvant treatment was recommended.  Genetic testing is negative.  The patient is planning to undergo her last round of neoadjuvant chemotherapy on 4/28/2022.  Repeat bilateral breast MRI completed recently demonstrated no evidence of residual left axillary mass or left axillary lymphadenopathy.    REVIEW OF SYSTEMS:  Constitutional: No fevers or chills  Eyes: No blurred or double vision  HENT: Denies headaches, No rhinorrhea, No sore throat  Respiratory: No cough or shortness of breath  Cardiovascular: Denies chest pain or palpitations  Gastrointestinal: No abdominal pain or nausea/vomiting  Genitourinary: No hematuria or dysuria  Musculoskeletal: Denies arthralgias or myalgias  Neurologic: No numbness or tingling  Integumentary: No skin rashes    Past Medical History:   Diagnosis Date     Hypertension goal BP (blood pressure) < 140/90      Recurrent sinusitis 12/2/2013       Past Surgical History:   Procedure Laterality Date     BREAST BIOPSY, RT/LT      Breat Biopsy RT/LT     COLONOSCOPY  10/03     COLONOSCOPY  4/8/2014    Procedure:  COLONOSCOPY;  COLONOSCOPY ;  Surgeon: Gaurav Shaffer MD;  Location: [facility]     IR CHEST PORT PLACEMENT > 5 YRS OF AGE  2021     RECONSTRUCT EYELID         Family History   Problem Relation Age of Onset     Cancer Mother         uterine     Breast Cancer Mother      Diabetes Paternal Grandmother        Social History     Tobacco Use     Smoking status: Former Smoker     Types: Cigarettes     Quit date: 10/30/1972     Years since quittin.5     Smokeless tobacco: Never Used   Substance Use Topics     Alcohol use: Yes     Comment: couple glasses of wine daily        Patient Active Problem List   Diagnosis     Atopic rhinitis     Essential hypertension with goal blood pressure less than 130/80     CKD (chronic kidney disease) stage 3, GFR 30-59 ml/min (H)     CARDIOVASCULAR SCREENING; LDL GOAL LESS THAN 130     Recurrent sinusitis     CTS (carpal tunnel syndrome)     Persistent disorder of initiating or maintaining sleep     Actinic keratosis     Multiple pulmonary nodules     Major depressive disorder, recurrent episode, moderate (H)     Anxiety     Malignant neoplasm of lower-outer quadrant of left breast of female, estrogen receptor negative (H)     Chemotherapy-induced neutropenia (H)     Encounter for other specified aftercare       Allergies   Allergen Reactions     Wheat Bran        Current Outpatient Medications   Medication Sig Dispense Refill     atorvastatin (LIPITOR) 10 MG tablet TAKE 1 TABLET (10 MG) BY MOUTH ONCE DAILY 90 tablet 3     candesartan (ATACAND) 4 MG tablet Take 0.5 tablets (2 mg) by mouth 2 times daily DISPENSE AS WRITTEN, Brand name only 45 tablet 2     LORazepam (ATIVAN) 0.5 MG tablet Take 1 tablet (0.5 mg) by mouth every 6 hours as needed for anxiety, nausea or sleep 45 tablet 0     Magnesium 400 MG TABS Take 400 mg by mouth       oxazepam (SERAX) 15 MG capsule Take 1 capsule (15 mg) by mouth nightly as needed for anxiety or sleep OK for generic please notify patient  regarding supply thanks 31 capsule 1     PARNATE 10 MG tablet TAKE 1 TABLET BY MOUTH 3 TIMES DAILY 270 tablet 3     polyethylene glycol (MIRALAX/GLYCOLAX) packet Take 1 packet by mouth daily       triamcinolone (KENALOG) 0.1 % external cream Apply  topically 2 times daily as needed. 15 g 1     triamterene-HCTZ (MAXZIDE-25) 37.5-25 MG tablet TAKE 1 TABLET BY MOUTH EVERY MORNING 90 tablet 2     zolpidem (AMBIEN) 5 MG tablet Take 1 tablet (5 mg) by mouth nightly as needed for sleep OK for generic please notify patient regarding supply thanks 31 tablet 1       Vitals: /60   Pulse 109   SpO2 100%   BMI= There is no height or weight on file to calculate BMI.    EXAM:  General: Vital signs reviewed, in no apparent distress  Eyes: Anicteric  HENT: Normocephalic, atraumatic, trachea midline   Respiratory: Breathing nonlabored  Cardiovascular: Regular rate and rhythm  Breast: No evidence of palpable breast mass bilaterally.  Lymph: No palpable axillary lymphadenopathy bilaterally  Musculoskeletal: No gross deformities  Neurologic: Grossly nonfocal exam  Psychiatric: Normal mood, affect and insight  Integumentary: Warm and dry    All labs and imaging personally reviewed and significant for:   MRI BREASTS, BILATERAL, ENHANCED - 12/10/2021 8:01 PM     HISTORY: Newly diagnosed left breast cancer. Evaluate for extent of  disease.      COMPARISON: Prior mammograms on November 17, 2021.      FINDINGS:      Right Breast: There is mild background parenchymal enhancement.     There are no areas of suspicious enhancement or lymphadenopathy.     Left Breast: There is mild background parenchymal enhancement.     In the left breast at 4:00 6 cm from the nipple, there is a  heterogeneously enhancing oval mass measuring 2.0 cm AP by 2.0 cm ML  by 1.5 cm SI, reflecting the biopsy cavity with postbiopsy changes,  although residual peripheral disease cannot be excluded.     There is a prominent 2.5 cm low left level 1 axillary lymph  node.                                                                       IMPRESSION:   BI-RADS 0, INCOMPLETE: NEED ADDITIONAL IMAGING EVALUATION,  LEFT.  Known biopsy-proven malignancy in the left breast at 4:00. Prominent  left axillary lymph node.       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  Left axillary lymph node biopsy:  - Metastatic high-grade ductal carcinoma (known breast primary invasive ductal carcinoma).         MRI BREASTS, BILATERAL, WITH AND WITHOUT CONTRAST       FINDINGS:      Background enhancement: Minimal     Right Breast: Nothing for malignancy.      Left Breast: No residual enhancement at the site of biopsy-proven  IDC/DCIS at 4:00 anterior/middle depth. Clip on target.     Lymph nodes: No residual left axillary adenopathy. Complete resolution  of prior enlarged node, which was biopsied and yielded krzysztof  involvement. Clip is not visible on MR but was on target by  ultrasound.  No right axillary or internal mammary chain  lymphadenopathy.                                                                      IMPRESSION:     1. Marked MR response to treatment. No residual enhancement at the  site of primary malignancy at in the left breast at 4:00. No residual  left axillary adenopathy.     2. Nothing for malignancy in the right breast.    ASSESSMENT:  Jann Davidson is a 76 year old who presents with triple negative left breast invasive ductal carcinoma.  Significant pertinent co-morbidities include: None.       PLAN:  Following a thorough discussion with the patient, the decision was made to proceed with tag localized left  breast lumpectomy with tag localized excision of previously biopsied left axillary lymph node and left axillary sentinel lymph node biopsy, possible limited left axillary dissection.  The risks and benefits of surgery will be discussed.  The patient's port will be left in place, as she will continue on Keytruda postoperatively.  Surgery will be tentatively scheduled for approximately 3 weeks from the patient's last neoadjuvant chemotherapy dose.    It was my pleasure to participate in the care of Jann Davidson in clinic today. Thank you for this consultation.         Tatianna Rai MD    Please route or send letter to:  Primary Care Provider (PCP) and Referring Provider

## 2022-04-28 ENCOUNTER — LAB (OUTPATIENT)
Dept: INFUSION THERAPY | Facility: CLINIC | Age: 77
End: 2022-04-28
Attending: INTERNAL MEDICINE
Payer: MEDICARE

## 2022-04-28 ENCOUNTER — TELEPHONE (OUTPATIENT)
Dept: SURGERY | Facility: CLINIC | Age: 77
End: 2022-04-28

## 2022-04-28 ENCOUNTER — INFUSION THERAPY VISIT (OUTPATIENT)
Dept: INFUSION THERAPY | Facility: CLINIC | Age: 77
End: 2022-04-28
Attending: NURSE PRACTITIONER
Payer: MEDICARE

## 2022-04-28 ENCOUNTER — TELEPHONE (OUTPATIENT)
Dept: MAMMOGRAPHY | Facility: CLINIC | Age: 77
End: 2022-04-28

## 2022-04-28 VITALS
RESPIRATION RATE: 18 BRPM | SYSTOLIC BLOOD PRESSURE: 123 MMHG | WEIGHT: 135.6 LBS | TEMPERATURE: 97.9 F | BODY MASS INDEX: 24.03 KG/M2 | OXYGEN SATURATION: 99 % | HEART RATE: 91 BPM | DIASTOLIC BLOOD PRESSURE: 60 MMHG

## 2022-04-28 DIAGNOSIS — C50.512 MALIGNANT NEOPLASM OF LOWER-OUTER QUADRANT OF LEFT BREAST OF FEMALE, ESTROGEN RECEPTOR NEGATIVE (H): ICD-10-CM

## 2022-04-28 DIAGNOSIS — D70.1 CHEMOTHERAPY-INDUCED NEUTROPENIA (H): Primary | ICD-10-CM

## 2022-04-28 DIAGNOSIS — T45.1X5A CHEMOTHERAPY-INDUCED NEUTROPENIA (H): Primary | ICD-10-CM

## 2022-04-28 DIAGNOSIS — Z17.1 MALIGNANT NEOPLASM OF LOWER-OUTER QUADRANT OF LEFT BREAST OF FEMALE, ESTROGEN RECEPTOR NEGATIVE (H): ICD-10-CM

## 2022-04-28 DIAGNOSIS — Z51.89 ENCOUNTER FOR OTHER SPECIFIED AFTERCARE: ICD-10-CM

## 2022-04-28 DIAGNOSIS — C50.512 MALIGNANT NEOPLASM OF LOWER-OUTER QUADRANT OF LEFT BREAST OF FEMALE, ESTROGEN RECEPTOR NEGATIVE (H): Primary | ICD-10-CM

## 2022-04-28 DIAGNOSIS — Z17.1 MALIGNANT NEOPLASM OF LOWER-OUTER QUADRANT OF LEFT BREAST OF FEMALE, ESTROGEN RECEPTOR NEGATIVE (H): Primary | ICD-10-CM

## 2022-04-28 LAB
BASOPHILS # BLD MANUAL: 0 10E3/UL (ref 0–0.2)
BASOPHILS NFR BLD MANUAL: 2 %
CREAT SERPL-MCNC: 1.23 MG/DL (ref 0.52–1.04)
DACRYOCYTES BLD QL SMEAR: SLIGHT
ELLIPTOCYTES BLD QL SMEAR: SLIGHT
EOSINOPHIL # BLD MANUAL: 0.1 10E3/UL (ref 0–0.7)
EOSINOPHIL NFR BLD MANUAL: 4 %
ERYTHROCYTE [DISTWIDTH] IN BLOOD BY AUTOMATED COUNT: 14.2 % (ref 10–15)
GFR SERPL CREATININE-BSD FRML MDRD: 45 ML/MIN/1.73M2
HCT VFR BLD AUTO: 29 % (ref 35–47)
HGB BLD-MCNC: 9.2 G/DL (ref 11.7–15.7)
LYMPHOCYTES # BLD MANUAL: 1.6 10E3/UL (ref 0.8–5.3)
LYMPHOCYTES NFR BLD MANUAL: 71 %
MCH RBC QN AUTO: 33 PG (ref 26.5–33)
MCHC RBC AUTO-ENTMCNC: 31.7 G/DL (ref 31.5–36.5)
MCV RBC AUTO: 104 FL (ref 78–100)
MONOCYTES # BLD MANUAL: 0.2 10E3/UL (ref 0–1.3)
MONOCYTES NFR BLD MANUAL: 10 %
NEUTROPHILS # BLD MANUAL: 0.3 10E3/UL (ref 1.6–8.3)
NEUTROPHILS NFR BLD MANUAL: 13 %
PLAT MORPH BLD: ABNORMAL
PLATELET # BLD AUTO: 261 10E3/UL (ref 150–450)
RBC # BLD AUTO: 2.79 10E6/UL (ref 3.8–5.2)
RBC MORPH BLD: ABNORMAL
WBC # BLD AUTO: 2.3 10E3/UL (ref 4–11)

## 2022-04-28 PROCEDURE — 250N000011 HC RX IP 250 OP 636: Performed by: INTERNAL MEDICINE

## 2022-04-28 PROCEDURE — 250N000011 HC RX IP 250 OP 636: Performed by: NURSE PRACTITIONER

## 2022-04-28 PROCEDURE — 85027 COMPLETE CBC AUTOMATED: CPT | Performed by: INTERNAL MEDICINE

## 2022-04-28 PROCEDURE — 82565 ASSAY OF CREATININE: CPT | Performed by: INTERNAL MEDICINE

## 2022-04-28 PROCEDURE — 96372 THER/PROPH/DIAG INJ SC/IM: CPT | Performed by: NURSE PRACTITIONER

## 2022-04-28 RX ORDER — HEPARIN SODIUM (PORCINE) LOCK FLUSH IV SOLN 100 UNIT/ML 100 UNIT/ML
5 SOLUTION INTRAVENOUS
Status: DISCONTINUED | OUTPATIENT
Start: 2022-04-28 | End: 2022-04-28 | Stop reason: HOSPADM

## 2022-04-28 RX ORDER — HEPARIN SODIUM (PORCINE) LOCK FLUSH IV SOLN 100 UNIT/ML 100 UNIT/ML
5 SOLUTION INTRAVENOUS
Status: CANCELLED | OUTPATIENT
Start: 2022-04-29

## 2022-04-28 RX ORDER — SODIUM CHLORIDE 9 MG/ML
INJECTION, SOLUTION INTRAVENOUS CONTINUOUS
Status: CANCELLED
Start: 2022-04-29

## 2022-04-28 RX ORDER — HEPARIN SODIUM,PORCINE 10 UNIT/ML
5 VIAL (ML) INTRAVENOUS
Status: CANCELLED | OUTPATIENT
Start: 2022-04-29

## 2022-04-28 RX ADMIN — FILGRASTIM 480 MCG: 480 INJECTION, SOLUTION INTRAVENOUS; SUBCUTANEOUS at 09:50

## 2022-04-28 RX ADMIN — Medication 5 ML: at 09:37

## 2022-04-28 NOTE — NURSING NOTE
Breast Nurse Care Coordination:  I re-introduced self to patient and explained my role of breast nurse coordinator. I accompanied Jann to her surgical consultation on 4/27/22 with Dr. Tatianna Rai at the [facility].      At the end of the consultation, we reviewed Jann's plan of care and education.   The plan is for patient to have a Localized Left Breast Lumpectomy with left sentinel lymph node biopsy with Dr. Tatianna Rai at the [facility].  Patient's last Taxol treatment is now scheduled for 5/6/22 and Dr. Neetu Mcmullen is okay with patient having her surgery 3 weeks following the last Taxol treatment. Dr. Fredi Gongora's surgery scheduler will be reaching out to patient to assist in getting her scheduled for surgery.  The plan is to keep the port in place so patient will receive adjuvent Keytruda after her radiation treatments have been completed.      I gave patient Anna educational materials regarding Exercises After Breast Surgery, [place] Lymph Node Biopsy, and Lumpectomy.  Informed patient for lumpectomy surgery, she will want to have two good supportive sports bras that open in the front to wear the 2 weeks following her surgery. I gave patient information on different options to purchase sports bras and showed her examples on [employer] website to purchase.    I also informed patient she will need a pre-op exam within 30 days of her surgery and Fransisca will assist in getting her scheduled for covid testing 2-4 days before her surgery.       I answered her questions and encouraged patient to call me back with any future questions or concerns.  Jann has my contact information, and knows to contact me in the future with any questions or concerns.     Marcia Lyon, RN, BSN  Breast Care Nurse Coordinator  [facility] [place]  [phone]

## 2022-04-28 NOTE — PROGRESS NOTES
Infusion Nursing Note:  Jann Davidson presents today for taxol.    Patient seen by provider today: No   present during visit today: Not Applicable.    Note: Labs drawn today; ; taxol held today, neupogen to be given for 3 days starting today. Taxol and Keytruda to be given next Friday, May 6 after labs drawn. Patient aware of plan and written schedule.      Intravenous Access:  Labs drawn without difficulty.  Implanted Port.    Treatment Conditions:  Lab Results   Component Value Date    HGB 9.2 (L) 04/28/2022    WBC 2.3 (L) 04/28/2022    ANEU 0.3 (LL) 04/28/2022    ANEUTAUTO 1.0 (L) 04/12/2022     04/28/2022      Results reviewed, labs did NOT meet treatment parameters: See note above.      Post Infusion Assessment:  Patient tolerated injection without incident.  Site patent and intact, free from redness, edema or discomfort.       Discharge Plan:   Copy of AVS reviewed with patient and/or family.  Patient will return 4/29 for next appointment.  Patient discharged in stable condition accompanied by: self.  Departure Mode: Ambulatory.      Tico Tyler RN

## 2022-04-28 NOTE — TELEPHONE ENCOUNTER
Type of surgery: radiofrequency tag localized left breast lumpectomy with radiofrequency tag localized excision left axillary lymph node, left axillary SLN biospy poss limited left axillary dissection  Location of surgery: [facility]  Date and time of surgery: 5/31/22 11:30am  Surgeon: Dr Rai  Pre-Op Appt Date: pt to schedule  Post-Op Appt Date: pt tos schedule   Packet sent out: Yes  Pre-cert/Authorization completed:  Not Applicable  Date: 4/28/22

## 2022-04-28 NOTE — TELEPHONE ENCOUNTER
I spoke with patient this morning to discuss her plan of care.    Informed patient the plan is to proceed with her Localized Left Breast Lumpectomy 3 weeks following her last Taxol treatment, which is now scheduled for 5/6/22.  Dr. Rai will ask Fransisca, her  to reach out to patient to schedule the week of 5/30/22.     Once patient is scheduled for her surgery, I will get patient scheduled for radiation oncology consult with Dr. Giles the following week.    I also informed Jann that Dr. Mcmullen is requesting the port to stay in during surgery, because she plans to give patient Keytruda after she has completed the her radiation therapy treatments.    We discussed purchasing the Hibiclens online and I answered all of patient's questions thoroughly to her satisfaction.  Informed patient to call me back with any questions or concerns.    Patient verbalizes understanding and agrees with the plan of care.  Marcia Lyon, RN BSN  Breast Nurse Care Coordinator  [facility] [place] [place]  [phone]

## 2022-04-29 ENCOUNTER — ALLIED HEALTH/NURSE VISIT (OUTPATIENT)
Dept: INFUSION THERAPY | Facility: CLINIC | Age: 77
End: 2022-04-29
Attending: INTERNAL MEDICINE
Payer: MEDICARE

## 2022-04-29 VITALS
TEMPERATURE: 98.5 F | DIASTOLIC BLOOD PRESSURE: 70 MMHG | RESPIRATION RATE: 18 BRPM | SYSTOLIC BLOOD PRESSURE: 123 MMHG | HEART RATE: 109 BPM

## 2022-04-29 DIAGNOSIS — Z51.89 ENCOUNTER FOR OTHER SPECIFIED AFTERCARE: Primary | ICD-10-CM

## 2022-04-29 DIAGNOSIS — Z17.1 MALIGNANT NEOPLASM OF LOWER-OUTER QUADRANT OF LEFT BREAST OF FEMALE, ESTROGEN RECEPTOR NEGATIVE (H): ICD-10-CM

## 2022-04-29 DIAGNOSIS — C50.512 MALIGNANT NEOPLASM OF LOWER-OUTER QUADRANT OF LEFT BREAST OF FEMALE, ESTROGEN RECEPTOR NEGATIVE (H): ICD-10-CM

## 2022-04-29 DIAGNOSIS — T45.1X5A CHEMOTHERAPY-INDUCED NEUTROPENIA (H): ICD-10-CM

## 2022-04-29 DIAGNOSIS — D70.1 CHEMOTHERAPY-INDUCED NEUTROPENIA (H): ICD-10-CM

## 2022-04-29 PROCEDURE — 250N000011 HC RX IP 250 OP 636: Performed by: NURSE PRACTITIONER

## 2022-04-29 PROCEDURE — 96372 THER/PROPH/DIAG INJ SC/IM: CPT | Performed by: NURSE PRACTITIONER

## 2022-04-29 RX ORDER — HEPARIN SODIUM (PORCINE) LOCK FLUSH IV SOLN 100 UNIT/ML 100 UNIT/ML
5 SOLUTION INTRAVENOUS
Status: CANCELLED | OUTPATIENT
Start: 2022-04-30

## 2022-04-29 RX ORDER — SODIUM CHLORIDE 9 MG/ML
INJECTION, SOLUTION INTRAVENOUS CONTINUOUS
Status: CANCELLED
Start: 2022-04-30

## 2022-04-29 RX ORDER — HEPARIN SODIUM,PORCINE 10 UNIT/ML
5 VIAL (ML) INTRAVENOUS
Status: CANCELLED | OUTPATIENT
Start: 2022-04-30

## 2022-04-29 RX ADMIN — FILGRASTIM 480 MCG: 480 INJECTION, SOLUTION INTRAVENOUS; SUBCUTANEOUS at 12:31

## 2022-04-29 NOTE — PROGRESS NOTES
Infusion Nursing Note:  Jann CARTER Stuart presents today for Neupogen.    Patient seen by provider today: No   present during visit today: Not Applicable.    Note: N/A.      Intravenous Access:  No Intravenous access/labs at this visit.    Treatment Conditions:  Lab Results   Component Value Date    HGB 9.2 (L) 04/28/2022    WBC 2.3 (L) 04/28/2022    ANEU 0.3 (LL) 04/28/2022    ANEUTAUTO 1.0 (L) 04/12/2022     04/28/2022          Post Infusion Assessment:  Patient tolerated injection without incident.  Site patent and intact, free from redness, edema or discomfort.  No evidence of extravasations.  Access discontinued per protocol.       Discharge Plan:   Patient discharged in stable condition accompanied by: self.  Departure Mode: Ambulatory.      Deric Mondragon RN

## 2022-04-30 ENCOUNTER — INFUSION THERAPY VISIT (OUTPATIENT)
Dept: INFUSION THERAPY | Facility: CLINIC | Age: 77
End: 2022-04-30
Attending: FAMILY MEDICINE
Payer: MEDICARE

## 2022-04-30 VITALS
RESPIRATION RATE: 16 BRPM | HEART RATE: 102 BPM | SYSTOLIC BLOOD PRESSURE: 132 MMHG | DIASTOLIC BLOOD PRESSURE: 64 MMHG | TEMPERATURE: 98.4 F | OXYGEN SATURATION: 100 %

## 2022-04-30 DIAGNOSIS — Z17.1 MALIGNANT NEOPLASM OF LOWER-OUTER QUADRANT OF LEFT BREAST OF FEMALE, ESTROGEN RECEPTOR NEGATIVE (H): ICD-10-CM

## 2022-04-30 DIAGNOSIS — D70.1 CHEMOTHERAPY-INDUCED NEUTROPENIA (H): ICD-10-CM

## 2022-04-30 DIAGNOSIS — C50.512 MALIGNANT NEOPLASM OF LOWER-OUTER QUADRANT OF LEFT BREAST OF FEMALE, ESTROGEN RECEPTOR NEGATIVE (H): ICD-10-CM

## 2022-04-30 DIAGNOSIS — Z51.89 ENCOUNTER FOR OTHER SPECIFIED AFTERCARE: Primary | ICD-10-CM

## 2022-04-30 DIAGNOSIS — T45.1X5A CHEMOTHERAPY-INDUCED NEUTROPENIA (H): ICD-10-CM

## 2022-04-30 PROCEDURE — 250N000011 HC RX IP 250 OP 636: Performed by: NURSE PRACTITIONER

## 2022-04-30 PROCEDURE — 96372 THER/PROPH/DIAG INJ SC/IM: CPT | Performed by: NURSE PRACTITIONER

## 2022-04-30 RX ORDER — SODIUM CHLORIDE 9 MG/ML
INJECTION, SOLUTION INTRAVENOUS CONTINUOUS
Status: CANCELLED
Start: 2022-04-30

## 2022-04-30 RX ORDER — HEPARIN SODIUM,PORCINE 10 UNIT/ML
5 VIAL (ML) INTRAVENOUS
Status: CANCELLED | OUTPATIENT
Start: 2022-04-30

## 2022-04-30 RX ORDER — HEPARIN SODIUM (PORCINE) LOCK FLUSH IV SOLN 100 UNIT/ML 100 UNIT/ML
5 SOLUTION INTRAVENOUS
Status: CANCELLED | OUTPATIENT
Start: 2022-04-30

## 2022-04-30 RX ADMIN — FILGRASTIM 480 MCG: 480 INJECTION, SOLUTION INTRAVENOUS; SUBCUTANEOUS at 12:56

## 2022-04-30 ASSESSMENT — PAIN SCALES - GENERAL: PAINLEVEL: NO PAIN (0)

## 2022-04-30 NOTE — PROGRESS NOTES
Infusion Nursing Note:  Jann RAUL Davidson presents today for Neupogen injection.    Patient seen by provider today: No   present during visit today: Not Applicable.    Note: N/A.      Intravenous Access:  No Intravenous access/labs at this visit.    Treatment Conditions:  Not Applicable.      Post Infusion Assessment:  Patient tolerated injection without incident.       Discharge Plan:   Discharge instructions reviewed with: Patient.  Patient and/or family verbalized understanding of discharge instructions and all questions answered.  Patient discharged in stable condition accompanied by: self.  Departure Mode: Ambulatory.      Karol Sanchez RN

## 2022-05-02 NOTE — TELEPHONE ENCOUNTER
Patient is now scheduled for Radiation Oncology Consultation with Dr. Lamont Giles on 6/9/22 @ 10:30a.m. at [facility].    I will send patient a [employer] message with the appointment details.   Marcia Lyon RN BSN  Breast Nurse Care Coordinator  [facility] [place]  [phone]

## 2022-05-03 DIAGNOSIS — T45.1X5A CHEMOTHERAPY-INDUCED NEUTROPENIA (H): Primary | ICD-10-CM

## 2022-05-03 DIAGNOSIS — C50.512 MALIGNANT NEOPLASM OF LOWER-OUTER QUADRANT OF LEFT BREAST OF FEMALE, ESTROGEN RECEPTOR NEGATIVE (H): ICD-10-CM

## 2022-05-03 DIAGNOSIS — Z17.1 MALIGNANT NEOPLASM OF LOWER-OUTER QUADRANT OF LEFT BREAST OF FEMALE, ESTROGEN RECEPTOR NEGATIVE (H): ICD-10-CM

## 2022-05-03 DIAGNOSIS — D70.1 CHEMOTHERAPY-INDUCED NEUTROPENIA (H): Primary | ICD-10-CM

## 2022-05-03 RX ORDER — EPINEPHRINE 1 MG/ML
0.3 INJECTION, SOLUTION, CONCENTRATE INTRAVENOUS EVERY 5 MIN PRN
Status: CANCELLED | OUTPATIENT
Start: 2022-05-06

## 2022-05-03 RX ORDER — NALOXONE HYDROCHLORIDE 0.4 MG/ML
0.2 INJECTION, SOLUTION INTRAMUSCULAR; INTRAVENOUS; SUBCUTANEOUS
Status: CANCELLED | OUTPATIENT
Start: 2022-05-06

## 2022-05-03 RX ORDER — DIPHENHYDRAMINE HCL 25 MG
50 CAPSULE ORAL
Status: CANCELLED
Start: 2022-05-06

## 2022-05-03 RX ORDER — ALBUTEROL SULFATE 0.83 MG/ML
2.5 SOLUTION RESPIRATORY (INHALATION)
Status: CANCELLED | OUTPATIENT
Start: 2022-05-06

## 2022-05-03 RX ORDER — LORAZEPAM 2 MG/ML
0.5 INJECTION INTRAMUSCULAR EVERY 4 HOURS PRN
Status: CANCELLED | OUTPATIENT
Start: 2022-05-06

## 2022-05-03 RX ORDER — ALBUTEROL SULFATE 90 UG/1
1-2 AEROSOL, METERED RESPIRATORY (INHALATION)
Status: CANCELLED
Start: 2022-05-06

## 2022-05-03 RX ORDER — HEPARIN SODIUM,PORCINE 10 UNIT/ML
5 VIAL (ML) INTRAVENOUS
Status: CANCELLED | OUTPATIENT
Start: 2022-05-06

## 2022-05-03 RX ORDER — MEPERIDINE HYDROCHLORIDE 25 MG/ML
25 INJECTION INTRAMUSCULAR; INTRAVENOUS; SUBCUTANEOUS EVERY 30 MIN PRN
Status: CANCELLED | OUTPATIENT
Start: 2022-05-06

## 2022-05-03 RX ORDER — HEPARIN SODIUM (PORCINE) LOCK FLUSH IV SOLN 100 UNIT/ML 100 UNIT/ML
5 SOLUTION INTRAVENOUS
Status: CANCELLED | OUTPATIENT
Start: 2022-05-06

## 2022-05-03 RX ORDER — DIPHENHYDRAMINE HYDROCHLORIDE 50 MG/ML
50 INJECTION INTRAMUSCULAR; INTRAVENOUS
Status: CANCELLED
Start: 2022-05-06

## 2022-05-06 ENCOUNTER — INFUSION THERAPY VISIT (OUTPATIENT)
Dept: INFUSION THERAPY | Facility: CLINIC | Age: 77
End: 2022-05-06
Attending: NURSE PRACTITIONER
Payer: MEDICARE

## 2022-05-06 ENCOUNTER — ONCOLOGY VISIT (OUTPATIENT)
Dept: ONCOLOGY | Facility: CLINIC | Age: 77
End: 2022-05-06
Attending: NURSE PRACTITIONER
Payer: MEDICARE

## 2022-05-06 VITALS
SYSTOLIC BLOOD PRESSURE: 117 MMHG | DIASTOLIC BLOOD PRESSURE: 73 MMHG | OXYGEN SATURATION: 99 % | TEMPERATURE: 97.9 F | RESPIRATION RATE: 16 BRPM | BODY MASS INDEX: 23.21 KG/M2 | HEART RATE: 85 BPM | WEIGHT: 131 LBS

## 2022-05-06 VITALS
BODY MASS INDEX: 23.21 KG/M2 | WEIGHT: 131 LBS | TEMPERATURE: 97.9 F | RESPIRATION RATE: 16 BRPM | OXYGEN SATURATION: 99 % | DIASTOLIC BLOOD PRESSURE: 73 MMHG | HEART RATE: 85 BPM | SYSTOLIC BLOOD PRESSURE: 117 MMHG

## 2022-05-06 DIAGNOSIS — D70.1 CHEMOTHERAPY-INDUCED NEUTROPENIA (H): Primary | ICD-10-CM

## 2022-05-06 DIAGNOSIS — Z17.1 MALIGNANT NEOPLASM OF LOWER-OUTER QUADRANT OF LEFT BREAST OF FEMALE, ESTROGEN RECEPTOR NEGATIVE (H): ICD-10-CM

## 2022-05-06 DIAGNOSIS — T45.1X5A CHEMOTHERAPY-INDUCED NEUTROPENIA (H): Primary | ICD-10-CM

## 2022-05-06 DIAGNOSIS — M10.9 GOUT INVOLVING TOE OF LEFT FOOT, UNSPECIFIED CAUSE, UNSPECIFIED CHRONICITY: Primary | ICD-10-CM

## 2022-05-06 DIAGNOSIS — C50.512 MALIGNANT NEOPLASM OF LOWER-OUTER QUADRANT OF LEFT BREAST OF FEMALE, ESTROGEN RECEPTOR NEGATIVE (H): ICD-10-CM

## 2022-05-06 DIAGNOSIS — M10.9 GOUT INVOLVING TOE OF LEFT FOOT, UNSPECIFIED CAUSE, UNSPECIFIED CHRONICITY: ICD-10-CM

## 2022-05-06 LAB
ALBUMIN SERPL-MCNC: 3.2 G/DL (ref 3.4–5)
ALP SERPL-CCNC: 163 U/L (ref 40–150)
ALT SERPL W P-5'-P-CCNC: 44 U/L (ref 0–50)
ANION GAP SERPL CALCULATED.3IONS-SCNC: 5 MMOL/L (ref 3–14)
AST SERPL W P-5'-P-CCNC: 32 U/L (ref 0–45)
BASOPHILS # BLD MANUAL: 0 10E3/UL (ref 0–0.2)
BASOPHILS NFR BLD MANUAL: 1 %
BILIRUB SERPL-MCNC: 1.2 MG/DL (ref 0.2–1.3)
BUN SERPL-MCNC: 26 MG/DL (ref 7–30)
CALCIUM SERPL-MCNC: 9 MG/DL (ref 8.5–10.1)
CHLORIDE BLD-SCNC: 105 MMOL/L (ref 94–109)
CO2 SERPL-SCNC: 25 MMOL/L (ref 20–32)
CREAT SERPL-MCNC: 1.34 MG/DL (ref 0.52–1.04)
EOSINOPHIL # BLD MANUAL: 0 10E3/UL (ref 0–0.7)
EOSINOPHIL NFR BLD MANUAL: 1 %
ERYTHROCYTE [DISTWIDTH] IN BLOOD BY AUTOMATED COUNT: 13.3 % (ref 10–15)
GFR SERPL CREATININE-BSD FRML MDRD: 41 ML/MIN/1.73M2
GLUCOSE BLD-MCNC: 115 MG/DL (ref 70–99)
HCT VFR BLD AUTO: 30.5 % (ref 35–47)
HGB BLD-MCNC: 9.8 G/DL (ref 11.7–15.7)
LYMPHOCYTES # BLD MANUAL: 2.2 10E3/UL (ref 0.8–5.3)
LYMPHOCYTES NFR BLD MANUAL: 50 %
MCH RBC QN AUTO: 32 PG (ref 26.5–33)
MCHC RBC AUTO-ENTMCNC: 32.1 G/DL (ref 31.5–36.5)
MCV RBC AUTO: 100 FL (ref 78–100)
MONOCYTES # BLD MANUAL: 0.3 10E3/UL (ref 0–1.3)
MONOCYTES NFR BLD MANUAL: 6 %
NEUTROPHILS # BLD MANUAL: 1.8 10E3/UL (ref 1.6–8.3)
NEUTROPHILS NFR BLD MANUAL: 42 %
PLAT MORPH BLD: NORMAL
PLATELET # BLD AUTO: 251 10E3/UL (ref 150–450)
POTASSIUM BLD-SCNC: 4.3 MMOL/L (ref 3.4–5.3)
PROT SERPL-MCNC: 6.7 G/DL (ref 6.8–8.8)
RBC # BLD AUTO: 3.06 10E6/UL (ref 3.8–5.2)
RBC MORPH BLD: NORMAL
SODIUM SERPL-SCNC: 135 MMOL/L (ref 133–144)
T4 FREE SERPL-MCNC: 1.42 NG/DL (ref 0.76–1.46)
TSH SERPL DL<=0.005 MIU/L-ACNC: 0.06 MU/L (ref 0.4–4)
URATE SERPL-MCNC: 7.5 MG/DL (ref 2.6–6)
WBC # BLD AUTO: 4.4 10E3/UL (ref 4–11)

## 2022-05-06 PROCEDURE — 80053 COMPREHEN METABOLIC PANEL: CPT | Performed by: INTERNAL MEDICINE

## 2022-05-06 PROCEDURE — 96413 CHEMO IV INFUSION 1 HR: CPT

## 2022-05-06 PROCEDURE — 84550 ASSAY OF BLOOD/URIC ACID: CPT | Performed by: INTERNAL MEDICINE

## 2022-05-06 PROCEDURE — 84439 ASSAY OF FREE THYROXINE: CPT | Performed by: INTERNAL MEDICINE

## 2022-05-06 PROCEDURE — 85007 BL SMEAR W/DIFF WBC COUNT: CPT | Performed by: INTERNAL MEDICINE

## 2022-05-06 PROCEDURE — 84443 ASSAY THYROID STIM HORMONE: CPT | Performed by: INTERNAL MEDICINE

## 2022-05-06 PROCEDURE — 96377 APPLICATON ON-BODY INJECTOR: CPT | Mod: XS

## 2022-05-06 PROCEDURE — 85027 COMPLETE CBC AUTOMATED: CPT | Performed by: INTERNAL MEDICINE

## 2022-05-06 PROCEDURE — 250N000011 HC RX IP 250 OP 636: Performed by: NURSE PRACTITIONER

## 2022-05-06 PROCEDURE — 99214 OFFICE O/P EST MOD 30 MIN: CPT | Performed by: NURSE PRACTITIONER

## 2022-05-06 PROCEDURE — 258N000003 HC RX IP 258 OP 636: Performed by: INTERNAL MEDICINE

## 2022-05-06 PROCEDURE — 96417 CHEMO IV INFUS EACH ADDL SEQ: CPT

## 2022-05-06 PROCEDURE — 250N000011 HC RX IP 250 OP 636: Performed by: INTERNAL MEDICINE

## 2022-05-06 PROCEDURE — 96361 HYDRATE IV INFUSION ADD-ON: CPT

## 2022-05-06 PROCEDURE — 36591 DRAW BLOOD OFF VENOUS DEVICE: CPT | Performed by: INTERNAL MEDICINE

## 2022-05-06 PROCEDURE — 96372 THER/PROPH/DIAG INJ SC/IM: CPT | Performed by: NURSE PRACTITIONER

## 2022-05-06 PROCEDURE — G0463 HOSPITAL OUTPT CLINIC VISIT: HCPCS | Mod: 25

## 2022-05-06 RX ORDER — ALLOPURINOL 300 MG/1
300 TABLET ORAL DAILY
Qty: 30 TABLET | Refills: 0 | Status: ON HOLD | OUTPATIENT
Start: 2022-05-06 | End: 2022-06-01

## 2022-05-06 RX ORDER — HEPARIN SODIUM (PORCINE) LOCK FLUSH IV SOLN 100 UNIT/ML 100 UNIT/ML
5 SOLUTION INTRAVENOUS
Status: DISCONTINUED | OUTPATIENT
Start: 2022-05-06 | End: 2022-05-06 | Stop reason: HOSPADM

## 2022-05-06 RX ORDER — COLCHICINE 0.6 MG/1
TABLET ORAL
Qty: 5 TABLET | Refills: 0 | Status: SHIPPED | OUTPATIENT
Start: 2022-05-06 | End: 2022-06-20

## 2022-05-06 RX ADMIN — PACLITAXEL 130 MG: 6 INJECTION, SOLUTION INTRAVENOUS at 11:46

## 2022-05-06 RX ADMIN — Medication 5 ML: at 12:52

## 2022-05-06 RX ADMIN — SODIUM CHLORIDE 1000 ML: 9 INJECTION, SOLUTION INTRAVENOUS at 10:25

## 2022-05-06 RX ADMIN — SODIUM CHLORIDE 200 MG: 9 INJECTION, SOLUTION INTRAVENOUS at 10:51

## 2022-05-06 RX ADMIN — PEGFILGRASTIM 6 MG: KIT SUBCUTANEOUS at 12:40

## 2022-05-06 ASSESSMENT — PAIN SCALES - GENERAL: PAINLEVEL: EXTREME PAIN (8)

## 2022-05-06 NOTE — LETTER
5/6/2022         RE: Jann Davidson  [address]  [place] [address]        Dear Colleague,    Thank you for referring your patient, Jann Davidson, to the [facility]. Please see a copy of my visit note below.    Oncology/Hematology Visit Note  May 6, 2022    Reason for Visit: follow up of left breast cancer  Triple negative  Stage IIb  Patient of Dr. Mcmullen    01/18/2021-started neoadjuvant chemotherapy with paclitaxel and carboplatin for 12 weeks with addition of pembrolizumab every 3 weeks   4/19/2022 breast MRI showed no residual enhancement -- Dr Mcmullen recommended foregoing chemotherapy with Adriamycin and Cytoxan.      Interval History:  Patient reports she noted redness on the big toe is painful especially when she walks.  She denies swelling denies discharge denies fever chills sweats cough shortness of breath chest pain denies nausea vomiting diarrhea pain bleeding      Review of Systems:  14 point ROS of systems including Constitutional, Eyes, Respiratory, Cardiovascular, Gastroenterology, Genitourinary, Integumentary, Muscularskeletal, Psychiatric were all negative except for pertinent positives noted in my HPI.    Physical Examination:  General: The patient is a pleasant female in no acute distress.  /73   Pulse 85   Temp 97.9  F (36.6  C) (Oral)   Resp 16   Wt 59.4 kg (131 lb)   SpO2 99%   BMI 23.21 kg/m    HEENT: EOMI, PERRL. Sclerae are anicteric. Oral mucosa is pink and moist with no lesions or thrush.   Lymph: Neck is supple with no lymphadenopathy in the cervical or supraclavicular areas.   Heart: Regular rate and rhythm.   Lungs: Clear to auscultation bilaterally.   GI: Bowel sounds present, soft, nontender with no palpable hepatosplenomegaly or masses.   Extremities: Big toe joint erythema .no discharge, no swelling no lower extremity edema noted bilaterally.   Skin: No rashes, petechiae, or bruising noted on exposed skin.    Laboratory Data:  CBC CMP  results reviewed      Assessment and Plan:    This is a 76-year-old female with    left breast cancer  Triple negative  Stage IIb  Patient of Dr. Mcmullen  01/18-started neoadjuvant chemotherapy with paclitaxel and carboplatin for 12 weeks with addition of pembrolizumab every 3 weeks   4/19/2022 breast MRI showed no residual enhancement -- Dr Mcmullen recommended foregoing chemotherapy with Adriamycin and Cytoxan.  Labs reviewed abnormalities discussed proceed with taxol pembrolizumab  -Give Neulasta on pro today  Plan for surgery on 05 31  Scheduled with Dr. Mcmullen second week of June      Gout flare   Big toe joint  -Check uric acid today  -Discussed treatment option with the pharmacist based on the treatment and mildly elevated kidney enzyme  -Per pharmacy recommendation -colchicine 1.2mg now followed by 0.6 mg 1 hour apart then 0.6 daily for 2 days  Give 1 L NS bolus hydration today      Anemia  Mild continue to monitor closely      Chronic kidney disease  Creatinine at baseline  Continue follow-up with nephrologist        FDG uptake at the anal canal  -Patient wants to wait for anoscopy and referral to colorectal since she is asymptomatic  -Plan to see colorectal after completion of the chemo for breast cancer-patient would like to delay until she is done with surgery    Right renal mass  Management per urology    Depression anxiety  Patient declined appointment with Dr Darin Jimenes  I also discussed referral palliative care  I discussed different coping mechanisms        Call clinic with any changes in health condition or questions        HUBER Damon CNP  [facility] [place]     Chart documentation with Dragon Voice recognition Software. Although reviewed after completion, some words and grammatical errors may remain.            Again, thank you for allowing me to participate in the care of your patient.        Sincerely,        HUBER Damon CNP

## 2022-05-06 NOTE — PROGRESS NOTES
Oncology/Hematology Visit Note  May 6, 2022    Reason for Visit: follow up of left breast cancer  Triple negative  Stage IIb  Patient of Dr. Mcmullen    01/18/2021-started neoadjuvant chemotherapy with paclitaxel and carboplatin for 12 weeks with addition of pembrolizumab every 3 weeks   4/19/2022 breast MRI showed no residual enhancement -- Dr Mcmullen recommended foregoing chemotherapy with Adriamycin and Cytoxan.      Interval History:  Patient reports she noted redness on the big toe is painful especially when she walks.  She denies swelling denies discharge denies fever chills sweats cough shortness of breath chest pain denies nausea vomiting diarrhea pain bleeding      Review of Systems:  14 point ROS of systems including Constitutional, Eyes, Respiratory, Cardiovascular, Gastroenterology, Genitourinary, Integumentary, Muscularskeletal, Psychiatric were all negative except for pertinent positives noted in my HPI.    Physical Examination:  General: The patient is a pleasant female in no acute distress.  /73   Pulse 85   Temp 97.9  F (36.6  C) (Oral)   Resp 16   Wt 59.4 kg (131 lb)   SpO2 99%   BMI 23.21 kg/m    HEENT: EOMI, PERRL. Sclerae are anicteric. Oral mucosa is pink and moist with no lesions or thrush.   Lymph: Neck is supple with no lymphadenopathy in the cervical or supraclavicular areas.   Heart: Regular rate and rhythm.   Lungs: Clear to auscultation bilaterally.   GI: Bowel sounds present, soft, nontender with no palpable hepatosplenomegaly or masses.   Extremities: Big toe joint erythema .no discharge, no swelling no lower extremity edema noted bilaterally.   Skin: No rashes, petechiae, or bruising noted on exposed skin.    Laboratory Data:  CBC CMP results reviewed      Assessment and Plan:    This is a 76-year-old female with    left breast cancer  Triple negative  Stage IIb  Patient of Dr. Mcmullen  01/18-started neoadjuvant chemotherapy with paclitaxel and carboplatin for 12 weeks with  addition of pembrolizumab every 3 weeks   4/19/2022 breast MRI showed no residual enhancement -- Dr Mcmullen recommended foregoing chemotherapy with Adriamycin and Cytoxan.  Labs reviewed abnormalities discussed proceed with taxol pembrolizumab  -Give Neulasta on pro today  Plan for surgery on 05 31  Scheduled with Dr. Mcmullen second week of June and for single agent pembrolizumab infusion      Gout flare   Big toe joint  -Check uric acid today  -Discussed treatment option with the pharmacist based on the treatment and mildly elevated kidney enzyme  -Per pharmacy recommendation -colchicine 1.2mg now followed by 0.6 mg 1 hour apart then 0.6 daily for 2 days  Give 1 L NS bolus hydration today      Hyperuricemia  Secondary to gout   Start allopurinol    Anemia  Mild continue to monitor closely      Chronic kidney disease  Creatinine at baseline  Continue follow-up with nephrologist        FDG uptake at the anal canal  -Patient wants to wait for anoscopy and referral to colorectal since she is asymptomatic  -Plan to see colorectal after completion of the chemo for breast cancer-patient would like to delay until she is done with surgery    Right renal mass  Management per urology    Depression anxiety  Patient declined appointment with Dr Darin Jimenes  I also discussed referral palliative care  I discussed different coping mechanisms        Call clinic with any changes in health condition or questions        HUBER Damon [facility] [place]     Chart documentation with Dragon Voice recognition Software. Although reviewed after completion, some words and grammatical errors may remain.

## 2022-05-06 NOTE — PROGRESS NOTES
Nursing Note:  Jann Davidson presents today for labs.    Patient seen by provider today: Yes: Kailee   present during visit today: Not Applicable.    Note: N/A.    Intravenous Access:  Implanted Port.    Discharge Plan:   Patient was sent to [facility] for next appointment.    Swetha Means RN

## 2022-05-06 NOTE — PROGRESS NOTES
Infusion Nursing Note:  Jann Davidson presents today for C4D8 Keytruda/ Taxol/ OnPro Neulasta.    Patient seen by provider today: Yes: Tadeo Dugan NP   present during visit today: Not Applicable.    Note: Patient arrived to infusion reporting new onset right foot pain of 8/10.  Says the pain started Monday night.  Patient notes some redness and edema on the top of her foot but denies warmth to the touch.     Per check out notes from Tadeo Dugan NP, give 1L NS today, proceed with chemo, treat foot for Gout with 2 home prescriptions.  Add OnPro Neulatsa today.    Patient educated on OnPro Neulasta and verbalizes understanding of how to manage the device at home.  Patient tolerated infusions well today without issue.    ONPRO  Was placed on patient's: left side of abdomen.    Was placed at 1240 PM    Podpal used: Yes    ONPRO injector device Lot number: [id number]    Patient education included: what patient can expect after application, what colored lights mean on the device, when to remove device, when and where to call with questions or issues, all patients questions answered and that Neulasta administration will occur at 15:40 on 5/7/22.    Patient tolerated administration well.      Intravenous Access:  Implanted Port.    Treatment Conditions:  Lab Results   Component Value Date    HGB 9.8 (L) 05/06/2022    WBC 4.4 05/06/2022    ANEU 1.8 05/06/2022    ANEUTAUTO 1.0 (L) 04/12/2022     05/06/2022      Lab Results   Component Value Date     05/06/2022    POTASSIUM 4.3 05/06/2022    MAG 2.1 01/17/2022    CR 1.34 (H) 05/06/2022    AMY 9.0 05/06/2022    BILITOTAL 1.2 05/06/2022    ALBUMIN 3.2 (L) 05/06/2022    ALT 44 05/06/2022    AST 32 05/06/2022     Results reviewed, labs MET treatment parameters, ok to proceed with treatment.      Post Infusion Assessment:  Patient tolerated infusion without incident.  Blood return noted pre and post infusion.  Site patent and intact, free from redness, edema or  discomfort.  No evidence of extravasations.  Access discontinued per protocol.       Discharge Plan:   Patient declined prescription refills.  Discharge instructions reviewed with: Patient.  Patient and/or family verbalized understanding of discharge instructions and all questions answered.  AVS to patient via [email or web address].  Patient will return 6/20/22 to see Dr. Mcmullen and for C5D1 Keytruda for next appointment.   Patient discharged in stable condition accompanied by: self.  Departure Mode: Ambulatory.      Reba Carrion RN

## 2022-05-19 DIAGNOSIS — Z11.59 ENCOUNTER FOR SCREENING FOR OTHER VIRAL DISEASES: Primary | ICD-10-CM

## 2022-05-20 ENCOUNTER — TELEPHONE (OUTPATIENT)
Dept: MAMMOGRAPHY | Facility: CLINIC | Age: 77
End: 2022-05-20
Payer: MEDICARE

## 2022-05-20 ENCOUNTER — NURSE TRIAGE (OUTPATIENT)
Dept: NURSING | Facility: CLINIC | Age: 77
End: 2022-05-20
Payer: MEDICARE

## 2022-05-20 NOTE — TELEPHONE ENCOUNTER
Breast Care Nurse Coordination:  Preoperative call placed to Jann today to assess her needs, and check in on whether she has any questions or concerns regarding her upcoming breast surgery.    Patient was diagnosed with Left Breast Cancer- December, 2021 and is scheduled to have a Localized Left Breast Lumpectomy with Left sentinel lymph node biopsy by Dr. Tatianna Rai on 5/31/22 at the [facility].    Patient had her preop exam with Dr. Real on 5/18/22 and is scheduled for her pre-surgical covid testing on 5/27/22 @ 12:30p.m.    I called patient and left her a voicemail message informing her she can call me back if she has questions or would like to discuss her breast surgery, otherwise to call 's office at [phone].    I will reach out to patient again the week of 5/31/22 when I am back in the office.    Marcia Lyon RN BSN  Breast Care Nurse Coordinator  [facility] [place] [place]  [phone]

## 2022-05-20 NOTE — TELEPHONE ENCOUNTER
"Breast Care Nurse Coordination:  Preoperative call placed to Jann today to assess her needs, and check in on whether she has any questions or concerns regarding her upcoming breast surgery.    Patient was diagnosed with Left Breast Cancer December, 2021 and is scheduled for a Localized Left Breast Lumpectomy with Left sentinel lymph node biopsy by Dr. Tatianna Rai on 5/31/22 at the [facility].    Jann informed me that she had a pre-op scheduled for 5/18/22 with Dr. Real, but had to reschedule it to 5/24/22 because she has not been feeling so well.    Patient states she had her last chemotherapy treatment and Neulasta on 5/6/22.      Jann informed me that she fainted 3 times on 5/16/22, hitting the back of her head and she now has bruises on her legs and arms.  She states she has a small bump at the back of her head and has had a headache this past week, but reports her \"headache is better today\".  States she has taken 2 tylenol tablets and has had some relief.  Patient denies feeling nauseated, denies feeling lightheaded or dizzy, and denies visual changes.  Jann denies having shortness of breathe, denies chest pain or pressure.  Jann reports that she did not notify Dr. Mcmullen or any of her providers of her fainting spells.  She also mentioned that she has struggled with decreased appetite during her chemotherapy, and has had to come in for fluids for dehydration in the past.     Jann states she has been struggling with \"horrible leg cramps\" and having no appetite while receiving chemotherapy treatments. Patient states she has had to make herself push fluids and eat small frequent meals.     Patient is concerned that she may need to push back her surgery date due to her not feeling well.     I advised patient to be seen in the ER today for her leg cramps, weakness, fainting spells, and headaches.  Patient states she \"does not want to sit in the ER on a Friday night for 6 hours waiting to " "be seen, but will go in tomorrow morning\".      Informed patient if her symptoms worsen tonight, she needs to call 911 or be seen in the ER ASAP.      I also advised patient to contact Dr. Mcmullen's office on 5/23/22 to give her an update on how she is feeling. Patient verbalizes understanding and agrees with the plan of care.      Marcia Lyon RN BSN  Breast Nurse Care Coordinator  [facility] [place]  [phone]          "

## 2022-05-21 ENCOUNTER — APPOINTMENT (OUTPATIENT)
Dept: GENERAL RADIOLOGY | Facility: CLINIC | Age: 77
DRG: 073 | End: 2022-05-21
Attending: EMERGENCY MEDICINE
Payer: MEDICARE

## 2022-05-21 ENCOUNTER — HOSPITAL ENCOUNTER (INPATIENT)
Facility: CLINIC | Age: 77
LOS: 2 days | Discharge: HOME | DRG: 073 | End: 2022-05-23
Attending: EMERGENCY MEDICINE | Admitting: INTERNAL MEDICINE
Payer: MEDICARE

## 2022-05-21 ENCOUNTER — APPOINTMENT (OUTPATIENT)
Dept: CT IMAGING | Facility: CLINIC | Age: 77
DRG: 073 | End: 2022-05-21
Attending: EMERGENCY MEDICINE
Payer: MEDICARE

## 2022-05-21 DIAGNOSIS — M79.662 PAIN IN BOTH LOWER LEGS: ICD-10-CM

## 2022-05-21 DIAGNOSIS — D70.1 CHEMOTHERAPY-INDUCED NEUTROPENIA (H): Primary | ICD-10-CM

## 2022-05-21 DIAGNOSIS — T45.1X5A CHEMOTHERAPY-INDUCED NEUTROPENIA (H): Primary | ICD-10-CM

## 2022-05-21 DIAGNOSIS — R51.9 NONINTRACTABLE HEADACHE, UNSPECIFIED CHRONICITY PATTERN, UNSPECIFIED HEADACHE TYPE: ICD-10-CM

## 2022-05-21 DIAGNOSIS — R55 SYNCOPE AND COLLAPSE: ICD-10-CM

## 2022-05-21 DIAGNOSIS — M79.661 PAIN IN BOTH LOWER LEGS: ICD-10-CM

## 2022-05-21 DIAGNOSIS — E87.1 HYPONATREMIA: ICD-10-CM

## 2022-05-21 LAB
ALBUMIN SERPL-MCNC: 2.9 G/DL (ref 3.4–5)
ALBUMIN UR-MCNC: 20 MG/DL
ALP SERPL-CCNC: 410 U/L (ref 40–150)
ALT SERPL W P-5'-P-CCNC: 189 U/L (ref 0–50)
ANION GAP SERPL CALCULATED.3IONS-SCNC: 11 MMOL/L (ref 3–14)
APPEARANCE UR: CLEAR
AST SERPL W P-5'-P-CCNC: 161 U/L (ref 0–45)
ATRIAL RATE - MUSE: 102 BPM
BASOPHILS # BLD AUTO: 0 10E3/UL (ref 0–0.2)
BASOPHILS NFR BLD AUTO: 0 %
BILIRUB SERPL-MCNC: 0.5 MG/DL (ref 0.2–1.3)
BILIRUB UR QL STRIP: NEGATIVE
BUN SERPL-MCNC: 36 MG/DL (ref 7–30)
CALCIUM SERPL-MCNC: 8.6 MG/DL (ref 8.5–10.1)
CHLORIDE BLD-SCNC: 94 MMOL/L (ref 94–109)
CO2 SERPL-SCNC: 21 MMOL/L (ref 20–32)
COLOR UR AUTO: ABNORMAL
CREAT SERPL-MCNC: 1.4 MG/DL (ref 0.52–1.04)
DIASTOLIC BLOOD PRESSURE - MUSE: NORMAL MMHG
EOSINOPHIL # BLD AUTO: 0 10E3/UL (ref 0–0.7)
EOSINOPHIL NFR BLD AUTO: 1 %
ERYTHROCYTE [DISTWIDTH] IN BLOOD BY AUTOMATED COUNT: 13.5 % (ref 10–15)
GFR SERPL CREATININE-BSD FRML MDRD: 39 ML/MIN/1.73M2
GLUCOSE BLD-MCNC: 105 MG/DL (ref 70–99)
GLUCOSE UR STRIP-MCNC: NEGATIVE MG/DL
HCT VFR BLD AUTO: 26.4 % (ref 35–47)
HGB BLD-MCNC: 9 G/DL (ref 11.7–15.7)
HGB UR QL STRIP: NEGATIVE
IMM GRANULOCYTES # BLD: 0.1 10E3/UL
IMM GRANULOCYTES NFR BLD: 2 %
INTERPRETATION ECG - MUSE: NORMAL
KETONES UR STRIP-MCNC: NEGATIVE MG/DL
LEUKOCYTE ESTERASE UR QL STRIP: NEGATIVE
LIPASE SERPL-CCNC: 246 U/L (ref 73–393)
LYMPHOCYTES # BLD AUTO: 0.9 10E3/UL (ref 0.8–5.3)
LYMPHOCYTES NFR BLD AUTO: 15 %
MAGNESIUM SERPL-MCNC: 1.7 MG/DL (ref 1.6–2.3)
MCH RBC QN AUTO: 31.9 PG (ref 26.5–33)
MCHC RBC AUTO-ENTMCNC: 34.1 G/DL (ref 31.5–36.5)
MCV RBC AUTO: 94 FL (ref 78–100)
MONOCYTES # BLD AUTO: 0.4 10E3/UL (ref 0–1.3)
MONOCYTES NFR BLD AUTO: 7 %
NEUTROPHILS # BLD AUTO: 4.4 10E3/UL (ref 1.6–8.3)
NEUTROPHILS NFR BLD AUTO: 75 %
NITRATE UR QL: NEGATIVE
NRBC # BLD AUTO: 0 10E3/UL
NRBC BLD AUTO-RTO: 0 /100
P AXIS - MUSE: 34 DEGREES
PH UR STRIP: 5.5 [PH] (ref 5–7)
PLATELET # BLD AUTO: 138 10E3/UL (ref 150–450)
POTASSIUM BLD-SCNC: 3.2 MMOL/L (ref 3.4–5.3)
POTASSIUM BLD-SCNC: 3.8 MMOL/L (ref 3.4–5.3)
PR INTERVAL - MUSE: 126 MS
PROT SERPL-MCNC: 6.4 G/DL (ref 6.8–8.8)
QRS DURATION - MUSE: 72 MS
QT - MUSE: 314 MS
QTC - MUSE: 409 MS
R AXIS - MUSE: 66 DEGREES
RBC # BLD AUTO: 2.82 10E6/UL (ref 3.8–5.2)
RBC URINE: <1 /HPF
SARS-COV-2 RNA RESP QL NAA+PROBE: NEGATIVE
SODIUM SERPL-SCNC: 126 MMOL/L (ref 133–144)
SODIUM SERPL-SCNC: 128 MMOL/L (ref 133–144)
SODIUM SERPL-SCNC: 129 MMOL/L (ref 133–144)
SP GR UR STRIP: 1.02 (ref 1–1.03)
SQUAMOUS EPITHELIAL: 1 /HPF
SYSTOLIC BLOOD PRESSURE - MUSE: NORMAL MMHG
T AXIS - MUSE: 27 DEGREES
TROPONIN I SERPL HS-MCNC: 4 NG/L
UROBILINOGEN UR STRIP-MCNC: NORMAL MG/DL
VENTRICULAR RATE- MUSE: 102 BPM
WBC # BLD AUTO: 6 10E3/UL (ref 4–11)
WBC URINE: 4 /HPF

## 2022-05-21 PROCEDURE — 250N000013 HC RX MED GY IP 250 OP 250 PS 637: Performed by: INTERNAL MEDICINE

## 2022-05-21 PROCEDURE — 250N000011 HC RX IP 250 OP 636: Performed by: INTERNAL MEDICINE

## 2022-05-21 PROCEDURE — 36415 COLL VENOUS BLD VENIPUNCTURE: CPT | Performed by: EMERGENCY MEDICINE

## 2022-05-21 PROCEDURE — 250N000011 HC RX IP 250 OP 636: Performed by: EMERGENCY MEDICINE

## 2022-05-21 PROCEDURE — G0378 HOSPITAL OBSERVATION PER HR: HCPCS

## 2022-05-21 PROCEDURE — 84132 ASSAY OF SERUM POTASSIUM: CPT | Performed by: INTERNAL MEDICINE

## 2022-05-21 PROCEDURE — U0005 INFEC AGEN DETEC AMPLI PROBE: HCPCS | Performed by: EMERGENCY MEDICINE

## 2022-05-21 PROCEDURE — 93005 ELECTROCARDIOGRAM TRACING: CPT

## 2022-05-21 PROCEDURE — 84484 ASSAY OF TROPONIN QUANT: CPT | Performed by: EMERGENCY MEDICINE

## 2022-05-21 PROCEDURE — 96375 TX/PRO/DX INJ NEW DRUG ADDON: CPT

## 2022-05-21 PROCEDURE — 96376 TX/PRO/DX INJ SAME DRUG ADON: CPT

## 2022-05-21 PROCEDURE — 81001 URINALYSIS AUTO W/SCOPE: CPT | Performed by: EMERGENCY MEDICINE

## 2022-05-21 PROCEDURE — 71046 X-RAY EXAM CHEST 2 VIEWS: CPT

## 2022-05-21 PROCEDURE — 84295 ASSAY OF SERUM SODIUM: CPT | Performed by: INTERNAL MEDICINE

## 2022-05-21 PROCEDURE — 85025 COMPLETE CBC W/AUTO DIFF WBC: CPT | Performed by: EMERGENCY MEDICINE

## 2022-05-21 PROCEDURE — 99223 1ST HOSP IP/OBS HIGH 75: CPT | Mod: AI | Performed by: INTERNAL MEDICINE

## 2022-05-21 PROCEDURE — 80053 COMPREHEN METABOLIC PANEL: CPT | Performed by: EMERGENCY MEDICINE

## 2022-05-21 PROCEDURE — 36415 COLL VENOUS BLD VENIPUNCTURE: CPT | Performed by: INTERNAL MEDICINE

## 2022-05-21 PROCEDURE — C9803 HOPD COVID-19 SPEC COLLECT: HCPCS

## 2022-05-21 PROCEDURE — 96366 THER/PROPH/DIAG IV INF ADDON: CPT

## 2022-05-21 PROCEDURE — 72170 X-RAY EXAM OF PELVIS: CPT

## 2022-05-21 PROCEDURE — 250N000013 HC RX MED GY IP 250 OP 250 PS 637: Performed by: EMERGENCY MEDICINE

## 2022-05-21 PROCEDURE — 258N000003 HC RX IP 258 OP 636: Performed by: INTERNAL MEDICINE

## 2022-05-21 PROCEDURE — 99222 1ST HOSP IP/OBS MODERATE 55: CPT | Performed by: INTERNAL MEDICINE

## 2022-05-21 PROCEDURE — 83690 ASSAY OF LIPASE: CPT | Performed by: EMERGENCY MEDICINE

## 2022-05-21 PROCEDURE — 99291 CRITICAL CARE FIRST HOUR: CPT | Mod: 25

## 2022-05-21 PROCEDURE — 96365 THER/PROPH/DIAG IV INF INIT: CPT

## 2022-05-21 PROCEDURE — G1004 CDSM NDSC: HCPCS

## 2022-05-21 PROCEDURE — 96361 HYDRATE IV INFUSION ADD-ON: CPT

## 2022-05-21 PROCEDURE — 258N000003 HC RX IP 258 OP 636: Performed by: EMERGENCY MEDICINE

## 2022-05-21 PROCEDURE — 83735 ASSAY OF MAGNESIUM: CPT | Performed by: INTERNAL MEDICINE

## 2022-05-21 PROCEDURE — 72100 X-RAY EXAM L-S SPINE 2/3 VWS: CPT

## 2022-05-21 PROCEDURE — 120N000001 HC R&B MED SURG/OB

## 2022-05-21 RX ORDER — ONDANSETRON 2 MG/ML
4 INJECTION INTRAMUSCULAR; INTRAVENOUS EVERY 6 HOURS PRN
Status: DISCONTINUED | OUTPATIENT
Start: 2022-05-21 | End: 2022-05-23 | Stop reason: HOSPADM

## 2022-05-21 RX ORDER — HYDROMORPHONE HCL IN WATER/PF 6 MG/30 ML
0.2 PATIENT CONTROLLED ANALGESIA SYRINGE INTRAVENOUS
Status: DISCONTINUED | OUTPATIENT
Start: 2022-05-21 | End: 2022-05-23 | Stop reason: HOSPADM

## 2022-05-21 RX ORDER — GABAPENTIN 100 MG/1
100 CAPSULE ORAL DAILY
Status: DISCONTINUED | OUTPATIENT
Start: 2022-05-21 | End: 2022-05-22

## 2022-05-21 RX ORDER — NALOXONE HYDROCHLORIDE 0.4 MG/ML
0.4 INJECTION, SOLUTION INTRAMUSCULAR; INTRAVENOUS; SUBCUTANEOUS
Status: DISCONTINUED | OUTPATIENT
Start: 2022-05-21 | End: 2022-05-23 | Stop reason: HOSPADM

## 2022-05-21 RX ORDER — ALLOPURINOL 300 MG/1
300 TABLET ORAL DAILY
Status: DISCONTINUED | OUTPATIENT
Start: 2022-05-21 | End: 2022-05-23 | Stop reason: HOSPADM

## 2022-05-21 RX ORDER — ATORVASTATIN CALCIUM 10 MG/1
10 TABLET, FILM COATED ORAL DAILY
COMMUNITY
End: 2022-08-22

## 2022-05-21 RX ORDER — HYDROMORPHONE HYDROCHLORIDE 1 MG/ML
0.5 INJECTION, SOLUTION INTRAMUSCULAR; INTRAVENOUS; SUBCUTANEOUS ONCE
Status: COMPLETED | OUTPATIENT
Start: 2022-05-21 | End: 2022-05-21

## 2022-05-21 RX ORDER — TRANYLCYPROMINE 10 MG/1
10 TABLET ORAL 3 TIMES DAILY
Status: DISCONTINUED | OUTPATIENT
Start: 2022-05-21 | End: 2022-05-22

## 2022-05-21 RX ORDER — PROCHLORPERAZINE 25 MG
12.5 SUPPOSITORY, RECTAL RECTAL EVERY 12 HOURS PRN
Status: DISCONTINUED | OUTPATIENT
Start: 2022-05-21 | End: 2022-05-23 | Stop reason: HOSPADM

## 2022-05-21 RX ORDER — ACETAMINOPHEN 325 MG/1
650 TABLET ORAL EVERY 6 HOURS PRN
Status: DISCONTINUED | OUTPATIENT
Start: 2022-05-21 | End: 2022-05-23 | Stop reason: HOSPADM

## 2022-05-21 RX ORDER — LORAZEPAM 0.5 MG/1
0.5 TABLET ORAL EVERY 6 HOURS PRN
Status: DISCONTINUED | OUTPATIENT
Start: 2022-05-21 | End: 2022-05-23 | Stop reason: HOSPADM

## 2022-05-21 RX ORDER — ONDANSETRON 4 MG/1
4 TABLET, ORALLY DISINTEGRATING ORAL EVERY 6 HOURS PRN
Status: DISCONTINUED | OUTPATIENT
Start: 2022-05-21 | End: 2022-05-23 | Stop reason: HOSPADM

## 2022-05-21 RX ORDER — DIPHENHYDRAMINE HCL 50 MG
50 CAPSULE ORAL EVERY 6 HOURS PRN
Status: DISCONTINUED | OUTPATIENT
Start: 2022-05-21 | End: 2022-05-23 | Stop reason: HOSPADM

## 2022-05-21 RX ORDER — DEXTROSE MONOHYDRATE, SODIUM CHLORIDE, AND POTASSIUM CHLORIDE 50; 1.49; 4.5 G/1000ML; G/1000ML; G/1000ML
INJECTION, SOLUTION INTRAVENOUS CONTINUOUS
Status: DISCONTINUED | OUTPATIENT
Start: 2022-05-21 | End: 2022-05-22

## 2022-05-21 RX ORDER — LIDOCAINE 40 MG/G
CREAM TOPICAL
Status: DISCONTINUED | OUTPATIENT
Start: 2022-05-21 | End: 2022-05-23 | Stop reason: HOSPADM

## 2022-05-21 RX ORDER — LORATADINE 10 MG/1
10 TABLET ORAL DAILY
COMMUNITY

## 2022-05-21 RX ORDER — ACETAMINOPHEN 650 MG/1
650 SUPPOSITORY RECTAL EVERY 6 HOURS PRN
Status: DISCONTINUED | OUTPATIENT
Start: 2022-05-21 | End: 2022-05-23 | Stop reason: HOSPADM

## 2022-05-21 RX ORDER — AMOXICILLIN 250 MG
2 CAPSULE ORAL 2 TIMES DAILY
Status: DISCONTINUED | OUTPATIENT
Start: 2022-05-21 | End: 2022-05-23 | Stop reason: HOSPADM

## 2022-05-21 RX ORDER — ZOLPIDEM TARTRATE 5 MG/1
5 TABLET ORAL
Status: DISCONTINUED | OUTPATIENT
Start: 2022-05-21 | End: 2022-05-23 | Stop reason: HOSPADM

## 2022-05-21 RX ORDER — SODIUM CHLORIDE 9 MG/ML
INJECTION, SOLUTION INTRAVENOUS CONTINUOUS
Status: DISCONTINUED | OUTPATIENT
Start: 2022-05-21 | End: 2022-05-21

## 2022-05-21 RX ORDER — ACETAMINOPHEN 325 MG/1
975 TABLET ORAL 3 TIMES DAILY
Status: DISCONTINUED | OUTPATIENT
Start: 2022-05-21 | End: 2022-05-22

## 2022-05-21 RX ORDER — POLYETHYLENE GLYCOL 3350 17 G/17G
17 POWDER, FOR SOLUTION ORAL DAILY PRN
Status: DISCONTINUED | OUTPATIENT
Start: 2022-05-21 | End: 2022-05-23 | Stop reason: HOSPADM

## 2022-05-21 RX ORDER — NALOXONE HYDROCHLORIDE 0.4 MG/ML
0.2 INJECTION, SOLUTION INTRAMUSCULAR; INTRAVENOUS; SUBCUTANEOUS
Status: DISCONTINUED | OUTPATIENT
Start: 2022-05-21 | End: 2022-05-23 | Stop reason: HOSPADM

## 2022-05-21 RX ORDER — PROCHLORPERAZINE MALEATE 5 MG
5 TABLET ORAL EVERY 6 HOURS PRN
Status: DISCONTINUED | OUTPATIENT
Start: 2022-05-21 | End: 2022-05-23 | Stop reason: HOSPADM

## 2022-05-21 RX ORDER — DIPHENHYDRAMINE HYDROCHLORIDE 50 MG/ML
50 INJECTION INTRAMUSCULAR; INTRAVENOUS EVERY 6 HOURS PRN
Status: DISCONTINUED | OUTPATIENT
Start: 2022-05-21 | End: 2022-05-23 | Stop reason: HOSPADM

## 2022-05-21 RX ORDER — AMOXICILLIN 250 MG
1 CAPSULE ORAL 2 TIMES DAILY
Status: DISCONTINUED | OUTPATIENT
Start: 2022-05-21 | End: 2022-05-23 | Stop reason: HOSPADM

## 2022-05-21 RX ORDER — POTASSIUM CHLORIDE 1500 MG/1
40 TABLET, EXTENDED RELEASE ORAL ONCE
Status: COMPLETED | OUTPATIENT
Start: 2022-05-21 | End: 2022-05-21

## 2022-05-21 RX ORDER — VALSARTAN 40 MG/1
40 TABLET ORAL DAILY
Status: DISCONTINUED | OUTPATIENT
Start: 2022-05-21 | End: 2022-05-23 | Stop reason: HOSPADM

## 2022-05-21 RX ORDER — ONDANSETRON 2 MG/ML
4 INJECTION INTRAMUSCULAR; INTRAVENOUS ONCE
Status: COMPLETED | OUTPATIENT
Start: 2022-05-21 | End: 2022-05-21

## 2022-05-21 RX ADMIN — OXYCODONE HYDROCHLORIDE 2.5 MG: 5 TABLET ORAL at 10:01

## 2022-05-21 RX ADMIN — ACETAMINOPHEN 975 MG: 325 TABLET ORAL at 16:21

## 2022-05-21 RX ADMIN — DIPHENHYDRAMINE HYDROCHLORIDE 50 MG: 50 CAPSULE ORAL at 22:27

## 2022-05-21 RX ADMIN — ALLOPURINOL 300 MG: 300 TABLET ORAL at 10:00

## 2022-05-21 RX ADMIN — SENNOSIDES AND DOCUSATE SODIUM 2 TABLET: 50; 8.6 TABLET ORAL at 10:00

## 2022-05-21 RX ADMIN — ACETAMINOPHEN 975 MG: 325 TABLET ORAL at 21:43

## 2022-05-21 RX ADMIN — ONDANSETRON 4 MG: 2 INJECTION INTRAMUSCULAR; INTRAVENOUS at 03:04

## 2022-05-21 RX ADMIN — HYDROMORPHONE HYDROCHLORIDE 0.2 MG: 0.2 INJECTION, SOLUTION INTRAMUSCULAR; INTRAVENOUS; SUBCUTANEOUS at 14:12

## 2022-05-21 RX ADMIN — HYDROMORPHONE HYDROCHLORIDE 0.2 MG: 0.2 INJECTION, SOLUTION INTRAMUSCULAR; INTRAVENOUS; SUBCUTANEOUS at 17:10

## 2022-05-21 RX ADMIN — DIPHENHYDRAMINE HYDROCHLORIDE 50 MG: 50 CAPSULE ORAL at 16:21

## 2022-05-21 RX ADMIN — HYDROMORPHONE HYDROCHLORIDE 0.2 MG: 0.2 INJECTION, SOLUTION INTRAMUSCULAR; INTRAVENOUS; SUBCUTANEOUS at 11:23

## 2022-05-21 RX ADMIN — HYDROMORPHONE HYDROCHLORIDE 0.5 MG: 1 INJECTION, SOLUTION INTRAMUSCULAR; INTRAVENOUS; SUBCUTANEOUS at 08:17

## 2022-05-21 RX ADMIN — OXYCODONE HYDROCHLORIDE 2.5 MG: 5 TABLET ORAL at 15:01

## 2022-05-21 RX ADMIN — POTASSIUM CHLORIDE, DEXTROSE MONOHYDRATE AND SODIUM CHLORIDE: 150; 5; 450 INJECTION, SOLUTION INTRAVENOUS at 17:10

## 2022-05-21 RX ADMIN — SODIUM CHLORIDE 1000 ML: 9 INJECTION, SOLUTION INTRAVENOUS at 01:54

## 2022-05-21 RX ADMIN — GABAPENTIN 100 MG: 100 CAPSULE ORAL at 10:01

## 2022-05-21 RX ADMIN — SENNOSIDES AND DOCUSATE SODIUM 1 TABLET: 50; 8.6 TABLET ORAL at 20:00

## 2022-05-21 RX ADMIN — HYDROMORPHONE HYDROCHLORIDE 0.5 MG: 1 INJECTION, SOLUTION INTRAMUSCULAR; INTRAVENOUS; SUBCUTANEOUS at 03:05

## 2022-05-21 RX ADMIN — POTASSIUM CHLORIDE, DEXTROSE MONOHYDRATE AND SODIUM CHLORIDE: 150; 5; 450 INJECTION, SOLUTION INTRAVENOUS at 10:00

## 2022-05-21 RX ADMIN — POTASSIUM CHLORIDE 40 MEQ: 1500 TABLET, EXTENDED RELEASE ORAL at 06:34

## 2022-05-21 ASSESSMENT — ENCOUNTER SYMPTOMS
DIARRHEA: 0
VOMITING: 0
FATIGUE: 1
HEADACHES: 1
APPETITE CHANGE: 1
ARTHRALGIAS: 1
ABDOMINAL PAIN: 1
CHILLS: 1
SLEEP DISTURBANCE: 1

## 2022-05-21 ASSESSMENT — ACTIVITIES OF DAILY LIVING (ADL)
ADLS_ACUITY_SCORE: 37
ADLS_ACUITY_SCORE: 35
ADLS_ACUITY_SCORE: 37
ADLS_ACUITY_SCORE: 37

## 2022-05-21 NOTE — CONSULTS
[facility]    Hematology/Oncology Consult Note      Date of Admission:  5/21/2022  Date of Consult:  05/21/22  Reason for Consult: breast cancer, admitted with severe bilateral leg pain      ASSESSMENT/PLAN:  Jann Davidson is a 76 year old female with:    1) Bilateral leg pain: She attributes it completely to Neulasta, although it was give 2 weeks ago.  She says that she had similar pains with Neupogen in the past, but this is much worse.  The timing seems unusual for Neulasta, but there is no other clear explanation.  She denies numbness or sciatica.  X-ray showed a likely subacute L5 compression fracture, per ortho.  She declined MRI lumbar spine.  No treatment was recommended, per ortho.    -she is on pain medications; she says that the Dilaudid does help  -checking electrolytes and on potassium and magnesium replacement protocol  -if no improvement, may need to consult pain/palliative team on Monday    2) Anxiety: She had stopped her Parnate for 2 days because she says that she felt terribly, so she stopped her medications.    -this was resumed, per hospitalist, and consulted psychiatry  -resumed home medications    3) Triple negative breast cancer: s/p neoadjuvant chemotherapy with carboplatin+paclitaxel+Keytruda.  -surgery planned for 5/31/22  -follow-up with Dr. Mcmullen or Tadeo after discharge    4) Elevated transaminases: could be from chemotherapy or immunotherapy.  She also says that she had been taking a lot of Tylenol due to pain.  She has had elevated transaminases in the past, likely from chemotherapy and then normalized.  -would monitor for now and hold off on steroids    Thank you for the consult.  We will continue to follow.        HISTORY OF PRESENT ILLNESS: Jann Davidson is a 76 year old female who presents with bilateral leg pain.    She is a patient of Dr. Neetu Mcmullen.   Per her last note:    1. 10/26/2021: Mammogram showed calcifications in the left lateral breast;  right breast negative.  2. 11/17/2021: Left diagnostic mammogram showed cluster of pleomorphic calcifications at 4:00, 6 cm from nipple, measuring 1.3 cm.  3. 12/3/2021: Stereotactic left breast biopsy at 4:00, 6 cm from nipple showed grade 2 invasive ductal carcinoma with micropapillary features, extensive lymphovascular invasion present, grade 2-3 focal DCIS, LCIS, ER negative, DC negative, HER-2/maxime FISH negative.  4. 12/10/2021: Breast MRI showed oval mass 2 x 2 x 1.5 cm at 4:00, 6 cm from nipple in left breast reflecting biopsy cavity with post-biopsy changes; prominent 2.5 cm low left level 1 axillary lymph node.  5.  12/28/2021: PET/CT scan showed FDG avid focus in left lower outer breast, hypermetabolic left axillary adenopathy, moderate FDG uptake at level off anus, exophytic right renal 1.1 cm mass, slowly increasing in size since 2016 concerning for growing renal cell carcinoma.  6. 1/18/2022: Started neoadjuvant chemotherapy with weekly paclitaxel + carboplatin and every 3-week pembrolizumab as per KEYNOTE-522 study.    She received C4D8 on carboplatin+paclitaxel+pembrolizumab on 5/6/22.  She received Neulasta on 5/7/22.    Her follow-up MRI on 4/19/22 showed no residual enhancement, so she is going to go on to surgery soon.        REVIEW OF SYSTEMS:   14 point ROS was reviewed and is negative other than as noted above in HPI.       MEDICATIONS:  Current Facility-Administered Medications   Medication     acetaminophen (TYLENOL) tablet 650 mg    Or     acetaminophen (TYLENOL) Suppository 650 mg     acetaminophen (TYLENOL) tablet 975 mg     allopurinol (ZYLOPRIM) tablet 300 mg     dextrose 5% and 0.45% NaCl + KCl 20 mEq/L infusion     diphenhydrAMINE (BENADRYL) capsule 50 mg    Or     diphenhydrAMINE (BENADRYL) injection 50 mg     gabapentin (NEURONTIN) capsule 100 mg     HYDROmorphone (DILAUDID) injection 0.2 mg     lidocaine (LMX4) cream     lidocaine 1 % 0.1-1 mL     LORazepam (ATIVAN) tablet 0.5 mg      melatonin tablet 1 mg     naloxone (NARCAN) injection 0.2 mg    Or     naloxone (NARCAN) injection 0.4 mg    Or     naloxone (NARCAN) injection 0.2 mg    Or     naloxone (NARCAN) injection 0.4 mg     ondansetron (ZOFRAN ODT) ODT tab 4 mg    Or     ondansetron (ZOFRAN) injection 4 mg     oxyCODONE IR (ROXICODONE) half-tab 2.5-5 mg     polyethylene glycol (MIRALAX) Packet 17 g     prochlorperazine (COMPAZINE) injection 5 mg    Or     prochlorperazine (COMPAZINE) tablet 5 mg    Or     prochlorperazine (COMPAZINE) suppository 12.5 mg     senna-docusate (SENOKOT-S/PERICOLACE) 8.6-50 MG per tablet 1 tablet    Or     senna-docusate (SENOKOT-S/PERICOLACE) 8.6-50 MG per tablet 2 tablet     sodium chloride (PF) 0.9% PF flush 3 mL     sodium chloride (PF) 0.9% PF flush 3 mL     tranylcypromine (PARNATE) tablet 10 mg     [Held by provider] valsartan (DIOVAN) tablet 40 mg     zolpidem (AMBIEN) tablet 5 mg         ALLERGIES:  Allergies   Allergen Reactions     Wheat Bran          PAST MEDICAL HISTORY:  Past Medical History:   Diagnosis Date     Hypertension goal BP (blood pressure) < 140/90      Recurrent sinusitis 2013         PAST SURGICAL HISTORY:  Past Surgical History:   Procedure Laterality Date     BREAST BIOPSY, RT/LT      Breat Biopsy RT/LT     COLONOSCOPY  10/03     COLONOSCOPY  2014    Procedure: COLONOSCOPY;  COLONOSCOPY ;  Surgeon: Gaurav Shaffer MD;  Location: [facility]     IR CHEST PORT PLACEMENT > 5 YRS OF AGE  2021     RECONSTRUCT EYELID           SOCIAL HISTORY:  Social History     Socioeconomic History     Marital status:      Spouse name: Not on file     Number of children: Not on file     Years of education: Not on file     Highest education level: Not on file   Occupational History     Not on file   Tobacco Use     Smoking status: Former Smoker     Types: Cigarettes     Quit date: 10/30/1972     Years since quittin.5     Smokeless tobacco: Never Used   Substance and Sexual  "Activity     Alcohol use: Yes     Comment: couple glasses of wine daily      Drug use: No     Sexual activity: Never   Other Topics Concern     Parent/sibling w/ CABG, MI or angioplasty before 65F 55M? Not Asked   Social History Narrative     Not on file     Social Determinants of Health     Financial Resource Strain: Not on file   Food Insecurity: Not on file   Transportation Needs: Not on file   Physical Activity: Not on file   Stress: Not on file   Social Connections: Not on file   Intimate Partner Violence: Not At Risk     Fear of Current or Ex-Partner: No     Emotionally Abused: No     Physically Abused: No     Sexually Abused: No   Housing Stability: Not on file         FAMILY HISTORY:  Family History   Problem Relation Age of Onset     Cancer Mother         uterine     Breast Cancer Mother      Diabetes Paternal Grandmother          PHYSICAL EXAM:  Vital signs:  Temp: 99.5  F (37.5  C) Temp src: Oral BP: 98/67 Pulse: 94   Resp: 20 SpO2: 100 % O2 Device: None (Room air)   Height: 160 cm (5' 3\") Weight: 56.7 kg (125 lb)  Estimated body mass index is 22.14 kg/m  as calculated from the following:    Height as of this encounter: 1.6 m (5' 3\").    Weight as of this encounter: 56.7 kg (125 lb).    GENERAL/CONSTITUTIONAL: No acute distress.  EYES: No scleral icterus.  RESPIRATORY: Clear to auscultation bilaterally. No crackles or wheezing.   CARDIOVASCULAR: Regular rate and rhythm without murmurs, gallops, or rubs.  GASTROINTESTINAL: No tenderness. No guarding.    MUSCULOSKELETAL: Warm and well-perfused, no cyanosis, clubbing, or edema.  NEUROLOGIC: Alert, oriented, answers questions appropriately.  INTEGUMENTARY: No jaundice.      LABS:  CBC RESULTS:   Recent Labs   Lab Test 05/21/22  0153   WBC 6.0   RBC 2.82*   HGB 9.0*   HCT 26.4*   MCV 94   MCH 31.9   MCHC 34.1   RDW 13.5   *       Recent Labs   Lab Test 05/21/22  0633 05/21/22  0153 05/06/22  0811   * 126* 135   POTASSIUM  --  3.2* 4.3   CHLORIDE  " --  94 105   CO2  --  21 25   ANIONGAP  --  11 5   GLC  --  105* 115*   BUN  --  36* 26   CR  --  1.40* 1.34*   AMY  --  8.6 9.0       IMAGING:  CXR 5/21/22:  Right-sided Port-A-Cath terminates over the distal SVC. Heart size is normal. No CHF, lobar consolidation, or effusions. No pneumothorax. Probable left upper lobe calcified granuloma. No acute bony abnormality.    CT head 5/21/22:  IMPRESSION:  1.  Mild parieto-occipital scalp contusion. No acute intracranial hemorrhage or calvarial fracture     2.  Age-related change.    Lumbar spine x-ray 5/21/22:  Five lumbar-type vertebral bodies. Alignment is normal. Slight superior endplate height loss at L5 appears new since 12/28/2021. It is age-indeterminate. Marked loss of disc space height L5-S1 and mild changes at L2-L3 through L4-L5. Mild   lower lumbar facet arthropathy.     Pelvis x-ray 5/21/22:  Given limitations of this single view, no acute bony abnormalities are identified.      Thank you for the opportunity to participate in this patient's care.  Please call with any questions.    Shana Ronquillo MD  Hematology/Oncology  [facility]

## 2022-05-21 NOTE — PROGRESS NOTES
Consult received.    New L5 compression fracture compared to December imaging.  Per chart, no back pain. Some leg pains, per hospitalist does not sound radicular.    Will order MRI to confirm acuity of L5 compression fracture.  No intervention likely.      Van Allen MD  Orthopaedic Spine Surgery  [facility]

## 2022-05-21 NOTE — H&P
[facility]    History and Physical - Hospitalist Service       Date of Admission:  5/21/2022    Assessment & Plan      Jann Davidson is a 76 year old female admitted on 5/21/2022. She presents to the emergency department with complaints of difficulty sleeping for the past 2 nights, and intermittent stabbing and shooting pains in her legs, occasionally arms, lower abdomen.  Also complains of a headache.  Attributes her symptoms to Neulasta auto infusion this past weekend, though also note that she discontinued her Parnate    Frequent falls: Patient with multiple falls Monday evening.  She is unsure if she lost consciousness or not; unclear if this represents syncope, though lower suspicion as compared to orthostasis.  Describes 3 or 4 falls without prodrome of lightheadedness.  History of fatigue and being in bed over the weekend, nausea, as well as elevated creatinine consistent with acute kidney injury and hyponatremia suggestive of volume depletion and associated orthostasis as etiology, though patient believes she might of had low blood sugar, though no prior measurement of such.  -Received 1 L normal saline in the emergency department  -Presenting sodium of 126.  Recheck after 1 L normal saline 129.  Given correction of sodium, initiating D5 half-normal saline at 100/h  -Trend sodium every 6 hours x2.  Goal correction 6 to 8 mEq over 24 hours.  -Orthostatic blood pressure and pulse trend  -Would discontinue triamterene hydrochlorothiazide given history of dehydration, nausea with chemotherapy treatments, and now falls with suspected orthostasis  -Potassium, magnesium replacement protocols for treatment of hypokalemia  -TTE.  Low suspicion for cardiac syncope  -Physical therapy consulted    Paresthesias: Patient reports shooting and stabbing pains in her legs, lower abdomen, occasionally arms.  Attributes this to recent Neulasta does appear to have a new compression fracture of L5 after  frequent falls, though general description of her discomfort is not consistent with sciatica and she has no back pain.  Patient also with a headache and recently self discontinued Parnate, so I question if she is having paresthesias and headache associated with Parnate withdrawal.  This said, patient reports that she had onset of symptoms prior to discontinuing her antidepressant, and she discontinued to make sure that this was not contributing.  -Resume Parnate  -As needed low-dose oxycodone, IV Dilaudid  -Initiating low-dose gabapentin for neuropathic discomfort  -Continue as needed lorazepam    Generalized anxiety disorder:  Depression:  Insomnia:  -Continue prior to admission Ambien 5 at bedtime as needed  -Resume prior to admission Parnate with tyramine controlled diet  -Psychiatry consulted.  Patient currently borderline manic which I anticipate is related to discontinuation of Parnate recently, though also has underlying generalized anxiety disorder as well as relatively recent diagnosis of breast cancer approaching surgery  -Continue as needed lorazepam 0.5 mg every 6 hours for anxiety  -Adding gabapentin 100 mg daily for neuropathic pain    Suspected superior endplate compression fracture at L5: New since 12/28/2021 PET scan.  Multiple falls this week.  Has some shooting pain in her legs, though variable description seems less consistent with sciatica.  Does have increasing alkaline phosphatase to suggest bony injury.  -Spine surgery consult for trauma evaluation.  I do not anticipate procedural intervention as patient does not have significant back pain.    Stage IIb triple negative breast cancer: Primary oncologist is Dr. Mcmullen through [facility].  Paclitaxel and carboplatin as well as Keytruda every 3 weeks.  Resolution as of April 19, 2022 breast MRI.  Approaching surgical date for lumpectomy 5/31.  Hepatic enzyme elevations: Concern for immune related hepatitis with Keytruda  administration.  -Oncology consulted.  Possible steroid taper, though hesitant to initiate at this time as patient appears borderline manic in the setting of self discontinuation of Parnate, underlying generalized anxiety  -Trend CMP    Hypertension:  -Would discontinue triamterene hydrochlorothiazide  -Resume prior to admission candesartan as renal function improves.  Patient had self discontinued this medication, though preference here would be to discontinue diuretic therapy.         Diet:  Regular tyramine controlled diet  DVT Prophylaxis: Pneumatic compression devices for now given described fall risk and scalp hematomas.  If prolonged hospitalization and stable, convert to chemoprophylaxis  George Catheter: Not present  Central Lines: PRESENT     Cardiac Monitoring: None  Code Status:  Full code.  Confirmed with patient on admission    Clinically Significant Risk Factors Present on Admission         # Hyponatremia: Na = 126 mmol/L (Ref range: 133 - 144 mmol/L) on admission, will monitor as appropriate     # Hypoalbuminemia: Albumin = 2.9 g/dL (Ref range: 3.4 - 5.0 g/dL) on admission, will monitor as appropriate          Disposition Plan   Expected Discharge: { TIP  Update expected discharge date and delays and refresh note (tipsheet)     :82063} potentially 5/23/2022 pending psychiatry, oncology evaluations, correction of hyponatremia, and resolution versus symptomatic control of patient's paresthesias.  Patient's agitation and borderline reyes is concerning for me in terms of safe discharge planning, as is description of multiple repeated falls on Monday in a patient who otherwise lives independently  Anticipated discharge location:  Awaiting care coordination huddle  Delays:           The patient's care was discussed with the Patient and Dr. Stephen in the emergency department.    George Suero MD  Hospitalist Service  [facility]  Securely message with the Vocera Web Console  (learn more here)  Text page via [facility] Paging/Directory         ______________________________________________________________________    Chief Complaint   Stabbing pains in the legs and lower abdomen  Inability to sleep for the past 2 nights  3-4 falls Monday night where she believes she may have lost consciousness  Headache    History is obtained from the patient, chart review, discussion with Dr. Stephen in the emergency department    History of Present Illness   Jann Davidson is a 76 year old female who presents to the emergency department for evaluation of stabbing pains in her bilateral legs, lower abdomen, occasionally arms over the past 4 days or so.  She also reports a splitting headache, mostly bifrontal.    Patient has a history of triple negative breast cancer who has responded well to chemotherapy over the past 12 weeks.  She is approaching lumpectomy date 5/31/2022.  Received chemotherapy 2 weeks ago, and this past week, received her Neulasta autoinjector.  Following autoinjection on Friday, patient tells me that she felt fatigued, spent the weekend in bed.  Was not eating and drinking normally secondary to this fatigue.  On Monday, she tells me that she got up from a seated position, walk to her kitchen, and within a minute, fell to the ground.  She does not recall any prodrome associated with this such as lightheadedness or tunnel vision.  She has a history of volume depletion and orthostasis resulting in lightheadedness and near syncope in the past related to her chemotherapy which she has treated with increased oral intake.  She did not have such symptoms this time by her recollection.  She tells me that she laid on the ground, attempted to get up almost immediately, and had several more episodes where she fell to the ground.  She does not recall what she hit or how she landed, though she has bruises on bilateral arms including the dorsal aspect of her distal left arm and the lateral aspect of her  proximal right arm.  She also has bruises on her head at the crown and right occiput.    She tells me that she has not fallen since, does not feel lightheaded now.  She attributed her episodes of falls to the syncope associated with a low blood sugar.  Has not had low blood sugar in the past.    Patient then developed shooting pains in her legs, lower abdomen, occasionally arms.  She tells me these are the same sensations that she had when she was receiving Neupogen, though this resolved when she was taking Claritin with her Neupogen.  This was her first dose of Neulasta, so she attributes all of her symptoms to Neulasta dosing.    Patient has had some medication nonadherence.  Specifically, patient tells me that she has self discontinued her antihypertensive candesartan in the setting of prior historic episode of lightheadedness and volume depletion.  She has continued on triamterene hydrochlorothiazide, however.  With onset of her neuropathic pain, patient tells me that she stopped her Parnate.  This is an old MAO inhibitor dose 3 times daily, and withdrawal symptoms can include anxiety, agitation, headache, paresthesias.  Can also include reyes.  Patient currently appears manic, quite agitated, and has symptoms consistent with Parnate withdrawal.  This said, she tells me that she did not stop this medication until after she developed paresthesias.    Laboratory findings notable for hyponatremia, acute kidney injury, hypokalemia.  All consistent with poor intake.  She tells me that she has nausea and decreased appetite associated with her chemotherapy regimen, and again, decreased intake over the weekend as she felt fatigued following Neulasta autoinjection.    Patient with a serum sodium 126, corrected 129 following 1 L normal saline bolus in the emergency department.  Rate of correction suggests sodium avid state from volume depletion, and would again hint towards orthostasis as cause of patient's multiple falls  on Monday evening.    Also of note, patient with elevated LFTs, increasing from prior values.  Her alkaline phosphatase is elevated and she does have what appears to be an L5 compression fracture new from December.  She does not have any significant pain, and her description of paresthesias is not particularly convincing for sciatica.  Some concern that patient may have immune related hepatitis associated with her Keytruda infusions.  Typically this is treated with a steroid taper while holding Keytruda.  I am very hesitant to initiate steroids with patient's current hypomanic state, and will await oncology evaluation.  Psychiatry has been consulted, patient is aware of this as well.  Optimizing patient's generalized anxiety disorder and treating any potential reyes which patient is currently exhibiting will be beneficial prior to surgical intervention planned for 5/31/2022.    Review of Systems    The 10 point Review of Systems is negative other than noted in the HPI or here.  No fevers    Past Medical History    I have reviewed this patient's medical history and updated it with pertinent information if needed.   Past Medical History:   Diagnosis Date     Hypertension goal BP (blood pressure) < 140/90      Recurrent sinusitis 12/2/2013       Past Surgical History   I have reviewed this patient's surgical history and updated it with pertinent information if needed.  Past Surgical History:   Procedure Laterality Date     BREAST BIOPSY, RT/LT      Breat Biopsy RT/LT     COLONOSCOPY  10/03     COLONOSCOPY  4/8/2014    Procedure: COLONOSCOPY;  COLONOSCOPY ;  Surgeon: Gaurav Shaffer MD;  Location: [facility]     IR CHEST PORT PLACEMENT > 5 YRS OF AGE  12/29/2021     RECONSTRUCT EYELID         Social History   I have reviewed this patient's social history and updated it with pertinent information if needed.  Social History     Tobacco Use     Smoking status: Former Smoker     Types: Cigarettes     Quit date:  10/30/1972     Years since quittin.5     Smokeless tobacco: Never Used   Substance Use Topics     Alcohol use: Yes     Comment: couple glasses of wine daily      Drug use: No       Family History   I have reviewed this patient's family history and updated it with pertinent information if needed.  Family History   Problem Relation Age of Onset     Cancer Mother         uterine     Breast Cancer Mother      Diabetes Paternal Grandmother        Prior to Admission Medications   Prior to Admission Medications   Prescriptions Last Dose Informant Patient Reported? Taking?   LORazepam (ATIVAN) 0.5 MG tablet   No No   Sig: Take 1 tablet (0.5 mg) by mouth every 6 hours as needed for anxiety, nausea or sleep   Patient not taking: No sig reported   Magnesium 400 MG TABS   Yes No   Sig: Take 400 mg by mouth   PARNATE 10 MG tablet   No No   Sig: TAKE 1 TABLET BY MOUTH 3 TIMES DAILY   allopurinol (ZYLOPRIM) 300 MG tablet   No No   Sig: Take 1 tablet (300 mg) by mouth daily   candesartan (ATACAND) 4 MG tablet   No No   Sig: Take 0.5 tablets (2 mg) by mouth 2 times daily DISPENSE AS WRITTEN, Brand name only   Patient not taking: No sig reported   colchicine (COLCYRS) 0.6 MG tablet   No No   Sig: Take 1.2 mg now followed by 0.6 mg 1 hour apart then 0.6 mg daily for 2 days   oxazepam (SERAX) 15 MG capsule   No No   Sig: Take 1 capsule (15 mg) by mouth nightly as needed for anxiety Reduced refill amt: - call [phone] to schedule appointment/fasting labs   polyethylene glycol (MIRALAX/GLYCOLAX) packet   Yes No   Sig: Take 1 packet by mouth daily   triamcinolone (KENALOG) 0.1 % external cream   No No   Sig: Apply  topically 2 times daily as needed.   triamterene-HCTZ (MAXZIDE-25) 37.5-25 MG tablet   No No   Sig: TAKE 1 TABLET BY MOUTH EVERY MORNING   zolpidem (AMBIEN) 5 MG tablet   No No   Sig: Take 1 tablet (5 mg) by mouth nightly as needed for sleep Reduced refill amt: - call [phone] to schedule appointment/fasting labs       Facility-Administered Medications: None     Allergies   Allergies   Allergen Reactions     Wheat Bran        Physical Exam   Vital Signs: Temp: 98.3  F (36.8  C) Temp src: Oral BP: 129/61 Pulse: 105   Resp: 17 SpO2: 99 % O2 Device: None (Room air)    Weight: 125 lbs 0 oz    General Appearance: Thin, restless and agitated 76-year-old female.  Appears older than stated age.  Eyes: No scleral icterus or injection.  HEENT: Patient with some soft tissue ecchymosis and bruising involving crown and right posterior scalp  Respiratory: Breath sounds are clear bilateral to auscultation without wheezes or crackles.  Cardiovascular: Patient is tachycardic in the 100 range.  Regular rhythm  GI: Abdomen thin, soft, nontender palpation.  No palpable mass.  Lymph/Hematologic: No lower extremity edema  Genitourinary: Not examined  Skin: Somewhat pallorous.  Ecchymoses on bilateral arms  Musculoskeletal: Diffuse muscular wasting and subcutaneous fat loss.  This is quite notable in face, chest, thighs  Neurologic: Patient is alert and conversant.  Able to provide a history.  Moves all extremities without difficulty.    Psychiatric: Patient is agitated, restless.  Somewhat tangential with pressured speech.  Attends appropriately to provider at bedside, though concern for reyes versus hypomania with patient's degree of agitation    Data   Data reviewed today: I reviewed all medications, new labs and imaging results over the last 24 hours. I personally reviewed no images or EKG's today.    Recent Labs   Lab 05/21/22  0633 05/21/22  0153   WBC  --  6.0   HGB  --  9.0*   MCV  --  94   PLT  --  138*   * 126*   POTASSIUM  --  3.2*   CHLORIDE  --  94   CO2  --  21   BUN  --  36*   CR  --  1.40*   ANIONGAP  --  11   AMY  --  8.6   GLC  --  105*   ALBUMIN  --  2.9*   PROTTOTAL  --  6.4*   BILITOTAL  --  0.5   ALKPHOS  --  410*   ALT  --  189*   AST  --  161*   LIPASE  --  246

## 2022-05-21 NOTE — ED TRIAGE NOTES
On treatment for Breast Cancer. Multiple complains at this time. Reports increased leg pain for 3 days; states she feels it is from Neulasta infusion she received 2 weeks ago. Also a migraine headache, multiple syncopal episodes on Monday, generalized body aches and subjective fever.      Hx. Of HTN, HLD, gout     Triage Assessment     Row Name 05/21/22 0012       Triage Assessment (Adult)    Airway WDL WDL       Respiratory WDL    Respiratory WDL X;rhythm/pattern    Rhythm/Pattern, Respiratory tachypneic       Skin Circulation/Temperature WDL    Skin Circulation/Temperature WDL WDL       Cardiac WDL    Cardiac WDL WDL       Peripheral/Neurovascular WDL    Peripheral Neurovascular WDL WDL       Cognitive/Neuro/Behavioral WDL    Cognitive/Neuro/Behavioral WDL X;mood/behavior    Mood/Behavior anxious;restless;sad

## 2022-05-21 NOTE — ED NOTES
"[facility]  ED Nurse Handoff Report    ED Chief complaint: Leg Pain      ED Diagnosis:   Final diagnoses:   Hyponatremia   Syncope and collapse   Pain in both lower legs   Nonintractable headache, unspecified chronicity pattern, unspecified headache type       Code Status: Hospitalist to assess    Allergies:   Allergies   Allergen Reactions     Wheat Bran        Patient Story: Patient currently undergoing round of chemo for breast cancer, had a different WBC-stimulating medication given to her which is causing intense leg discomfort. She also complains that food tastes like sawdust and isn't appealing. This, coupled with pain, results in patient not eating/drinking much and feeling fatigued. She reports several syncopal episodes in recent past.  Focused Assessment:  Tachycardic, cold/shivering, anxious, in pain.    Treatments and/or interventions provided: Given IV fluids, Zofran and Dilaudid.  Patient's response to treatments and/or interventions: Pain improved, patient able to converse better with RN.    To be done/followed up on inpatient unit:  Release orders    Does this patient have any cognitive concerns?: None    Activity level - Baseline/Home:  Independent  Activity Level - Current:   Stand with Assist    Patient's Preferred language: English   Needed?: No    Isolation: None  Infection: Not Applicable  Patient tested for COVID 19 prior to admission: YES  Bariatric?: No    Vital Signs:   Vitals:    05/21/22 0012   BP: 129/61   Pulse: 106   Resp: 25   Temp: 98.3  F (36.8  C)   TempSrc: Oral   SpO2: 98%   Weight: 56.7 kg (125 lb)   Height: 1.6 m (5' 3\")       Cardiac Rhythm:     Was the PSS-3 completed:   Yes  What interventions are required if any?               Family Comments: N/A  OBS brochure/video discussed/provided to patient/family: Yes              Name of person given brochure if not patient:               Relationship to patient:     For the majority of the shift this " patient's behavior was Green.   Behavioral interventions performed were None.    ED NURSE PHONE NUMBER: [id number]  [phone]

## 2022-05-21 NOTE — PROGRESS NOTES
RECEIVING UNIT ED HANDOFF REVIEW    ED Nurse Handoff Report was reviewed by: Elda Craven RN on May 21, 2022 at 2:27 PM

## 2022-05-21 NOTE — CONSULTS
Orthopedic Surgery Consult / History and Physical    Jann Davidson MRN# [id number]   Age: 76 year old YOB: 1945     Date of Admission:   5/21/2022  Date of Consult: 05/21/22   Location:    [facility]             Assessment and Plan:   Assessment:  Likely subacute L5 compression fracture.  No neurologic involvement, no back pain.     Plan:  1. Patient declined MRI lumbar spine for evaluation of L5 fracture acuity.  She does not think her lower extremity symptoms are coming from her back as she does not have any radicular component extending from her back.  2. No treatment necessary.             Chief Complaint:   Bilateral lower extremity pain           History of Present Illness:   This patient is a 76 year old female with a significant past medical history of breast cancer who presents with migratory pain in the bilateral lower extremities and upper extremity, torso.    Reports that her recent medication infusion has resulted in the past with significant myalgias across her entire body.  She had a recent infusion which resulted in his myalgias across the lower extremities.  Has had multiple falls.    Reports no focal low back pain.  No pain radiating from the back.  Bilateral lower extremity migratory pains, nondermatomal distributions.            Past Medical History:     Past Medical History:   Diagnosis Date     Hypertension goal BP (blood pressure) < 140/90      Recurrent sinusitis 12/2/2013            Past Surgical History:     Past Surgical History:   Procedure Laterality Date     BREAST BIOPSY, RT/LT      Breat Biopsy RT/LT     COLONOSCOPY  10/03     COLONOSCOPY  4/8/2014    Procedure: COLONOSCOPY;  COLONOSCOPY ;  Surgeon: Gaurav Shaffer MD;  Location:  GI     IR CHEST PORT PLACEMENT > 5 YRS OF AGE  12/29/2021     RECONSTRUCT EYELID              Social History:   Smoking: Non-smoker      Social History     Tobacco Use     Smoking status: Former Smoker      Types: Cigarettes     Quit date: 10/30/1972     Years since quittin.5     Smokeless tobacco: Never Used   Substance Use Topics     Alcohol use: Yes     Comment: couple glasses of wine daily             Family History:   Denies family history of bleeding or clotting disorders  Family History   Problem Relation Age of Onset     Cancer Mother         uterine     Breast Cancer Mother      Diabetes Paternal Grandmother             Allergies:     Allergies   Allergen Reactions     Wheat Bran             Medications:   Anticoagulants:    (Not in a hospital admission)            Review of Systems:   A 10 point review of systems was performed, and was negative except as noted in HPI.         Physical Exam:     Patient Vitals for the past 8 hrs:   BP Pulse Resp SpO2   22 0822 116/76 110 17 96 %   22 0600 -- 105 17 99 %   22 0515 -- 99 15 99 %       General: awake, alert, cooperative, no apparent distress, appears stated age  HEENT: normal  Respiratory: breathing non-labored  Cardiovascular: skin warm and well perfused  Skin: no rashes or lesions  Abdomen:  non-distended  Lymph:  No abnormal swelling of uninjured extremities  Heme:  No petechiae  Neurological: CN II-XII grossly intact  Musculoskeletal:       Spine:   Skin: intact over lumbar spine without evidence of infection    Sensation:      R       L    L3:   Intact   Intact    L4:   Intact   Intact    L5:   Intact   Intact    S1:   Intact   Intact     Motor:     R L    L3: Psoas    5  5    L4:   Quad    5  5    L4: TA   5 5    L5: EHL    5  5    S1: Eversion/PF  5  5               Data:   X-ray lumbar spine: Very mild L5 compression fracture.  No apparent burst morphology.      Total time of this encounter, including non-face-to-face time was 15 minutes.         Van Allen MD  Orthopaedic Spine Surgery  [facility]

## 2022-05-21 NOTE — TELEPHONE ENCOUNTER
"Caller reports that she developed increased  leg pain  3 days ago; states she feels it is from  Neulasta  infusion she received 2 weeks ago 05/06. Pain is severe and unrelieved with  Tylenol and Claritin   Spoke with care coordinator earlier  today  Who advised ED  but patient is refusing because she does not want to \"sit in ED for 6 hours\"   Triage protocol revieed   Advised  2 LT with on call provider   Yg Owens via  @ 9:10 PM  Caller returned an advised ED assessment as  Pain this much later is  Unusual and other sources of pain need to be ruled out.   Caller notified and will comply and go to [facility] ED   Onelia Rodriguez RN  FNA                  Reason for Disposition    Patient sounds very sick or weak to the triager    Additional Information    Negative: Looks like a broken bone or dislocated joint (e.g., crooked or deformed)    Negative: Sounds like a life-threatening emergency to the triager    Negative: Followed a leg injury    Negative: Leg swelling is main symptom    Negative: Back pain radiating (shooting) into leg(s)    Negative: Knee pain is main symptom    Negative: Ankle pain is main symptom    Negative: Pregnant    Negative: Postpartum (from 0 to 6 weeks after delivery)    Negative: Chest pain    Negative: Difficulty breathing    Negative: Entire foot is cool or blue in comparison to other side    Negative: Unable to walk    Protocols used: LEG PAIN-A-AH      "

## 2022-05-21 NOTE — PHARMACY-ADMISSION MEDICATION HISTORY
Pharmacy Medication History  Admission medication history interview status for the 5/21/2022  admission is complete. See EPIC admission navigator for prior to admission medications     Location of Interview: Patient room  Medication history sources: Patient, Surescripts and Care Everywhere    Significant changes made to the medication list:  Added: Claritin  Changed:   --pt reports taking 0.5 tablet of triamterene/HCTZ vs 1 tablet daily  --pt reports taking Parnate 30 mg daily vs 10 mg TID. Does this because it is difficult to take TID.     In the past week, patient estimated taking medication this percent of the time: greater than 90%    Medication reconciliation completed by provider prior to medication history? No    Time spent in this activity: 10 min    Prior to Admission medications    Medication Sig Last Dose Taking? Auth Provider   allopurinol (ZYLOPRIM) 300 MG tablet Take 1 tablet (300 mg) by mouth daily 5/20/2022 at Unknown time Yes Tadeo Dugan APRN CNP   atorvastatin (LIPITOR) 10 MG tablet Take 10 mg by mouth daily 5/20/2022 at Unknown time Yes Unknown, Entered By History   candesartan (ATACAND) 4 MG tablet Take 0.5 tablets (2 mg) by mouth 2 times daily DISPENSE AS WRITTEN, Brand name only 5/20/2022 at Unknown time Yes Mehran Oliva MD   colchicine (COLCYRS) 0.6 MG tablet Take 1.2 mg now followed by 0.6 mg 1 hour apart then 0.6 mg daily for 2 days  Patient taking differently: as needed (gout flares) Take 1.2 mg now followed by 0.6 mg 1 hour apart then 0.6 mg daily for 2 days Past Week at Unknown time Yes Tadeo Dugan APRN CNP   loratadine (CLARITIN) 10 MG tablet Take 10 mg by mouth daily 5/20/2022 at Unknown time Yes Unknown, Entered By History   LORazepam (ATIVAN) 0.5 MG tablet Take 1 tablet (0.5 mg) by mouth every 6 hours as needed for anxiety, nausea or sleep More than a month at Unknown time Yes Tadeo Dugan APRN CNP   Magnesium 400 MG TABS Take 800 mg by mouth daily 5/20/2022 at Unknown time  Yes Reported, Patient   oxazepam (SERAX) 15 MG capsule Take 1 capsule (15 mg) by mouth nightly as needed for anxiety Reduced refill amt: - call [phone] to schedule appointment/fasting labs Past Week at Unknown time Yes Mehran Oliva MD   PARNATE 10 MG tablet TAKE 1 TABLET BY MOUTH 3 TIMES DAILY  Patient taking differently: Take 10 mg by mouth 3 times daily 5/20/2022 at Unknown time Yes Mehran Oliva MD   polyethylene glycol (MIRALAX/GLYCOLAX) packet Take 1 packet by mouth daily 5/20/2022 at Unknown time Yes Reported, Patient   triamcinolone (KENALOG) 0.1 % external cream Apply  topically 2 times daily as needed. More than a month at Unknown time Yes Mehran Oliva MD   triamterene-HCTZ (MAXZIDE-25) 37.5-25 MG tablet TAKE 1 TABLET BY MOUTH EVERY MORNING  Patient taking differently: Take 0.5 tablets by mouth daily 5/20/2022 at Unknown time Yes Mehran Oliva MD   zolpidem (AMBIEN) 5 MG tablet Take 1 tablet (5 mg) by mouth nightly as needed for sleep Reduced refill amt: - call [phone] to schedule appointment/fasting labs Past Week at Unknown time Yes Mehran Oliva MD       The information provided in this note is only as accurate as the sources available at the time of update(s)

## 2022-05-21 NOTE — ED NOTES
Patient c/o back of head pain (from syncopal episode and head strike), abdominal pain, leg pain from Neulasta. Patient tachypneic, very anxious.

## 2022-05-21 NOTE — ED PROVIDER NOTES
History   Chief Complaint:  Leg Pain       The history is provided by the patient.      Jann Davidson is a 76 year old female with history of breast cancer, hypertension, and CKD who presents with fatigue and bilateral leg pain. Two weeks ago, the patient had her last round of neoadjuvant chemotherapy with paclitaxel. She also has been receiving Neulasta and has been becoming progressively more fatigued and having arthralgias in her legs which she describes as a stabbing sensation. Her provider told her that one of the side effects of the Neulasta injections is bone pain but when she spoke with the nursing line they felt her side effects have been progressing more than they would expect. She lives alone and notes that she was not eating for a few days and five days ago had multiple syncopal episodes. At home, she has been feeling fatigued and is having posterior head pain, abdominal pain, and chills. She has been unable to sleep consistently due to her pain. She denies any urinary symptoms, vomiting, or diarrhea.         Review of Systems   Constitutional: Positive for appetite change, chills and fatigue.   Gastrointestinal: Positive for abdominal pain. Negative for diarrhea and vomiting.   Genitourinary: Negative.    Musculoskeletal: Positive for arthralgias (bilateral leg pain).   Neurological: Positive for syncope and headaches.   Psychiatric/Behavioral: Positive for sleep disturbance.   All other systems reviewed and are negative.        Allergies:  Wheat Bran    Medications:  Allopurinol  Candesartan   Colchicine  Ativan   Miralax  Serax  Parnate  Maxzide  Ambien     Past Medical History:     Hypertension   Recurrent sinusitis   Breast cancer   CKD   Anemia   Right renal mass  Depression   Anxiety   Gout    Past Surgical History:    Bilateral breast biopsies  Colonoscopy   Chest port placement   Eyelid reconstruction      Family History:    Mother: uterine cancer, breast cancer    Social History:  The patient  "presents to the ED alone. She lives alone.       Physical Exam     Patient Vitals for the past 24 hrs:   BP Temp Temp src Pulse Resp SpO2 Height Weight   05/21/22 0012 129/61 98.3  F (36.8  C) Oral 106 25 98 % 1.6 m (5' 3\") 56.7 kg (125 lb)       Physical Exam    GENERAL: well developed, pleasant, appears anxious  HEAD: atraumatic  EYES: pupils reactive, extraocular muscles intact, conjunctivae normal  ENT:  mucus membranes moist  NECK:  trachea midline, normal range of motion  RESPIRATORY: no tachypnea, breath sounds clear to auscultation   CVS: normal S1/S2, no murmurs, intact distal pulses  ABDOMEN: soft, nontender, nondistention, no tenderness in the right upper quadrant or epigastric region  MUSCULOSKELETAL: no deformities, no midline back pain in the thoracic or lumbar spine, able to do straight leg raise bilaterally without reproducing any sciatica type symptomatology, able to take hips and knees through flexion as well as internal and external rotation of the hips and axial loading of the femur into the pelvis without pain  SKIN: warm and dry, no acute rashes or ulceration, bruising to the left elbow and contusion to the head  NEURO: GCS 15, cranial nerves intact, alert and oriented x3  PSYCH: Appears anxious at times        Emergency Department Course   ECG  ECG taken at 0122, ECG read at 0124  Sinus tachycardia. Otherwise normal ECG.    Rate 102 bpm. NJ interval 126 ms. QRS duration 72 ms. QT/QTc 314/409 ms. P-R-T axes 34 66 27.       Imaging:  XR Pelvis 1/2 Views   Final Result   IMPRESSION: Given limitations of this single view, no acute bony abnormalities are identified.      Lumbar spine XR, 2-3 views   Final Result   IMPRESSION: Five lumbar-type vertebral bodies. Alignment is normal. Slight superior endplate height loss at L5 appears new since 12/28/2021. It is age-indeterminate. Marked loss of disc space height L5-S1 and mild changes at L2-L3 through L4-L5. Mild    lower lumbar facet arthropathy.    "      CT Head w/o Contrast   Final Result   IMPRESSION:   1.  Mild parieto-occipital scalp contusion. No acute intracranial hemorrhage or calvarial fracture      2.  Age-related change.      XR Chest 2 Views   Final Result   IMPRESSION: Right-sided Port-A-Cath terminates over the distal SVC. Heart size is normal. No CHF, lobar consolidation, or effusions. No pneumothorax. Probable left upper lobe calcified granuloma. No acute bony abnormality.        Report per radiology    Laboratory:  Labs Ordered and Resulted from Time of ED Arrival to Time of ED Departure   COMPREHENSIVE METABOLIC PANEL - Abnormal       Result Value    Sodium 126 (*)     Potassium 3.2 (*)     Chloride 94      Carbon Dioxide (CO2) 21      Anion Gap 11      Urea Nitrogen 36 (*)     Creatinine 1.40 (*)     Calcium 8.6      Glucose 105 (*)     Alkaline Phosphatase 410 (*)      (*)      (*)     Protein Total 6.4 (*)     Albumin 2.9 (*)     Bilirubin Total 0.5      GFR Estimate 39 (*)    ROUTINE UA WITH MICROSCOPIC REFLEX TO CULTURE - Abnormal    Color Urine Light Yellow      Appearance Urine Clear      Glucose Urine Negative      Bilirubin Urine Negative      Ketones Urine Negative      Specific Gravity Urine 1.016      Blood Urine Negative      pH Urine 5.5      Protein Albumin Urine 20  (*)     Urobilinogen Urine Normal      Nitrite Urine Negative      Leukocyte Esterase Urine Negative      RBC Urine <1      WBC Urine 4      Squamous Epithelials Urine 1     CBC WITH PLATELETS AND DIFFERENTIAL - Abnormal    WBC Count 6.0      RBC Count 2.82 (*)     Hemoglobin 9.0 (*)     Hematocrit 26.4 (*)     MCV 94      MCH 31.9      MCHC 34.1      RDW 13.5      Platelet Count 138 (*)     % Neutrophils 75      % Lymphocytes 15      % Monocytes 7      % Eosinophils 1      % Basophils 0      % Immature Granulocytes 2      NRBCs per 100 WBC 0      Absolute Neutrophils 4.4      Absolute Lymphocytes 0.9      Absolute Monocytes 0.4      Absolute  Eosinophils 0.0      Absolute Basophils 0.0      Absolute Immature Granulocytes 0.1      Absolute NRBCs 0.0     LIPASE - Normal    Lipase 246     TROPONIN I - Normal    Troponin I High Sensitivity 4     COVID-19 VIRUS (CORONAVIRUS) BY PCR - Normal    SARS CoV2 PCR Negative     SODIUM        Procedures      Emergency Department Course:             Reviewed:  I reviewed nursing notes, vitals and past medical history    Assessments:  0055 I obtained history and examined the patient as noted above.   0315 I rechecked the patient and explained findings.     Consults:  0332 I spoke with Dr. Suero, hospitalist, who agreed to accept the patient.     Interventions:  0154 NS 1L IV Bolus  0304 Zofran 4 mg IV  0305 Dilaudid 0.5 mg IV    Disposition:  The patient was admitted to the hospital under the care of Dr. Suero.     Impression & Plan     CMS Diagnoses: None    Medical Decision Making:    Patient presents with several complaints most notably intermittent shooting pains in both legs, syncopal episode that she had prodrome causing bruising to her arms and head.  CT head is negative for injury.  Arm she is able to go through range of motion without pain to warrant imaging of her bruises.  Does sound to be likely vasovagal in nature.  Patient sodium was slightly low.  Patient notes that she has these random sporadic pains in her legs although has a benign spine exam and leg exam.  Patient notes she has had something similar to this in the past when getting Neupogen.  She feels that the last dose was a stronger or more intense dose than she has had in the past.  Patient lives alone.  Spoke with the hospitalist regarding admission for observation.    Critical Care Time: was 0 minutes for this patient excluding procedures    Diagnosis:    ICD-10-CM    1. Hyponatremia  E87.1    2. Syncope and collapse  R55    3. Pain in both lower legs  M79.661     M79.662    4. Nonintractable headache, unspecified chronicity pattern, unspecified  headache type  R51.9        Scribe Disclosure:  I, Reba Hector, am serving as a scribe at 12:50 AM on 5/21/2022 to document services personally performed by Galo Stephen MD based on my observations and the provider's statements to me.              Galo Stephen MD  05/21/22 0532

## 2022-05-22 LAB
ALBUMIN SERPL-MCNC: 2.7 G/DL (ref 3.4–5)
ALP SERPL-CCNC: 315 U/L (ref 40–150)
ALT SERPL W P-5'-P-CCNC: 132 U/L (ref 0–50)
ANION GAP SERPL CALCULATED.3IONS-SCNC: 6 MMOL/L (ref 3–14)
AST SERPL W P-5'-P-CCNC: 109 U/L (ref 0–45)
BILIRUB SERPL-MCNC: 0.5 MG/DL (ref 0.2–1.3)
BUN SERPL-MCNC: 23 MG/DL (ref 7–30)
CALCIUM SERPL-MCNC: 8.1 MG/DL (ref 8.5–10.1)
CHLORIDE BLD-SCNC: 100 MMOL/L (ref 94–109)
CO2 SERPL-SCNC: 22 MMOL/L (ref 20–32)
CREAT SERPL-MCNC: 1.21 MG/DL (ref 0.52–1.04)
ERYTHROCYTE [DISTWIDTH] IN BLOOD BY AUTOMATED COUNT: 14.2 % (ref 10–15)
GFR SERPL CREATININE-BSD FRML MDRD: 46 ML/MIN/1.73M2
GLUCOSE BLD-MCNC: 111 MG/DL (ref 70–99)
HCT VFR BLD AUTO: 23.6 % (ref 35–47)
HGB BLD-MCNC: 8 G/DL (ref 11.7–15.7)
MAGNESIUM SERPL-MCNC: 1.7 MG/DL (ref 1.6–2.3)
MCH RBC QN AUTO: 31.9 PG (ref 26.5–33)
MCHC RBC AUTO-ENTMCNC: 33.9 G/DL (ref 31.5–36.5)
MCV RBC AUTO: 94 FL (ref 78–100)
PLATELET # BLD AUTO: 206 10E3/UL (ref 150–450)
POTASSIUM BLD-SCNC: 4.3 MMOL/L (ref 3.4–5.3)
PROT SERPL-MCNC: 6 G/DL (ref 6.8–8.8)
RBC # BLD AUTO: 2.51 10E6/UL (ref 3.8–5.2)
SODIUM SERPL-SCNC: 128 MMOL/L (ref 133–144)
SODIUM SERPL-SCNC: 128 MMOL/L (ref 133–144)
WBC # BLD AUTO: 5.2 10E3/UL (ref 4–11)

## 2022-05-22 PROCEDURE — 250N000013 HC RX MED GY IP 250 OP 250 PS 637: Performed by: INTERNAL MEDICINE

## 2022-05-22 PROCEDURE — 84295 ASSAY OF SERUM SODIUM: CPT | Performed by: INTERNAL MEDICINE

## 2022-05-22 PROCEDURE — 999N000147 HC STATISTIC PT IP EVAL DEFER

## 2022-05-22 PROCEDURE — 83735 ASSAY OF MAGNESIUM: CPT | Performed by: INTERNAL MEDICINE

## 2022-05-22 PROCEDURE — 250N000011 HC RX IP 250 OP 636: Performed by: INTERNAL MEDICINE

## 2022-05-22 PROCEDURE — 85027 COMPLETE CBC AUTOMATED: CPT | Performed by: INTERNAL MEDICINE

## 2022-05-22 PROCEDURE — 120N000001 HC R&B MED SURG/OB

## 2022-05-22 PROCEDURE — 99232 SBSQ HOSP IP/OBS MODERATE 35: CPT | Performed by: INTERNAL MEDICINE

## 2022-05-22 PROCEDURE — 99233 SBSQ HOSP IP/OBS HIGH 50: CPT | Performed by: INTERNAL MEDICINE

## 2022-05-22 PROCEDURE — 80053 COMPREHEN METABOLIC PANEL: CPT | Performed by: INTERNAL MEDICINE

## 2022-05-22 RX ORDER — DOCUSATE SODIUM 100 MG/1
100 CAPSULE, LIQUID FILLED ORAL 2 TIMES DAILY
Status: DISCONTINUED | OUTPATIENT
Start: 2022-05-22 | End: 2022-05-23 | Stop reason: HOSPADM

## 2022-05-22 RX ORDER — TRANYLCYPROMINE 10 MG/1
10 TABLET ORAL DAILY
Status: DISCONTINUED | OUTPATIENT
Start: 2022-05-23 | End: 2022-05-22

## 2022-05-22 RX ORDER — TRANYLCYPROMINE 10 MG/1
30 TABLET ORAL DAILY
Status: DISCONTINUED | OUTPATIENT
Start: 2022-05-23 | End: 2022-05-23 | Stop reason: HOSPADM

## 2022-05-22 RX ORDER — PREGABALIN 25 MG/1
25 CAPSULE ORAL 3 TIMES DAILY
Status: DISCONTINUED | OUTPATIENT
Start: 2022-05-22 | End: 2022-05-23 | Stop reason: HOSPADM

## 2022-05-22 RX ORDER — SODIUM CHLORIDE AND POTASSIUM CHLORIDE 150; 900 MG/100ML; MG/100ML
INJECTION, SOLUTION INTRAVENOUS CONTINUOUS
Status: DISCONTINUED | OUTPATIENT
Start: 2022-05-22 | End: 2022-05-23 | Stop reason: HOSPADM

## 2022-05-22 RX ADMIN — HYDROMORPHONE HYDROCHLORIDE 0.2 MG: 0.2 INJECTION, SOLUTION INTRAMUSCULAR; INTRAVENOUS; SUBCUTANEOUS at 13:27

## 2022-05-22 RX ADMIN — OXYCODONE HYDROCHLORIDE 5 MG: 5 TABLET ORAL at 21:47

## 2022-05-22 RX ADMIN — PREGABALIN 25 MG: 25 CAPSULE ORAL at 16:37

## 2022-05-22 RX ADMIN — ACETAMINOPHEN 650 MG: 325 TABLET ORAL at 04:36

## 2022-05-22 RX ADMIN — HYDROMORPHONE HYDROCHLORIDE 0.2 MG: 0.2 INJECTION, SOLUTION INTRAMUSCULAR; INTRAVENOUS; SUBCUTANEOUS at 10:41

## 2022-05-22 RX ADMIN — OXYCODONE HYDROCHLORIDE 2.5 MG: 5 TABLET ORAL at 01:27

## 2022-05-22 RX ADMIN — LORAZEPAM 0.5 MG: 0.5 TABLET ORAL at 10:41

## 2022-05-22 RX ADMIN — LORAZEPAM 0.5 MG: 0.5 TABLET ORAL at 02:39

## 2022-05-22 RX ADMIN — DOCUSATE SODIUM 100 MG: 100 CAPSULE, LIQUID FILLED ORAL at 21:47

## 2022-05-22 RX ADMIN — ACETAMINOPHEN 975 MG: 325 TABLET ORAL at 08:18

## 2022-05-22 RX ADMIN — PREGABALIN 25 MG: 25 CAPSULE ORAL at 21:47

## 2022-05-22 RX ADMIN — TRANYLCYPROMINE SULFATE 10 MG: 10 TABLET, FILM COATED ORAL at 08:18

## 2022-05-22 RX ADMIN — HYDROMORPHONE HYDROCHLORIDE 0.2 MG: 0.2 INJECTION, SOLUTION INTRAMUSCULAR; INTRAVENOUS; SUBCUTANEOUS at 02:36

## 2022-05-22 RX ADMIN — DIPHENHYDRAMINE HYDROCHLORIDE 50 MG: 50 CAPSULE ORAL at 04:53

## 2022-05-22 RX ADMIN — HYDROMORPHONE HYDROCHLORIDE 0.2 MG: 0.2 INJECTION, SOLUTION INTRAMUSCULAR; INTRAVENOUS; SUBCUTANEOUS at 08:32

## 2022-05-22 RX ADMIN — POTASSIUM CHLORIDE AND SODIUM CHLORIDE: 900; 150 INJECTION, SOLUTION INTRAVENOUS at 10:36

## 2022-05-22 RX ADMIN — GABAPENTIN 100 MG: 100 CAPSULE ORAL at 08:18

## 2022-05-22 RX ADMIN — DIPHENHYDRAMINE HYDROCHLORIDE 50 MG: 50 CAPSULE ORAL at 16:19

## 2022-05-22 RX ADMIN — DOCUSATE SODIUM 100 MG: 100 CAPSULE, LIQUID FILLED ORAL at 10:37

## 2022-05-22 RX ADMIN — OXYCODONE HYDROCHLORIDE 5 MG: 5 TABLET ORAL at 16:19

## 2022-05-22 RX ADMIN — HYDROMORPHONE HYDROCHLORIDE 0.2 MG: 0.2 INJECTION, SOLUTION INTRAMUSCULAR; INTRAVENOUS; SUBCUTANEOUS at 04:35

## 2022-05-22 RX ADMIN — ALLOPURINOL 300 MG: 300 TABLET ORAL at 08:18

## 2022-05-22 ASSESSMENT — ACTIVITIES OF DAILY LIVING (ADL)
ADLS_ACUITY_SCORE: 36
ADLS_ACUITY_SCORE: 20
ADLS_ACUITY_SCORE: 36
ADLS_ACUITY_SCORE: 37
ADLS_ACUITY_SCORE: 36
ADLS_ACUITY_SCORE: 20
ADLS_ACUITY_SCORE: 37
ADLS_ACUITY_SCORE: 36
ADLS_ACUITY_SCORE: 37
ADLS_ACUITY_SCORE: 36

## 2022-05-22 NOTE — PROVIDER NOTIFICATION
MD Notification    Notified Person: MD    Notified Person Name:  Dr. Lopez and Dr. Ronquillo    Notification Date/Time:  5/22/22; 1610    Notification Interaction:  [employer] Messaging    Purpose of Notification:  Pt is extremely upset that she is unable to order cheese with her current diet order. She is adamant I speak with you to have adjustments made to her diet order to allow for her to have mozzarella and swiss cheese.    Orders Received:  No changes will be made to her diet r/t risk for interaction with her medication.     Comments:   Dr. Ronquillo at bedside; pt spoke with MD about Parnate needing to be stopped 10 days prior to surgery r/t risk of bleeding.  MD stated she would look into the need to stop the medication and follow up with matt.

## 2022-05-22 NOTE — CONSULTS
Triage and Transition - Consult and Liaison     Jann Davidson  May 22, 2022    Session start: 2:37 pm  Session end: 3:27 pm  Session duration in minutes: 50 minutes  CPT utilized: 77222 - Brief diagnostic assessment (modifier 52)  Patient was seen virtually ([employer] cart or other teleconferencing device).    Reason for consult: Psychiatry consult was requested due to anxiety and depression. Patient was seen by [facility] Consult & Liaison team.     Identifying information: Jann is 76 year old White  female   followed related to leg pain. Patient lives alone. She has one grown son who lives out of state. She was diagnosed with breast cancer in December and has been going through chemo.     Presenting problem (including symptoms, strengths risks, resources used): In individual therapeutic contact with patient today, patient presents with broad affect, irritable mood.. Safety concerns are not present today. Patient was tangential and hyper-verbal. Patient has difficulty answering questions writer asks about her mental health as she is presently focused on shooting pains and frustrations with feelings like her needs are not being met at hospital. Patient repeatedly states she would like medication to control the pain she is feeling and she feels like no one is giving her answers. Patient states she doesn't know who is working with her or following her care. Patient does appear confused at times, stating she has been here for 3 days and believes it is Monday. Patient states her mental health is not being impacted by current situation. Patient reports her current medications are helpful. Patient reports she has been on them for many years, specifically the parnate for 30+ years. Patient reports when she has taken herself off Parnate she has noticed she does worse. She states when not on her medications her brain is always moving a step ahead of her. Patient has difficulty describing other symptoms she experiences.  Patient does  report she has been struggling with sleep, however, attributes that to pain. Patient reports her mental health was adequate prior to this pain episode.     Mental Status Exam   Affect: Appropriate  Appearance: Appropriate   Attention Span/Concentration: Attentive    Eye Contact: Engaged  Fund of Knowledge: Appropriate   Language /Speech Content: Fluent  Language /Speech Volume: Normal   Language /Speech Rate/Productions: Hyperverbal and Pressured   Recent Memory: Variable  Remote Memory: Intact  Mood: Irritable   Orientation:   Person: Yes   Place: Yes  Time of Day: Yes   Date: Yes   Situation (Do they understand why they are here?): Yes   Psychomotor Behavior: Agitated   Thought Content: Clear  Thought Form: Obsessive/Perseverative and Tangential    Current medications:   Current Facility-Administered Medications   Medication     0.9% sodium chloride + KCl 20 mEq/L infusion     acetaminophen (TYLENOL) tablet 650 mg    Or     acetaminophen (TYLENOL) Suppository 650 mg     allopurinol (ZYLOPRIM) tablet 300 mg     diphenhydrAMINE (BENADRYL) capsule 50 mg    Or     diphenhydrAMINE (BENADRYL) injection 50 mg     docusate sodium (COLACE) capsule 100 mg     HYDROmorphone (DILAUDID) injection 0.2 mg     lidocaine (LMX4) cream     lidocaine 1 % 0.1-1 mL     LORazepam (ATIVAN) tablet 0.5 mg     melatonin tablet 5 mg     naloxone (NARCAN) injection 0.2 mg    Or     naloxone (NARCAN) injection 0.4 mg    Or     naloxone (NARCAN) injection 0.2 mg    Or     naloxone (NARCAN) injection 0.4 mg     ondansetron (ZOFRAN ODT) ODT tab 4 mg    Or     ondansetron (ZOFRAN) injection 4 mg     oxyCODONE IR (ROXICODONE) half-tab 2.5-5 mg     polyethylene glycol (MIRALAX) Packet 17 g     pregabalin (LYRICA) capsule 25 mg     prochlorperazine (COMPAZINE) injection 5 mg    Or     prochlorperazine (COMPAZINE) tablet 5 mg    Or     prochlorperazine (COMPAZINE) suppository 12.5 mg     senna-docusate (SENOKOT-S/PERICOLACE) 8.6-50 MG per tablet 1  tablet    Or     senna-docusate (SENOKOT-S/PERICOLACE) 8.6-50 MG per tablet 2 tablet     sodium chloride (PF) 0.9% PF flush 3 mL     sodium chloride (PF) 0.9% PF flush 3 mL     [START ON 5/23/2022] tranylcypromine (PARNATE) tablet 30 mg     [Held by provider] valsartan (DIOVAN) tablet 40 mg     zolpidem (AMBIEN) tablet 5 mg     Relevant history: Unclear, difficulty gathering historical information from patient. Patient has been diagnosed with depression for over 40 years. She has been on Parnate for over 30 years per her report.     Cultural Influences: none noted     Therapeutic intervention and progress:  Therapeutic intervention consisted of building therapeutic rapport, active listening and validation.     Collateral information:   Reviewed chart     Diagnosis:   296.32 (F33.1) Major Depressive Disorder, Recurrent Episode, Moderate _, by history;  300.02 (F41.1) Generalized Anxiety Disorder, by history;     Plan:     Patient currently reports no concerns regarding mental health. Patient states she does not wish to adjust medications. Patient declines to continue to meet with mental health.   Patient will not continue to be followed by this service. Please re consult as needed.     Melissa Bernal, [facility]  Triage and Transition - Consult and Liaison   [phone]

## 2022-05-22 NOTE — PROGRESS NOTES
A/O x 4; anxious; needs reassurance; given PRN medication with relief.  C/O bilateral lower extremity intermittent cramping; relief with scheduled and PRN medication.  Na 128; IV fluids decreased; goal of 6-8 increase in 24 hours.  On mg and K+ protocols; rechecks in AM. Orthostatic BP negative.  Up with SBA given hx of recent falls.  Tolerating diet.  Continent of bowel and bladder. Discharge pending pain control; if inability to manage pain; possible palliative/pain consult will be ordered on Monday.

## 2022-05-22 NOTE — PROVIDER NOTIFICATION
MD Notification    Notified Person: MD    Notified Person Name: Guillermo Landeros    Notification Date/Time: 5/22/22 @0403    Notification Interaction: page     Purpose of Notification: Pt c/o 10/10 burning pain all over the body with sudden onset. Gave oxycodone, Dilaudid & Ativan. Pain now 8/10, pt restless & agitated. Would you like to increase the dose(s)?     Orders Received:  No, don't increase dose    Comments: Palliative care consult pending

## 2022-05-22 NOTE — PLAN OF CARE
A/O x 4; anxious; needs reassurance; given PRN medication with relief.  C/O bilateral lower extremity intermittent cramping; relief with scheduled and PRN medication; pt states with the 5 mg dose of Oxycodone she feels her pain management has improved since yesterday.  Na 128; IV fluids infusing with recheck in AM. On mg and K+ protocols; rechecks in AM. Orthostatic BP negative.  Up with SBA given hx of recent falls.  Tolerating diet.  Continent of bowel and bladder; pt refusing senna as the medication does not work for her; requesting to take two capsules of colace, education provided to patient; pt c/o of frequency r/t continuous IV fluid infusion, education provided on the necessity of the IV fluids. Discharge pending pain control; if inability to manage pain; possible palliative/pain consult will be ordered on Monday.      Pt adamant she is allowed to eat swiss and mozzarella cheese even with her diet restrictions.  MD discussed with pt at bedside with rounds that her diet order is not to change.  Spoke with MD over [employer] messaging this afternoon per pt persistence on the issue.  MD will not change diet order; reiterated to patient.  Pt also expressed concern with taking Parnate prior to her procedure, noted that it needs to be stopped 10 days prior to surgery---MD aware.

## 2022-05-22 NOTE — PLAN OF CARE
PT orders received and reviewed. Met with patient, educated on role of PT in acute setting. Patient up ad clive in room, reports no concerns regarding mobility and reporting continued significant leg pain. Patient declines PT; will complete orders at this time.

## 2022-05-22 NOTE — PROGRESS NOTES
Cross cover    Paged regarding Na of level of 128, down from 129 when earlier today.  Was 126 at admission about 0200 this AM.  Will decrease D5-1/2NS from 100ml/hr to 50 ml/hr to allow for further correction.  Patient has a diet.

## 2022-05-22 NOTE — PROVIDER NOTIFICATION
MD Notification    Notified Person: MD    Notified Person Name:  Dr. Jeff      Notification Date/Time:  5/21/22 1940    Notification Interaction: Spoke over the telephone      Purpose of Notification:  Na 128    Orders Received:  Orders entered into chart    Comments:

## 2022-05-22 NOTE — PROGRESS NOTES
[facility]    Medicine Progress Note - Hospitalist Service    Date of Admission:  5/21/2022    Assessment & Plan            Jann Davidson is a 76-year-old female with generalized anxiety disorder, depression, stage IIb breast cancer for which she is scheduled for lumpectomy on 5/31, hypertension who presented on 5/21 with unable to sleep for 2 nights, stabbing/shooting pain in her legs.  She attributed her symptoms to Neulasta auto infusion and reported that she had discontinued her Parnate.  Had been feeling dizzy at home and had multiple falls.    #.  CKD 3   #.  Acute kidney injury, suspected on admission.  Now ruled out   #.  Hypovolemic hyponatremia, sodium 126  #.  Frequent falls at home  #.  Mild hypokalemia, potassium 3.2  -Had frequent falls on 5/16.  Likely due to orthostasis though syncope is not completely ruled out.  Reported lightheadedness before falls.  Had been feeling, nauseated.  -Labs showed sodium of 126, creatinine 1.4 (baseline 1.2-1.4)  -Does not meet criteria for acute kidney injury  -Received 1 L NS in ER  -HCTZ/triamterene discontinued due to dehydration.  This should be permanently discontinued as patient is likely to experience more nausea and dehydration with chemotherapy  -Replace electrolytes as needed  -Sodium did not improve significantly with half-normal saline, will switch to NS +20 mEq KCl at 50 mL/h and reassess in  -TTE pending  -Consult PT      #.  Paresthesias  -Reports intermittent shooting/sharp pain in her legs, lower abdomen and occasionally in the arms.  She attributes these to Neulasta or infusion.  -Has new L5 vertebral compression fracture but denies back pain and findings not consistent with radiculopathy.  MRI was recommended but patient declined  -5/22-reports burning pain in her feet which is most likely secondary to neuropathy and may be chemotherapy-induced  -Also in differential is paresthesias and headache due to paranoid withdrawal.   But these have not improved even after restarting Parnate  -Continue Parnate  -Continue p.o. and IV Dilaudid  -Start on pregabalin  -As needed lorazepam  -Heme-onc is recommending pain/palliative consult on Monday if patient continues to have significant symptoms    #.  Elevated liver enzymes/hepatitis    -Noted to have elevated transaminases in the past due to chemotherapy which subsequently normalized   -LFTs elevated again on admission-alkaline phosphatase 410, ,   -Could be secondary to chemotherapy or immunotherapy    -Heme-onc consulted.  Recommended to hold off on steroids   -Monitor trend.  LFTs are improving       #.  Essential hypertension  -Triamterene-HCTZ discontinued as patient is at risk for recurrent episodes of dehydration  -Resume candesartan when needed though patient had herself discontinued this medication  -Blood pressure currently in normal range      #.  Generalized anxiety disorder  #.  Depression  #.  Insomnia  -Continue Ambien 5 mg at bedtime as needed  -Parnate resumed, continue.  Needs to be on tyramine controlled diet.  He is very upset about not being able to receive Swiss cheese or mozzarella cheese as she has read in pamphlet there is these 2 cheeses are allowed  -Psychiatry consult pending  -Continue Ativan as needed      #.  L5 superior endplate compression fracture, likely subacute   - new since 12/2021 PET scan  - MRI was recommended for evaluation to look at acuity of fracture.  Patient declined to undergo MRI  - Does not complain of back pain  - Orthopedics consulted.  Lower extremity symptoms do not appear to be secondary to L5 fracture.  No treatment recommended    #.  Stage IIb triple negative breast cancer  -Primary oncologist is Dr. Mcmullen through [facility]  -S/p neoadjuvant chemotherapy with paclitaxel + carboplatin +Keytruda   -Lumpectomy planned for 5/31  -follow-up with Dr. Mcmullen or Tadeo after discharge         Diet: Tyramine Controlled Diet     DVT Prophylaxis: Pneumatic Compression Devices  George Catheter: Not present  Central Lines: PRESENT     Cardiac Monitoring: ACTIVE order. Indication: Syncope- low cardiac risk (24 hours)  Code Status: Full Code      Disposition Plan   Expected Discharge: 05/23/2022     Anticipated discharge location:  Awaiting care coordination huddle  Delays:            The patient's care was discussed with the Patient.    Carin Lopez MD  Hospitalist Service  [facility]  Securely message with the Voc[employer] Web Console (learn more here)  Text page via [employer] Paging/Directory         Clinically Significant Risk Factors Present on Admission                 ______________________________________________________________________    Interval History   Patient remains very anxious.  Complains of burning pain in her feet.  Also reports she has itching and rash.  Dilaudid is helping with pain  Is upset that she is not receiving cheese such as Swiss cheese or mozzarella cheese which she is allowed to take with Parnate.  No chest pain or shortness of breath  Very anxious      Data reviewed today: I reviewed all medications, new labs and imaging results over the last 24 hours. I personally reviewed no images or EKG's today.    Physical Exam   Vital Signs: Temp: 98.5  F (36.9  C) Temp src: Oral BP: 126/62 Pulse: 92   Resp: 22 SpO2: 99 % O2 Device: None (Room air)    Weight: 134 lbs 3.2 oz    Constitutional- awake and alert, resting in bed, in no acute distress  Cardiovascular-regular rate and rhythm, no murmurs, no edema  Pulmonary-lungs are clear to auscultation bilaterally, no wheezing or rhonchi  GI-abdomen is soft, nontender, nondistended, no hepatosplenomegaly or masses  Integumentary-skin is warm and dry, itchy rash over upper arms and upper thighs   Neurological-patient is awake, alert and oriented x3.  Moving all 4 extremities, normal speech, no focal deficits      Data   Recent Labs   Lab 05/22/22  0697  05/22/22  0119 05/21/22  1902 05/21/22  1607 05/21/22  0633 05/21/22  0153   WBC 5.2  --   --   --   --  6.0   HGB 8.0*  --   --   --   --  9.0*   MCV 94  --   --   --   --  94     --   --   --   --  138*   * 128* 128*  --    < > 126*   POTASSIUM 4.3  --   --  3.8  --  3.2*   CHLORIDE 100  --   --   --   --  94   CO2 22  --   --   --   --  21   BUN 23  --   --   --   --  36*   CR 1.21*  --   --   --   --  1.40*   ANIONGAP 6  --   --   --   --  11   AMY 8.1*  --   --   --   --  8.6   *  --   --   --   --  105*   ALBUMIN 2.7*  --   --   --   --  2.9*   PROTTOTAL 6.0*  --   --   --   --  6.4*   BILITOTAL 0.5  --   --   --   --  0.5   ALKPHOS 315*  --   --   --   --  410*   *  --   --   --   --  189*   *  --   --   --   --  161*   LIPASE  --   --   --   --   --  246    < > = values in this interval not displayed.     No results found for this or any previous visit (from the past 24 hour(s)).  Medications     0.9% sodium chloride + KCl 20 mEq/L 50 mL/hr at 05/22/22 1036       allopurinol  300 mg Oral Daily     docusate sodium  100 mg Oral BID     gabapentin  100 mg Oral Daily     senna-docusate  1 tablet Oral BID    Or     senna-docusate  2 tablet Oral BID     sodium chloride (PF)  3 mL Intracatheter Q8H     [START ON 5/23/2022] tranylcypromine  30 mg Oral Daily     [Held by provider] valsartan  40 mg Oral Daily

## 2022-05-22 NOTE — PROVIDER NOTIFICATION
MD Notification    Notified Person: MD    Notified Person Name: Dr. Lopez    Notification Date/Time: 5/22/22 @ 0905    Notification Interaction: [employer] messaging    Purpose of Notification:   Pt requested to have Colace instead of senna - state Colace works better for her.   She also is insistent on having 30mg of parnate in the morning and none the rest of the day. States this is how she has been taking it for years and is refusing to take it at night. Can we get that order switched? I tried giving education on it and she was insistent on it being 30 mg in the morning. Thanks.         Orders Received:    Comments:

## 2022-05-22 NOTE — PROGRESS NOTES
[facility]    Hematology/Oncology Follow-up Note      Today's Date: 05/22/22  Date of Admission:  5/21/2022  Reason for Consult: breast cancer, admitted with severe bilateral leg pain      ASSESSMENT/PLAN:  Jann Davidson is a 76 year old female with:    1) Bilateral leg pain: She attributes it completely to Neulasta, although it was give 2 weeks ago.  She says that she had similar pains with Neupogen in the past, but this is much worse.  The timing seems unusual for Neulasta, but there is no other clear explanation.  She denies numbness or sciatica.  X-ray showed a likely subacute L5 compression fracture, per ortho.  She declined MRI lumbar spine.  No treatment was recommended, per ortho.    -she is on pain medications  -steroids considered, but will hold off for now and possibly discuss with psychiatry   -if no improvement, may need to consult pain/palliative team on Monday     2) Anxiety: She had stopped her Parnate for 2 days because she says that she felt terribly, so she stopped her medications.    -this was resumed, per hospitalist, and consulted psychiatry  -resumed home medications  -per the package insert, Parnate should be stopped 10 days prior to an elective surgery.  Will have to discuss with psychiatry regarding this.       3) Triple negative breast cancer: s/p neoadjuvant chemotherapy with carboplatin+paclitaxel+Keytruda.  -surgery planned for 5/31/22  -follow-up with Dr. Mcmullen or Tadeo after discharge     4) Elevated transaminases: could be from chemotherapy or immunotherapy.  She also says that she had been taking a lot of Tylenol due to pain.  She has had elevated transaminases in the past, likely from chemotherapy and then normalized.  LFT's this morning has improved from yesterday.  -would monitor for now and hold off on steroids    5) Patient very upset about her diet order.  Discussed with nursing.  Will defer diet order to hospitalist.      We will continue to follow.   "Dr. Gibbs is rounding tomorrow - I discussed with him the complex issues surrounding this patient.        INTERIM HISTORY: Jann was seen in her room today. She was upset for multiple reasons.  She is upset that she has cardiac monitor on, which was later removed.  She was upset about the IV fluids because she has to go to the bathroom frequently.  She is also very upset about her diet order, which is a tyramine controlled diet, due to her parnate.  She also says that she wants the wheat allergy removed from her chart, because she doesn't have an allergy to wheat.  She says that she \"stupidly put it in the chart\" years ago, but she says that nothing actually happens when she eats wheat.         MEDICATIONS:  Current Facility-Administered Medications   Medication     0.9% sodium chloride + KCl 20 mEq/L infusion     acetaminophen (TYLENOL) tablet 650 mg    Or     acetaminophen (TYLENOL) Suppository 650 mg     allopurinol (ZYLOPRIM) tablet 300 mg     diphenhydrAMINE (BENADRYL) capsule 50 mg    Or     diphenhydrAMINE (BENADRYL) injection 50 mg     docusate sodium (COLACE) capsule 100 mg     HYDROmorphone (DILAUDID) injection 0.2 mg     lidocaine (LMX4) cream     lidocaine 1 % 0.1-1 mL     LORazepam (ATIVAN) tablet 0.5 mg     melatonin tablet 5 mg     naloxone (NARCAN) injection 0.2 mg    Or     naloxone (NARCAN) injection 0.4 mg    Or     naloxone (NARCAN) injection 0.2 mg    Or     naloxone (NARCAN) injection 0.4 mg     ondansetron (ZOFRAN ODT) ODT tab 4 mg    Or     ondansetron (ZOFRAN) injection 4 mg     oxyCODONE IR (ROXICODONE) half-tab 2.5-5 mg     polyethylene glycol (MIRALAX) Packet 17 g     pregabalin (LYRICA) capsule 25 mg     prochlorperazine (COMPAZINE) injection 5 mg    Or     prochlorperazine (COMPAZINE) tablet 5 mg    Or     prochlorperazine (COMPAZINE) suppository 12.5 mg     senna-docusate (SENOKOT-S/PERICOLACE) 8.6-50 MG per tablet 1 tablet    Or     senna-docusate (SENOKOT-S/PERICOLACE) 8.6-50 MG per " "tablet 2 tablet     sodium chloride (PF) 0.9% PF flush 3 mL     sodium chloride (PF) 0.9% PF flush 3 mL     [START ON 5/23/2022] tranylcypromine (PARNATE) tablet 30 mg     [Held by provider] valsartan (DIOVAN) tablet 40 mg     zolpidem (AMBIEN) tablet 5 mg           ALLERGIES:  Allergies   Allergen Reactions     Wheat Bran          PHYSICAL EXAM:  Vital signs:  Temp: 98.5  F (36.9  C) Temp src: Oral BP: 126/62 Pulse: 92   Resp: 22 SpO2: 99 % O2 Device: None (Room air)   Height: 160 cm (5' 3\") Weight: 60.9 kg (134 lb 3.2 oz)  Estimated body mass index is 23.77 kg/m  as calculated from the following:    Height as of this encounter: 1.6 m (5' 3\").    Weight as of this encounter: 60.9 kg (134 lb 3.2 oz).      GENERAL/CONSTITUTIONAL: No acute distress.  EYES: No scleral icterus.  NEUROLOGIC: Alert, oriented, answers questions appropriately.  INTEGUMENTARY: No jaundice.      LABS:  CBC RESULTS:   Recent Labs   Lab Test 05/22/22  0628   WBC 5.2   RBC 2.51*   HGB 8.0*   HCT 23.6*   MCV 94   MCH 31.9   MCHC 33.9   RDW 14.2          Recent Labs   Lab Test 05/22/22  0628 05/22/22  0119 05/21/22  1902 05/21/22  1607 05/21/22  0633 05/21/22  0153   * 128*   < >  --    < > 126*   POTASSIUM 4.3  --   --  3.8  --  3.2*   CHLORIDE 100  --   --   --   --  94   CO2 22  --   --   --   --  21   ANIONGAP 6  --   --   --   --  11   *  --   --   --   --  105*   BUN 23  --   --   --   --  36*   CR 1.21*  --   --   --   --  1.40*   AMY 8.1*  --   --   --   --  8.6    < > = values in this interval not displayed.       Shana Ronquillo MD  Hematology/Oncology  [facility]    Total time spent on day of visit, including review of tests, obtaining/reviewing separately obtained history, ordering medications/tests/procedures, communicating with PCP/consultants, and documenting in electronic medical record: 35 minutes    "

## 2022-05-22 NOTE — PLAN OF CARE
Goal Outcome Evaluation:    Plan of Care Reviewed With: patient      AOx4, anxious. Hgb 8.0. Na+ low 129 at 6 am goal of 6-8 increase today.  PRN Dilaudid, oxycodone for pain w/o minimal relief - c/o burning pain sensation all over body, palliative care consult pending. Ativan for anxiety, Benadryl for itching.  Pysch consult also pending. Up ad clive in rm, voids in BR. PIV infusing. Continue w/ plan of care.

## 2022-05-22 NOTE — PLAN OF CARE
Goal Outcome Evaluation:    A/O x 4; anxious; needs reassurance;  C/O itching to legs, arms, and scalp ; relief with scheduled and PRN benadryl and headache managed with scheduled tylenol.  Na 128; IV fluids decreased 50ml/hr; goal of 6-8 increase in 24 hours.  On mg and K+ protocols; rechecks in AM. Orthostatic BP negative.  Up with SBA given hx of recent falls.  Tolerating diet.  Continent of bowel and bladder. Discharge pending pain control; if inability to manage pain; possible palliative/pain consult will be ordered on Monday.

## 2022-05-22 NOTE — PLAN OF CARE
Goal Outcome Evaluation:      A/Ox4, VSS on RA. Very anxious, tearful, and inconsolable this morning. C/o 7/10 pain - managed with PRN dilaudid & tylenol. Ativan given x1 for anxiety. Tyramine controlled diet, fair appetite. Up SBA in room, pt refuses nonskid socks. Cont of B/B - no BM this shift, colace given. Port infusing NS + KCl 20 mEq/l @ 50 ml/hr - good blood return noted. Na = 128. Tele discontinued. Psych consulted. Discharge pending pain control - if unable to manage, possible pain/palliative consult for Monday.

## 2022-05-23 ENCOUNTER — APPOINTMENT (OUTPATIENT)
Dept: CARDIOLOGY | Facility: CLINIC | Age: 77
DRG: 073 | End: 2022-05-23
Attending: INTERNAL MEDICINE
Payer: MEDICARE

## 2022-05-23 ENCOUNTER — APPOINTMENT (OUTPATIENT)
Dept: ULTRASOUND IMAGING | Facility: CLINIC | Age: 77
DRG: 073 | End: 2022-05-23
Attending: HOSPITALIST
Payer: MEDICARE

## 2022-05-23 ENCOUNTER — APPOINTMENT (OUTPATIENT)
Dept: GENERAL RADIOLOGY | Facility: CLINIC | Age: 77
DRG: 073 | End: 2022-05-23
Attending: HOSPITALIST
Payer: MEDICARE

## 2022-05-23 VITALS
HEIGHT: 63 IN | TEMPERATURE: 98.7 F | RESPIRATION RATE: 16 BRPM | HEART RATE: 100 BPM | SYSTOLIC BLOOD PRESSURE: 113 MMHG | DIASTOLIC BLOOD PRESSURE: 65 MMHG | WEIGHT: 130.4 LBS | OXYGEN SATURATION: 100 % | BODY MASS INDEX: 23.11 KG/M2

## 2022-05-23 LAB
ALBUMIN SERPL-MCNC: 2.9 G/DL (ref 3.4–5)
ALBUMIN UR-MCNC: 10 MG/DL
ALP SERPL-CCNC: 337 U/L (ref 40–150)
ALT SERPL W P-5'-P-CCNC: 125 U/L (ref 0–50)
ANION GAP SERPL CALCULATED.3IONS-SCNC: 7 MMOL/L (ref 3–14)
APPEARANCE UR: ABNORMAL
AST SERPL W P-5'-P-CCNC: 91 U/L (ref 0–45)
BILIRUB DIRECT SERPL-MCNC: 0.1 MG/DL (ref 0–0.2)
BILIRUB SERPL-MCNC: 0.4 MG/DL (ref 0.2–1.3)
BILIRUB UR QL STRIP: NEGATIVE
BUN SERPL-MCNC: 21 MG/DL (ref 7–30)
CALCIUM SERPL-MCNC: 8.4 MG/DL (ref 8.5–10.1)
CHLORIDE BLD-SCNC: 106 MMOL/L (ref 94–109)
CO2 SERPL-SCNC: 19 MMOL/L (ref 20–32)
COLOR UR AUTO: ABNORMAL
CREAT SERPL-MCNC: 1.25 MG/DL (ref 0.52–1.04)
ERYTHROCYTE [DISTWIDTH] IN BLOOD BY AUTOMATED COUNT: 14.7 % (ref 10–15)
GFR SERPL CREATININE-BSD FRML MDRD: 44 ML/MIN/1.73M2
GLUCOSE BLD-MCNC: 92 MG/DL (ref 70–99)
GLUCOSE UR STRIP-MCNC: NEGATIVE MG/DL
HCT VFR BLD AUTO: 22.3 % (ref 35–47)
HGB BLD-MCNC: 7.3 G/DL (ref 11.7–15.7)
HGB UR QL STRIP: NEGATIVE
KETONES UR STRIP-MCNC: NEGATIVE MG/DL
LEUKOCYTE ESTERASE UR QL STRIP: ABNORMAL
LVEF ECHO: NORMAL
MAGNESIUM SERPL-MCNC: 1.7 MG/DL (ref 1.6–2.3)
MCH RBC QN AUTO: 31.7 PG (ref 26.5–33)
MCHC RBC AUTO-ENTMCNC: 32.7 G/DL (ref 31.5–36.5)
MCV RBC AUTO: 97 FL (ref 78–100)
MUCOUS THREADS #/AREA URNS LPF: PRESENT /LPF
NITRATE UR QL: NEGATIVE
PH UR STRIP: 5.5 [PH] (ref 5–7)
PLATELET # BLD AUTO: 258 10E3/UL (ref 150–450)
POTASSIUM BLD-SCNC: 4.2 MMOL/L (ref 3.4–5.3)
POTASSIUM BLD-SCNC: 4.3 MMOL/L (ref 3.4–5.3)
PROT SERPL-MCNC: 6.1 G/DL (ref 6.8–8.8)
RBC # BLD AUTO: 2.3 10E6/UL (ref 3.8–5.2)
RBC URINE: 31 /HPF
SODIUM SERPL-SCNC: 132 MMOL/L (ref 133–144)
SP GR UR STRIP: 1.01 (ref 1–1.03)
SQUAMOUS EPITHELIAL: <1 /HPF
UROBILINOGEN UR STRIP-MCNC: NORMAL MG/DL
WBC # BLD AUTO: 6 10E3/UL (ref 4–11)
WBC URINE: 7 /HPF

## 2022-05-23 PROCEDURE — 71045 X-RAY EXAM CHEST 1 VIEW: CPT

## 2022-05-23 PROCEDURE — 76705 ECHO EXAM OF ABDOMEN: CPT

## 2022-05-23 PROCEDURE — 250N000011 HC RX IP 250 OP 636: Performed by: HOSPITALIST

## 2022-05-23 PROCEDURE — 250N000011 HC RX IP 250 OP 636: Performed by: INTERNAL MEDICINE

## 2022-05-23 PROCEDURE — 81001 URINALYSIS AUTO W/SCOPE: CPT | Performed by: HOSPITALIST

## 2022-05-23 PROCEDURE — 87040 BLOOD CULTURE FOR BACTERIA: CPT | Performed by: INTERNAL MEDICINE

## 2022-05-23 PROCEDURE — 82248 BILIRUBIN DIRECT: CPT | Performed by: INTERNAL MEDICINE

## 2022-05-23 PROCEDURE — 85027 COMPLETE CBC AUTOMATED: CPT | Performed by: HOSPITALIST

## 2022-05-23 PROCEDURE — 99232 SBSQ HOSP IP/OBS MODERATE 35: CPT | Performed by: INTERNAL MEDICINE

## 2022-05-23 PROCEDURE — 87040 BLOOD CULTURE FOR BACTERIA: CPT | Performed by: HOSPITALIST

## 2022-05-23 PROCEDURE — 250N000013 HC RX MED GY IP 250 OP 250 PS 637: Performed by: INTERNAL MEDICINE

## 2022-05-23 PROCEDURE — 36415 COLL VENOUS BLD VENIPUNCTURE: CPT | Performed by: INTERNAL MEDICINE

## 2022-05-23 PROCEDURE — 93306 TTE W/DOPPLER COMPLETE: CPT | Mod: 26 | Performed by: INTERNAL MEDICINE

## 2022-05-23 PROCEDURE — 93306 TTE W/DOPPLER COMPLETE: CPT

## 2022-05-23 PROCEDURE — 84132 ASSAY OF SERUM POTASSIUM: CPT | Performed by: INTERNAL MEDICINE

## 2022-05-23 PROCEDURE — 99239 HOSP IP/OBS DSCHRG MGMT >30: CPT | Performed by: HOSPITALIST

## 2022-05-23 PROCEDURE — 250N000013 HC RX MED GY IP 250 OP 250 PS 637: Performed by: HOSPITALIST

## 2022-05-23 PROCEDURE — 82310 ASSAY OF CALCIUM: CPT | Performed by: HOSPITALIST

## 2022-05-23 PROCEDURE — 83735 ASSAY OF MAGNESIUM: CPT | Performed by: HOSPITALIST

## 2022-05-23 RX ORDER — OXYCODONE HYDROCHLORIDE 5 MG/1
5 TABLET ORAL EVERY 4 HOURS PRN
Qty: 10 TABLET | Refills: 0 | Status: ON HOLD | OUTPATIENT
Start: 2022-05-23 | End: 2022-06-01

## 2022-05-23 RX ORDER — LORATADINE 10 MG/1
10 TABLET ORAL DAILY
Status: DISCONTINUED | OUTPATIENT
Start: 2022-05-23 | End: 2022-05-23 | Stop reason: HOSPADM

## 2022-05-23 RX ORDER — OXYCODONE HYDROCHLORIDE 5 MG/1
5 TABLET ORAL EVERY 4 HOURS PRN
Qty: 10 TABLET | Refills: 0 | Status: SHIPPED | OUTPATIENT
Start: 2022-05-23 | End: 2022-05-23

## 2022-05-23 RX ORDER — HEPARIN SODIUM (PORCINE) LOCK FLUSH IV SOLN 100 UNIT/ML 100 UNIT/ML
5-10 SOLUTION INTRAVENOUS
Status: DISCONTINUED | OUTPATIENT
Start: 2022-05-23 | End: 2022-05-23 | Stop reason: HOSPADM

## 2022-05-23 RX ORDER — PREGABALIN 25 MG/1
25 CAPSULE ORAL 3 TIMES DAILY
Qty: 30 CAPSULE | Refills: 1 | Status: SHIPPED | OUTPATIENT
Start: 2022-05-23 | End: 2022-06-01

## 2022-05-23 RX ORDER — PREGABALIN 25 MG/1
25 CAPSULE ORAL 3 TIMES DAILY
Qty: 90 CAPSULE | Refills: 1 | Status: SHIPPED | OUTPATIENT
Start: 2022-05-23 | End: 2022-05-23

## 2022-05-23 RX ADMIN — ALLOPURINOL 300 MG: 300 TABLET ORAL at 09:08

## 2022-05-23 RX ADMIN — OXYCODONE HYDROCHLORIDE 2.5 MG: 5 TABLET ORAL at 13:37

## 2022-05-23 RX ADMIN — ACETAMINOPHEN 650 MG: 325 TABLET ORAL at 00:40

## 2022-05-23 RX ADMIN — HYDROMORPHONE HYDROCHLORIDE 0.2 MG: 0.2 INJECTION, SOLUTION INTRAMUSCULAR; INTRAVENOUS; SUBCUTANEOUS at 04:57

## 2022-05-23 RX ADMIN — DOCUSATE SODIUM 100 MG: 100 CAPSULE, LIQUID FILLED ORAL at 09:08

## 2022-05-23 RX ADMIN — LORAZEPAM 0.5 MG: 0.5 TABLET ORAL at 13:37

## 2022-05-23 RX ADMIN — LORAZEPAM 0.5 MG: 0.5 TABLET ORAL at 04:57

## 2022-05-23 RX ADMIN — OXYCODONE HYDROCHLORIDE 5 MG: 5 TABLET ORAL at 09:08

## 2022-05-23 RX ADMIN — PREGABALIN 25 MG: 25 CAPSULE ORAL at 09:08

## 2022-05-23 RX ADMIN — POTASSIUM CHLORIDE AND SODIUM CHLORIDE: 900; 150 INJECTION, SOLUTION INTRAVENOUS at 04:38

## 2022-05-23 RX ADMIN — Medication 5 ML: at 19:58

## 2022-05-23 RX ADMIN — PREGABALIN 25 MG: 25 CAPSULE ORAL at 16:35

## 2022-05-23 RX ADMIN — LORATADINE 10 MG: 10 TABLET ORAL at 11:22

## 2022-05-23 RX ADMIN — POLYETHYLENE GLYCOL 3350 17 G: 17 POWDER, FOR SOLUTION ORAL at 09:08

## 2022-05-23 ASSESSMENT — ACTIVITIES OF DAILY LIVING (ADL)
ADLS_ACUITY_SCORE: 20
ADLS_ACUITY_SCORE: 20
ADLS_ACUITY_SCORE: 22
ADLS_ACUITY_SCORE: 20
ADLS_ACUITY_SCORE: 22
ADLS_ACUITY_SCORE: 20
ADLS_ACUITY_SCORE: 20
ADLS_ACUITY_SCORE: 22
ADLS_ACUITY_SCORE: 20
ADLS_ACUITY_SCORE: 22

## 2022-05-23 NOTE — DISCHARGE SUMMARY
[facility]    Discharge Summary  Hospitalist    Date of Admission:  5/21/2022  Date of Discharge:  5/23/2022    Discharge Diagnoses      Hyponatremia  Syncope and collapse  Pain in both lower legs  Nonintractable headache, unspecified chronicity pattern, unspecified headache type  Chemotherapy-induced neutropenia (H)    History of Present Illness   Jann Davidson is an 76 year old female who presented with weakness, falls and apin in lower extremities     Hospital Course   Jann Davidson was admitted on 5/21/2022.  The following problems were addressed during her hospitalization:    Jann Davidson is a 76-year-old female with generalized anxiety disorder, depression, stage IIb breast cancer for which she is scheduled for lumpectomy on 5/31, hypertension who presented on 5/21 with unable to sleep for 2 nights, stabbing/shooting pain in her legs.  She attributed her symptoms to Neulasta auto infusion and reported that she had discontinued her Parnate.  Had been feeling dizzy at home and had multiple falls.     #.  CKD 3   #.  Acute kidney injury, suspected on admission.  Now ruled out   #.  Hypovolemic hyponatremia, sodium 126  #.  Frequent falls at home  #.  Mild hypokalemia, potassium 3.2  # metabolic encephalopathy likely secondary to hyponatremia   -Had frequent falls on 5/16.  Likely due to orthostasis though syncope is not completely ruled out.  Reported lightheadedness before falls.  Had been feeling, nauseated.  -Labs showed sodium of 126, creatinine 1.4 (baseline 1.2-1.4)  -Does not meet criteria for acute kidney injury  -Received 1 L NS in ER  -HCTZ/triamterene discontinued due to dehydration.  This should be permanently discontinued as patient is likely to experience more nausea and dehydration with chemotherapy  -Replace electrolytes as needed  -Sodium did not improve significantly with half-normal saline, will switch to NS +20 mEq KCl at 50 mL/h and reassess in  -TTE completed.   Report showed ejection fraction at 65 to 70% with normal diastolic function and no regional wall motion abnormalities.  -Consulted PT.  Cleared for discharge home.  Patient has been moving around in the room and refused any significant therapy.        #.  Paresthesias  -Reports intermittent shooting/sharp pain in her legs, lower abdomen and occasionally in the arms.  She attributes these to Neulasta or infusion.  -Has new L5 vertebral compression fracture but denies back pain and findings not consistent with radiculopathy.  MRI was recommended but patient declined  -5/22-reports burning pain in her feet which is most likely secondary to neuropathy and may be chemotherapy-induced  -Also in differential is paresthesias and headache due to paranoid withdrawal.  But these have not improved even after restarting Parnate  -Continue Parnate  -Continue oral oxycodone.  Discontinue IV Dilaudid.  -Start on pregabalin  -As needed lorazepam  -Patient's pain was well well controlled at the time of discharge.  She is convinced that her pain is attributed to her Neulasta injection.  Did discharge her home on oxycodone and Lyrica.     #.  Elevated liver enzymes/hepatitis    -Noted to have elevated transaminases in the past due to chemotherapy which subsequently normalized   -LFTs elevated again on admission-alkaline phosphatase 410, ,   -Could be secondary to chemotherapy or immunotherapy    -Heme-onc consulted.  Recommended to hold off on steroids   -Monitor trend.  LFTs are improving   -Right upper quadrant ultrasound done.  No acute findings.        #.  Essential hypertension  -Triamterene-HCTZ discontinued as patient is at risk for recurrent episodes of dehydration  -Resume candesartan when needed though patient had herself discontinued this medication  -Blood pressure currently in normal range        #.  Generalized anxiety disorder  #.  Depression  #.  Insomnia  -Continue Ambien 5 mg at bedtime as  needed  -Parnate resumed, continue.  Needs to be on tyramine controlled diet.  He is very upset about not being able to receive Swiss cheese or mozzarella cheese as she has read in pamphlet there is these 2 cheeses are allowed  -Psychiatry consult pending  -Continue Ativan as needed        #.  L5 superior endplate compression fracture, likely subacute   - new since 12/2021 PET scan  - MRI was recommended for evaluation to look at acuity of fracture.  Patient declined to undergo MRI  - Does not complain of back pain  - Orthopedics consulted.  Lower extremity symptoms do not appear to be secondary to L5 fracture.  No treatment recommended     #.  Stage IIb triple negative breast cancer  -Primary oncologist is Dr. Mcmullen through [facility]  -S/p neoadjuvant chemotherapy with paclitaxel + carboplatin +Keytruda   -Lumpectomy planned for 5/31  -follow-up with Dr. Mcmullen or Tadeo after discharge     # fever-  Unclear source.  No leukocytosis.  Urinalysis showed only trace leukocyte esterase and she has no urinary symptoms.  Blood cultures drawn from periphery and from port.  Has been afebrile since that 1 episode.  Right upper quadrant ultrasound negative.  Patient was very adamant that she be discharged today to keep her appointment for preop evaluation tomorrow.  She is being discharged home with no antibiotics since there is no obvious source of infection and she has been afebrile other than that one episode.    Progressive anemia  -No evidence of acute bleeding.  Likely secondary to her malignancy and chemotherapy.  No transfusion given during this admission.  Will need close follow-up and transfusion as necessary for hemoglobin less than 7.     DVT Prophylaxis: Pneumatic Compression Devices  Code Status: Full Code  Disposition: Expected discharge likely today if she remains afebrile and her right upper quadrant ultrasound is negative.        Nichole Thompson MD, MD  [phone](p)  [phone]( c)                             Nichole Thompson MD, MD    Pending Results   These results will be followed up by PCP  Unresulted Labs Ordered in the Past 30 Days of this Admission     Date and Time Order Name Status Description    5/23/2022  1:24 AM Blood Culture Arm, Left In process     5/23/2022  1:07 AM Blood Culture Portacath In process         Code Status   Full Code       Primary Care Physician   Mehran Oliva    Physical Exam   Temp: 99.4  F (37.4  C) (Room temperature 78 degrees; will recheck) Temp src: Oral BP: 113/65 Pulse: 100   Resp: 16 SpO2: 100 % O2 Device: None (Room air)    Vitals:    05/21/22 0012 05/22/22 0458 05/23/22 0546   Weight: 56.7 kg (125 lb) 60.9 kg (134 lb 3.2 oz) 59.1 kg (130 lb 6.4 oz)     Vital Signs with Ranges  Temp:  [98.2  F (36.8  C)-101.1  F (38.4  C)] 99.4  F (37.4  C)  Pulse:  [] 100  Resp:  [16] 16  BP: (104-132)/(50-68) 113/65  SpO2:  [95 %-100 %] 100 %  I/O last 3 completed shifts:  In: 1319.17 [P.O.:300; I.V.:1019.17]  Out: 925 [Urine:925]    Physical Exam  Constitutional:       Appearance: Normal appearance.   HENT:      Head: Normocephalic.   Eyes:      Pupils: Pupils are equal, round, and reactive to light.   Cardiovascular:      Rate and Rhythm: Normal rate and regular rhythm.      Pulses: Normal pulses.      Heart sounds: Normal heart sounds.   Pulmonary:      Effort: Pulmonary effort is normal. No respiratory distress.      Breath sounds: Normal breath sounds.   Abdominal:      General: Abdomen is flat. Bowel sounds are normal. There is no distension.      Tenderness: There is no abdominal tenderness. There is no guarding.   Musculoskeletal:         General: Normal range of motion.      Cervical back: Normal range of motion.   Skin:     General: Skin is warm and dry.   Neurological:      General: No focal deficit present.   Psychiatric:         Mood and Affect: Mood normal.           Discharge Disposition   Discharged to home  Condition at discharge: Stable    Consultations  This Hospital Stay   PHYSICAL THERAPY ADULT IP CONSULT  SPINE SURGERY ADULT IP CONSULT  PSYCHIATRY IP CONSULT  HEMATOLOGY & ONCOLOGY IP CONSULT    Time Spent on this Encounter   I, Nichole Thompson MD, personally saw the patient today and spent greater than 30 minutes discharging this patient.    Discharge Orders      Reason for your hospital stay    Fall and weakness     Follow-up and recommended labs and tests     Follow up with primary care provider, Mehran Oliva, within 7 days for hospital follow- up.  The following labs/tests are recommended: cbc, bmp in 1 week.     Activity    Your activity upon discharge: activity as tolerated     Discharge Medications   Current Discharge Medication List      START taking these medications    Details   oxyCODONE (ROXICODONE) 5 MG tablet Take 1 tablet (5 mg) by mouth every 4 hours as needed for moderate to severe pain  Qty: 10 tablet, Refills: 0    Associated Diagnoses: Syncope and collapse; Chemotherapy-induced neutropenia (H)      pregabalin (LYRICA) 25 MG capsule Take 1 capsule (25 mg) by mouth 3 times daily  Qty: 30 capsule, Refills: 1    Associated Diagnoses: Syncope and collapse; Chemotherapy-induced neutropenia (H)         CONTINUE these medications which have NOT CHANGED    Details   allopurinol (ZYLOPRIM) 300 MG tablet Take 1 tablet (300 mg) by mouth daily  Qty: 30 tablet, Refills: 0    Associated Diagnoses: Gout involving toe of left foot, unspecified cause, unspecified chronicity      atorvastatin (LIPITOR) 10 MG tablet Take 10 mg by mouth daily      candesartan (ATACAND) 4 MG tablet Take 0.5 tablets (2 mg) by mouth 2 times daily DISPENSE AS WRITTEN, Brand name only  Qty: 45 tablet, Refills: 2    Associated Diagnoses: Hypertension goal BP (blood pressure) < 140/90      colchicine (COLCYRS) 0.6 MG tablet Take 1.2 mg now followed by 0.6 mg 1 hour apart then 0.6 mg daily for 2 days  Qty: 5 tablet, Refills: 0    Associated Diagnoses: Gout involving toe of left  foot, unspecified cause, unspecified chronicity      loratadine (CLARITIN) 10 MG tablet Take 10 mg by mouth daily      LORazepam (ATIVAN) 0.5 MG tablet Take 1 tablet (0.5 mg) by mouth every 6 hours as needed for anxiety, nausea or sleep  Qty: 45 tablet, Refills: 0    Associated Diagnoses: Anxiety; Chemotherapy-induced neutropenia (H)      Magnesium 400 MG TABS Take 800 mg by mouth daily      oxazepam (SERAX) 15 MG capsule Take 1 capsule (15 mg) by mouth nightly as needed for anxiety Reduced refill amt: - call [phone] to schedule appointment/fasting labs  Qty: 31 capsule, Refills: 0    Associated Diagnoses: Anxiety      PARNATE 10 MG tablet TAKE 1 TABLET BY MOUTH 3 TIMES DAILY  Qty: 270 tablet, Refills: 3    Associated Diagnoses: Major depressive disorder, recurrent episode, moderate (H)      polyethylene glycol (MIRALAX/GLYCOLAX) packet Take 1 packet by mouth daily      triamcinolone (KENALOG) 0.1 % external cream Apply  topically 2 times daily as needed.  Qty: 15 g, Refills: 1    Associated Diagnoses: Sun-damaged skin      zolpidem (AMBIEN) 5 MG tablet Take 1 tablet (5 mg) by mouth nightly as needed for sleep Reduced refill amt: - call [phone] to schedule appointment/fasting labs  Qty: 31 tablet, Refills: 0    Associated Diagnoses: Persistent disorder of initiating or maintaining sleep         STOP taking these medications       triamterene-HCTZ (MAXZIDE-25) 37.5-25 MG tablet Comments:   Reason for Stopping:             Allergies   No Active Allergies  Data   Recent Labs   Lab Test 05/23/22  1116 05/22/22  0628 05/21/22  0153   WBC 6.0 5.2 6.0   HGB 7.3* 8.0* 9.0*   MCV 97 94 94    206 138*      Recent Labs   Lab Test 05/23/22  0506 05/22/22  0628 05/22/22  0119 05/21/22  1902 05/21/22  1607 05/21/22  0633 05/21/22  0153   * 128* 128*   < >  --    < > 126*   POTASSIUM 4.3  4.2 4.3  --   --  3.8  --  3.2*   CHLORIDE 106 100  --   --   --   --  94   CO2 19* 22  --   --   --   --  21   BUN 21  23  --   --   --   --  36*   CR 1.25* 1.21*  --   --   --   --  1.40*   ANIONGAP 7 6  --   --   --   --  11   AMY 8.4* 8.1*  --   --   --   --  8.6   GLC 92 111*  --   --   --   --  105*    < > = values in this interval not displayed.         Results for orders placed or performed during the hospital encounter of 05/21/22   CT Head w/o Contrast    Narrative    EXAM: CT HEAD W/O CONTRAST  LOCATION: [facility]  DATE/TIME: 5/21/2022 2:09 AM    INDICATION: Trauma - Head Injury  COMPARISON: None.  TECHNIQUE: Routine CT Head without IV contrast. Multiplanar reformats. Dose reduction techniques were used.    FINDINGS:  INTRACRANIAL CONTENTS: No intracranial hemorrhage, extraaxial collection, or mass effect.  No CT evidence of acute infarct. Mild volume loss and presumed chronic small vessel ischemia.     VISUALIZED ORBITS/SINUSES/MASTOIDS: No intraorbital abnormality. No paranasal sinus mucosal disease. No middle ear or mastoid effusion.    BONES/SOFT TISSUES: Mild midline parieto-occipital scalp contusion. No calvarial fracture.       Impression    IMPRESSION:  1.  Mild parieto-occipital scalp contusion. No acute intracranial hemorrhage or calvarial fracture    2.  Age-related change.   XR Chest 2 Views    Narrative    EXAM: XR CHEST 2 VW  LOCATION: [facility]  DATE/TIME: 5/21/2022 2:08 AM    INDICATION: fall, rib pain  COMPARISON: None.      Impression    IMPRESSION: Right-sided Port-A-Cath terminates over the distal SVC. Heart size is normal. No CHF, lobar consolidation, or effusions. No pneumothorax. Probable left upper lobe calcified granuloma. No acute bony abnormality.   Lumbar spine XR, 2-3 views    Narrative    EXAM: XR LUMBAR SPINE 2-3 VIEWS  LOCATION: [facility]  DATE/TIME: 05/21/2022, 4:21 AM    INDICATION: Fall, evaluate fxr.  COMPARISON: Images from PET scan of 12/28/2021.  TECHNIQUE: CR Lumbar Spine.      Impression    IMPRESSION:  Five lumbar-type vertebral bodies. Alignment is normal. Slight superior endplate height loss at L5 appears new since 12/28/2021. It is age-indeterminate. Marked loss of disc space height L5-S1 and mild changes at L2-L3 through L4-L5. Mild   lower lumbar facet arthropathy.     XR Pelvis 1/2 Views    Narrative    EXAM: XR PELVIS 1/2 VW  LOCATION: [facility]  DATE/TIME: 5/21/2022 4:22 AM    INDICATION: fall, leg pain  COMPARISON: None.      Impression    IMPRESSION: Given limitations of this single view, no acute bony abnormalities are identified.   XR Chest Port 1 View    Narrative    EXAM: XR CHEST PORT 1 VIEW  LOCATION: [facility]  DATE/TIME: 5/23/2022 1:09 AM    INDICATION: fever  COMPARISON: 05/21/2022      Impression    IMPRESSION: Lungs are clear. No pleural effusion or pneumothorax.  Normal heart size. Stable right IJ Port-A-Cath.   US Abdomen Limited    Narrative    US ABDOMEN LIMITED 5/23/2022 4:09 PM    CLINICAL HISTORY: fever with hx of breast cancer and elevated lft  TECHNIQUE: Limited abdominal ultrasound.    COMPARISON: PET CT 12/28/2021    FINDINGS:    GALLBLADDER: The gallbladder is normal. No gallstones, wall  thickening, or pericholecystic fluid. Negative sonographic Stover's  sign.    BILE DUCTS: Borderline dilated extrahepatic bile ducts. The common  duct measures 8mm.    LIVER: Normal where seen.    RIGHT KIDNEY: No hydronephrosis.    PANCREAS: The visualized portions of the pancreas are normal.    No ascites.      Impression    IMPRESSION:  1.  Borderline dilated extrahepatic bile ducts, similar to previous.  2.  Otherwise normal right upper quadrant ultrasound.    JOSHUA ENRIQUEZ MD         SYSTEM ID:  [id number]   Echocardiogram Complete     Value    LVEF  65-70%    Narrative    [id number]  [id number]  [id number]  [id number]^ANDERSON^MARTÍNEZ^[facility]  Echocardiography Laboratory  [address] [place] [address]     Name:  TARAS WILSON  MRN: [id number]  : 1945  Study Date: 2022 02:13 PM  Age: 76 yrs  Gender: Female  Patient Location: [facility]  Reason For Study: Syncope and Collapse  Ordering Physician: MARTÍNEZ ANDERSON  Performed By: Reina Ag     BSA: 1.6 m2  Height: 63 in  Weight: 125 lb  HR: 106  BP: 129/61 mmHg  ______________________________________________________________________________  ______________________________________________________________________________  Interpretation Summary     Left ventricular systolic function is normal. The visual ejection fraction is  65-70%.  Right ventricle is normal in structure, function and size.  No significant valvular abnormalities.  The inferior vena cava was normal in size with preserved respiratory  variability.  There is a catheter/pacemaker lead seen in the right atrium.  There is no pericardial effusion.     Global biventricular function is stable when compared to 2022.     ______________________________________________________________________________  Left Ventricle  The left ventricle is normal in size. There is normal left ventricular wall  thickness. Left ventricular systolic function is normal. The visual ejection  fraction is 65-70%. Left ventricular diastolic function is normal. No regional  wall motion abnormalities noted.     Right Ventricle  The right ventricle is normal in structure, function and size.     Atria  Normal left atrial size. Right atrial size is normal. There is a  catheter/pacemaker lead seen in the right atrium. There is no color Doppler  evidence of an atrial shunt.     Mitral Valve  There is trace mitral regurgitation.     Tricuspid Valve  There is trace tricuspid regurgitation. The right ventricular systolic  pressure is approximated at 25.9 mmHg plus the right atrial pressure. Right  ventricle systolic pressure estimate normal.     Aortic Valve  There is mild trileaflet aortic sclerosis.     Pulmonic Valve  The pulmonic valve  is not well visualized.     Vessels  The aortic root is normal size. Normal size ascending aorta. The inferior vena  cava was normal in size with preserved respiratory variability.     Pericardium  There is no pericardial effusion.     ______________________________________________________________________________  MMode/2D Measurements & Calculations  IVSd: 0.57 cm  LVIDd: 3.5 cm  LVIDs: 2.1 cm  LVPWd: 0.70 cm  FS: 39.2 %     LV mass(C)d: 55.6 grams  LV mass(C)dI: 35.1 grams/m2  Ao root diam: 2.6 cm  LA dimension: 3.3 cm  asc Aorta Diam: 3.1 cm  LA/Ao: 1.3  LVOT diam: 1.7 cm  LVOT area: 2.2 cm2  LA Volume (BP): 39.5 ml  LA Volume Index (BP): 25.0 ml/m2  RWT: 0.40     Doppler Measurements & Calculations  MV E max venecia: 105.0 cm/sec  MV A max venecia: 109.1 cm/sec  MV E/A: 0.96     MV dec slope: 519.6 cm/sec2  PA acc time: 0.10 sec  TR max venecia: 254.5 cm/sec  TR max P.9 mmHg  Medial E/e': 10.8     ______________________________________________________________________________  Report approved by: Carolyn Drake 2022 04:56 PM

## 2022-05-23 NOTE — PROGRESS NOTES
Discharge Note    Patient discharged to home via private vehicle  accompanied by friend.  IV and Port : Discontinued and Heparinized and De-accessed   Prescriptions filled and given to patient/family.   Belongings reviewed and sent with patient.   Home medications returned to patient: NA  Equipment sent with: patient, N/A.   patient verbalizes understanding of discharge instructions. AVS given to patient.    Pt waiting for ride; discharge instructions and prescriptions given to patient at 1950

## 2022-05-23 NOTE — PROVIDER NOTIFICATION
MD Notification    Notified Person: MD    Notified Person Name:  Dr. Thompson    Notification Date/Time:  1628 5/23/22    Notification Interaction:  [employer] messaging    Purpose of Notification:  FYI ultrasound results are available; please place orders if patient is to discharge     Orders Received:  No anticipated call back; message stated delivered, but not read so sent a web based page sent at 1700.     Comments:  Dr. Thompson messaged me back ok to discharge; orders placed to discharge patient.

## 2022-05-23 NOTE — PLAN OF CARE
Goal Outcome Evaluation:      A/Ox4, VSS on RA. Very anxious, tearful, & in disbelief.  C/o 8/10 pain - managed with PRN oxycodone x2. Ativan given x1 for anxiety. Tyramine controlled diet, fair appetite - NPO at 1000 for US this afternoon. Up SBA in room, pt refuses nonskid socks & bed alarm. Cont of B/B - no BM this shift, colace & miralax given. Port infusing NS + KCl 20 mEq/l @ 50 ml/hr - good blood return noted. Na = 132. Hgb = 7.3. Potassium = 4.3 - AM recheck scheduled.  ECHO completed. Plan for US of abdomen later this afternoon. Possible discharge after US to make pre-op appt tomorrow.

## 2022-05-23 NOTE — PLAN OF CARE
Goal Outcome Evaluation:    Patient had a fever of 101.1 oral. MD notified. Chest xray, blood culture and UA/UC were done. Tylenol given (650 mg ). Fever was down to 98.2 at 4 am. Use tylenol conservatively due to elevated liver function enzymes.  Patient has been emotional and hysteric at times. She brings up multiple concerns at once. Constipation, pain in the leg, rash on skin (generalized), phone not being charged enough, fear of missing outpatient appointment and so on. She appeared better as the morning advanced. 0.2 mg of dilaudid and 0.5 mg of lorazepam was given. Redirecting and focal  conversations seem to lessen her anxiety. Up independently in the room. Upset about bed alarm use. She has been calling appropriately for help. Denies dizziness.

## 2022-05-23 NOTE — PROGRESS NOTES
"Paged for fever over 101 overnight.     I ordered UA that shows 7 WBC. I ordered CXR that showed: \"IMPRESSION: Lungs are clear. No pleural effusion or pneumothorax.  Normal heart size. Stable right IJ Port-A-Cath.\"    I ordered blood cultures. We treated her fever and continue to monitor her very closely this AM.  "

## 2022-05-23 NOTE — PROGRESS NOTES
CLINICAL NUTRITION SERVICES  -  ASSESSMENT NOTE      Recommendations Ordered by Registered Dietitian (RD):   Continue low tyramine diet per MD - fresh cheese such as mozzarella and American are available on this diet order. Aged cheeses such as cheddar and mozzarella should be avoided/limited     Wheat allergy removed from meal ordering system (did not automatically interface with removal of allergy in Epic)      Malnutrition:   % Weight Loss:  Weight loss does not meet criteria for malnutrition (potentially 4% in </=1 month)  % Intake:  </= 50% for >/= 5 days (severe malnutrition)  Subcutaneous Fat Loss:  Orbital region mild depletion - likely chronic (not using towards malnutrition dx as only one indicator currently present)   Muscle Loss:  None observed  Fluid Retention:  None noted    Malnutrition Diagnosis: Patient does not meet two of the above criteria necessary for diagnosing malnutrition at this time         REASON FOR ASSESSMENT  Jann Davidson is a 76 year old female seen by Registered Dietitian for Admission Nutrition Risk Screen  - Have you recently lost weight without trying in the last six months?: unsure  - Have you been eating poorly because of a decreased appetite?: no      NUTRITION HISTORY  Visited with patient today   Reports adequate PO and appetite at home, apart from ~5 days PTA where she was mostly in bed, too lethargic to prepare food 2/2 new chemo med (Neulasta)   She feels she has lost weight during this time but unsure how much   At baseline, avoids meet and eggs and relies on soft fresh cheeses for protein/fat d/t tyramine restrictions with [facility] med (however, per H&P, has been non compliant with this med PTA)  Resides alone and typically eats Amys frozen meals with added sides of vegetables   Previous wheat allergy recorded in chart, however per patient, this is not a true allergy and she does not avoid this at baseline       CURRENT NUTRITION ORDERS  Diet Order:     Low  "Tyramine    Current Intake/Tolerance:  Appetite is poor-fair, consuming </=25% meals so far  She has chronic taste changes and nothing sounds good  She expresses her frustration w/ wheat and cheese products being unavailable in her diet order & allergies   Writer reviewed tyramine controlled diet guidelines, and fresh cheese such as mozzarella and american ARE available to order - she should continue to avoid aged cheese such as cheddar and parmesan     NUTRITION FOCUSED PHYSICAL ASSESSMENT FOR DIAGNOSING MALNUTRITION)  Yes         Observed:    Muscle wasting (refer to documentation in Malnutrition section) and Subcutaneous fat loss (refer to documentation in Malnutrition section) - mild, chronic     Obtained from Chart/Interdisciplinary Team:  None noted     ANTHROPOMETRICS  Height: 5' 3\"  Weight: 130 lbs 6.4 oz  Body mass index is 23.1 kg/m .  Weight Status:  Normal BMI  IBW: 52.3 kg   % IBW: 113%  Weight History: up to 5 lbs lost in </=1month (4%)  Wt Readings from Last 20 Encounters:   05/23/22 59.1 kg (130 lb 6.4 oz)   05/06/22 59.4 kg (131 lb)   05/06/22 59.4 kg (131 lb)   04/28/22 61.5 kg (135 lb 9.6 oz)   04/20/22 61.2 kg (135 lb)   04/15/22 60.5 kg (133 lb 6.4 oz)   04/12/22 60.1 kg (132 lb 6.4 oz)   03/29/22 62.1 kg (136 lb 12.8 oz)   03/22/22 61.8 kg (136 lb 3.2 oz)   03/15/22 61 kg (134 lb 6.4 oz)   03/08/22 60.7 kg (133 lb 12.8 oz)   03/03/22 60.3 kg (133 lb)   03/01/22 60.8 kg (134 lb)   03/01/22 61 kg (134 lb 6.4 oz)   02/14/22 61 kg (134 lb 6.4 oz)   02/07/22 60.9 kg (134 lb 3.2 oz)   01/31/22 60 kg (132 lb 4.4 oz)   01/25/22 60.1 kg (132 lb 9.6 oz)   01/18/22 59.5 kg (131 lb 3.2 oz)   01/03/22 60.5 kg (133 lb 6.4 oz)       LABS  Labs reviewed  Na 128 (L)  Recent Labs   Lab 05/22/22  0628 05/21/22  0153   * 105*       MEDICATIONS  Medications reviewed  Colace  IVF at 50 mL/hr     ASSESSED NUTRITION NEEDS PER APPROVED PRACTICE GUIDELINES:    Dosing Weight 59 kg   Estimated Energy Needs: " 7291-1304 kcals (25-30 Kcal/Kg)  Justification: maintenance  Estimated Protein Needs: 70-88 grams protein (1.2-1.5 g pro/Kg)  Justification: preservation of lean body mass  Estimated Fluid Needs: 1 mL/kcal or per MD    Justification: maintenance    MALNUTRITION:  % Weight Loss:  Weight loss does not meet criteria for malnutrition (potentially 4% in </=1 month)  % Intake:  </= 50% for >/= 5 days (severe malnutrition)  Subcutaneous Fat Loss:  Orbital region mild depletion - likely chronic (not using towards malnutrition dx as only one indicator currently present)   Muscle Loss:  None observed  Fluid Retention:  None noted    Malnutrition Diagnosis: Patient does not meet two of the above criteria necessary for diagnosing malnutrition at this time     NUTRITION DIAGNOSIS:  Inadequate oral intake related to decreased appetite, lethargy, taste changes and dislike of available menu options as evidenced by intakes </=50% for >/=5 days       NUTRITION INTERVENTIONS  Recommendations / Nutrition Prescription  Reviewed available food options - some cheese are allowed (see above)  Remove wheat allergy       Implementation  Nutrition education: Per Provider order if indicated   Composition of Meals and Snacks: as above      Nutrition Goals  Patient will consume >50% meals TID      MONITORING AND EVALUATION:  Progress towards goals will be monitored and evaluated per protocol and Practice Guidelines        Meka Loaiza RD, LD  Clinical Dietitian   [facility], Neuroscience, 88, Postpartum, L&D  Pager: [phone]

## 2022-05-24 ENCOUNTER — PATIENT OUTREACH (OUTPATIENT)
Dept: CARE COORDINATION | Facility: CLINIC | Age: 77
End: 2022-05-24
Payer: MEDICARE

## 2022-05-24 ENCOUNTER — TELEPHONE (OUTPATIENT)
Dept: FAMILY MEDICINE | Facility: CLINIC | Age: 77
End: 2022-05-24
Payer: MEDICARE

## 2022-05-24 DIAGNOSIS — Z71.89 OTHER SPECIFIED COUNSELING: ICD-10-CM

## 2022-05-24 DIAGNOSIS — J32.9 RECURRENT SINUSITIS: Primary | ICD-10-CM

## 2022-05-24 RX ORDER — AZITHROMYCIN 250 MG/1
TABLET, FILM COATED ORAL
Qty: 6 TABLET | Refills: 0 | Status: SHIPPED | OUTPATIENT
Start: 2022-05-24 | End: 2022-05-31

## 2022-05-24 NOTE — TELEPHONE ENCOUNTER
"KK and RNs,   Patient calling   Was discharged from the hospital last night   States she was supposed to be sent home with abx but was not  Now today has fever of 99.5    Reviewed discharge summary - noted:      Patient now upset that her surgery will probably have to get cancelled as she knows she has a sinus infection but won't get over it by next Tuesday if doesn't get meds soon   Has hx of sinus infections she said - Augmentin does not work - needs Zpak    Pt states also \"they told me they'd send a steroid too but didn't\"  Reviewed discharge summary - noted:         Pt upset   Knows her body she said and needs Rx for sinus infection  Please advise  Thanks,  Patria CARTER, RN          "

## 2022-05-24 NOTE — PROGRESS NOTES
Service Date: 05/23/2022    SUBJECTIVE:  Ms. Davidson is a 76-year-old female with triple negative left breast cancer.  Clinically stage IIB.  The patient received neoadjuvant chemotherapy with carboplatin, Taxol and pembrolizumab.  Cycle 4 was completed on 05/06/2022.    The patient presented to the Emergency Room on 05/21/2022 because of weakness and bilateral leg pain.  The patient says that she has pain in both the lower extremities.  In the feet, she has burning sensation.  She also has been getting progressively weaker.  The patient says that she also has some pain in other parts of the body.    Overall, she does not feel good.  No headache or dizziness.  No chest pain or shortness of breath.  Some nausea. No recurrent vomiting. The patient is very anxious.    Multiple investigations were done on 05/21/2022.  -CBC revealed hemoglobin of 9 and platelets of 138.  Normal WBC.  -CMP revealed multiple abnormalities including low sodium and elevated creatinine.  AST, ALT and alkaline phosphatase elevated.   -UA did not reveal any infection.  -COVID-19 negative.  -Chest x-ray did not reveal any infiltrate.  -CT head did not reveal any acute intracranial process.  -Pelvic x-ray did not reveal any acute bony abnormality.  -Lumbar spine x-ray reveals some arthropathy.  No fracture.    The patient has been seen by Orthopedics.  MRI lumbar spine was suggested.  The patient did not want it.    Cause of patient's bilateral leg pain is not very clear.  It could be some neuropathy from the Taxol.  Other possibility is pain from Neulasta (unlikely as it was almost 2 weeks ago).    I met with the patient.  The patient says that she still continues to have pain in the lower extremities, mainly below the knee.  Some burning sensation.  She is on pain medication including oxycodone.  That helps with the pain.  She is also on Lyrica.    The patient also has depression and anxiety.  That also might be contributing to some  pain.    Last night, the patient had a temperature of 101.1.  Cultures are all negative.  Repeat chest x-ray does not reveal any infiltrate.    PHYSICAL EXAMINATION:    GENERAL:  She is alert.  Not in any distress.  VITAL SIGNS:  Reviewed.    Rest of the systems not examined.    LABORATORY DATA:  Labs reviewed.    ASSESSMENT:  1.  A 76-year-old female with clinically stage II triple negative left breast cancer on neoadjuvant chemotherapy and immunotherapy.  2.  Bilateral lower extremity pain.  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  3.  Normocytic anemia secondary to chemotherapy.  4.  Elevated LFT, improving.  5.  Hyponatremia, improving.  6.  Elevated creatinine.  7.  Episode of fever.    PLAN:  1.  Discussed regarding her bilateral lower extremity pain.  I explained to the patient that pain is multifactorial.  The patient says that there is some burning sensation.  This will be neuropathic pain from the Taxol.  The patient received Neulasta on 05/06/2022.  That might have contributed to some pain also.  The patient does have depression and anxiety.  That also could be causing some somatic pain.    The patient's pain is controlled on oxycodone.  That will be continued.  She is on Lyrica, which will be continued. I discussed regarding steroid.  If her pain does not improve, we will consider starting her on dexamethasone.     2.  CBC reviewed.  She is anemic.  This is from chemotherapy.  She should be transfused for hemoglobin below 7.    3.  CMP reveals multiple abnormalities.  This is secondary to chemotherapy.  It is improving and will be monitored.    4.  The patient has anxiety and depression.  The patient does not want to see the psychiatrist.    5.  The patient had a few questions, which were all answered.  Oncology will continue to follow.    TOTAL TIME SPENT:  25 minutes.  Time spent in today's visit, review of chart/investigations today and  documentation.      Laila Gibbs MD        D: 2022   T: 2022   MT: [id number]    Name:     TARAS WILSON  MRN:      [id number]        Account:      [id number]   :      1945           Service Date: 2022       Document: [id number]

## 2022-05-24 NOTE — PROGRESS NOTES
Clinic Care Coordination Contact  [facility]/Voicemail     Clinical Data: Care Coordinator Outreach - TCM      Outreach attempted x 1.  Left message on patient's voicemail, providing [facility]'s 24/7 scheduling and nurse triage phone number [phone] ([phone]) for questions/concerns.      Plan:  Care Coordinator will try to reach patient again in 1-2 business days.       Lucia Rush, CHARMAINE  [facility], [facility]

## 2022-05-25 ENCOUNTER — VIRTUAL VISIT (OUTPATIENT)
Dept: FAMILY MEDICINE | Facility: CLINIC | Age: 77
End: 2022-05-25
Payer: MEDICARE

## 2022-05-25 DIAGNOSIS — J01.01 ACUTE RECURRENT MAXILLARY SINUSITIS: Primary | ICD-10-CM

## 2022-05-25 PROCEDURE — 99442 PR PHYSICIAN TELEPHONE EVALUATION 11-20 MIN: CPT | Mod: 95 | Performed by: FAMILY MEDICINE

## 2022-05-25 ASSESSMENT — PATIENT HEALTH QUESTIONNAIRE - PHQ9
10. IF YOU CHECKED OFF ANY PROBLEMS, HOW DIFFICULT HAVE THESE PROBLEMS MADE IT FOR YOU TO DO YOUR WORK, TAKE CARE OF THINGS AT HOME, OR GET ALONG WITH OTHER PEOPLE: NOT DIFFICULT AT ALL
SUM OF ALL RESPONSES TO PHQ QUESTIONS 1-9: 0
SUM OF ALL RESPONSES TO PHQ QUESTIONS 1-9: 0

## 2022-05-25 NOTE — PROGRESS NOTES
"Clinic Care Coordination Contact  [facility]: Post-Discharge Note  SITUATION                                                      Admission:    Admission Date: 05/21/22   Reason for Admission: Hyponatremia  Syncope and collapse  Pain in both lower legs  Nonintractable headache, unspecified chronicity pattern, unspecified headache type  Chemotherapy-induced neutropenia (H)  Discharge:   Discharge Date: 05/23/22  Discharge Diagnosis: Hyponatremia  Syncope and collapse  Pain in both lower legs  Nonintractable headache, unspecified chronicity pattern, unspecified headache type  Chemotherapy-induced neutropenia (H)    BACKGROUND                                                      Per hospital discharge summary and inpatient provider notes:    Jann Davidson is a 76 year old female with history of breast cancer, hypertension, and CKD who presents with fatigue and bilateral leg pain. Two weeks ago, the patient had her last round of neoadjuvant chemotherapy with paclitaxel. She also has been receiving Neulasta and has been becoming progressively more fatigued and having arthralgias in her legs which she describes as a stabbing sensation. Her provider told her that one of the side effects of the Neulasta injections is bone pain but when she spoke with the nursing line they felt her side effects have been progressing more than they would expect. She lives alone and notes that she was not eating for a few days and five days ago had multiple syncopal episodes. At home, she has been feeling fatigued and is having posterior head pain, abdominal pain, and chills. She has been unable to sleep consistently due to her pain. She denies any urinary symptoms, vomiting, or diarrhea.          ASSESSMENT      Enrollment  Primary Care Care Coordination Status: Declined    Discharge Assessment  How are you doing now that you are home?: Patient states she is feeling \"horrible\".  Patient states symptoms are the same as when she was in " "the hospital. Patient states she knows she has a sinus infection.  How are your symptoms? (Red Flag symptoms escalate to triage hotline per guidelines): Unchanged  Do you feel your condition is stable enough to be safe at home until your provider visit?: Yes  Does the patient have their discharge instructions? : Yes  Does the patient have questions regarding their discharge instructions? : No  Were you started on any new medications or were there changes to any of your previous medications? : Yes  Does the patient have all of their medications?: Yes  Do you have questions regarding any of your medications? : No  Do you have all of your needed medical supplies or equipment (DME)?  (i.e. oxygen tank, CPAP, cane, etc.):  (N/A)  Discharge follow-up appointment scheduled within 14 calendar days? : Yes  Discharge Follow Up Appointment Date: 05/26/22  Discharge Follow Up Appointment Scheduled with?: Primary Care Provider         Post-op (Clinicians Only)  Did the patient have surgery or a procedure: No    RN called patient to follow up on recent visit.  Patient is very upset about her recent stay.  Patient was given patient advocacy line number to call to express concerns related to her hospital stay.  Patient states she told them in the hospital that she has a sinus infection.  Patient thought she would be getting a prescription for it but never received one and states it was due to her blood culture being negative.  Patient called Dr. Oliva's office yesterday regarding her symptoms.  Writer advised patient that Dr. Oliva had sent in a prescription last night for the Azithromycin.  Patient states she never received a call this morning to notify her that they were sent in.  Patient kept repeating \"I have an infection\" throughout the call.  Patient is upset about not receiving the antibiotics and is concerned about her procedure next week.  Patient has preop scheduled for tomorrow.  RN recommended keeping this appointment " and starting antibiotics as soon as possible to help with symptoms.  Patient kept making statements about not being cleared for surgery and that she wouldn't clear herself with how her symptoms are.  RN advised patient that the provider will make a decision based on their assessment.  Patient still very concerned about being able to proceed with the surgery.  RN offered virtual appointment today to discuss concerns with being able to proceed with her surgery with her concern for a current sinus infection.  Patient was advised that they may not be able to make a decision with the procedure still being a week out.  Patient did correct RN as it is 6 days away. Patient would still like to proceed with a virtual visit to discuss with a provider.  Patient was warm transferred to scheduling to assist with making a virtual appointment to discuss concerns about being able to proceed with her procedure next week with current symptoms.      PLAN                                                      Outpatient Plan:  Follow up with primary care provider, Mehran Oliva, within 7 days for hospital follow- up.  The following labs/tests are recommended: cbc, bmp in 1 week        Future Appointments   Date Time Provider Department Center   5/26/2022  3:30 PM Jackie Christianson PA-C [facility]    5/27/2022 12:30 PM Nurse,  Surg Cons [facility]   5/27/2022  1:30 PM SHBCUS1 [facility]   5/27/2022  2:30 PM SHBCUS1 [facility]   5/27/2022  3:00 PM SHBCMA1 [facility]   5/31/2022 10:30 AM SHNM2 [facility]   5/31/2022 11:15 AM Tatianna Rai MD [facility]   5/31/2022 11:30 AM SHMASP2 [facility]   6/15/2022 11:15 AM Tatianna Rai MD [facility]   6/20/2022  7:45 AM  FAST TRACK LAB [facility]   6/20/2022  8:20 AM [facility]   6/20/2022  9:30 AM  C[facility]ION [facility]URSE [facility]         For any urgent concerns, please contact our 24 hour nurse  triage line: [phone] ([phone])         Lucia Rush RN

## 2022-05-25 NOTE — PROGRESS NOTES
Dinorah this is Dr. Balderas from [facility] me I speak to Jann michael jann is a 76 year old who is being evaluated via a billable telephone visit.      What phone number would you like to be contacted at? [phone]  How would you like to obtain your AVS? MyChart    Assessment & Plan     Acute recurrent maxillary sinusitis  The patient received an azithromycin prescription and she started her first dose today.  I advised her to continue with the current medication (Azithromycin) and see her primary care provider tomorrow for her preop.    Return in about 1 day (around 5/26/2022) for Pre-Op visit .    Mandeep Buckley MD  [facility]   Jann is a 76 year old who presents for the following health issues:    History of Present Illness       Reason for visit:  Hospital follow up and sinus infection    She eats 0-1 servings of fruits and vegetables daily.She consumes 0 sweetened beverage(s) daily.She exercises with enough effort to increase her heart rate 9 or less minutes per day.  She exercises with enough effort to increase her heart rate 3 or less days per week.   She is taking medications regularly.    Today's PHQ-9         PHQ-9 Total Score: 0    PHQ-9 Q9 Thoughts of better off dead/self-harm past 2 weeks :   Not at all    How difficult have these problems made it for you to do your work, take care of things at home, or get along with other people: Not difficult at all       Post Discharge Outreach 5/25/2022   Admission Date 5/21/2022   Reason for Admission Hyponatremia  Syncope and collapse  Pain in both lower legs  Nonintractable headache, unspecified chronicity pattern, unspecified headache type  Chemotherapy-induced neutropenia (H)   Discharge Date 5/23/2022   Discharge Diagnosis Hyponatremia  Syncope and collapse  Pain in both lower legs  Nonintractable headache, unspecified chronicity pattern, unspecified headache type  Chemotherapy-induced neutropenia (H)   How are you  "doing now that you are home? Patient states she is feeling \"horrible\".  Patient states symptoms are the same as when she was in the hospital. Patient states she knows she has a sinus infection.   How are your symptoms? (Red Flag symptoms escalate to triage hotline per guidelines) Unchanged   Do you feel your condition is stable enough to be safe at home until your provider visit? Yes   Does the patient have their discharge instructions?  Yes   Does the patient have questions regarding their discharge instructions?  No   Were you started on any new medications or were there changes to any of your previous medications?  Yes   Does the patient have all of their medications? Yes   Do you have questions regarding any of your medications?  No   Do you have all of your needed medical supplies or equipment (DME)?  (i.e. oxygen tank, CPAP, cane, etc.) (No Data)   Discharge follow-up appointment scheduled within 14 calendar days?  Yes   Discharge Follow Up Appointment Date 5/26/2022   Discharge Follow Up Appointment Scheduled with? Primary Care Provider     Hospital Follow-up Visit:    Hospital/Nursing Home/IP Rehab Facility: [facility]  Date of Admission: 5-20-22  Date of Discharge: 5-23-22  Reason(s) for Admission: Pain in legs, chemo induced neuropathy, also sinus infection and is getting ZPack delivered today prescribed by Dr. Oliva      Was your hospitalization related to COVID-19? YES   How are you feeling today? neuropathy better, tired, headache and stuffy   In the past 24 hours have you had shortness of breath when speaking, walking, or climbing stairs? I don't have breathing problems  Do you have a cough? I don't have a cough  When is the last time you had a fever greater than 100? None  Are you having any other symptoms? Sinus symptoms    Do you have any other stressors you would like to discuss with your provider? No    Was the patient in the ICU or did the patient experience delirium " during hospitalization?  No    Problems taking medications regularly:  None  Medication changes since discharge: None  Problems adhering to non-medication therapy:  None    Summary of hospitalization:  [facility] discharge summary reviewed  Diagnostic Tests/Treatments reviewed.  Follow up needed: CBC, BMP one week after discharge.   Other Healthcare Providers Involved in Patient s Care:         None  Update since discharge: worsened.  Post Discharge Medication Reconciliation: discharge medications reconciled, continue medications without change.  Plan of care communicated with patient      76-year-old with past medical history of triple negative stage IIb left breast cancer.  The patient presented to the emergency room on May 21, 2022 with complaints about insomnia, bilateral lower extremity pain, dizziness and  presyncopal episodes.  Patient states that she informed the physician at the hospital that she had sinus pressure and headaches.  States that she had multiple sinus infections in the past and her symptoms were consistent with acute sinus infection.  Patient states that she was told that she will be discharged home with azithromycin but the prescription was not sent from the hospital.  Patient is very anxious and emotional states that she is unsure if she is healthy enough to undergo procedure on Tuesday.  She is scheduled for preop examination on 05/26/22.  She plans to attend appointment tomorrow.    For a suspected acute sinusitis, the patient was given a prescription of azithromycin yesterday.  Medication was delivered today and she already took 2 tablets of azithromycin 250 mg each.    Review of Systems   Constitutional, HEENT, cardiovascular, pulmonary, gi and gu systems are negative, except as otherwise noted.      Objective           Vitals:  No vitals were obtained today due to virtual visit.    Physical Exam   healthy, alert and no distress  PSYCH: Alert and oriented times 3;  coherent speech, normal   rate and volume, able to articulate logical thoughts, able   to abstract reason, no tangential thoughts, no hallucinations   or delusions  Her affect is normal  RESP: No cough, no audible wheezing, able to talk in full sentences  Remainder of exam unable to be completed due to telephone visits    No results found for this or any previous visit (from the past 24 hour(s)).          Phone call duration: 12 minutes

## 2022-05-26 ENCOUNTER — TELEPHONE (OUTPATIENT)
Dept: ONCOLOGY | Facility: CLINIC | Age: 77
End: 2022-05-26
Payer: MEDICARE

## 2022-05-26 NOTE — TELEPHONE ENCOUNTER
Jann calling to request her pre op H&P appt for 3:10pm today be canceled and r/s once a new surgery date is set.  She is not able to come in due to fatigue and weakness since being discharged from hospital 3 days ago. Writer recommended Jann be seen to assess her weakness and fatigue. She declined but agrees to go to ED with worsening or persisting symptoms.     Writer canceled preop visit and covid test. Sent IB to Lucy Espinoza to contact pt to r/s the localizer and mastectomy appts.    Regina Arango RN

## 2022-05-28 ENCOUNTER — NURSE TRIAGE (OUTPATIENT)
Dept: NURSING | Facility: CLINIC | Age: 77
End: 2022-05-28
Payer: MEDICARE

## 2022-05-28 LAB
BACTERIA BLD CULT: NO GROWTH
BACTERIA BLD CULT: NO GROWTH

## 2022-05-28 NOTE — TELEPHONE ENCOUNTER
Triage call:     Caller was being transferred to writer as a warm transfer from scheduling, patient was disconnected prior to connecting with writer:     Per scheduling: Just out of the hospital for neuropathy and was supposed to have a procedure but was too fatigued  Was a red flag transfer from scheduling but patient hung up   Attempting to call patient back  Patient was reportedly crying while on the phone with scheduling.   After multiple attempts to connect with patient- scheduling was not able to get her back on the phone.   Writer also attempted to call patient- line rings and rings- did not connect to a voicemail so unable to leave a message    Di Cavazos RN BSN 5/28/2022 10:48 AM

## 2022-05-30 ENCOUNTER — HOSPITAL ENCOUNTER (OUTPATIENT)
Facility: CLINIC | Age: 77
Setting detail: OBSERVATION
Discharge: HOME | End: 2022-06-01
Attending: EMERGENCY MEDICINE | Admitting: HOSPITALIST
Payer: MEDICARE

## 2022-05-30 DIAGNOSIS — R55 SYNCOPE AND COLLAPSE: ICD-10-CM

## 2022-05-30 DIAGNOSIS — M79.662 PAIN IN BOTH LOWER LEGS: Primary | ICD-10-CM

## 2022-05-30 DIAGNOSIS — T45.1X5A CHEMOTHERAPY-INDUCED NEUTROPENIA (H): ICD-10-CM

## 2022-05-30 DIAGNOSIS — M79.661 PAIN IN BOTH LOWER LEGS: Primary | ICD-10-CM

## 2022-05-30 DIAGNOSIS — Z92.21 STATUS POST CHEMOTHERAPY: ICD-10-CM

## 2022-05-30 DIAGNOSIS — R21 RASH: ICD-10-CM

## 2022-05-30 DIAGNOSIS — R50.9 FEVER AND CHILLS: ICD-10-CM

## 2022-05-30 DIAGNOSIS — D70.1 CHEMOTHERAPY-INDUCED NEUTROPENIA (H): ICD-10-CM

## 2022-05-30 DIAGNOSIS — F33.1 MAJOR DEPRESSIVE DISORDER, RECURRENT EPISODE, MODERATE (H): ICD-10-CM

## 2022-05-30 LAB
HCO3 BLDV-SCNC: 25 MMOL/L (ref 21–28)
LACTATE BLD-SCNC: 0.8 MMOL/L
PCO2 BLDV: 44 MM HG (ref 40–50)
PH BLDV: 7.36 [PH] (ref 7.32–7.43)
PO2 BLDV: 22 MM HG (ref 25–47)
SAO2 % BLDV: 35 % (ref 94–100)

## 2022-05-30 PROCEDURE — 87040 BLOOD CULTURE FOR BACTERIA: CPT | Performed by: EMERGENCY MEDICINE

## 2022-05-30 PROCEDURE — 250N000011 HC RX IP 250 OP 636: Performed by: EMERGENCY MEDICINE

## 2022-05-30 PROCEDURE — 96374 THER/PROPH/DIAG INJ IV PUSH: CPT

## 2022-05-30 PROCEDURE — 36415 COLL VENOUS BLD VENIPUNCTURE: CPT | Performed by: EMERGENCY MEDICINE

## 2022-05-30 PROCEDURE — 99285 EMERGENCY DEPT VISIT HI MDM: CPT | Mod: 25

## 2022-05-30 PROCEDURE — 82803 BLOOD GASES ANY COMBINATION: CPT

## 2022-05-30 PROCEDURE — 85025 COMPLETE CBC W/AUTO DIFF WBC: CPT | Performed by: EMERGENCY MEDICINE

## 2022-05-30 PROCEDURE — 80048 BASIC METABOLIC PNL TOTAL CA: CPT | Performed by: EMERGENCY MEDICINE

## 2022-05-30 PROCEDURE — 96375 TX/PRO/DX INJ NEW DRUG ADDON: CPT

## 2022-05-30 RX ORDER — METHYLPREDNISOLONE SODIUM SUCCINATE 125 MG/2ML
125 INJECTION, POWDER, LYOPHILIZED, FOR SOLUTION INTRAMUSCULAR; INTRAVENOUS ONCE
Status: COMPLETED | OUTPATIENT
Start: 2022-05-30 | End: 2022-05-30

## 2022-05-30 RX ORDER — DIPHENHYDRAMINE HYDROCHLORIDE 50 MG/ML
25 INJECTION INTRAMUSCULAR; INTRAVENOUS ONCE
Status: COMPLETED | OUTPATIENT
Start: 2022-05-30 | End: 2022-05-30

## 2022-05-30 RX ADMIN — DIPHENHYDRAMINE HYDROCHLORIDE 25 MG: 50 INJECTION, SOLUTION INTRAMUSCULAR; INTRAVENOUS at 23:51

## 2022-05-30 RX ADMIN — METHYLPREDNISOLONE SODIUM SUCCINATE 125 MG: 125 INJECTION, POWDER, FOR SOLUTION INTRAMUSCULAR; INTRAVENOUS at 23:51

## 2022-05-30 ASSESSMENT — ENCOUNTER SYMPTOMS
NAUSEA: 0
ABDOMINAL PAIN: 0
VOMITING: 0

## 2022-05-31 ENCOUNTER — APPOINTMENT (OUTPATIENT)
Dept: GENERAL RADIOLOGY | Facility: CLINIC | Age: 77
End: 2022-05-31
Attending: EMERGENCY MEDICINE
Payer: MEDICARE

## 2022-05-31 PROBLEM — R21 RASH: Status: ACTIVE | Noted: 2022-05-31

## 2022-05-31 PROBLEM — R50.9 FEVER AND CHILLS: Status: ACTIVE | Noted: 2022-05-31

## 2022-05-31 PROBLEM — Z92.21 STATUS POST CHEMOTHERAPY: Status: ACTIVE | Noted: 2022-05-31

## 2022-05-31 LAB
ALBUMIN UR-MCNC: NEGATIVE MG/DL
ANION GAP SERPL CALCULATED.3IONS-SCNC: 7 MMOL/L (ref 3–14)
APPEARANCE UR: CLEAR
BASOPHILS # BLD AUTO: 0 10E3/UL (ref 0–0.2)
BASOPHILS NFR BLD AUTO: 0 %
BILIRUB UR QL STRIP: NEGATIVE
BUN SERPL-MCNC: 25 MG/DL (ref 7–30)
CALCIUM SERPL-MCNC: 8.6 MG/DL (ref 8.5–10.1)
CHLORIDE BLD-SCNC: 98 MMOL/L (ref 94–109)
CO2 SERPL-SCNC: 26 MMOL/L (ref 20–32)
COLOR UR AUTO: NORMAL
CREAT SERPL-MCNC: 1.45 MG/DL (ref 0.52–1.04)
EOSINOPHIL # BLD AUTO: 0.2 10E3/UL (ref 0–0.7)
EOSINOPHIL NFR BLD AUTO: 4 %
ERYTHROCYTE [DISTWIDTH] IN BLOOD BY AUTOMATED COUNT: 14.9 % (ref 10–15)
GFR SERPL CREATININE-BSD FRML MDRD: 37 ML/MIN/1.73M2
GLUCOSE BLD-MCNC: 86 MG/DL (ref 70–99)
GLUCOSE UR STRIP-MCNC: NEGATIVE MG/DL
HCT VFR BLD AUTO: 27.1 % (ref 35–47)
HGB BLD-MCNC: 8.6 G/DL (ref 11.7–15.7)
HGB UR QL STRIP: NEGATIVE
HOLD SPECIMEN: NORMAL
IMM GRANULOCYTES # BLD: 0 10E3/UL
IMM GRANULOCYTES NFR BLD: 0 %
KETONES UR STRIP-MCNC: NEGATIVE MG/DL
LEUKOCYTE ESTERASE UR QL STRIP: NEGATIVE
LYMPHOCYTES # BLD AUTO: 1.1 10E3/UL (ref 0.8–5.3)
LYMPHOCYTES NFR BLD AUTO: 29 %
MCH RBC QN AUTO: 31.9 PG (ref 26.5–33)
MCHC RBC AUTO-ENTMCNC: 31.7 G/DL (ref 31.5–36.5)
MCV RBC AUTO: 100 FL (ref 78–100)
MONOCYTES # BLD AUTO: 0.6 10E3/UL (ref 0–1.3)
MONOCYTES NFR BLD AUTO: 15 %
NEUTROPHILS # BLD AUTO: 1.8 10E3/UL (ref 1.6–8.3)
NEUTROPHILS NFR BLD AUTO: 52 %
NITRATE UR QL: NEGATIVE
NRBC # BLD AUTO: 0 10E3/UL
NRBC BLD AUTO-RTO: 0 /100
PH UR STRIP: 6 [PH] (ref 5–7)
PLATELET # BLD AUTO: 326 10E3/UL (ref 150–450)
POTASSIUM BLD-SCNC: 4.2 MMOL/L (ref 3.4–5.3)
RBC # BLD AUTO: 2.7 10E6/UL (ref 3.8–5.2)
RBC URINE: <1 /HPF
SARS-COV-2 RNA RESP QL NAA+PROBE: NEGATIVE
SODIUM SERPL-SCNC: 131 MMOL/L (ref 133–144)
SP GR UR STRIP: 1 (ref 1–1.03)
SQUAMOUS EPITHELIAL: <1 /HPF
TRANSITIONAL EPI: <1 /HPF
UROBILINOGEN UR STRIP-MCNC: NORMAL MG/DL
WBC # BLD AUTO: 3.6 10E3/UL (ref 4–11)
WBC URINE: <1 /HPF

## 2022-05-31 PROCEDURE — G0378 HOSPITAL OBSERVATION PER HR: HCPCS

## 2022-05-31 PROCEDURE — 250N000011 HC RX IP 250 OP 636: Performed by: INTERNAL MEDICINE

## 2022-05-31 PROCEDURE — C9803 HOPD COVID-19 SPEC COLLECT: HCPCS

## 2022-05-31 PROCEDURE — 81001 URINALYSIS AUTO W/SCOPE: CPT | Performed by: EMERGENCY MEDICINE

## 2022-05-31 PROCEDURE — 87086 URINE CULTURE/COLONY COUNT: CPT | Performed by: EMERGENCY MEDICINE

## 2022-05-31 PROCEDURE — 99207 PR NO BILLABLE SERVICE THIS VISIT: CPT | Performed by: INTERNAL MEDICINE

## 2022-05-31 PROCEDURE — 99215 OFFICE O/P EST HI 40 MIN: CPT | Performed by: INTERNAL MEDICINE

## 2022-05-31 PROCEDURE — 36415 COLL VENOUS BLD VENIPUNCTURE: CPT | Performed by: EMERGENCY MEDICINE

## 2022-05-31 PROCEDURE — 99220 PR INITIAL OBSERVATION CARE,LEVEL III: CPT | Performed by: HOSPITALIST

## 2022-05-31 PROCEDURE — 87040 BLOOD CULTURE FOR BACTERIA: CPT | Performed by: EMERGENCY MEDICINE

## 2022-05-31 PROCEDURE — U0003 INFECTIOUS AGENT DETECTION BY NUCLEIC ACID (DNA OR RNA); SEVERE ACUTE RESPIRATORY SYNDROME CORONAVIRUS 2 (SARS-COV-2) (CORONAVIRUS DISEASE [COVID-19]), AMPLIFIED PROBE TECHNIQUE, MAKING USE OF HIGH THROUGHPUT TECHNOLOGIES AS DESCRIBED BY CMS-2020-01-R: HCPCS | Performed by: EMERGENCY MEDICINE

## 2022-05-31 PROCEDURE — 250N000013 HC RX MED GY IP 250 OP 250 PS 637: Performed by: INTERNAL MEDICINE

## 2022-05-31 PROCEDURE — 71046 X-RAY EXAM CHEST 2 VIEWS: CPT

## 2022-05-31 PROCEDURE — 250N000013 HC RX MED GY IP 250 OP 250 PS 637: Performed by: HOSPITALIST

## 2022-05-31 RX ORDER — MAGNESIUM OXIDE 400 MG/1
400 TABLET ORAL DAILY
Status: DISCONTINUED | OUTPATIENT
Start: 2022-05-31 | End: 2022-06-01 | Stop reason: HOSPADM

## 2022-05-31 RX ORDER — BENZOCAINE/MENTHOL 6 MG-10 MG
LOZENGE MUCOUS MEMBRANE 2 TIMES DAILY PRN
Status: DISCONTINUED | OUTPATIENT
Start: 2022-05-31 | End: 2022-06-01 | Stop reason: HOSPADM

## 2022-05-31 RX ORDER — POLYETHYLENE GLYCOL 3350 17 G/17G
17 POWDER, FOR SOLUTION ORAL DAILY
Status: DISCONTINUED | OUTPATIENT
Start: 2022-05-31 | End: 2022-06-01 | Stop reason: HOSPADM

## 2022-05-31 RX ORDER — ALLOPURINOL 300 MG/1
300 TABLET ORAL DAILY
Status: DISCONTINUED | OUTPATIENT
Start: 2022-05-31 | End: 2022-06-01

## 2022-05-31 RX ORDER — HEPARIN SODIUM,PORCINE 10 UNIT/ML
5-10 VIAL (ML) INTRAVENOUS
Status: DISCONTINUED | OUTPATIENT
Start: 2022-05-31 | End: 2022-06-01 | Stop reason: HOSPADM

## 2022-05-31 RX ORDER — PROCHLORPERAZINE MALEATE 5 MG
5 TABLET ORAL EVERY 6 HOURS PRN
Status: DISCONTINUED | OUTPATIENT
Start: 2022-05-31 | End: 2022-06-01 | Stop reason: HOSPADM

## 2022-05-31 RX ORDER — ACETAMINOPHEN 325 MG/1
650 TABLET ORAL EVERY 6 HOURS PRN
Status: DISCONTINUED | OUTPATIENT
Start: 2022-05-31 | End: 2022-06-01 | Stop reason: HOSPADM

## 2022-05-31 RX ORDER — POLYETHYLENE GLYCOL 3350 17 G/17G
17 POWDER, FOR SOLUTION ORAL DAILY PRN
Status: DISCONTINUED | OUTPATIENT
Start: 2022-05-31 | End: 2022-06-01 | Stop reason: HOSPADM

## 2022-05-31 RX ORDER — PROCHLORPERAZINE 25 MG
12.5 SUPPOSITORY, RECTAL RECTAL EVERY 12 HOURS PRN
Status: DISCONTINUED | OUTPATIENT
Start: 2022-05-31 | End: 2022-06-01 | Stop reason: HOSPADM

## 2022-05-31 RX ORDER — LIDOCAINE 40 MG/G
CREAM TOPICAL
Status: DISCONTINUED | OUTPATIENT
Start: 2022-05-31 | End: 2022-06-01 | Stop reason: HOSPADM

## 2022-05-31 RX ORDER — ATORVASTATIN CALCIUM 10 MG/1
10 TABLET, FILM COATED ORAL DAILY
Status: DISCONTINUED | OUTPATIENT
Start: 2022-05-31 | End: 2022-06-01 | Stop reason: HOSPADM

## 2022-05-31 RX ORDER — LORAZEPAM 0.5 MG/1
0.5 TABLET ORAL EVERY 6 HOURS PRN
Status: DISCONTINUED | OUTPATIENT
Start: 2022-05-31 | End: 2022-06-01 | Stop reason: HOSPADM

## 2022-05-31 RX ORDER — OXYCODONE HYDROCHLORIDE 5 MG/1
5 TABLET ORAL EVERY 4 HOURS PRN
Status: DISCONTINUED | OUTPATIENT
Start: 2022-05-31 | End: 2022-06-01 | Stop reason: HOSPADM

## 2022-05-31 RX ORDER — NALOXONE HYDROCHLORIDE 0.4 MG/ML
0.2 INJECTION, SOLUTION INTRAMUSCULAR; INTRAVENOUS; SUBCUTANEOUS
Status: DISCONTINUED | OUTPATIENT
Start: 2022-05-31 | End: 2022-06-01 | Stop reason: HOSPADM

## 2022-05-31 RX ORDER — LORATADINE 10 MG/1
10 TABLET ORAL DAILY
Status: DISCONTINUED | OUTPATIENT
Start: 2022-05-31 | End: 2022-06-01 | Stop reason: HOSPADM

## 2022-05-31 RX ORDER — TRANYLCYPROMINE 10 MG/1
10 TABLET ORAL 3 TIMES DAILY
Status: DISCONTINUED | OUTPATIENT
Start: 2022-05-31 | End: 2022-06-01

## 2022-05-31 RX ORDER — DIPHENHYDRAMINE HCL 25 MG
25-50 CAPSULE ORAL EVERY 4 HOURS PRN
Status: DISCONTINUED | OUTPATIENT
Start: 2022-05-31 | End: 2022-06-01

## 2022-05-31 RX ORDER — ACETAMINOPHEN 650 MG/1
650 SUPPOSITORY RECTAL EVERY 6 HOURS PRN
Status: DISCONTINUED | OUTPATIENT
Start: 2022-05-31 | End: 2022-06-01 | Stop reason: HOSPADM

## 2022-05-31 RX ORDER — HEPARIN SODIUM,PORCINE 10 UNIT/ML
5-10 VIAL (ML) INTRAVENOUS EVERY 24 HOURS
Status: DISCONTINUED | OUTPATIENT
Start: 2022-05-31 | End: 2022-06-01 | Stop reason: HOSPADM

## 2022-05-31 RX ORDER — ONDANSETRON 4 MG/1
4 TABLET, ORALLY DISINTEGRATING ORAL EVERY 6 HOURS PRN
Status: DISCONTINUED | OUTPATIENT
Start: 2022-05-31 | End: 2022-06-01 | Stop reason: HOSPADM

## 2022-05-31 RX ORDER — HEPARIN SODIUM (PORCINE) LOCK FLUSH IV SOLN 100 UNIT/ML 100 UNIT/ML
5-10 SOLUTION INTRAVENOUS
Status: DISCONTINUED | OUTPATIENT
Start: 2022-05-31 | End: 2022-06-01 | Stop reason: HOSPADM

## 2022-05-31 RX ORDER — ONDANSETRON 2 MG/ML
4 INJECTION INTRAMUSCULAR; INTRAVENOUS EVERY 6 HOURS PRN
Status: DISCONTINUED | OUTPATIENT
Start: 2022-05-31 | End: 2022-06-01 | Stop reason: HOSPADM

## 2022-05-31 RX ORDER — NALOXONE HYDROCHLORIDE 0.4 MG/ML
0.4 INJECTION, SOLUTION INTRAMUSCULAR; INTRAVENOUS; SUBCUTANEOUS
Status: DISCONTINUED | OUTPATIENT
Start: 2022-05-31 | End: 2022-06-01 | Stop reason: HOSPADM

## 2022-05-31 RX ORDER — LORAZEPAM 1 MG/1
1 TABLET ORAL ONCE
Status: COMPLETED | OUTPATIENT
Start: 2022-05-31 | End: 2022-05-31

## 2022-05-31 RX ORDER — BISACODYL 10 MG
10 SUPPOSITORY, RECTAL RECTAL DAILY PRN
Status: DISCONTINUED | OUTPATIENT
Start: 2022-05-31 | End: 2022-06-01 | Stop reason: HOSPADM

## 2022-05-31 RX ADMIN — LORATADINE 10 MG: 10 TABLET ORAL at 18:36

## 2022-05-31 RX ADMIN — DIPHENHYDRAMINE HYDROCHLORIDE 25 MG: 25 CAPSULE ORAL at 03:38

## 2022-05-31 RX ADMIN — DIPHENHYDRAMINE HYDROCHLORIDE 50 MG: 25 CAPSULE ORAL at 20:09

## 2022-05-31 RX ADMIN — DIPHENHYDRAMINE HYDROCHLORIDE 50 MG: 25 CAPSULE ORAL at 11:40

## 2022-05-31 RX ADMIN — ATORVASTATIN CALCIUM 10 MG: 10 TABLET, FILM COATED ORAL at 17:16

## 2022-05-31 RX ADMIN — Medication 400 MG: at 17:16

## 2022-05-31 RX ADMIN — ALLOPURINOL 300 MG: 300 TABLET ORAL at 17:16

## 2022-05-31 RX ADMIN — POLYETHYLENE GLYCOL 3350 17 G: 17 POWDER, FOR SOLUTION ORAL at 17:15

## 2022-05-31 RX ADMIN — TRANYLCYPROMINE SULFATE 10 MG: 10 TABLET, FILM COATED ORAL at 18:36

## 2022-05-31 RX ADMIN — DIPHENHYDRAMINE HYDROCHLORIDE 50 MG: 25 CAPSULE ORAL at 15:43

## 2022-05-31 RX ADMIN — Medication 5 ML: at 18:03

## 2022-05-31 RX ADMIN — HYDROCORTISONE: 1 CREAM TOPICAL at 22:33

## 2022-05-31 RX ADMIN — LORAZEPAM 1 MG: 1 TABLET ORAL at 03:38

## 2022-05-31 RX ADMIN — DIPHENHYDRAMINE HYDROCHLORIDE 25 MG: 25 CAPSULE ORAL at 07:03

## 2022-05-31 ASSESSMENT — ENCOUNTER SYMPTOMS: FEVER: 0

## 2022-05-31 NOTE — PROGRESS NOTES
RECEIVING UNIT ED HANDOFF REVIEW    ED Nurse Handoff Report was reviewed by: Amira Jimenez RN on May 31, 2022 at 1:42 PM

## 2022-05-31 NOTE — ED NOTES
Bed: ED02  Expected date:   Expected time:   Means of arrival:   Comments:  Triage - clean room waiting for bed

## 2022-05-31 NOTE — H&P
[facility]    History and Physical  Hospitalist       Date of Admission:  5/30/2022  Date of Service (when I saw the patient): 05/31/22    ASSESSMENT  Jann Davidson is a pleasant 76 year old woman with past medical history that is most notable for breast cancer, as well as chronic depression and CKD Stage 3, among others; who presents with rash and fever.    PLAN    Rash and fever: of note, Ms. Davidson is followed by  Oncology for Stage 2b triple negative breast cancer, diagnosed 10/2021, having now completed multiple cycles of chemotherapy (last received some time around 5/6/2022), scheduled now for upcoming surgery by Dr. Rai of General Surgery reportedly on 6/13/2022. She was hospitalized here from 5/21/22 to 5/23/22 for generalized weakness and paresthesias leading to falls. 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 TTE showed preserved LVEF. Lyrica was prescribed. Hydrochlorothiazide/Triamterene was stopped. Prior to discharge she developed a fever for which no clear source was found (with negative cultures, CXR, COVID and UA). It seems that after discharge, on 5/25, she was treated as an outpatient with Azithromycin for facial pain attributed to maxiallry sinustis and completed the course of antibiotic. Now, she presents with progressive pruritic maculopapular rash as well as reported ongoing intermittent fevers. It seems that her rash could be due to drug reaction from Azithromycin or possibly Lyrica. Her fevers could be related to that, vs due to cancer itself as there are now at present no clear signs of acute infection. She is not neutropenic currently and CXR was negative for acute pathology.     -- Observation. Prn benadryl 25-50 mg q 4 hours ordered. Monitor serial exams.    -- Hold off on antibiotics for now. Follow up UA and blood  and urine cultures. 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    -- Oncology consulted for further evaluation. Resume home prn Oxycodone when verified.    -- Hold Lyrica for now pending further clinical assessment in AM    Ongoing mild hyponatremia: Na 131; seen last week when hospitalized as well (it was as low as 126 on 5/21, improved to 132 by discharge 5/23). Monitor closely for now, encourage fluid intake.    Chronic anemia and leukopenia: ANC 1.8 tonight. Daily CBC with differential ordered.  Hemoglobin   Date Value Ref Range Status   05/30/2022 8.6 (L) 11.7 - 15.7 g/dL Final   05/23/2022 7.3 (L) 11.7 - 15.7 g/dL Final   04/30/2018 13.1 11.7 - 15.7 g/dL Final   07/17/2016 14.1 11.7 - 15.7 g/dL Final     Stage 3 CKD: Reportedly followed as an outpatient by Dr. Luna of [facility]. Monitor labs closely while hospitalized    Recent Labs   Lab Test 05/30/22  2259 05/23/22  0506 05/22/22  0628   CR 1.45* 1.25* 1.21*       Generalized anxiety disorder:  Depression:  Insomnia:  She is on a MAO Inhibitor and is meant not to have Tyrosine in her diet; however she says she eats non-aged mozzarella cheese regularly at home without issue. Currently she is markedly distraught.    -- One time dose of Ativan ordered now for rest and sleep    -- Resume home Parnate, prn Ativan and Oxcarbazepam when verified.    History of gout: Resume home Allopurinol when verified. She also takes Colchicine with flares.    Hypertension:   Dyslipidemia:    -- Resume home Candesartan, Lipitor when verified.    Rule Out COVID-19 infection  This patient was evaluated during a global COVID-19 pandemic, which necessitated consideration that the patient might be at risk for infection with the SARS-CoV-2 virus that causes COVID-19. Applicable protocols for evaluation were followed during the patient's care.   -follow up  COVID-19 PCR test result  -no current indication for precautions    Chief Complaint   Rash    History is obtained from the patient and the ED physician whom I have spoken with    History of Present Illness   Jann Davidson is a pleasant 76 year old woman who presents with rash. This is a very itchy rash that started several days ago. She says it started just prior to her having left the hospital 5/23/2022. It was very mild at that time but she says several days ago it really started to bother her. It has now spread throughout her arms and especially her legs. It has been associated with ongoing fatigue, which she attributes to ongoing side effect from previous Neulasta injections. She felt so weak several days ago that she missed a phone call scheduling her pre-operative H and P and so her upcoming surgery has now been postponed to 6/13 from 5/31, and she is very distressed by this delay. Last week she also felt onset of headache and facial pain, and on 5/25 at a virtual visit with a provider she was given Azithromycin and finished a course of that with resolution of her symptoms. She adds that at the beginning of 5/2022 she had an outbreak of gout in her left toe, treated with medication, that has now resolved. Her only other medication she currently takes that is relatively new to her is Lyrica, started while she was in the hospital. She endorses intermittent fevers since discharge, and had a fever earlier today. She otherwise denies cough, nausea, dyspnea, diarrhea, or any other acute complaints.    In the ED, 121/71, 105, 98.3, 100% on room air.    CBC and BMP were notable for WBC 3.6, HGB 8.6, Na 131, BUN 25, Cr 1.45, otherwise were within the normal reference range. Lactate was 0.8. VBG showed 7.36/44. Blood cultures were sent.    She was also given Solu Medrol 125 mg in the ED.    Recent Results (from the past 24 hour(s))   XR Chest 2 Views    Narrative    EXAM: XR CHEST 2 VW  LOCATION: [facility]  "[facility]  DATE/TIME: 5/31/2022 1:55 AM    INDICATION: cancer, fever  COMPARISON: 05/23/2022      Impression    IMPRESSION: Able chest with again seen Port-A-Cath which appears be in good position. Benign calcified granuloma upper left lung. Prominent left nipple shadow. No acute cardiopulmonary abnormalities.       PHYSICAL EXAM  Blood pressure 115/71, pulse 94, temperature 98.3  F (36.8  C), temperature source Oral, resp. rate 18, height 1.6 m (5' 3\"), weight 56.7 kg (125 lb), SpO2 99 %.  Constitutional: Alert and oriented to person, place and time; distraught  HEENT: alopecia, moist mucus membranes  Respiratory: lungs clear to auscultation bilaterally  Cardiovascular: regular S1 S2  GI: abdomen soft non tender non distended bowel sounds positive  Lymph/Hematologic: pale, no cervical lymphadenopathy  Skin: diffuse maculopapular rash over arms and legs, good turgor  Musculoskeletal: no clubbing, cyanosis or edema  Neurologic: extra-ocular muscles intact; moves all four extremities  Psychiatric: markedly anxious affect, speech non-tangential     DVT Prophylaxis: Pneumatic Compression Devices  Code Status: Full Code    Disposition: Expected discharge in 0-2 days    Guillermo Landeros MD, MD    Past Medical History    I have reviewed this patient's medical history and updated it with pertinent information if needed.   Past Medical History:   Diagnosis Date     Breast cancer (H)      CKD (chronic kidney disease) stage 3, GFR 30-59 ml/min (H)      Depression with anxiety      Gout      Hypertension goal BP (blood pressure) < 140/90      Recurrent sinusitis 12/02/2013       Past Surgical History   I have reviewed this patient's surgical history and updated it with pertinent information if needed.  Past Surgical History:   Procedure Laterality Date     BREAST BIOPSY, RT/LT      Breat Biopsy RT/LT     COLONOSCOPY  10/03     COLONOSCOPY  4/8/2014    Procedure: COLONOSCOPY;  COLONOSCOPY ;  Surgeon: Edmund" Gaurav Moe MD;  Location:  GI     IR CHEST PORT PLACEMENT > 5 YRS OF AGE  12/29/2021     RECONSTRUCT EYELID         Prior to Admission Medications   Prior to Admission Medications   Prescriptions Last Dose Informant Patient Reported? Taking?   LORazepam (ATIVAN) 0.5 MG tablet   No No   Sig: Take 1 tablet (0.5 mg) by mouth every 6 hours as needed for anxiety, nausea or sleep   Magnesium 400 MG TABS   Yes No   Sig: Take 800 mg by mouth daily   PARNATE 10 MG tablet   No No   Sig: TAKE 1 TABLET BY MOUTH 3 TIMES DAILY   Patient taking differently: Take 10 mg by mouth 3 times daily   allopurinol (ZYLOPRIM) 300 MG tablet   No No   Sig: Take 1 tablet (300 mg) by mouth daily   atorvastatin (LIPITOR) 10 MG tablet   Yes No   Sig: Take 10 mg by mouth daily   azithromycin (ZITHROMAX) 250 MG tablet   No No   Sig: Take 2 tablets (500 mg) by mouth daily for 1 day, THEN 1 tablet (250 mg) daily for 4 days.   candesartan (ATACAND) 4 MG tablet   No No   Sig: Take 0.5 tablets (2 mg) by mouth 2 times daily DISPENSE AS WRITTEN, Brand name only   colchicine (COLCYRS) 0.6 MG tablet   No No   Sig: Take 1.2 mg now followed by 0.6 mg 1 hour apart then 0.6 mg daily for 2 days   Patient taking differently: as needed (gout flares) Take 1.2 mg now followed by 0.6 mg 1 hour apart then 0.6 mg daily for 2 days   loratadine (CLARITIN) 10 MG tablet   Yes No   Sig: Take 10 mg by mouth daily   oxazepam (SERAX) 15 MG capsule   No No   Sig: Take 1 capsule (15 mg) by mouth nightly as needed for anxiety Reduced refill amt: - call [phone] to schedule appointment/fasting labs   oxyCODONE (ROXICODONE) 5 MG tablet   No No   Sig: Take 1 tablet (5 mg) by mouth every 4 hours as needed for moderate to severe pain   polyethylene glycol (MIRALAX/GLYCOLAX) packet   Yes No   Sig: Take 1 packet by mouth daily   pregabalin (LYRICA) 25 MG capsule   No No   Sig: Take 1 capsule (25 mg) by mouth 3 times daily   triamcinolone (KENALOG) 0.1 % external cream   No No    Sig: Apply  topically 2 times daily as needed.   zolpidem (AMBIEN) 5 MG tablet   No No   Sig: Take 1 tablet (5 mg) by mouth nightly as needed for sleep Reduced refill amt: - call [phone] to schedule appointment/fasting labs      Facility-Administered Medications: None     Allergies   No Known Allergies    Social History   I have reviewed this patient's social history and updated it with pertinent information if needed. Jann Davidson  reports that she quit smoking about 49 years ago. Her smoking use included cigarettes. She has never used smokeless tobacco. She reports current alcohol use. She reports that she does not use drugs.    Family History   Family history assessed and, except as above, is non-contributory.    Family History   Problem Relation Age of Onset     Cancer Mother         uterine     Breast Cancer Mother      Diabetes Paternal Grandmother        Review of Systems   The 10 point Review of Systems is negative other than noted in the HPI or here.     Primary Care Physician   Mehran Oliva    Data   Labs Ordered and Resulted from Time of ED Arrival to Time of ED Departure   ISTAT GASES LACTATE VENOUS POCT - Abnormal       Result Value    Lactic Acid POCT 0.8      Bicarbonate Venous POCT 25      O2 Sat, Venous POCT 35 (*)     pCO2V Venous POCT 44      pH Venous POCT 7.36      pO2 Venous POCT 22 (*)    BASIC METABOLIC PANEL - Abnormal    Sodium 131 (*)     Potassium 4.2      Chloride 98      Carbon Dioxide (CO2) 26      Anion Gap 7      Urea Nitrogen 25      Creatinine 1.45 (*)     Calcium 8.6      Glucose 86      GFR Estimate 37 (*)    CBC WITH PLATELETS AND DIFFERENTIAL - Abnormal    WBC Count 3.6 (*)     RBC Count 2.70 (*)     Hemoglobin 8.6 (*)     Hematocrit 27.1 (*)           MCH 31.9      MCHC 31.7      RDW 14.9      Platelet Count 326      % Neutrophils 52      % Lymphocytes 29      % Monocytes 15      % Eosinophils 4      % Basophils 0      % Immature Granulocytes 0      NRBCs  per 100 WBC 0      Absolute Neutrophils 1.8      Absolute Lymphocytes 1.1      Absolute Monocytes 0.6      Absolute Eosinophils 0.2      Absolute Basophils 0.0      Absolute Immature Granulocytes 0.0      Absolute NRBCs 0.0     ROUTINE UA WITH MICROSCOPIC REFLEX TO CULTURE   COVID-19 VIRUS (CORONAVIRUS) BY PCR   BLOOD CULTURE   BLOOD CULTURE   URINE CULTURE       Data reviewed today:  I personally reviewed the chest x-ray image(s) showing no acute pathology.

## 2022-05-31 NOTE — PROGRESS NOTES
[facility]    Medicine Progress Note - Hospitalist Service    Date of Admission:  5/30/2022    Jann Davidson is a pleasant 76 year old woman with past medical history that is most notable for breast cancer, as well as chronic depression and CKD Stage 3, among others; who presents with rash and fever.    Assessment & Plan          Rash and fever  SIRS  SIRS, resolving: based on HR > 90 bpm and WBC < 4, without known infection.    of note, Ms. Davidson is followed by  Oncology for Stage 2b triple negative breast cancer, diagnosed 10/2021, having now completed multiple cycles of chemotherapy (last received some time around 5/6/2022), scheduled now for upcoming surgery by Dr. Rai of General Surgery reportedly on 6/13/2022. She was hospitalized here from 5/21/22 to 5/23/22 for generalized weakness and paresthesias leading to falls. 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 TTE showed preserved LVEF. Lyrica was prescribed. Hydrochlorothiazide/Triamterene was stopped. Prior to discharge she developed a fever for which no clear source was found (with negative cultures, CXR, COVID and UA). It seems that after discharge, on 5/25, she was treated as an outpatient with Azithromycin for facial pain attributed to maxiallry sinustis and completed the course of antibiotic. Now, she presents with progressive pruritic maculopapular rash as well as reported ongoing intermittent fevers. It seems that her rash could be due to drug reaction from Azithromycin or possibly Lyrica. Her fevers could be related to that, vs due to cancer itself as there are now at present no clear signs of acute infection. She is not neutropenic currently and CXR was negative for acute pathology.       Observation.     Prn benadryl 25-50 mg q 4 hours ordered. Monitor serial exams.    Hold  off on antibiotics for now.     Follow up UA and blood and urine cultures.     Consider CT of the sinuses if she spikes a fever while here     Breast cancer: reportedly causing ongoing suspected peripheral neuropathy due to chemotherapy.    Oncology consulted for further evaluation.     Resume home prn Oxycodone when verified.    Hold Lyrica for now pending further clinical assessment in AM     Ongoing mild hyponatremia: Na 131; seen last week when hospitalized as well (it was as low as 126 on 5/21, improved to 132 by discharge 5/23).    Monitor closely for now, encourage fluid intake.     Chronic anemia and leukopenia: ANC 1.8 tonight. Daily CBC with differential ordered.        Hemoglobin   Date Value Ref Range Status   05/30/2022 8.6 (L) 11.7 - 15.7 g/dL Final   05/23/2022 7.3 (L) 11.7 - 15.7 g/dL Final   04/30/2018 13.1 11.7 - 15.7 g/dL Final   07/17/2016 14.1 11.7 - 15.7 g/dL Final      Stage 3 CKD: Reportedly followed as an outpatient by Dr. Luna of [facility].     Monitor labs closely while hospitalized           Recent Labs   Lab Test 05/30/22  2259 05/23/22  0506 05/22/22  0628   CR 1.45* 1.25* 1.21*         Generalized anxiety disorder:  Depression:  Insomnia:  She is on a MAO Inhibitor and is meant not to have Tyrosine in her diet; however she says she eats non-aged mozzarella cheese regularly at home without issue.     One time dose of Ativan ordered now for rest and sleep    Resume home Parnate, prn Ativan.  We also do not stock oxazepam, substituted Ativan.     History of gout: Resume home Allopurinol. She also takes Colchicine with flares.     Hypertension:   Dyslipidemia:    Our pharmacy does not stock candesartan, therefore cannot resume.  Could auto substitute losartan, but do not wish to give a new medication to a patient admitted with unexplained rash.  Meanwhile, blood pressure readings are at goal    Resume statin        Diet: Combination Diet Vegetarian Diet; Other - please comment   "  DVT Prophylaxis: Low Risk/Ambulatory with no VTE prophylaxis indicated  George Catheter: Not present  Central Lines: PRESENT       Cardiac Monitoring: None  Code Status: Full Code Full    Disposition Plan   Expected Discharge: 06/01/2022  Anticipated discharge location:  Awaiting care coordination huddle  Delays:    Pending improvement in rash, renal function, afebrile and/or potential source of infection identified       The patient's care was discussed with the Bedside Nurse and Patient and Dr. Sai Roberts MD  Hospitalist Service  [facility]  Securely message with the Vocera Web Console (learn more here)  Text page via CustomMade Paging/Directory         Clinically Significant Risk Factors Present on Admission         # Hyponatremia: Na = 131 mmol/L (Ref range: 133 - 144 mmol/L) on admission, will monitor as appropriate              ______________________________________________________________________    Interval History   \"I cannot live like this.\"  Jann complains of severe pain in the nerves of her legs, says, \"Lyrica was the only thing that helped.\"  She later allows that oxycodone provided benefit, \"but I only got 10 tablets.\"  I mentioned prescribing oxycodone at bedtime she says, \"what do I do the rest of the day?\"  She says the rash is itchy, she was told Benadryl would be sedating but, \"they do not know me.\"    Data reviewed today: I reviewed all medications, new labs and imaging results over the last 24 hours. I personally reviewed the chest x-ray image(s) showing portacath., clear lungs            Physical Exam   Vital Signs: Temp: 98.3  F (36.8  C) Temp src: Oral BP: 133/68 Pulse: 92   Resp: 16 SpO2: 99 % O2 Device: None (Room air)    Weight: 125 lbs 0 oz  Constitutional: Awake, alert  Respiratory: Clear to auscultation bilaterally, no crackles or wheezing  Cardiovascular: Regular rate and rhythm, normal S1 and S2, and no murmur noted  GI: Normal bowel sounds, soft, " non-distended, non-tender  Skin/Integumen: Fine macular rash, as depicted above (see images)  Other:  mood is distressed      Data   Recent Labs   Lab 05/30/22  2259   WBC 3.6*   HGB 8.6*         *   POTASSIUM 4.2   CHLORIDE 98   CO2 26   BUN 25   CR 1.45*   ANIONGAP 7   AMY 8.6   GLC 86     Recent Results (from the past 24 hour(s))   XR Chest 2 Views    Narrative    EXAM: XR CHEST 2 VW  LOCATION: [facility]  DATE/TIME: 5/31/2022 1:55 AM    INDICATION: cancer, fever  COMPARISON: 05/23/2022      Impression    IMPRESSION: Able chest with again seen Port-A-Cath which appears be in good position. Benign calcified granuloma upper left lung. Prominent left nipple shadow. No acute cardiopulmonary abnormalities.     Medications       allopurinol  300 mg Oral Daily     atorvastatin  10 mg Oral Daily     heparin  5-10 mL Intracatheter Q28 Days     heparin lock flush  5-10 mL Intracatheter Q24H     loratadine  10 mg Oral Daily     magnesium oxide  400 mg Oral Daily     polyethylene glycol  17 g Oral Daily     sodium chloride (PF)  10-20 mL Intracatheter Q28 Days     sodium chloride (PF)  3 mL Intracatheter Q8H     tranylcypromine  10 mg Oral TID

## 2022-05-31 NOTE — PHARMACY-ADMISSION MEDICATION HISTORY
Pharmacy Medication History  Admission medication history interview status for the 5/30/2022  admission is complete. See EPIC admission navigator for prior to admission medications     Location of Interview: Patient room  Medication history sources: Patient and Surescripts    Significant changes made to the medication list:  Candesartan was changed to 2 mg once daily.    In the past week, patient estimated taking medication this percent of the time: greater than 90%     Additional medication history information:   Jann takes her parnate doses all at once.    Medication reconciliation completed by provider prior to medication history? No    Time spent in this activity: 30 minutes    Prior to Admission medications    Medication Sig Last Dose Taking? Auth Provider   oxyCODONE (ROXICODONE) 5 MG tablet Take 1 tablet (5 mg) by mouth every 4 hours as needed for moderate to severe pain 5/30/2022 Yes Nichole Thompson MD   allopurinol (ZYLOPRIM) 300 MG tablet Take 1 tablet (300 mg) by mouth daily 5/29/2022 at am  Tadeo Dugan APRN CNP   atorvastatin (LIPITOR) 10 MG tablet Take 10 mg by mouth daily 5/29/2022 at am  Unknown, Entered By History   candesartan (ATACAND) 4 MG tablet Take 0.5 tablets (2 mg) by mouth 2 times daily DISPENSE AS WRITTEN, Brand name only  Patient taking differently: Take 2 mg by mouth daily DISPENSE AS WRITTEN, Brand name only 5/29/2022 at am  Mehran Oliav MD   colchicine (COLCYRS) 0.6 MG tablet Take 1.2 mg now followed by 0.6 mg 1 hour apart then 0.6 mg daily for 2 days  Patient taking differently: as needed (gout flares) Take 1.2 mg now followed by 0.6 mg 1 hour apart then 0.6 mg daily for 2 days  at prn  Tadeo Dugan APRN CNP   loratadine (CLARITIN) 10 MG tablet Take 10 mg by mouth daily 5/29/2022 at am  Unknown, Entered By History   LORazepam (ATIVAN) 0.5 MG tablet Take 1 tablet (0.5 mg) by mouth every 6 hours as needed for anxiety, nausea or sleep 5/29/2022  Tadeo Dugan APRN CNP    Magnesium 400 MG TABS Take 800 mg by mouth daily 5/29/2022 at am  Reported, Patient   oxazepam (SERAX) 15 MG capsule Take 1 capsule (15 mg) by mouth nightly as needed for anxiety Reduced refill amt: - call [phone] to schedule appointment/fasting labs 5/29/2022 at hs  Mehran Oliva MD   PARNATE 10 MG tablet TAKE 1 TABLET BY MOUTH 3 TIMES DAILY  Patient taking differently: Take 10 mg by mouth 3 times daily 5/29/2022  Mehran Oliva MD   polyethylene glycol (MIRALAX/GLYCOLAX) packet Take 1 packet by mouth daily 5/29/2022  Reported, Patient   pregabalin (LYRICA) 25 MG capsule Take 1 capsule (25 mg) by mouth 3 times daily 5/29/2022 at pm  Nichole Thompson MD   triamcinolone (KENALOG) 0.1 % external cream Apply  topically 2 times daily as needed. 5/29/2022  Mehran Oliva MD   zolpidem (AMBIEN) 5 MG tablet Take 1 tablet (5 mg) by mouth nightly as needed for sleep Reduced refill amt: - call [phone] to schedule appointment/fasting labs 5/29/2022 at hs  Mehran Oliva MD   triamterene-HCTZ (MAXZIDE-25) 37.5-25 MG tablet TAKE 1 TABLET BY MOUTH EVERY MORNING  Patient taking differently: Take 0.5 tablets by mouth daily   Mehran Oliva MD       The information provided in this note is only as accurate as the sources available at the time of update(s)

## 2022-05-31 NOTE — ED PROVIDER NOTES
History   Chief Complaint:  Rash    The history is provided by the patient and medical records.      Jann Davidson is a 76 year old female with history of breast cancer, hypertension, and DVT who presents with a rash. The patient states she developed an itchy rash on her bilateral lower extremities after starting Lyrica a week ago. She developed a fever of 101.9 at home today as well, which prompted her visit to the emergency department today. She states rash has worsened over the past week. She denies any abdominal pain, nausea, or emesis. She has not taken any OTC allergy medications. She has history of breast cancer and just completed chemotherapy. She has a left breast lumpectomy with localized excision of the left axillary lymph node on 06/13/2022.     The patient was admitted to the hospital from 05/21/2022-05/23/2022 due to chemotherapy-induced neutropenia. The patient believed her symptoms were due to Neulasta auto infusion. She had discontinued Parnate at that time. She was told to continued Parnate and was discharged with pregabalin, lorazepam PRN, oxycodone, and Lyrica.     Review of Systems   Constitutional: Negative for fever.   Gastrointestinal: Negative for abdominal pain, nausea and vomiting.   Skin: Positive for rash.   All other systems reviewed and are negative.    Allergies:  The patient has no known allergies.     Medications:  Zyloprim  Lipitor  Atacand  Colcyrs   Claritin  Ativan  Serax  Parnate  Lyrica  Ambien     Past Medical History:     Hypertension   Recurrent sinusitis   Depression  Anxiety   Dysthymic disorder  Acute venous embolism and thrombosis of unspecified deep vessels of lower extremity   Breast cancer. Stage IIb    Past Surgical History:    Breast biopsy  Colonoscopy  Eyelid reconstruction      Family History:    Mother - Breast Cancer, Uterine Cancer, Hypertension     Social History:  The patient was unaccompanied to the emergency department.    Physical Exam     Patient  "Vitals for the past 24 hrs:   BP Temp Temp src Pulse Resp SpO2 Height Weight   05/31/22 0000 113/64 -- -- -- -- 100 % -- --   05/30/22 2300 115/61 -- -- 97 -- 100 % -- --   05/30/22 2122 95/47 98.3  F (36.8  C) Oral 105 18 100 % 1.6 m (5' 3\") 56.7 kg (125 lb)     Physical Exam  General: Resting on the gurney, appears uncomfortable  Head:  The scalp, face, and head appear normal  Mouth/Throat: Mucus membranes are moist  CV:  Regular rate    Normal S1 and S2  No pathological murmur   Resp:  Breath sounds clear and equal bilaterally    Non-labored, no retractions or accessory muscle use    No coarseness    No wheezing   GI:  Abdomen is soft, no rigidity    No tenderness to palpation  MS:  Normal motor assessment of all extremities.    Good capillary refill noted.  Skin:  Diffuse urticarial rash.   Neuro:  Speech is normal and fluent. No apparent deficit.  Psych: Awake. Alert.  Normal affect.      Appropriate interactions.    Emergency Department Course   Imaging:  XR Chest 2 Views   Final Result   IMPRESSION: Able chest with again seen Port-A-Cath which appears be in good position. Benign calcified granuloma upper left lung. Prominent left nipple shadow. No acute cardiopulmonary abnormalities.      Report per radiology    Laboratory:  Labs Ordered and Resulted from Time of ED Arrival to Time of ED Departure   ISTAT GASES LACTATE VENOUS POCT - Abnormal       Result Value    Lactic Acid POCT 0.8      Bicarbonate Venous POCT 25      O2 Sat, Venous POCT 35 (*)     pCO2V Venous POCT 44      pH Venous POCT 7.36      pO2 Venous POCT 22 (*)    BASIC METABOLIC PANEL - Abnormal    Sodium 131 (*)     Potassium 4.2      Chloride 98      Carbon Dioxide (CO2) 26      Anion Gap 7      Urea Nitrogen 25      Creatinine 1.45 (*)     Calcium 8.6      Glucose 86      GFR Estimate 37 (*)    CBC WITH PLATELETS AND DIFFERENTIAL - Abnormal    WBC Count 3.6 (*)     RBC Count 2.70 (*)     Hemoglobin 8.6 (*)     Hematocrit 27.1 (*)        "    MCH 31.9      MCHC 31.7      RDW 14.9      Platelet Count 326      % Neutrophils 52      % Lymphocytes 29      % Monocytes 15      % Eosinophils 4      % Basophils 0      % Immature Granulocytes 0      NRBCs per 100 WBC 0      Absolute Neutrophils 1.8      Absolute Lymphocytes 1.1      Absolute Monocytes 0.6      Absolute Eosinophils 0.2      Absolute Basophils 0.0      Absolute Immature Granulocytes 0.0      Absolute NRBCs 0.0     BLOOD CULTURE   BLOOD CULTURE      Emergency Department Course:     Reviewed:  I reviewed nursing notes, vitals, past medical history and Care Everywhere    Assessments:  2304 I obtained history and examined the patient as noted above.    I rechecked the patient and explained findings.    Consults:   I spoke to Dr. Landeros of the hospitalist service who accepts the patient for admission.     Interventions:  2351 Benadryl 25 mg IV  2351 Solu-Medrol 125 mg IV    Disposition:  The patient was admitted to the hospital under the care of Dr. Landeros.     Impression & Plan   Medical Decision Making:  Jann Davidson is a 76 year old female who presents for fever in the setting of neutropenia.  Patients most recent WBC was 6.0 and today was 3.6.  They are being treated for breast cancer stage IIb and are not currently undergoing chemotherapy.  Given fever in setting of recent neutropenic fever admission, with no source, I will place her on observation for further evaluation.  The patient was pan cultured and will be admitted to the hospitalist for further management.  She is hemodynamically stable and has no focal complaints.    Diagnosis:    ICD-10-CM    1. Rash  R21    2. Fever and chills  R50.9    3. Status post chemotherapy  Z92.21      Scribe Disclosure:  I, Hazel Valle, am serving as a scribe at 10:37 PM on 5/30/2022 to document services personally performed by Jazmin Timmons MD based on my observations and the provider's statements to me.     Jazmin Timmons MD  05/31/22 1459

## 2022-05-31 NOTE — H&P (VIEW-ONLY)
[facility]    History and Physical  Hospitalist       Date of Admission:  5/30/2022  Date of Service (when I saw the patient): 05/31/22    ASSESSMENT  Jann Davidson is a pleasant 76 year old woman with past medical history that is most notable for breast cancer, as well as chronic depression and CKD Stage 3, among others; who presents with rash and fever.    PLAN    Rash and fever: of note, Ms. Davidson is followed by  Oncology for Stage 2b triple negative breast cancer, diagnosed 10/2021, having now completed multiple cycles of chemotherapy (last received some time around 5/6/2022), scheduled now for upcoming surgery by Dr. Rai of General Surgery reportedly on 6/13/2022. She was hospitalized here from 5/21/22 to 5/23/22 for generalized weakness and paresthesias leading to falls. 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 TTE showed preserved LVEF. Lyrica was prescribed. Hydrochlorothiazide/Triamterene was stopped. Prior to discharge she developed a fever for which no clear source was found (with negative cultures, CXR, COVID and UA). It seems that after discharge, on 5/25, she was treated as an outpatient with Azithromycin for facial pain attributed to maxiallry sinustis and completed the course of antibiotic. Now, she presents with progressive pruritic maculopapular rash as well as reported ongoing intermittent fevers. It seems that her rash could be due to drug reaction from Azithromycin or possibly Lyrica. Her fevers could be related to that, vs due to cancer itself as there are now at present no clear signs of acute infection. She is not neutropenic currently and CXR was negative for acute pathology.     -- Observation. Prn benadryl 25-50 mg q 4 hours ordered. Monitor serial exams.    -- Hold off on antibiotics for now. Follow up UA and blood  and urine cultures. 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    -- Oncology consulted for further evaluation. Resume home prn Oxycodone when verified.    -- Hold Lyrica for now pending further clinical assessment in AM    Ongoing mild hyponatremia: Na 131; seen last week when hospitalized as well (it was as low as 126 on 5/21, improved to 132 by discharge 5/23). Monitor closely for now, encourage fluid intake.    Chronic anemia and leukopenia: ANC 1.8 tonight. Daily CBC with differential ordered.  Hemoglobin   Date Value Ref Range Status   05/30/2022 8.6 (L) 11.7 - 15.7 g/dL Final   05/23/2022 7.3 (L) 11.7 - 15.7 g/dL Final   04/30/2018 13.1 11.7 - 15.7 g/dL Final   07/17/2016 14.1 11.7 - 15.7 g/dL Final     Stage 3 CKD: Reportedly followed as an outpatient by Dr. Luna of [facility]. Monitor labs closely while hospitalized    Recent Labs   Lab Test 05/30/22  2259 05/23/22  0506 05/22/22  0628   CR 1.45* 1.25* 1.21*       Generalized anxiety disorder:  Depression:  Insomnia:  She is on a MAO Inhibitor and is meant not to have Tyrosine in her diet; however she says she eats non-aged mozzarella cheese regularly at home without issue. Currently she is markedly distraught.    -- One time dose of Ativan ordered now for rest and sleep    -- Resume home Parnate, prn Ativan and Oxcarbazepam when verified.    History of gout: Resume home Allopurinol when verified. She also takes Colchicine with flares.    Hypertension:   Dyslipidemia:    -- Resume home Candesartan, Lipitor when verified.    Rule Out COVID-19 infection  This patient was evaluated during a global COVID-19 pandemic, which necessitated consideration that the patient might be at risk for infection with the SARS-CoV-2 virus that causes COVID-19. Applicable protocols for evaluation were followed during the patient's care.   -follow up  COVID-19 PCR test result  -no current indication for precautions    Chief Complaint   Rash    History is obtained from the patient and the ED physician whom I have spoken with    History of Present Illness   Jann Davidson is a pleasant 76 year old woman who presents with rash. This is a very itchy rash that started several days ago. She says it started just prior to her having left the hospital 5/23/2022. It was very mild at that time but she says several days ago it really started to bother her. It has now spread throughout her arms and especially her legs. It has been associated with ongoing fatigue, which she attributes to ongoing side effect from previous Neulasta injections. She felt so weak several days ago that she missed a phone call scheduling her pre-operative H and P and so her upcoming surgery has now been postponed to 6/13 from 5/31, and she is very distressed by this delay. Last week she also felt onset of headache and facial pain, and on 5/25 at a virtual visit with a provider she was given Azithromycin and finished a course of that with resolution of her symptoms. She adds that at the beginning of 5/2022 she had an outbreak of gout in her left toe, treated with medication, that has now resolved. Her only other medication she currently takes that is relatively new to her is Lyrica, started while she was in the hospital. She endorses intermittent fevers since discharge, and had a fever earlier today. She otherwise denies cough, nausea, dyspnea, diarrhea, or any other acute complaints.    In the ED, 121/71, 105, 98.3, 100% on room air.    CBC and BMP were notable for WBC 3.6, HGB 8.6, Na 131, BUN 25, Cr 1.45, otherwise were within the normal reference range. Lactate was 0.8. VBG showed 7.36/44. Blood cultures were sent.    She was also given Solu Medrol 125 mg in the ED.    Recent Results (from the past 24 hour(s))   XR Chest 2 Views    Narrative    EXAM: XR CHEST 2 VW  LOCATION: [facility]  [facility]  DATE/TIME: 5/31/2022 1:55 AM    INDICATION: cancer, fever  COMPARISON: 05/23/2022      Impression    IMPRESSION: Able chest with again seen Port-A-Cath which appears be in good position. Benign calcified granuloma upper left lung. Prominent left nipple shadow. No acute cardiopulmonary abnormalities.       PHYSICAL EXAM  Blood pressure 115/71, pulse 94, temperature 98.3  F (36.8  C), temperature source Oral, resp. rate 18, height 1.6 m (5' 3\"), weight 56.7 kg (125 lb), SpO2 99 %.  Constitutional: Alert and oriented to person, place and time; distraught  HEENT: alopecia, moist mucus membranes  Respiratory: lungs clear to auscultation bilaterally  Cardiovascular: regular S1 S2  GI: abdomen soft non tender non distended bowel sounds positive  Lymph/Hematologic: pale, no cervical lymphadenopathy  Skin: diffuse maculopapular rash over arms and legs, good turgor  Musculoskeletal: no clubbing, cyanosis or edema  Neurologic: extra-ocular muscles intact; moves all four extremities  Psychiatric: markedly anxious affect, speech non-tangential     DVT Prophylaxis: Pneumatic Compression Devices  Code Status: Full Code    Disposition: Expected discharge in 0-2 days    Guillermo Landeros MD, MD    Past Medical History    I have reviewed this patient's medical history and updated it with pertinent information if needed.   Past Medical History:   Diagnosis Date     Breast cancer (H)      CKD (chronic kidney disease) stage 3, GFR 30-59 ml/min (H)      Depression with anxiety      Gout      Hypertension goal BP (blood pressure) < 140/90      Recurrent sinusitis 12/02/2013       Past Surgical History   I have reviewed this patient's surgical history and updated it with pertinent information if needed.  Past Surgical History:   Procedure Laterality Date     BREAST BIOPSY, RT/LT      Breat Biopsy RT/LT     COLONOSCOPY  10/03     COLONOSCOPY  4/8/2014    Procedure: COLONOSCOPY;  COLONOSCOPY ;  Surgeon: Edmund" Gaurav Moe MD;  Location:  GI     IR CHEST PORT PLACEMENT > 5 YRS OF AGE  12/29/2021     RECONSTRUCT EYELID         Prior to Admission Medications   Prior to Admission Medications   Prescriptions Last Dose Informant Patient Reported? Taking?   LORazepam (ATIVAN) 0.5 MG tablet   No No   Sig: Take 1 tablet (0.5 mg) by mouth every 6 hours as needed for anxiety, nausea or sleep   Magnesium 400 MG TABS   Yes No   Sig: Take 800 mg by mouth daily   PARNATE 10 MG tablet   No No   Sig: TAKE 1 TABLET BY MOUTH 3 TIMES DAILY   Patient taking differently: Take 10 mg by mouth 3 times daily   allopurinol (ZYLOPRIM) 300 MG tablet   No No   Sig: Take 1 tablet (300 mg) by mouth daily   atorvastatin (LIPITOR) 10 MG tablet   Yes No   Sig: Take 10 mg by mouth daily   azithromycin (ZITHROMAX) 250 MG tablet   No No   Sig: Take 2 tablets (500 mg) by mouth daily for 1 day, THEN 1 tablet (250 mg) daily for 4 days.   candesartan (ATACAND) 4 MG tablet   No No   Sig: Take 0.5 tablets (2 mg) by mouth 2 times daily DISPENSE AS WRITTEN, Brand name only   colchicine (COLCYRS) 0.6 MG tablet   No No   Sig: Take 1.2 mg now followed by 0.6 mg 1 hour apart then 0.6 mg daily for 2 days   Patient taking differently: as needed (gout flares) Take 1.2 mg now followed by 0.6 mg 1 hour apart then 0.6 mg daily for 2 days   loratadine (CLARITIN) 10 MG tablet   Yes No   Sig: Take 10 mg by mouth daily   oxazepam (SERAX) 15 MG capsule   No No   Sig: Take 1 capsule (15 mg) by mouth nightly as needed for anxiety Reduced refill amt: - call [phone] to schedule appointment/fasting labs   oxyCODONE (ROXICODONE) 5 MG tablet   No No   Sig: Take 1 tablet (5 mg) by mouth every 4 hours as needed for moderate to severe pain   polyethylene glycol (MIRALAX/GLYCOLAX) packet   Yes No   Sig: Take 1 packet by mouth daily   pregabalin (LYRICA) 25 MG capsule   No No   Sig: Take 1 capsule (25 mg) by mouth 3 times daily   triamcinolone (KENALOG) 0.1 % external cream   No No    Sig: Apply  topically 2 times daily as needed.   zolpidem (AMBIEN) 5 MG tablet   No No   Sig: Take 1 tablet (5 mg) by mouth nightly as needed for sleep Reduced refill amt: - call [phone] to schedule appointment/fasting labs      Facility-Administered Medications: None     Allergies   No Known Allergies    Social History   I have reviewed this patient's social history and updated it with pertinent information if needed. Jann Davidson  reports that she quit smoking about 49 years ago. Her smoking use included cigarettes. She has never used smokeless tobacco. She reports current alcohol use. She reports that she does not use drugs.    Family History   Family history assessed and, except as above, is non-contributory.    Family History   Problem Relation Age of Onset     Cancer Mother         uterine     Breast Cancer Mother      Diabetes Paternal Grandmother        Review of Systems   The 10 point Review of Systems is negative other than noted in the HPI or here.     Primary Care Physician   Mehran Oliva    Data   Labs Ordered and Resulted from Time of ED Arrival to Time of ED Departure   ISTAT GASES LACTATE VENOUS POCT - Abnormal       Result Value    Lactic Acid POCT 0.8      Bicarbonate Venous POCT 25      O2 Sat, Venous POCT 35 (*)     pCO2V Venous POCT 44      pH Venous POCT 7.36      pO2 Venous POCT 22 (*)    BASIC METABOLIC PANEL - Abnormal    Sodium 131 (*)     Potassium 4.2      Chloride 98      Carbon Dioxide (CO2) 26      Anion Gap 7      Urea Nitrogen 25      Creatinine 1.45 (*)     Calcium 8.6      Glucose 86      GFR Estimate 37 (*)    CBC WITH PLATELETS AND DIFFERENTIAL - Abnormal    WBC Count 3.6 (*)     RBC Count 2.70 (*)     Hemoglobin 8.6 (*)     Hematocrit 27.1 (*)           MCH 31.9      MCHC 31.7      RDW 14.9      Platelet Count 326      % Neutrophils 52      % Lymphocytes 29      % Monocytes 15      % Eosinophils 4      % Basophils 0      % Immature Granulocytes 0      NRBCs  per 100 WBC 0      Absolute Neutrophils 1.8      Absolute Lymphocytes 1.1      Absolute Monocytes 0.6      Absolute Eosinophils 0.2      Absolute Basophils 0.0      Absolute Immature Granulocytes 0.0      Absolute NRBCs 0.0     ROUTINE UA WITH MICROSCOPIC REFLEX TO CULTURE   COVID-19 VIRUS (CORONAVIRUS) BY PCR   BLOOD CULTURE   BLOOD CULTURE   URINE CULTURE       Data reviewed today:  I personally reviewed the chest x-ray image(s) showing no acute pathology.

## 2022-05-31 NOTE — ED NOTES
[facility]  ED Nurse Handoff Report    ED Chief complaint: Fever      ED Diagnosis:   Final diagnoses:   Rash   Fever and chills   Status post chemotherapy       Code Status: Full Code    Allergies: No Known Allergies    Patient Story: pt with hx of breast ca - last chemo three weeks ago. Recently diagnosed with neuropathy of feet and started on lyrica one week ago. Pt reports itchy rash to b/l legs, b/l arms and chest for couple days that have gotten progressively worse. Pt further reports fever x2 days.  Focused Assessment:  Pt a&o x3, respirations easy and unlabored. Denies pain. See above    Treatments and/or interventions provided: fluids, labs, imaging  Patient's response to treatments and/or interventions: fair    To be done/followed up on inpatient unit:  n/a    Does this patient have any cognitive concerns?: none    Activity level - Baseline/Home:  Independent  Activity Level - Current:   Independent    Patient's Preferred language: English   Needed?: No    Isolation: None  Infection: Not Applicable  Patient tested for COVID 19 prior to admission: YES  Bariatric?: No    Vital Signs:   Vitals:    05/31/22 0000 05/31/22 0100 05/31/22 0115 05/31/22 0130   BP: 113/64 121/71 116/73 115/71   Pulse:  93 94 94   Resp:       Temp:       TempSrc:       SpO2: 100% 98% 100% 99%   Weight:       Height:           Cardiac Rhythm:     Was the PSS-3 completed:   Yes  What interventions are required if any?               Family Comments: n/a  OBS brochure/video discussed/provided to patient/family: N/A              Name of person given brochure if not patient: n/a              Relationship to patient: n/a    For the majority of the shift this patient's behavior was Green.   Behavioral interventions performed were reassurance and information.    ED NURSE PHONE NUMBER: *[phone]

## 2022-05-31 NOTE — ED TRIAGE NOTES
Patient here with a fever today of 101.9. she stated she took tylenol 2 hours ago. She also here with a rash which started on Monday. Hx of breast cancer ,not currently on treatment

## 2022-06-01 VITALS
WEIGHT: 124.8 LBS | OXYGEN SATURATION: 96 % | HEIGHT: 63 IN | HEART RATE: 103 BPM | RESPIRATION RATE: 18 BRPM | DIASTOLIC BLOOD PRESSURE: 60 MMHG | SYSTOLIC BLOOD PRESSURE: 90 MMHG | BODY MASS INDEX: 22.11 KG/M2 | TEMPERATURE: 99.2 F

## 2022-06-01 LAB
ANION GAP SERPL CALCULATED.3IONS-SCNC: 6 MMOL/L (ref 3–14)
BACTERIA UR CULT: NORMAL
BASOPHILS # BLD AUTO: 0 10E3/UL (ref 0–0.2)
BASOPHILS NFR BLD AUTO: 1 %
BUN SERPL-MCNC: 22 MG/DL (ref 7–30)
CALCIUM SERPL-MCNC: 8.7 MG/DL (ref 8.5–10.1)
CHLORIDE BLD-SCNC: 101 MMOL/L (ref 94–109)
CO2 SERPL-SCNC: 26 MMOL/L (ref 20–32)
CREAT SERPL-MCNC: 1.32 MG/DL (ref 0.52–1.04)
EOSINOPHIL # BLD AUTO: 0.2 10E3/UL (ref 0–0.7)
EOSINOPHIL NFR BLD AUTO: 3 %
ERYTHROCYTE [DISTWIDTH] IN BLOOD BY AUTOMATED COUNT: 15.1 % (ref 10–15)
GFR SERPL CREATININE-BSD FRML MDRD: 42 ML/MIN/1.73M2
GLUCOSE BLD-MCNC: 94 MG/DL (ref 70–99)
HCT VFR BLD AUTO: 23.9 % (ref 35–47)
HGB BLD-MCNC: 7.8 G/DL (ref 11.7–15.7)
IMM GRANULOCYTES # BLD: 0 10E3/UL
IMM GRANULOCYTES NFR BLD: 0 %
LYMPHOCYTES # BLD AUTO: 1.4 10E3/UL (ref 0.8–5.3)
LYMPHOCYTES NFR BLD AUTO: 32 %
MCH RBC QN AUTO: 31.8 PG (ref 26.5–33)
MCHC RBC AUTO-ENTMCNC: 32.6 G/DL (ref 31.5–36.5)
MCV RBC AUTO: 98 FL (ref 78–100)
MONOCYTES # BLD AUTO: 0.7 10E3/UL (ref 0–1.3)
MONOCYTES NFR BLD AUTO: 15 %
NEUTROPHILS # BLD AUTO: 2.1 10E3/UL (ref 1.6–8.3)
NEUTROPHILS NFR BLD AUTO: 49 %
NRBC # BLD AUTO: 0 10E3/UL
NRBC BLD AUTO-RTO: 0 /100
PLATELET # BLD AUTO: 312 10E3/UL (ref 150–450)
POTASSIUM BLD-SCNC: 3.7 MMOL/L (ref 3.4–5.3)
RBC # BLD AUTO: 2.45 10E6/UL (ref 3.8–5.2)
SODIUM SERPL-SCNC: 133 MMOL/L (ref 133–144)
WBC # BLD AUTO: 4.4 10E3/UL (ref 4–11)

## 2022-06-01 PROCEDURE — 250N000013 HC RX MED GY IP 250 OP 250 PS 637: Performed by: INTERNAL MEDICINE

## 2022-06-01 PROCEDURE — 250N000011 HC RX IP 250 OP 636: Performed by: INTERNAL MEDICINE

## 2022-06-01 PROCEDURE — G0378 HOSPITAL OBSERVATION PER HR: HCPCS

## 2022-06-01 PROCEDURE — 250N000012 HC RX MED GY IP 250 OP 636 PS 637: Performed by: INTERNAL MEDICINE

## 2022-06-01 PROCEDURE — 80048 BASIC METABOLIC PNL TOTAL CA: CPT | Performed by: HOSPITALIST

## 2022-06-01 PROCEDURE — 250N000013 HC RX MED GY IP 250 OP 250 PS 637: Performed by: HOSPITALIST

## 2022-06-01 PROCEDURE — 99207 PR CDG-CODE CATEGORY CHANGED: CPT | Performed by: INTERNAL MEDICINE

## 2022-06-01 PROCEDURE — 99217 PR OBSERVATION CARE DISCHARGE: CPT | Performed by: INTERNAL MEDICINE

## 2022-06-01 PROCEDURE — 99214 OFFICE O/P EST MOD 30 MIN: CPT | Performed by: INTERNAL MEDICINE

## 2022-06-01 PROCEDURE — 85025 COMPLETE CBC W/AUTO DIFF WBC: CPT | Performed by: HOSPITALIST

## 2022-06-01 RX ORDER — METHYLPREDNISOLONE 4 MG/1
4 TABLET ORAL AT BEDTIME
Status: DISCONTINUED | OUTPATIENT
Start: 2022-06-03 | End: 2022-06-01 | Stop reason: HOSPADM

## 2022-06-01 RX ORDER — HYDROXYZINE HYDROCHLORIDE 25 MG/1
25 TABLET, FILM COATED ORAL EVERY 6 HOURS PRN
Status: DISCONTINUED | OUTPATIENT
Start: 2022-06-01 | End: 2022-06-01 | Stop reason: HOSPADM

## 2022-06-01 RX ORDER — HYDROXYZINE HYDROCHLORIDE 25 MG/1
25 TABLET, FILM COATED ORAL EVERY 6 HOURS PRN
Qty: 30 TABLET | Refills: 0 | Status: SHIPPED | OUTPATIENT
Start: 2022-06-01 | End: 2022-06-20

## 2022-06-01 RX ORDER — DOCUSATE SODIUM 100 MG/1
300 CAPSULE, LIQUID FILLED ORAL DAILY PRN
Status: DISCONTINUED | OUTPATIENT
Start: 2022-06-01 | End: 2022-06-01 | Stop reason: HOSPADM

## 2022-06-01 RX ORDER — METHYLPREDNISOLONE 4 MG/1
4 TABLET ORAL
Status: DISCONTINUED | OUTPATIENT
Start: 2022-06-02 | End: 2022-06-01 | Stop reason: HOSPADM

## 2022-06-01 RX ORDER — OXYCODONE HYDROCHLORIDE 5 MG/1
5 TABLET ORAL EVERY 4 HOURS PRN
Qty: 15 TABLET | Refills: 0 | Status: SHIPPED | OUTPATIENT
Start: 2022-06-01 | End: 2022-11-17

## 2022-06-01 RX ORDER — GABAPENTIN 300 MG/1
300 CAPSULE ORAL 2 TIMES DAILY
Status: DISCONTINUED | OUTPATIENT
Start: 2022-06-01 | End: 2022-06-01 | Stop reason: HOSPADM

## 2022-06-01 RX ORDER — METHYLPREDNISOLONE 4 MG/1
8 TABLET ORAL ONCE
Status: COMPLETED | OUTPATIENT
Start: 2022-06-01 | End: 2022-06-01

## 2022-06-01 RX ORDER — METHYLPREDNISOLONE 4 MG/1
4 TABLET ORAL ONCE
Status: COMPLETED | OUTPATIENT
Start: 2022-06-01 | End: 2022-06-01

## 2022-06-01 RX ORDER — METHYLPREDNISOLONE 4 MG
TABLET, DOSE PACK ORAL
Qty: 21 TABLET | Refills: 0 | Status: SHIPPED | OUTPATIENT
Start: 2022-06-01 | End: 2022-06-01

## 2022-06-01 RX ORDER — GABAPENTIN 300 MG/1
300 CAPSULE ORAL 2 TIMES DAILY
Qty: 60 CAPSULE | Refills: 0 | Status: SHIPPED | OUTPATIENT
Start: 2022-06-01 | End: 2022-06-20

## 2022-06-01 RX ORDER — GABAPENTIN 300 MG/1
300 CAPSULE ORAL 2 TIMES DAILY
Qty: 60 CAPSULE | Refills: 0 | Status: SHIPPED | OUTPATIENT
Start: 2022-06-01 | End: 2022-06-01

## 2022-06-01 RX ORDER — METHYLPREDNISOLONE 4 MG
TABLET, DOSE PACK ORAL
Qty: 21 TABLET | Refills: 0 | Status: SHIPPED | OUTPATIENT
Start: 2022-06-01 | End: 2022-06-20

## 2022-06-01 RX ORDER — METHYLPREDNISOLONE 4 MG/1
8 TABLET ORAL AT BEDTIME
Status: DISCONTINUED | OUTPATIENT
Start: 2022-06-01 | End: 2022-06-01 | Stop reason: HOSPADM

## 2022-06-01 RX ORDER — TRANYLCYPROMINE 10 MG/1
30 TABLET ORAL DAILY
Status: DISCONTINUED | OUTPATIENT
Start: 2022-06-01 | End: 2022-06-01 | Stop reason: HOSPADM

## 2022-06-01 RX ADMIN — OXYCODONE HYDROCHLORIDE 5 MG: 5 TABLET ORAL at 01:35

## 2022-06-01 RX ADMIN — OXYCODONE HYDROCHLORIDE 5 MG: 5 TABLET ORAL at 05:40

## 2022-06-01 RX ADMIN — DIPHENHYDRAMINE HYDROCHLORIDE 50 MG: 25 CAPSULE ORAL at 04:25

## 2022-06-01 RX ADMIN — METHYLPREDNISOLONE 8 MG: 4 TABLET ORAL at 13:10

## 2022-06-01 RX ADMIN — TRANYLCYPROMINE SULFATE 30 MG: 10 TABLET, FILM COATED ORAL at 08:53

## 2022-06-01 RX ADMIN — POLYETHYLENE GLYCOL 3350 17 G: 17 POWDER, FOR SOLUTION ORAL at 09:00

## 2022-06-01 RX ADMIN — Medication 5 ML: at 17:38

## 2022-06-01 RX ADMIN — DIPHENHYDRAMINE HYDROCHLORIDE 50 MG: 25 CAPSULE ORAL at 00:17

## 2022-06-01 RX ADMIN — HYDROXYZINE HYDROCHLORIDE 25 MG: 25 TABLET, FILM COATED ORAL at 13:10

## 2022-06-01 RX ADMIN — OXYCODONE HYDROCHLORIDE 5 MG: 5 TABLET ORAL at 08:53

## 2022-06-01 RX ADMIN — DIPHENHYDRAMINE HYDROCHLORIDE 50 MG: 25 CAPSULE ORAL at 08:53

## 2022-06-01 RX ADMIN — SODIUM CHLORIDE, PRESERVATIVE FREE 5 ML: 5 INJECTION INTRAVENOUS at 05:57

## 2022-06-01 RX ADMIN — LORATADINE 10 MG: 10 TABLET ORAL at 08:54

## 2022-06-01 RX ADMIN — METHYLPREDNISOLONE 4 MG: 4 TABLET ORAL at 14:37

## 2022-06-01 RX ADMIN — ATORVASTATIN CALCIUM 10 MG: 10 TABLET, FILM COATED ORAL at 08:53

## 2022-06-01 RX ADMIN — ALLOPURINOL 300 MG: 300 TABLET ORAL at 08:53

## 2022-06-01 RX ADMIN — LORAZEPAM 0.5 MG: 0.5 TABLET ORAL at 01:52

## 2022-06-01 RX ADMIN — LORAZEPAM 0.5 MG: 0.5 TABLET ORAL at 08:54

## 2022-06-01 RX ADMIN — Medication 400 MG: at 08:53

## 2022-06-01 RX ADMIN — METHYLPREDNISOLONE 4 MG: 4 TABLET ORAL at 16:45

## 2022-06-01 ASSESSMENT — ACTIVITIES OF DAILY LIVING (ADL)
CONCENTRATING,_REMEMBERING_OR_MAKING_DECISIONS_DIFFICULTY: NO
CHANGE_IN_FUNCTIONAL_STATUS_SINCE_ONSET_OF_CURRENT_ILLNESS/INJURY: NO
DIFFICULTY_COMMUNICATING: NO
DOING_ERRANDS_INDEPENDENTLY_DIFFICULTY: NO
TOILETING_ISSUES: NO
HEARING_DIFFICULTY_OR_DEAF: NO
WALKING_OR_CLIMBING_STAIRS_DIFFICULTY: NO
DRESSING/BATHING_DIFFICULTY: NO
WEAR_GLASSES_OR_BLIND: NO
DIFFICULTY_EATING/SWALLOWING: NO
FALL_HISTORY_WITHIN_LAST_SIX_MONTHS: NO

## 2022-06-01 NOTE — CONSULTS
Consult Date: 05/30/2022    This consult has been requested by Dr. Landeros for breast cancer.    Ms. Davidson is a 76-year-old female with triple negative stage IIB left breast cancer.  The patient received neoadjuvant treatment with carboplatin, paclitaxel, and pembrolizumab.  The patient is scheduled for left breast lumpectomy and limited left axillary dissection in about 2 weeks.  Last treatment was with Taxol and pembrolizumab on 05/06/2022.    The patient has peripheral neuropathy.  She has burning sensation below the waist bilaterally.  This neuropathy is from Taxol.  The patient was started on Lyrica on 05/22/2022.    The patient presented to the Emergency Room because of a skin rash and itching.  The patient mentioned that her skin rash had started about 10 days ago. It was initially mild. Her skin rash progressively got worse.  She started to have itching.  Because of itching, her sleeping was disturbed. Patient mentioned that she had fever at home.    I discussed regarding her medications. She had noticed some skin rash on 05/21/2022.   It was before she started Lyrica. The patient was started on allopurinol on 05/06/2022.  That can also cause a rash.  The patient was started on Zithromax on 05/24/2022.  Again, this was started after she already had some rash.    The patient feels weak.  No headache.  No dizziness.  No chest pain.  No shortness of breath.  No cough.  She did not have any swelling of the face or tongue.  No urinary or bowel complaints.  All other review of systems negative.    ALLERGIES:  REVIEWED.    MEDICATIONS:  Reviewed.    PAST MEDICAL HISTORY:    1.  Triple negative left breast cancer.  2.  Hypertension.  3.  Depression and anxiety.  4.  Gout.  5.  Recurrent sinusitis.  6.  Eyelid surgery.    SOCIAL HISTORY:    -She quit smoking many years ago.    -Occasional alcohol use.    PHYSICAL ASSESSMENT:    GENERAL:  She is alert and oriented x 3.  Not in distress.    VITAL SIGNS:   Reviewed.  FACE:  No swelling.  THROAT:  No swelling of the tongue.  SKIN:  There is a faint erythematous maculopapular rash.  Rash is on the chest, extremities, and the abdomen.    LABORATORY DATA:  CBC and BMP done yesterday reviewed.    ASSESSMENT:    1.  A 76-year-old female with triple negative left breast cancer status post neoadjuvant chemotherapy and immunotherapy.  2.  Skin rash.  Could be drug rash or could be from some viral infection.  3.  Fever.  No documented fever in the hospital.  She had fever at home.  4.  Peripheral neuropathy.  5.  Hyponatremia.  6.  Elevated creatinine.  7.  Leukopenia.  8.  Normocytic anemia.    RECOMMENDATIONS:    1.  The patient has triple negative breast cancer.  She received neoadjuvant chemotherapy and immunotherapy.  Plan is for left breast lumpectomy.  Further management as per primary oncologist, Dr. Neetu Mcmullen.    2.  Discussed regarding rash.  I explained to her this could be due to multiple reasons.  Likely cause is medication.  At this time, it is hard to say which medication caused it. One possibility is the allopurinol as it was started before her rash appeared.  Lyrica and Zithromax was started after the rash, so I do not think that the cause.  Her Lyrica has been stopped. She has completed zithromax. Patient had fever at home.  No documented fever in the hospital.  She could have viral infection, which could have caused both the fever and the rash.      At this time, the rash is mild to moderate.  It causes some itching.  She is taking Benadryl, which helps.  She is on Claritin.  She also received Solu-Medrol.  I am hoping that will improve.  If there is no significant improvement, one option can be give her a short course of steroid.  We will consider stopping allopurinol, if the rash does not improve.      The patient has peripheral neuropathy.  She was on Lyrica, which has been stopped.  I told her that we can treat her with gabapentin.  I want to see how  her rash is before we plan on starting gabapentin.    3.  Labs with a few abnormalities.  Anemia and thrombocytopenia is from chemotherapy.  Hyponatremia is from malignancy and its treatment.  It will be monitored.    4.  The patient had a few questions, which were all answered.  Hematology/Oncology will continue to follow.    Thanks for the consult.    TOTAL TIME SPENT:  55 minutes.  Time spent in today's visit, review of chart/investigations today and documentation today.    Laila Gibbs MD        D: 2022   T: 2022   MT: DYLAN    Name:     TARAS WILSONSuzanna  MRN:      [id number]        Account:      [id number]   :      1945           Consult Date: 2022     Document: [id number]

## 2022-06-01 NOTE — PLAN OF CARE
Goal Outcome Evaluation:        Vitals stable. Independent in room. Port R chest patent with good blood return. Generalized rash to entire body, patient states that it 'looks better'. Itching and pain not controlled with Oxy x1, Benedryl x1. Patient anxious and restless, resting this afternoon. Oncology started Atarax and Prednisone dosepak. Discharge orders placed, discharge after 1600.

## 2022-06-01 NOTE — PROVIDER NOTIFICATION
"MD Notification    Notified Person: MD    Notified Person Name: Dr. Roberts    Notification Date/Time: 06/01, 0954    Notification Interaction: [employer] Messaging     Purpose of Notification: Pt has had 8-9/10 pain this morning and severe itching from her rash. She has taken the oxycodone, Benadryl, and Ativan with no relief. She states the hydrocortisone cream does not work either. Any other suggestions?    Orders Received: \"I am waiting for Dr. Gibbs's opinion today. He talks about adding steroids for the rash and/or Gabapentin for pain but wants to see how the rash evolves. No new orders at this time.\"    Comments: Dr. Gibbs paged at 1050, responded at 1140; Placing orders for Atarax and Prednisone.  "

## 2022-06-01 NOTE — DISCHARGE SUMMARY
[facility]  Hospitalist Discharge Summary      Date of Admission:  5/30/2022  Date of Discharge:  6/1/2022  Discharging Provider: Jazmyn Roberts MD  Discharge Service: Hospitalist Service    Discharge Diagnoses   Rash, etiology unknown, possibly due to medications  SIRS, resolving  Generalized anxiety disorder  Depression  Insomnia  History of gout  Hypertension  Dyslipidemia    Follow-ups Needed After Discharge   Follow-up Appointments     Follow-up and recommended labs and tests       Follow up with primary care provider, Mehran Oliva, within 7 days   to evaluate medication change and for hospital follow- up.  The following   labs/tests are recommended: BMP, CBC.             Unresulted Labs Ordered in the Past 30 Days of this Admission     Date and Time Order Name Status Description    5/31/2022 12:55 AM Blood Culture Arm, Left Preliminary     5/31/2022 12:55 AM Blood Culture Peripheral Blood Preliminary       These results will be followed up by primary MD    Discharge Disposition   Discharged to home  Condition at discharge: Stable      Hospital Course            Rash and fever  SIRS  SIRS, resolving: based on HR > 90 bpm and WBC < 4, without known infection.    of note, Ms. Davidson is followed by  Oncology for Stage 2b triple negative breast cancer, diagnosed 10/2021, having now completed multiple cycles of chemotherapy (last received some time around 5/6/2022), scheduled now for upcoming surgery by Dr. Rai of General Surgery reportedly on 6/13/2022. She was hospitalized here from 5/21/22 to 5/23/22 for generalized weakness and paresthesias leading to falls. 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 TTE showed preserved LVEF. Lyrica was prescribed. Hydrochlorothiazide/Triamterene was stopped. Prior to discharge she developed a  fever for which no clear source was found (with negative cultures, CXR, COVID and UA). It seems that after discharge, on 5/25, she was treated as an outpatient with Azithromycin for facial pain attributed to maxiallry sinustis and completed the course of antibiotic. Now, she presents with progressive pruritic maculopapular rash as well as reported ongoing intermittent fevers. It seems that her rash could be due to drug reaction from Azithromycin, Lyrica or allopurinol.  Her fevers could be related to that, vs due to cancer itself as there are now at present no clear signs of acute infection. She is not neutropenic currently and CXR was negative for acute pathology.     Jann was seen in consultation by Dr. Gibbs of [facility].  He indicates her rash could be from medications or viral infection.  He felt the likely culprit for rash is medication, notes that Lyrica and azithromycin were started after her rash developed, so it is less likely that rash is from either of these medications.  He recommended discontinuing allopurinol.    He recommended addition of Medrol Dosepak and Atarax for her rash and itching.    Jann has peripheral neuropathy related to her chemotherapy.  He added gabapentin since Lyrica has been discontinued.  Jann also says she gets relief of her nerve pain with oxycodone and I provided a prescription for 15 oxycodone tablets.  She is anxious for discharge and is dismissed home in good condition.     Breast cancer: reportedly causing ongoing suspected peripheral neuropathy due to chemotherapy.    Oncology consulted for further evaluation.     Resumed home prn Oxycodone     Surgery is planned later this month     Ongoing mild hyponatremia: Na 131; seen last week when hospitalized as well (it was as low as 126 on 5/21, improved to 132 by discharge 5/23).    Monitor, encourage fluid intake.     Chronic anemia and leukopenia: ANC 1.8 tonight. Daily CBC with differential ordered.        Hemoglobin    Date Value Ref Range Status   05/30/2022 8.6 (L) 11.7 - 15.7 g/dL Final   05/23/2022 7.3 (L) 11.7 - 15.7 g/dL Final   04/30/2018 13.1 11.7 - 15.7 g/dL Final   07/17/2016 14.1 11.7 - 15.7 g/dL Final      Stage 3 CKD: Reportedly followed as an outpatient by Dr. Luna of [facility].     Monitor labs closely while hospitalized           Recent Labs   Lab Test 05/30/22  2259 05/23/22  0506 05/22/22  0628   CR 1.45* 1.25* 1.21*         Generalized anxiety disorder:  Depression:  Insomnia:  She is on a MAO Inhibitor and is meant not to have Tyrosine in her diet; however she says she eats non-aged mozzarella cheese regularly at home without issue.     One time dose of Ativan ordered now for rest and sleep    Resume home Parnate, prn Ativan.  We also do not stock oxazepam, substituted Ativan.     History of gout: Resume home Allopurinol. She also takes Colchicine with flares.     Hypertension:   Dyslipidemia:    Our pharmacy does not stock candesartan, therefore cannot resume.  Could auto substitute losartan, but do not wish to give a new medication to a patient admitted with unexplained rash.  Meanwhile, blood pressure readings are at goal    Resume statin       Consultations This Hospital Stay   HEMATOLOGY & ONCOLOGY IP CONSULT    Code Status   Full Code    Time Spent on this Encounter   I, Jazmyn Roberts MD, personally saw the patient today and spent greater than 30 minutes discharging this patient.       Jazmyn Roberts MD  [place] [address] ONCOLOGY  [facility] [address]  [facility] [address]  Phone: [phone]  ______________________________________________________________________    Physical Exam   Vital Signs: Temp: 99.1  F (37.3  C) Temp src: Oral BP: 93/48 Pulse: 98   Resp: 16 SpO2: 99 % O2 Device: None (Room air)    Weight: 124 lbs 12.8 oz  I saw and examined the patient on the date of discharge.           Primary Care Physician   Mehran Oliva    Discharge Orders       Reason for your hospital stay    You had a new rash.     Follow-up and recommended labs and tests     Follow up with primary care provider, Mehran Oliva, within 7 days to evaluate medication change and for hospital follow- up.  The following labs/tests are recommended: BMP, CBC.     Activity    Your activity upon discharge: activity as tolerated.  Do not drive when you are taking the new medications.     Diet    Follow this diet upon discharge: Orders Placed This Encounter      Combination Diet Vegetarian Diet; Other - please comment       Significant Results and Procedures   Most Recent 3 CBC's:Recent Labs   Lab Test 06/01/22  0545 05/30/22 2259 05/23/22  1116   WBC 4.4 3.6* 6.0   HGB 7.8* 8.6* 7.3*   MCV 98 100 97    326 258     Most Recent 3 BMP's:Recent Labs   Lab Test 06/01/22 0545 05/30/22 2259 05/23/22  0506    131* 132*   POTASSIUM 3.7 4.2 4.3  4.2   CHLORIDE 101 98 106   CO2 26 26 19*   BUN 22 25 21   CR 1.32* 1.45* 1.25*   ANIONGAP 6 7 7   AMY 8.7 8.6 8.4*   GLC 94 86 92   ,   Results for orders placed or performed during the hospital encounter of 05/30/22   XR Chest 2 Views    Narrative    EXAM: XR CHEST 2 VW  LOCATION: [facility]  DATE/TIME: 5/31/2022 1:55 AM    INDICATION: cancer, fever  COMPARISON: 05/23/2022      Impression    IMPRESSION: Able chest with again seen Port-A-Cath which appears be in good position. Benign calcified granuloma upper left lung. Prominent left nipple shadow. No acute cardiopulmonary abnormalities.       Discharge Medications   Current Discharge Medication List      START taking these medications    Details   gabapentin (NEURONTIN) 300 MG capsule Take 1 capsule (300 mg) by mouth 2 times daily  Qty: 60 capsule, Refills: 0    Associated Diagnoses: Pain in both lower legs      methylPREDNISolone (MEDROL DOSEPAK) 4 MG tablet therapy pack Follow Package Directions  Qty: 21 tablet, Refills: 0    Associated Diagnoses: Rash          CONTINUE these medications which have CHANGED    Details   oxyCODONE (ROXICODONE) 5 MG tablet Take 1 tablet (5 mg) by mouth every 4 hours as needed for moderate to severe pain  Qty: 15 tablet, Refills: 0    Associated Diagnoses: Syncope and collapse; Chemotherapy-induced neutropenia (H)         CONTINUE these medications which have NOT CHANGED    Details   atorvastatin (LIPITOR) 10 MG tablet Take 10 mg by mouth daily      candesartan (ATACAND) 4 MG tablet Take 0.5 tablets (2 mg) by mouth 2 times daily DISPENSE AS WRITTEN, Brand name only  Qty: 45 tablet, Refills: 2    Associated Diagnoses: Hypertension goal BP (blood pressure) < 140/90      colchicine (COLCYRS) 0.6 MG tablet Take 1.2 mg now followed by 0.6 mg 1 hour apart then 0.6 mg daily for 2 days  Qty: 5 tablet, Refills: 0    Associated Diagnoses: Gout involving toe of left foot, unspecified cause, unspecified chronicity      loratadine (CLARITIN) 10 MG tablet Take 10 mg by mouth daily      LORazepam (ATIVAN) 0.5 MG tablet Take 1 tablet (0.5 mg) by mouth every 6 hours as needed for anxiety, nausea or sleep  Qty: 45 tablet, Refills: 0    Associated Diagnoses: Anxiety; Chemotherapy-induced neutropenia (H)      Magnesium 400 MG TABS Take 800 mg by mouth daily      oxazepam (SERAX) 15 MG capsule Take 1 capsule (15 mg) by mouth nightly as needed for anxiety Reduced refill amt: - call [phone] to schedule appointment/fasting labs  Qty: 31 capsule, Refills: 0    Associated Diagnoses: Anxiety      PARNATE 10 MG tablet TAKE 1 TABLET BY MOUTH 3 TIMES DAILY  Qty: 270 tablet, Refills: 3    Associated Diagnoses: Major depressive disorder, recurrent episode, moderate (H)      polyethylene glycol (MIRALAX/GLYCOLAX) packet Take 1 packet by mouth daily      triamcinolone (KENALOG) 0.1 % external cream Apply  topically 2 times daily as needed.  Qty: 15 g, Refills: 1    Associated Diagnoses: Sun-damaged skin      zolpidem (AMBIEN) 5 MG tablet Take 1 tablet (5 mg) by mouth  nightly as needed for sleep Reduced refill amt: - call [phone] to schedule appointment/fasting labs  Qty: 31 tablet, Refills: 0    Associated Diagnoses: Persistent disorder of initiating or maintaining sleep         STOP taking these medications       allopurinol (ZYLOPRIM) 300 MG tablet Comments:   Reason for Stopping:         pregabalin (LYRICA) 25 MG capsule Comments:   Reason for Stopping:             Allergies   No Known Allergies

## 2022-06-01 NOTE — PLAN OF CARE
Goal Outcome Evaluation:  Vitals stable. A&Ox4, independent. Generalized whole body rash pink/red, itching most of the time, mainly controlled w/ Benadryl. Port accessed, good blood return- Hep locked. Lyrica discontinued, may start Gabapentin today. C/o of pain- generalized pain- given twice. Ativan also given to help with anxiety and sleep. Possible discharge today.

## 2022-06-01 NOTE — PLAN OF CARE
[id number] shift. Vitals stable. A&O, independent. Fair appetite. Generalized whole body rash pink/red, itching at times but mostly controlled w/ Benadryl. Port accessed, good blood return. Dr. Gibbs rounded, holding of on Lyrica still, may start Gabapentin tomorrow. Pt is hopeful for discharge tomorrow.

## 2022-06-01 NOTE — PLAN OF CARE
Pt discharged to home. Port de-accessed. Paperwork completed. Prescriptions and belongings sent w/ pt.

## 2022-06-02 ENCOUNTER — PATIENT OUTREACH (OUTPATIENT)
Dept: CARE COORDINATION | Facility: CLINIC | Age: 77
End: 2022-06-02
Payer: MEDICARE

## 2022-06-02 DIAGNOSIS — Z71.89 OTHER SPECIFIED COUNSELING: ICD-10-CM

## 2022-06-02 NOTE — PROGRESS NOTES
Clinic Care Coordination Contact  [facility]: Post-Discharge Note  SITUATION                                                      Admission:    Admission Date: 05/30/22   Reason for Admission: Rash and fever  Discharge:   Discharge Date: 06/01/22  Discharge Diagnosis: Rash, etiology unknown, possibly due to medications  SIRS, resolving  Generalized anxiety disorder  Depression  Insomnia    BACKGROUND                                                      Per hospital discharge summary and inpatient provider notes:  Jann Davidson is a pleasant 76 year old woman who presents with rash. This is a very itchy rash that started several days ago. She says it started just prior to her having left the hospital 5/23/2022. It was very mild at that time but she says several days ago it really started to bother her. It has now spread throughout her arms and especially her legs. It has been associated with ongoing fatigue, which she attributes to ongoing side effect from previous Neulasta injections. She felt so weak several days ago that she missed a phone call scheduling her pre-operative H and P and so her upcoming surgery has now been postponed to 6/13 from 5/31, and she is very distressed by this delay. Last week she also felt onset of headache and facial pain, and on 5/25 at a virtual visit with a provider she was given Azithromycin and finished a course of that with resolution of her symptoms. She adds that at the beginning of 5/2022 she had an outbreak of gout in her left toe, treated with medication, that has now resolved. Her only other medication she currently takes that is relatively new to her is Lyrica, started while she was in the hospital. She endorses intermittent fevers since discharge, and had a fever earlier today. She otherwise denies cough, nausea, dyspnea, diarrhea, or any other acute complaints.    ASSESSMENT      Enrollment  Primary Care Care Coordination Status: Declined    Discharge Assessment  How  are you doing now that you are home?: not great  How are your symptoms? (Red Flag symptoms escalate to triage hotline per guidelines): Improved (waist up still hurts but waist down has gotten better)  Do you feel your condition is stable enough to be safe at home until your provider visit?: Yes  Does the patient have their discharge instructions? : Yes  Does the patient have questions regarding their discharge instructions? : No  Were you started on any new medications or were there changes to any of your previous medications? : Yes  Does the patient have all of their medications?: Yes  Do you have questions regarding any of your medications? : No  Do you have all of your needed medical supplies or equipment (DME)?  (i.e. oxygen tank, CPAP, cane, etc.): Yes  Discharge follow-up appointment scheduled within 14 calendar days? : Yes              PLAN                                                      Outpatient Plan:  Follow-up Appointments     Follow-up and recommended labs and tests       Follow up with primary care provider, Mehran Oliva, within 7 days   to evaluate medication change and for hospital follow- up.  The following   labs/tests are recommended: BMP, CBC.       Future Appointments   Date Time Provider Department Center   6/9/2022 12:00 PM Nurse,  Surg Cons [facility]   6/9/2022  1:00 PM SHBCUS2 [facility]   6/9/2022  1:30 PM SHBCUS2 [facility]   6/9/2022  2:00 PM SHBCMA1 [facility]   6/13/2022  9:15 AM SHNM2 [facility]NUC [facility]   6/13/2022 10:00 AM Tatianna Rai MD [facility]   6/13/2022 12:30 PM SHMASP2 [facility]   6/15/2022 11:15 AM Tatianna Rai MD [facility]   6/20/2022  7:45 AM  FAST TRACK LAB [facility]   6/20/2022  8:20 AM Neetu Mcmullen MD [facility]   6/20/2022  9:30 AM  [facility]         For any urgent concerns, please contact our 24 hour nurse triage line: [phone] ([phone])         Nadia  MARCELINO Powell

## 2022-06-02 NOTE — PROGRESS NOTES
Service Date: 06/01/2022    HISTORY OF PRESENT ILLNESS:  Ms. Davidson is a 76-year-old female with triple negative left breast cancer.  The patient had neoadjuvant chemotherapy and immunotherapy.  She will be undergoing surgery.    The patient admitted because of skin rash and itching.  Her skin rash could have been drug related.  Other possibility is a viral rash.    The patient continues to have itching.  Her rash has not gotten worse.  Because of the itching, she is uncomfortable.  She has been on Benadryl, which has not been helping much.    No chest pain.  No shortness of breath. The patient is afebrile.    The patient has neuropathy.  She has burning sensation in both the lower extremities below the waist.  She was on Lyrica, which was stopped as there was a concern it might have caused the rash.    PHYSICAL EXAM:    GENERAL:  The patient is alert.  Not in pain.  SKIN:  There is a diffuse, erythematous maculopapular rash.  No vesicles or pustules.  Rest of the system not examined.    LABORATORY:  Labs reviewed.    ASSESSMENT:     1.  A 76-year-old female with skin rash.  This is likely drug rash.  2.  Triple negative left breast cancer.  3.  Anemia.  4.  Mildly elevated creatinine.  5.  Peripheral neuropathy.    PLAN:     1.  Discussed regarding the rash.  I think it is a drug rash.  The patient was on Lyrica, which has been stopped.  She was on Zithromax, which has been completed.  The patient is on allopurinol for about a month.  That can cause rash.  I am going to discontinue allopurinol.  2.  Discussed regarding treatment.  Rash has not improved.  She has itching.  I will start her on Medrol Dosepak.  She is agreeable for it.  Side effect of steroids reviewed.  The patient is on Benadryl.  As it is not helping, will discontinue Benadryl.  I will start her on Atarax as needed.  I explained to the patient that, hopefully, these things will help with the rash and itching.  3.  She has peripheral neuropathy.  I  will start her on gabapentin 300 mg twice a day.  If tolerated, may be increased to 3 times a day.  4.  For breast cancer, she is scheduled for surgery.  She will follow up with Dr. Mcmullen after discharge.  5.  The patient had a few questions, which were all answered.   6.  Case discussed with Dr. Roberts.    TOTAL TIME SPENT:  Total visit time of 25 minutes.  Time was spent in today's visit, review of chart/investigations today, communicating with other providers and documentation.    Laila Gibbs MD        D: 2022   T: 2022   MT: FLOR    Name:     TARAS WILSON  MRN:      [id number]        Account:      [id number]   :      1945           Service Date: 2022       Document: [id number]

## 2022-06-05 LAB
BACTERIA BLD CULT: NO GROWTH
BACTERIA BLD CULT: NO GROWTH

## 2022-06-07 DIAGNOSIS — D70.1 CHEMOTHERAPY-INDUCED NEUTROPENIA (H): ICD-10-CM

## 2022-06-07 DIAGNOSIS — F41.9 ANXIETY: ICD-10-CM

## 2022-06-07 DIAGNOSIS — T45.1X5A CHEMOTHERAPY-INDUCED NEUTROPENIA (H): ICD-10-CM

## 2022-06-07 NOTE — TELEPHONE ENCOUNTER
Patient called requesting a call from Tadeo Dugan. Patient states her surgery was delayed to 6/27/22. Patient wants to cancel her appointments with Dr Mcmullen and alexandra for 6/20/22. Please contact patient at home phone [phone]

## 2022-06-08 DIAGNOSIS — D70.1 CHEMOTHERAPY-INDUCED NEUTROPENIA (H): ICD-10-CM

## 2022-06-08 DIAGNOSIS — F41.9 ANXIETY: ICD-10-CM

## 2022-06-08 DIAGNOSIS — T45.1X5A CHEMOTHERAPY-INDUCED NEUTROPENIA (H): ICD-10-CM

## 2022-06-08 RX ORDER — LORAZEPAM 0.5 MG/1
0.5 TABLET ORAL EVERY 6 HOURS PRN
Qty: 45 TABLET | Refills: 0 | Status: SHIPPED | OUTPATIENT
Start: 2022-06-08 | End: 2022-09-08

## 2022-06-08 RX ORDER — LORAZEPAM 0.5 MG/1
0.5 TABLET ORAL EVERY 6 HOURS PRN
Qty: 45 TABLET | Refills: 0 | Status: SHIPPED | OUTPATIENT
Start: 2022-06-08 | End: 2022-06-08

## 2022-06-08 NOTE — TELEPHONE ENCOUNTER
Jann was admitted to hospital x 2 in past month due to chemo induced neuropathy and allergic reaction to Lyrica.     She would like to update that her surgery with Dr Rai on 5/31 has been r/s to 6/27/22.    She would like to cancel her labs, follow up and Keytruda appts on 6/20/22 as she has a pre-op and needs to r/s her consult with Dr Feliciano as well.    She is requesting a refill of lorazepam as well. She has been taking approximately 2 per day due to the anxiety of her current circumstances.     Routing to care team to update and determine plan going forward.        Regina Arango RN

## 2022-06-16 DIAGNOSIS — F41.9 ANXIETY: ICD-10-CM

## 2022-06-16 DIAGNOSIS — G47.00 PERSISTENT DISORDER OF INITIATING OR MAINTAINING SLEEP: Chronic | ICD-10-CM

## 2022-06-20 ENCOUNTER — OFFICE VISIT (OUTPATIENT)
Dept: FAMILY MEDICINE | Facility: CLINIC | Age: 77
End: 2022-06-20
Payer: MEDICARE

## 2022-06-20 VITALS
SYSTOLIC BLOOD PRESSURE: 133 MMHG | DIASTOLIC BLOOD PRESSURE: 80 MMHG | OXYGEN SATURATION: 99 % | HEIGHT: 63 IN | HEART RATE: 92 BPM | BODY MASS INDEX: 22.15 KG/M2 | TEMPERATURE: 97.1 F | RESPIRATION RATE: 17 BRPM | WEIGHT: 125 LBS

## 2022-06-20 DIAGNOSIS — F33.1 MAJOR DEPRESSIVE DISORDER, RECURRENT EPISODE, MODERATE (H): ICD-10-CM

## 2022-06-20 DIAGNOSIS — Z01.818 PREOP GENERAL PHYSICAL EXAM: Primary | ICD-10-CM

## 2022-06-20 DIAGNOSIS — N18.30 STAGE 3 CHRONIC KIDNEY DISEASE, UNSPECIFIED WHETHER STAGE 3A OR 3B CKD (H): ICD-10-CM

## 2022-06-20 DIAGNOSIS — C50.512 MALIGNANT NEOPLASM OF LOWER-OUTER QUADRANT OF LEFT BREAST OF FEMALE, ESTROGEN RECEPTOR NEGATIVE (H): ICD-10-CM

## 2022-06-20 DIAGNOSIS — F41.9 ANXIETY: ICD-10-CM

## 2022-06-20 DIAGNOSIS — I10 HYPERTENSION GOAL BP (BLOOD PRESSURE) < 140/90: ICD-10-CM

## 2022-06-20 DIAGNOSIS — Z92.21 STATUS POST CHEMOTHERAPY: ICD-10-CM

## 2022-06-20 DIAGNOSIS — Z17.1 MALIGNANT NEOPLASM OF LOWER-OUTER QUADRANT OF LEFT BREAST OF FEMALE, ESTROGEN RECEPTOR NEGATIVE (H): ICD-10-CM

## 2022-06-20 LAB
ERYTHROCYTE [DISTWIDTH] IN BLOOD BY AUTOMATED COUNT: 14.4 % (ref 10–15)
HCT VFR BLD AUTO: 32.1 % (ref 35–47)
HGB BLD-MCNC: 10.1 G/DL (ref 11.7–15.7)
MCH RBC QN AUTO: 32 PG (ref 26.5–33)
MCHC RBC AUTO-ENTMCNC: 31.5 G/DL (ref 31.5–36.5)
MCV RBC AUTO: 102 FL (ref 78–100)
PLATELET # BLD AUTO: 200 10E3/UL (ref 150–450)
RBC # BLD AUTO: 3.16 10E6/UL (ref 3.8–5.2)
WBC # BLD AUTO: 5.3 10E3/UL (ref 4–11)

## 2022-06-20 PROCEDURE — 85027 COMPLETE CBC AUTOMATED: CPT | Performed by: PHYSICIAN ASSISTANT

## 2022-06-20 PROCEDURE — 36415 COLL VENOUS BLD VENIPUNCTURE: CPT | Performed by: PHYSICIAN ASSISTANT

## 2022-06-20 PROCEDURE — 80048 BASIC METABOLIC PNL TOTAL CA: CPT | Performed by: PHYSICIAN ASSISTANT

## 2022-06-20 PROCEDURE — 99215 OFFICE O/P EST HI 40 MIN: CPT | Performed by: PHYSICIAN ASSISTANT

## 2022-06-20 RX ORDER — ZOLPIDEM TARTRATE 5 MG/1
TABLET ORAL
Qty: 31 TABLET | Refills: 0 | Status: SHIPPED | OUTPATIENT
Start: 2022-06-20 | End: 2022-07-19

## 2022-06-20 RX ORDER — OXAZEPAM 15 MG/1
15 CAPSULE ORAL
Qty: 31 CAPSULE | Refills: 0 | Status: SHIPPED | OUTPATIENT
Start: 2022-06-20 | End: 2022-07-19

## 2022-06-20 RX ORDER — CANDESARTAN 4 MG/1
0.5 TABLET ORAL DAILY
COMMUNITY
Start: 2022-06-20

## 2022-06-20 NOTE — TELEPHONE ENCOUNTER
Routing refill request to provider for review/approval because:  Drug not on the [facility] refill protocol, due for visit    Denise Fuentes RN   [facility]

## 2022-06-20 NOTE — PATIENT INSTRUCTIONS
Stop all NSAIDs (motrin, ibuprofen, naproxen, aleve, advil, naprosyn, aspirin)  for at least 7 days prior to surgery.  Tylenol is safe to take prior to surgery.    Take lipitor, candesartan and claritin with a sip of water the am of surgery.    Skip vitamins and supplements the am of surgery.    Take evening medications as usual the night before surgery.    Try over the counter debrox drops in right ear.    Try over the counter flonase.

## 2022-06-20 NOTE — PROGRESS NOTES
[address], [address]  [facility] [address]  Phone: [phone]  Primary Provider: Mehran Oliva  Pre-op Performing Provider: DAYANA RUIZ      PREOPERATIVE EVALUATION:  Today's date: 6/20/2022    Jann Davidson is a 76 year old female who presents for a preoperative evaluation.    Surgical Information:  Surgery/Procedure: Radiofrequency tag localized left breast lumpectomy with radiofrequency tag localized excision of left axillary lymph node, left  Surgery Location: [facility] OR  Surgeon: Tatianna Rai MD  Surgery Date: 6/27/22  Time of Surgery: 10 am  Where patient plans to recover: At home alone  Fax number for surgical facility: no need to fax    Type of Anesthesia Anticipated: to be determined    Assessment & Plan     The proposed surgical procedure is considered INTERMEDIATE risk.    (Z01.818) Preop general physical exam  (primary encounter diagnosis)  Comment: able to do 4 METs  Plan: CBC with platelets, Basic metabolic panel  (Ca,        Cl, CO2, Creat, Gluc, K, Na, BUN)  Cleared for surgery     (C50.512,  Z17.1) Malignant neoplasm of lower-outer quadrant of left breast of female, estrogen receptor negative (H)  Comment: Dx 10/2021,  s/p neoadjuvant chemotherapy (paclitaxel and carboplatin and pembrolizumab),   Plan: sees onc in June    (I10) Hypertension goal BP (blood pressure) < 140/90  Comment: well controlled on atacand 2mg daily  Plan: candesartan (ATACAND) 4 MG tablet, take 1/2 tab daily          (F41.9) Anxiety  Comment: uses prn ativan   Plan:     (N18.30) Stage 3 chronic kidney disease, unspecified whether stage 3a or 3b CKD (H)  Comment: creat baseline around 1.3  Plan: avoid nephrotoxins, hypotension     (F33.1) Major depressive disorder, recurrent episode, moderate (H)  Comment: tearful at times, stopped parnate cold turkey a week ago in anticipation of surgery  Plan: declined mental health referral, discussed gradually resuming  parnate after surgery, she does not want to see a therapist, she knows to let me know if she changes her mind    (Z92.21) Status post chemotherapy  Comment: see above  Plan:     Risks and Recommendations:  The patient has the following additional risks and recommendations for perioperative complications:   - Consult Hospitalist / IM to assist with post-op medical management    Medication Instructions:  Stop all NSAIDs (motrin, ibuprofen, naproxen, aleve, advil, naprosyn, aspirin)  for at least 7 days prior to surgery.  Tylenol is safe to take prior to surgery.    Take lipitor, candesartan and claritin with a sip of water the am of surgery.    Skip vitamins and supplements the am of surgery.    Take evening medications as usual the night before surgery.    RECOMMENDATION:  APPROVAL GIVEN to proceed with proposed procedure, without further diagnostic evaluation.    Jackie Cid PA-C  45 minutes on the day of the encounter doing chart review, history and exam, documentation and further activities as noted above.          Subjective     HPI related to upcoming procedure:   She will have left breast lumpectomy and LN excision on 6/27/22.      She does her own housekeeping.  She is able to walk up a flight of stairs without any chest pain or significant dyspnea.    She stopped parnate one week ago in anticipation of surgery.    She has had some intermittent muffling of right ear, no pain.  She was recently treated for sinusitis with zithromax.    See ROS below   Preop Questions 6/20/2022   1. Have you ever had a heart attack or stroke? No   2. Have you ever had surgery on your heart or blood vessels, such as a stent placement, a coronary artery bypass, or surgery on an artery in your head, neck, heart, or legs? No   3. Do you have chest pain with activity? No   4. Do you have a history of  heart failure? No   5. Do you currently have a cold, bronchitis or symptoms of other infection? No   6. Do you have a  cough, shortness of breath, or wheezing? No   7. Do you or anyone in your family have previous history of blood clots? No   8. Do you or does anyone in your family have a serious bleeding problem such as prolonged bleeding following surgeries or cuts? No   9. Have you ever had problems with anemia or been told to take iron pills? No   10. Have you had any abnormal blood loss such as black, tarry or bloody stools, or abnormal vaginal bleeding? No   11. Have you ever had a blood transfusion? No   12. Are you willing to have a blood transfusion if it is medically needed before, during, or after your surgery? Yes   13. Have you or any of your relatives ever had problems with anesthesia? No   14. Do you have sleep apnea, excessive snoring or daytime drowsiness? No   15. Do you have any artifical heart valves or other implanted medical devices like a pacemaker, defibrillator, or continuous glucose monitor? No   16. Do you have artificial joints? No   17. Are you allergic to latex? No     Health Care Directive:  Patient does not have a Health Care Directive or Living Will: Advance Directive received and scanned. Click on Code in the patient header to view.      Review of Systems  CONSTITUTIONAL: NEGATIVE for fever, chills, change in weight  INTEGUMENTARY/SKIN: NEGATIVE for worrisome rashes, moles or lesions  EYES: NEGATIVE for vision changes or irritation  ENT/MOUTH: NEGATIVE for ear, mouth and throat problems  RESP: NEGATIVE for significant cough or SOB  CV: NEGATIVE for chest pain, palpitations or peripheral edema  GI: NEGATIVE for nausea, abdominal pain, heartburn, or change in bowel habits  : NEGATIVE for frequency, dysuria, or hematuria  MUSCULOSKELETAL: NEGATIVE for significant arthralgias or myalgia  NEURO: NEGATIVE for weakness, dizziness or paresthesias  HEME: NEGATIVE for bleeding problems  PSYCHIATRIC: NEGATIVE for changes in mood or affect    Patient Active Problem List    Diagnosis Date Noted     Rash  05/31/2022     Priority: Medium     Fever and chills 05/31/2022     Priority: Medium     Status post chemotherapy 05/31/2022     Priority: Medium     Syncope and collapse 05/21/2022     Priority: Medium     Hyponatremia 05/21/2022     Priority: Medium     Nonintractable headache, unspecified chronicity pattern, unspecified headache type 05/21/2022     Priority: Medium     Pain in both lower legs 05/21/2022     Priority: Medium     Encounter for other specified aftercare 03/22/2022     Priority: Medium     Chemotherapy-induced neutropenia (H) 01/17/2022     Priority: Medium     Malignant neoplasm of lower-outer quadrant of left breast of female, estrogen receptor negative (H) 12/16/2021     Priority: Medium     Major depressive disorder, recurrent episode, moderate (H) 04/04/2018     Priority: Medium     Anxiety 06/28/2017     Priority: Medium     Multiple pulmonary nodules 07/18/2016     Priority: Medium     Incidentally noticed on CT abdominal 7/16: Several bibasilar nodules < 5 mm. Low risk- followup CT 12 months. If unchanged, no further followup.       Actinic keratosis 01/26/2016     Priority: Medium     CTS (carpal tunnel syndrome) 12/10/2014     Priority: Medium     Right          Persistent disorder of initiating or maintaining sleep 12/10/2014     Priority: Medium     Takes oxazepam 15 mg and zolpidem 5 mg every night for intractable insomnia       Recurrent sinusitis 12/02/2013     Priority: Medium     OK for zpac by phone once a winter; appointment if not better after Rx.        CARDIOVASCULAR SCREENING; LDL GOAL LESS THAN 130 05/15/2013     Priority: Medium     CKD (chronic kidney disease) stage 3, GFR 30-59 ml/min (H) 04/14/2012     Priority: Medium     Essential hypertension with goal blood pressure less than 130/80      Priority: Medium     Atopic rhinitis 02/02/2011     Priority: Medium      Past Medical History:   Diagnosis Date     Breast cancer (H)      CKD (chronic kidney disease) stage 3, GFR  30-59 ml/min (H)      Depression with anxiety      Gout      Hypertension goal BP (blood pressure) < 140/90      Recurrent sinusitis 12/02/2013     Past Surgical History:   Procedure Laterality Date     BREAST BIOPSY, RT/LT      Breat Biopsy RT/LT     COLONOSCOPY  10/03     COLONOSCOPY  4/8/2014    Procedure: COLONOSCOPY;  COLONOSCOPY ;  Surgeon: Gaurav Shaffer MD;  Location:  GI     IR CHEST PORT PLACEMENT > 5 YRS OF AGE  12/29/2021     RECONSTRUCT EYELID       Current Outpatient Medications   Medication Sig Dispense Refill     atorvastatin (LIPITOR) 10 MG tablet Take 10 mg by mouth daily       candesartan (ATACAND) 4 MG tablet Take 0.5 tablets (2 mg) by mouth 2 times daily DISPENSE AS WRITTEN, Brand name only (Patient taking differently: Take 2 mg by mouth daily DISPENSE AS WRITTEN, Brand name only) 45 tablet 2     colchicine (COLCYRS) 0.6 MG tablet Take 1.2 mg now followed by 0.6 mg 1 hour apart then 0.6 mg daily for 2 days (Patient taking differently: as needed (gout flares) Take 1.2 mg now followed by 0.6 mg 1 hour apart then 0.6 mg daily for 2 days) 5 tablet 0     gabapentin (NEURONTIN) 300 MG capsule Take 1 capsule (300 mg) by mouth 2 times daily 60 capsule 0     hydrOXYzine (ATARAX) 25 MG tablet Take 1 tablet (25 mg) by mouth every 6 hours as needed for other or itching (For Itching.) 30 tablet 0     loratadine (CLARITIN) 10 MG tablet Take 10 mg by mouth daily       LORazepam (ATIVAN) 0.5 MG tablet Take 1 tablet (0.5 mg) by mouth every 6 hours as needed for anxiety, nausea or sleep 45 tablet 0     Magnesium 400 MG TABS Take 800 mg by mouth daily       methylPREDNISolone (MEDROL DOSEPAK) 4 MG tablet therapy pack Follow Package Directions 21 tablet 0     oxazepam (SERAX) 15 MG capsule Take 1 capsule (15 mg) by mouth nightly as needed for anxiety Reduced refill amt: - call [phone] to schedule appointment/fasting labs 31 capsule 0     oxyCODONE (ROXICODONE) 5 MG tablet Take 1 tablet (5 mg) by  "mouth every 4 hours as needed for moderate to severe pain 15 tablet 0     PARNATE 10 MG tablet TAKE 1 TABLET BY MOUTH 3 TIMES DAILY (Patient taking differently: Take 10 mg by mouth 3 times daily) 270 tablet 3     polyethylene glycol (MIRALAX/GLYCOLAX) packet Take 1 packet by mouth daily       triamcinolone (KENALOG) 0.1 % external cream Apply  topically 2 times daily as needed. 15 g 1     zolpidem (AMBIEN) 5 MG tablet Take 1 tablet (5 mg) by mouth nightly as needed for sleep Reduced refill amt: - call [phone] to schedule appointment/fasting labs 31 tablet 0       No Known Allergies     Social History     Tobacco Use     Smoking status: Former Smoker     Types: Cigarettes     Quit date: 10/30/1972     Years since quittin.6     Smokeless tobacco: Never Used   Substance Use Topics     Alcohol use: Yes     Comment: couple glasses of wine daily      Family History   Problem Relation Age of Onset     Cancer Mother         uterine     Breast Cancer Mother      Diabetes Paternal Grandmother      History   Drug Use No         Objective     /80 (BP Location: Right arm, Patient Position: Chair, Cuff Size: Adult Regular)   Pulse 92   Temp 97.1  F (36.2  C) (Temporal)   Resp 17   Ht 1.6 m (5' 3\")   Wt 56.7 kg (125 lb)   SpO2 99%   BMI 22.14 kg/m      Physical Exam      GENERAL APPEARANCE: healthy, alert and no distress     EYES: no scleral icterus     HENT: OP clear mouth without ulcers or lesions, TMs: small amount of fluid behind right TM, no erythema or bulging, Left TM normal, external canals with small amount of cerumen bilat, R>L     NECK: supple, no bruits     RESP: lungs clear to auscultation - no rales, rhonchi or wheezes     CV: regular rates and rhythm, normal S1 S2, no S3 or S4 and no murmur, click or rub     ABDOMEN:  soft, nontender, no HSM or masses and bowel sounds normal     MS: extremities normal- no gross deformities noted, no edema     NEURO: Normal mentation, gait and speechnormal     " PSYCH: mentation appears normal. and affect normal/bright      Recent Labs   Lab Test 06/01/22  0545 05/30/22  2259   HGB 7.8* 8.6*    326    131*   POTASSIUM 3.7 4.2   CR 1.32* 1.45*        Diagnostics:  Labs pending at this time.  Results will be reviewed when available.   No EKG this visit, completed in the last 90 days.    Revised Cardiac Risk Index (RCRI):  The patient has the following serious cardiovascular risks for perioperative complications:   - No serious cardiac risks = 0 points     RCRI Interpretation: 0 points: Class I (very low risk - 0.4% complication rate)           Signed Electronically by: Jackie Cid PA-C  Copy of this evaluation report is provided to requesting physician.

## 2022-06-20 NOTE — RESULT ENCOUNTER NOTE
Dear Jann,     Here are your recent results which look good.  Your white blood cell count is normal and your hemoglobin is improving nicely.     Please let us know if you have any questions or concerns.    Thanks,  Jackie Cid PA-C

## 2022-06-22 LAB
ANION GAP SERPL CALCULATED.3IONS-SCNC: 6 MMOL/L (ref 3–14)
BUN SERPL-MCNC: 15 MG/DL (ref 7–30)
CALCIUM SERPL-MCNC: 9.3 MG/DL (ref 8.5–10.1)
CHLORIDE BLD-SCNC: 108 MMOL/L (ref 94–109)
CO2 SERPL-SCNC: 26 MMOL/L (ref 20–32)
CREAT SERPL-MCNC: 1.06 MG/DL (ref 0.52–1.04)
GFR SERPL CREATININE-BSD FRML MDRD: 54 ML/MIN/1.73M2
GLUCOSE BLD-MCNC: 88 MG/DL (ref 70–99)
POTASSIUM BLD-SCNC: 4 MMOL/L (ref 3.4–5.3)
SODIUM SERPL-SCNC: 140 MMOL/L (ref 133–144)

## 2022-06-24 ENCOUNTER — HOSPITAL ENCOUNTER (OUTPATIENT)
Dept: MAMMOGRAPHY | Facility: CLINIC | Age: 77
Discharge: HOME | End: 2022-06-24
Attending: SURGERY
Payer: MEDICARE

## 2022-06-24 DIAGNOSIS — Z17.1 MALIGNANT NEOPLASM OF LOWER-OUTER QUADRANT OF LEFT BREAST OF FEMALE, ESTROGEN RECEPTOR NEGATIVE (H): ICD-10-CM

## 2022-06-24 DIAGNOSIS — C50.512 MALIGNANT NEOPLASM OF LOWER-OUTER QUADRANT OF LEFT BREAST OF FEMALE, ESTROGEN RECEPTOR NEGATIVE (H): ICD-10-CM

## 2022-06-24 PROCEDURE — A4648 IMPLANTABLE TISSUE MARKER: HCPCS

## 2022-06-24 PROCEDURE — 999N000065 MA POST PROCEDURE LEFT

## 2022-06-24 PROCEDURE — 250N000009 HC RX 250: Performed by: SURGERY

## 2022-06-24 RX ORDER — HYDROXYZINE HYDROCHLORIDE 25 MG/1
25 TABLET, FILM COATED ORAL 3 TIMES DAILY PRN
COMMUNITY
End: 2022-06-27

## 2022-06-24 RX ORDER — GABAPENTIN 300 MG/1
300 CAPSULE ORAL 2 TIMES DAILY
COMMUNITY
End: 2022-06-27

## 2022-06-24 RX ADMIN — LIDOCAINE HYDROCHLORIDE 5 ML: 10 INJECTION, SOLUTION INFILTRATION; PERINEURAL at 13:12

## 2022-06-24 RX ADMIN — LIDOCAINE HYDROCHLORIDE 5 ML: 10 INJECTION, SOLUTION INFILTRATION; PERINEURAL at 13:11

## 2022-06-27 ENCOUNTER — ANESTHESIA EVENT (OUTPATIENT)
Dept: SURGERY | Facility: CLINIC | Age: 77
End: 2022-06-27
Payer: MEDICARE

## 2022-06-27 ENCOUNTER — HOSPITAL ENCOUNTER (OUTPATIENT)
Dept: NUCLEAR MEDICINE | Facility: CLINIC | Age: 77
Setting detail: NUCLEAR MEDICINE
Discharge: HOME | End: 2022-06-27
Attending: SURGERY | Admitting: SURGERY
Payer: MEDICARE

## 2022-06-27 ENCOUNTER — HOSPITAL ENCOUNTER (OUTPATIENT)
Dept: MAMMOGRAPHY | Facility: CLINIC | Age: 77
Discharge: HOME | End: 2022-06-27
Attending: SURGERY
Payer: MEDICARE

## 2022-06-27 ENCOUNTER — HOSPITAL ENCOUNTER (OUTPATIENT)
Facility: CLINIC | Age: 77
Discharge: HOME | End: 2022-06-28
Attending: SURGERY | Admitting: SURGERY
Payer: MEDICARE

## 2022-06-27 ENCOUNTER — ANESTHESIA (OUTPATIENT)
Dept: SURGERY | Facility: CLINIC | Age: 77
End: 2022-06-27
Payer: MEDICARE

## 2022-06-27 DIAGNOSIS — Z17.1 MALIGNANT NEOPLASM OF LOWER-OUTER QUADRANT OF LEFT BREAST OF FEMALE, ESTROGEN RECEPTOR NEGATIVE (H): ICD-10-CM

## 2022-06-27 DIAGNOSIS — G89.18 POST-OP PAIN: Primary | ICD-10-CM

## 2022-06-27 DIAGNOSIS — C50.512 MALIGNANT NEOPLASM OF LOWER-OUTER QUADRANT OF LEFT BREAST OF FEMALE, ESTROGEN RECEPTOR NEGATIVE (H): ICD-10-CM

## 2022-06-27 LAB — SARS-COV-2 RNA RESP QL NAA+PROBE: NEGATIVE

## 2022-06-27 PROCEDURE — 999N000104 MA BREAST SPECIMEN LEFT OR

## 2022-06-27 PROCEDURE — 250N000013 HC RX MED GY IP 250 OP 250 PS 637: Performed by: ANESTHESIOLOGY

## 2022-06-27 PROCEDURE — 343N000001 HC RX 343: Performed by: SURGERY

## 2022-06-27 PROCEDURE — 250N000011 HC RX IP 250 OP 636: Performed by: ANESTHESIOLOGY

## 2022-06-27 PROCEDURE — 19301 PARTIAL MASTECTOMY: CPT | Mod: LT | Performed by: SURGERY

## 2022-06-27 PROCEDURE — 360N000083 HC SURGERY LEVEL 3 W/ FLUORO, PER MIN: Performed by: SURGERY

## 2022-06-27 PROCEDURE — 710N000009 HC RECOVERY PHASE 1, LEVEL 1, PER MIN: Performed by: SURGERY

## 2022-06-27 PROCEDURE — 250N000009 HC RX 250: Performed by: ANESTHESIOLOGY

## 2022-06-27 PROCEDURE — U0005 INFEC AGEN DETEC AMPLI PROBE: HCPCS | Performed by: SURGERY

## 2022-06-27 PROCEDURE — 250N000013 HC RX MED GY IP 250 OP 250 PS 637: Performed by: PHYSICIAN ASSISTANT

## 2022-06-27 PROCEDURE — 250N000011 HC RX IP 250 OP 636: Performed by: SURGERY

## 2022-06-27 PROCEDURE — 250N000009 HC RX 250: Performed by: SURGERY

## 2022-06-27 PROCEDURE — 250N000011 HC RX IP 250 OP 636: Performed by: NURSE ANESTHETIST, CERTIFIED REGISTERED

## 2022-06-27 PROCEDURE — 38792 RA TRACER ID OF SENTINL NODE: CPT

## 2022-06-27 PROCEDURE — A9520 TC99 TILMANOCEPT DIAG 0.5MCI: HCPCS | Performed by: SURGERY

## 2022-06-27 PROCEDURE — 272N000001 HC OR GENERAL SUPPLY STERILE: Performed by: SURGERY

## 2022-06-27 PROCEDURE — 258N000003 HC RX IP 258 OP 636: Performed by: ANESTHESIOLOGY

## 2022-06-27 PROCEDURE — 999N000141 HC STATISTIC PRE-PROCEDURE NURSING ASSESSMENT: Performed by: SURGERY

## 2022-06-27 PROCEDURE — 258N000003 HC RX IP 258 OP 636: Performed by: PHYSICIAN ASSISTANT

## 2022-06-27 PROCEDURE — 38525 BIOPSY/REMOVAL LYMPH NODES: CPT | Mod: LT | Performed by: SURGERY

## 2022-06-27 PROCEDURE — 370N000017 HC ANESTHESIA TECHNICAL FEE, PER MIN: Performed by: SURGERY

## 2022-06-27 PROCEDURE — 88331 PATH CONSLTJ SURG 1 BLK 1SPC: CPT | Mod: TC,XS | Performed by: SURGERY

## 2022-06-27 RX ORDER — ONDANSETRON 4 MG/1
4-8 TABLET, ORALLY DISINTEGRATING ORAL EVERY 8 HOURS PRN
Qty: 10 TABLET | Refills: 0 | Status: SHIPPED | OUTPATIENT
Start: 2022-06-27 | End: 2022-06-28

## 2022-06-27 RX ORDER — SODIUM CHLORIDE, SODIUM LACTATE, POTASSIUM CHLORIDE, CALCIUM CHLORIDE 600; 310; 30; 20 MG/100ML; MG/100ML; MG/100ML; MG/100ML
INJECTION, SOLUTION INTRAVENOUS CONTINUOUS
Status: DISCONTINUED | OUTPATIENT
Start: 2022-06-27 | End: 2022-06-27 | Stop reason: HOSPADM

## 2022-06-27 RX ORDER — BUPIVACAINE HYDROCHLORIDE 2.5 MG/ML
INJECTION, SOLUTION EPIDURAL; INFILTRATION; INTRACAUDAL
Status: DISCONTINUED
Start: 2022-06-27 | End: 2022-06-27 | Stop reason: HOSPADM

## 2022-06-27 RX ORDER — VALSARTAN 40 MG/1
40 TABLET ORAL DAILY
Status: DISCONTINUED | OUTPATIENT
Start: 2022-06-28 | End: 2022-06-28 | Stop reason: HOSPADM

## 2022-06-27 RX ORDER — HYDROMORPHONE HCL IN WATER/PF 6 MG/30 ML
0.2 PATIENT CONTROLLED ANALGESIA SYRINGE INTRAVENOUS
Status: DISCONTINUED | OUTPATIENT
Start: 2022-06-27 | End: 2022-06-28 | Stop reason: HOSPADM

## 2022-06-27 RX ORDER — ONDANSETRON 4 MG/1
4 TABLET, ORALLY DISINTEGRATING ORAL EVERY 6 HOURS PRN
Status: DISCONTINUED | OUTPATIENT
Start: 2022-06-27 | End: 2022-06-28 | Stop reason: HOSPADM

## 2022-06-27 RX ORDER — HYDROXYZINE HYDROCHLORIDE 10 MG/1
10 TABLET, FILM COATED ORAL EVERY 6 HOURS PRN
Qty: 30 TABLET | Refills: 0 | Status: SHIPPED | OUTPATIENT
Start: 2022-06-27 | End: 2022-06-28

## 2022-06-27 RX ORDER — ONDANSETRON 2 MG/ML
INJECTION INTRAMUSCULAR; INTRAVENOUS PRN
Status: DISCONTINUED | OUTPATIENT
Start: 2022-06-27 | End: 2022-06-27

## 2022-06-27 RX ORDER — PREGABALIN 25 MG/1
25 CAPSULE ORAL 2 TIMES DAILY
COMMUNITY
End: 2022-06-27

## 2022-06-27 RX ORDER — PROPOFOL 10 MG/ML
INJECTION, EMULSION INTRAVENOUS PRN
Status: DISCONTINUED | OUTPATIENT
Start: 2022-06-27 | End: 2022-06-27

## 2022-06-27 RX ORDER — EPHEDRINE SULFATE 50 MG/ML
INJECTION, SOLUTION INTRAMUSCULAR; INTRAVENOUS; SUBCUTANEOUS PRN
Status: DISCONTINUED | OUTPATIENT
Start: 2022-06-27 | End: 2022-06-27

## 2022-06-27 RX ORDER — TRIAMTERENE/HYDROCHLOROTHIAZID 37.5-25 MG
1 TABLET ORAL DAILY
COMMUNITY
End: 2022-06-27

## 2022-06-27 RX ORDER — SODIUM CHLORIDE 9 MG/ML
INJECTION, SOLUTION INTRAVENOUS CONTINUOUS
Status: DISCONTINUED | OUTPATIENT
Start: 2022-06-27 | End: 2022-06-28 | Stop reason: HOSPADM

## 2022-06-27 RX ORDER — NALOXONE HYDROCHLORIDE 0.4 MG/ML
0.4 INJECTION, SOLUTION INTRAMUSCULAR; INTRAVENOUS; SUBCUTANEOUS
Status: DISCONTINUED | OUTPATIENT
Start: 2022-06-27 | End: 2022-06-28 | Stop reason: HOSPADM

## 2022-06-27 RX ORDER — NALOXONE HYDROCHLORIDE 0.4 MG/ML
0.2 INJECTION, SOLUTION INTRAMUSCULAR; INTRAVENOUS; SUBCUTANEOUS
Status: DISCONTINUED | OUTPATIENT
Start: 2022-06-27 | End: 2022-06-28 | Stop reason: HOSPADM

## 2022-06-27 RX ORDER — OXYCODONE HYDROCHLORIDE 5 MG/1
5-10 TABLET ORAL EVERY 6 HOURS PRN
Qty: 12 TABLET | Refills: 0 | Status: SHIPPED | OUTPATIENT
Start: 2022-06-27 | End: 2022-06-28

## 2022-06-27 RX ORDER — CEFAZOLIN SODIUM/WATER 2 G/20 ML
2 SYRINGE (ML) INTRAVENOUS
Status: COMPLETED | OUTPATIENT
Start: 2022-06-27 | End: 2022-06-27

## 2022-06-27 RX ORDER — CEFAZOLIN SODIUM/WATER 2 G/20 ML
2 SYRINGE (ML) INTRAVENOUS SEE ADMIN INSTRUCTIONS
Status: DISCONTINUED | OUTPATIENT
Start: 2022-06-27 | End: 2022-06-27 | Stop reason: HOSPADM

## 2022-06-27 RX ORDER — LORAZEPAM 0.5 MG/1
0.5 TABLET ORAL EVERY 6 HOURS PRN
Status: DISCONTINUED | OUTPATIENT
Start: 2022-06-27 | End: 2022-06-28 | Stop reason: HOSPADM

## 2022-06-27 RX ORDER — ZOLPIDEM TARTRATE 5 MG/1
5 TABLET ORAL
Status: DISCONTINUED | OUTPATIENT
Start: 2022-06-27 | End: 2022-06-27 | Stop reason: CLARIF

## 2022-06-27 RX ORDER — HYDROXYZINE HYDROCHLORIDE 10 MG/1
10 TABLET, FILM COATED ORAL EVERY 6 HOURS PRN
Status: DISCONTINUED | OUTPATIENT
Start: 2022-06-27 | End: 2022-06-28 | Stop reason: HOSPADM

## 2022-06-27 RX ORDER — OXYCODONE HYDROCHLORIDE 5 MG/1
5 TABLET ORAL EVERY 4 HOURS PRN
Status: DISCONTINUED | OUTPATIENT
Start: 2022-06-27 | End: 2022-06-28 | Stop reason: HOSPADM

## 2022-06-27 RX ORDER — LIDOCAINE HYDROCHLORIDE 20 MG/ML
INJECTION, SOLUTION INFILTRATION; PERINEURAL PRN
Status: DISCONTINUED | OUTPATIENT
Start: 2022-06-27 | End: 2022-06-27

## 2022-06-27 RX ORDER — POLYETHYLENE GLYCOL 3350 17 G/17G
17 POWDER, FOR SOLUTION ORAL DAILY
Status: DISCONTINUED | OUTPATIENT
Start: 2022-06-27 | End: 2022-06-28 | Stop reason: HOSPADM

## 2022-06-27 RX ORDER — FENTANYL CITRATE 50 UG/ML
INJECTION, SOLUTION INTRAMUSCULAR; INTRAVENOUS PRN
Status: DISCONTINUED | OUTPATIENT
Start: 2022-06-27 | End: 2022-06-27

## 2022-06-27 RX ORDER — OXYCODONE HYDROCHLORIDE 5 MG/1
5 TABLET ORAL EVERY 4 HOURS PRN
Status: DISCONTINUED | OUTPATIENT
Start: 2022-06-27 | End: 2022-06-27 | Stop reason: HOSPADM

## 2022-06-27 RX ORDER — AMOXICILLIN 250 MG
1-2 CAPSULE ORAL 2 TIMES DAILY PRN
Qty: 30 TABLET | Refills: 0 | Status: SHIPPED | OUTPATIENT
Start: 2022-06-27 | End: 2022-06-28

## 2022-06-27 RX ORDER — ONDANSETRON 4 MG/1
4 TABLET, ORALLY DISINTEGRATING ORAL EVERY 30 MIN PRN
Status: DISCONTINUED | OUTPATIENT
Start: 2022-06-27 | End: 2022-06-27 | Stop reason: HOSPADM

## 2022-06-27 RX ORDER — PROPOFOL 10 MG/ML
INJECTION, EMULSION INTRAVENOUS CONTINUOUS PRN
Status: DISCONTINUED | OUTPATIENT
Start: 2022-06-27 | End: 2022-06-27

## 2022-06-27 RX ORDER — LORATADINE 10 MG/1
10 TABLET ORAL DAILY
Status: DISCONTINUED | OUTPATIENT
Start: 2022-06-28 | End: 2022-06-28 | Stop reason: HOSPADM

## 2022-06-27 RX ORDER — ACETAMINOPHEN 500 MG
1000 TABLET ORAL ONCE
Status: DISCONTINUED | OUTPATIENT
Start: 2022-06-27 | End: 2022-06-27 | Stop reason: HOSPADM

## 2022-06-27 RX ORDER — ATORVASTATIN CALCIUM 10 MG/1
10 TABLET, FILM COATED ORAL DAILY
Status: DISCONTINUED | OUTPATIENT
Start: 2022-06-28 | End: 2022-06-28 | Stop reason: HOSPADM

## 2022-06-27 RX ORDER — FENTANYL CITRATE 0.05 MG/ML
25 INJECTION, SOLUTION INTRAMUSCULAR; INTRAVENOUS EVERY 5 MIN PRN
Status: DISCONTINUED | OUTPATIENT
Start: 2022-06-27 | End: 2022-06-27 | Stop reason: HOSPADM

## 2022-06-27 RX ORDER — MAGNESIUM HYDROXIDE 1200 MG/15ML
LIQUID ORAL PRN
Status: DISCONTINUED | OUTPATIENT
Start: 2022-06-27 | End: 2022-06-27 | Stop reason: HOSPADM

## 2022-06-27 RX ORDER — CANDESARTAN 4 MG/1
4 TABLET ORAL DAILY
Status: DISCONTINUED | OUTPATIENT
Start: 2022-06-27 | End: 2022-06-27 | Stop reason: CLARIF

## 2022-06-27 RX ORDER — ONDANSETRON 2 MG/ML
4 INJECTION INTRAMUSCULAR; INTRAVENOUS EVERY 6 HOURS PRN
Status: DISCONTINUED | OUTPATIENT
Start: 2022-06-27 | End: 2022-06-28 | Stop reason: HOSPADM

## 2022-06-27 RX ORDER — HYDROMORPHONE HCL IN WATER/PF 6 MG/30 ML
0.2 PATIENT CONTROLLED ANALGESIA SYRINGE INTRAVENOUS EVERY 5 MIN PRN
Status: DISCONTINUED | OUTPATIENT
Start: 2022-06-27 | End: 2022-06-27 | Stop reason: HOSPADM

## 2022-06-27 RX ORDER — LIDOCAINE HYDROCHLORIDE 10 MG/ML
INJECTION, SOLUTION EPIDURAL; INFILTRATION; INTRACAUDAL; PERINEURAL
Status: DISCONTINUED
Start: 2022-06-27 | End: 2022-06-27 | Stop reason: HOSPADM

## 2022-06-27 RX ORDER — MAGNESIUM OXIDE 400 MG/1
800 TABLET ORAL DAILY
Status: DISCONTINUED | OUTPATIENT
Start: 2022-06-27 | End: 2022-06-28 | Stop reason: HOSPADM

## 2022-06-27 RX ORDER — ONDANSETRON 2 MG/ML
4 INJECTION INTRAMUSCULAR; INTRAVENOUS EVERY 30 MIN PRN
Status: DISCONTINUED | OUTPATIENT
Start: 2022-06-27 | End: 2022-06-27 | Stop reason: HOSPADM

## 2022-06-27 RX ORDER — LORAZEPAM 0.5 MG/1
0.5 TABLET ORAL
Status: DISCONTINUED | OUTPATIENT
Start: 2022-06-27 | End: 2022-06-27 | Stop reason: CLARIF

## 2022-06-27 RX ORDER — DEXAMETHASONE SODIUM PHOSPHATE 4 MG/ML
INJECTION, SOLUTION INTRA-ARTICULAR; INTRALESIONAL; INTRAMUSCULAR; INTRAVENOUS; SOFT TISSUE PRN
Status: DISCONTINUED | OUTPATIENT
Start: 2022-06-27 | End: 2022-06-27

## 2022-06-27 RX ORDER — LIDOCAINE 40 MG/G
CREAM TOPICAL
Status: DISCONTINUED | OUTPATIENT
Start: 2022-06-27 | End: 2022-06-28 | Stop reason: HOSPADM

## 2022-06-27 RX ORDER — TRANYLCYPROMINE 10 MG/1
10 TABLET ORAL 3 TIMES DAILY
Status: DISCONTINUED | OUTPATIENT
Start: 2022-06-27 | End: 2022-06-28 | Stop reason: HOSPADM

## 2022-06-27 RX ORDER — PROCHLORPERAZINE MALEATE 5 MG
5 TABLET ORAL EVERY 6 HOURS PRN
Status: DISCONTINUED | OUTPATIENT
Start: 2022-06-27 | End: 2022-06-28 | Stop reason: HOSPADM

## 2022-06-27 RX ORDER — AMOXICILLIN 250 MG
1-2 CAPSULE ORAL 2 TIMES DAILY PRN
Status: DISCONTINUED | OUTPATIENT
Start: 2022-06-27 | End: 2022-06-28 | Stop reason: HOSPADM

## 2022-06-27 RX ORDER — LIDOCAINE 40 MG/G
CREAM TOPICAL
Status: DISCONTINUED | OUTPATIENT
Start: 2022-06-27 | End: 2022-06-27 | Stop reason: HOSPADM

## 2022-06-27 RX ORDER — HYDROXYZINE HYDROCHLORIDE 25 MG/1
25 TABLET, FILM COATED ORAL ONCE
Status: COMPLETED | OUTPATIENT
Start: 2022-06-27 | End: 2022-06-27

## 2022-06-27 RX ORDER — ACETAMINOPHEN 500 MG
1000 TABLET ORAL ONCE
Status: COMPLETED | OUTPATIENT
Start: 2022-06-27 | End: 2022-06-27

## 2022-06-27 RX ADMIN — Medication 5 MG: at 10:53

## 2022-06-27 RX ADMIN — Medication 800 MG: at 14:30

## 2022-06-27 RX ADMIN — PHENYLEPHRINE HYDROCHLORIDE 50 MCG: 10 INJECTION INTRAVENOUS at 10:40

## 2022-06-27 RX ADMIN — PROPOFOL 200 MCG/KG/MIN: 10 INJECTION, EMULSION INTRAVENOUS at 10:25

## 2022-06-27 RX ADMIN — POLYETHYLENE GLYCOL 3350 17 G: 17 POWDER, FOR SOLUTION ORAL at 14:30

## 2022-06-27 RX ADMIN — PROPOFOL 140 MG: 10 INJECTION, EMULSION INTRAVENOUS at 10:25

## 2022-06-27 RX ADMIN — Medication 2 G: at 10:23

## 2022-06-27 RX ADMIN — FENTANYL CITRATE 50 MCG: 50 INJECTION, SOLUTION INTRAMUSCULAR; INTRAVENOUS at 10:43

## 2022-06-27 RX ADMIN — LORAZEPAM 0.5 MG: 0.5 TABLET ORAL at 14:34

## 2022-06-27 RX ADMIN — SODIUM CHLORIDE: 9 INJECTION, SOLUTION INTRAVENOUS at 13:51

## 2022-06-27 RX ADMIN — TILMANOCEPT 0.49 MILLICURIE: KIT at 09:25

## 2022-06-27 RX ADMIN — PHENYLEPHRINE HYDROCHLORIDE 50 MCG: 10 INJECTION INTRAVENOUS at 10:33

## 2022-06-27 RX ADMIN — SODIUM CHLORIDE, POTASSIUM CHLORIDE, SODIUM LACTATE AND CALCIUM CHLORIDE: 600; 310; 30; 20 INJECTION, SOLUTION INTRAVENOUS at 09:18

## 2022-06-27 RX ADMIN — PHENYLEPHRINE HYDROCHLORIDE 50 MCG: 10 INJECTION INTRAVENOUS at 11:07

## 2022-06-27 RX ADMIN — ACETAMINOPHEN 1000 MG: 500 TABLET, FILM COATED ORAL at 09:29

## 2022-06-27 RX ADMIN — PHENYLEPHRINE HYDROCHLORIDE 100 MCG: 10 INJECTION INTRAVENOUS at 10:53

## 2022-06-27 RX ADMIN — PHENYLEPHRINE HYDROCHLORIDE 0.3 MCG/KG/MIN: 10 INJECTION INTRAVENOUS at 11:17

## 2022-06-27 RX ADMIN — PHENYLEPHRINE HYDROCHLORIDE 100 MCG: 10 INJECTION INTRAVENOUS at 10:46

## 2022-06-27 RX ADMIN — DEXAMETHASONE SODIUM PHOSPHATE 4 MG: 4 INJECTION, SOLUTION INTRA-ARTICULAR; INTRALESIONAL; INTRAMUSCULAR; INTRAVENOUS; SOFT TISSUE at 10:26

## 2022-06-27 RX ADMIN — FENTANYL CITRATE 25 MCG: 50 INJECTION, SOLUTION INTRAMUSCULAR; INTRAVENOUS at 11:24

## 2022-06-27 RX ADMIN — MIDAZOLAM 1 MG: 1 INJECTION INTRAMUSCULAR; INTRAVENOUS at 10:17

## 2022-06-27 RX ADMIN — FENTANYL CITRATE 25 MCG: 50 INJECTION, SOLUTION INTRAMUSCULAR; INTRAVENOUS at 10:25

## 2022-06-27 RX ADMIN — ONDANSETRON 4 MG: 2 INJECTION INTRAMUSCULAR; INTRAVENOUS at 11:49

## 2022-06-27 RX ADMIN — LIDOCAINE HYDROCHLORIDE 80 MG: 20 INJECTION, SOLUTION INFILTRATION; PERINEURAL at 10:25

## 2022-06-27 RX ADMIN — HYDROXYZINE HYDROCHLORIDE 25 MG: 25 TABLET, FILM COATED ORAL at 09:52

## 2022-06-27 ASSESSMENT — ENCOUNTER SYMPTOMS
DYSRHYTHMIAS: 0
SEIZURES: 0

## 2022-06-27 ASSESSMENT — LIFESTYLE VARIABLES: TOBACCO_USE: 1

## 2022-06-27 NOTE — ANESTHESIA CARE TRANSFER NOTE
Patient: Jann Davidson    Procedure: Procedure(s):  Radiofrequency tag localized left breast lumpectomy with radiofrequency tag localized excision of left axillary lymph node  left axillary sentinel lymph node biopsy       Diagnosis: Malignant neoplasm of lower-outer quadrant of left breast of female, estrogen receptor negative (H) [C50.512, Z17.1]  Diagnosis Additional Information: No value filed.    Anesthesia Type:   General     Note:    Oropharynx: oropharynx clear of all foreign objects  Level of Consciousness: awake  Oxygen Supplementation: face mask  Level of Supplemental Oxygen (L/min / FiO2): 6  Independent Airway: airway patency satisfactory and stable  Dentition: dentition unchanged  Vital Signs Stable: post-procedure vital signs reviewed and stable  Report to RN Given: handoff report given  Patient transferred to: PACU    Handoff Report: Identifed the Patient, Identified the Reponsible Provider, Reviewed the pertinent medical history, Discussed the surgical course, Reviewed Intra-OP anesthesia mangement and issues during anesthesia, Set expectations for post-procedure period and Allowed opportunity for questions and acknowledgement of understanding      Vitals:  Vitals Value Taken Time   /70 06/27/22 1219   Temp     Pulse 73 06/27/22 1227   Resp 32 06/27/22 1227   SpO2 99 % 06/27/22 1227   Vitals shown include unvalidated device data.    Electronically Signed By: HUBER Heredia CRNA  June 27, 2022  12:28 PM

## 2022-06-27 NOTE — OR NURSING
VSS. A&O. Denies pain. ACE wrap/dressing CDI. No n/v. Report to gen surg RN and transferred to room 2228.

## 2022-06-27 NOTE — ANESTHESIA PROCEDURE NOTES
Airway       Patient location during procedure: OR  Staff -        Other Anesthesia Staff: Rebecca Saxena       Performed By: SRNA  Consent for Airway        Urgency: elective  Indications and Patient Condition       Indications for airway management: malachi-procedural       Induction type:intravenous       Mask difficulty assessment: 0 - not attempted    Final Airway Details       Final airway type: supraglottic airway    Supraglottic Airway Details        Type: LMA       Brand: Ambu AuraGain       LMA size: 4    Post intubation assessment        Placement verified by: capnometry        Number of attempts at approach: 1       Number of other approaches attempted: 0       Secured with: silk tape       Ease of procedure: easy       Dentition: Intact and Unchanged

## 2022-06-27 NOTE — ANESTHESIA POSTPROCEDURE EVALUATION
Patient: Jann Davidson    Procedure: Procedure(s):  Radiofrequency tag localized left breast lumpectomy with radiofrequency tag localized excision of left axillary lymph node  left axillary sentinel lymph node biopsy       Anesthesia Type:  General    Note:  Disposition: Admission   Postop Pain Control: Uneventful            Sign Out: Well controlled pain   PONV: No   Neuro/Psych: Uneventful            Sign Out: Acceptable/Baseline neuro status   Airway/Respiratory: Uneventful            Sign Out: Acceptable/Baseline resp. status   CV/Hemodynamics: Uneventful            Sign Out: Acceptable CV status; No obvious hypovolemia; No obvious fluid overload   Other NRE: NONE   DID A NON-ROUTINE EVENT OCCUR? No           Last vitals:  Vitals Value Taken Time   /66 06/27/22 1315   Temp 36.7  C (98  F) 06/27/22 1315   Pulse 69 06/27/22 1321   Resp 15 06/27/22 1321   SpO2 100 % 06/27/22 1321   Vitals shown include unvalidated device data.    Electronically Signed By: Nadia Khoury MD  June 27, 2022  3:48 PM

## 2022-06-27 NOTE — PROVIDER NOTIFICATION
Call placed to [facility] about multiple orders for benzodiazepines (which pt takes at home). Oxazepam is not stocked in the hospital and is auto-substituted to lorazepam. Pt also has orders for lorazepam and ambien. Migue Diaz states he would be accepting of lorazepam order alone if pt in agreement. Discussed this with pt and she states that ambien doesn't work for her so doesn't want it. She states lorazepam works and she is accepting of using just lorazepam if needed while hospitalized. Orders changed to lorazepam q6h prn alone (no ambien or oxazepam). Deya CoronaD

## 2022-06-27 NOTE — PROGRESS NOTES
Received patient at 1330, POD 0 left breast lumpectomy.   A/Ox4, VSS on RA, patient was inconsolable once first came onto floor, tearful, anxious, and uncooperative with using call light. PRN Ativan given, intervention effective, ambulated to bathroom, SBA, PIV infusing fluids. Ace wraps CDI, refused ice packs, tolerating regular diet. Continue plan of care.

## 2022-06-27 NOTE — OP NOTE
Procedure Date: 06/27/2022    STAFF SURGEON:  Tatianna Rai MD    ASSISTANT:  Migue Diaz PA-C    PREOPERATIVE DIAGNOSIS:  Left breast cancer.    POSTOPERATIVE DIAGNOSIS:  Left breast cancer.    PROCEDURE PERFORMED:     1.  Tag localized left breast lumpectomy.  2.  Tag localized excision of axillary lymph node.  3.  Left axillary sentinel lymph node biopsy.    INDICATIONS FOR PROCEDURE:  The patient is a 77-year-old female who was undergoing a screening mammogram when she was noted to have calcifications of the left breast.  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  Breast MRI was performed and this revealed a 2 cm mass at the 4 o'clock position of the left breast, 6 cm from the nipple.  The patient was also noted to have an enlarged left axillary lymph node.  This was biopsied and found to be positive for metastatic carcinoma.  Neoadjuvant treatment was recommended and this has been completed.  Repeat bilateral breast MRI demonstrated no evidence of residual left breast or left axillary disease.  The decision was made for the patient to proceed to the operating room for tag localized excision of her left breast mass and left axillary metastatic lymph node in addition to a left axillary sentinel lymph node biopsy.  The risks and benefits of the procedure were discussed with the patient and consent for the procedure was obtained.    ANESTHESIA:  General.    DESCRIPTION OF PROCEDURE:  The patient underwent tag localization of her left breast and left axillary biopsy sites.  The patient was taken preop and preoperative antibiotics were administered.  The patient underwent left--sided periareolar injection with radionuclide for sentinel lymph node biopsy.  The patient was then taken back to the operating room and placed in the supine position on the operating table.  A timeout was completed.  Anesthesia was induced and the patient was intubated.  The patient  was secured to the operating table and her pressure points were padded.  The patient's left breast and left axilla were prepped and draped in the sterile fashion.  Following infiltration with local anesthetic, the 15 blade scalpel was used to make an incision directly overlying the patient's left axilla.  Dissection was carried out down through the subcutaneous tissues utilizing the electrocautery.  The localizer device was used to locate the left axillary lymph node that had been previously biopsied.  This was carefully dissected free from the surrounding tissues utilizing the electrocautery.  When the node been completely dissected free, it was checked with the McMullin probe and did have significant counts, qualifying it also as left axillary sentinel lymph node #1.  The specimen was placed into the OBOOK machine.  Specimen radiograph confirmed presence of the biopsy clip and the localizing tag within the lymph node specimen.  This was submitted for frozen section as tagged localized left axillary lymph node/left axillary sentinel lymph node #1.  We returned our attention to the patient's left axilla.  The McMullin probe was used to direct our dissection.  We identified 1 additional left axillary lymph node with significant counts.  This was carefully dissected free from the surrounding tissues utilizing the electrocautery and passed off the table for frozen section.  The remainder of the patient's left axilla was inspected.  There were no additional palpable nodes or nodes with counts greater than 10% of the hottest sentinel node.     Attention was then turned to the patient's left breast.  Following infiltration with local anesthetic, the 15 blade scalpel was used to make an elliptical skin incision overlying the outer lower quadrant of the left breast.  Dissection was carried out down through the subcutaneous tissues utilizing the electrocautery.  Skin flaps were then raised in the anterior plane between the  deep dermal tissues and the underlying breast tissue.  The localizer device was then used to identify the previous biopsy area, which was adjacent to the localizing tag.  This area was grasped with an Allis clamp.  Circumferential dissection was then carried out to carefully separate the specimen free from the surrounding tissues.  We continued our dissection down on to the level of the chest wall and the specimen was dissected directly off the underlying chest wall.  When the specimen had been dissected free, it was marked with paints for proper orientation and placed in the [employer] machine.  Specimen radiograph confirmed presence of the localizing tag and the biopsy clip within the specimen.  The specimen was then submitted for gross evaluation of margins.  At this point, we heard back from the pathologist.  [place] lymph node #2 for evidence of metastatic disease.  [place] lymph node #1, which was the previously biopsied positive left axillary node, did contain 8 single cells suspicious for possible metastatic involvement.  This qualified as isolated tumor cells.  There was no additional indication to proceed with left axillary dissection.  The patient's left axillary wound was inspected and hemostasis was ensured.  The wound was irrigated with normal saline solution.  The patient's wound was then reapproximated in multiple layers utilizing interrupted 3-0 Vicryl for the deeper tissues and a running 4-0 Monocryl subcuticular stitch to reapproximate the skin.     We then turned our attention to the patient's left breast wound.  The lumpectomy cavity was marked in each of the quadrants using surgical clips.  Hemostasis was ensured utilizing the electrocautery.  The wound was irrigated with normal saline solution.  We then heard back from pathology, which confirmed that grossly there was no obvious evidence of residual mass at the previous biopsy site.  Margins appeared clear circumferentially.  The patient's  left breast wound was then reapproximated in multiple layers utilizing interrupted 3-0 Vicryl for the deeper tissues and a running 4-0 Monocryl subcuticular stitch to reapproximate the skin.  The left breast and left axillary incisions were washed and dried and skin glue was applied.  The lap, needle and instrument counts were correct at the end of the procedure.  The patient tolerated the procedure well and was extubated and taken to the recovery area in stable condition.    Migue Diaz PA-C assisted in the above procedure. They provided assistance with pre-operative positioning, prepping, and draping of the patient. The assistant provided vital operative assistance with retraction using instruments thus providing the necessary exposure and visualization for the case, manipulation of tissues to achieve hemostasis, suction for visualization and assisted in closure of the wound. Post-operatively they assisted in transfer of the patient off the operative table and transition to the PACU physicians.      ESTIMATED BLOOD LOSS:  10 mL    FINDINGS:     1.  Tag localized, previously biopsied left axillary node excised and found to be consistent with left axillary sentinel lymph node #1.  Frozen pathology demonstrated 8 cells suspicious for isolated tumor cells.  No evidence of micro or macrometastatic disease.  2.  Left axillary sentinel lymph node #2 was negative for evidence of metastatic disease.  Left axillary dissection performed.   3.  Tag localized left breast lumpectomy completed.  Margins appeared grossly negative per pathology.    COMPLICATIONS:  None.    SPECIMENS:   1.  Tag localized left axillary lymph node/left axillary sentinel lymph node #1.  2.  Left axillary sentinel lymph node #2.   3.  Tag localized left breast lumpectomy.    DRAINS:  None.    CONDITION:  The patient will be admitted overnight for observation.  She will likely be discharged to home on postoperative day #1.    Tatianna Rai,  MD        D: 2022   T: 2022   MT: BRUCE    Name:     TARAS WILSON  MRN:      [id number]        Account:        [id number]   :      1945           Procedure Date: 2022     Document: [id number]

## 2022-06-27 NOTE — ANESTHESIA PREPROCEDURE EVALUATION
Anesthesia Pre-Procedure Evaluation    Patient: Jann Davidson   MRN: [id number] : 1945        Procedure : Procedure(s):  Radiofrequency tag localized left breast lumpectomy with radiofrequency tag localized excision of left axillary lymph node  left axillary sentinel lymph node biopsy, possible limited left axillary dissection          Past Medical History:   Diagnosis Date     Breast cancer (H)      CKD (chronic kidney disease) stage 3, GFR 30-59 ml/min (H)      Depression with anxiety      Gout      Hypertension goal BP (blood pressure) < 140/90      Recurrent sinusitis 2013      Past Surgical History:   Procedure Laterality Date     BREAST BIOPSY, RT/LT      Breat Biopsy RT/LT     COLONOSCOPY  10/03     COLONOSCOPY  2014    Procedure: COLONOSCOPY;  COLONOSCOPY ;  Surgeon: Gaurav Shaffer MD;  Location: [facility]     IR CHEST PORT PLACEMENT > 5 YRS OF AGE  2021     RECONSTRUCT EYELID        Allergies   Allergen Reactions     Lyrica [Pregabalin] Rash      Social History     Tobacco Use     Smoking status: Former Smoker     Types: Cigarettes     Quit date: 10/30/1972     Years since quittin.6     Smokeless tobacco: Never Used   Substance Use Topics     Alcohol use: Yes     Comment: couple glasses of wine daily       Wt Readings from Last 1 Encounters:   22 56.7 kg (125 lb)        Prior to Admission medications    Medication Sig Start Date End Date Taking? Authorizing Provider   atorvastatin (LIPITOR) 10 MG tablet Take 10 mg by mouth daily   Yes Unknown, Entered By History   candesartan (ATACAND) 4 MG tablet Take 0.5 tablets (2 mg) by mouth daily DISPENSE AS WRITTEN, Brand name only 22  Yes Jackie Christianson PA-C   loratadine (CLARITIN) 10 MG tablet Take 10 mg by mouth daily   Yes Unknown, Entered By History   LORazepam (ATIVAN) 0.5 MG tablet Take 1 tablet (0.5 mg) by mouth every 6 hours as needed for anxiety, nausea or sleep 22  Yes Neetu Mcmullen MD    Magnesium 400 MG TABS Take 800 mg by mouth daily   Yes Reported, Patient   oxazepam (SERAX) 15 MG capsule TAKE 1 CAPSULE (15 MG) BY MOUTH NIGHTLY AS NEEDED FOR ANXIETY. REDUCED REFILL AMT: - CALL [phone] TO SCHEDULE APPOINTMENT/FASTING LABS 6/20/22  Yes Mehran Oliva MD   oxyCODONE (ROXICODONE) 5 MG tablet Take 1 tablet (5 mg) by mouth every 4 hours as needed for moderate to severe pain 6/1/22  Yes Jazmyn Roberts MD   PARNATE 10 MG tablet TAKE 1 TABLET BY MOUTH 3 TIMES DAILY  Patient taking differently: Take 10 mg by mouth 3 times daily 1/12/22  Yes Mehran Oliva MD   polyethylene glycol (MIRALAX/GLYCOLAX) packet Take 1 packet by mouth daily   Yes Reported, Patient   triamcinolone (KENALOG) 0.1 % external cream Apply  topically 2 times daily as needed. 5/21/21  Yes Mehran Oliva MD   zolpidem (AMBIEN) 5 MG tablet TAKE 1 TABLET (5 MG) BY MOUTH NIGHTLY AS NEEDED FOR FOR SLEEP. REDUCED REFILL AMT: - CALL [phone] TO SCHEDULE APPOINTMENT/FASTING LABS 6/20/22  Yes Mehran Oliva MD     ECG 5/21/22: Sinus tachycardia   Otherwise normal ECG    ECHO 5/23/22: Interpretation Summary     Left ventricular systolic function is normal. The visual ejection fraction is  65-70%.  Right ventricle is normal in structure, function and size.  No significant valvular abnormalities.  The inferior vena cava was normal in size with preserved respiratory  variability.  There is a catheter/pacemaker lead seen in the right atrium.  There is no pericardial effusion.     Global biventricular function is stable when compared to 1/18/2022.     ______________________________________________________________________________  Left Ventricle  The left ventricle is normal in size. There is normal left ventricular wall  thickness. Left ventricular systolic function is normal. The visual ejection  fraction is 65-70%. Left ventricular diastolic function is normal. No regional  wall motion abnormalities  noted.     Right Ventricle  The right ventricle is normal in structure, function and size.     Atria  Normal left atrial size. Right atrial size is normal. There is a  catheter/pacemaker lead seen in the right atrium. There is no color Doppler  evidence of an atrial shunt.     Mitral Valve  There is trace mitral regurgitation.     Tricuspid Valve  There is trace tricuspid regurgitation. The right ventricular systolic  pressure is approximated at 25.9 mmHg plus the right atrial pressure. Right  ventricle systolic pressure estimate normal.     Aortic Valve  There is mild trileaflet aortic sclerosis.     Pulmonic Valve  The pulmonic valve is not well visualized.     Vessels  The aortic root is normal size. Normal size ascending aorta. The inferior vena  cava was normal in size with preserved respiratory variability.     Pericardium  There is no pericardial effusion.           Anesthesia Evaluation   Pt has had prior anesthetic. Type: General.    No history of anesthetic complications       ROS/MED HX  ENT/Pulmonary:     (+) tobacco use, Past use,  (-) asthma and sleep apnea   Neurologic:    (-) no seizures, no CVA and migraines   Cardiovascular:     (+) Dyslipidemia hypertension----- (-) CAD, JAY, arrhythmias and valvular problems/murmurs   METS/Exercise Tolerance: >4 METS    Hematologic:     (+) anemia,  (-) history of blood clots   Musculoskeletal: Comment: gout   (-) arthritis   GI/Hepatic:    (-) GERD and liver disease   Renal/Genitourinary:     (+) renal disease, type: CRI,  (-) nephrolithiasis   Endo:    (-) Type I DM, Type II DM, thyroid disease and obesity   Psychiatric/Substance Use:     (+) psychiatric history anxiety and depression     Infectious Disease:    (-) Recent Fever   Malignancy:   (+) Malignancy,     Other:            Physical Exam    Airway        Mallampati: II   TM distance: > 3 FB   Neck ROM: full   Mouth opening: > 3 cm    Respiratory Devices and Support         Dental  no notable dental  history         Cardiovascular   cardiovascular exam normal       Rhythm and rate: normal     Pulmonary   pulmonary exam normal        breath sounds clear to auscultation           OUTSIDE LABS:  CBC:   Lab Results   Component Value Date    WBC 5.3 06/20/2022    WBC 4.4 06/01/2022    HGB 10.1 (L) 06/20/2022    HGB 7.8 (L) 06/01/2022    HCT 32.1 (L) 06/20/2022    HCT 23.9 (L) 06/01/2022     06/20/2022     06/01/2022     BMP:   Lab Results   Component Value Date     06/20/2022     06/01/2022    POTASSIUM 4.0 06/20/2022    POTASSIUM 3.7 06/01/2022    CHLORIDE 108 06/20/2022    CHLORIDE 101 06/01/2022    CO2 26 06/20/2022    CO2 26 06/01/2022    BUN 15 06/20/2022    BUN 22 06/01/2022    CR 1.06 (H) 06/20/2022    CR 1.32 (H) 06/01/2022    GLC 88 06/20/2022    GLC 94 06/01/2022     COAGS: No results found for: PTT, INR, FIBR  POC: No results found for: BGM, HCG, HCGS  HEPATIC:   Lab Results   Component Value Date    ALBUMIN 2.9 (L) 05/23/2022    PROTTOTAL 6.1 (L) 05/23/2022     (H) 05/23/2022    AST 91 (H) 05/23/2022    ALKPHOS 337 (H) 05/23/2022    BILITOTAL 0.4 05/23/2022     OTHER:   Lab Results   Component Value Date    PH 7.36 05/30/2022    LACT 0.8 05/30/2022    AMY 9.3 06/20/2022    MAG 1.7 05/23/2022    LIPASE 246 05/21/2022    TSH 0.06 (L) 05/06/2022    T4 1.42 05/06/2022       Anesthesia Plan    ASA Status:  2   NPO Status:  NPO Appropriate    Anesthesia Type: General.     - Airway: LMA   Induction: Propofol.   Maintenance: TIVA.        Consents    Anesthesia Plan(s) and associated risks, benefits, and realistic alternatives discussed. Questions answered and patient/representative(s) expressed understanding.    - Discussed:     - Discussed with:  Patient         Postoperative Care    Pain management: Multi-modal analgesia.   PONV prophylaxis: Ondansetron (or other 5HT-3), Dexamethasone or Solumedrol     Comments:                Nadia Khoury MD

## 2022-06-27 NOTE — BRIEF OP NOTE
[facility]    Brief Operative Note    Pre-operative diagnosis: Malignant neoplasm of lower-outer quadrant of left breast of female, estrogen receptor negative (H) [C50.512, Z17.1]  Post-operative diagnosis Same as pre-operative diagnosis    Procedure: Procedure(s):  Radiofrequency tag localized left breast lumpectomy with radiofrequency tag localized excision of left axillary lymph node  left axillary sentinel lymph node biopsy  Surgeon: Surgeon(s) and Role:     * Tatianna Rai MD - Primary     * Migue Diaz PA-C - Assisting  Anesthesia: General   Estimated Blood Loss: 10 mL from 6/27/2022 10:17 AM to 6/27/2022 12:17 PM      Drains: none  Specimens:   ID Type Source Tests Collected by Time Destination   1 : TAG LOCALIZED LEFT AXILLARY LYMPH NODE / LEFT AXILLARY SENTINEL LYMPH NODE #1 Tissue Lymph Node(s) [place], Breast, Left SURGICAL PATHOLOGY EXAM Tatianna Rai MD 6/27/2022 11:05 AM    2 : LEFT AXILLARY SENTINEL LYMPH NODE # 2 Tissue Lymph Node(s) [place], Breast, Left SURGICAL PATHOLOGY EXAM Tatianna Rai MD 6/27/2022 11:16 AM    3 : TAG LOCALIZED LEFT BREAST LUMPECTOMY Tissue Breast, Left SURGICAL PATHOLOGY EXAM Tatianna Rai MD 6/27/2022 11:29 AM      Findings:   Excisional biopsy of multiple left axillary nodes. Left breast lumpectomy.  See full operative report.  Complications: None.  Implants: * No implants in log *      Migue Diaz PA-C

## 2022-06-27 NOTE — PROGRESS NOTES
PTA medications updated by Medication Scribe prior to surgery via phone call with patient (last doses completed by Nurse)     Medication history sources: Patient, Surescripts and H&P  In the past week, patient estimated taking medication this percent of the time: Greater than 90%  Adherence assessment: N/A Not Observed    Significant changes made to the medication list:  Patient reports no longer taking the following meds (med scribe removed from PTA med list): Pregabalin, Maxide, Gabapentin, Hydroxyzine      Additional medication history information:   None    Medication reconciliation completed by provider prior to medication history? No    Time spent in this activity: 40 minutes    The information provided in this note is only as accurate as the sources available at the time of update(s)    Prior to Admission medications    Medication Sig Last Dose Taking? Auth Provider Long Term End Date   atorvastatin (LIPITOR) 10 MG tablet Take 10 mg by mouth daily 6/27/2022 at am Yes Unknown, Entered By History No    candesartan (ATACAND) 4 MG tablet Take 0.5 tablets (2 mg) by mouth daily DISPENSE AS WRITTEN, Brand name only 6/27/2022 at am Yes Constance Cid, Jackie Roberson PA-C Yes    loratadine (CLARITIN) 10 MG tablet Take 10 mg by mouth daily 6/27/2022 at am Yes Unknown, Entered By History     LORazepam (ATIVAN) 0.5 MG tablet Take 1 tablet (0.5 mg) by mouth every 6 hours as needed for anxiety, nausea or sleep  at prn Yes Neetu Mcmullen MD     Magnesium 400 MG TABS Take 800 mg by mouth daily 6/24/2022 at am Yes Reported, Patient     oxazepam (SERAX) 15 MG capsule TAKE 1 CAPSULE (15 MG) BY MOUTH NIGHTLY AS NEEDED FOR ANXIETY. REDUCED REFILL AMT: - CALL [phone] TO SCHEDULE APPOINTMENT/FASTING LABS  at prn Yes Mehran Oliva MD Yes    oxyCODONE (ROXICODONE) 5 MG tablet Take 1 tablet (5 mg) by mouth every 4 hours as needed for moderate to severe pain  at prn Yes Jazmyn Roberts MD     PARNATE 10 MG tablet  TAKE 1 TABLET BY MOUTH 3 TIMES DAILY  Patient taking differently: Take 10 mg by mouth 3 times daily 6/26/2022 at pm Yes Mehran Oliva MD Yes    polyethylene glycol (MIRALAX/GLYCOLAX) packet Take 1 packet by mouth daily 6/26/2022 at am Yes Reported, Patient     triamcinolone (KENALOG) 0.1 % external cream Apply  topically 2 times daily as needed.  at prn Yes Mehran Oliva MD     zolpidem (AMBIEN) 5 MG tablet TAKE 1 TABLET (5 MG) BY MOUTH NIGHTLY AS NEEDED FOR FOR SLEEP. REDUCED REFILL AMT: - CALL [phone] TO SCHEDULE APPOINTMENT/FASTING LABS  at prn Yes Mehran Oliva MD Yes      Medication history completed by:    Helder Alexis CPhT  Medication [facility]

## 2022-06-27 NOTE — DISCHARGE INSTRUCTIONS
[facility] - [facility]  Discharge Instructions: Post-Operative Breast Surgery    ACTIVITY  Take frequent short walks and increase your activity gradually.    Avoid strenuous physical activity or heavy lifting greater than 15-20 lbs. for 1-2 weeks with arm on the surgery side.  You may climb stairs.  Gentle rotation and stretching of your arms and shoulders will prevent joint stiffness.  You may drive without restrictions when you are not using any prescription pain medication and feel comfortable in a car.  You may return to work/school when you are comfortable without any prescription pain medication.    WOUND CARE  You may remove your ACE wrap/dressing and shower 48 hours after the surgery.  Pat your incisions dry and leave them open to air.  Re-apply dressing (Band-Aids or gauze/tape) as needed for drainage.  You may have steri-strips (looks like white tape) or skin glue on your incisions.  You may peel off the steri-strips 2 weeks after your surgery if they have not peeled off on their own.  If you have skin glue, it will peel up and fall off on its own.  Do not soak your incisions in a tub or pool for 2 weeks.   Do not apply any lotions, creams, or ointments to your incisions.  A ridge under your incisions is normal and will gradually resolve.  Wear a supportive bra for 1-2 weeks, day and night.    DIET  Start with liquids, then gradually resume your regular diet as tolerated.   Drink plenty of liquids to stay hydrated.    PAIN  Expect some tenderness and discomfort at the incision site(s).  Use the prescribed pain medication at your discretion.  Expect gradual resolution of your pain over several days.  You may take ibuprofen with food (unless you have been told not to) or acetaminophen/Tylenol instead of or in addition to your prescribed pain medication.  If you are taking Norco or Percocet, do not take any additional acetaminophen/Tylenol.  Do not drink alcohol or drive while you are taking  pain medications.    EXPECTATIONS  Pain medications can cause constipation.  Limit use when possible.  Take over the counter stool softener/stimulant, such as Colace or Senna, 1-2 times a day with plenty of water.  You may take a mild over the counter laxative, such as Miralax or a suppository, as needed.    You may discontinue these medications once you are having regular bowel movements and/or are no longer taking your narcotic pain medication.    RETURN APPOINTMENT  Follow up with your surgeon in 2 weeks.  Please call the office at [phone] to schedule your appointment.      CALL OUR OFFICE -[phone] IF YOU HAVE:   Chills or fever above 101 F.  Increased redness, warmth, or drainage at your incisions.  Significant bleeding.  Pain not relieved by your pain medication or rest.  Increasing pain after the first 48 hours.  Any other concerns or questions.

## 2022-06-27 NOTE — TELEPHONE ENCOUNTER
Patient is due for an appointment. Please have them do a visit prior to refill. Once scheduled if they will be out of their medication prior to the visit let me know. Pt notified. Corrected order in Epic. Called radiology scheduling and changed it to Chest CT without contrast.     Pt asked if we received records from Dr. Luna for visits from 7/12/17 and 9/6/17. Advised that all we have is from the media tab from June. She asked that I call Madeline at [phone] to request records. Records requested.    Jessica Samuels RN  [facility] [place]

## 2022-06-28 VITALS
WEIGHT: 125 LBS | TEMPERATURE: 98.4 F | RESPIRATION RATE: 17 BRPM | OXYGEN SATURATION: 98 % | DIASTOLIC BLOOD PRESSURE: 75 MMHG | HEIGHT: 63 IN | BODY MASS INDEX: 22.15 KG/M2 | HEART RATE: 74 BPM | SYSTOLIC BLOOD PRESSURE: 128 MMHG

## 2022-06-28 LAB — GLUCOSE BLDC GLUCOMTR-MCNC: 121 MG/DL (ref 70–99)

## 2022-06-28 PROCEDURE — 250N000013 HC RX MED GY IP 250 OP 250 PS 637: Performed by: SURGERY

## 2022-06-28 PROCEDURE — 82962 GLUCOSE BLOOD TEST: CPT

## 2022-06-28 PROCEDURE — 250N000013 HC RX MED GY IP 250 OP 250 PS 637: Performed by: PHYSICIAN ASSISTANT

## 2022-06-28 PROCEDURE — 258N000003 HC RX IP 258 OP 636: Performed by: PHYSICIAN ASSISTANT

## 2022-06-28 RX ADMIN — SODIUM CHLORIDE: 9 INJECTION, SOLUTION INTRAVENOUS at 00:08

## 2022-06-28 RX ADMIN — SENNOSIDES AND DOCUSATE SODIUM 2 TABLET: 50; 8.6 TABLET ORAL at 01:06

## 2022-06-28 RX ADMIN — ATORVASTATIN CALCIUM 10 MG: 10 TABLET, FILM COATED ORAL at 08:26

## 2022-06-28 RX ADMIN — LORAZEPAM 0.5 MG: 0.5 TABLET ORAL at 09:29

## 2022-06-28 RX ADMIN — VALSARTAN 40 MG: 40 TABLET, FILM COATED ORAL at 08:26

## 2022-06-28 RX ADMIN — LORAZEPAM 0.5 MG: 0.5 TABLET ORAL at 03:19

## 2022-06-28 RX ADMIN — POLYETHYLENE GLYCOL 3350 17 G: 17 POWDER, FOR SOLUTION ORAL at 08:26

## 2022-06-28 RX ADMIN — LORATADINE 10 MG: 10 TABLET ORAL at 08:26

## 2022-06-28 RX ADMIN — Medication 800 MG: at 08:26

## 2022-06-28 NOTE — PLAN OF CARE
Goal Outcome Evaluation:    Plan of Care Reviewed With: patient     Overall Patient Progress: improving     Pt Aox4, VSS on RA. Pt up ind in room. Voiding regularly, good urine output. Denies passing flatus, BS hypoactive, senna-s given at pt request. Pt anxious but calmed down by end of shift, ativan given to help sleep. Pt did not sleep much. LUE CMS intact, binder to chest intact. Loss of IV access towards end of night, pt unsure how it got pulled out. Denies pain. Reg vegetarian diet. Discharge most likely today.

## 2022-06-28 NOTE — PROGRESS NOTES
"Surgery    No complaints  Tired  Denies pain  +bowel function   Tolerating diet  Walking   Denies CP, dyspnea    Gen:  Awake, Alert, NAD  Blood pressure 128/75, pulse 74, temperature 98.4  F (36.9  C), temperature source Oral, resp. rate 17, height 1.6 m (5' 3\"), weight 56.7 kg (125 lb), SpO2 98 %.  Resp - Non-labored, clear to ascultation.    Cardiac - Regular rate & rhythm without murmur  Chest - ACE wrap with gentle compression in place      A/P s/p excisional biopsy of left breast lump and left axillary lymph nodes on 6/28/22    - ADAT  - encourage incentive spirometer use  - ambulate  - dispo: Home today. Declined rx's  - instructed  - Follow up with Dr. Rai at [facility] in about 2 weeks.  Call [phone] to schedule an appointment.       Migue Diaz PA-C  Office: [phone]  Pager: [phone]    "

## 2022-06-30 LAB
PATH REPORT.COMMENTS IMP SPEC: NORMAL
PATH REPORT.FINAL DX SPEC: NORMAL
PATH REPORT.GROSS SPEC: NORMAL
PATH REPORT.INTRAOP OBS SPEC DOC: NORMAL
PATH REPORT.MICROSCOPIC SPEC OTHER STN: NORMAL
PATH REPORT.RELEVANT HX SPEC: NORMAL
PATHOLOGY SYNOPTIC REPORT: NORMAL
PHOTO IMAGE: NORMAL

## 2022-06-30 PROCEDURE — 88331 PATH CONSLTJ SURG 1 BLK 1SPC: CPT | Mod: 26 | Performed by: PATHOLOGY

## 2022-06-30 PROCEDURE — 88341 IMHCHEM/IMCYTCHM EA ADD ANTB: CPT | Mod: 26 | Performed by: PATHOLOGY

## 2022-06-30 PROCEDURE — 88342 IMHCHEM/IMCYTCHM 1ST ANTB: CPT | Mod: 26 | Performed by: PATHOLOGY

## 2022-06-30 PROCEDURE — 88307 TISSUE EXAM BY PATHOLOGIST: CPT | Mod: 26 | Performed by: PATHOLOGY

## 2022-07-01 ENCOUNTER — TELEPHONE (OUTPATIENT)
Dept: SURGERY | Facility: CLINIC | Age: 77
End: 2022-07-01

## 2022-07-01 NOTE — TELEPHONE ENCOUNTER
[facility]:    Patient contacted and pathology discussed.  Doing well post-op.  No significant complaints.    Final Diagnosis   A.  Lymph node, left axillary sentinel #1, excisional biopsy:  -Isolated tumor cells in one lymph node (see comment).  -Previous biopsy site changes identified.  -Focal fibrosis consistent with treatment effect.     B.  Lymph node, left axillary sentinel #2, excisional biopsy:  -One lymph node, negative for metastatic carcinoma (0/1).     C.  Breast, left, tag-localized partial mastectomy:  -No residual invasive carcinoma identified.  -Residual lobular carcinoma in-situ (LCIS) and atypical lobular hyperplasia with associated calcifications.  -Previous biopsy site changes identified.  -Additional details: Fibrocystic change, sclerosing adenosis, usual ductal hyperplasia.  -Residual Cancer [place] Calculator:                -Primary Tumor Bed Area: 24 x 11 mm.                -Overall Cancer Cellularity (as percentage of area): 0%                -Percentage of Cancer That Is in-situ Disease: 0%                -Number of Positive Lymph Nodes: 1                -Diameter of Largest Metastasis: 0.087 mm  -Residual Cancer [place]: 0.66  -Residual Cancer [place] Class: RCB-I.         Tatianna Rai MD

## 2022-07-05 ENCOUNTER — TELEPHONE (OUTPATIENT)
Dept: ONCOLOGY | Facility: CLINIC | Age: 77
End: 2022-07-05

## 2022-07-05 NOTE — TELEPHONE ENCOUNTER
Left voicemail message for patient requesting a return call to confirm return visit scheduled with Dr Mcmullen on 7/21/22 at 12:00pm        ----- Message from Neetu Mcmullen MD sent at 7/1/2022  4:38 PM CDT -----  Regarding: Follow-up appt  Hello schedulers,    Could you please arrange for Jann to follow-up with me on Thursday 7/21 at 12:00?    Thank you!  Neetu Mcmullen MD  Hematology/Oncology  [facility]

## 2022-07-05 NOTE — TELEPHONE ENCOUNTER
Patient has the following appointment scheduled:    Radiation Oncology consult:  Dr. Lamont Giles 7/13/22 @ 1:00p.m.  Medical Oncology follow up:  Dr. Neetu Mcmullen 7/21/22 @ 12:00 p.m.  1st PO visit:  Dr. Tatianna Rai 7/22/22 @ 2:45p.m.    Marcia Lyon, RN BSN  Breast Nurse Care Coordinator  [facility] [place] [place]  [phone]

## 2022-07-13 ENCOUNTER — TRANSFERRED RECORDS (OUTPATIENT)
Dept: HEALTH INFORMATION MANAGEMENT | Facility: CLINIC | Age: 77
End: 2022-07-13

## 2022-07-21 NOTE — PROGRESS NOTES
Surgery Postoperative Note    S: Jann is a 77-year-old female who presents for postoperative follow-up following left breast lumpectomy with left axillary sentinel lymph node biopsy.  The patient has done very well postoperatively.  No significant pain in her surgical sites.  No wound concerns.  The patient's pathology was again discussed today in clinic, and the patient was provided with a copy of her pathology report.    Breast: Left breast incision healing well without evidence of infection or significant underlying fluid collection  Lymph: Left axillary incision healing well without evidence of infection or significant underlying fluid collection    Pathology:   Final Diagnosis   A.  Lymph node, left axillary sentinel #1, excisional biopsy:  -Isolated tumor cells in one lymph node (see comment).  -Previous biopsy site changes identified.  -Focal fibrosis consistent with treatment effect.     B.  Lymph node, left axillary sentinel #2, excisional biopsy:  -One lymph node, negative for metastatic carcinoma (0/1).     C.  Breast, left, tag-localized partial mastectomy:  -No residual invasive carcinoma identified.  -Residual lobular carcinoma in-situ (LCIS) and atypical lobular hyperplasia with associated calcifications.  -Previous biopsy site changes identified.  -Additional details: Fibrocystic change, sclerosing adenosis, usual ductal hyperplasia.  -Residual Cancer [place] Calculator:                -Primary Tumor Bed Area: 24 x 11 mm.                -Overall Cancer Cellularity (as percentage of area): 0%                -Percentage of Cancer That Is in-situ Disease: 0%                -Number of Positive Lymph Nodes: 1                -Diameter of Largest Metastasis: 0.087 mm  -Residual Cancer [place]: 0.66  -Residual Cancer [place] Class: RCB-I.       A/P  Jann Davidson is recovering from a left breast lumpectomy with left axillary sentinel lymph node biopsy.  The patient has done very well postoperatively and may  follow-up in the general surgery clinic on an as-needed basis.    Thank you for the opportunity to help in her care.    Tatianna Rai MD   [facility]  [phone]    Please route or send letter to:  Primary Care Provider (PCP) and Referring Provider

## 2022-07-22 ENCOUNTER — OFFICE VISIT (OUTPATIENT)
Dept: SURGERY | Facility: CLINIC | Age: 77
End: 2022-07-22
Payer: MEDICARE

## 2022-07-22 DIAGNOSIS — Z09 SURGERY FOLLOW-UP EXAMINATION: ICD-10-CM

## 2022-07-22 DIAGNOSIS — Z98.890 POSTOPERATIVE STATE: Primary | ICD-10-CM

## 2022-07-22 PROCEDURE — 99024 POSTOP FOLLOW-UP VISIT: CPT | Performed by: SURGERY

## 2022-07-22 NOTE — LETTER
July 22, 2022          Mehran Oliva MD  [address]  [place] [address]      RE:   Jann Davidson 1945      Dear Colleague,    Thank you for referring your patient, Jann Davidson, to [facility] at [facility] [place] [facility]. Please see a copy of my visit note below.    Jann is a 77-year-old female who presents for postoperative follow-up following left breast lumpectomy with left axillary sentinel lymph node biopsy.  The patient has done very well postoperatively.  No significant pain in her surgical sites.  No wound concerns.  The patient's pathology was again discussed today in clinic, and the patient was provided with a copy of her pathology report.     Breast: Left breast incision healing well without evidence of infection or significant underlying fluid collection  Lymph: Left axillary incision healing well without evidence of infection or significant underlying fluid collection     Pathology:   Final Diagnosis   A.  Lymph node, left axillary sentinel #1, excisional biopsy:  -Isolated tumor cells in one lymph node (see comment).  -Previous biopsy site changes identified.  -Focal fibrosis consistent with treatment effect.     B.  Lymph node, left axillary sentinel #2, excisional biopsy:  -One lymph node, negative for metastatic carcinoma (0/1).     C.  Breast, left, tag-localized partial mastectomy:  -No residual invasive carcinoma identified.  -Residual lobular carcinoma in-situ (LCIS) and atypical lobular hyperplasia with associated calcifications.  -Previous biopsy site changes identified.  -Additional details: Fibrocystic change, sclerosing adenosis, usual ductal hyperplasia.  -Residual Cancer [place] Calculator:                -Primary Tumor Bed Area: 24 x 11 mm.                -Overall Cancer Cellularity (as percentage of area): 0%                -Percentage of Cancer That Is in-situ Disease: 0%                -Number of Positive Lymph Nodes: 1                -Diameter of  Largest Metastasis: 0.087 mm  -Residual Cancer [place]: 0.66  -Residual Cancer [place] Class: RCB-I.         A/P  Jann Davidson is recovering from a left breast lumpectomy with left axillary sentinel lymph node biopsy.  The patient has done very well postoperatively and may follow-up in the general surgery clinic on an as-needed basis.          Again, thank you for allowing me to participate in the care of your patient.      Sincerely,      Tatianna Rai MD

## 2022-07-26 NOTE — PROGRESS NOTES
[facility]    Hematology/Oncology Established Patient Note      Today's Date: 8/8/2022    Reason for follow-up:  Left breast cancer, triple negative.    HISTORY OF PRESENT ILLNESS: Jann Davidson is a 77 year old female who presents with the following oncologic history:  1. 10/26/2021: Mammogram showed calcifications in the left lateral breast; right breast negative.  2. 11/17/2021: Left diagnostic mammogram showed cluster of pleomorphic calcifications at 4:00, 6 cm from nipple, measuring 1.3 cm.  3. 12/3/2021: Stereotactic left breast biopsy at 4:00, 6 cm from nipple showed grade 2 invasive ductal carcinoma with micropapillary features, extensive lymphovascular invasion present, grade 2-3 focal DCIS, LCIS, ER negative, WI negative, HER-2/maxime FISH negative.  4. 12/10/2021: Breast MRI showed oval mass 2 x 2 x 1.5 cm at 4:00, 6 cm from nipple in left breast reflecting biopsy cavity with post-biopsy changes; prominent 2.5 cm low left level 1 axillary lymph node.  5.  12/28/2021: PET/CT scan showed FDG avid focus in left lower outer breast, hypermetabolic left axillary adenopathy, moderate FDG uptake at level off anus, exophytic right renal 1.1 cm mass, slowly increasing in size since 2016 concerning for growing renal cell carcinoma.  6. 1/18/2022: Started neoadjuvant chemotherapy with weekly paclitaxel + carboplatin and every 3-week pembrolizumab as per KEYNOTE-522 study.  7. 4/19/2022: Breast MRI showed no residual enhancement at site of primary malignancy in left breast at 4:00; no residual left axillary adenopathy. Right breast negative.  8. 5/6/2022: Completed cycle 12 paclitaxel with pembrolizumab. Surgery delayed due to 2 hospitalizations for rash and peripheral neuropathy.  9. 6/27/2022: Underwent left breast lumpectomy and left axillary sentinel lymph node excision under care of Dr. Tatianna Rai.  Pathology showed no residual invasive carcinoma in left breast; residual LCIS and atypical  lobular hyperplasia present; one of 2 lymph nodes with isolated tumor cells measuring 0.087 mm, focal fibrosis consistent with treatment effect; RCB-I, ypT0-pN0(i+).      INTERVAL HISTORY:  Jann reports resolution of peripheral neuropathy. She had prior rash reaction to Lyrica.  She feels overall well.      REVIEW OF SYSTEMS:   14 point ROS was reviewed and is negative other than as noted above in HPI.       HOME MEDICATIONS:  Current Outpatient Medications   Medication Sig Dispense Refill     atorvastatin (LIPITOR) 10 MG tablet Take 10 mg by mouth daily       candesartan (ATACAND) 4 MG tablet Take 0.5 tablets (2 mg) by mouth daily DISPENSE AS WRITTEN, Brand name only       loratadine (CLARITIN) 10 MG tablet Take 10 mg by mouth daily       LORazepam (ATIVAN) 0.5 MG tablet Take 1 tablet (0.5 mg) by mouth every 6 hours as needed for anxiety, nausea or sleep 45 tablet 0     Magnesium 400 MG TABS Take 800 mg by mouth daily       oxazepam (SERAX) 15 MG capsule TAKE 1 CAPSULE (15 MG) BY MOUTH NIGHTLY AS NEEDED FOR ANXIETY. REDUCED REFILL AMT: - CALL [phone] TO SCHEDULE APPOINTMENT/FASTING LABS 21 capsule 0     oxyCODONE (ROXICODONE) 5 MG tablet Take 1 tablet (5 mg) by mouth every 4 hours as needed for moderate to severe pain 15 tablet 0     PARNATE 10 MG tablet TAKE 1 TABLET BY MOUTH 3 TIMES DAILY (Patient taking differently: Take 10 mg by mouth 3 times daily) 270 tablet 3     polyethylene glycol (MIRALAX/GLYCOLAX) packet Take 1 packet by mouth daily       raloxifene (EVISTA) 60 MG tablet Take 1 tablet (60 mg) by mouth daily 90 tablet 3     triamcinolone (KENALOG) 0.1 % external cream Apply  topically 2 times daily as needed. 15 g 1     zolpidem (AMBIEN) 5 MG tablet TAKE 1 TABLET (5 MG) BY MOUTH NIGHTLY AS NEEDED FOR FOR SLEEP. REDUCED REFILL AMT: - CALL [phone] TO SCHEDULE APPOINTMENT/FASTING LABS 21 tablet 0         ALLERGIES:  Allergies   Allergen Reactions     Lyrica [Pregabalin] Rash         PAST MEDICAL  HISTORY:  Past Medical History:   Diagnosis Date     Breast cancer (H)      CKD (chronic kidney disease) stage 3, GFR 30-59 ml/min (H)      Depression with anxiety      Gout      Hypertension goal BP (blood pressure) < 140/90      Recurrent sinusitis 2013     Gynecologic history:  Age of menarche at 11; LMP ;  (one son), 1st pregnancy at age 19; no HRT.    PAST SURGICAL HISTORY:  Past Surgical History:   Procedure Laterality Date     BIOPSY NODE SENTINEL Left 2022    Procedure: left axillary sentinel lymph node biopsy;  Surgeon: Tatianna Rai MD;  Location: [facility] OR     BREAST BIOPSY, RT/LT      Breat Biopsy RT/LT     COLONOSCOPY  10/03     COLONOSCOPY  2014    Procedure: COLONOSCOPY;  COLONOSCOPY ;  Surgeon: Gaurav Shaffer MD;  Location: [facility] GI     IR CHEST PORT PLACEMENT > 5 YRS OF AGE  2021     LUMPECTOMY BREAST WITH SEED LOCALIZATION Left 2022    Procedure: Radiofrequency tag localized left breast lumpectomy with radiofrequency tag localized excision of left axillary lymph node;  Surgeon: Tatianna Rai MD;  Location: [facility] OR     RECONSTRUCT EYELID           SOCIAL HISTORY:  Social History     Socioeconomic History     Marital status:      Spouse name: Not on file     Number of children: Not on file     Years of education: Not on file     Highest education level: Not on file   Occupational History     Not on file   Tobacco Use     Smoking status: Former Smoker     Types: Cigarettes     Quit date: 10/30/1972     Years since quittin.7     Smokeless tobacco: Never Used   Vaping Use     Vaping Use: Never used   Substance and Sexual Activity     Alcohol use: Yes     Comment: couple glasses of wine daily      Drug use: No     Sexual activity: Never   Other Topics Concern     Parent/sibling w/ CABG, MI or angioplasty before 65F 55M? Not Asked   Social History Narrative     Not on file     Social Determinants of Health     Financial Resource Strain: Not on file  "  Food Insecurity: Not on file   Transportation Needs: Not on file   Physical Activity: Not on file   Stress: Not on file   Social Connections: Not on file   Intimate Partner Violence: Not At Risk     Fear of Current or Ex-Partner: No     Emotionally Abused: No     Physically Abused: No     Sexually Abused: No   Housing Stability: Not on file         FAMILY HISTORY:  Family History   Problem Relation Age of Onset     Cancer Mother         uterine     Breast Cancer Mother      Diabetes Paternal Grandmother          PHYSICAL EXAM:  Vital signs:  /81   Pulse 101   Resp 16   Ht 1.6 m (5' 3\")   Wt 59.1 kg (130 lb 3.2 oz)   SpO2 100%   BMI 23.06 kg/m     GENERAL: No acute distress.  EYES: No scleral icterus. No overt erythema.  RESPIRATORY: No audible cough, wheezing, or labored breathing.  MUSCULOSKELETAL: Range of motion in the neck, shoulders, and arms appear normal.  SKIN: No overt rashes, discolorations, or lesions over the face and neck.  NEUROLOGIC: Alert.  No overt tremors.  PSYCHIATRIC: Normal affect and mood.  Does not appear anxious.    LABS:  CBC RESULTS: Recent Labs   Lab Test 06/20/22  1712   WBC 5.3   RBC 3.16*   HGB 10.1*   HCT 32.1*   *   MCH 32.0   MCHC 31.5   RDW 14.4        Last Comprehensive Metabolic Panel:  Sodium   Date Value Ref Range Status   06/20/2022 140 133 - 144 mmol/L Final   06/05/2019 142 133 - 144 mmol/L Final     Potassium   Date Value Ref Range Status   06/20/2022 4.0 3.4 - 5.3 mmol/L Final   06/05/2019 3.7 3.4 - 5.3 mmol/L Final     Chloride   Date Value Ref Range Status   06/20/2022 108 94 - 109 mmol/L Final   06/05/2019 110 (H) 94 - 109 mmol/L Final     Carbon Dioxide   Date Value Ref Range Status   06/05/2019 24 20 - 32 mmol/L Final     Carbon Dioxide (CO2)   Date Value Ref Range Status   06/20/2022 26 20 - 32 mmol/L Final     Anion Gap   Date Value Ref Range Status   06/20/2022 6 3 - 14 mmol/L Final   06/05/2019 8 3 - 14 mmol/L Final     Glucose   Date " Value Ref Range Status   2022 88 70 - 99 mg/dL Final   2019 93 70 - 99 mg/dL Final     Comment:     Fasting specimen     GLUCOSE BY METER POCT   Date Value Ref Range Status   2022 121 (H) 70 - 99 mg/dL Final     Urea Nitrogen   Date Value Ref Range Status   2022 15 7 - 30 mg/dL Final   2019 32 (H) 7 - 30 mg/dL Final     Creatinine   Date Value Ref Range Status   2022 1.06 (H) 0.52 - 1.04 mg/dL Final   2019 1.15 (H) 0.52 - 1.04 mg/dL Final     GFR Estimate   Date Value Ref Range Status   2022 54 (L) >60 mL/min/1.73m2 Final     Comment:     Effective 2021 eGFRcr in adults is calculated using the  CKD-EPI creatinine equation which includes age and gender (Meliton et al., [place], DOI: 10.1056/AROXir5525013)   2019 47 (L) >60 mL/min/[1.73_m2] Final     Comment:     Non  GFR Calc  Starting 2018, serum creatinine based estimated GFR (eGFR) will be   calculated using the Chronic Kidney Disease Epidemiology Collaboration   (CKD-EPI) equation.       GFR, ESTIMATED POCT   Date Value Ref Range Status   2021 33 (L) >60 mL/min/1.73m2 Final     Calcium   Date Value Ref Range Status   2022 9.3 8.5 - 10.1 mg/dL Final   2019 9.2 8.5 - 10.1 mg/dL Final     Bilirubin Total   Date Value Ref Range Status   2022 0.4 0.2 - 1.3 mg/dL Final   2019 0.4 0.2 - 1.3 mg/dL Final     Alkaline Phosphatase   Date Value Ref Range Status   2022 337 (H) 40 - 150 U/L Final   2019 85 40 - 150 U/L Final     ALT   Date Value Ref Range Status   2022 125 (H) 0 - 50 U/L Final   2019 33 0 - 50 U/L Final     AST   Date Value Ref Range Status   2022 91 (H) 0 - 45 U/L Final   2019 33 0 - 45 U/L Final       PATHOLOGY:  Reviewed as per HPI.    IMAGIN2022 Limited abdominal U/S showed:  Borderline dilated extrahepatic bile ducts, similar to previous.    ASSESSMENT/PLAN:  Jann Davidson is a 77 year old  female with the following issues:  1. 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 Left breast LCIS and atypical lobular hyperplasia  --Jann completed neoadjuvant chemotherapy with paclitaxel and carboplatin for 12 weeks with every 3-week pembrolizumab. Given that her 4/19/2022 breast MRI showed no residual enhancement -- I had advised foregoing chemotherapy with Adriamycin and Cytoxan given her age and low likelihood of tolerating these drugs.  --6/27/2022 Final pathology from her lumpectomy and sentinel lymph node excision showed near-complete response to neoadjuvant chemotherapy with just isolated tumor cells in one lymph node.  She had residual LCIS and atypical lobular hyperplasia.  Reviewed pathology with her today.  --Discussed that I would recommend 9 cycles of pembrolizumab post-surgery as per the KEYNOTE-522 trial and depending on her current liver enzyme levels. Will recheck CMP, TSH and CBC today.  --She will start adjuvant radiation therapy on 8/11/2022 with Dr. Gilse.  --Genetic testing negative.  --Discussed alternating mammogram with breast MRI for high risk surveillance given findings of LCIS and ALH, which are markers for increased risk of additional breast cancer in the future.  She agrees to proceed.  We will plan for 3-month post-radiation diagnostic mammogram, then breast MRI 6 months thereafter.  --Discussed role of raloxifene for 5 years for risk reduction given the findings of LCIS and ALH. Discussed risks and benefits.  She would like to proceed.  Prescription issued.  She may start taking raloxifene.    3. Chronic kidney disease, stage 3  --Creatinine stable.  --She follows with Dr. Luna.  - She asked about restarting HCTZ.  I recommended she consult with Dr. Luna.    4. FDG uptake at anal canal  --She has history of anal fissure from  4/8/2014 colonoscopy.  --PET scan showed uptake at level of anus and could represent hemorrhoid, fissure, malignancy, or be physiologic.  --Advised referral to colorectal for further evaluation given that she has completed neoadjuvant therapy and surgery.  She agrees to see colorectal.    5. Right renal mass  --PET scan showed a 1.1 cm mass in the right kidney that has been reportedly slowly growing since 2016 concerning for renal cell carcinoma.  --Will not be able to further evaluate with contrast MRI due to her degree of chronic kidney disease.  Will refer her to urology for surveillance/intervention now that she has completed surgery for her breast cancer.    6. Pulmonary nodules  --PET scan showed scattered small bilateral pulmonary nodules that are unchanged since 10/24/2018 and most likely benign.    7. Depression/anxiety  --Worsened due to cancer diagnosis and passing of her dog.  --She would like to continue on Parnate and not change her antidepressant.  --She takes oxazepam together with zolpidem.    --She could use lorazepam (Ativan) as long as she takes this 6 hours from her other benzodiazepenes.    8. Transaminitis  --Potential to be related to immunotherapy.  She has been off pembrolizumab since surgery.  --Will repeat CMP today and determine if she may restart pembrolizumab.    9. Anemia of chemotherapy  --Repeat CBC today.    Neetu Mcmullen MD  Hematology/Oncology  [facility]    Total time spent: 40 minutes in patient evaluation, counseling, documentation, and coordination of care.

## 2022-08-08 ENCOUNTER — ONCOLOGY VISIT (OUTPATIENT)
Dept: ONCOLOGY | Facility: CLINIC | Age: 77
End: 2022-08-08
Attending: INTERNAL MEDICINE
Payer: MEDICARE

## 2022-08-08 VITALS
SYSTOLIC BLOOD PRESSURE: 131 MMHG | WEIGHT: 130.2 LBS | HEART RATE: 101 BPM | BODY MASS INDEX: 23.07 KG/M2 | HEIGHT: 63 IN | OXYGEN SATURATION: 100 % | DIASTOLIC BLOOD PRESSURE: 81 MMHG | RESPIRATION RATE: 16 BRPM

## 2022-08-08 DIAGNOSIS — D05.02 NEOPLASM OF LEFT BREAST, PRIMARY TUMOR STAGING CATEGORY TIS: LOBULAR CARCINOMA IN SITU (LCIS): ICD-10-CM

## 2022-08-08 DIAGNOSIS — R94.6 THYROID FUNCTION TEST ABNORMAL: ICD-10-CM

## 2022-08-08 DIAGNOSIS — N60.92 ATYPICAL LOBULAR HYPERPLASIA (ALH) OF LEFT BREAST: ICD-10-CM

## 2022-08-08 DIAGNOSIS — N28.89 RIGHT KIDNEY MASS: ICD-10-CM

## 2022-08-08 DIAGNOSIS — R74.01 TRANSAMINITIS: ICD-10-CM

## 2022-08-08 DIAGNOSIS — Z17.1 MALIGNANT NEOPLASM OF LOWER-OUTER QUADRANT OF LEFT BREAST OF FEMALE, ESTROGEN RECEPTOR NEGATIVE (H): Primary | ICD-10-CM

## 2022-08-08 DIAGNOSIS — F41.9 ANXIETY: ICD-10-CM

## 2022-08-08 DIAGNOSIS — T45.1X5A ANEMIA DUE TO ANTINEOPLASTIC CHEMOTHERAPY: ICD-10-CM

## 2022-08-08 DIAGNOSIS — C50.512 MALIGNANT NEOPLASM OF LOWER-OUTER QUADRANT OF LEFT BREAST OF FEMALE, ESTROGEN RECEPTOR NEGATIVE (H): Primary | ICD-10-CM

## 2022-08-08 DIAGNOSIS — K62.9: ICD-10-CM

## 2022-08-08 DIAGNOSIS — D64.81 ANEMIA DUE TO ANTINEOPLASTIC CHEMOTHERAPY: ICD-10-CM

## 2022-08-08 LAB
ALBUMIN SERPL-MCNC: 3.6 G/DL (ref 3.4–5)
ALP SERPL-CCNC: 111 U/L (ref 40–150)
ALT SERPL W P-5'-P-CCNC: 42 U/L (ref 0–50)
ANION GAP SERPL CALCULATED.3IONS-SCNC: 6 MMOL/L (ref 3–14)
AST SERPL W P-5'-P-CCNC: 43 U/L (ref 0–45)
BASOPHILS # BLD AUTO: 0 10E3/UL (ref 0–0.2)
BASOPHILS NFR BLD AUTO: 1 %
BILIRUB SERPL-MCNC: 0.2 MG/DL (ref 0.2–1.3)
BUN SERPL-MCNC: 23 MG/DL (ref 7–30)
CALCIUM SERPL-MCNC: 9.3 MG/DL (ref 8.5–10.1)
CHLORIDE BLD-SCNC: 108 MMOL/L (ref 94–109)
CO2 SERPL-SCNC: 25 MMOL/L (ref 20–32)
CREAT SERPL-MCNC: 1.35 MG/DL (ref 0.52–1.04)
EOSINOPHIL # BLD AUTO: 0.1 10E3/UL (ref 0–0.7)
EOSINOPHIL NFR BLD AUTO: 2 %
ERYTHROCYTE [DISTWIDTH] IN BLOOD BY AUTOMATED COUNT: 11.5 % (ref 10–15)
GFR SERPL CREATININE-BSD FRML MDRD: 40 ML/MIN/1.73M2
GLUCOSE BLD-MCNC: 86 MG/DL (ref 70–99)
HCT VFR BLD AUTO: 32.8 % (ref 35–47)
HGB BLD-MCNC: 10.6 G/DL (ref 11.7–15.7)
IMM GRANULOCYTES # BLD: 0 10E3/UL
IMM GRANULOCYTES NFR BLD: 0 %
LYMPHOCYTES # BLD AUTO: 1.1 10E3/UL (ref 0.8–5.3)
LYMPHOCYTES NFR BLD AUTO: 26 %
MCH RBC QN AUTO: 32 PG (ref 26.5–33)
MCHC RBC AUTO-ENTMCNC: 32.3 G/DL (ref 31.5–36.5)
MCV RBC AUTO: 99 FL (ref 78–100)
MONOCYTES # BLD AUTO: 0.4 10E3/UL (ref 0–1.3)
MONOCYTES NFR BLD AUTO: 9 %
NEUTROPHILS # BLD AUTO: 2.6 10E3/UL (ref 1.6–8.3)
NEUTROPHILS NFR BLD AUTO: 62 %
NRBC # BLD AUTO: 0 10E3/UL
NRBC BLD AUTO-RTO: 0 /100
PLATELET # BLD AUTO: 264 10E3/UL (ref 150–450)
POTASSIUM BLD-SCNC: 4.3 MMOL/L (ref 3.4–5.3)
PROT SERPL-MCNC: 6.8 G/DL (ref 6.8–8.8)
RBC # BLD AUTO: 3.31 10E6/UL (ref 3.8–5.2)
SODIUM SERPL-SCNC: 139 MMOL/L (ref 133–144)
T4 FREE SERPL-MCNC: 1.5 NG/DL (ref 0.76–1.46)
TSH SERPL DL<=0.005 MIU/L-ACNC: 0.01 MU/L (ref 0.4–4)
WBC # BLD AUTO: 4.1 10E3/UL (ref 4–11)

## 2022-08-08 PROCEDURE — G0463 HOSPITAL OUTPT CLINIC VISIT: HCPCS

## 2022-08-08 PROCEDURE — 99215 OFFICE O/P EST HI 40 MIN: CPT | Performed by: INTERNAL MEDICINE

## 2022-08-08 PROCEDURE — 85025 COMPLETE CBC W/AUTO DIFF WBC: CPT | Performed by: INTERNAL MEDICINE

## 2022-08-08 PROCEDURE — 80053 COMPREHEN METABOLIC PANEL: CPT | Performed by: INTERNAL MEDICINE

## 2022-08-08 PROCEDURE — 36415 COLL VENOUS BLD VENIPUNCTURE: CPT | Performed by: INTERNAL MEDICINE

## 2022-08-08 PROCEDURE — 84443 ASSAY THYROID STIM HORMONE: CPT | Performed by: INTERNAL MEDICINE

## 2022-08-08 PROCEDURE — 84439 ASSAY OF FREE THYROXINE: CPT | Performed by: INTERNAL MEDICINE

## 2022-08-08 RX ORDER — RALOXIFENE HYDROCHLORIDE 60 MG/1
60 TABLET, FILM COATED ORAL DAILY
Qty: 90 TABLET | Refills: 3 | Status: SHIPPED | OUTPATIENT
Start: 2022-08-08 | End: 2023-07-26

## 2022-08-08 ASSESSMENT — PAIN SCALES - GENERAL: PAINLEVEL: NO PAIN (0)

## 2022-08-08 NOTE — PROGRESS NOTES
"Oncology Rooming Note    August 8, 2022 4:08 PM   Jann Davidson is a 77 year old female who presents for:    Chief Complaint   Patient presents with     Oncology Clinic Visit     Initial Vitals: There were no vitals taken for this visit. Estimated body mass index is 22.14 kg/m  as calculated from the following:    Height as of 6/27/22: 1.6 m (5' 3\").    Weight as of 6/27/22: 56.7 kg (125 lb). There is no height or weight on file to calculate BSA.  Data Unavailable Comment: Data Unavailable   No LMP recorded. Patient is postmenopausal.  Allergies reviewed: Yes  Medications reviewed: Yes    Medications: Medication refills not needed today.  Pharmacy name entered into EPIC:    [id number] - [place], [place] - [address]  [facility] - [place], [place] - [address] [place] [address]    Clinical concerns:  doctor was notified.      Viviane Colby MA            "

## 2022-08-08 NOTE — LETTER
8/8/2022         RE: Jann Davidson  [address] [facility] [address]        Dear Colleague,    Thank you for referring your patient, Jann Davdison, to the [facility]. Please see a copy of my visit note below.    [facility] [place]    Hematology/Oncology Established Patient Note      Today's Date: 8/8/2022    Reason for follow-up:  Left breast cancer, triple negative.    HISTORY OF PRESENT ILLNESS: Jann Davidson is a 77 year old female who presents with the following oncologic history:  1. 10/26/2021: Mammogram showed calcifications in the left lateral breast; right breast negative.  2. 11/17/2021: Left diagnostic mammogram showed cluster of pleomorphic calcifications at 4:00, 6 cm from nipple, measuring 1.3 cm.  3. 12/3/2021: Stereotactic left breast biopsy at 4:00, 6 cm from nipple showed grade 2 invasive ductal carcinoma with micropapillary features, extensive lymphovascular invasion present, grade 2-3 focal DCIS, LCIS, ER negative, DE negative, HER-2/maxime FISH negative.  4. 12/10/2021: Breast MRI showed oval mass 2 x 2 x 1.5 cm at 4:00, 6 cm from nipple in left breast reflecting biopsy cavity with post-biopsy changes; prominent 2.5 cm low left level 1 axillary lymph node.  5.  12/28/2021: PET/CT scan showed FDG avid focus in left lower outer breast, hypermetabolic left axillary adenopathy, moderate FDG uptake at level off anus, exophytic right renal 1.1 cm mass, slowly increasing in size since 2016 concerning for growing renal cell carcinoma.  6. 1/18/2022: Started neoadjuvant chemotherapy with weekly paclitaxel + carboplatin and every 3-week pembrolizumab as per KEYNOTE-522 study.  7. 4/19/2022: Breast MRI showed no residual enhancement at site of primary malignancy in left breast at 4:00; no residual left axillary adenopathy. Right breast negative.  8. 5/6/2022: Completed cycle 12 paclitaxel with pembrolizumab. Surgery delayed due to 2 hospitalizations for rash and  peripheral neuropathy.  9. 6/27/2022: Underwent left breast lumpectomy and left axillary sentinel lymph node excision under care of Dr. Tatianna Rai.  Pathology showed no residual invasive carcinoma in left breast; residual LCIS and atypical lobular hyperplasia present; one of 2 lymph nodes with isolated tumor cells measuring 0.087 mm, focal fibrosis consistent with treatment effect; RCB-I, ypT0-pN0(i+).      INTERVAL HISTORY:  Jann reports resolution of peripheral neuropathy. She had prior rash reaction to Lyrica.  She feels overall well.      REVIEW OF SYSTEMS:   14 point ROS was reviewed and is negative other than as noted above in HPI.       HOME MEDICATIONS:  Current Outpatient Medications   Medication Sig Dispense Refill     atorvastatin (LIPITOR) 10 MG tablet Take 10 mg by mouth daily       candesartan (ATACAND) 4 MG tablet Take 0.5 tablets (2 mg) by mouth daily DISPENSE AS WRITTEN, Brand name only       loratadine (CLARITIN) 10 MG tablet Take 10 mg by mouth daily       LORazepam (ATIVAN) 0.5 MG tablet Take 1 tablet (0.5 mg) by mouth every 6 hours as needed for anxiety, nausea or sleep 45 tablet 0     Magnesium 400 MG TABS Take 800 mg by mouth daily       oxazepam (SERAX) 15 MG capsule TAKE 1 CAPSULE (15 MG) BY MOUTH NIGHTLY AS NEEDED FOR ANXIETY. REDUCED REFILL AMT: - CALL [phone] TO SCHEDULE APPOINTMENT/FASTING LABS 21 capsule 0     oxyCODONE (ROXICODONE) 5 MG tablet Take 1 tablet (5 mg) by mouth every 4 hours as needed for moderate to severe pain 15 tablet 0     PARNATE 10 MG tablet TAKE 1 TABLET BY MOUTH 3 TIMES DAILY (Patient taking differently: Take 10 mg by mouth 3 times daily) 270 tablet 3     polyethylene glycol (MIRALAX/GLYCOLAX) packet Take 1 packet by mouth daily       raloxifene (EVISTA) 60 MG tablet Take 1 tablet (60 mg) by mouth daily 90 tablet 3     triamcinolone (KENALOG) 0.1 % external cream Apply  topically 2 times daily as needed. 15 g 1     zolpidem (AMBIEN) 5 MG tablet TAKE 1 TABLET  (5 MG) BY MOUTH NIGHTLY AS NEEDED FOR FOR SLEEP. REDUCED REFILL AMT: - CALL [phone] TO SCHEDULE APPOINTMENT/FASTING LABS 21 tablet 0         ALLERGIES:  Allergies   Allergen Reactions     Lyrica [Pregabalin] Rash         PAST MEDICAL HISTORY:  Past Medical History:   Diagnosis Date     Breast cancer (H)      CKD (chronic kidney disease) stage 3, GFR 30-59 ml/min (H)      Depression with anxiety      Gout      Hypertension goal BP (blood pressure) < 140/90      Recurrent sinusitis 2013     Gynecologic history:  Age of menarche at 11; LMP ;  (one son), 1st pregnancy at age 19; no HRT.    PAST SURGICAL HISTORY:  Past Surgical History:   Procedure Laterality Date     BIOPSY NODE SENTINEL Left 2022    Procedure: left axillary sentinel lymph node biopsy;  Surgeon: Tatianna Rai MD;  Location: [facility] OR     BREAST BIOPSY, RT/LT      Breat Biopsy RT/LT     COLONOSCOPY  10/03     COLONOSCOPY  2014    Procedure: COLONOSCOPY;  COLONOSCOPY ;  Surgeon: Gaurav Shaffer MD;  Location: [facility] GI     IR CHEST PORT PLACEMENT > 5 YRS OF AGE  2021     LUMPECTOMY BREAST WITH SEED LOCALIZATION Left 2022    Procedure: Radiofrequency tag localized left breast lumpectomy with radiofrequency tag localized excision of left axillary lymph node;  Surgeon: Tatianna Rai MD;  Location: [facility] OR     RECONSTRUCT EYELID           SOCIAL HISTORY:  Social History     Socioeconomic History     Marital status:      Spouse name: Not on file     Number of children: Not on file     Years of education: Not on file     Highest education level: Not on file   Occupational History     Not on file   Tobacco Use     Smoking status: Former Smoker     Types: Cigarettes     Quit date: 10/30/1972     Years since quittin.7     Smokeless tobacco: Never Used   Vaping Use     Vaping Use: Never used   Substance and Sexual Activity     Alcohol use: Yes     Comment: couple glasses of wine daily      Drug use: No      "Sexual activity: Never   Other Topics Concern     Parent/sibling w/ CABG, MI or angioplasty before 65F 55M? Not Asked   Social History Narrative     Not on file     Social Determinants of Health     Financial Resource Strain: Not on file   Food Insecurity: Not on file   Transportation Needs: Not on file   Physical Activity: Not on file   Stress: Not on file   Social Connections: Not on file   Intimate Partner Violence: Not At Risk     Fear of Current or Ex-Partner: No     Emotionally Abused: No     Physically Abused: No     Sexually Abused: No   Housing Stability: Not on file         FAMILY HISTORY:  Family History   Problem Relation Age of Onset     Cancer Mother         uterine     Breast Cancer Mother      Diabetes Paternal Grandmother          PHYSICAL EXAM:  Vital signs:  /81   Pulse 101   Resp 16   Ht 1.6 m (5' 3\")   Wt 59.1 kg (130 lb 3.2 oz)   SpO2 100%   BMI 23.06 kg/m     GENERAL: No acute distress.  EYES: No scleral icterus. No overt erythema.  RESPIRATORY: No audible cough, wheezing, or labored breathing.  MUSCULOSKELETAL: Range of motion in the neck, shoulders, and arms appear normal.  SKIN: No overt rashes, discolorations, or lesions over the face and neck.  NEUROLOGIC: Alert.  No overt tremors.  PSYCHIATRIC: Normal affect and mood.  Does not appear anxious.    LABS:  CBC RESULTS: Recent Labs   Lab Test 06/20/22  1712   WBC 5.3   RBC 3.16*   HGB 10.1*   HCT 32.1*   *   MCH 32.0   MCHC 31.5   RDW 14.4        Last Comprehensive Metabolic Panel:  Sodium   Date Value Ref Range Status   06/20/2022 140 133 - 144 mmol/L Final   06/05/2019 142 133 - 144 mmol/L Final     Potassium   Date Value Ref Range Status   06/20/2022 4.0 3.4 - 5.3 mmol/L Final   06/05/2019 3.7 3.4 - 5.3 mmol/L Final     Chloride   Date Value Ref Range Status   06/20/2022 108 94 - 109 mmol/L Final   06/05/2019 110 (H) 94 - 109 mmol/L Final     Carbon Dioxide   Date Value Ref Range Status   06/05/2019 24 20 - 32 " mmol/L Final     Carbon Dioxide (CO2)   Date Value Ref Range Status   06/20/2022 26 20 - 32 mmol/L Final     Anion Gap   Date Value Ref Range Status   06/20/2022 6 3 - 14 mmol/L Final   06/05/2019 8 3 - 14 mmol/L Final     Glucose   Date Value Ref Range Status   06/20/2022 88 70 - 99 mg/dL Final   06/05/2019 93 70 - 99 mg/dL Final     Comment:     Fasting specimen     GLUCOSE BY METER POCT   Date Value Ref Range Status   06/28/2022 121 (H) 70 - 99 mg/dL Final     Urea Nitrogen   Date Value Ref Range Status   06/20/2022 15 7 - 30 mg/dL Final   06/05/2019 32 (H) 7 - 30 mg/dL Final     Creatinine   Date Value Ref Range Status   06/20/2022 1.06 (H) 0.52 - 1.04 mg/dL Final   06/05/2019 1.15 (H) 0.52 - 1.04 mg/dL Final     GFR Estimate   Date Value Ref Range Status   06/20/2022 54 (L) >60 mL/min/1.73m2 Final     Comment:     Effective December 21, 2021 eGFRcr in adults is calculated using the 2021 CKD-EPI creatinine equation which includes age and gender (Meliton et al., NEJM, DOI: 10.1056/SCTOau7152405)   06/05/2019 47 (L) >60 mL/min/[1.73_m2] Final     Comment:     Non  GFR Calc  Starting 12/18/2018, serum creatinine based estimated GFR (eGFR) will be   calculated using the Chronic Kidney Disease Epidemiology Collaboration   (CKD-EPI) equation.       GFR, ESTIMATED POCT   Date Value Ref Range Status   12/28/2021 33 (L) >60 mL/min/1.73m2 Final     Calcium   Date Value Ref Range Status   06/20/2022 9.3 8.5 - 10.1 mg/dL Final   06/05/2019 9.2 8.5 - 10.1 mg/dL Final     Bilirubin Total   Date Value Ref Range Status   05/23/2022 0.4 0.2 - 1.3 mg/dL Final   06/05/2019 0.4 0.2 - 1.3 mg/dL Final     Alkaline Phosphatase   Date Value Ref Range Status   05/23/2022 337 (H) 40 - 150 U/L Final   06/05/2019 85 40 - 150 U/L Final     ALT   Date Value Ref Range Status   05/23/2022 125 (H) 0 - 50 U/L Final   06/05/2019 33 0 - 50 U/L Final     AST   Date Value Ref Range Status   05/23/2022 91 (H) 0 - 45 U/L Final    2019 33 0 - 45 U/L Final       PATHOLOGY:  Reviewed as per HPI.    IMAGIN2022 Limited abdominal U/S showed:  Borderline dilated extrahepatic bile ducts, similar to previous.    ASSESSMENT/PLAN:  Jann Davidson is a 77 year old female with the following issues:  1. 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 Left breast LCIS and atypical lobular hyperplasia  --Jann completed neoadjuvant chemotherapy with paclitaxel and carboplatin for 12 weeks with every 3-week pembrolizumab. Given that her 2022 breast MRI showed no residual enhancement -- I had advised foregoing chemotherapy with Adriamycin and Cytoxan given her age and low likelihood of tolerating these drugs.  --2022 Final pathology from her lumpectomy and sentinel lymph node excision showed near-complete response to neoadjuvant chemotherapy with just isolated tumor cells in one lymph node.  She had residual LCIS and atypical lobular hyperplasia.  Reviewed pathology with her today.  --Discussed that I would recommend 9 cycles of pembrolizumab post-surgery as per the KEYNOTE-522 trial and depending on her current liver enzyme levels. Will recheck CMP, TSH and CBC today.  --She will start adjuvant radiation therapy on 2022 with Dr. Giles.  --Genetic testing negative.  --Discussed alternating mammogram with breast MRI for high risk surveillance given findings of LCIS and ALH, which are markers for increased risk of additional breast cancer in the future.  She agrees to proceed.  We will plan for 3-month post-radiation diagnostic mammogram, then breast MRI 6 months thereafter.  --Discussed role of raloxifene for 5 years for risk reduction given the findings of LCIS and ALH. Discussed risks and benefits.  She would like to proceed.  Prescription issued.  She may start taking  raloxifene.    3. Chronic kidney disease, stage 3  --Creatinine stable.  --She follows with Dr. Luna.  - She asked about restarting HCTZ.  I recommended she consult with Dr. Luna.    4. FDG uptake at anal canal  --She has history of anal fissure from 4/8/2014 colonoscopy.  --PET scan showed uptake at level of anus and could represent hemorrhoid, fissure, malignancy, or be physiologic.  --Advised referral to colorectal for further evaluation given that she has completed neoadjuvant therapy and surgery.  She agrees to see colorectal.    5. Right renal mass  --PET scan showed a 1.1 cm mass in the right kidney that has been reportedly slowly growing since 2016 concerning for renal cell carcinoma.  --Will not be able to further evaluate with contrast MRI due to her degree of chronic kidney disease.  Will refer her to urology for surveillance/intervention now that she has completed surgery for her breast cancer.    6. Pulmonary nodules  --PET scan showed scattered small bilateral pulmonary nodules that are unchanged since 10/24/2018 and most likely benign.    7. Depression/anxiety  --Worsened due to cancer diagnosis and passing of her dog.  --She would like to continue on Parnate and not change her antidepressant.  --She takes oxazepam together with zolpidem.    --She could use lorazepam (Ativan) as long as she takes this 6 hours from her other benzodiazepenes.    8. Transaminitis  --Potential to be related to immunotherapy.  She has been off pembrolizumab since surgery.  --Will repeat CMP today and determine if she may restart pembrolizumab.    9. Anemia of chemotherapy  --Repeat CBC today.    Neetu Mcmullen MD  Hematology/Oncology  [facility]    Total time spent: 40 minutes in patient evaluation, counseling, documentation, and coordination of care.    Oncology Rooming Note    August 8, 2022 4:08 PM   Jann Davidson is a 77 year old female who presents for:    Chief Complaint   Patient presents  "with     Oncology Clinic Visit     Initial Vitals: There were no vitals taken for this visit. Estimated body mass index is 22.14 kg/m  as calculated from the following:    Height as of 6/27/22: 1.6 m (5' 3\").    Weight as of 6/27/22: 56.7 kg (125 lb). There is no height or weight on file to calculate BSA.  Data Unavailable Comment: Data Unavailable   No LMP recorded. Patient is postmenopausal.  Allergies reviewed: Yes  Medications reviewed: Yes    Medications: Medication refills not needed today.  Pharmacy name entered into EPIC:    [id number] - [place], [place] - [address] [facility] - [address]    Clinical concerns:  doctor was notified.      iVviane Colby MA                Again, thank you for allowing me to participate in the care of your patient.        Sincerely,        Neetu Mcmullen MD    "

## 2022-08-08 NOTE — PATIENT INSTRUCTIONS
Start raloxifene (Evista) 60 mg orally daily.  Arrange for pembrolizumab (Keytruda) every 3 weeks starting next week.  Lab draw every 3 weeks prior to each treatment.  RTC NP every 3 weeks with first 3 cycles of Keytruda.  RTC MD with cycle 4 Keytruda.  Arrange for colorectal surgery consult.  Arrange for urology consult.

## 2022-08-09 DIAGNOSIS — R94.6 THYROID FUNCTION TEST ABNORMAL: Primary | ICD-10-CM

## 2022-08-10 ENCOUNTER — TELEPHONE (OUTPATIENT)
Dept: ONCOLOGY | Facility: CLINIC | Age: 77
End: 2022-08-10

## 2022-08-10 ENCOUNTER — TELEPHONE (OUTPATIENT)
Dept: SURGERY | Facility: CLINIC | Age: 77
End: 2022-08-10

## 2022-08-10 NOTE — TELEPHONE ENCOUNTER
[facility] Call Center    Phone Message    May a detailed message be left on voicemail: no     Reason for Call: Question regarding specialist protocol  Please follow protocols- only utilize this documentation for questions or concerns that are not clear in the protocol.  Contact clinic directly to clarify question(s) via phone or [place] message.   Was Clinic Available: no  Question regarding protocol:  Patient has a referral for:Disorder of anal canal [K62.9]-note states: PET scan uptake at anal canal. Unable to locate dx on protos  Is there a referral for the requested specialist/specialty? yes  Name of referring provider: Neetu Mcmullen MD   Location of referring provider: in  CANCER CLINIC      Action Taken: Message routed to:  [facility] colon & rectal    Travel Screening: Not Applicable

## 2022-08-10 NOTE — TELEPHONE ENCOUNTER
Patient is calling to notify us the soonest she can get in to see Endo is January. Patient would like to know what to do about her thyroid in the meantime.    Writer will send message to care team.    Per patient ok to leave detailed message with response.    Franca Solitario, RN, BSN, PHN  [facility]

## 2022-08-10 NOTE — TELEPHONE ENCOUNTER
I called Jann to set up an appointment for flexible sigmoidoscopy to evaluate FDG uptake in the anus from PET scan in 2021.  I offered an appointment tomorrow with Dr. Ivory but patient did not want that appointment.  She states she has radiation for the next 6 weeks and seemed actually quite annoyed that I was calling to schedule an appointment as she continued to sigh and states that this appointment is not important.  She wanted to schedule in [place] so I went ahead and scheduled with  on 10/14.

## 2022-08-11 DIAGNOSIS — F41.9 ANXIETY: ICD-10-CM

## 2022-08-11 DIAGNOSIS — G47.00 PERSISTENT DISORDER OF INITIATING OR MAINTAINING SLEEP: Chronic | ICD-10-CM

## 2022-08-11 NOTE — TELEPHONE ENCOUNTER
Patient calling upset that her prescription was sent in for only 21 days and that it says she needs labs. She is currently being treated for breast cancer and has a lot going on. Has many appointments/gets labs drawn frequently, no family in town, recently lost her support animal.    I explained to patient that the message on the script may have been old since it needs to be manually removed and apologized for any added stress that caused her.     Told her I would pass her refill request on to Dr. Oliva and also ask him when he next would want to do a phone/video visit to check in. Also if any labs are needed they can be added on.    FYI, She is very busy with treatment for the next 6 weeks at least.    Sveta ADAMS RN

## 2022-08-12 RX ORDER — OXAZEPAM 15 MG/1
15 CAPSULE ORAL
Qty: 45 CAPSULE | Refills: 0 | Status: SHIPPED | OUTPATIENT
Start: 2022-08-12 | End: 2022-09-23

## 2022-08-12 RX ORDER — ZOLPIDEM TARTRATE 5 MG/1
TABLET ORAL
Qty: 45 TABLET | Refills: 0 | Status: SHIPPED | OUTPATIENT
Start: 2022-08-12 | End: 2022-09-23

## 2022-08-17 ENCOUNTER — MYC MEDICAL ADVICE (OUTPATIENT)
Dept: FAMILY MEDICINE | Facility: CLINIC | Age: 77
End: 2022-08-17

## 2022-08-17 NOTE — TELEPHONE ENCOUNTER
Yes, I know she is understandably upset. The way for me to be able to help her is for her to schedule appointment with me (long overdue)  Thanks Mehran

## 2022-08-25 ENCOUNTER — DOCUMENTATION ONLY (OUTPATIENT)
Dept: ONCOLOGY | Facility: CLINIC | Age: 77
End: 2022-08-25

## 2022-08-25 ENCOUNTER — LAB (OUTPATIENT)
Dept: INFUSION THERAPY | Facility: CLINIC | Age: 77
End: 2022-08-25
Attending: INTERNAL MEDICINE
Payer: MEDICARE

## 2022-08-25 DIAGNOSIS — T45.1X5A CHEMOTHERAPY-INDUCED NEUTROPENIA (H): ICD-10-CM

## 2022-08-25 DIAGNOSIS — C50.512 MALIGNANT NEOPLASM OF LOWER-OUTER QUADRANT OF LEFT BREAST OF FEMALE, ESTROGEN RECEPTOR NEGATIVE (H): Primary | ICD-10-CM

## 2022-08-25 DIAGNOSIS — Z51.89 ENCOUNTER FOR OTHER SPECIFIED AFTERCARE: ICD-10-CM

## 2022-08-25 DIAGNOSIS — Z17.1 MALIGNANT NEOPLASM OF LOWER-OUTER QUADRANT OF LEFT BREAST OF FEMALE, ESTROGEN RECEPTOR NEGATIVE (H): Primary | ICD-10-CM

## 2022-08-25 DIAGNOSIS — D70.1 CHEMOTHERAPY-INDUCED NEUTROPENIA (H): ICD-10-CM

## 2022-08-25 LAB
ALBUMIN SERPL-MCNC: 3.4 G/DL (ref 3.4–5)
ALP SERPL-CCNC: 93 U/L (ref 40–150)
ALT SERPL W P-5'-P-CCNC: 32 U/L (ref 0–50)
ANION GAP SERPL CALCULATED.3IONS-SCNC: 4 MMOL/L (ref 3–14)
AST SERPL W P-5'-P-CCNC: 26 U/L (ref 0–45)
BILIRUB SERPL-MCNC: 0.3 MG/DL (ref 0.2–1.3)
BUN SERPL-MCNC: 22 MG/DL (ref 7–30)
CALCIUM SERPL-MCNC: 9.5 MG/DL (ref 8.5–10.1)
CHLORIDE BLD-SCNC: 107 MMOL/L (ref 94–109)
CO2 SERPL-SCNC: 26 MMOL/L (ref 20–32)
CREAT SERPL-MCNC: 1.2 MG/DL (ref 0.52–1.04)
GFR SERPL CREATININE-BSD FRML MDRD: 46 ML/MIN/1.73M2
GLUCOSE BLD-MCNC: 98 MG/DL (ref 70–99)
POTASSIUM BLD-SCNC: 4.1 MMOL/L (ref 3.4–5.3)
PROT SERPL-MCNC: 6.6 G/DL (ref 6.8–8.8)
SODIUM SERPL-SCNC: 137 MMOL/L (ref 133–144)
T4 FREE SERPL-MCNC: 0.92 NG/DL (ref 0.76–1.46)
TSH SERPL DL<=0.005 MIU/L-ACNC: 0.27 MU/L (ref 0.4–4)

## 2022-08-25 PROCEDURE — 250N000011 HC RX IP 250 OP 636: Performed by: INTERNAL MEDICINE

## 2022-08-25 PROCEDURE — 84443 ASSAY THYROID STIM HORMONE: CPT | Performed by: INTERNAL MEDICINE

## 2022-08-25 PROCEDURE — 80053 COMPREHEN METABOLIC PANEL: CPT | Performed by: INTERNAL MEDICINE

## 2022-08-25 PROCEDURE — 84439 ASSAY OF FREE THYROXINE: CPT | Performed by: INTERNAL MEDICINE

## 2022-08-25 PROCEDURE — 36591 DRAW BLOOD OFF VENOUS DEVICE: CPT

## 2022-08-25 RX ORDER — HEPARIN SODIUM (PORCINE) LOCK FLUSH IV SOLN 100 UNIT/ML 100 UNIT/ML
5 SOLUTION INTRAVENOUS
Status: DISCONTINUED | OUTPATIENT
Start: 2022-08-25 | End: 2022-08-25 | Stop reason: HOSPADM

## 2022-08-25 RX ORDER — PROCHLORPERAZINE MALEATE 10 MG
10 TABLET ORAL EVERY 6 HOURS PRN
Qty: 30 TABLET | Refills: 5 | Status: SHIPPED | OUTPATIENT
Start: 2022-08-25 | End: 2022-09-24

## 2022-08-25 RX ORDER — SODIUM CHLORIDE 9 MG/ML
INJECTION, SOLUTION INTRAVENOUS CONTINUOUS
Status: CANCELLED
Start: 2022-08-25

## 2022-08-25 RX ORDER — HEPARIN SODIUM,PORCINE 10 UNIT/ML
5 VIAL (ML) INTRAVENOUS
Status: CANCELLED | OUTPATIENT
Start: 2022-08-25

## 2022-08-25 RX ORDER — HEPARIN SODIUM (PORCINE) LOCK FLUSH IV SOLN 100 UNIT/ML 100 UNIT/ML
5 SOLUTION INTRAVENOUS
Status: CANCELLED | OUTPATIENT
Start: 2022-08-25

## 2022-08-25 RX ADMIN — Medication 5 ML: at 14:30

## 2022-08-25 NOTE — PROGRESS NOTES
Nursing Note:  Jann Davidson presents today for Port labs.    Patient seen by provider today: No   present during visit today: Not Applicable.    Note: Patient tolerated port lab draw well without issue.    Intravenous Access:  Implanted Port.    Discharge Plan:   Patient was sent to home in stable condition.    Reba Carrion RN

## 2022-08-26 ENCOUNTER — INFUSION THERAPY VISIT (OUTPATIENT)
Dept: INFUSION THERAPY | Facility: CLINIC | Age: 77
End: 2022-08-26
Attending: INTERNAL MEDICINE
Payer: MEDICARE

## 2022-08-26 VITALS
WEIGHT: 130.2 LBS | SYSTOLIC BLOOD PRESSURE: 107 MMHG | TEMPERATURE: 98.3 F | OXYGEN SATURATION: 95 % | RESPIRATION RATE: 20 BRPM | BODY MASS INDEX: 23.06 KG/M2 | DIASTOLIC BLOOD PRESSURE: 57 MMHG | HEART RATE: 92 BPM

## 2022-08-26 DIAGNOSIS — Z17.1 MALIGNANT NEOPLASM OF LOWER-OUTER QUADRANT OF LEFT BREAST OF FEMALE, ESTROGEN RECEPTOR NEGATIVE (H): Primary | ICD-10-CM

## 2022-08-26 DIAGNOSIS — C50.512 MALIGNANT NEOPLASM OF LOWER-OUTER QUADRANT OF LEFT BREAST OF FEMALE, ESTROGEN RECEPTOR NEGATIVE (H): Primary | ICD-10-CM

## 2022-08-26 PROCEDURE — 258N000003 HC RX IP 258 OP 636: Performed by: INTERNAL MEDICINE

## 2022-08-26 PROCEDURE — 96413 CHEMO IV INFUSION 1 HR: CPT

## 2022-08-26 PROCEDURE — 250N000011 HC RX IP 250 OP 636: Performed by: INTERNAL MEDICINE

## 2022-08-26 RX ORDER — DIPHENHYDRAMINE HYDROCHLORIDE 50 MG/ML
50 INJECTION INTRAMUSCULAR; INTRAVENOUS
Status: CANCELLED
Start: 2022-08-26

## 2022-08-26 RX ORDER — ALBUTEROL SULFATE 0.83 MG/ML
2.5 SOLUTION RESPIRATORY (INHALATION)
Status: CANCELLED | OUTPATIENT
Start: 2022-08-26

## 2022-08-26 RX ORDER — HEPARIN SODIUM (PORCINE) LOCK FLUSH IV SOLN 100 UNIT/ML 100 UNIT/ML
5 SOLUTION INTRAVENOUS
Status: DISCONTINUED | OUTPATIENT
Start: 2022-08-26 | End: 2022-08-26 | Stop reason: HOSPADM

## 2022-08-26 RX ORDER — HEPARIN SODIUM (PORCINE) LOCK FLUSH IV SOLN 100 UNIT/ML 100 UNIT/ML
5 SOLUTION INTRAVENOUS
Status: CANCELLED | OUTPATIENT
Start: 2022-08-26

## 2022-08-26 RX ORDER — HEPARIN SODIUM,PORCINE 10 UNIT/ML
5 VIAL (ML) INTRAVENOUS
Status: CANCELLED | OUTPATIENT
Start: 2022-08-26

## 2022-08-26 RX ORDER — MEPERIDINE HYDROCHLORIDE 25 MG/ML
25 INJECTION INTRAMUSCULAR; INTRAVENOUS; SUBCUTANEOUS EVERY 30 MIN PRN
Status: CANCELLED | OUTPATIENT
Start: 2022-08-26

## 2022-08-26 RX ORDER — ALBUTEROL SULFATE 90 UG/1
1-2 AEROSOL, METERED RESPIRATORY (INHALATION)
Status: CANCELLED
Start: 2022-08-26

## 2022-08-26 RX ORDER — EPINEPHRINE 1 MG/ML
0.3 INJECTION, SOLUTION, CONCENTRATE INTRAVENOUS EVERY 5 MIN PRN
Status: CANCELLED | OUTPATIENT
Start: 2022-08-26

## 2022-08-26 RX ORDER — LORAZEPAM 2 MG/ML
0.5 INJECTION INTRAMUSCULAR EVERY 4 HOURS PRN
Status: CANCELLED | OUTPATIENT
Start: 2022-08-26

## 2022-08-26 RX ADMIN — SODIUM CHLORIDE 200 MG: 9 INJECTION, SOLUTION INTRAVENOUS at 10:12

## 2022-08-26 RX ADMIN — HEPARIN 5 ML: 100 SYRINGE at 10:45

## 2022-08-26 ASSESSMENT — PAIN SCALES - GENERAL: PAINLEVEL: NO PAIN (0)

## 2022-08-26 NOTE — PROGRESS NOTES
Infusion Nursing Note:  Jann Davidson presents today for Cycle 1 Day 1 Keytruda.    Patient seen by provider today: No   present during visit today: Not Applicable.    Note: N/A.    Intravenous Access:  Implanted Port.    Treatment Conditions:  Lab Results   Component Value Date     08/25/2022    POTASSIUM 4.1 08/25/2022    MAG 1.7 05/23/2022    CR 1.20 (H) 08/25/2022    AMY 9.5 08/25/2022    BILITOTAL 0.3 08/25/2022    ALBUMIN 3.4 08/25/2022    ALT 32 08/25/2022    AST 26 08/25/2022       Post Infusion Assessment:  Patient tolerated infusion without incident.  Blood return noted pre and post infusion.  Site patent and intact, free from redness, edema or discomfort.  No evidence of extravasations.  Access discontinued per protocol.     Discharge Plan:   Patient declined prescription refills.  Discharge instructions reviewed with: Patient.  Patient verbalized understanding of discharge instructions and all questions answered.  AVS to patient via [facility].  Patient will return 9/16/22 for next appointment.   Patient discharged in stable condition accompanied by: self.  Departure Mode: Ambulatory.      Jessica Ellis RN

## 2022-09-08 DIAGNOSIS — D70.1 CHEMOTHERAPY-INDUCED NEUTROPENIA (H): ICD-10-CM

## 2022-09-08 DIAGNOSIS — F41.9 ANXIETY: ICD-10-CM

## 2022-09-08 DIAGNOSIS — T45.1X5A CHEMOTHERAPY-INDUCED NEUTROPENIA (H): ICD-10-CM

## 2022-09-08 RX ORDER — LORAZEPAM 0.5 MG/1
0.5 TABLET ORAL EVERY 6 HOURS PRN
Qty: 45 TABLET | Refills: 0 | Status: SHIPPED | OUTPATIENT
Start: 2022-09-08 | End: 2022-10-19

## 2022-09-08 NOTE — TELEPHONE ENCOUNTER
Pending Prescriptions:                       Disp   Refills    LORazepam (ATIVAN) 0.5 MG tablet          45 tab*0            Sig: Take 1 tablet (0.5 mg) by mouth every 6 hours as           needed for anxiety, nausea or sleep  LORAZEPAM (ATIVAN) 0.5mg tablet  Last Written Prescription Date:  6/8/22  Last Fill Quantity: 45,   # refills: 0  Last Office Visit: 8/8/22  Future Office visit:    Next 5 appointments (look out 90 days)    Sep 16, 2022 12:30 PM  Return Visit with HUBER Damon CNP  [facility] ([facility] ) [address]  [facility] [address]  [phone]   Sep 23, 2022  3:40 PM  (Arrive by 3:20 PM)  Provider Visit with Mehran Oliva MD  [facility] ([facility]  ) [address]   [address]  [facility] [address]  [phone]   Oct 07, 2022  1:30 PM  Return Visit with HUBER Damon CNP  [facility] ([facility] ) [address]  [facility] [address]  [phone]   Oct 28, 2022 12:30 PM  Return Visit with HUBER Damon CNP  [facility] ([facility] ) [address]  [facility] [address]  [phone]   Nov 17, 2022  2:30 PM  Return Visit with Neetu Mcmullen MD  [facility] ([facility] ) [address]  [facility] [address]  [phone]           Routing refill request to provider for review/approval     Reny Summers, RN, BSN    Triage Nurse Advisor  [facility]aciela/Chrystal Polanco  [phone]

## 2022-09-13 RX ORDER — HEPARIN SODIUM (PORCINE) LOCK FLUSH IV SOLN 100 UNIT/ML 100 UNIT/ML
5 SOLUTION INTRAVENOUS
Status: CANCELLED | OUTPATIENT
Start: 2022-09-16

## 2022-09-13 RX ORDER — DIPHENHYDRAMINE HYDROCHLORIDE 50 MG/ML
50 INJECTION INTRAMUSCULAR; INTRAVENOUS
Status: CANCELLED
Start: 2022-09-16

## 2022-09-13 RX ORDER — ALBUTEROL SULFATE 90 UG/1
1-2 AEROSOL, METERED RESPIRATORY (INHALATION)
Status: CANCELLED
Start: 2022-09-16

## 2022-09-13 RX ORDER — ALBUTEROL SULFATE 0.83 MG/ML
2.5 SOLUTION RESPIRATORY (INHALATION)
Status: CANCELLED | OUTPATIENT
Start: 2022-09-16

## 2022-09-13 RX ORDER — EPINEPHRINE 1 MG/ML
0.3 INJECTION, SOLUTION, CONCENTRATE INTRAVENOUS EVERY 5 MIN PRN
Status: CANCELLED | OUTPATIENT
Start: 2022-09-16

## 2022-09-13 RX ORDER — HEPARIN SODIUM,PORCINE 10 UNIT/ML
5 VIAL (ML) INTRAVENOUS
Status: CANCELLED | OUTPATIENT
Start: 2022-09-16

## 2022-09-13 RX ORDER — LORAZEPAM 2 MG/ML
0.5 INJECTION INTRAMUSCULAR EVERY 4 HOURS PRN
Status: CANCELLED | OUTPATIENT
Start: 2022-09-16

## 2022-09-13 RX ORDER — MEPERIDINE HYDROCHLORIDE 25 MG/ML
25 INJECTION INTRAMUSCULAR; INTRAVENOUS; SUBCUTANEOUS EVERY 30 MIN PRN
Status: CANCELLED | OUTPATIENT
Start: 2022-09-16

## 2022-09-16 ENCOUNTER — ONCOLOGY VISIT (OUTPATIENT)
Dept: ONCOLOGY | Facility: CLINIC | Age: 77
End: 2022-09-16
Attending: NURSE PRACTITIONER
Payer: MEDICARE

## 2022-09-16 ENCOUNTER — INFUSION THERAPY VISIT (OUTPATIENT)
Dept: INFUSION THERAPY | Facility: CLINIC | Age: 77
End: 2022-09-16
Attending: INTERNAL MEDICINE
Payer: MEDICARE

## 2022-09-16 VITALS
HEART RATE: 77 BPM | SYSTOLIC BLOOD PRESSURE: 125 MMHG | TEMPERATURE: 98.2 F | HEIGHT: 63 IN | OXYGEN SATURATION: 100 % | DIASTOLIC BLOOD PRESSURE: 74 MMHG | RESPIRATION RATE: 16 BRPM | BODY MASS INDEX: 23.04 KG/M2 | WEIGHT: 130 LBS

## 2022-09-16 DIAGNOSIS — C50.512 MALIGNANT NEOPLASM OF LOWER-OUTER QUADRANT OF LEFT BREAST OF FEMALE, ESTROGEN RECEPTOR NEGATIVE (H): Primary | ICD-10-CM

## 2022-09-16 DIAGNOSIS — Z17.1 MALIGNANT NEOPLASM OF LOWER-OUTER QUADRANT OF LEFT BREAST OF FEMALE, ESTROGEN RECEPTOR NEGATIVE (H): Primary | ICD-10-CM

## 2022-09-16 LAB
ALBUMIN SERPL-MCNC: 3.5 G/DL (ref 3.4–5)
ALP SERPL-CCNC: 81 U/L (ref 40–150)
ALT SERPL W P-5'-P-CCNC: 27 U/L (ref 0–50)
ANION GAP SERPL CALCULATED.3IONS-SCNC: 6 MMOL/L (ref 3–14)
AST SERPL W P-5'-P-CCNC: 25 U/L (ref 0–45)
BILIRUB SERPL-MCNC: 0.6 MG/DL (ref 0.2–1.3)
BUN SERPL-MCNC: 31 MG/DL (ref 7–30)
CALCIUM SERPL-MCNC: 9.7 MG/DL (ref 8.5–10.1)
CHLORIDE BLD-SCNC: 106 MMOL/L (ref 94–109)
CO2 SERPL-SCNC: 25 MMOL/L (ref 20–32)
CREAT SERPL-MCNC: 1 MG/DL (ref 0.52–1.04)
GFR SERPL CREATININE-BSD FRML MDRD: 58 ML/MIN/1.73M2
GLUCOSE BLD-MCNC: 94 MG/DL (ref 70–99)
POTASSIUM BLD-SCNC: 3.8 MMOL/L (ref 3.4–5.3)
PROT SERPL-MCNC: 6.7 G/DL (ref 6.8–8.8)
SODIUM SERPL-SCNC: 137 MMOL/L (ref 133–144)
T4 FREE SERPL-MCNC: 0.82 NG/DL (ref 0.76–1.46)
TSH SERPL DL<=0.005 MIU/L-ACNC: 5.32 MU/L (ref 0.4–4)

## 2022-09-16 PROCEDURE — 84439 ASSAY OF FREE THYROXINE: CPT | Performed by: INTERNAL MEDICINE

## 2022-09-16 PROCEDURE — 84443 ASSAY THYROID STIM HORMONE: CPT | Performed by: INTERNAL MEDICINE

## 2022-09-16 PROCEDURE — G0463 HOSPITAL OUTPT CLINIC VISIT: HCPCS | Mod: 25

## 2022-09-16 PROCEDURE — 258N000003 HC RX IP 258 OP 636: Performed by: INTERNAL MEDICINE

## 2022-09-16 PROCEDURE — 96413 CHEMO IV INFUSION 1 HR: CPT

## 2022-09-16 PROCEDURE — 99214 OFFICE O/P EST MOD 30 MIN: CPT | Performed by: NURSE PRACTITIONER

## 2022-09-16 PROCEDURE — 80053 COMPREHEN METABOLIC PANEL: CPT | Performed by: INTERNAL MEDICINE

## 2022-09-16 PROCEDURE — 250N000011 HC RX IP 250 OP 636: Performed by: INTERNAL MEDICINE

## 2022-09-16 RX ORDER — HEPARIN SODIUM (PORCINE) LOCK FLUSH IV SOLN 100 UNIT/ML 100 UNIT/ML
5 SOLUTION INTRAVENOUS
Status: DISCONTINUED | OUTPATIENT
Start: 2022-09-16 | End: 2022-09-16 | Stop reason: HOSPADM

## 2022-09-16 RX ADMIN — Medication 5 ML: at 12:50

## 2022-09-16 RX ADMIN — SODIUM CHLORIDE 200 MG: 9 INJECTION, SOLUTION INTRAVENOUS at 12:13

## 2022-09-16 RX ADMIN — SODIUM CHLORIDE 250 ML: 9 INJECTION, SOLUTION INTRAVENOUS at 12:13

## 2022-09-16 ASSESSMENT — PAIN SCALES - GENERAL: PAINLEVEL: NO PAIN (0)

## 2022-09-16 NOTE — PROGRESS NOTES
"Oncology/Hematology Visit Note  Sep 16, 2022    Reason for Visit: follow up of triple negative left breast cancer  Stage IIb grade 2 invasive ductal carcinoma  Status post neoadjuvant chemotherapy with immunotherapy  Breast MRI showed no residual enhancement therefore patient did not get Adriamycin Cytoxan  -Status postlumpectomy and sentinel lymph node excision-near complete response to neoadjuvant therapy with residual LCIS and atypical lobular hyperplasia  -Dr. Mcmullen recommended pembrolizumab for 9 cycles for surgery  Patient is currently getting adjuvant radiation which she will complete on 09/22/22 08/08-started Evista 5 mg given the findings of LCIS and ALH    Interval History:  Overall patient reports she has been tolerating radiation well.  Reports she has been tolerating Keytruda well.  Denies fever chills sweats cough shortness of breath chest pain nausea vomiting diarrhea abdominal pain bleeding  Patient reports she will make appointment with endocrinologist    Review of Systems:  14 point ROS of systems including Constitutional, Eyes, Respiratory, Cardiovascular, Gastroenterology, Genitourinary, Integumentary, Muscularskeletal, Psychiatric were all negative except for pertinent positives noted in my HPI.      Physical Examination:  General: The patient is a pleasant female in no acute distress.  /74   Pulse 77   Temp 98.2  F (36.8  C)   Resp 16   Ht 1.6 m (5' 3\")   Wt 59 kg (130 lb)   SpO2 100%   BMI 23.03 kg/m    HEENT: EOMI, PERRL. Sclerae are anicteric. Oral mucosa is pink and moist with no lesions or thrush.   Lymph: Neck is supple with no lymphadenopathy in the cervical or supraclavicular areas.   Heart: Regular rate and rhythm.   Lungs: Clear to auscultation bilaterally.   GI: Bowel sounds present, soft, nontender with no palpable hepatosplenomegaly or masses.   Extremities: No lower extremity edema noted bilaterally.   Skin: No rashes, petechiae, or bruising noted on exposed " skin.    Laboratory Data:  CMP and TSH results reviewed    Assessment and Plan:  This is a 77-year-old female with    Triple negative left breast cancer  Status post neoadjuvant chemotherapy with pembrolizumab followed by lumpectomy and sentinel lymph node excision-showing near complete response  With residual LCIS and ALH  -Per Dr. Mcmullen patient recommendation patient started pembrolizumab with plan for 9 cycles   -08/08/22-started at this time 5 mg given  LCIS and ALH  -09/22/2022-patient will complete adjuvant radiation  -Labs reviewed  -Continue with pembrolizumab  Continue with Evista 5 mg  -Plan is for diagnostic mammogram in 3 months post radiation then breast MRI 6 months thereafter        Chronic kidney disease  Continue follow-up with Dr. Luna    FDG uptake the anal canal  Patient will see colorectal for further evaluation    Right renal mass  Patient has follow-up appointment with urology    Pulmonary nodules  Stable continue to monitor    Elevated TSH  -Normal T4  Advised patient to follow-up with endocrinologist      Call clinic with any changes in health condition or questions      HUBER Damon [facility] [place]     Chart documentation with Dragon Voice recognition Software. Although reviewed after completion, some words and grammatical errors may remain.

## 2022-09-16 NOTE — LETTER
"    9/16/2022         RE: Jann Davidson  [address] [id number]        Dear Colleague,    Thank you for referring your patient, Jann Davidson, to the [facility]. Please see a copy of my visit note below.    Oncology Rooming Note    September 16, 2022 11:20 AM   Jann Davidson is a 77 year old female who presents for:    Chief Complaint   Patient presents with     Oncology Clinic Visit     Initial Vitals: There were no vitals taken for this visit. Estimated body mass index is 23.06 kg/m  as calculated from the following:    Height as of 8/8/22: 1.6 m (5' 3\").    Weight as of 8/26/22: 59.1 kg (130 lb 3.2 oz). There is no height or weight on file to calculate BSA.  Data Unavailable Comment: Data Unavailable   No LMP recorded. Patient is postmenopausal.  Allergies reviewed: Yes  Medications reviewed: Yes    Medications: Medication refills not needed today.  Pharmacy name entered into EPIC:    [id number] - [place], [place] - [address] [facility] - [place], [place] - [address] [place] [address]    Clinical concerns:  NP was notified.      Viviane Colby MA              Oncology/Hematology Visit Note  Sep 16, 2022    Reason for Visit: follow up of triple negative left breast cancer  Stage IIb grade 2 invasive ductal carcinoma  Status post neoadjuvant chemotherapy with immunotherapy  Breast MRI showed no residual enhancement therefore patient did not get Adriamycin Cytoxan  -Status postlumpectomy and sentinel lymph node excision-near complete response to neoadjuvant therapy with residual LCIS and atypical lobular hyperplasia  -Dr. Mcmullen recommended pembrolizumab for 9 cycles for surgery  Patient is currently getting adjuvant radiation which she will complete on 09/22/22 08/08-started Evista 5 mg given the findings of LCIS and ALH    Interval History:  Overall patient reports she has been tolerating radiation well.  Reports " "she has been tolerating Keytruda well.  Denies fever chills sweats cough shortness of breath chest pain nausea vomiting diarrhea abdominal pain bleeding  Patient reports she will make appointment with endocrinologist    Review of Systems:  14 point ROS of systems including Constitutional, Eyes, Respiratory, Cardiovascular, Gastroenterology, Genitourinary, Integumentary, Muscularskeletal, Psychiatric were all negative except for pertinent positives noted in my HPI.      Physical Examination:  General: The patient is a pleasant female in no acute distress.  /74   Pulse 77   Temp 98.2  F (36.8  C)   Resp 16   Ht 1.6 m (5' 3\")   Wt 59 kg (130 lb)   SpO2 100%   BMI 23.03 kg/m    HEENT: EOMI, PERRL. Sclerae are anicteric. Oral mucosa is pink and moist with no lesions or thrush.   Lymph: Neck is supple with no lymphadenopathy in the cervical or supraclavicular areas.   Heart: Regular rate and rhythm.   Lungs: Clear to auscultation bilaterally.   GI: Bowel sounds present, soft, nontender with no palpable hepatosplenomegaly or masses.   Extremities: No lower extremity edema noted bilaterally.   Skin: No rashes, petechiae, or bruising noted on exposed skin.    Laboratory Data:  CMP and TSH results reviewed    Assessment and Plan:  This is a 77-year-old female with    Triple negative left breast cancer  Status post neoadjuvant chemotherapy with pembrolizumab followed by lumpectomy and sentinel lymph node excision-showing near complete response  With residual LCIS and ALH  -Per Dr. Mcmullen patient recommendation patient started pembrolizumab with plan for 9 cycles   -08/08/22-started at this time 5 mg given  LCIS and ALH  -09/22/2022-patient will complete adjuvant radiation  -Labs reviewed  -Continue with pembrolizumab  Continue with Evista 5 mg  -Plan is for diagnostic mammogram in 3 months post radiation then breast MRI 6 months thereafter        Chronic kidney disease  Continue follow-up with Dr. Luna    FDG " uptake the anal canal  Patient will see colorectal for further evaluation    Right renal mass  Patient has follow-up appointment with urology    Pulmonary nodules  Stable continue to monitor    Elevated TSH  -Normal T4  Advised patient to follow-up with endocrinologist      Call clinic with any changes in health condition or questions      HUBER Damon CNP  [facility] [place]     Chart documentation with Dragon Voice recognition Software. Although reviewed after completion, some words and grammatical errors may remain.            Again, thank you for allowing me to participate in the care of your patient.        Sincerely,        HUBER Damon CNP

## 2022-09-16 NOTE — PROGRESS NOTES
Infusion Nursing Note:  Jann Davidson presents today for Cycle 2 Day 1 Keytruda.    Patient seen by provider today: Yes: Tadeo Dugan NP   present during visit today: Not Applicable.    Note: N/A.    Intravenous Access:  Implanted Port.    Treatment Conditions:  Lab Results   Component Value Date     09/16/2022    POTASSIUM 3.8 09/16/2022    MAG 1.7 05/23/2022    CR 1.00 09/16/2022    AMY 9.7 09/16/2022    BILITOTAL 0.6 09/16/2022    ALBUMIN 3.5 09/16/2022    ALT 27 09/16/2022    AST 25 09/16/2022     Results reviewed, labs MET treatment parameters, ok to proceed with treatment.    Post Infusion Assessment:  Patient tolerated infusion without incident.  Blood return noted pre and post infusion.  Site patent and intact, free from redness, edema or discomfort.  No evidence of extravasations.  Access discontinued per protocol.     Discharge Plan:   Patient declined prescription refills.  Discharge instructions reviewed with: Patient.  Patient verbalized understanding of discharge instructions and all questions answered.  AVS to patient via [employer].  Patient will return 10/7/22 for next appointment.   Patient discharged in stable condition accompanied by: self.  Departure Mode: Ambulatory.      Jessica Ellis RN

## 2022-09-16 NOTE — PROGRESS NOTES
Infusion Nursing Note:  Jann Davidson presents today for port labs.    Patient seen by provider today: Yes: Tadeo Dugan   present during visit today: Not Applicable.    Note: N/A.    Intravenous Access:  Labs drawn without difficulty.  Implanted Port.    Treatment Conditions:  Not Applicable.    Post Infusion Assessment:  Site patent and intact, free from redness, edema or discomfort.  No evidence of extravasations.     Discharge Plan:   Patient discharged in stable condition accompanied by: self.  Departure Mode: Ambulatory.      Alena Alarcon RN

## 2022-09-16 NOTE — PROGRESS NOTES
"Oncology Rooming Note    September 16, 2022 11:20 AM   Jann Davidson is a 77 year old female who presents for:    Chief Complaint   Patient presents with     Oncology Clinic Visit     Initial Vitals: There were no vitals taken for this visit. Estimated body mass index is 23.06 kg/m  as calculated from the following:    Height as of 8/8/22: 1.6 m (5' 3\").    Weight as of 8/26/22: 59.1 kg (130 lb 3.2 oz). There is no height or weight on file to calculate BSA.  Data Unavailable Comment: Data Unavailable   No LMP recorded. Patient is postmenopausal.  Allergies reviewed: Yes  Medications reviewed: Yes    Medications: Medication refills not needed today.  Pharmacy name entered into EPIC:    [id number] - [place], [place] - [address]  [facility] - [place], [place] - [address] [place] [address]    Clinical concerns:  NP was notified.      Viviane Colby MA            "

## 2022-09-22 ENCOUNTER — PRE VISIT (OUTPATIENT)
Dept: SURGERY | Facility: CLINIC | Age: 77
End: 2022-09-22

## 2022-09-22 ENCOUNTER — TRANSFERRED RECORDS (OUTPATIENT)
Dept: HEALTH INFORMATION MANAGEMENT | Facility: CLINIC | Age: 77
End: 2022-09-22

## 2022-09-22 NOTE — TELEPHONE ENCOUNTER
Diagnosis, Referred by & from: Dx: Disorder of anal canal- Referred by Dr. Neetu Mcmullen at [facility]    Appt date: 10/14/22   NOTES STATUS DETAILS   OFFICE NOTE from referring provider Internal 08/08/2022 OV with Dr. Mcmullen    OFFICE NOTE from other specialist N/A       DISCHARGE SUMMARY from hospital N/A      DISCHARGE REPORT from the ER N/A    OPERATIVE REPORT N/A    MEDICATION LIST Internal    LABS     ANAL PAP N/A    BIOPSIES/PATHOLOGY RELATED TO DIAGNOSIS N/A      DIAGNOSTIC PROCEDURES     PFC TESTING (from the Pelvic floor center includes Manometry, PDNL, EMG, etc.) N/A    COLONOSCOPY Internal 04/08/2014  04/10/2012     UPPER ENDOSCOPY (EGD) N/A      FLEX SIGMOIDOSCOPY N/A    ERCP N/A    IMAGING (DISC & REPORT)      CT N/A     MRI N/A    XRAY Internal 05/21/2022 Pelvis    ULTRASOUND  (ENDOANAL/ENDORECTAL) N/A

## 2022-09-23 ENCOUNTER — VIRTUAL VISIT (OUTPATIENT)
Dept: FAMILY MEDICINE | Facility: CLINIC | Age: 77
End: 2022-09-23
Payer: MEDICARE

## 2022-09-23 DIAGNOSIS — N18.30 STAGE 3 CHRONIC KIDNEY DISEASE, UNSPECIFIED WHETHER STAGE 3A OR 3B CKD (H): ICD-10-CM

## 2022-09-23 DIAGNOSIS — F33.1 MAJOR DEPRESSIVE DISORDER, RECURRENT EPISODE, MODERATE (H): ICD-10-CM

## 2022-09-23 DIAGNOSIS — Z13.220 SCREENING CHOLESTEROL LEVEL: ICD-10-CM

## 2022-09-23 DIAGNOSIS — F41.3 OTHER MIXED ANXIETY DISORDERS: ICD-10-CM

## 2022-09-23 DIAGNOSIS — G47.00 PERSISTENT DISORDER OF INITIATING OR MAINTAINING SLEEP: Primary | Chronic | ICD-10-CM

## 2022-09-23 PROCEDURE — 99443 PR PHYSICIAN TELEPHONE EVALUATION 21-30 MIN: CPT | Mod: 95 | Performed by: FAMILY MEDICINE

## 2022-09-23 RX ORDER — ZOLPIDEM TARTRATE 5 MG/1
TABLET ORAL
Qty: 45 TABLET | Refills: 1 | Status: SHIPPED | OUTPATIENT
Start: 2022-09-23 | End: 2023-01-02

## 2022-09-23 RX ORDER — TRANYLCYPROMINE SULFATE 10 MG/1
TABLET, FILM COATED ORAL
Qty: 300 TABLET | Refills: 3 | Status: SHIPPED | OUTPATIENT
Start: 2022-09-23 | End: 2023-11-05

## 2022-09-23 RX ORDER — OXAZEPAM 15 MG/1
15 CAPSULE ORAL
Qty: 45 CAPSULE | Refills: 1 | Status: SHIPPED | OUTPATIENT
Start: 2022-09-23 | End: 2023-02-01

## 2022-09-23 NOTE — PATIENT INSTRUCTIONS
Ask with next oncology lab draw that lipid panel for cholesterol screening and albumin random urine for mild CKD.

## 2022-09-23 NOTE — PROGRESS NOTES
Jann is a 77 year old who is being evaluated via a billable telephone visit.      What phone number would you like to be contacted at? cell  How would you like to obtain your AVS? MyChart    Assessment & Plan     Persistent disorder of initiating or maintaining sleep  Many years   - zolpidem (AMBIEN) 5 MG tablet; TAKE 1 TABLET (5 MG) BY MOUTH NIGHTLY AS NEEDED FOR FOR SLEEP    Other mixed anxiety disorders  Worse during cancer treatment  - PARNATE 10 MG tablet; TAKE 1 TABLET BY MOUTH 3 TIMES DAILY    Major depressive disorder, recurrent episode, moderate (H)  Worse with death of 20 year old pet  - PARNATE 10 MG tablet; TAKE 1 TABLET BY MOUTH 3 TIMES DAILY    Stage 3 chronic kidney disease, unspecified whether stage 3a or 3b CKD (H)  Blood pressure at goal   - Albumin Random Urine Quantitative with Creat Ratio; Future    Screening cholesterol level  overdue  - Lipid panel reflex to direct LDL Fasting; Future    Review of external notes as documented elsewhere in note  Ordering of each unique test  Prescription drug management  30 minutes spent on the date of the encounter doing chart review, history and exam, documentation and further activities per the note     Patient Instructions   Ask with next oncology lab draw that lipid panel for cholesterol screening and albumin random urine for mild CKD.      Return in 6 months (on 3/23/2023) for Physical Exam, Lab Work.    Mehran Oliva MD  [facility]    Carmina Forte is a 77 year old, presenting for the following health issues:  Recheck Medication      History of Present Illness       Reason for visit:  Doctor required to continue to prescribe medications.    She eats 2-3 servings of fruits and vegetables daily.She consumes 0 sweetened beverage(s) daily.She exercises with enough effort to increase her heart rate 9 or less minutes per day.  She exercises with enough effort to increase her heart rate 3 or less days per week.   She is taking  medications regularly.    Finished last radiation treatment yesterday (after 6 weeks). Met with Dr. Feliciano on Monday. Still on Keytruda and will complete treatment after 21 weeks.  24 Oct -appointment with colorectal. Also has an upcoming appointment with urology on 10/05. She has had difficulty getting in to see endocrinology. Oncology has been helping her coordinate her appointments.  Sleep: Getting lorazepam from oncology for anxiety. Sleeping quality varies (medications give about 3-4 hours). She is also on oxazepam, Ambien. She takes oxazepam first at night and then takes Ambien in middle of night; she does this because she reports needing to sleep on a full stomach. Minimum 6 hours between oxazepam and lorazepam. Alcohol: 1-2 glasses of wine at times.  Patient had also lost her support animal (dog) in August who was 19 years old.    Depression and Anxiety Follow-Up    How are you doing with your depression since your last visit? Worsened     How are you doing with your anxiety since your last visit?  Worsened     Are you having other symptoms that might be associated with depression or anxiety? Yes:  insomnia    Have you had a significant life event? Grief or Loss     Do you have any concerns with your use of alcohol or other drugs? No    Social History     Tobacco Use     Smoking status: Former Smoker     Types: Cigarettes     Quit date: 10/30/1972     Years since quittin.9     Smokeless tobacco: Never Used   Vaping Use     Vaping Use: Never used   Substance Use Topics     Alcohol use: Yes     Comment: couple glasses of wine daily      Drug use: No     PHQ 2020   PHQ-9 Total Score 0 0 0   Q9: Thoughts of better off dead/self-harm past 2 weeks Not at all Not at all Not at all     Insomnia  Onset/Duration: many years   Description:   Frequency of insomnia:  nightly  Time to fall asleep (sleep latency): varies  Middle of night awakening:  YES  Early morning awakening:   "YES  Progression of Symptoms:  same and constant  Accompanying Signs & Symptoms:  Daytime sleepiness/napping: YES  Excessive snoring/apnea: unknown   Restless legs: No  Waking to urinate: YES  Chronic pain:  No  Depression symptoms (if yes, do PHQ9): YES  Anxiety symptoms (if yes, do RUT-7): YES  History:  Prior Insomnia: YES  New stressful situation: YES  Precipitating factors:   Caffeine intake: some  OTC decongestants: No  Any new medications: No  Alleviating factors:  Self medicating (alcohol, etc.):  No  Stress-reduction (exercise, yoga, meditation etc): some  Therapies tried and outcome: Ambien -somewhat, lorazepam -  effective and oxazepam-  usually effective    Chronic Kidney Disease Follow-up    Do you take any over the counter pain medicine?: Yes  What over the counter medicine are you taking for your pain?:  Acetaminophen     How often do you take this medicine?:  A few times a week      Review of Systems   Constitutional, HEENT, cardiovascular, pulmonary, gi and gu systems are negative, except as otherwise noted.      Objective    Vitals - Patient Reported  Weight (Patient Reported): 59 kg (130 lb)  Height (Patient Reported): 160 cm (5' 3\")  BMI (Based on Pt Reported Ht/Wt): 23.03  Pain Score: No Pain (0)      Vitals:  No vitals were obtained today due to virtual visit.    Physical Exam   alert and mild distress  PSYCH: Alert and oriented times 3; coherent speech, normal   rate and volume, able to articulate logical thoughts, able   to abstract reason, no tangential thoughts, no hallucinations   or delusions  Her affect is pleasant, anxious, tearful and sad  RESP: No cough, no audible wheezing, able to talk in full sentences  Remainder of exam unable to be completed due to telephone visits        Phone call duration: 30 minutes    "

## 2022-10-04 RX ORDER — LORAZEPAM 2 MG/ML
0.5 INJECTION INTRAMUSCULAR EVERY 4 HOURS PRN
Status: CANCELLED | OUTPATIENT
Start: 2022-10-07

## 2022-10-04 RX ORDER — DIPHENHYDRAMINE HYDROCHLORIDE 50 MG/ML
50 INJECTION INTRAMUSCULAR; INTRAVENOUS
Status: CANCELLED
Start: 2022-10-07

## 2022-10-04 RX ORDER — EPINEPHRINE 1 MG/ML
0.3 INJECTION, SOLUTION, CONCENTRATE INTRAVENOUS EVERY 5 MIN PRN
Status: CANCELLED | OUTPATIENT
Start: 2022-10-07

## 2022-10-04 RX ORDER — HEPARIN SODIUM (PORCINE) LOCK FLUSH IV SOLN 100 UNIT/ML 100 UNIT/ML
5 SOLUTION INTRAVENOUS
Status: CANCELLED | OUTPATIENT
Start: 2022-10-07

## 2022-10-04 RX ORDER — ALBUTEROL SULFATE 0.83 MG/ML
2.5 SOLUTION RESPIRATORY (INHALATION)
Status: CANCELLED | OUTPATIENT
Start: 2022-10-07

## 2022-10-04 RX ORDER — MEPERIDINE HYDROCHLORIDE 25 MG/ML
25 INJECTION INTRAMUSCULAR; INTRAVENOUS; SUBCUTANEOUS EVERY 30 MIN PRN
Status: CANCELLED | OUTPATIENT
Start: 2022-10-07

## 2022-10-04 RX ORDER — HEPARIN SODIUM,PORCINE 10 UNIT/ML
5 VIAL (ML) INTRAVENOUS
Status: CANCELLED | OUTPATIENT
Start: 2022-10-07

## 2022-10-04 RX ORDER — ALBUTEROL SULFATE 90 UG/1
1-2 AEROSOL, METERED RESPIRATORY (INHALATION)
Status: CANCELLED
Start: 2022-10-07

## 2022-10-05 ENCOUNTER — VIRTUAL VISIT (OUTPATIENT)
Dept: UROLOGY | Facility: CLINIC | Age: 77
End: 2022-10-05
Attending: INTERNAL MEDICINE
Payer: MEDICARE

## 2022-10-05 DIAGNOSIS — N28.89 RIGHT KIDNEY MASS: Primary | ICD-10-CM

## 2022-10-05 PROCEDURE — 99204 OFFICE O/P NEW MOD 45 MIN: CPT | Mod: 95 | Performed by: UROLOGY

## 2022-10-05 NOTE — LETTER
10/5/2022       RE: Jann Davidson  [address]  [facility] [address]     Dear Colleague,    Thank you for referring your patient, Jann Davidson, to the [facility] at [facility]. Please see a copy of my visit note below.    Urology Consult History and Physical  [facility]   Name: Jann Davidson    MRN: [id number]   YOB: 1945       We were asked to see Jann Davidson at the request of Dr. Mcmullen for evaluation and treatment of incidental right renal mass.        Chief Complaint:   Incidental right renal mass    History is obtained from the patient            History of Present Illness:   Jann Davidson is a 77 year old female who is being seen for evaluation of a small incidental right renal mass  Diagnosed with breast cancer last year  S/p Chemo/radiationa and surgery  She is doing very well from a breast cancer treatment standpoint and is now following up on these other findings  Incidental 1.1cm RIGHT complex renal mass vs cyst on PET from 12/2021           Past Medical History:     Past Medical History:   Diagnosis Date     Breast cancer (H)      CKD (chronic kidney disease) stage 3, GFR 30-59 ml/min (H)      Depression with anxiety      Gout      Hypertension goal BP (blood pressure) < 140/90      Recurrent sinusitis 12/02/2013            Past Surgical History:     Past Surgical History:   Procedure Laterality Date     BIOPSY NODE SENTINEL Left 6/27/2022    Procedure: left axillary sentinel lymph node biopsy;  Surgeon: Tatianna Rai MD;  Location:  OR     BREAST BIOPSY, RT/LT      Breat Biopsy RT/LT     COLONOSCOPY  10/03     COLONOSCOPY  4/8/2014    Procedure: COLONOSCOPY;  COLONOSCOPY ;  Surgeon: Gaurav Shaffer MD;  Location:  GI     IR CHEST PORT PLACEMENT > 5 YRS OF AGE  12/29/2021     LUMPECTOMY BREAST WITH SEED LOCALIZATION Left 6/27/2022    Procedure: Radiofrequency tag localized left breast lumpectomy  with radiofrequency tag localized excision of left axillary lymph node;  Surgeon: Tatianna Rai MD;  Location: [facility] OR     RECONSTRUCT EYELID              Social History:     Social History     Tobacco Use     Smoking status: Former Smoker     Types: Cigarettes     Quit date: 10/30/1972     Years since quittin.9     Smokeless tobacco: Never Used   Substance Use Topics     Alcohol use: Yes     Comment: couple glasses of wine daily        History   Smoking Status     Former Smoker     Types: Cigarettes     Quit date: 10/30/1972   Smokeless Tobacco     Never Used            Family History:     Family History   Problem Relation Age of Onset     Cancer Mother         uterine     Breast Cancer Mother      Diabetes Paternal Grandmother               Allergies:     Allergies   Allergen Reactions     Lyrica [Pregabalin] Rash            Medications:     Current Outpatient Medications   Medication Sig     atorvastatin (LIPITOR) 10 MG tablet TAKE 1 TABLET (10 MG) BY MOUTH ONCE DAILY     candesartan (ATACAND) 4 MG tablet Take 0.5 tablets (2 mg) by mouth daily DISPENSE AS WRITTEN, Brand name only     loratadine (CLARITIN) 10 MG tablet Take 10 mg by mouth daily     LORazepam (ATIVAN) 0.5 MG tablet Take 1 tablet (0.5 mg) by mouth every 6 hours as needed for anxiety, nausea or sleep     Magnesium 400 MG TABS Take 800 mg by mouth daily     oxazepam (SERAX) 15 MG capsule Take 1 capsule (15 mg) by mouth nightly as needed for anxiety     oxyCODONE (ROXICODONE) 5 MG tablet Take 1 tablet (5 mg) by mouth every 4 hours as needed for moderate to severe pain     PARNATE 10 MG tablet TAKE 1 TABLET BY MOUTH 3 TIMES DAILY     polyethylene glycol (MIRALAX/GLYCOLAX) packet Take 1 packet by mouth daily     raloxifene (EVISTA) 60 MG tablet Take 1 tablet (60 mg) by mouth daily     triamcinolone (KENALOG) 0.1 % external cream Apply  topically 2 times daily as needed.     zolpidem (AMBIEN) 5 MG tablet TAKE 1 TABLET (5 MG) BY MOUTH NIGHTLY AS  NEEDED FOR FOR SLEEP     No current facility-administered medications for this visit.             Review of Systems:     Skin: negative  Eyes: negative  Ears/Nose/Throat: negative  Respiratory: No shortness of breath, dyspnea on exertion, cough, or hemoptysis  Cardiovascular: negative  Gastrointestinal: negative  Genitourinary: as above  Musculoskeletal: negative  Neurologic: negative  Psychiatric: negative  Hematologic/Lymphatic/Immunologic: negative  Endocrine: negative          Physical Exam:   PHYSICAL EXAM  Patient is a 77 year old  female   Vitals: There were no vitals taken for this visit.  There is no height or weight on file to calculate BMI.  General Appearance Adult:   Alert, no acute distress, oriented  HENT: throat/mouth:normal, good dentition  Lungs: no respiratory distress, or pursed lip breathing  Heart: No obvious jugular venous distension present  Abdomen: NON - distended  Musculoskeltal: extremities normal, no peripheral edema  Skin: no suspicious lesions or rashes  Neuro: Alert, oriented, speech and mentation normal  Psych: affect and mood normal          Data:   All laboratory data reviewed:    UA RESULTS:  Recent Labs   Lab Test 05/31/22  0241   COLOR Straw   APPEARANCE Clear   URINEGLC Negative   URINEBILI Negative   URINEKETONE Negative   SG 1.005   UBLD Negative   URINEPH 6.0   PROTEIN Negative   NITRITE Negative   LEUKEST Negative   RBCU <1   WBCU <1      Lab Results   Component Value Date    CR 1.00 09/16/2022    CR 1.15 06/05/2019      IMAGING:  All pertinent imaging reviewed:    All imaging studies reviewed by me.  I personally reviewed these imaging films.  A formal report from radiology will follow.    PET SCAN 12/28/2021:  FINDINGS:      HEAD/NECK:  There is no  suspicious FDG uptake in the neck. FDG uptake at the  level of the vocal cords likely secondary to speech.     The paranasal sinuses are clear. The mastoid air cells are clear.  Bilateral pseudophakia.     The mucosal  pharyngeal space, the , prevertebral and carotid  spaces are within normal limits.      No masses, mass effect or pathologically enlarged lymph nodes are  evident. The thyroid gland is within normal limits.     CHEST:  -Hypermetabolic left axillary adenopathy measuring up to 15 mm with a  max SUV of 4.64 (series 4, image 158).  -Moderately FDG avid focus in the inferior left lower quadrant of the  left breast with a max SUV of 2.60 (series 4, image 194).     Heart size is normal. No pericardial effusion. No significant coronary  artery calcifications. Thoracic aorta and main pulmonary arteries are  normal in caliber and patent with conventional three-vessel branching  pattern of the aortic arch.     No hypermetabolic mediastinal, hilar adenopathy. No hypermetabolic  right axillary adenopathy. Esophagus is normal.     Trachea and central airways are clear. No focal consolidation. No  pleural effusion or pneumothorax. Trace biapical scarring. Calcified  granuloma in the left upper lobe. Scattered solid and groundglass  pulmonary nodules, unchanged since at least 10/24/2018. For example:  -4 mm nodule in the right lower lobe, image 76).  -3 mm nodule in the right lower lobe (series 8, image 55).  -4 mm nodule in the left lower lobe (series 8, image 68).  -5 mm nodule in the left lower lobe (series 8, image 63).     ABDOMEN AND PELVIS:  Moderate FDG uptake at the level of the anus with a max SUV of 12.44  (series 4, image there are 98).     Liver is unremarkable. No focal enhancing lesion. No intra or  extrahepatic biliary duct dilation. Gallbladder is normal. Spleen size  is within normal limits. Homogenous enhancement of the pancreas. The  main pancreatic and common bile ducts are not dilated.     No adrenal mass. Symmetric renal enhancement. No hydronephrosis or  calcified stone. Probable enhancement exophytic lesion involving the  superior right renal pole measuring 11 x 9 mm without significant  FDG  uptake (series 4, image 258), similar in appearance since at least  1/10/2018 and increased in size since 7/7/2016. Bladder is  unremarkable. Uterus is unremarkable. No adnexal mass.     Stomach is unremarkable. No small or large bowel wall thickening or  dilation. Appendix is normal. No free intraperitoneal air. No  intra-abdominal or pelvic free fluid. No pneumatosis or portal venous  gas. Scattered colonic diverticulosis without additional evidence to  suggest acute diverticulitis.     Abdominal aorta and major branches are normal in caliber and patent  without significant atherosclerotic disease. Main portal vein and  major branches are patent.     No hypermetabolic mesenteric, retroperitoneal, or pelvic  lymphadenopathy.     LOWER EXTREMITIES:   No abnormal masses or hypermetabolic lesions. Muscle activation in the  bilateral lower extremities.     BONES:   There are no suspicious lytic or blastic osseous lesions.  There is no  abnormal FDG uptake in the skeleton. Mild degenerative changes of the  thoracolumbar spine     Context: patient with recently diagnosed left breast cancer                                                                      IMPRESSION: In summary left breast cancer, left axillary metastasis,  incidental small possible right renal cell carcinoma.  1.  Moderately FDG avid focus in the left breast outer lower quadrant  likely representing known primary malignancy.    1a. Hypermetabolic left axillary adenopathy compatible with krzysztof  metastasis.  1b. No evidence for metastatic breast disease in the abdomen or  pelvis.  2. Moderate FDG uptake at the level of the anus differential  hemorrhoid, fissure, physiologic, cancer.   3. Indeterminate exophytic right renal 1.1 cm mass, slow increasing in  size since 2016, concerning for slow growing renal cell carcinoma.  Recommend MRI for definitive characterization.   (Although there isn't  significant FDG uptake, this doesn't change the risk as  only about 50%  of renal cell carcinomas take up FDG)               Impression and Plan:   Impression:   77-year-old female with history of breast cancer with an incidental small right renal mass      Plan:   Incidental small right renal mass  - I reviewed her labs which are notable for a normal serum creatinine  - I reviewed her PET scan from December 2021 and reviewed these images personally.  I agree with radiologist interpretation.  I agree with the size measurements of the small indeterminant renal mass versus cyst  - We discussed that given that this was very small, 1.1 cm, I would recommend observation at this time with a repeat CT renal mass protocol  - Order for the CT scan placed  - We will plan for virtual visit follow-up after the CT scan to review the results  - This is an undiagnosed problem with an uncertain prognosis     Thank you for the kind consultation.    Time spent: 20 minutes spent on the date of the encounter doing chart review, history and exam, documentation and further activities as noted above.    Frank Guerrero MD   Urology  [facility]  [facility] Phone: [phone]  [facility] Phone: [phone]         Jann is a 77 year old who is being evaluated via a billable video visit.      How would you like to obtain your AVS? MyChart  If the video visit is dropped, the invitation should be resent by: Text to cell phone: [phone]  Will anyone else be joining your video visit? No      Video-Visit Details    Video Start Time: 4:47 PM    Type of service:  Video Visit    Video End Time:5:02 PM    Originating Location (pt. Location): Home    Distant Location (provider location):  [facility]     Platform used for Video Visit: [employer]

## 2022-10-05 NOTE — PROGRESS NOTES
Urology Consult History and Physical  [facility]   Name: Jann Davidson    MRN: [id number]   YOB: 1945       We were asked to see Jann Davidson at the request of Dr. Mcmullen for evaluation and treatment of incidental right renal mass.        Chief Complaint:   Incidental right renal mass    History is obtained from the patient            History of Present Illness:   Jann Davidson is a 77 year old female who is being seen for evaluation of a small incidental right renal mass  Diagnosed with breast cancer last year  S/p Chemo/radiationa and surgery  She is doing very well from a breast cancer treatment standpoint and is now following up on these other findings  Incidental 1.1cm RIGHT complex renal mass vs cyst on PET from 12/2021           Past Medical History:     Past Medical History:   Diagnosis Date     Breast cancer (H)      CKD (chronic kidney disease) stage 3, GFR 30-59 ml/min (H)      Depression with anxiety      Gout      Hypertension goal BP (blood pressure) < 140/90      Recurrent sinusitis 12/02/2013            Past Surgical History:     Past Surgical History:   Procedure Laterality Date     BIOPSY NODE SENTINEL Left 6/27/2022    Procedure: left axillary sentinel lymph node biopsy;  Surgeon: Tatianna Rai MD;  Location:  OR     BREAST BIOPSY, RT/LT      Breat Biopsy RT/LT     COLONOSCOPY  10/03     COLONOSCOPY  4/8/2014    Procedure: COLONOSCOPY;  COLONOSCOPY ;  Surgeon: Gaurav Shaffer MD;  Location: [facility] GI     IR CHEST PORT PLACEMENT > 5 YRS OF AGE  12/29/2021     LUMPECTOMY BREAST WITH SEED LOCALIZATION Left 6/27/2022    Procedure: Radiofrequency tag localized left breast lumpectomy with radiofrequency tag localized excision of left axillary lymph node;  Surgeon: Tatianna Rai MD;  Location:  OR     RECONSTRUCT EYELID              Social History:     Social History     Tobacco Use     Smoking status: Former Smoker     Types: Cigarettes     Quit date: 10/30/1972     Years  since quittin.9     Smokeless tobacco: Never Used   Substance Use Topics     Alcohol use: Yes     Comment: couple glasses of wine daily        History   Smoking Status     Former Smoker     Types: Cigarettes     Quit date: 10/30/1972   Smokeless Tobacco     Never Used            Family History:     Family History   Problem Relation Age of Onset     Cancer Mother         uterine     Breast Cancer Mother      Diabetes Paternal Grandmother               Allergies:     Allergies   Allergen Reactions     Lyrica [Pregabalin] Rash            Medications:     Current Outpatient Medications   Medication Sig     atorvastatin (LIPITOR) 10 MG tablet TAKE 1 TABLET (10 MG) BY MOUTH ONCE DAILY     candesartan (ATACAND) 4 MG tablet Take 0.5 tablets (2 mg) by mouth daily DISPENSE AS WRITTEN, Brand name only     loratadine (CLARITIN) 10 MG tablet Take 10 mg by mouth daily     LORazepam (ATIVAN) 0.5 MG tablet Take 1 tablet (0.5 mg) by mouth every 6 hours as needed for anxiety, nausea or sleep     Magnesium 400 MG TABS Take 800 mg by mouth daily     oxazepam (SERAX) 15 MG capsule Take 1 capsule (15 mg) by mouth nightly as needed for anxiety     oxyCODONE (ROXICODONE) 5 MG tablet Take 1 tablet (5 mg) by mouth every 4 hours as needed for moderate to severe pain     PARNATE 10 MG tablet TAKE 1 TABLET BY MOUTH 3 TIMES DAILY     polyethylene glycol (MIRALAX/GLYCOLAX) packet Take 1 packet by mouth daily     raloxifene (EVISTA) 60 MG tablet Take 1 tablet (60 mg) by mouth daily     triamcinolone (KENALOG) 0.1 % external cream Apply  topically 2 times daily as needed.     zolpidem (AMBIEN) 5 MG tablet TAKE 1 TABLET (5 MG) BY MOUTH NIGHTLY AS NEEDED FOR FOR SLEEP     No current facility-administered medications for this visit.             Review of Systems:     Skin: negative  Eyes: negative  Ears/Nose/Throat: negative  Respiratory: No shortness of breath, dyspnea on exertion, cough, or hemoptysis  Cardiovascular:  negative  Gastrointestinal: negative  Genitourinary: as above  Musculoskeletal: negative  Neurologic: negative  Psychiatric: negative  Hematologic/Lymphatic/Immunologic: negative  Endocrine: negative          Physical Exam:   PHYSICAL EXAM  Patient is a 77 year old  female   Vitals: There were no vitals taken for this visit.  There is no height or weight on file to calculate BMI.  General Appearance Adult:   Alert, no acute distress, oriented  HENT: throat/mouth:normal, good dentition  Lungs: no respiratory distress, or pursed lip breathing  Heart: No obvious jugular venous distension present  Abdomen: NON - distended  Musculoskeltal: extremities normal, no peripheral edema  Skin: no suspicious lesions or rashes  Neuro: Alert, oriented, speech and mentation normal  Psych: affect and mood normal          Data:   All laboratory data reviewed:    UA RESULTS:  Recent Labs   Lab Test 05/31/22  0241   COLOR Straw   APPEARANCE Clear   URINEGLC Negative   URINEBILI Negative   URINEKETONE Negative   SG 1.005   UBLD Negative   URINEPH 6.0   PROTEIN Negative   NITRITE Negative   LEUKEST Negative   RBCU <1   WBCU <1      Lab Results   Component Value Date    CR 1.00 09/16/2022    CR 1.15 06/05/2019      IMAGING:  All pertinent imaging reviewed:    All imaging studies reviewed by me.  I personally reviewed these imaging films.  A formal report from radiology will follow.    PET SCAN 12/28/2021:  FINDINGS:      HEAD/NECK:  There is no  suspicious FDG uptake in the neck. FDG uptake at the  level of the vocal cords likely secondary to speech.     The paranasal sinuses are clear. The mastoid air cells are clear.  Bilateral pseudophakia.     The mucosal pharyngeal space, the , prevertebral and carotid  spaces are within normal limits.      No masses, mass effect or pathologically enlarged lymph nodes are  evident. The thyroid gland is within normal limits.     CHEST:  -Hypermetabolic left axillary adenopathy measuring up to  15 mm with a  max SUV of 4.64 (series 4, image 158).  -Moderately FDG avid focus in the inferior left lower quadrant of the  left breast with a max SUV of 2.60 (series 4, image 194).     Heart size is normal. No pericardial effusion. No significant coronary  artery calcifications. Thoracic aorta and main pulmonary arteries are  normal in caliber and patent with conventional three-vessel branching  pattern of the aortic arch.     No hypermetabolic mediastinal, hilar adenopathy. No hypermetabolic  right axillary adenopathy. Esophagus is normal.     Trachea and central airways are clear. No focal consolidation. No  pleural effusion or pneumothorax. Trace biapical scarring. Calcified  granuloma in the left upper lobe. Scattered solid and groundglass  pulmonary nodules, unchanged since at least 10/24/2018. For example:  -4 mm nodule in the right lower lobe, image 76).  -3 mm nodule in the right lower lobe (series 8, image 55).  -4 mm nodule in the left lower lobe (series 8, image 68).  -5 mm nodule in the left lower lobe (series 8, image 63).     ABDOMEN AND PELVIS:  Moderate FDG uptake at the level of the anus with a max SUV of 12.44  (series 4, image there are 98).     Liver is unremarkable. No focal enhancing lesion. No intra or  extrahepatic biliary duct dilation. Gallbladder is normal. Spleen size  is within normal limits. Homogenous enhancement of the pancreas. The  main pancreatic and common bile ducts are not dilated.     No adrenal mass. Symmetric renal enhancement. No hydronephrosis or  calcified stone. Probable enhancement exophytic lesion involving the  superior right renal pole measuring 11 x 9 mm without significant FDG  uptake (series 4, image 258), similar in appearance since at least  1/10/2018 and increased in size since 7/7/2016. Bladder is  unremarkable. Uterus is unremarkable. No adnexal mass.     Stomach is unremarkable. No small or large bowel wall thickening or  dilation. Appendix is normal. No  free intraperitoneal air. No  intra-abdominal or pelvic free fluid. No pneumatosis or portal venous  gas. Scattered colonic diverticulosis without additional evidence to  suggest acute diverticulitis.     Abdominal aorta and major branches are normal in caliber and patent  without significant atherosclerotic disease. Main portal vein and  major branches are patent.     No hypermetabolic mesenteric, retroperitoneal, or pelvic  lymphadenopathy.     LOWER EXTREMITIES:   No abnormal masses or hypermetabolic lesions. Muscle activation in the  bilateral lower extremities.     BONES:   There are no suspicious lytic or blastic osseous lesions.  There is no  abnormal FDG uptake in the skeleton. Mild degenerative changes of the  thoracolumbar spine     Context: patient with recently diagnosed left breast cancer                                                                      IMPRESSION: In summary left breast cancer, left axillary metastasis,  incidental small possible right renal cell carcinoma.  1.  Moderately FDG avid focus in the left breast outer lower quadrant  likely representing known primary malignancy.    1a. Hypermetabolic left axillary adenopathy compatible with krzysztof  metastasis.  1b. No evidence for metastatic breast disease in the abdomen or  pelvis.  2. Moderate FDG uptake at the level of the anus differential  hemorrhoid, fissure, physiologic, cancer.   3. Indeterminate exophytic right renal 1.1 cm mass, slow increasing in  size since 2016, concerning for slow growing renal cell carcinoma.  Recommend MRI for definitive characterization.   (Although there isn't  significant FDG uptake, this doesn't change the risk as only about 50%  of renal cell carcinomas take up FDG)               Impression and Plan:   Impression:   77-year-old female with history of breast cancer with an incidental small right renal mass      Plan:   Incidental small right renal mass  - I reviewed her labs which are notable for a  normal serum creatinine  - I reviewed her PET scan from December 2021 and reviewed these images personally.  I agree with radiologist interpretation.  I agree with the size measurements of the small indeterminant renal mass versus cyst  - We discussed that given that this was very small, 1.1 cm, I would recommend observation at this time with a repeat CT renal mass protocol  - Order for the CT scan placed  - We will plan for virtual visit follow-up after the CT scan to review the results  - This is an undiagnosed problem with an uncertain prognosis     Thank you for the kind consultation.    Time spent: 20 minutes spent on the date of the encounter doing chart review, history and exam, documentation and further activities as noted above.    Frank Guerrero MD   Urology  [facility]  [facility] Phone: [phone]  [facility] Phone: [phone]         Jann is a 77 year old who is being evaluated via a billable video visit.      How would you like to obtain your AVS? MyChart  If the video visit is dropped, the invitation should be resent by: Text to cell phone: [phone]  Will anyone else be joining your video visit? No      Video-Visit Details    Video Start Time: 4:47 PM    Type of service:  Video Visit    Video End Time:5:02 PM    Originating Location (pt. Location): Home    Distant Location (provider location):  [facility]     Platform used for Video Visit: [employer]

## 2022-10-07 ENCOUNTER — ONCOLOGY VISIT (OUTPATIENT)
Dept: ONCOLOGY | Facility: CLINIC | Age: 77
End: 2022-10-07
Attending: NURSE PRACTITIONER
Payer: MEDICARE

## 2022-10-07 ENCOUNTER — INFUSION THERAPY VISIT (OUTPATIENT)
Dept: INFUSION THERAPY | Facility: CLINIC | Age: 77
End: 2022-10-07
Attending: INTERNAL MEDICINE
Payer: MEDICARE

## 2022-10-07 VITALS
DIASTOLIC BLOOD PRESSURE: 73 MMHG | TEMPERATURE: 97.3 F | OXYGEN SATURATION: 100 % | SYSTOLIC BLOOD PRESSURE: 117 MMHG | RESPIRATION RATE: 20 BRPM | WEIGHT: 130.6 LBS | BODY MASS INDEX: 23.13 KG/M2 | HEART RATE: 77 BPM

## 2022-10-07 VITALS
TEMPERATURE: 97.3 F | HEART RATE: 77 BPM | SYSTOLIC BLOOD PRESSURE: 117 MMHG | DIASTOLIC BLOOD PRESSURE: 73 MMHG | OXYGEN SATURATION: 100 % | BODY MASS INDEX: 23.12 KG/M2 | RESPIRATION RATE: 20 BRPM | WEIGHT: 130.51 LBS

## 2022-10-07 DIAGNOSIS — Z17.1 MALIGNANT NEOPLASM OF LOWER-OUTER QUADRANT OF LEFT BREAST OF FEMALE, ESTROGEN RECEPTOR NEGATIVE (H): Primary | ICD-10-CM

## 2022-10-07 DIAGNOSIS — C50.512 MALIGNANT NEOPLASM OF LOWER-OUTER QUADRANT OF LEFT BREAST OF FEMALE, ESTROGEN RECEPTOR NEGATIVE (H): Primary | ICD-10-CM

## 2022-10-07 LAB
ALBUMIN SERPL-MCNC: 3.6 G/DL (ref 3.4–5)
ALP SERPL-CCNC: 84 U/L (ref 40–150)
ALT SERPL W P-5'-P-CCNC: 28 U/L (ref 0–50)
ANION GAP SERPL CALCULATED.3IONS-SCNC: 6 MMOL/L (ref 3–14)
AST SERPL W P-5'-P-CCNC: 34 U/L (ref 0–45)
BILIRUB SERPL-MCNC: 0.3 MG/DL (ref 0.2–1.3)
BUN SERPL-MCNC: 27 MG/DL (ref 7–30)
CALCIUM SERPL-MCNC: 9.4 MG/DL (ref 8.5–10.1)
CHLORIDE BLD-SCNC: 107 MMOL/L (ref 94–109)
CO2 SERPL-SCNC: 27 MMOL/L (ref 20–32)
CREAT SERPL-MCNC: 1.09 MG/DL (ref 0.52–1.04)
GFR SERPL CREATININE-BSD FRML MDRD: 52 ML/MIN/1.73M2
GLUCOSE BLD-MCNC: 99 MG/DL (ref 70–99)
POTASSIUM BLD-SCNC: 4.1 MMOL/L (ref 3.4–5.3)
PROT SERPL-MCNC: 6.6 G/DL (ref 6.8–8.8)
SODIUM SERPL-SCNC: 140 MMOL/L (ref 133–144)
TSH SERPL DL<=0.005 MIU/L-ACNC: 1.84 MU/L (ref 0.4–4)

## 2022-10-07 PROCEDURE — 250N000011 HC RX IP 250 OP 636: Performed by: INTERNAL MEDICINE

## 2022-10-07 PROCEDURE — 258N000003 HC RX IP 258 OP 636: Performed by: INTERNAL MEDICINE

## 2022-10-07 PROCEDURE — 80053 COMPREHEN METABOLIC PANEL: CPT | Performed by: INTERNAL MEDICINE

## 2022-10-07 PROCEDURE — 99214 OFFICE O/P EST MOD 30 MIN: CPT | Performed by: NURSE PRACTITIONER

## 2022-10-07 PROCEDURE — 96413 CHEMO IV INFUSION 1 HR: CPT

## 2022-10-07 PROCEDURE — 84443 ASSAY THYROID STIM HORMONE: CPT | Performed by: INTERNAL MEDICINE

## 2022-10-07 PROCEDURE — 82040 ASSAY OF SERUM ALBUMIN: CPT | Performed by: INTERNAL MEDICINE

## 2022-10-07 PROCEDURE — G0463 HOSPITAL OUTPT CLINIC VISIT: HCPCS | Mod: 25

## 2022-10-07 RX ORDER — HEPARIN SODIUM (PORCINE) LOCK FLUSH IV SOLN 100 UNIT/ML 100 UNIT/ML
5 SOLUTION INTRAVENOUS
Status: DISCONTINUED | OUTPATIENT
Start: 2022-10-07 | End: 2022-10-07 | Stop reason: HOSPADM

## 2022-10-07 RX ADMIN — SODIUM CHLORIDE 250 ML: 9 INJECTION, SOLUTION INTRAVENOUS at 13:51

## 2022-10-07 RX ADMIN — SODIUM CHLORIDE 200 MG: 9 INJECTION, SOLUTION INTRAVENOUS at 13:51

## 2022-10-07 RX ADMIN — Medication 5 ML: at 14:33

## 2022-10-07 ASSESSMENT — PAIN SCALES - GENERAL
PAINLEVEL: NO PAIN (0)
PAINLEVEL: NO PAIN (0)

## 2022-10-07 NOTE — PROGRESS NOTES
Infusion Nursing Note:  Jann Davidson presents today for keytruda.    Patient seen by provider today: Yes: ISABEL Dumont   present during visit today: Not Applicable.    Note: Feeling well after radiation treatment was over a couple weeks ago.    Intravenous Access:  Implanted Port.    Treatment Conditions:  Lab Results   Component Value Date    HGB 10.6 (L) 08/08/2022    WBC 4.1 08/08/2022    ANEU 1.8 05/06/2022    ANEUTAUTO 2.6 08/08/2022     08/08/2022      Lab Results   Component Value Date     10/07/2022    POTASSIUM 4.1 10/07/2022    MAG 1.7 05/23/2022    CR 1.09 (H) 10/07/2022    AMY 9.4 10/07/2022    BILITOTAL 0.3 10/07/2022    ALBUMIN 3.6 10/07/2022    ALT 28 10/07/2022    AST 34 10/07/2022     Results reviewed, labs MET treatment parameters, ok to proceed with treatment.    Post Infusion Assessment:  Patient tolerated infusion without incident.  Blood return noted pre and post infusion.  Site patent and intact, free from redness, edema or discomfort.  No evidence of extravasations.  Access discontinued per protocol.     Discharge Plan:   AVS to patient via MYCHART.  Patient will return as prev maria de jesus for next appointment.   Patient discharged in stable condition accompanied by: self.  Departure Mode: Ambulatory.      Tico Tyler RN

## 2022-10-07 NOTE — PROGRESS NOTES
Nursing Note:  Jann Davidson presents today for labs.    Patient seen by provider today: No   present during visit today: Not Applicable.    Note: N/A.    Intravenous Access:  Implanted Port.    Discharge Plan:   Patient was sent to [facility] for next appointment.    Swetha Means RN

## 2022-10-07 NOTE — PROGRESS NOTES
Oncology/Hematology Visit Note  Oct 7, 2022    Reason for Visit: Follow up of breast cancer    Primary Oncologist: Dr. Mcmullen  Oncologic History:   1. 10/26/2021: Mammogram showed calcifications in the left lateral breast; right breast negative.  2. 11/17/2021: Left diagnostic mammogram showed cluster of pleomorphic calcifications at 4:00, 6 cm from nipple, measuring 1.3 cm.  3. 12/3/2021: Stereotactic left breast biopsy at 4:00, 6 cm from nipple showed grade 2 invasive ductal carcinoma with micropapillary features, extensive lymphovascular invasion present, grade 2-3 focal DCIS, LCIS, ER negative, MO negative, HER-2/maxime FISH negative.  4. 12/10/2021: Breast MRI showed oval mass 2 x 2 x 1.5 cm at 4:00, 6 cm from nipple in left breast reflecting biopsy cavity with post-biopsy changes; prominent 2.5 cm low left level 1 axillary lymph node.  5.  12/28/2021: PET/CT scan showed FDG avid focus in left lower outer breast, hypermetabolic left axillary adenopathy, moderate FDG uptake at level off anus, exophytic right renal 1.1 cm mass, slowly increasing in size since 2016 concerning for growing renal cell carcinoma.  6. 1/18/2022: Started neoadjuvant chemotherapy with weekly paclitaxel + carboplatin and every 3-week pembrolizumab as per KEYNOTE-522 study.  7. 4/19/2022: Breast MRI showed no residual enhancement at site of primary malignancy in left breast at 4:00; no residual left axillary adenopathy. Right breast negative.  8. 5/6/2022: Completed cycle 12 paclitaxel with pembrolizumab. Surgery delayed due to 2 hospitalizations for rash and peripheral neuropathy.  9. 6/27/2022: Underwent left breast lumpectomy and left axillary sentinel lymph node excision under care of Dr. Tatianna Rai.  Pathology showed no residual invasive carcinoma in left breast; residual LCIS and atypical lobular hyperplasia present; one of 2 lymph nodes with isolated tumor cells measuring 0.087 mm, focal fibrosis consistent with treatment effect;  RCB-I, ypT0-pN0(i+).  10. Started adjuvant radiation 8/11/22-9/22/22 per Dr. Giles.   11. Staretd 5-year course of raloxifene 8/8/22 due to residual LCIS and ALH  12. Started 9 cycles of Keytruda 8/26/22.    Interval History:  No interval events. Denies change to medical status. Does not complain of neuropath today. No rash. No inflammatory symptoms such as diarrhea.     Review of Systems:  A complete review of systems was negative except as noted in the HPI.     Past medical, Surgical, and social history:  Reviewed    Physical Examination:  /73   Pulse 77   Temp 97.3  F (36.3  C) (Oral)   Resp 20   Wt 59.2 kg (130 lb 8.2 oz)   SpO2 100%   BMI 23.12 kg/m    Wt Readings from Last 10 Encounters:   10/07/22 59.2 kg (130 lb 8.2 oz)   10/07/22 59.2 kg (130 lb 9.6 oz)   09/16/22 59 kg (130 lb)   08/26/22 59.1 kg (130 lb 3.2 oz)   08/08/22 59.1 kg (130 lb 3.2 oz)   06/27/22 56.7 kg (125 lb)   06/20/22 56.7 kg (125 lb)   06/01/22 56.6 kg (124 lb 12.8 oz)   05/23/22 59.1 kg (130 lb 6.4 oz)   05/06/22 59.4 kg (131 lb)     General: No acute distress.  Skin: Warm and dry. No abnormal ecchymosis or rashes. Line clean, dry, intact.  Eyes: Anicteric. EOMs intact. No strabismus or ptosis.  Heart: Normal rate and rhythm without murmur.  Lungs: Clear to auscultation in all fields with no adventitious lung sounds. Normal work of breathing.  Abdomen: Soft, non-tender, non-distended without hepatosplenomegaly. Normoactive bowel sounds.  Extremities: No peripheral edema. Gross movement intact without difficulty.  Neuro: Cranial nerves and coordination grossly intact. Alert and oriented x 3.    Laboratory Data:  CMP, TSH reviewed.       Assessment and Plan:  Jann Davidson is a 77 year old year old female with triple-negative breast cancer with 1 LN+status post neoadjuvant carbo/Taxol + Keytruda, lumpectomy with residual LCIS and ALH on adjuvant Evista and Keytruda.     # Triple Negative Breast Cancer  # Anemia Secondary to  Chemotherapy  - Continue every 3 week Keytruda, okay to proceed with cycle 3 today, plan for 9 cycles  - Continue raloxifene x 5 years  - Repeat diagnostic mammogram 3 months post radiation (12/2022)  - Repeat breast MRI 6 months thereafter    # Elevated TSH, resolved  - Check each cycle with Keytruda  - Declined consultation with endocrinology    # Renal Mass  - Follows with urology    # Chronic Kidney Disease  - Follows with nephrology    # Abnormal Imaging Findings  - Pulmonary nodules - continue to monitor  - Anal avidity - recommend colorectal follow-up, history of fissure    20 minutes spent on the date of the encounter doing chart review, review of test results, interpretation of tests, patient visit and documentation     Julia Gomez PA-C  Department of Hematology and Oncology  [facility]

## 2022-10-07 NOTE — PROGRESS NOTES
"Oncology Rooming Note    October 7, 2022 1:20 PM   Jann Davidson is a 77 year old female who presents for:    Chief Complaint   Patient presents with     Oncology Clinic Visit     Initial Vitals: /73   Pulse 77   Temp 97.3  F (36.3  C) (Oral)   Resp 20   Wt 59.2 kg (130 lb 8.2 oz)   SpO2 100%   BMI 23.12 kg/m   Estimated body mass index is 23.12 kg/m  as calculated from the following:    Height as of 9/16/22: 1.6 m (5' 3\").    Weight as of this encounter: 59.2 kg (130 lb 8.2 oz). Body surface area is 1.62 meters squared.  No Pain (0) Comment: Data Unavailable   No LMP recorded. Patient is postmenopausal.  Allergies reviewed: Yes  Medications reviewed: Yes    Medications: Medication refills not needed today.  Pharmacy name entered into EPIC:    [id number] - [place], [place] - [address]  [facility] - [place], [place] - [address] [place] [address]    Clinical concerns: no      Rosa Palomino CMA            "

## 2022-10-07 NOTE — LETTER
"    10/7/2022         RE: Jann Davidson  [address] [place]  [place] [address]        Dear Colleague,    Thank you for referring your patient, Jann Davidson, to the [facility]. Please see a copy of my visit note below.    Oncology Rooming Note    October 7, 2022 1:20 PM   Jann Davidson is a 77 year old female who presents for:    Chief Complaint   Patient presents with     Oncology Clinic Visit     Initial Vitals: /73   Pulse 77   Temp 97.3  F (36.3  C) (Oral)   Resp 20   Wt 59.2 kg (130 lb 8.2 oz)   SpO2 100%   BMI 23.12 kg/m   Estimated body mass index is 23.12 kg/m  as calculated from the following:    Height as of 9/16/22: 1.6 m (5' 3\").    Weight as of this encounter: 59.2 kg (130 lb 8.2 oz). Body surface area is 1.62 meters squared.  No Pain (0) Comment: Data Unavailable   No LMP recorded. Patient is postmenopausal.  Allergies reviewed: Yes  Medications reviewed: Yes    Medications: Medication refills not needed today.  Pharmacy name entered into EPIC:    [facility][id number] - [place], [place] - [address] [facility] - [place], [place] - [address] [place] [address]    Clinical concerns: no      Rosa Palomino, [facility]              Oncology/Hematology Visit Note  Oct 7, 2022    Reason for Visit: Follow up of breast cancer    Primary Oncologist: Dr. Mcmullen  Oncologic History:   1. 10/26/2021: Mammogram showed calcifications in the left lateral breast; right breast negative.  2. 11/17/2021: Left diagnostic mammogram showed cluster of pleomorphic calcifications at 4:00, 6 cm from nipple, measuring 1.3 cm.  3. 12/3/2021: Stereotactic left breast biopsy at 4:00, 6 cm from nipple showed grade 2 invasive ductal carcinoma with micropapillary features, extensive lymphovascular invasion present, grade 2-3 focal DCIS, LCIS, ER negative, [place] negative, HER-2/maxime FISH negative.  4. 12/10/2021: Breast MRI showed oval mass 2 x 2 x 1.5 cm at 4:00, " 6 cm from nipple in left breast reflecting biopsy cavity with post-biopsy changes; prominent 2.5 cm low left level 1 axillary lymph node.  5.  12/28/2021: PET/CT scan showed FDG avid focus in left lower outer breast, hypermetabolic left axillary adenopathy, moderate FDG uptake at level off anus, exophytic right renal 1.1 cm mass, slowly increasing in size since 2016 concerning for growing renal cell carcinoma.  6. 1/18/2022: Started neoadjuvant chemotherapy with weekly paclitaxel + carboplatin and every 3-week pembrolizumab as per KEYNOTE-522 study.  7. 4/19/2022: Breast MRI showed no residual enhancement at site of primary malignancy in left breast at 4:00; no residual left axillary adenopathy. Right breast negative.  8. 5/6/2022: Completed cycle 12 paclitaxel with pembrolizumab. Surgery delayed due to 2 hospitalizations for rash and peripheral neuropathy.  9. 6/27/2022: Underwent left breast lumpectomy and left axillary sentinel lymph node excision under care of Dr. Tatianna Rai.  Pathology showed no residual invasive carcinoma in left breast; residual LCIS and atypical lobular hyperplasia present; one of 2 lymph nodes with isolated tumor cells measuring 0.087 mm, focal fibrosis consistent with treatment effect; RCB-I, ypT0-pN0(i+).  10. Started adjuvant radiation 8/11/22-9/22/22 per Dr. Giles.   11. Staretd 5-year course of raloxifene 8/8/22 due to residual LCIS and ALH  12. Started 9 cycles of Keytruda 8/26/22.    Interval History:  No interval events. Denies change to medical status. Does not complain of neuropath today. No rash. No inflammatory symptoms such as diarrhea.     Review of Systems:  A complete review of systems was negative except as noted in the HPI.     Past medical, Surgical, and social history:  Reviewed    Physical Examination:  /73   Pulse 77   Temp 97.3  F (36.3  C) (Oral)   Resp 20   Wt 59.2 kg (130 lb 8.2 oz)   SpO2 100%   BMI 23.12 kg/m    Wt Readings from Last 10  Encounters:   10/07/22 59.2 kg (130 lb 8.2 oz)   10/07/22 59.2 kg (130 lb 9.6 oz)   09/16/22 59 kg (130 lb)   08/26/22 59.1 kg (130 lb 3.2 oz)   08/08/22 59.1 kg (130 lb 3.2 oz)   06/27/22 56.7 kg (125 lb)   06/20/22 56.7 kg (125 lb)   06/01/22 56.6 kg (124 lb 12.8 oz)   05/23/22 59.1 kg (130 lb 6.4 oz)   05/06/22 59.4 kg (131 lb)     General: No acute distress.  Skin: Warm and dry. No abnormal ecchymosis or rashes. Line clean, dry, intact.  Eyes: Anicteric. EOMs intact. No strabismus or ptosis.  Heart: Normal rate and rhythm without murmur.  Lungs: Clear to auscultation in all fields with no adventitious lung sounds. Normal work of breathing.  Abdomen: Soft, non-tender, non-distended without hepatosplenomegaly. Normoactive bowel sounds.  Extremities: No peripheral edema. Gross movement intact without difficulty.  Neuro: Cranial nerves and coordination grossly intact. Alert and oriented x 3.    Laboratory Data:  CMP, TSH reviewed.       Assessment and Plan:  Jann Davidson is a 77 year old year old female with triple-negative breast cancer with 1 LN+status post neoadjuvant carbo/Taxol + Keytruda, lumpectomy with residual LCIS and ALH on adjuvant Evista and Keytruda.     # Triple Negative Breast Cancer  # Anemia Secondary to Chemotherapy  - Continue every 3 week Keytruda, okay to proceed with cycle 3 today, plan for 9 cycles  - Continue raloxifene x 5 years  - Repeat diagnostic mammogram 3 months post radiation (12/2022)  - Repeat breast MRI 6 months thereafter    # Elevated TSH, resolved  - Check each cycle with Keytruda  - Declined consultation with endocrinology    # Renal Mass  - Follows with urology    # Chronic Kidney Disease  - Follows with nephrology    # Abnormal Imaging Findings  - Pulmonary nodules - continue to monitor  - Anal avidity - recommend colorectal follow-up, history of fissure    20 minutes spent on the date of the encounter doing chart review, review of test results, interpretation of tests,  patient visit and documentation     Julia Gomez PA-C  Department of Hematology and Oncology  [facility]       Attestation signed by Tadeo Dugan APRN CNP at 10/10/2022 11:57 AM:  I saw the patient with Julia HALL and agree with the note      Again, thank you for allowing me to participate in the care of your patient.        Sincerely,        HUBER Damon CNP

## 2022-10-14 ENCOUNTER — TELEPHONE (OUTPATIENT)
Dept: SURGERY | Facility: CLINIC | Age: 77
End: 2022-10-14

## 2022-10-14 NOTE — TELEPHONE ENCOUNTER
"In regards to vm msg received from patient this morning, sent the following message to Dr. Cervantes's nurse Ellen, RN:    \"CARMINE Liu received this am from patient to cancel patient's clinic appt this am at 11:15 AM with Dr. Cervantes. Patient says that she has not slept. She also says that she tried to do the Fleet Enema but was unable. She wants call back about \"this\" and the enema.    Thank-you,  Melida\"    Melida Meneses  [facility]-op Coordinator  [facility]  Direct Phone: [phone]    "

## 2022-10-19 DIAGNOSIS — D70.1 CHEMOTHERAPY-INDUCED NEUTROPENIA (H): ICD-10-CM

## 2022-10-19 DIAGNOSIS — F41.9 ANXIETY: ICD-10-CM

## 2022-10-19 DIAGNOSIS — T45.1X5A CHEMOTHERAPY-INDUCED NEUTROPENIA (H): ICD-10-CM

## 2022-10-19 RX ORDER — LORAZEPAM 0.5 MG/1
0.5 TABLET ORAL EVERY 6 HOURS PRN
Qty: 45 TABLET | Refills: 0 | Status: SHIPPED | OUTPATIENT
Start: 2022-10-19 | End: 2022-11-22

## 2022-10-23 ENCOUNTER — HEALTH MAINTENANCE LETTER (OUTPATIENT)
Age: 77
End: 2022-10-23

## 2022-10-25 RX ORDER — HEPARIN SODIUM,PORCINE 10 UNIT/ML
5 VIAL (ML) INTRAVENOUS
Status: CANCELLED | OUTPATIENT
Start: 2022-10-28

## 2022-10-25 RX ORDER — ALBUTEROL SULFATE 90 UG/1
1-2 AEROSOL, METERED RESPIRATORY (INHALATION)
Status: CANCELLED
Start: 2022-10-28

## 2022-10-25 RX ORDER — MEPERIDINE HYDROCHLORIDE 25 MG/ML
25 INJECTION INTRAMUSCULAR; INTRAVENOUS; SUBCUTANEOUS EVERY 30 MIN PRN
Status: CANCELLED | OUTPATIENT
Start: 2022-10-28

## 2022-10-25 RX ORDER — ALBUTEROL SULFATE 0.83 MG/ML
2.5 SOLUTION RESPIRATORY (INHALATION)
Status: CANCELLED | OUTPATIENT
Start: 2022-10-28

## 2022-10-25 RX ORDER — HEPARIN SODIUM (PORCINE) LOCK FLUSH IV SOLN 100 UNIT/ML 100 UNIT/ML
5 SOLUTION INTRAVENOUS
Status: CANCELLED | OUTPATIENT
Start: 2022-10-28

## 2022-10-25 RX ORDER — LORAZEPAM 2 MG/ML
0.5 INJECTION INTRAMUSCULAR EVERY 4 HOURS PRN
Status: CANCELLED | OUTPATIENT
Start: 2022-10-28

## 2022-10-25 RX ORDER — EPINEPHRINE 1 MG/ML
0.3 INJECTION, SOLUTION, CONCENTRATE INTRAVENOUS EVERY 5 MIN PRN
Status: CANCELLED | OUTPATIENT
Start: 2022-10-28

## 2022-10-25 RX ORDER — DIPHENHYDRAMINE HYDROCHLORIDE 50 MG/ML
50 INJECTION INTRAMUSCULAR; INTRAVENOUS
Status: CANCELLED
Start: 2022-10-28

## 2022-10-28 ENCOUNTER — LAB (OUTPATIENT)
Dept: INFUSION THERAPY | Facility: CLINIC | Age: 77
End: 2022-10-28
Attending: INTERNAL MEDICINE
Payer: MEDICARE

## 2022-10-28 ENCOUNTER — ONCOLOGY VISIT (OUTPATIENT)
Dept: ONCOLOGY | Facility: CLINIC | Age: 77
End: 2022-10-28
Attending: NURSE PRACTITIONER
Payer: MEDICARE

## 2022-10-28 VITALS
DIASTOLIC BLOOD PRESSURE: 67 MMHG | TEMPERATURE: 98.3 F | OXYGEN SATURATION: 100 % | HEIGHT: 63 IN | SYSTOLIC BLOOD PRESSURE: 127 MMHG | HEART RATE: 91 BPM | WEIGHT: 131.8 LBS | BODY MASS INDEX: 23.35 KG/M2 | RESPIRATION RATE: 16 BRPM

## 2022-10-28 DIAGNOSIS — C50.512 MALIGNANT NEOPLASM OF LOWER-OUTER QUADRANT OF LEFT BREAST OF FEMALE, ESTROGEN RECEPTOR NEGATIVE (H): Primary | ICD-10-CM

## 2022-10-28 DIAGNOSIS — Z17.1 MALIGNANT NEOPLASM OF LOWER-OUTER QUADRANT OF LEFT BREAST OF FEMALE, ESTROGEN RECEPTOR NEGATIVE (H): Primary | ICD-10-CM

## 2022-10-28 LAB
ALBUMIN SERPL-MCNC: 3.5 G/DL (ref 3.4–5)
ALP SERPL-CCNC: 82 U/L (ref 40–150)
ALT SERPL W P-5'-P-CCNC: 28 U/L (ref 0–50)
ANION GAP SERPL CALCULATED.3IONS-SCNC: 9 MMOL/L (ref 3–14)
AST SERPL W P-5'-P-CCNC: 32 U/L (ref 0–45)
BILIRUB SERPL-MCNC: 0.4 MG/DL (ref 0.2–1.3)
BUN SERPL-MCNC: 24 MG/DL (ref 7–30)
CALCIUM SERPL-MCNC: 9.5 MG/DL (ref 8.5–10.1)
CHLORIDE BLD-SCNC: 109 MMOL/L (ref 94–109)
CO2 SERPL-SCNC: 22 MMOL/L (ref 20–32)
CREAT SERPL-MCNC: 1.09 MG/DL (ref 0.52–1.04)
GFR SERPL CREATININE-BSD FRML MDRD: 52 ML/MIN/1.73M2
GLUCOSE BLD-MCNC: 93 MG/DL (ref 70–99)
POTASSIUM BLD-SCNC: 3.7 MMOL/L (ref 3.4–5.3)
PROT SERPL-MCNC: 6.8 G/DL (ref 6.8–8.8)
SODIUM SERPL-SCNC: 140 MMOL/L (ref 133–144)
TSH SERPL DL<=0.005 MIU/L-ACNC: 1.66 MU/L (ref 0.4–4)

## 2022-10-28 PROCEDURE — 84443 ASSAY THYROID STIM HORMONE: CPT | Performed by: INTERNAL MEDICINE

## 2022-10-28 PROCEDURE — G0008 ADMIN INFLUENZA VIRUS VAC: HCPCS | Performed by: NURSE PRACTITIONER

## 2022-10-28 PROCEDURE — 258N000003 HC RX IP 258 OP 636: Performed by: INTERNAL MEDICINE

## 2022-10-28 PROCEDURE — 90662 IIV NO PRSV INCREASED AG IM: CPT | Performed by: NURSE PRACTITIONER

## 2022-10-28 PROCEDURE — 0124A HC ADMIN COVID VAC PFIZER 12+ BIVAL ADDITIONAL: CPT | Performed by: NURSE PRACTITIONER

## 2022-10-28 PROCEDURE — 80053 COMPREHEN METABOLIC PANEL: CPT | Performed by: INTERNAL MEDICINE

## 2022-10-28 PROCEDURE — 250N000011 HC RX IP 250 OP 636: Performed by: INTERNAL MEDICINE

## 2022-10-28 PROCEDURE — 96413 CHEMO IV INFUSION 1 HR: CPT

## 2022-10-28 PROCEDURE — 250N000011 HC RX IP 250 OP 636: Performed by: NURSE PRACTITIONER

## 2022-10-28 PROCEDURE — 99214 OFFICE O/P EST MOD 30 MIN: CPT | Performed by: NURSE PRACTITIONER

## 2022-10-28 PROCEDURE — 91312 HC RX IP 250 OP 636: CPT | Performed by: NURSE PRACTITIONER

## 2022-10-28 PROCEDURE — G0463 HOSPITAL OUTPT CLINIC VISIT: HCPCS | Mod: 25

## 2022-10-28 RX ORDER — HEPARIN SODIUM (PORCINE) LOCK FLUSH IV SOLN 100 UNIT/ML 100 UNIT/ML
5 SOLUTION INTRAVENOUS
Status: DISCONTINUED | OUTPATIENT
Start: 2022-10-28 | End: 2022-10-28 | Stop reason: HOSPADM

## 2022-10-28 RX ADMIN — SODIUM CHLORIDE 200 MG: 9 INJECTION, SOLUTION INTRAVENOUS at 14:05

## 2022-10-28 RX ADMIN — INFLUENZA A VIRUS A/VICTORIA/2570/2019 IVR-215 (H1N1) ANTIGEN (FORMALDEHYDE INACTIVATED), INFLUENZA A VIRUS A/DARWIN/9/2021 SAN-010 (H3N2) ANTIGEN (FORMALDEHYDE INACTIVATED), INFLUENZA B VIRUS B/PHUKET/3073/2013 ANTIGEN (FORMALDEHYDE INACTIVATED), AND INFLUENZA B VIRUS B/MICHIGAN/01/2021 ANTIGEN (FORMALDEHYDE INACTIVATED) 0.7 ML: 60; 60; 60; 60 INJECTION, SUSPENSION INTRAMUSCULAR at 14:46

## 2022-10-28 RX ADMIN — BNT162B2 ORIGINAL AND OMICRON BA.4/BA.5 30 MCG: .1125; .1125 INJECTION, SUSPENSION INTRAMUSCULAR at 14:49

## 2022-10-28 RX ADMIN — SODIUM CHLORIDE 250 ML: 9 INJECTION, SOLUTION INTRAVENOUS at 14:04

## 2022-10-28 RX ADMIN — Medication 5 ML: at 14:52

## 2022-10-28 ASSESSMENT — PAIN SCALES - GENERAL: PAINLEVEL: NO PAIN (0)

## 2022-10-28 NOTE — LETTER
"    10/28/2022         RE: Jann Davidson  [address] [facility] [address]        Dear Colleague,    Thank you for referring your patient, Jann Davidson, to the [facility]. Please see a copy of my visit note below.    Oncology Rooming Note    October 28, 2022 12:35 PM   Jann Davidson is a 77 year old female who presents for:    Chief Complaint   Patient presents with     Oncology Clinic Visit     Malignant neoplasm of lower-outer quadrant of left breast of female, estrogen receptor negative (H)     Initial Vitals: /67   Pulse 91   Temp 98.3  F (36.8  C) (Oral)   Resp 16   Ht 1.6 m (5' 3\")   Wt 59.8 kg (131 lb 12.8 oz)   SpO2 100%   BMI 23.35 kg/m   Estimated body mass index is 23.35 kg/m  as calculated from the following:    Height as of this encounter: 1.6 m (5' 3\").    Weight as of this encounter: 59.8 kg (131 lb 12.8 oz). Body surface area is 1.63 meters squared.  No Pain (0) Comment: Data Unavailable   No LMP recorded. Patient is postmenopausal.  Allergies reviewed: Yes  Medications reviewed: Yes    Medications: Medication refills not needed today.  Pharmacy name entered into EPIC:    [facility][id number] - [place], [place] - [address] [facility] - [place], [place] - [address] [place] [address]    Clinical concerns: None       Maryellen Ge MA                Oncology/Hematology Visit Note  Oct 28, 2022    Reason for Visit: follow up of triple negative left breast cancer  Stage IIb grade 2 invasive ductal carcinoma  Status post neoadjuvant chemotherapy with immunotherapy  Breast MRI showed no residual enhancement therefore patient did not get Adriamycin Cytoxan  -Status postlumpectomy and sentinel lymph node excision-near complete response to neoadjuvant therapy with residual LCIS and atypical lobular hyperplasia  -Dr. Mcmullen recommended pembrolizumab for 9 cycles for surgery  S/p adjuvant radiation  complete on " "09/22/22 08/08-started Evista 5 mg given the findings of LCIS and ALH    Interval History:  Patient reports she is doing well.  Denies fever chills sweats cough shortness of breath chest pain nausea vomiting diarrhea abdominal pain or bleeding.  Reports has been tolerating Keytruda well    Review of Systems:  14 point ROS of systems including Constitutional, Eyes, Respiratory, Cardiovascular, Gastroenterology, Genitourinary, Integumentary, Muscularskeletal, Psychiatric were all negative except for pertinent positives noted in my HPI.      Physical Examination:  General: The patient is a pleasant female in no acute distress.  /67   Pulse 91   Temp 98.3  F (36.8  C) (Oral)   Resp 16   Ht 1.6 m (5' 3\")   Wt 59.8 kg (131 lb 12.8 oz)   SpO2 100%   BMI 23.35 kg/m    HEENT: EOMI, PERRL. Sclerae are anicteric. Oral mucosa is pink and moist with no lesions or thrush.   Lymph: Neck is supple with no lymphadenopathy in the cervical or supraclavicular areas.   Heart: Regular rate and rhythm.   Lungs: Clear to auscultation bilaterally.   GI: Bowel sounds present, soft, nontender with no palpable hepatosplenomegaly or masses.   Extremities: No lower extremity edema noted bilaterally.   Skin: No rashes, petechiae, or bruising noted on exposed skin.    Laboratory Data:  CMP and TSH results reviewed    Assessment and Plan:  This is a 77-year-old female with    Triple negative left breast cancer  Status post neoadjuvant chemotherapy with pembrolizumab followed by lumpectomy and sentinel lymph node excision-showing near complete response  With residual LCIS and ALH  -Per Dr. Mcmullen patient recommendation patient started pembrolizumab with plan for 9 cycles   -08/08/22-started at this time 5 mg given  LCIS and ALH  -09/22/2022-completed adjuvant radiation  -Labs reviewed  -Continue with pembrolizumab  Continue with Evista 5 mg  -Plan is for diagnostic mammogram in 3 months post radiation then breast MRI 6 months " thereafter  Patient is scheduled with Dr. Mcmullen next month with treatment      Chronic kidney disease  Continue follow-up with Dr. Luna    FDG uptake the anal canal  Patient will see colorectal for further evaluation    Right renal mass  Patient has follow-up appointment with urology    Pulmonary nodules  Stable continue to monitor    Health maintenance  -Flu shot and COVID booster today      Call clinic with any changes in health condition or questions      HUBER Damon CNP  [facility]     Chart documentation with Dragon Voice recognition Software. Although reviewed after completion, some words and grammatical errors may remain.            Again, thank you for allowing me to participate in the care of your patient.        Sincerely,        HUBER Damon CNP

## 2022-10-28 NOTE — PROGRESS NOTES
Nursing Note:  Jann Cuiton presents today for port labs prior to tx.    Patient seen by provider today: No   present during visit today: Not Applicable.    Note: N/A.    Intravenous Access:  Labs drawn without difficulty.  Implanted Port.    Discharge Plan:   Patient was sent to [facility] for clinic appointment.    Tico Tyler RN

## 2022-10-28 NOTE — PROGRESS NOTES
"Oncology/Hematology Visit Note  Oct 28, 2022    Reason for Visit: follow up of triple negative left breast cancer  Stage IIb grade 2 invasive ductal carcinoma  Status post neoadjuvant chemotherapy with immunotherapy  Breast MRI showed no residual enhancement therefore patient did not get Adriamycin Cytoxan  -Status postlumpectomy and sentinel lymph node excision-near complete response to neoadjuvant therapy with residual LCIS and atypical lobular hyperplasia  -Dr. Mcmullen recommended pembrolizumab for 9 cycles for surgery  S/p adjuvant radiation  complete on 09/22/22 08/08-started Evista 5 mg given the findings of LCIS and ALH    Interval History:  Patient reports she is doing well.  Denies fever chills sweats cough shortness of breath chest pain nausea vomiting diarrhea abdominal pain or bleeding.  Reports has been tolerating Keytruda well    Review of Systems:  14 point ROS of systems including Constitutional, Eyes, Respiratory, Cardiovascular, Gastroenterology, Genitourinary, Integumentary, Muscularskeletal, Psychiatric were all negative except for pertinent positives noted in my HPI.      Physical Examination:  General: The patient is a pleasant female in no acute distress.  /67   Pulse 91   Temp 98.3  F (36.8  C) (Oral)   Resp 16   Ht 1.6 m (5' 3\")   Wt 59.8 kg (131 lb 12.8 oz)   SpO2 100%   BMI 23.35 kg/m    HEENT: EOMI, PERRL. Sclerae are anicteric. Oral mucosa is pink and moist with no lesions or thrush.   Lymph: Neck is supple with no lymphadenopathy in the cervical or supraclavicular areas.   Heart: Regular rate and rhythm.   Lungs: Clear to auscultation bilaterally.   GI: Bowel sounds present, soft, nontender with no palpable hepatosplenomegaly or masses.   Extremities: No lower extremity edema noted bilaterally.   Skin: No rashes, petechiae, or bruising noted on exposed skin.    Laboratory Data:  CMP and TSH results reviewed    Assessment and Plan:  This is a 77-year-old female with    Triple " negative left breast cancer  Status post neoadjuvant chemotherapy with pembrolizumab followed by lumpectomy and sentinel lymph node excision-showing near complete response  With residual LCIS and ALH  -Per Dr. Mcmullen patient recommendation patient started pembrolizumab with plan for 9 cycles   -08/08/22-started at this time 5 mg given  LCIS and ALH  -09/22/2022-completed adjuvant radiation  -Labs reviewed  -Continue with pembrolizumab  Continue with Evista 5 mg  -Plan is for diagnostic mammogram in 3 months post radiation then breast MRI 6 months thereafter  Patient is scheduled with Dr. Mcmullen next month with treatment      Chronic kidney disease  Continue follow-up with Dr. Luna    FDG uptake the anal canal  Patient will see colorectal for further evaluation    Right renal mass  Patient has follow-up appointment with urology    Pulmonary nodules  Stable continue to monitor    Health maintenance  -Flu shot and COVID booster today      Call clinic with any changes in health condition or questions      HUBER Damon CNP  [facility]     Chart documentation with Dragon Voice recognition Software. Although reviewed after completion, some words and grammatical errors may remain.

## 2022-10-28 NOTE — PROGRESS NOTES
Infusion Nursing Note:  Jann Davidson presents today for C4D1 Keytruda.    Patient seen by provider today: Yes: Tadeo Dugan NP   present during visit today: Not Applicable.    Note: Per Tadeo Dugan NP, ok to proceed with treatment today. Given flu shot and covid booster today per laura Piedra vaccine consent sent to [facility]. Confirmed patient is not taking any oral steroids per med list.    Intravenous Access:  Implanted Port in place on arrival, excellent blood return noted.    Treatment Conditions:  Lab Results   Component Value Date     10/28/2022    POTASSIUM 3.7 10/28/2022    MAG 1.7 05/23/2022    CR 1.09 (H) 10/28/2022    AMY 9.5 10/28/2022    BILITOTAL 0.4 10/28/2022    ALBUMIN 3.5 10/28/2022    ALT 28 10/28/2022    AST 32 10/28/2022     TSH: 1.66  Results reviewed, labs MET treatment parameters, ok to proceed with treatment.    Post Infusion Assessment:  Patient tolerated infusion without incident.  Patient tolerated injections without incident.  Blood return noted pre and post infusion.  Site patent and intact, free from redness, edema or discomfort.  No evidence of extravasations.  Access discontinued per protocol.     Discharge Plan:   Discharge instructions reviewed with: Patient.  Patient and/or family verbalized understanding of discharge instructions and all questions answered.  AVS to patient via [employer].  Patient will return 11/17 & 11/18 for next appointment.   Patient discharged in stable condition accompanied by: self.  Departure Mode: Ambulatory.      Brigid Payne RN

## 2022-11-01 NOTE — PROGRESS NOTES
[facility]    Hematology/Oncology Established Patient Note      Today's Date: 11/17/2022    Reason for follow-up:  Left breast cancer, triple negative.    HISTORY OF PRESENT ILLNESS: Jann Davidson is a 77 year old female who presents with the following oncologic history:  1. 10/26/2021: Mammogram showed calcifications in the left lateral breast; right breast negative.  2. 11/17/2021: Left diagnostic mammogram showed cluster of pleomorphic calcifications at 4:00, 6 cm from nipple, measuring 1.3 cm.  3. 12/3/2021: Stereotactic left breast biopsy at 4:00, 6 cm from nipple showed grade 2 invasive ductal carcinoma with micropapillary features, extensive lymphovascular invasion present, grade 2-3 focal DCIS, LCIS, ER negative, CT negative, HER-2/maxime FISH negative.  4. 12/10/2021: Breast MRI showed oval mass 2 x 2 x 1.5 cm at 4:00, 6 cm from nipple in left breast reflecting biopsy cavity with post-biopsy changes; prominent 2.5 cm low left level 1 axillary lymph node.  5.  12/28/2021: PET/CT scan showed FDG avid focus in left lower outer breast, hypermetabolic left axillary adenopathy, moderate FDG uptake at level off anus, exophytic right renal 1.1 cm mass, slowly increasing in size since 2016 concerning for growing renal cell carcinoma.  6. 1/18/2022: Started neoadjuvant chemotherapy with weekly paclitaxel + carboplatin and every 3-week pembrolizumab as per KEYNOTE-522 study.  7. 4/19/2022: Breast MRI showed no residual enhancement at site of primary malignancy in left breast at 4:00; no residual left axillary adenopathy. Right breast negative.  8. 5/6/2022: Completed cycle 12 paclitaxel with pembrolizumab. Surgery delayed due to 2 hospitalizations for rash and peripheral neuropathy.  9. 6/27/2022: Underwent left breast lumpectomy and left axillary sentinel lymph node excision under care of Dr. Tatianna Rai.  Pathology showed no residual invasive carcinoma in left breast; residual LCIS and atypical  lobular hyperplasia present; one of 2 lymph nodes with isolated tumor cells measuring 0.087 mm, focal fibrosis consistent with treatment effect; RCB-I, ypT0-pN0(i+).  10. 8/08/2022: Started raloxifene for LCIS and atypical lobular hyperplasia.  11. 8/10/2022-9/22/2022: Completion of adjuvant radiation therapy.  12. 8/25/2022: Started adjuvant pembrolizumab for planned 9 cycles.    INTERVAL HISTORY:  Jann reports feeling well although some mild fatigue. She denies dyspnea, cough, bowel or bladder issues.    REVIEW OF SYSTEMS:   14 point ROS was reviewed and is negative other than as noted above in HPI.       HOME MEDICATIONS:  Current Outpatient Medications   Medication Sig Dispense Refill     atorvastatin (LIPITOR) 10 MG tablet TAKE 1 TABLET (10 MG) BY MOUTH ONCE DAILY 90 tablet 3     candesartan (ATACAND) 4 MG tablet Take 0.5 tablets (2 mg) by mouth daily DISPENSE AS WRITTEN, Brand name only       loratadine (CLARITIN) 10 MG tablet Take 10 mg by mouth daily       LORazepam (ATIVAN) 0.5 MG tablet Take 1 tablet (0.5 mg) by mouth every 6 hours as needed for anxiety, nausea or sleep 45 tablet 0     Magnesium 400 MG TABS Take 800 mg by mouth daily       oxazepam (SERAX) 15 MG capsule Take 1 capsule (15 mg) by mouth nightly as needed for anxiety 45 capsule 1     oxyCODONE (ROXICODONE) 5 MG tablet Take 1 tablet (5 mg) by mouth every 4 hours as needed for moderate to severe pain 15 tablet 0     PARNATE 10 MG tablet TAKE 1 TABLET BY MOUTH 3 TIMES DAILY 300 tablet 3     polyethylene glycol (MIRALAX/GLYCOLAX) packet Take 1 packet by mouth daily       raloxifene (EVISTA) 60 MG tablet Take 1 tablet (60 mg) by mouth daily 90 tablet 3     triamcinolone (KENALOG) 0.1 % external cream Apply  topically 2 times daily as needed. 15 g 1     zolpidem (AMBIEN) 5 MG tablet TAKE 1 TABLET (5 MG) BY MOUTH NIGHTLY AS NEEDED FOR FOR SLEEP 45 tablet 1         ALLERGIES:  Allergies   Allergen Reactions     Lyrica [Pregabalin] Rash         PAST  MEDICAL HISTORY:  Past Medical History:   Diagnosis Date     Breast cancer (H)      CKD (chronic kidney disease) stage 3, GFR 30-59 ml/min (H)      Depression with anxiety      Gout      Hypertension goal BP (blood pressure) < 140/90      Recurrent sinusitis 2013     Gynecologic history:  Age of menarche at 11; LMP ;  (one son), 1st pregnancy at age 19; no HRT.    PAST SURGICAL HISTORY:  Past Surgical History:   Procedure Laterality Date     BIOPSY NODE SENTINEL Left 2022    Procedure: left axillary sentinel lymph node biopsy;  Surgeon: Tatianna Rai MD;  Location: [facility] OR     BREAST BIOPSY, RT/LT      Breat Biopsy RT/LT     COLONOSCOPY  10/03     COLONOSCOPY  2014    Procedure: COLONOSCOPY;  COLONOSCOPY ;  Surgeon: Gaurav Shaffer MD;  Location: [facility]     IR CHEST PORT PLACEMENT > 5 YRS OF AGE  2021     LUMPECTOMY BREAST WITH SEED LOCALIZATION Left 2022    Procedure: Radiofrequency tag localized left breast lumpectomy with radiofrequency tag localized excision of left axillary lymph node;  Surgeon: Tatianna Rai MD;  Location: [facility] OR     RECONSTRUCT EYELID           SOCIAL HISTORY:  Social History     Socioeconomic History     Marital status:      Spouse name: Not on file     Number of children: Not on file     Years of education: Not on file     Highest education level: Not on file   Occupational History     Not on file   Tobacco Use     Smoking status: Former     Types: Cigarettes     Quit date: 10/30/1972     Years since quittin.0     Smokeless tobacco: Never   Vaping Use     Vaping Use: Never used   Substance and Sexual Activity     Alcohol use: Yes     Comment: couple glasses of wine daily      Drug use: No     Sexual activity: Never   Other Topics Concern     Parent/sibling w/ CABG, MI or angioplasty before 65F 55M? Not Asked   Social History Narrative     Not on file     Social Determinants of Health     Financial Resource Strain: Not on file   Food  Insecurity: Not on file   Transportation Needs: Not on file   Physical Activity: Not on file   Stress: Not on file   Social Connections: Not on file   Intimate Partner Violence: Not At Risk     Fear of Current or Ex-Partner: No     Emotionally Abused: No     Physically Abused: No     Sexually Abused: No   Housing Stability: Not on file         FAMILY HISTORY:  Family History   Problem Relation Age of Onset     Cancer Mother         uterine     Breast Cancer Mother      Diabetes Paternal Grandmother          PHYSICAL EXAM:  Vital signs:  /68   Pulse 101   Resp 16   Wt 59 kg (130 lb)   SpO2 100%   BMI 23.03 kg/m     GENERAL: No acute distress.  EYES: No scleral icterus. No overt erythema.  LYMPH: No palpable lymphadenopathy in the   BREAST: No palpable masses in either breast. Nipples are everted bilaterally with no discharge. No erythema, ulceration, or dimpling of the skin.  RESPIRATORY: Clear bilaterally.  CARDIAC: Regular rate and rhythm with no murmurs.  MUSCULOSKELETAL: No clubbing, cyanosis or edema.  SKIN: No rashes or jaundice.  NEUROLOGIC: Alert, answers questions appropriately; no focal deficits.  PSYCHIATRIC: Normal affect; anxious.    LABS:  CBC RESULTS: Recent Labs   Lab Test 08/08/22  1650   WBC 4.1   RBC 3.31*   HGB 10.6*   HCT 32.8*   MCV 99   MCH 32.0   MCHC 32.3   RDW 11.5        Last Comprehensive Metabolic Panel:  Sodium   Date Value Ref Range Status   10/28/2022 140 133 - 144 mmol/L Final   06/05/2019 142 133 - 144 mmol/L Final     Potassium   Date Value Ref Range Status   10/28/2022 3.7 3.4 - 5.3 mmol/L Final   06/05/2019 3.7 3.4 - 5.3 mmol/L Final     Chloride   Date Value Ref Range Status   10/28/2022 109 94 - 109 mmol/L Final   06/05/2019 110 (H) 94 - 109 mmol/L Final     Carbon Dioxide   Date Value Ref Range Status   06/05/2019 24 20 - 32 mmol/L Final     Carbon Dioxide (CO2)   Date Value Ref Range Status   10/28/2022 22 20 - 32 mmol/L Final     Anion Gap   Date Value Ref  Range Status   10/28/2022 9 3 - 14 mmol/L Final   2019 8 3 - 14 mmol/L Final     Glucose   Date Value Ref Range Status   10/28/2022 93 70 - 99 mg/dL Final   2019 93 70 - 99 mg/dL Final     Comment:     Fasting specimen     Urea Nitrogen   Date Value Ref Range Status   10/28/2022 24 7 - 30 mg/dL Final   2019 32 (H) 7 - 30 mg/dL Final     Creatinine   Date Value Ref Range Status   10/28/2022 1.09 (H) 0.52 - 1.04 mg/dL Final   2019 1.15 (H) 0.52 - 1.04 mg/dL Final     GFR Estimate   Date Value Ref Range Status   10/28/2022 52 (L) >60 mL/min/1.73m2 Final     Comment:     Effective 2021 eGFRcr in adults is calculated using the  CKD-EPI creatinine equation which includes age and gender (Meliton et al., [place], DOI: 10.1056/PAXTid7277477)   2019 47 (L) >60 mL/min/[1.73_m2] Final     Comment:     Non  GFR Calc  Starting 2018, serum creatinine based estimated GFR (eGFR) will be   calculated using the Chronic Kidney Disease Epidemiology Collaboration   (CKD-EPI) equation.       GFR, ESTIMATED POCT   Date Value Ref Range Status   2021 33 (L) >60 mL/min/1.73m2 Final     Calcium   Date Value Ref Range Status   10/28/2022 9.5 8.5 - 10.1 mg/dL Final   2019 9.2 8.5 - 10.1 mg/dL Final     Bilirubin Total   Date Value Ref Range Status   10/28/2022 0.4 0.2 - 1.3 mg/dL Final   2019 0.4 0.2 - 1.3 mg/dL Final     Alkaline Phosphatase   Date Value Ref Range Status   10/28/2022 82 40 - 150 U/L Final   2019 85 40 - 150 U/L Final     ALT   Date Value Ref Range Status   10/28/2022 28 0 - 50 U/L Final   2019 33 0 - 50 U/L Final     AST   Date Value Ref Range Status   10/28/2022 32 0 - 45 U/L Final   2019 33 0 - 45 U/L Final     10/28/2022: TSH = 1.66.    PATHOLOGY:  None new.    IMAGIN2022 Limited abdominal U/S showed:  Borderline dilated extrahepatic bile ducts, similar to previous.    11/10/2022: Renal CT showed 1.4 x 1.3 cm right  kidney mass, increased from 1.1 x 1 cm.    ASSESSMENT/PLAN:  Jann Davidson is a 77 year old female with the following issues:  1. Clinical prognostic stage IIB, uF2z-S9-S2, grade 2 invasive ductal carcinoma of the left lower outer breast, ER negative, [place] negative, HER-2/maxime FISH negative (triple negative), ypT0-pN0(i+)  2. Left breast LCIS and atypical lobular hyperplasia  --Jann completed neoadjuvant chemotherapy with paclitaxel and carboplatin for 12 weeks with every 3-week pembrolizumab. Given that her 4/19/2022 breast MRI showed no residual enhancement -- I had advised foregoing chemotherapy with Adriamycin and Cytoxan given her age and low likelihood of tolerating these drugs.  --6/27/2022 Final pathology from her lumpectomy and sentinel lymph node excision showed near-complete response to neoadjuvant chemotherapy with just isolated tumor cells in one lymph node.  She had residual LCIS and atypical lobular hyperplasia.  --She proceeded with adjuvant pembrolizumab on 8/25/2022 for planned 9 cycles as per the KEYNOTE-522 trial.  --She completed adjuvant radiation therapy on 9/22/2022 with Dr. Giles.  --Genetic testing negative.  --Plan to alternate mammogram with breast MRI for high risk surveillance given findings of LCIS and ALH, which are markers for increased risk of additional breast cancer in the future.  We will plan for 3-month post-radiation diagnostic bilateral mammogram, then breast MRI 6 months thereafter.  --Continue raloxifene for 5 years for risk reduction given the findings of LCIS and ALH.    3. Chronic kidney disease, stage 3  --Creatinine stable.  --She follows with Dr. Luna.    4. FDG uptake at anal canal  --She has history of anal fissure from 4/8/2014 colonoscopy.  --PET scan showed uptake at level of anus and could represent hemorrhoid, fissure, malignancy, or be physiologic.  --She will be meeting with colorectal in 12/2022 with flex sig.    5. Right renal mass  --PET scan showed a 1.1  cm mass in the right kidney that has been reportedly slowly growing since 2016 concerning for renal cell carcinoma.  --Following with Dr. Guerrero, urology.  --11/10/2022 Renal CT showed slight increase in the renal mass concerning for renal cell carcinoma.  He will follow-up with Dr. Guerrero next week to determine next steps.    6. Pulmonary nodules  --PET scan showed scattered small bilateral pulmonary nodules that are unchanged since 10/24/2018 and most likely benign.    7. Depression/anxiety  --Stable.  --She would like to continue on Parnate and not change her antidepressant.  --She takes oxazepam together with zolpidem.    --She could use lorazepam (Ativan) as long as she takes this 6 hours from her other benzodiazepenes.    8. Anemia related to past chemotherapy  --Repeat CBC today.    9. Hypothyroidism, now resolved  --May be related to pembrolizumab.    --TSH was elevated at 5.32 on 9/16/2022 and now normal at 1.66 from 10/28/2022.    Neetu Mcmullen MD  Hematology/Oncology  [facility]    Total time spent: 40 minutes in patient evaluation, counseling, documentation, and coordination of care.

## 2022-11-10 ENCOUNTER — ANCILLARY PROCEDURE (OUTPATIENT)
Dept: CT IMAGING | Facility: CLINIC | Age: 77
End: 2022-11-10
Attending: UROLOGY
Payer: MEDICARE

## 2022-11-10 DIAGNOSIS — N28.89 RIGHT KIDNEY MASS: ICD-10-CM

## 2022-11-10 PROCEDURE — 250N000011 HC RX IP 250 OP 636: Performed by: UROLOGY

## 2022-11-10 PROCEDURE — G1010 CDSM STANSON: HCPCS

## 2022-11-10 PROCEDURE — 255N000002 HC RX 255 OP 636: Performed by: UROLOGY

## 2022-11-10 RX ADMIN — IOHEXOL 100 ML: 350 INJECTION, SOLUTION INTRAVENOUS at 16:42

## 2022-11-10 RX ADMIN — Medication 5 ML: at 16:42

## 2022-11-10 NOTE — TELEPHONE ENCOUNTER
Prescription for zolpidem (AMBIEN) 5 MG tablet and oxazepam (SERAX) 15 MG capsule was faxed to [facility] pharmacy at [phone]   Subjective:   David Petty is a 57 y.o. male here today for follow-up of hip imaging, medications    Primary osteoarthritis of left hip  He complains of continued left hip pain.  We reviewed the results of his x-ray which show severe osteoarthritis.  We discussed that the definitive treatment and management for this is a hip replacement.  I have already placed a referral to orthopedics and I gave him this information today.  He is requesting to have a Toradol injection to help manage the pain.  He is aware that he should not take NSAIDs today or tomorrow because of this.    Chronic left hip pain  States that he was never able to  his Flexeril.  He is requesting refill on this.    Tobacco use  States that he was never able to  his nicotine patches and he is requesting that I resend these prescriptions.     Current medicines (including changes today)  Current Outpatient Medications   Medication Sig Dispense Refill    nicotine (NICODERM) 14 MG/24HR PATCH 24 HR Place 1 Patch on the skin every 24 hours. 28 Patch 0    nicotine (NICODERM) 7 MG/24HR PATCH 24 HR Place 1 Patch on the skin every 24 hours. 28 Patch 0    cyclobenzaprine (FLEXERIL) 10 mg Tab Take 1 Tablet by mouth 3 times a day as needed for Muscle Spasms. 30 Tablet 5    fluticasone (FLONASE) 50 MCG/ACT nasal spray Administer 1 Spray into affected nostril(S) every day. 16 g 11    meloxicam (MOBIC) 15 MG tablet Take 1 Tablet by mouth 1 time a day as needed for Severe Pain. 30 Tablet 5    lurasidone (LATUDA) 20 MG Tab Take 1 Tablet by mouth with dinner. 30 Tablet 5    atorvastatin (LIPITOR) 20 MG Tab Take 1 Tablet by mouth every evening. 30 Tablet 5    fexofenadine (ALLEGRA) 180 MG tablet Take 1 Tablet by mouth every day. 30 Tablet 11    Cholecalciferol (VITAMIN D-3) 25 MCG (1000 UT) Cap Take 25 mcg by mouth every day. 60 capsule 5     No current facility-administered medications for this visit.     He  has a past medical history of Allergy,  Anxiety, Arthralgia of left hip, Depression, Hyperlipidemia, and Schizophrenia (HCC).         Objective:     Vitals:    11/09/22 1534   BP: 122/82   Pulse: 74   Resp: 16   Temp: 36.6 °C (97.9 °F)   SpO2: 97%     Body mass index is 25.86 kg/m².   Physical Exam:  Constitutional: Alert, no distress.  Skin: Warm, dry, good turgor, no rashes in visible areas.  Eye: Equal, round and reactive, conjunctiva clear, lids  Psych: Alert and oriented x3, normal affect and mood.      Results and Imaging: Reviewed hip x-ray (10/18/22)     FINDINGS:  Postsurgical fixation in the left acetabulum.     No acute fracture or dislocation.     Severe osteoarthritis of the left hip joint with severe joint space loss, subchondral cystic change and osteophytes.     Evaluation of the sacrum and bilateral iliac crests is limited due to overlying bowel content.     IMPRESSION:        Postsurgical fixation in the left acetabulum.     Severe osteoarthritis of the left hip joint.    Assessment and Plan:   The following treatment plan was discussed    1. Chronic left hip pain  Secondary to severe osteoarthritis.  Referral information for orthopedics was provided to patient today and he was advised to make an appointment.  Toradol shot was given at his request and I have refilled his Flexeril  - ketorolac (TORADOL) injection 30 mg  - ketorolac (TORADOL) injection 30 mg  - cyclobenzaprine (FLEXERIL) 10 mg Tab; Take 1 Tablet by mouth 3 times a day as needed for Muscle Spasms.  Dispense: 30 Tablet; Refill: 5    2. Tobacco use  - nicotine (NICODERM) 14 MG/24HR PATCH 24 HR; Place 1 Patch on the skin every 24 hours.  Dispense: 28 Patch; Refill: 0  - nicotine (NICODERM) 7 MG/24HR PATCH 24 HR; Place 1 Patch on the skin every 24 hours.  Dispense: 28 Patch; Refill: 0    3. Seasonal allergies  - fluticasone (FLONASE) 50 MCG/ACT nasal spray; Administer 1 Spray into affected nostril(S) every day.  Dispense: 16 g; Refill: 11    4. Primary osteoarthritis of left  hip  See discussion above        Followup: Return if symptoms worsen or fail to improve.

## 2022-11-17 ENCOUNTER — ONCOLOGY VISIT (OUTPATIENT)
Dept: ONCOLOGY | Facility: CLINIC | Age: 77
End: 2022-11-17
Payer: MEDICARE

## 2022-11-17 ENCOUNTER — LAB (OUTPATIENT)
Dept: INFUSION THERAPY | Facility: CLINIC | Age: 77
End: 2022-11-17
Payer: MEDICARE

## 2022-11-17 VITALS
BODY MASS INDEX: 23.03 KG/M2 | HEART RATE: 101 BPM | RESPIRATION RATE: 16 BRPM | OXYGEN SATURATION: 100 % | SYSTOLIC BLOOD PRESSURE: 124 MMHG | DIASTOLIC BLOOD PRESSURE: 68 MMHG | WEIGHT: 130 LBS

## 2022-11-17 DIAGNOSIS — N60.92 ATYPICAL LOBULAR HYPERPLASIA (ALH) OF LEFT BREAST: ICD-10-CM

## 2022-11-17 DIAGNOSIS — C50.512 MALIGNANT NEOPLASM OF LOWER-OUTER QUADRANT OF LEFT BREAST OF FEMALE, ESTROGEN RECEPTOR NEGATIVE (H): Primary | ICD-10-CM

## 2022-11-17 DIAGNOSIS — N28.89 RIGHT KIDNEY MASS: ICD-10-CM

## 2022-11-17 DIAGNOSIS — D64.81 ANEMIA DUE TO ANTINEOPLASTIC CHEMOTHERAPY: ICD-10-CM

## 2022-11-17 DIAGNOSIS — Z17.1 MALIGNANT NEOPLASM OF LOWER-OUTER QUADRANT OF LEFT BREAST OF FEMALE, ESTROGEN RECEPTOR NEGATIVE (H): Primary | ICD-10-CM

## 2022-11-17 DIAGNOSIS — Z51.89 ENCOUNTER FOR OTHER SPECIFIED AFTERCARE: ICD-10-CM

## 2022-11-17 DIAGNOSIS — D70.1 CHEMOTHERAPY-INDUCED NEUTROPENIA (H): ICD-10-CM

## 2022-11-17 DIAGNOSIS — D05.02 NEOPLASM OF LEFT BREAST, PRIMARY TUMOR STAGING CATEGORY TIS: LOBULAR CARCINOMA IN SITU (LCIS): ICD-10-CM

## 2022-11-17 DIAGNOSIS — F41.9 ANXIETY: ICD-10-CM

## 2022-11-17 DIAGNOSIS — K62.9: ICD-10-CM

## 2022-11-17 DIAGNOSIS — T45.1X5A CHEMOTHERAPY-INDUCED NEUTROPENIA (H): ICD-10-CM

## 2022-11-17 DIAGNOSIS — T45.1X5A ANEMIA DUE TO ANTINEOPLASTIC CHEMOTHERAPY: ICD-10-CM

## 2022-11-17 DIAGNOSIS — E03.2 HYPOTHYROIDISM DUE TO MEDICATION: ICD-10-CM

## 2022-11-17 LAB
ALBUMIN SERPL-MCNC: 3.5 G/DL (ref 3.4–5)
ALP SERPL-CCNC: 85 U/L (ref 40–150)
ALT SERPL W P-5'-P-CCNC: 25 U/L (ref 0–50)
ANION GAP SERPL CALCULATED.3IONS-SCNC: 7 MMOL/L (ref 3–14)
AST SERPL W P-5'-P-CCNC: 26 U/L (ref 0–45)
BILIRUB SERPL-MCNC: 0.4 MG/DL (ref 0.2–1.3)
BUN SERPL-MCNC: 25 MG/DL (ref 7–30)
CALCIUM SERPL-MCNC: 9.2 MG/DL (ref 8.5–10.1)
CHLORIDE BLD-SCNC: 108 MMOL/L (ref 94–109)
CO2 SERPL-SCNC: 23 MMOL/L (ref 20–32)
CREAT SERPL-MCNC: 1.34 MG/DL (ref 0.52–1.04)
GFR SERPL CREATININE-BSD FRML MDRD: 41 ML/MIN/1.73M2
GLUCOSE BLD-MCNC: 119 MG/DL (ref 70–99)
POTASSIUM BLD-SCNC: 4 MMOL/L (ref 3.4–5.3)
PROT SERPL-MCNC: 7.2 G/DL (ref 6.8–8.8)
SODIUM SERPL-SCNC: 138 MMOL/L (ref 133–144)
T4 FREE SERPL-MCNC: 0.77 NG/DL (ref 0.76–1.46)
TSH SERPL DL<=0.005 MIU/L-ACNC: 13.89 MU/L (ref 0.4–4)

## 2022-11-17 PROCEDURE — 84439 ASSAY OF FREE THYROXINE: CPT | Performed by: INTERNAL MEDICINE

## 2022-11-17 PROCEDURE — 99215 OFFICE O/P EST HI 40 MIN: CPT | Performed by: INTERNAL MEDICINE

## 2022-11-17 PROCEDURE — 84443 ASSAY THYROID STIM HORMONE: CPT | Performed by: INTERNAL MEDICINE

## 2022-11-17 PROCEDURE — 250N000011 HC RX IP 250 OP 636: Performed by: INTERNAL MEDICINE

## 2022-11-17 PROCEDURE — G0463 HOSPITAL OUTPT CLINIC VISIT: HCPCS

## 2022-11-17 PROCEDURE — 36591 DRAW BLOOD OFF VENOUS DEVICE: CPT

## 2022-11-17 PROCEDURE — 80053 COMPREHEN METABOLIC PANEL: CPT | Performed by: INTERNAL MEDICINE

## 2022-11-17 RX ORDER — ALBUTEROL SULFATE 90 UG/1
1-2 AEROSOL, METERED RESPIRATORY (INHALATION)
Status: CANCELLED
Start: 2022-11-18

## 2022-11-17 RX ORDER — SODIUM CHLORIDE 9 MG/ML
INJECTION, SOLUTION INTRAVENOUS CONTINUOUS
Status: CANCELLED
Start: 2022-11-17

## 2022-11-17 RX ORDER — LORAZEPAM 2 MG/ML
0.5 INJECTION INTRAMUSCULAR EVERY 4 HOURS PRN
Status: CANCELLED | OUTPATIENT
Start: 2022-11-18

## 2022-11-17 RX ORDER — ALBUTEROL SULFATE 0.83 MG/ML
2.5 SOLUTION RESPIRATORY (INHALATION)
Status: CANCELLED | OUTPATIENT
Start: 2022-11-18

## 2022-11-17 RX ORDER — DIPHENHYDRAMINE HYDROCHLORIDE 50 MG/ML
50 INJECTION INTRAMUSCULAR; INTRAVENOUS
Status: CANCELLED
Start: 2022-11-18

## 2022-11-17 RX ORDER — METHYLPREDNISOLONE SODIUM SUCCINATE 125 MG/2ML
125 INJECTION, POWDER, LYOPHILIZED, FOR SOLUTION INTRAMUSCULAR; INTRAVENOUS
Status: CANCELLED
Start: 2022-11-18

## 2022-11-17 RX ORDER — HEPARIN SODIUM (PORCINE) LOCK FLUSH IV SOLN 100 UNIT/ML 100 UNIT/ML
5 SOLUTION INTRAVENOUS
Status: CANCELLED | OUTPATIENT
Start: 2022-11-18

## 2022-11-17 RX ORDER — HEPARIN SODIUM (PORCINE) LOCK FLUSH IV SOLN 100 UNIT/ML 100 UNIT/ML
5 SOLUTION INTRAVENOUS
Status: CANCELLED | OUTPATIENT
Start: 2022-11-17

## 2022-11-17 RX ORDER — EPINEPHRINE 1 MG/ML
0.3 INJECTION, SOLUTION INTRAMUSCULAR; SUBCUTANEOUS EVERY 5 MIN PRN
Status: CANCELLED | OUTPATIENT
Start: 2022-11-18

## 2022-11-17 RX ORDER — HEPARIN SODIUM,PORCINE 10 UNIT/ML
5 VIAL (ML) INTRAVENOUS
Status: ACTIVE | OUTPATIENT
Start: 2022-11-17

## 2022-11-17 RX ORDER — HEPARIN SODIUM (PORCINE) LOCK FLUSH IV SOLN 100 UNIT/ML 100 UNIT/ML
5 SOLUTION INTRAVENOUS
Status: ACTIVE | OUTPATIENT
Start: 2022-11-17

## 2022-11-17 RX ORDER — HEPARIN SODIUM,PORCINE 10 UNIT/ML
5 VIAL (ML) INTRAVENOUS
Status: CANCELLED | OUTPATIENT
Start: 2022-11-17

## 2022-11-17 RX ORDER — HEPARIN SODIUM,PORCINE 10 UNIT/ML
5 VIAL (ML) INTRAVENOUS
Status: CANCELLED | OUTPATIENT
Start: 2022-11-18

## 2022-11-17 RX ORDER — MEPERIDINE HYDROCHLORIDE 25 MG/ML
25 INJECTION INTRAMUSCULAR; INTRAVENOUS; SUBCUTANEOUS EVERY 30 MIN PRN
Status: CANCELLED | OUTPATIENT
Start: 2022-11-18

## 2022-11-17 RX ADMIN — Medication 5 ML: at 14:44

## 2022-11-17 ASSESSMENT — PAIN SCALES - GENERAL: PAINLEVEL: NO PAIN (0)

## 2022-11-17 NOTE — LETTER
11/17/2022         RE: Jann Davidson  [address] [facility] [address]        Dear Colleague,    Thank you for referring your patient, Jann Davidson, to the [facility]. Please see a copy of my visit note below.    [facility]    Hematology/Oncology Established Patient Note      Today's Date: 11/17/2022    Reason for follow-up:  Left breast cancer, triple negative.    HISTORY OF PRESENT ILLNESS: Jann Davidson is a 77 year old female who presents with the following oncologic history:  1. 10/26/2021: Mammogram showed calcifications in the left lateral breast; right breast negative.  2. 11/17/2021: Left diagnostic mammogram showed cluster of pleomorphic calcifications at 4:00, 6 cm from nipple, measuring 1.3 cm.  3. 12/3/2021: Stereotactic left breast biopsy at 4:00, 6 cm from nipple showed grade 2 invasive ductal carcinoma with micropapillary features, extensive lymphovascular invasion present, grade 2-3 focal DCIS, LCIS, ER negative, WA negative, HER-2/maxime FISH negative.  4. 12/10/2021: Breast MRI showed oval mass 2 x 2 x 1.5 cm at 4:00, 6 cm from nipple in left breast reflecting biopsy cavity with post-biopsy changes; prominent 2.5 cm low left level 1 axillary lymph node.  5.  12/28/2021: PET/CT scan showed FDG avid focus in left lower outer breast, hypermetabolic left axillary adenopathy, moderate FDG uptake at level off anus, exophytic right renal 1.1 cm mass, slowly increasing in size since 2016 concerning for growing renal cell carcinoma.  6. 1/18/2022: Started neoadjuvant chemotherapy with weekly paclitaxel + carboplatin and every 3-week pembrolizumab as per KEYNOTE-522 study.  7. 4/19/2022: Breast MRI showed no residual enhancement at site of primary malignancy in left breast at 4:00; no residual left axillary adenopathy. Right breast negative.  8. 5/6/2022: Completed cycle 12 paclitaxel with pembrolizumab. Surgery delayed due to 2 hospitalizations for rash  and peripheral neuropathy.  9. 6/27/2022: Underwent left breast lumpectomy and left axillary sentinel lymph node excision under care of Dr. Tatianna Rai.  Pathology showed no residual invasive carcinoma in left breast; residual LCIS and atypical lobular hyperplasia present; one of 2 lymph nodes with isolated tumor cells measuring 0.087 mm, focal fibrosis consistent with treatment effect; RCB-I, ypT0-pN0(i+).  10. 8/08/2022: Started raloxifene for LCIS and atypical lobular hyperplasia.  11. 8/10/2022-9/22/2022: Completion of adjuvant radiation therapy.  12. 8/25/2022: Started adjuvant pembrolizumab for planned 9 cycles.    INTERVAL HISTORY:  Jann reports feeling well although some mild fatigue. She denies dyspnea, cough, bowel or bladder issues.    REVIEW OF SYSTEMS:   14 point ROS was reviewed and is negative other than as noted above in HPI.       HOME MEDICATIONS:  Current Outpatient Medications   Medication Sig Dispense Refill     atorvastatin (LIPITOR) 10 MG tablet TAKE 1 TABLET (10 MG) BY MOUTH ONCE DAILY 90 tablet 3     candesartan (ATACAND) 4 MG tablet Take 0.5 tablets (2 mg) by mouth daily DISPENSE AS WRITTEN, Brand name only       loratadine (CLARITIN) 10 MG tablet Take 10 mg by mouth daily       LORazepam (ATIVAN) 0.5 MG tablet Take 1 tablet (0.5 mg) by mouth every 6 hours as needed for anxiety, nausea or sleep 45 tablet 0     Magnesium 400 MG TABS Take 800 mg by mouth daily       oxazepam (SERAX) 15 MG capsule Take 1 capsule (15 mg) by mouth nightly as needed for anxiety 45 capsule 1     oxyCODONE (ROXICODONE) 5 MG tablet Take 1 tablet (5 mg) by mouth every 4 hours as needed for moderate to severe pain 15 tablet 0     PARNATE 10 MG tablet TAKE 1 TABLET BY MOUTH 3 TIMES DAILY 300 tablet 3     polyethylene glycol (MIRALAX/GLYCOLAX) packet Take 1 packet by mouth daily       raloxifene (EVISTA) 60 MG tablet Take 1 tablet (60 mg) by mouth daily 90 tablet 3     triamcinolone (KENALOG) 0.1 % external cream Apply   topically 2 times daily as needed. 15 g 1     zolpidem (AMBIEN) 5 MG tablet TAKE 1 TABLET (5 MG) BY MOUTH NIGHTLY AS NEEDED FOR FOR SLEEP 45 tablet 1         ALLERGIES:  Allergies   Allergen Reactions     Lyrica [Pregabalin] Rash         PAST MEDICAL HISTORY:  Past Medical History:   Diagnosis Date     Breast cancer (H)      CKD (chronic kidney disease) stage 3, GFR 30-59 ml/min (H)      Depression with anxiety      Gout      Hypertension goal BP (blood pressure) < 140/90      Recurrent sinusitis 2013     Gynecologic history:  Age of menarche at 11; LMP ;  (one son), 1st pregnancy at age 19; no HRT.    PAST SURGICAL HISTORY:  Past Surgical History:   Procedure Laterality Date     BIOPSY NODE SENTINEL Left 2022    Procedure: left axillary sentinel lymph node biopsy;  Surgeon: Tatianna Rai MD;  Location: [facility] OR     BREAST BIOPSY, RT/LT      Breat Biopsy RT/LT     COLONOSCOPY  10/03     COLONOSCOPY  2014    Procedure: COLONOSCOPY;  COLONOSCOPY ;  Surgeon: Gaurav Shaffer MD;  Location: [facility] GI     IR CHEST PORT PLACEMENT > 5 YRS OF AGE  2021     LUMPECTOMY BREAST WITH SEED LOCALIZATION Left 2022    Procedure: Radiofrequency tag localized left breast lumpectomy with radiofrequency tag localized excision of left axillary lymph node;  Surgeon: Tatianna Rai MD;  Location: [facility] OR     RECONSTRUCT EYELID           SOCIAL HISTORY:  Social History     Socioeconomic History     Marital status:      Spouse name: Not on file     Number of children: Not on file     Years of education: Not on file     Highest education level: Not on file   Occupational History     Not on file   Tobacco Use     Smoking status: Former     Types: Cigarettes     Quit date: 10/30/1972     Years since quittin.0     Smokeless tobacco: Never   Vaping Use     Vaping Use: Never used   Substance and Sexual Activity     Alcohol use: Yes     Comment: couple glasses of wine daily      Drug use: No      Sexual activity: Never   Other Topics Concern     Parent/sibling w/ CABG, MI or angioplasty before 65F 55M? Not Asked   Social History Narrative     Not on file     Social Determinants of Health     Financial Resource Strain: Not on file   Food Insecurity: Not on file   Transportation Needs: Not on file   Physical Activity: Not on file   Stress: Not on file   Social Connections: Not on file   Intimate Partner Violence: Not At Risk     Fear of Current or Ex-Partner: No     Emotionally Abused: No     Physically Abused: No     Sexually Abused: No   Housing Stability: Not on file         FAMILY HISTORY:  Family History   Problem Relation Age of Onset     Cancer Mother         uterine     Breast Cancer Mother      Diabetes Paternal Grandmother          PHYSICAL EXAM:  Vital signs:  /68   Pulse 101   Resp 16   Wt 59 kg (130 lb)   SpO2 100%   BMI 23.03 kg/m     GENERAL: No acute distress.  EYES: No scleral icterus. No overt erythema.  LYMPH: No palpable lymphadenopathy in the   BREAST: No palpable masses in either breast. Nipples are everted bilaterally with no discharge. No erythema, ulceration, or dimpling of the skin.  RESPIRATORY: Clear bilaterally.  CARDIAC: Regular rate and rhythm with no murmurs.  MUSCULOSKELETAL: No clubbing, cyanosis or edema.  SKIN: No rashes or jaundice.  NEUROLOGIC: Alert, answers questions appropriately; no focal deficits.  PSYCHIATRIC: Normal affect; anxious.    LABS:  CBC RESULTS: Recent Labs   Lab Test 08/08/22  1650   WBC 4.1   RBC 3.31*   HGB 10.6*   HCT 32.8*   MCV 99   MCH 32.0   MCHC 32.3   RDW 11.5        Last Comprehensive Metabolic Panel:  Sodium   Date Value Ref Range Status   10/28/2022 140 133 - 144 mmol/L Final   06/05/2019 142 133 - 144 mmol/L Final     Potassium   Date Value Ref Range Status   10/28/2022 3.7 3.4 - 5.3 mmol/L Final   06/05/2019 3.7 3.4 - 5.3 mmol/L Final     Chloride   Date Value Ref Range Status   10/28/2022 109 94 - 109 mmol/L Final    06/05/2019 110 (H) 94 - 109 mmol/L Final     Carbon Dioxide   Date Value Ref Range Status   06/05/2019 24 20 - 32 mmol/L Final     Carbon Dioxide (CO2)   Date Value Ref Range Status   10/28/2022 22 20 - 32 mmol/L Final     Anion Gap   Date Value Ref Range Status   10/28/2022 9 3 - 14 mmol/L Final   06/05/2019 8 3 - 14 mmol/L Final     Glucose   Date Value Ref Range Status   10/28/2022 93 70 - 99 mg/dL Final   06/05/2019 93 70 - 99 mg/dL Final     Comment:     Fasting specimen     Urea Nitrogen   Date Value Ref Range Status   10/28/2022 24 7 - 30 mg/dL Final   06/05/2019 32 (H) 7 - 30 mg/dL Final     Creatinine   Date Value Ref Range Status   10/28/2022 1.09 (H) 0.52 - 1.04 mg/dL Final   06/05/2019 1.15 (H) 0.52 - 1.04 mg/dL Final     GFR Estimate   Date Value Ref Range Status   10/28/2022 52 (L) >60 mL/min/1.73m2 Final     Comment:     Effective December 21, 2021 eGFRcr in adults is calculated using the 2021 CKD-EPI creatinine equation which includes age and gender (Meliton et al., NEJM, DOI: 10.1056/MJDZdu5422856)   06/05/2019 47 (L) >60 mL/min/[1.73_m2] Final     Comment:     Non  GFR Calc  Starting 12/18/2018, serum creatinine based estimated GFR (eGFR) will be   calculated using the Chronic Kidney Disease Epidemiology Collaboration   (CKD-EPI) equation.       GFR, ESTIMATED POCT   Date Value Ref Range Status   12/28/2021 33 (L) >60 mL/min/1.73m2 Final     Calcium   Date Value Ref Range Status   10/28/2022 9.5 8.5 - 10.1 mg/dL Final   06/05/2019 9.2 8.5 - 10.1 mg/dL Final     Bilirubin Total   Date Value Ref Range Status   10/28/2022 0.4 0.2 - 1.3 mg/dL Final   06/05/2019 0.4 0.2 - 1.3 mg/dL Final     Alkaline Phosphatase   Date Value Ref Range Status   10/28/2022 82 40 - 150 U/L Final   06/05/2019 85 40 - 150 U/L Final     ALT   Date Value Ref Range Status   10/28/2022 28 0 - 50 U/L Final   06/05/2019 33 0 - 50 U/L Final     AST   Date Value Ref Range Status   10/28/2022 32 0 - 45 U/L Final    2019 33 0 - 45 U/L Final     10/28/2022: TSH = 1.66.    PATHOLOGY:  None new.    IMAGIN2022 Limited abdominal U/S showed:  Borderline dilated extrahepatic bile ducts, similar to previous.    11/10/2022: Renal CT showed 1.4 x 1.3 cm right kidney mass, increased from 1.1 x 1 cm.    ASSESSMENT/PLAN:  Jann Davidson is a 77 year old female with the following issues:  1. 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 Left breast LCIS and atypical lobular hyperplasia  --Jann completed neoadjuvant chemotherapy with paclitaxel and carboplatin for 12 weeks with every 3-week pembrolizumab. Given that her 2022 breast MRI showed no residual enhancement -- I had advised foregoing chemotherapy with Adriamycin and Cytoxan given her age and low likelihood of tolerating these drugs.  --2022 Final pathology from her lumpectomy and sentinel lymph node excision showed near-complete response to neoadjuvant chemotherapy with just isolated tumor cells in one lymph node.  She had residual LCIS and atypical lobular hyperplasia.  --She proceeded with adjuvant pembrolizumab on 2022 for planned 9 cycles as per the KEYNOTE-522 trial.  --She completed adjuvant radiation therapy on 2022 with Dr. Giles.  --Genetic testing negative.  --Plan to alternate mammogram with breast MRI for high risk surveillance given findings of LCIS and ALH, which are markers for increased risk of additional breast cancer in the future.  We will plan for 3-month post-radiation diagnostic bilateral mammogram, then breast MRI 6 months thereafter.  --Continue raloxifene for 5 years for risk reduction given the findings of LCIS and ALH.    3. Chronic kidney disease, stage 3  --Creatinine stable.  --She follows with Dr. Luna.    4. FDG uptake at anal canal  --She has history of anal fissure  from 4/8/2014 colonoscopy.  --PET scan showed uptake at level of anus and could represent hemorrhoid, fissure, malignancy, or be physiologic.  --She will be meeting with colorectal in 12/2022 with flex sig.    5. Right renal mass  --PET scan showed a 1.1 cm mass in the right kidney that has been reportedly slowly growing since 2016 concerning for renal cell carcinoma.  --Following with Dr. Guerrero, urology.  --11/10/2022 Renal CT showed slight increase in the renal mass concerning for renal cell carcinoma.  He will follow-up with Dr. Guerrero next week to determine next steps.    6. Pulmonary nodules  --PET scan showed scattered small bilateral pulmonary nodules that are unchanged since 10/24/2018 and most likely benign.    7. Depression/anxiety  --Stable.  --She would like to continue on Parnate and not change her antidepressant.  --She takes oxazepam together with zolpidem.    --She could use lorazepam (Ativan) as long as she takes this 6 hours from her other benzodiazepenes.    8. Anemia related to past chemotherapy  --Repeat CBC today.    9. Hypothyroidism, now resolved  --May be related to pembrolizumab.    --TSH was elevated at 5.32 on 9/16/2022 and now normal at 1.66 from 10/28/2022.    Neetu Mcmullen MD  Hematology/Oncology  [facility]    Total time spent: 40 minutes in patient evaluation, counseling, documentation, and coordination of care.      Again, thank you for allowing me to participate in the care of your patient.        Sincerely,        Neetu Mcmullen MD

## 2022-11-17 NOTE — PROGRESS NOTES
Nursing Note:  Jann Davidson presents today for port labs.    Patient seen by provider today: Yes Mcmullen   present during visit today: Not Applicable.    Note: N/A.    Intravenous Access:  Labs drawn without difficulty.  Implanted Port.    Discharge Plan:   [facility] was sent to [facility] for MD appointment.    Benita Greenfield RN

## 2022-11-17 NOTE — PATIENT INSTRUCTIONS
Arrange pembrolizumab with lab draw every 3 weeks through 2/10/2022.  RTC NP on or prior to 1/20/2023.  RTC MD on or prior to 2/10/2022.  Arrange for IR port removal after 2/10/2022.

## 2022-11-18 ENCOUNTER — TELEPHONE (OUTPATIENT)
Dept: ONCOLOGY | Facility: CLINIC | Age: 77
End: 2022-11-18

## 2022-11-18 ENCOUNTER — INFUSION THERAPY VISIT (OUTPATIENT)
Dept: INFUSION THERAPY | Facility: CLINIC | Age: 77
End: 2022-11-18
Attending: INTERNAL MEDICINE
Payer: MEDICARE

## 2022-11-18 VITALS
DIASTOLIC BLOOD PRESSURE: 83 MMHG | TEMPERATURE: 98.3 F | HEART RATE: 85 BPM | WEIGHT: 133.2 LBS | OXYGEN SATURATION: 100 % | SYSTOLIC BLOOD PRESSURE: 139 MMHG | BODY MASS INDEX: 23.6 KG/M2 | RESPIRATION RATE: 16 BRPM

## 2022-11-18 DIAGNOSIS — E03.2 HYPOTHYROIDISM DUE TO MEDICATION: Primary | ICD-10-CM

## 2022-11-18 DIAGNOSIS — Z17.1 MALIGNANT NEOPLASM OF LOWER-OUTER QUADRANT OF LEFT BREAST OF FEMALE, ESTROGEN RECEPTOR NEGATIVE (H): Primary | ICD-10-CM

## 2022-11-18 DIAGNOSIS — C50.512 MALIGNANT NEOPLASM OF LOWER-OUTER QUADRANT OF LEFT BREAST OF FEMALE, ESTROGEN RECEPTOR NEGATIVE (H): Primary | ICD-10-CM

## 2022-11-18 PROCEDURE — 250N000011 HC RX IP 250 OP 636: Performed by: INTERNAL MEDICINE

## 2022-11-18 PROCEDURE — 258N000003 HC RX IP 258 OP 636: Performed by: INTERNAL MEDICINE

## 2022-11-18 PROCEDURE — 96413 CHEMO IV INFUSION 1 HR: CPT

## 2022-11-18 RX ORDER — HEPARIN SODIUM (PORCINE) LOCK FLUSH IV SOLN 100 UNIT/ML 100 UNIT/ML
5 SOLUTION INTRAVENOUS
Status: DISCONTINUED | OUTPATIENT
Start: 2022-11-18 | End: 2022-11-18 | Stop reason: HOSPADM

## 2022-11-18 RX ORDER — LEVOTHYROXINE SODIUM 25 UG/1
25 TABLET ORAL DAILY
Qty: 30 TABLET | Refills: 3 | Status: SHIPPED | OUTPATIENT
Start: 2022-11-18 | End: 2023-02-10

## 2022-11-18 RX ADMIN — Medication 5 ML: at 14:07

## 2022-11-18 RX ADMIN — SODIUM CHLORIDE 250 ML: 9 INJECTION, SOLUTION INTRAVENOUS at 13:22

## 2022-11-18 RX ADMIN — SODIUM CHLORIDE 200 MG: 9 INJECTION, SOLUTION INTRAVENOUS at 13:39

## 2022-11-18 ASSESSMENT — PAIN SCALES - GENERAL: PAINLEVEL: NO PAIN (0)

## 2022-11-18 NOTE — PROGRESS NOTES
Infusion Nursing Note:  Jann Davidson presents today for C5D1 Keytruda.    Patient seen by provider today: No   present during visit today: Not Applicable.    Note: N/A.    Intravenous Access:  Implanted Port.    Treatment Conditions:  Lab Results   Component Value Date     11/17/2022    POTASSIUM 4.0 11/17/2022    MAG 1.7 05/23/2022    CR 1.34 (H) 11/17/2022    AMY 9.2 11/17/2022    BILITOTAL 0.4 11/17/2022    ALBUMIN 3.5 11/17/2022    ALT 25 11/17/2022    AST 26 11/17/2022     Results reviewed, labs MET treatment parameters, ok to proceed with treatment.  Dr. Mcmullen is aware of TSH level. See note regarding the results.    Post Infusion Assessment:  Patient tolerated infusion without incident.  Blood return noted pre and post infusion.  Site patent and intact, free from redness, edema or discomfort.  No evidence of extravasations.  Access discontinued per protocol.     Discharge Plan:   AVS to patient via MYCHART.  Patient will return 12/9/22 for next appointment.   Patient discharged in stable condition accompanied by: self.  Departure Mode: Ambulatory.      Amanda Dean RN

## 2022-11-18 NOTE — TELEPHONE ENCOUNTER
Neetu Mcmullen MD  You; Regina Arango, RN 9 minutes ago (9:01 AM)       Benjy Clarke,     Yes, Jann should proceed with Keytruda as scheduled today.  I recommend she start on a low dose of levothyroxine 25 mcg PO daily for thyroid replacement.  Will eRX.     Thank you,   Neetu Mcmullen MD   Hematology/Oncology   [facility]     Called Jann and informed her of Dr. Mcmullen's recommendations and she verbalized understanding.

## 2022-11-18 NOTE — TELEPHONE ENCOUNTER
Patient called regarding her TSH level of 13.89 and is wondering if she should still proceed with Keytruda infusion today. Infusion is scheduled for 1 PM.    Will route to/page Dr. Mcmullen for recommendations.    Tricia Tavares RN  Triage Nurse Advisor  [facility]

## 2022-11-22 DIAGNOSIS — T45.1X5A CHEMOTHERAPY-INDUCED NEUTROPENIA (H): ICD-10-CM

## 2022-11-22 DIAGNOSIS — D70.1 CHEMOTHERAPY-INDUCED NEUTROPENIA (H): ICD-10-CM

## 2022-11-22 DIAGNOSIS — F41.9 ANXIETY: ICD-10-CM

## 2022-11-22 RX ORDER — LORAZEPAM 0.5 MG/1
0.5 TABLET ORAL EVERY 6 HOURS PRN
Qty: 45 TABLET | Refills: 0 | Status: SHIPPED | OUTPATIENT
Start: 2022-11-22 | End: 2022-12-09

## 2022-11-23 ENCOUNTER — VIRTUAL VISIT (OUTPATIENT)
Dept: UROLOGY | Facility: CLINIC | Age: 77
End: 2022-11-23
Payer: MEDICARE

## 2022-11-23 VITALS — BODY MASS INDEX: 23.03 KG/M2 | WEIGHT: 130 LBS

## 2022-11-23 DIAGNOSIS — N28.89 RIGHT KIDNEY MASS: Primary | ICD-10-CM

## 2022-11-23 PROCEDURE — 99214 OFFICE O/P EST MOD 30 MIN: CPT | Mod: 95 | Performed by: UROLOGY

## 2022-11-23 ASSESSMENT — PAIN SCALES - GENERAL: PAINLEVEL: NO PAIN (0)

## 2022-11-23 NOTE — PROGRESS NOTES
[facility]  CHIEF COMPLAINT   It was my pleasure to see Jann Davidson who is a 77 year old female for follow-up of RIGHT renal mass.      HPI   Jann Davidson is a very pleasant 77 year old female     Initially seen 10/5/2022:  Jann Davidson is a 77 year old female who is being seen for evaluation of a small incidental right renal mass  Diagnosed with breast cancer last year  S/p Chemo/radiationa and surgery  She is doing very well from a breast cancer treatment standpoint and is now following up on these other findings  Incidental 1.1cm RIGHT complex renal mass vs cyst on PET from 12/2021    TODAY 11/23/2022:  No significant changes  This is causing her some mental fatigue and worry  She also notes she is having a sigmoidoscopy in December    PHYSICAL EXAM  Patient is a 77 year old  female   Vitals: Weight 59 kg (130 lb).  Body mass index is 23.03 kg/m .  General Appearance Adult:   Alert, no acute distress, oriented  HENT: throat/mouth:normal, good dentition  Lungs: no respiratory distress, or pursed lip breathing  Heart: No obvious jugular venous distension present  Abdomen: non - distended  Musculoskeltal: extremities normal, no peripheral edema  Skin: no suspicious lesions or rashes  Neuro: Alert, oriented, speech and mentation normal  Psych: affect and mood normal    Creatinine   Date Value Ref Range Status   11/17/2022 1.34 (H) 0.52 - 1.04 mg/dL Final   06/05/2019 1.15 (H) 0.52 - 1.04 mg/dL Final      IMAGING:  All pertinent imaging reviewed:     All imaging studies reviewed by me.  I personally reviewed these imaging films.  A formal report from radiology will follow.     PET SCAN 12/28/2021:  FINDINGS:      HEAD/NECK:  There is no  suspicious FDG uptake in the neck. FDG uptake at the  level of the vocal cords likely secondary to speech.     The paranasal sinuses are clear. The mastoid air cells are clear.  Bilateral pseudophakia.     The mucosal pharyngeal space, the , prevertebral and  carotid  spaces are within normal limits.      No masses, mass effect or pathologically enlarged lymph nodes are  evident. The thyroid gland is within normal limits.     CHEST:  -Hypermetabolic left axillary adenopathy measuring up to 15 mm with a  max SUV of 4.64 (series 4, image 158).  -Moderately FDG avid focus in the inferior left lower quadrant of the  left breast with a max SUV of 2.60 (series 4, image 194).     Heart size is normal. No pericardial effusion. No significant coronary  artery calcifications. Thoracic aorta and main pulmonary arteries are  normal in caliber and patent with conventional three-vessel branching  pattern of the aortic arch.     No hypermetabolic mediastinal, hilar adenopathy. No hypermetabolic  right axillary adenopathy. Esophagus is normal.     Trachea and central airways are clear. No focal consolidation. No  pleural effusion or pneumothorax. Trace biapical scarring. Calcified  granuloma in the left upper lobe. Scattered solid and groundglass  pulmonary nodules, unchanged since at least 10/24/2018. For example:  -4 mm nodule in the right lower lobe, image 76).  -3 mm nodule in the right lower lobe (series 8, image 55).  -4 mm nodule in the left lower lobe (series 8, image 68).  -5 mm nodule in the left lower lobe (series 8, image 63).     ABDOMEN AND PELVIS:  Moderate FDG uptake at the level of the anus with a max SUV of 12.44  (series 4, image there are 98).     Liver is unremarkable. No focal enhancing lesion. No intra or  extrahepatic biliary duct dilation. Gallbladder is normal. Spleen size  is within normal limits. Homogenous enhancement of the pancreas. The  main pancreatic and common bile ducts are not dilated.     No adrenal mass. Symmetric renal enhancement. No hydronephrosis or  calcified stone. Probable enhancement exophytic lesion involving the  superior right renal pole measuring 11 x 9 mm without significant FDG  uptake (series 4, image 258), similar in appearance  since at least  1/10/2018 and increased in size since 7/7/2016. Bladder is  unremarkable. Uterus is unremarkable. No adnexal mass.     Stomach is unremarkable. No small or large bowel wall thickening or  dilation. Appendix is normal. No free intraperitoneal air. No  intra-abdominal or pelvic free fluid. No pneumatosis or portal venous  gas. Scattered colonic diverticulosis without additional evidence to  suggest acute diverticulitis.     Abdominal aorta and major branches are normal in caliber and patent  without significant atherosclerotic disease. Main portal vein and  major branches are patent.     No hypermetabolic mesenteric, retroperitoneal, or pelvic  lymphadenopathy.     LOWER EXTREMITIES:   No abnormal masses or hypermetabolic lesions. Muscle activation in the  bilateral lower extremities.     BONES:   There are no suspicious lytic or blastic osseous lesions.  There is no  abnormal FDG uptake in the skeleton. Mild degenerative changes of the  thoracolumbar spine     Context: patient with recently diagnosed left breast cancer                                                                      IMPRESSION: In summary left breast cancer, left axillary metastasis,  incidental small possible right renal cell carcinoma.  1.  Moderately FDG avid focus in the left breast outer lower quadrant  likely representing known primary malignancy.    1a. Hypermetabolic left axillary adenopathy compatible with krzysztof  metastasis.  1b. No evidence for metastatic breast disease in the abdomen or  pelvis.  2. Moderate FDG uptake at the level of the anus differential  hemorrhoid, fissure, physiologic, cancer.   3. Indeterminate exophytic right renal 1.1 cm mass, slow increasing in  size since 2016, concerning for slow growing renal cell carcinoma.  Recommend MRI for definitive characterization.   (Although there isn't  significant FDG uptake, this doesn't change the risk as only about 50%  of renal cell carcinomas take up FDG)       CT ABD/PEL 11/10/2022:  FINDINGS:     LOWER CHEST: Normal.     HEPATOBILIARY: Normal.     PANCREAS: Normal.     SPLEEN: Normal.     ADRENAL GLANDS: Normal.     RIGHT KIDNEY/URETER: 1.4 x 1.3 cm exophytic enhancing mass posterior cortex upper pole right kidney. This has minimally increased in size from the prior exam when it measured 1.1 x 1.0 cm. No hydronephrosis or stones.     LEFT KIDNEY/URETER: Normal. No renal calculi, mass, hydronephrosis.     BLADDER: Not imaged.     BOWEL: Visualized bowel loops unremarkable.     LYMPH NODES: No adenopathy.     VASCULATURE: No abdominal aortic aneurysm. Right renal vein is patent.     MUSCULOSKELETAL: No concerning skeletal lesions.                                                             IMPRESSION:  1.  1.4 x 1.3 cm enhancing mass right kidney concerning for a renal cell carcinoma, slightly increased in size since 12/28/2021. Urology consultation recommended.      ASSESSMENT and PLAN  77-year-old female with history of breast cancer with an incidental small right renal mass    Incidental small right renal mass  - I reviewed her updated CT renal mass protocol and reviewed these images personally.  - I agree with radiologist interpretation, though I measured the mass at 1.5 to 1.6 cm  - Looking back to imaging from 2016 there was a possible 6 mm nodule at this site at that time  - We discussed that this remains less than 2 cm in size and that I would recommend continued surveillance  - Provided reassurance that the likelihood of this mass metastasizing at this point is extraordinarily low  - We will plan for follow-up in 6 months with repeat CT scan  -This remains an undiagnosed problem with an uncertain prognosis    Time spent: 20 minutes spent on the date of the encounter doing chart review, history and exam, documentation and further activities as noted above.    Frank Guerrero MD   Urology  [facility]  Phone: [phone]  [facility] Phone: [phone]    Jann is a 77 year old who is being evaluated via a billable video visit.      How would you like to obtain your AVS? MyChart  If the video visit is dropped, the invitation should be resent by: Text to cell phone: [phone]  Will anyone else be joining your video visit? No        Video-Visit Details    Video Start Time: 4:21 PM    Type of service:  Video Visit    Video End Time:4:34 PM    Originating Location (pt. Location): Home        Distant Location (provider location):  On-site    Platform used for Video Visit: [employer]

## 2022-11-23 NOTE — LETTER
11/23/2022       RE: Jann Davidson  [address]  [place] [address]     Dear Colleague,    Thank you for referring your patient, Jann Davidson, to the [facility] at [facility]. Please see a copy of my visit note below.    [place]  CHIEF COMPLAINT   It was my pleasure to see Jann Davidson who is a 77 year old female for follow-up of RIGHT renal mass.      HPI   Jann Davidson is a very pleasant 77 year old female     Initially seen 10/5/2022:  Jann Davidson is a 77 year old female who is being seen for evaluation of a small incidental right renal mass  Diagnosed with breast cancer last year  S/p Chemo/radiationa and surgery  She is doing very well from a breast cancer treatment standpoint and is now following up on these other findings  Incidental 1.1cm RIGHT complex renal mass vs cyst on PET from 12/2021    TODAY 11/23/2022:  No significant changes  This is causing her some mental fatigue and worry  She also notes she is having a sigmoidoscopy in December    PHYSICAL EXAM  Patient is a 77 year old  female   Vitals: Weight 59 kg (130 lb).  Body mass index is 23.03 kg/m .  General Appearance Adult:   Alert, no acute distress, oriented  HENT: throat/mouth:normal, good dentition  Lungs: no respiratory distress, or pursed lip breathing  Heart: No obvious jugular venous distension present  Abdomen: non - distended  Musculoskeltal: extremities normal, no peripheral edema  Skin: no suspicious lesions or rashes  Neuro: Alert, oriented, speech and mentation normal  Psych: affect and mood normal    Creatinine   Date Value Ref Range Status   11/17/2022 1.34 (H) 0.52 - 1.04 mg/dL Final   06/05/2019 1.15 (H) 0.52 - 1.04 mg/dL Final      IMAGING:  All pertinent imaging reviewed:     All imaging studies reviewed by me.  I personally reviewed these imaging films.  A formal report from radiology will follow.     PET SCAN 12/28/2021:  FINDINGS:       HEAD/NECK:  There is no  suspicious FDG uptake in the neck. FDG uptake at the  level of the vocal cords likely secondary to speech.     The paranasal sinuses are clear. The mastoid air cells are clear.  Bilateral pseudophakia.     The mucosal pharyngeal space, the , prevertebral and carotid  spaces are within normal limits.      No masses, mass effect or pathologically enlarged lymph nodes are  evident. The thyroid gland is within normal limits.     CHEST:  -Hypermetabolic left axillary adenopathy measuring up to 15 mm with a  max SUV of 4.64 (series 4, image 158).  -Moderately FDG avid focus in the inferior left lower quadrant of the  left breast with a max SUV of 2.60 (series 4, image 194).     Heart size is normal. No pericardial effusion. No significant coronary  artery calcifications. Thoracic aorta and main pulmonary arteries are  normal in caliber and patent with conventional three-vessel branching  pattern of the aortic arch.     No hypermetabolic mediastinal, hilar adenopathy. No hypermetabolic  right axillary adenopathy. Esophagus is normal.     Trachea and central airways are clear. No focal consolidation. No  pleural effusion or pneumothorax. Trace biapical scarring. Calcified  granuloma in the left upper lobe. Scattered solid and groundglass  pulmonary nodules, unchanged since at least 10/24/2018. For example:  -4 mm nodule in the right lower lobe, image 76).  -3 mm nodule in the right lower lobe (series 8, image 55).  -4 mm nodule in the left lower lobe (series 8, image 68).  -5 mm nodule in the left lower lobe (series 8, image 63).     ABDOMEN AND PELVIS:  Moderate FDG uptake at the level of the anus with a max SUV of 12.44  (series 4, image there are 98).     Liver is unremarkable. No focal enhancing lesion. No intra or  extrahepatic biliary duct dilation. Gallbladder is normal. Spleen size  is within normal limits. Homogenous enhancement of the pancreas. The  main pancreatic and  common bile ducts are not dilated.     No adrenal mass. Symmetric renal enhancement. No hydronephrosis or  calcified stone. Probable enhancement exophytic lesion involving the  superior right renal pole measuring 11 x 9 mm without significant FDG  uptake (series 4, image 258), similar in appearance since at least  1/10/2018 and increased in size since 7/7/2016. Bladder is  unremarkable. Uterus is unremarkable. No adnexal mass.     Stomach is unremarkable. No small or large bowel wall thickening or  dilation. Appendix is normal. No free intraperitoneal air. No  intra-abdominal or pelvic free fluid. No pneumatosis or portal venous  gas. Scattered colonic diverticulosis without additional evidence to  suggest acute diverticulitis.     Abdominal aorta and major branches are normal in caliber and patent  without significant atherosclerotic disease. Main portal vein and  major branches are patent.     No hypermetabolic mesenteric, retroperitoneal, or pelvic  lymphadenopathy.     LOWER EXTREMITIES:   No abnormal masses or hypermetabolic lesions. Muscle activation in the  bilateral lower extremities.     BONES:   There are no suspicious lytic or blastic osseous lesions.  There is no  abnormal FDG uptake in the skeleton. Mild degenerative changes of the  thoracolumbar spine     Context: patient with recently diagnosed left breast cancer                                                                      IMPRESSION: In summary left breast cancer, left axillary metastasis,  incidental small possible right renal cell carcinoma.  1.  Moderately FDG avid focus in the left breast outer lower quadrant  likely representing known primary malignancy.    1a. Hypermetabolic left axillary adenopathy compatible with krzysztof  metastasis.  1b. No evidence for metastatic breast disease in the abdomen or  pelvis.  2. Moderate FDG uptake at the level of the anus differential  hemorrhoid, fissure, physiologic, cancer.   3. Indeterminate  exophytic right renal 1.1 cm mass, slow increasing in  size since 2016, concerning for slow growing renal cell carcinoma.  Recommend MRI for definitive characterization.   (Although there isn't  significant FDG uptake, this doesn't change the risk as only about 50%  of renal cell carcinomas take up FDG)      CT ABD/PEL 11/10/2022:  FINDINGS:     LOWER CHEST: Normal.     HEPATOBILIARY: Normal.     PANCREAS: Normal.     SPLEEN: Normal.     ADRENAL GLANDS: Normal.     RIGHT KIDNEY/URETER: 1.4 x 1.3 cm exophytic enhancing mass posterior cortex upper pole right kidney. This has minimally increased in size from the prior exam when it measured 1.1 x 1.0 cm. No hydronephrosis or stones.     LEFT KIDNEY/URETER: Normal. No renal calculi, mass, hydronephrosis.     BLADDER: Not imaged.     BOWEL: Visualized bowel loops unremarkable.     LYMPH NODES: No adenopathy.     VASCULATURE: No abdominal aortic aneurysm. Right renal vein is patent.     MUSCULOSKELETAL: No concerning skeletal lesions.                                                             IMPRESSION:  1.  1.4 x 1.3 cm enhancing mass right kidney concerning for a renal cell carcinoma, slightly increased in size since 12/28/2021. Urology consultation recommended.      ASSESSMENT and PLAN  77-year-old female with history of breast cancer with an incidental small right renal mass    Incidental small right renal mass  - I reviewed her updated CT renal mass protocol and reviewed these images personally.  - I agree with radiologist interpretation, though I measured the mass at 1.5 to 1.6 cm  - Looking back to imaging from 2016 there was a possible 6 mm nodule at this site at that time  - We discussed that this remains less than 2 cm in size and that I would recommend continued surveillance  - Provided reassurance that the likelihood of this mass metastasizing at this point is extraordinarily low  - We will plan for follow-up in 6 months with repeat CT scan  -This remains an  undiagnosed problem with an uncertain prognosis    Time spent: 20 minutes spent on the date of the encounter doing chart review, history and exam, documentation and further activities as noted above.    Frank Guerrero MD   Urology  [facility]  [facility] Phone: [phone]  [facility] Phone: [phone]    Jann is a 77 year old who is being evaluated via a billable video visit.      How would you like to obtain your AVS? MyChart  If the video visit is dropped, the invitation should be resent by: Text to cell phone: [phone]  Will anyone else be joining your video visit? No        Video-Visit Details    Video Start Time: 4:21 PM    Type of service:  Video Visit    Video End Time:4:34 PM    Originating Location (pt. Location): Home        Distant Location (provider location):  On-site    Platform used for Video Visit: [employer]

## 2022-11-23 NOTE — NURSING NOTE
Chief Complaint   Patient presents with     Right Renal Mass      Go over CT      Sue Garcia LPN

## 2022-12-02 NOTE — PROGRESS NOTES
Colon and Rectal Surgery Clinic Note    RE: Jann Davidson.  : 1945.  CHARLES: 2022.    Reason for visit: Abnormal anorectal finding on PET CT scan.    HPI: 77 year old with PMH of triple negative breast cancer diagnosed in 2021 s/p chemoradiaion, now with possible kidney metastasis. She had a PET scan in 2021 and it was recommended she follow up for visualization of the rectum due to abnormal uptake. She is doing very well from a breast cancer treatment standpoint and is now following up on these other findings from her PET. Her last colonoscopy was in  with normal findings, she is due for this in .       PET 21  IMPRESSION: In summary left breast cancer, left axillary metastasis,  incidental small possible right renal cell carcinoma.  1.  Moderately FDG avid focus in the left breast outer lower quadrant  likely representing known primary malignancy.    1a. Hypermetabolic left axillary adenopathy compatible with krzysztof  metastasis.  1b. No evidence for metastatic breast disease in the abdomen or  pelvis.  2. Moderate FDG uptake at the level of the anus differential  hemorrhoid, fissure, physiologic, cancer.   3. Indeterminate exophytic right renal 1.1 cm mass, slow increasing in  size since 2016, concerning for slow growing renal cell carcinoma.  Recommend MRI for definitive characterization.   (Although there isn't  significant FDG uptake, this doesn't change the risk as only about 50%  of renal cell carcinomas take up FDG)        Colonoscopy (14)  Findings:        The area from rectum to cecum appeared normal. The digital rectal exam        was abnormal. Findings include anal fissure. Pertinent negatives include        normal sphincter tone. The retroflexed view of the distal rectum was        normal and showed no anal or rectal abnormalities.     Medical history:  Past Medical History:   Diagnosis Date     Breast cancer (H)      CKD (chronic kidney disease) stage 3, GFR  30-59 ml/min (H)      Depression with anxiety      Gout      Hypertension goal BP (blood pressure) < 140/90      Recurrent sinusitis 12/02/2013       Surgical history:  Past Surgical History:   Procedure Laterality Date     BIOPSY NODE SENTINEL Left 6/27/2022    Procedure: left axillary sentinel lymph node biopsy;  Surgeon: Tatianna Rai MD;  Location: [facility] OR     BREAST BIOPSY, RT/LT      Breat Biopsy RT/LT     COLONOSCOPY  10/03     COLONOSCOPY  4/8/2014    Procedure: COLONOSCOPY;  COLONOSCOPY ;  Surgeon: Gaurav Shaffer MD;  Location: [facility] GI     IR CHEST PORT PLACEMENT > 5 YRS OF AGE  12/29/2021     LUMPECTOMY BREAST WITH SEED LOCALIZATION Left 6/27/2022    Procedure: Radiofrequency tag localized left breast lumpectomy with radiofrequency tag localized excision of left axillary lymph node;  Surgeon: Tatianna Ria MD;  Location: [facility] OR     RECONSTRUCT EYELID         Family history:  Family History   Problem Relation Age of Onset     Cancer Mother         uterine     Breast Cancer Mother      Diabetes Paternal Grandmother        Medications:  Current Outpatient Medications   Medication Sig Dispense Refill     atorvastatin (LIPITOR) 10 MG tablet TAKE 1 TABLET (10 MG) BY MOUTH ONCE DAILY 90 tablet 3     candesartan (ATACAND) 4 MG tablet Take 0.5 tablets (2 mg) by mouth daily DISPENSE AS WRITTEN, Brand name only       levothyroxine (SYNTHROID/LEVOTHROID) 50 MCG tablet Take 2 tablets (100 mcg) by mouth daily 60 tablet 2     loratadine (CLARITIN) 10 MG tablet Take 10 mg by mouth daily       LORazepam (ATIVAN) 0.5 MG tablet Take 1 tablet (0.5 mg) by mouth every 6 hours as needed for anxiety, nausea or sleep 45 tablet 0     Magnesium 400 MG TABS Take 800 mg by mouth daily       oxazepam (SERAX) 15 MG capsule Take 1 capsule (15 mg) by mouth nightly as needed for anxiety 45 capsule 1     PARNATE 10 MG tablet TAKE 1 TABLET BY MOUTH 3 TIMES DAILY 300 tablet 3     polyethylene glycol (MIRALAX/GLYCOLAX) packet Take 1  "packet by mouth daily       raloxifene (EVISTA) 60 MG tablet Take 1 tablet (60 mg) by mouth daily 90 tablet 3     triamcinolone (KENALOG) 0.1 % external cream Apply  topically 2 times daily as needed. 15 g 1     zolpidem (AMBIEN) 5 MG tablet TAKE 1 TABLET (5 MG) BY MOUTH NIGHTLY AS NEEDED FOR FOR SLEEP 45 tablet 1     levothyroxine (SYNTHROID/LEVOTHROID) 25 MCG tablet Take 1 tablet (25 mcg) by mouth daily 30 tablet 3       Allergies:  Allergies   Allergen Reactions     Lyrica [Pregabalin] Rash       Social history:   Social History     Tobacco Use     Smoking status: Former     Types: Cigarettes     Quit date: 10/30/1972     Years since quittin.1     Smokeless tobacco: Never   Substance Use Topics     Alcohol use: Yes     Comment: couple glasses of wine daily      Marital status: .    Physical Examination:  /70 (BP Location: Left arm, Patient Position: Sitting, Cuff Size: Adult Regular)   Pulse 95   SpO2 98%   General: Well hydrated. No acute distress.  Perianal external examination:  Perianal skin: intact.  Lesions: No.  Eversion of buttocks: There was not evidence of an anal fissure. Details: N/A.  Skin tags or external hemorrhoids: No.  Digital rectal examination: Was performed.   Sphincter tone: Good.  Palpable lesions: Posterior based internal hemorrhoid versus anal papilla.  Other: None.  Bimanual examination: was not performed.    Investigations:  None.    Procedures:  Flexible sigmoidoscopy: after obtaining informed consent and performing a \"time out\", an adult flexible sigmoidoscope was introduced through the anus and passed up to the descending colon. The quality of the prep was good. Findings: no active ulceration, inflammation or bleeding.  No evidence of neoplasia in the rectum.  Upon retroflexion, there was an internal hemorrhoid with what appears to be an anal papilla which appears benign.  No additional abnormalities were seen. Total scope time: 12 minutes. The patient tolerated " the procedure well.    ASSESSMENT  Normal flexible sigmoidoscopy.  No evidence of neoplasia in the rectum.    PLAN  1.  Unless changes in overall cancer stage and health, I would recommend colonoscopy in 3 years.    30 minutes spent on the date of encounter (excluding time performing procedures with or without biopsy) performing chart review, history and exam, documentation and further activities as noted above.      Jessee Cervantes MD, MSc, FACS, FASCRS.    Chief, Division of Colon & Rectal Surgery  Stanley M. Goldberg, MD Endowed Chair, Colon & Rectal Surgery  Department of Surgery  [facility]      Referring Provider:  No referring provider defined for this encounter.     Primary Care Provider:  Mehran Oliva

## 2022-12-06 RX ORDER — LORAZEPAM 2 MG/ML
0.5 INJECTION INTRAMUSCULAR EVERY 4 HOURS PRN
Status: CANCELLED | OUTPATIENT
Start: 2022-12-09

## 2022-12-06 RX ORDER — EPINEPHRINE 1 MG/ML
0.3 INJECTION, SOLUTION, CONCENTRATE INTRAVENOUS EVERY 5 MIN PRN
Status: CANCELLED | OUTPATIENT
Start: 2022-12-09

## 2022-12-06 RX ORDER — ALBUTEROL SULFATE 90 UG/1
1-2 AEROSOL, METERED RESPIRATORY (INHALATION)
Status: CANCELLED
Start: 2022-12-09

## 2022-12-06 RX ORDER — DIPHENHYDRAMINE HYDROCHLORIDE 50 MG/ML
50 INJECTION INTRAMUSCULAR; INTRAVENOUS
Status: CANCELLED
Start: 2022-12-09

## 2022-12-06 RX ORDER — HEPARIN SODIUM (PORCINE) LOCK FLUSH IV SOLN 100 UNIT/ML 100 UNIT/ML
5 SOLUTION INTRAVENOUS
Status: CANCELLED | OUTPATIENT
Start: 2022-12-09

## 2022-12-06 RX ORDER — HEPARIN SODIUM,PORCINE 10 UNIT/ML
5 VIAL (ML) INTRAVENOUS
Status: CANCELLED | OUTPATIENT
Start: 2022-12-09

## 2022-12-06 RX ORDER — MEPERIDINE HYDROCHLORIDE 25 MG/ML
25 INJECTION INTRAMUSCULAR; INTRAVENOUS; SUBCUTANEOUS EVERY 30 MIN PRN
Status: CANCELLED | OUTPATIENT
Start: 2022-12-09

## 2022-12-06 RX ORDER — ALBUTEROL SULFATE 0.83 MG/ML
2.5 SOLUTION RESPIRATORY (INHALATION)
Status: CANCELLED | OUTPATIENT
Start: 2022-12-09

## 2022-12-09 ENCOUNTER — LAB (OUTPATIENT)
Dept: INFUSION THERAPY | Facility: CLINIC | Age: 77
End: 2022-12-09
Attending: INTERNAL MEDICINE
Payer: MEDICARE

## 2022-12-09 ENCOUNTER — INFUSION THERAPY VISIT (OUTPATIENT)
Dept: INFUSION THERAPY | Facility: CLINIC | Age: 77
End: 2022-12-09
Attending: NURSE PRACTITIONER
Payer: MEDICARE

## 2022-12-09 ENCOUNTER — ONCOLOGY VISIT (OUTPATIENT)
Dept: ONCOLOGY | Facility: CLINIC | Age: 77
End: 2022-12-09
Attending: PHYSICIAN ASSISTANT
Payer: MEDICARE

## 2022-12-09 VITALS
WEIGHT: 133.2 LBS | DIASTOLIC BLOOD PRESSURE: 85 MMHG | TEMPERATURE: 97.7 F | OXYGEN SATURATION: 97 % | HEART RATE: 96 BPM | HEIGHT: 63 IN | SYSTOLIC BLOOD PRESSURE: 116 MMHG | RESPIRATION RATE: 16 BRPM | BODY MASS INDEX: 23.6 KG/M2

## 2022-12-09 DIAGNOSIS — F41.9 ANXIETY: ICD-10-CM

## 2022-12-09 DIAGNOSIS — Z17.1 MALIGNANT NEOPLASM OF LOWER-OUTER QUADRANT OF LEFT BREAST OF FEMALE, ESTROGEN RECEPTOR NEGATIVE (H): Primary | ICD-10-CM

## 2022-12-09 DIAGNOSIS — D05.02 NEOPLASM OF LEFT BREAST, PRIMARY TUMOR STAGING CATEGORY TIS: LOBULAR CARCINOMA IN SITU (LCIS): Primary | ICD-10-CM

## 2022-12-09 DIAGNOSIS — T45.1X5A CHEMOTHERAPY-INDUCED NEUTROPENIA (H): ICD-10-CM

## 2022-12-09 DIAGNOSIS — C50.512 MALIGNANT NEOPLASM OF LOWER-OUTER QUADRANT OF LEFT BREAST OF FEMALE, ESTROGEN RECEPTOR NEGATIVE (H): Primary | ICD-10-CM

## 2022-12-09 DIAGNOSIS — D70.1 CHEMOTHERAPY-INDUCED NEUTROPENIA (H): ICD-10-CM

## 2022-12-09 LAB
ALBUMIN SERPL-MCNC: 3.4 G/DL (ref 3.4–5)
ALP SERPL-CCNC: 75 U/L (ref 40–150)
ALT SERPL W P-5'-P-CCNC: 21 U/L (ref 0–50)
ANION GAP SERPL CALCULATED.3IONS-SCNC: 6 MMOL/L (ref 3–14)
AST SERPL W P-5'-P-CCNC: 25 U/L (ref 0–45)
BILIRUB SERPL-MCNC: 0.6 MG/DL (ref 0.2–1.3)
BUN SERPL-MCNC: 32 MG/DL (ref 7–30)
CALCIUM SERPL-MCNC: 8.6 MG/DL (ref 8.5–10.1)
CHLORIDE BLD-SCNC: 105 MMOL/L (ref 94–109)
CO2 SERPL-SCNC: 24 MMOL/L (ref 20–32)
CREAT SERPL-MCNC: 1.11 MG/DL (ref 0.52–1.04)
GFR SERPL CREATININE-BSD FRML MDRD: 51 ML/MIN/1.73M2
GLUCOSE BLD-MCNC: 104 MG/DL (ref 70–99)
POTASSIUM BLD-SCNC: 4.3 MMOL/L (ref 3.4–5.3)
PROT SERPL-MCNC: 7.2 G/DL (ref 6.8–8.8)
SODIUM SERPL-SCNC: 135 MMOL/L (ref 133–144)
T4 FREE SERPL-MCNC: 0.66 NG/DL (ref 0.76–1.46)
TSH SERPL DL<=0.005 MIU/L-ACNC: 39.96 MU/L (ref 0.4–4)

## 2022-12-09 PROCEDURE — 99214 OFFICE O/P EST MOD 30 MIN: CPT | Performed by: PHYSICIAN ASSISTANT

## 2022-12-09 PROCEDURE — G0463 HOSPITAL OUTPT CLINIC VISIT: HCPCS | Mod: 25

## 2022-12-09 PROCEDURE — 96413 CHEMO IV INFUSION 1 HR: CPT

## 2022-12-09 PROCEDURE — 84439 ASSAY OF FREE THYROXINE: CPT | Performed by: INTERNAL MEDICINE

## 2022-12-09 PROCEDURE — 80053 COMPREHEN METABOLIC PANEL: CPT | Performed by: INTERNAL MEDICINE

## 2022-12-09 PROCEDURE — 258N000003 HC RX IP 258 OP 636: Performed by: INTERNAL MEDICINE

## 2022-12-09 PROCEDURE — G0463 HOSPITAL OUTPT CLINIC VISIT: HCPCS | Performed by: PHYSICIAN ASSISTANT

## 2022-12-09 PROCEDURE — 250N000011 HC RX IP 250 OP 636: Performed by: INTERNAL MEDICINE

## 2022-12-09 PROCEDURE — 84443 ASSAY THYROID STIM HORMONE: CPT | Performed by: INTERNAL MEDICINE

## 2022-12-09 RX ORDER — LEVOTHYROXINE SODIUM 50 UG/1
100 TABLET ORAL DAILY
Qty: 60 TABLET | Refills: 2 | Status: SHIPPED | OUTPATIENT
Start: 2022-12-09 | End: 2023-01-20

## 2022-12-09 RX ORDER — LORAZEPAM 0.5 MG/1
0.5 TABLET ORAL EVERY 6 HOURS PRN
Qty: 45 TABLET | Refills: 0 | Status: SHIPPED | OUTPATIENT
Start: 2022-12-09 | End: 2023-03-01

## 2022-12-09 RX ORDER — HEPARIN SODIUM (PORCINE) LOCK FLUSH IV SOLN 100 UNIT/ML 100 UNIT/ML
5 SOLUTION INTRAVENOUS
Status: DISCONTINUED | OUTPATIENT
Start: 2022-12-09 | End: 2022-12-09 | Stop reason: HOSPADM

## 2022-12-09 RX ADMIN — SODIUM CHLORIDE 200 MG: 9 INJECTION, SOLUTION INTRAVENOUS at 14:20

## 2022-12-09 RX ADMIN — SODIUM CHLORIDE 250 ML: 9 INJECTION, SOLUTION INTRAVENOUS at 14:20

## 2022-12-09 RX ADMIN — Medication 5 ML: at 15:06

## 2022-12-09 ASSESSMENT — PAIN SCALES - GENERAL: PAINLEVEL: NO PAIN (0)

## 2022-12-09 NOTE — LETTER
"    12/9/2022         RE: Jann Davidson  [address] [place] [facility] [address]        Dear Colleague,    Thank you for referring your patient, Jann Davidson, to the [facility]. Please see a copy of my visit note below.    Oncology Rooming Note    December 9, 2022 1:02 PM   Jann Davidson is a 77 year old female who presents for:    Chief Complaint   Patient presents with     Oncology Clinic Visit     Initial Vitals: There were no vitals taken for this visit. Estimated body mass index is 23.03 kg/m  as calculated from the following:    Height as of 10/28/22: 1.6 m (5' 3\").    Weight as of 11/23/22: 59 kg (130 lb). There is no height or weight on file to calculate BSA.  Data Unavailable Comment: Data Unavailable   No LMP recorded. Patient is postmenopausal.  Allergies reviewed: Yes  Medications reviewed: Yes    Medications: Medication refills not needed today.  Pharmacy name entered into EPIC:    [id number] - [place], [place] - [address] [facility] - [place], [place] - [address]    Clinical concerns:  pa was notified.      Viviane Colby MA              Oncology/Hematology Visit Note  Dec 9, 2022    Reason for Visit: Follow up of breast cancer    Primary Oncologist: Dr. Mcmullen  Oncologic History:   1. 10/26/2021: Mammogram showed calcifications in the left lateral breast; right breast negative.  2. 11/17/2021: Left diagnostic mammogram showed cluster of pleomorphic calcifications at 4:00, 6 cm from nipple, measuring 1.3 cm.  3. 12/3/2021: Stereotactic left breast biopsy at 4:00, 6 cm from nipple showed grade 2 invasive ductal carcinoma with micropapillary features, extensive lymphovascular invasion present, grade 2-3 focal DCIS, LCIS, ER negative, OR negative, HER-2/maxime FISH negative.  4. 12/10/2021: Breast MRI showed oval mass 2 x 2 x 1.5 cm at 4:00, 6 cm from nipple in left breast reflecting biopsy cavity with " post-biopsy changes; prominent 2.5 cm low left level 1 axillary lymph node.  5.  12/28/2021: PET/CT scan showed FDG avid focus in left lower outer breast, hypermetabolic left axillary adenopathy, moderate FDG uptake at level off anus, exophytic right renal 1.1 cm mass, slowly increasing in size since 2016 concerning for growing renal cell carcinoma.  6. 1/18/2022: Started neoadjuvant chemotherapy with weekly paclitaxel + carboplatin and every 3-week pembrolizumab as per KEYNOTE-522 study.  7. 4/19/2022: Breast MRI showed no residual enhancement at site of primary malignancy in left breast at 4:00; no residual left axillary adenopathy. Right breast negative.  8. 5/6/2022: Completed cycle 12 paclitaxel with pembrolizumab. Surgery delayed due to 2 hospitalizations for rash and peripheral neuropathy.  9. 6/27/2022: Underwent left breast lumpectomy and left axillary sentinel lymph node excision under care of Dr. Tatianna Rai.  Pathology showed no residual invasive carcinoma in left breast; residual LCIS and atypical lobular hyperplasia present; one of 2 lymph nodes with isolated tumor cells measuring 0.087 mm, focal fibrosis consistent with treatment effect; RCB-I, ypT0-pN0(i+).  10. Started adjuvant radiation 8/11/22-9/22/22 per Dr. Giles.   11. Staretd 5-year course of raloxifene 8/8/22 due to residual LCIS and ALH  12. Started 9 cycles of Keytruda 8/26/22.    Interval History:  No interval events. Denies change to medical status. Ongoing severe fatigue, for example slept an entire day and night one time. Not able to do much outside of treatment chooses not to risk infection by seeing friends. Does not complain of neuropath today. No rash. No inflammatory symptoms such as diarrhea.     Review of Systems:  A complete review of systems was negative except as noted in the HPI.     Past medical, Surgical, and social history:  Reviewed    Physical Examination:  /85   Pulse 96   Temp 97.7  F (36.5  C)   Resp 16    "Ht 1.6 m (5' 3\")   Wt 60.4 kg (133 lb 3.2 oz)   SpO2 97%   BMI 23.60 kg/m    Wt Readings from Last 10 Encounters:   12/09/22 60.4 kg (133 lb 3.2 oz)   11/23/22 59 kg (130 lb)   11/18/22 60.4 kg (133 lb 3.2 oz)   11/17/22 59 kg (130 lb)   10/28/22 59.8 kg (131 lb 12.8 oz)   10/07/22 59.2 kg (130 lb 8.2 oz)   10/07/22 59.2 kg (130 lb 9.6 oz)   09/16/22 59 kg (130 lb)   08/26/22 59.1 kg (130 lb 3.2 oz)   08/08/22 59.1 kg (130 lb 3.2 oz)     General: No acute distress.  Skin: Warm and dry. No abnormal ecchymosis or rashes. Line clean, dry, intact.  Eyes: Anicteric.  Lungs: Normal work of breathing.  Abdomen: Non-distended.  Extremities: No peripheral edema. Gross movement intact without difficulty.  Mental: Anxious, tearful at times, but appropriate.   Neuro: Cranial nerves and coordination grossly intact. Alert and oriented x 3.    Laboratory Data:  CMP, TSH reviewed.       Assessment and Plan:  Jann Davidson is a 77 year old year old female with triple-negative breast cancer with 1 LN+status post neoadjuvant carbo/Taxol + Keytruda, lumpectomy with residual LCIS and ALH on adjuvant Evista and Keytruda.     # Triple Negative Breast Cancer  # Anemia Secondary to Chemotherapy  - Continue every 3 week Keytruda, okay to proceed with cycle 7 today, plan for 9 cycles  - Continue raloxifene x 5 years  - Repeat diagnostic mammogram 3 months post radiation (12/2022), not yet scheduled  - Repeat breast MRI 6 months thereafter    # Elevated TSH, resolved  - Check each cycle with Keytruda  - Started levothyroxine 25 mcg last cycle with TSH 14, increased to 40 today  - Declined consultation with endocrinology  - Increase to 100 mcg today ( recommended 75 however due to severe fatigue patient requests more aggressive approach, discussed risks of over treating); patient now has 25 and 50 mcg tablets for future dosing purposes    # Renal Mass  - Follows with urology, likely cancerous, but <2 cm so will repeat imaging in 6 " months    # Chronic Kidney Disease  - Follows with nephrology    # Abnormal Imaging Findings  - Pulmonary nodules - continue to monitor  - Anal avidity - recommend colorectal follow-up, history of fissure, Flex sig scheduled    35 minutes spent on the date of the encounter doing chart review, review of test results, interpretation of tests, patient visit and documentation     Julia Gomez PA-C  Department of Hematology and Oncology  [facility]       Again, thank you for allowing me to participate in the care of your patient.        Sincerely,        JULIA GOMEZ PA-C

## 2022-12-09 NOTE — PROGRESS NOTES
"Oncology Rooming Note    December 9, 2022 1:02 PM   Jann Davidson is a 77 year old female who presents for:    Chief Complaint   Patient presents with     Oncology Clinic Visit     Initial Vitals: There were no vitals taken for this visit. Estimated body mass index is 23.03 kg/m  as calculated from the following:    Height as of 10/28/22: 1.6 m (5' 3\").    Weight as of 11/23/22: 59 kg (130 lb). There is no height or weight on file to calculate BSA.  Data Unavailable Comment: Data Unavailable   No LMP recorded. Patient is postmenopausal.  Allergies reviewed: Yes  Medications reviewed: Yes    Medications: Medication refills not needed today.  Pharmacy name entered into EPIC:    [facility] #[id number] - [place], [place] - [address]  [facility] - [place], [place] - [address] [place] [address]    Clinical concerns:  pa was notified.      Viviane Colby MA            "

## 2022-12-09 NOTE — PROGRESS NOTES
Nursing Note:  Jann Davidson presents today for port labs for tx today.    Patient seen by provider today: Yes: ISABEL Dumont   present during visit today: Not Applicable.    Note: N/A.    Intravenous Access:  Labs drawn without difficulty.  Implanted Port.    Discharge Plan:   Patient was sent to [facility] for clinic/infusion appointment.    Tico Tyler RN

## 2022-12-09 NOTE — PROGRESS NOTES
Infusion Nursing Note:  Jann Davidson presents today for C6 D1 Keytruda.    Patient seen by provider today: Yes: Julia Gomez PA-C   present during visit today: Not Applicable.    Note: N/A.    Intravenous Access:  Implanted Port.    Treatment Conditions:  Lab Results   Component Value Date     12/09/2022    POTASSIUM 4.3 12/09/2022    MAG 1.7 05/23/2022    CR 1.11 (H) 12/09/2022    AMY 8.6 12/09/2022    BILITOTAL 0.6 12/09/2022    ALBUMIN 3.4 12/09/2022    ALT 21 12/09/2022    AST 25 12/09/2022     Results reviewed, labs MET treatment parameters, ok to proceed with treatment.    Post Infusion Assessment:  Patient tolerated infusion without incident.  Blood return noted pre and post infusion.  Site patent and intact, free from redness, edema or discomfort.  No evidence of extravasations.  Access discontinued per protocol.     Discharge Plan:   Discharge instructions reviewed with: Patient.  Patient and/or family verbalized understanding of discharge instructions and all questions answered.  Patient discharged in stable condition accompanied by: self.  Departure Mode: Ambulatory.      Marquita Weaver RN

## 2022-12-10 NOTE — PROGRESS NOTES
Oncology/Hematology Visit Note  Dec 9, 2022    Reason for Visit: Follow up of breast cancer    Primary Oncologist: Dr. Mcmullen  Oncologic History:   1. 10/26/2021: Mammogram showed calcifications in the left lateral breast; right breast negative.  2. 11/17/2021: Left diagnostic mammogram showed cluster of pleomorphic calcifications at 4:00, 6 cm from nipple, measuring 1.3 cm.  3. 12/3/2021: Stereotactic left breast biopsy at 4:00, 6 cm from nipple showed grade 2 invasive ductal carcinoma with micropapillary features, extensive lymphovascular invasion present, grade 2-3 focal DCIS, LCIS, ER negative, MT negative, HER-2/maxime FISH negative.  4. 12/10/2021: Breast MRI showed oval mass 2 x 2 x 1.5 cm at 4:00, 6 cm from nipple in left breast reflecting biopsy cavity with post-biopsy changes; prominent 2.5 cm low left level 1 axillary lymph node.  5.  12/28/2021: PET/CT scan showed FDG avid focus in left lower outer breast, hypermetabolic left axillary adenopathy, moderate FDG uptake at level off anus, exophytic right renal 1.1 cm mass, slowly increasing in size since 2016 concerning for growing renal cell carcinoma.  6. 1/18/2022: Started neoadjuvant chemotherapy with weekly paclitaxel + carboplatin and every 3-week pembrolizumab as per KEYNOTE-522 study.  7. 4/19/2022: Breast MRI showed no residual enhancement at site of primary malignancy in left breast at 4:00; no residual left axillary adenopathy. Right breast negative.  8. 5/6/2022: Completed cycle 12 paclitaxel with pembrolizumab. Surgery delayed due to 2 hospitalizations for rash and peripheral neuropathy.  9. 6/27/2022: Underwent left breast lumpectomy and left axillary sentinel lymph node excision under care of Dr. Tatianna Rai.  Pathology showed no residual invasive carcinoma in left breast; residual LCIS and atypical lobular hyperplasia present; one of 2 lymph nodes with isolated tumor cells measuring 0.087 mm, focal fibrosis consistent with treatment effect;  "RCB-I, ypT0-pN0(i+).  10. Started adjuvant radiation 8/11/22-9/22/22 per Dr. Giles.   11. Staretd 5-year course of raloxifene 8/8/22 due to residual LCIS and ALH  12. Started 9 cycles of Keytruda 8/26/22.    Interval History:  No interval events. Denies change to medical status. Ongoing severe fatigue, for example slept an entire day and night one time. Not able to do much outside of treatment chooses not to risk infection by seeing friends. Does not complain of neuropath today. No rash. No inflammatory symptoms such as diarrhea.     Review of Systems:  A complete review of systems was negative except as noted in the HPI.     Past medical, Surgical, and social history:  Reviewed    Physical Examination:  /85   Pulse 96   Temp 97.7  F (36.5  C)   Resp 16   Ht 1.6 m (5' 3\")   Wt 60.4 kg (133 lb 3.2 oz)   SpO2 97%   BMI 23.60 kg/m    Wt Readings from Last 10 Encounters:   12/09/22 60.4 kg (133 lb 3.2 oz)   11/23/22 59 kg (130 lb)   11/18/22 60.4 kg (133 lb 3.2 oz)   11/17/22 59 kg (130 lb)   10/28/22 59.8 kg (131 lb 12.8 oz)   10/07/22 59.2 kg (130 lb 8.2 oz)   10/07/22 59.2 kg (130 lb 9.6 oz)   09/16/22 59 kg (130 lb)   08/26/22 59.1 kg (130 lb 3.2 oz)   08/08/22 59.1 kg (130 lb 3.2 oz)     General: No acute distress.  Skin: Warm and dry. No abnormal ecchymosis or rashes. Line clean, dry, intact.  Eyes: Anicteric.  Lungs: Normal work of breathing.  Abdomen: Non-distended.  Extremities: No peripheral edema. Gross movement intact without difficulty.  Mental: Anxious, tearful at times, but appropriate.   Neuro: Cranial nerves and coordination grossly intact. Alert and oriented x 3.    Laboratory Data:  CMP, TSH reviewed.       Assessment and Plan:  Jann Davidson is a 77 year old year old female with triple-negative breast cancer with 1 LN+status post neoadjuvant carbo/Taxol + Keytruda, lumpectomy with residual LCIS and ALH on adjuvant Evista and Keytruda.     # Triple Negative Breast Cancer  # Anemia " Secondary to Chemotherapy  - Continue every 3 week Keytruda, okay to proceed with cycle 7 today, plan for 9 cycles  - Continue raloxifene x 5 years  - Repeat diagnostic mammogram 3 months post radiation (12/2022), not yet scheduled  - Repeat breast MRI 6 months thereafter    # Elevated TSH, resolved  - Check each cycle with Keytruda  - Started levothyroxine 25 mcg last cycle with TSH 14, increased to 40 today  - Declined consultation with endocrinology  - Increase to 100 mcg today ( recommended 75 however due to severe fatigue patient requests more aggressive approach, discussed risks of over treating); patient now has 25 and 50 mcg tablets for future dosing purposes    # Renal Mass  - Follows with urology, likely cancerous, but <2 cm so will repeat imaging in 6 months    # Chronic Kidney Disease  - Follows with nephrology    # Abnormal Imaging Findings  - Pulmonary nodules - continue to monitor  - Anal avidity - recommend colorectal follow-up, history of fissure, Flex sig scheduled    35 minutes spent on the date of the encounter doing chart review, review of test results, interpretation of tests, patient visit and documentation     Julia Gomez PA-C  Department of Hematology and Oncology  [facility]

## 2022-12-16 ENCOUNTER — OFFICE VISIT (OUTPATIENT)
Dept: SURGERY | Facility: CLINIC | Age: 77
End: 2022-12-16
Payer: MEDICARE

## 2022-12-16 VITALS — DIASTOLIC BLOOD PRESSURE: 70 MMHG | OXYGEN SATURATION: 98 % | HEART RATE: 95 BPM | SYSTOLIC BLOOD PRESSURE: 130 MMHG

## 2022-12-16 DIAGNOSIS — R93.89 ABNORMAL FINDING ON IMAGING: Primary | ICD-10-CM

## 2022-12-16 PROCEDURE — 99214 OFFICE O/P EST MOD 30 MIN: CPT | Mod: 25 | Performed by: COLON & RECTAL SURGERY

## 2022-12-16 PROCEDURE — 45330 DIAGNOSTIC SIGMOIDOSCOPY: CPT | Performed by: COLON & RECTAL SURGERY

## 2022-12-16 ASSESSMENT — PAIN SCALES - GENERAL: PAINLEVEL: MILD PAIN (3)

## 2022-12-16 NOTE — NURSING NOTE
Chief Complaint   Patient presents with     Flexible Sigmoidoscopy       Vitals:    12/16/22 1128   BP: 130/70   BP Location: Left arm   Patient Position: Sitting   Cuff Size: Adult Regular   Pulse: 95   SpO2: 98%       There is no height or weight on file to calculate BMI.    Juan M Cochran EMT-P

## 2022-12-16 NOTE — PATIENT INSTRUCTIONS
Follow up:  Colonoscopy in 3 years      Please call with any questions or concerns regarding your clinic visit today.    It is a pleasure being involved in your health care.    Contacts post-consultation depending on your need:    Radiology Appointments [phone]    Schedule Clinic Appointments [phone] # 1   M-F 7:30 - 5 pm    [facility] [phone]    Clinic Fax Number [phone]    Surgery Scheduling [phone]    My Chart is available 24 hours a day and is a secure way to access your records and communicate with your care team.  I strongly recommend signing up if you haven't already done so, if you are comfortable with computers.  If you would like to inquire about this or are having problems with My Chart access, you may call [phone] or go online at [email or web address].  Please allow at least 24 hours for a response and extra time on weekends and Holidays.

## 2022-12-16 NOTE — LETTER
2022         RE: Jann Davidson  [address] [facility] [address]        Dear Colleague,    Thank you for referring your patient, Jann Davidson, to the [facility] [place]. Please see a copy of my visit note below.    Colon and Rectal Surgery Clinic Note    RE: Jann Davidson.  : 1945.  CHARLES: 2022.    Reason for visit: Abnormal anorectal finding on PET CT scan.    HPI: 77 year old with PMH of triple negative breast cancer diagnosed in 2021 s/p chemoradiaion, now with possible kidney metastasis. She had a PET scan in 2021 and it was recommended she follow up for visualization of the rectum due to abnormal uptake. She is doing very well from a breast cancer treatment standpoint and is now following up on these other findings from her PET. Her last colonoscopy was in  with normal findings, she is due for this in .       PET 21  IMPRESSION: In summary left breast cancer, left axillary metastasis,  incidental small possible right renal cell carcinoma.  1.  Moderately FDG avid focus in the left breast outer lower quadrant  likely representing known primary malignancy.    1a. Hypermetabolic left axillary adenopathy compatible with krzysztof  metastasis.  1b. No evidence for metastatic breast disease in the abdomen or  pelvis.  2. Moderate FDG uptake at the level of the anus differential  hemorrhoid, fissure, physiologic, cancer.   3. Indeterminate exophytic right renal 1.1 cm mass, slow increasing in  size since 2016, concerning for slow growing renal cell carcinoma.  Recommend MRI for definitive characterization.   (Although there isn't  significant FDG uptake, this doesn't change the risk as only about 50%  of renal cell carcinomas take up FDG)        Colonoscopy (14)  Findings:        The area from rectum to cecum appeared normal. The digital rectal exam        was abnormal. Findings include anal fissure. Pertinent negatives include         normal sphincter tone. The retroflexed view of the distal rectum was        normal and showed no anal or rectal abnormalities.     Medical history:  Past Medical History:   Diagnosis Date     Breast cancer (H)      CKD (chronic kidney disease) stage 3, GFR 30-59 ml/min (H)      Depression with anxiety      Gout      Hypertension goal BP (blood pressure) < 140/90      Recurrent sinusitis 12/02/2013       Surgical history:  Past Surgical History:   Procedure Laterality Date     BIOPSY NODE SENTINEL Left 6/27/2022    Procedure: left axillary sentinel lymph node biopsy;  Surgeon: Tatianna Rai MD;  Location: [facility] OR     BREAST BIOPSY, RT/LT      Breat Biopsy RT/LT     COLONOSCOPY  10/03     COLONOSCOPY  4/8/2014    Procedure: COLONOSCOPY;  COLONOSCOPY ;  Surgeon: Gaurav Shaffer MD;  Location: [facility] GI     IR CHEST PORT PLACEMENT > 5 YRS OF AGE  12/29/2021     LUMPECTOMY BREAST WITH SEED LOCALIZATION Left 6/27/2022    Procedure: Radiofrequency tag localized left breast lumpectomy with radiofrequency tag localized excision of left axillary lymph node;  Surgeon: Tatianna Rai MD;  Location: [facility] OR     RECONSTRUCT EYELID         Family history:  Family History   Problem Relation Age of Onset     Cancer Mother         uterine     Breast Cancer Mother      Diabetes Paternal Grandmother        Medications:  Current Outpatient Medications   Medication Sig Dispense Refill     atorvastatin (LIPITOR) 10 MG tablet TAKE 1 TABLET (10 MG) BY MOUTH ONCE DAILY 90 tablet 3     candesartan (ATACAND) 4 MG tablet Take 0.5 tablets (2 mg) by mouth daily DISPENSE AS WRITTEN, Brand name only       levothyroxine (SYNTHROID/LEVOTHROID) 50 MCG tablet Take 2 tablets (100 mcg) by mouth daily 60 tablet 2     loratadine (CLARITIN) 10 MG tablet Take 10 mg by mouth daily       LORazepam (ATIVAN) 0.5 MG tablet Take 1 tablet (0.5 mg) by mouth every 6 hours as needed for anxiety, nausea or sleep 45 tablet 0     Magnesium 400 MG TABS Take 800 mg by  "mouth daily       oxazepam (SERAX) 15 MG capsule Take 1 capsule (15 mg) by mouth nightly as needed for anxiety 45 capsule 1     PARNATE 10 MG tablet TAKE 1 TABLET BY MOUTH 3 TIMES DAILY 300 tablet 3     polyethylene glycol (MIRALAX/GLYCOLAX) packet Take 1 packet by mouth daily       raloxifene (EVISTA) 60 MG tablet Take 1 tablet (60 mg) by mouth daily 90 tablet 3     triamcinolone (KENALOG) 0.1 % external cream Apply  topically 2 times daily as needed. 15 g 1     zolpidem (AMBIEN) 5 MG tablet TAKE 1 TABLET (5 MG) BY MOUTH NIGHTLY AS NEEDED FOR FOR SLEEP 45 tablet 1     levothyroxine (SYNTHROID/LEVOTHROID) 25 MCG tablet Take 1 tablet (25 mcg) by mouth daily 30 tablet 3       Allergies:  Allergies   Allergen Reactions     Lyrica [Pregabalin] Rash       Social history:   Social History     Tobacco Use     Smoking status: Former     Types: Cigarettes     Quit date: 10/30/1972     Years since quittin.1     Smokeless tobacco: Never   Substance Use Topics     Alcohol use: Yes     Comment: couple glasses of wine daily      Marital status: .    Physical Examination:  /70 (BP Location: Left arm, Patient Position: Sitting, Cuff Size: Adult Regular)   Pulse 95   SpO2 98%   General: Well hydrated. No acute distress.  Perianal external examination:  Perianal skin: intact.  Lesions: No.  Eversion of buttocks: There was not evidence of an anal fissure. Details: N/A.  Skin tags or external hemorrhoids: No.  Digital rectal examination: Was performed.   Sphincter tone: Good.  Palpable lesions: Posterior based internal hemorrhoid versus anal papilla.  Other: None.  Bimanual examination: was not performed.    Investigations:  None.    Procedures:  Flexible sigmoidoscopy: after obtaining informed consent and performing a \"time out\", an adult flexible sigmoidoscope was introduced through the anus and passed up to the descending colon. The quality of the prep was good. Findings: no active ulceration, inflammation or " bleeding.  No evidence of neoplasia in the rectum.  Upon retroflexion, there was an internal hemorrhoid with what appears to be an anal papilla which appears benign.  No additional abnormalities were seen. Total scope time: 12 minutes. The patient tolerated the procedure well.    ASSESSMENT  Normal flexible sigmoidoscopy.  No evidence of neoplasia in the rectum.    PLAN  1.  Unless changes in overall cancer stage and health, I would recommend colonoscopy in 3 years.    30 minutes spent on the date of encounter (excluding time performing procedures with or without biopsy) performing chart review, history and exam, documentation and further activities as noted above.      Jessee Cervantes MD, MSc, FACS, FASCRS.    Chief, Division of Colon & Rectal Surgery  Stanley M. Goldberg, MD Endowed Chair, Colon & Rectal Surgery  Department of Surgery  [facility]      Referring Provider:  No referring provider defined for this encounter.     Primary Care Provider:  Mehran Oliva      Again, thank you for allowing me to participate in the care of your patient.        Sincerely,        Jessee Cervantes MD

## 2022-12-27 RX ORDER — ALBUTEROL SULFATE 90 UG/1
1-2 AEROSOL, METERED RESPIRATORY (INHALATION)
Status: CANCELLED
Start: 2022-12-30

## 2022-12-27 RX ORDER — HEPARIN SODIUM,PORCINE 10 UNIT/ML
5 VIAL (ML) INTRAVENOUS
Status: CANCELLED | OUTPATIENT
Start: 2022-12-30

## 2022-12-27 RX ORDER — DIPHENHYDRAMINE HYDROCHLORIDE 50 MG/ML
50 INJECTION INTRAMUSCULAR; INTRAVENOUS
Status: CANCELLED
Start: 2022-12-30

## 2022-12-27 RX ORDER — ALBUTEROL SULFATE 0.83 MG/ML
2.5 SOLUTION RESPIRATORY (INHALATION)
Status: CANCELLED | OUTPATIENT
Start: 2022-12-30

## 2022-12-27 RX ORDER — LORAZEPAM 2 MG/ML
0.5 INJECTION INTRAMUSCULAR EVERY 4 HOURS PRN
Status: CANCELLED | OUTPATIENT
Start: 2022-12-30

## 2022-12-27 RX ORDER — EPINEPHRINE 1 MG/ML
0.3 INJECTION, SOLUTION, CONCENTRATE INTRAVENOUS EVERY 5 MIN PRN
Status: CANCELLED | OUTPATIENT
Start: 2022-12-30

## 2022-12-27 RX ORDER — MEPERIDINE HYDROCHLORIDE 25 MG/ML
25 INJECTION INTRAMUSCULAR; INTRAVENOUS; SUBCUTANEOUS EVERY 30 MIN PRN
Status: CANCELLED | OUTPATIENT
Start: 2022-12-30

## 2022-12-27 RX ORDER — HEPARIN SODIUM (PORCINE) LOCK FLUSH IV SOLN 100 UNIT/ML 100 UNIT/ML
5 SOLUTION INTRAVENOUS
Status: CANCELLED | OUTPATIENT
Start: 2022-12-30

## 2022-12-30 ENCOUNTER — INFUSION THERAPY VISIT (OUTPATIENT)
Dept: INFUSION THERAPY | Facility: CLINIC | Age: 77
End: 2022-12-30
Attending: INTERNAL MEDICINE
Payer: MEDICARE

## 2022-12-30 VITALS
HEART RATE: 103 BPM | OXYGEN SATURATION: 98 % | SYSTOLIC BLOOD PRESSURE: 135 MMHG | BODY MASS INDEX: 23.24 KG/M2 | TEMPERATURE: 98.2 F | DIASTOLIC BLOOD PRESSURE: 78 MMHG | RESPIRATION RATE: 20 BRPM | WEIGHT: 131.2 LBS

## 2022-12-30 DIAGNOSIS — Z51.89 ENCOUNTER FOR OTHER SPECIFIED AFTERCARE: ICD-10-CM

## 2022-12-30 DIAGNOSIS — C50.512 MALIGNANT NEOPLASM OF LOWER-OUTER QUADRANT OF LEFT BREAST OF FEMALE, ESTROGEN RECEPTOR NEGATIVE (H): Primary | ICD-10-CM

## 2022-12-30 DIAGNOSIS — T45.1X5A CHEMOTHERAPY-INDUCED NEUTROPENIA (H): ICD-10-CM

## 2022-12-30 DIAGNOSIS — Z17.1 MALIGNANT NEOPLASM OF LOWER-OUTER QUADRANT OF LEFT BREAST OF FEMALE, ESTROGEN RECEPTOR NEGATIVE (H): Primary | ICD-10-CM

## 2022-12-30 DIAGNOSIS — D70.1 CHEMOTHERAPY-INDUCED NEUTROPENIA (H): ICD-10-CM

## 2022-12-30 LAB
ALBUMIN SERPL-MCNC: 3.7 G/DL (ref 3.4–5)
ALP SERPL-CCNC: 84 U/L (ref 40–150)
ALT SERPL W P-5'-P-CCNC: 29 U/L (ref 0–50)
ANION GAP SERPL CALCULATED.3IONS-SCNC: 7 MMOL/L (ref 3–14)
AST SERPL W P-5'-P-CCNC: 36 U/L (ref 0–45)
BILIRUB SERPL-MCNC: 0.4 MG/DL (ref 0.2–1.3)
BUN SERPL-MCNC: 29 MG/DL (ref 7–30)
CALCIUM SERPL-MCNC: 9.4 MG/DL (ref 8.5–10.1)
CHLORIDE BLD-SCNC: 110 MMOL/L (ref 94–109)
CO2 SERPL-SCNC: 24 MMOL/L (ref 20–32)
CREAT SERPL-MCNC: 1.1 MG/DL (ref 0.52–1.04)
GFR SERPL CREATININE-BSD FRML MDRD: 51 ML/MIN/1.73M2
GLUCOSE BLD-MCNC: 103 MG/DL (ref 70–99)
POTASSIUM BLD-SCNC: 4 MMOL/L (ref 3.4–5.3)
PROT SERPL-MCNC: 7.3 G/DL (ref 6.8–8.8)
SODIUM SERPL-SCNC: 141 MMOL/L (ref 133–144)
TSH SERPL DL<=0.005 MIU/L-ACNC: 1.04 MU/L (ref 0.4–4)

## 2022-12-30 PROCEDURE — 250N000011 HC RX IP 250 OP 636: Performed by: INTERNAL MEDICINE

## 2022-12-30 PROCEDURE — 258N000003 HC RX IP 258 OP 636: Performed by: NURSE PRACTITIONER

## 2022-12-30 PROCEDURE — 80053 COMPREHEN METABOLIC PANEL: CPT | Performed by: INTERNAL MEDICINE

## 2022-12-30 PROCEDURE — 84443 ASSAY THYROID STIM HORMONE: CPT | Performed by: INTERNAL MEDICINE

## 2022-12-30 PROCEDURE — 96413 CHEMO IV INFUSION 1 HR: CPT

## 2022-12-30 PROCEDURE — 258N000003 HC RX IP 258 OP 636: Performed by: INTERNAL MEDICINE

## 2022-12-30 RX ORDER — HEPARIN SODIUM,PORCINE 10 UNIT/ML
5 VIAL (ML) INTRAVENOUS
Status: CANCELLED | OUTPATIENT
Start: 2022-12-30

## 2022-12-30 RX ORDER — HEPARIN SODIUM (PORCINE) LOCK FLUSH IV SOLN 100 UNIT/ML 100 UNIT/ML
5 SOLUTION INTRAVENOUS
Status: DISCONTINUED | OUTPATIENT
Start: 2022-12-30 | End: 2022-12-30 | Stop reason: HOSPADM

## 2022-12-30 RX ORDER — HEPARIN SODIUM (PORCINE) LOCK FLUSH IV SOLN 100 UNIT/ML 100 UNIT/ML
5 SOLUTION INTRAVENOUS
Status: CANCELLED | OUTPATIENT
Start: 2022-12-30

## 2022-12-30 RX ORDER — SODIUM CHLORIDE 9 MG/ML
INJECTION, SOLUTION INTRAVENOUS CONTINUOUS
Status: CANCELLED
Start: 2022-12-30

## 2022-12-30 RX ORDER — SODIUM CHLORIDE 9 MG/ML
INJECTION, SOLUTION INTRAVENOUS CONTINUOUS
Status: DISCONTINUED | OUTPATIENT
Start: 2022-12-30 | End: 2022-12-30 | Stop reason: HOSPADM

## 2022-12-30 RX ADMIN — SODIUM CHLORIDE: 9 INJECTION, SOLUTION INTRAVENOUS at 13:49

## 2022-12-30 RX ADMIN — SODIUM CHLORIDE 200 MG: 9 INJECTION, SOLUTION INTRAVENOUS at 13:50

## 2022-12-30 RX ADMIN — Medication 5 ML: at 15:00

## 2022-12-30 ASSESSMENT — PAIN SCALES - GENERAL: PAINLEVEL: NO PAIN (0)

## 2022-12-30 NOTE — PROGRESS NOTES
"Infusion Nursing Note:  Jann Davidson presents today for C7D1 Keytruda  Patient seen by provider today: No   present during visit today: Not Applicable.    Note: pt states she has been having \"dizziness\" when getting up from lying down and when rolling over in bed. She is concerned this is from her Synthroid medication. She does not feel there has been any decrease in oral intake. She does not feel \"ill\". Pt agrees to liter NS for possible decrease in hydration status. Pharmacist does not feel likely due to synthroid. Pt states she can be sensitive to medication. VSS at today's visit. Pt to monitor symptoms and f/u early next week prn. .    Intravenous Access:  Implanted Port.    Treatment Conditions:  Lab Results   Component Value Date    HGB 10.6 (L) 08/08/2022    WBC 4.1 08/08/2022    ANEU 1.8 05/06/2022    ANEUTAUTO 2.6 08/08/2022     08/08/2022      Lab Results   Component Value Date     12/30/2022    POTASSIUM 4.0 12/30/2022    MAG 1.7 05/23/2022    CR 1.10 (H) 12/30/2022    AMY 9.4 12/30/2022    BILITOTAL 0.4 12/30/2022    ALBUMIN 3.7 12/30/2022    ALT 29 12/30/2022    AST 36 12/30/2022     Results reviewed, labs MET treatment parameters, ok to proceed with treatment.    Post Infusion Assessment:  Patient tolerated infusion without incident.  Blood return noted pre and post infusion.  Site patent and intact, free from redness, edema or discomfort.  Access discontinued per protocol.     Discharge Plan:   Discharge instructions reviewed with: Patient.  Patient and/or family verbalized understanding of discharge instructions and all questions answered.  Patient discharged in stable condition accompanied by: self.  Departure Mode: Ambulatory.      Karol Sanchez RN                      "

## 2022-12-30 NOTE — PROGRESS NOTES
Nursing Note:  Jann Cuiton presents today for Port Labs.    Patient seen by provider today: No   present during visit today: Not Applicable.    Note: Patient states she has increased dizziness when changing positions.    Intravenous Access:  Labs drawn without difficulty.  Implanted Port.    Discharge Plan:   Patient was sent to [facility] for 1PM appointment.    Geoff Gabriel RN

## 2023-01-02 DIAGNOSIS — G47.00 PERSISTENT DISORDER OF INITIATING OR MAINTAINING SLEEP: Chronic | ICD-10-CM

## 2023-01-02 RX ORDER — ZOLPIDEM TARTRATE 5 MG/1
TABLET ORAL
Qty: 45 TABLET | Refills: 1 | Status: SHIPPED | OUTPATIENT
Start: 2023-01-02 | End: 2023-03-24

## 2023-01-13 ENCOUNTER — HOSPITAL ENCOUNTER (OUTPATIENT)
Dept: MAMMOGRAPHY | Facility: CLINIC | Age: 78
Discharge: HOME | End: 2023-01-13
Attending: INTERNAL MEDICINE | Admitting: INTERNAL MEDICINE
Payer: MEDICARE

## 2023-01-13 DIAGNOSIS — Z17.1 MALIGNANT NEOPLASM OF LOWER-OUTER QUADRANT OF LEFT BREAST OF FEMALE, ESTROGEN RECEPTOR NEGATIVE (H): ICD-10-CM

## 2023-01-13 DIAGNOSIS — N60.92 ATYPICAL LOBULAR HYPERPLASIA (ALH) OF LEFT BREAST: ICD-10-CM

## 2023-01-13 DIAGNOSIS — D05.02 NEOPLASM OF LEFT BREAST, PRIMARY TUMOR STAGING CATEGORY TIS: LOBULAR CARCINOMA IN SITU (LCIS): ICD-10-CM

## 2023-01-13 DIAGNOSIS — C50.512 MALIGNANT NEOPLASM OF LOWER-OUTER QUADRANT OF LEFT BREAST OF FEMALE, ESTROGEN RECEPTOR NEGATIVE (H): ICD-10-CM

## 2023-01-13 PROCEDURE — 77062 BREAST TOMOSYNTHESIS BI: CPT

## 2023-01-17 RX ORDER — ALBUTEROL SULFATE 0.83 MG/ML
2.5 SOLUTION RESPIRATORY (INHALATION)
Status: CANCELLED | OUTPATIENT
Start: 2023-02-10

## 2023-01-17 RX ORDER — HEPARIN SODIUM (PORCINE) LOCK FLUSH IV SOLN 100 UNIT/ML 100 UNIT/ML
5 SOLUTION INTRAVENOUS
Status: CANCELLED | OUTPATIENT
Start: 2023-01-20

## 2023-01-17 RX ORDER — EPINEPHRINE 1 MG/ML
0.3 INJECTION, SOLUTION, CONCENTRATE INTRAVENOUS EVERY 5 MIN PRN
Status: CANCELLED | OUTPATIENT
Start: 2023-01-20

## 2023-01-17 RX ORDER — LORAZEPAM 2 MG/ML
0.5 INJECTION INTRAMUSCULAR EVERY 4 HOURS PRN
Status: CANCELLED | OUTPATIENT
Start: 2023-01-20

## 2023-01-17 RX ORDER — EPINEPHRINE 1 MG/ML
0.3 INJECTION, SOLUTION, CONCENTRATE INTRAVENOUS EVERY 5 MIN PRN
Status: CANCELLED | OUTPATIENT
Start: 2023-02-10

## 2023-01-17 RX ORDER — DIPHENHYDRAMINE HYDROCHLORIDE 50 MG/ML
50 INJECTION INTRAMUSCULAR; INTRAVENOUS
Status: CANCELLED
Start: 2023-02-10

## 2023-01-17 RX ORDER — ALBUTEROL SULFATE 90 UG/1
1-2 AEROSOL, METERED RESPIRATORY (INHALATION)
Status: CANCELLED
Start: 2023-02-10

## 2023-01-17 RX ORDER — LORAZEPAM 2 MG/ML
0.5 INJECTION INTRAMUSCULAR EVERY 4 HOURS PRN
Status: CANCELLED | OUTPATIENT
Start: 2023-02-10

## 2023-01-17 RX ORDER — ALBUTEROL SULFATE 90 UG/1
1-2 AEROSOL, METERED RESPIRATORY (INHALATION)
Status: CANCELLED
Start: 2023-01-20

## 2023-01-17 RX ORDER — HEPARIN SODIUM (PORCINE) LOCK FLUSH IV SOLN 100 UNIT/ML 100 UNIT/ML
5 SOLUTION INTRAVENOUS
Status: CANCELLED | OUTPATIENT
Start: 2023-02-10

## 2023-01-17 RX ORDER — DIPHENHYDRAMINE HYDROCHLORIDE 50 MG/ML
50 INJECTION INTRAMUSCULAR; INTRAVENOUS
Status: CANCELLED
Start: 2023-01-20

## 2023-01-17 RX ORDER — HEPARIN SODIUM,PORCINE 10 UNIT/ML
5 VIAL (ML) INTRAVENOUS
Status: CANCELLED | OUTPATIENT
Start: 2023-01-20

## 2023-01-17 RX ORDER — ALBUTEROL SULFATE 0.83 MG/ML
2.5 SOLUTION RESPIRATORY (INHALATION)
Status: CANCELLED | OUTPATIENT
Start: 2023-01-20

## 2023-01-17 RX ORDER — MEPERIDINE HYDROCHLORIDE 25 MG/ML
25 INJECTION INTRAMUSCULAR; INTRAVENOUS; SUBCUTANEOUS EVERY 30 MIN PRN
Status: CANCELLED | OUTPATIENT
Start: 2023-02-10

## 2023-01-17 RX ORDER — HEPARIN SODIUM,PORCINE 10 UNIT/ML
5 VIAL (ML) INTRAVENOUS
Status: CANCELLED | OUTPATIENT
Start: 2023-02-10

## 2023-01-17 RX ORDER — MEPERIDINE HYDROCHLORIDE 25 MG/ML
25 INJECTION INTRAMUSCULAR; INTRAVENOUS; SUBCUTANEOUS EVERY 30 MIN PRN
Status: CANCELLED | OUTPATIENT
Start: 2023-01-20

## 2023-01-18 NOTE — Clinical Note
Detail Level: Zone Please send copy of letter to patient with test results. Please enclose test results: Other Laboratory; [facility]; [facility]-Cancer Panel, genetic testing, instructions in order comments (Laboratory Miscellaneous Order) [[id number]] (Order [id number])

## 2023-01-20 ENCOUNTER — LAB (OUTPATIENT)
Dept: INFUSION THERAPY | Facility: CLINIC | Age: 78
End: 2023-01-20
Attending: INTERNAL MEDICINE
Payer: MEDICARE

## 2023-01-20 ENCOUNTER — ONCOLOGY VISIT (OUTPATIENT)
Dept: ONCOLOGY | Facility: CLINIC | Age: 78
End: 2023-01-20
Attending: NURSE PRACTITIONER
Payer: MEDICARE

## 2023-01-20 VITALS
SYSTOLIC BLOOD PRESSURE: 132 MMHG | WEIGHT: 133.8 LBS | RESPIRATION RATE: 16 BRPM | BODY MASS INDEX: 23.7 KG/M2 | HEART RATE: 86 BPM | TEMPERATURE: 98.2 F | DIASTOLIC BLOOD PRESSURE: 79 MMHG | OXYGEN SATURATION: 100 %

## 2023-01-20 DIAGNOSIS — C50.512 MALIGNANT NEOPLASM OF LOWER-OUTER QUADRANT OF LEFT BREAST OF FEMALE, ESTROGEN RECEPTOR NEGATIVE (H): Primary | ICD-10-CM

## 2023-01-20 DIAGNOSIS — Z17.1 MALIGNANT NEOPLASM OF LOWER-OUTER QUADRANT OF LEFT BREAST OF FEMALE, ESTROGEN RECEPTOR NEGATIVE (H): Primary | ICD-10-CM

## 2023-01-20 LAB
ALBUMIN SERPL BCG-MCNC: 4.2 G/DL (ref 3.5–5.2)
ALP SERPL-CCNC: 82 U/L (ref 35–104)
ALT SERPL W P-5'-P-CCNC: 27 U/L (ref 10–35)
ANION GAP SERPL CALCULATED.3IONS-SCNC: 11 MMOL/L (ref 7–15)
AST SERPL W P-5'-P-CCNC: 41 U/L (ref 10–35)
BILIRUB SERPL-MCNC: 0.2 MG/DL
BUN SERPL-MCNC: 31.8 MG/DL (ref 8–23)
CALCIUM SERPL-MCNC: 9.3 MG/DL (ref 8.8–10.2)
CHLORIDE SERPL-SCNC: 104 MMOL/L (ref 98–107)
CREAT SERPL-MCNC: 1.21 MG/DL (ref 0.51–0.95)
DEPRECATED HCO3 PLAS-SCNC: 22 MMOL/L (ref 22–29)
GFR SERPL CREATININE-BSD FRML MDRD: 46 ML/MIN/1.73M2
GLUCOSE SERPL-MCNC: 104 MG/DL (ref 70–99)
POTASSIUM SERPL-SCNC: 4.8 MMOL/L (ref 3.4–5.3)
PROT SERPL-MCNC: 6.7 G/DL (ref 6.4–8.3)
SODIUM SERPL-SCNC: 137 MMOL/L (ref 136–145)
T4 FREE SERPL-MCNC: 0.9 NG/DL (ref 0.9–1.7)
TSH SERPL DL<=0.005 MIU/L-ACNC: 11.79 UIU/ML (ref 0.3–4.2)

## 2023-01-20 PROCEDURE — 96413 CHEMO IV INFUSION 1 HR: CPT

## 2023-01-20 PROCEDURE — 99214 OFFICE O/P EST MOD 30 MIN: CPT | Performed by: NURSE PRACTITIONER

## 2023-01-20 PROCEDURE — 250N000011 HC RX IP 250 OP 636: Performed by: INTERNAL MEDICINE

## 2023-01-20 PROCEDURE — G0463 HOSPITAL OUTPT CLINIC VISIT: HCPCS | Mod: 25 | Performed by: NURSE PRACTITIONER

## 2023-01-20 PROCEDURE — 258N000003 HC RX IP 258 OP 636: Performed by: INTERNAL MEDICINE

## 2023-01-20 PROCEDURE — 80053 COMPREHEN METABOLIC PANEL: CPT | Performed by: INTERNAL MEDICINE

## 2023-01-20 PROCEDURE — 84439 ASSAY OF FREE THYROXINE: CPT | Performed by: INTERNAL MEDICINE

## 2023-01-20 PROCEDURE — 84443 ASSAY THYROID STIM HORMONE: CPT | Performed by: INTERNAL MEDICINE

## 2023-01-20 RX ORDER — HEPARIN SODIUM (PORCINE) LOCK FLUSH IV SOLN 100 UNIT/ML 100 UNIT/ML
5 SOLUTION INTRAVENOUS
Status: DISCONTINUED | OUTPATIENT
Start: 2023-01-20 | End: 2023-01-20 | Stop reason: HOSPADM

## 2023-01-20 RX ADMIN — SODIUM CHLORIDE 250 ML: 9 INJECTION, SOLUTION INTRAVENOUS at 14:04

## 2023-01-20 RX ADMIN — Medication 5 ML: at 14:55

## 2023-01-20 RX ADMIN — SODIUM CHLORIDE 200 MG: 9 INJECTION, SOLUTION INTRAVENOUS at 14:04

## 2023-01-20 ASSESSMENT — PAIN SCALES - GENERAL: PAINLEVEL: NO PAIN (1)

## 2023-01-20 NOTE — PROGRESS NOTES
Infusion Nursing Note:  Jann Davidson presents today for Cycle 8 Day 1 Keytruda.    Patient seen by provider today: Yes: Tadeo Dugan NP   present during visit today: Not Applicable.    Note: N/A.    Intravenous Access:  Implanted Port.    Treatment Conditions:  Lab Results   Component Value Date     01/20/2023    POTASSIUM 4.8 01/20/2023    MAG 1.7 05/23/2022    CR 1.21 (H) 01/20/2023    AMY 9.3 01/20/2023    BILITOTAL 0.2 01/20/2023    ALBUMIN 4.2 01/20/2023    ALT 27 01/20/2023    AST 41 (H) 01/20/2023     Results reviewed, labs MET treatment parameters, ok to proceed with treatment.    Post Infusion Assessment:  Patient tolerated infusion without incident.  Blood return noted pre and post infusion.  Site patent and intact, free from redness, edema or discomfort.  No evidence of extravasations.  Access discontinued per protocol.     Discharge Plan:   Patient declined prescription refills.  Discharge instructions reviewed with: Patient.  Patient verbalized understanding of discharge instructions and all questions answered.  AVS to patient via [employer].  Patient will return 2/10/23 for next appointment.   Patient discharged in stable condition accompanied by: self.  Departure Mode: Ambulatory.      Jessica Ellis RN

## 2023-01-20 NOTE — PROGRESS NOTES
Oncology/Hematology Visit Note  Jan 20, 2023    Reason for Visit: follow up of triple negative left breast cancer  Stage IIb grade 2 invasive ductal carcinoma  Status post neoadjuvant chemotherapy with immunotherapy  Breast MRI showed no residual enhancement therefore patient did not get Adriamycin Cytoxan  -Status postlumpectomy and sentinel lymph node excision-near complete response to neoadjuvant therapy with residual LCIS and atypical lobular hyperplasia  -Dr. Mcmullen recommended pembrolizumab for 9 cycles for surgery  S/p adjuvant radiation  complete on 09/22/22 08/08-started Evista 5 mg given the findings of LCIS and ALH    Interval History:  Patient reports she had lightheadedness with levothyroxine.  She reports she stopped taking levothyroxine and now she reports today no lightheadedness at all.  Patient denies headache dizziness seizures.  Denies nausea vomiting pain denies fever chills sweats cough shortness of breath    Review of Systems:  14 point ROS of systems including Constitutional, Eyes, Respiratory, Cardiovascular, Gastroenterology, Genitourinary, Integumentary, Muscularskeletal, Psychiatric were all negative except for pertinent positives noted in my HPI.      Physical Examination:  General: The patient is a pleasant female in no acute distress.  /79   Pulse 86   Temp 98.2  F (36.8  C) (Oral)   Resp 16   Wt 60.7 kg (133 lb 12.8 oz)   SpO2 100%   BMI 23.70 kg/m    HEENT: EOMI, PERRL. Sclerae are anicteric. Oral mucosa is pink and moist with no lesions or thrush.   Lymph: Neck is supple with no lymphadenopathy in the cervical or supraclavicular areas.   Heart: Regular rate and rhythm.   Lungs: Clear to auscultation bilaterally.   GI: Bowel sounds present, soft, nontender with no palpable hepatosplenomegaly or masses.   Extremities: No lower extremity edema noted bilaterally.   Skin: No rashes, petechiae, or bruising noted on exposed skin.    Laboratory Data:  CMP and TSH results  reviewed    Assessment and Plan:  This is a 77-year-old female with    Triple negative left breast cancer  Status post neoadjuvant chemotherapy with pembrolizumab followed by lumpectomy and sentinel lymph node excision-showing near complete response  With residual LCIS and ALH  -Per Dr. Mcmullen patient recommendation patient started pembrolizumab with plan for 9 cycles   -08/08/22-started at this time 5 mg given  LCIS and ALH  -09/22/2022-completed adjuvant radiation  -Labs reviewed  -Continue with pembrolizumab  Continue with Evista 5 mg  -=01/13/2023-diagnostic mammogram i post radiation no suspicious findings plan for breast MRI sometime in July  Patient is scheduled with Dr. Mcmullen next month with treatment      Chronic kidney disease  Continue follow-up with Dr. Luna    FDG uptake the anal canal  Patient will see colorectal for further evaluation    Right renal mass  Patient has follow-up appointment with urology    Pulmonary nodules  Stable continue to monitor    Hypothyroidism.   States with higher doses of hypothyroidism she experiences lightheadedness  Patient reports she stopped taking levothyroxine  I reviewed with patient and TSH is trending up  I recommend starting levothyroxine 25mcg      History of lightheadedness  Patient contributes this to the levothyroxine  -Patient reports she does not have any lightheadedness now  I Inform patient if lightheadedness persists then I would recommend MRI of the brain          Call clinic with any changes in health condition or questions      HUBER Damon CNP  [facility] [place]     Chart documentation with Dragon Voice recognition Software. Although reviewed after completion, some words and grammatical errors may remain.

## 2023-01-20 NOTE — PROGRESS NOTES
"Oncology Rooming Note    January 20, 2023 12:32 PM   Jann Davidson is a 77 year old female who presents for:    Chief Complaint   Patient presents with     Oncology Clinic Visit     Initial Vitals: /79   Pulse 86   Temp 98.2  F (36.8  C) (Oral)   Resp 16   Wt 60.7 kg (133 lb 12.8 oz)   SpO2 100%   BMI 23.70 kg/m   Estimated body mass index is 23.7 kg/m  as calculated from the following:    Height as of 12/9/22: 1.6 m (5' 3\").    Weight as of this encounter: 60.7 kg (133 lb 12.8 oz). Body surface area is 1.64 meters squared.  No Pain (1) Comment: Data Unavailable   No LMP recorded. Patient is postmenopausal.  Allergies reviewed: Yes  Medications reviewed: Yes    Medications: Medication refills not needed today.  Pharmacy name entered into EPIC:    [id number] - [place], [place] - [address]  [facility] - [place], [place] - [address] [place] [address]    Clinical concerns:  NP was notified.      Isabela Padron CMA            "

## 2023-01-20 NOTE — PROGRESS NOTES
Nursing Note:  Jann Davidson presents today for port labs prior to treatment.    Patient seen by provider today: Yes: Tadeo   present during visit today: Not Applicable.    Note: N/A.    Intravenous Access:  Labs drawn without difficulty.  Implanted Port.    Discharge Plan:   Patient was sent to [facility] for clinic appointment.    Tico Tyler RN

## 2023-01-20 NOTE — LETTER
"    1/20/2023         RE: Jann Davidson  [address]  [place] [address]        Dear Colleague,    Thank you for referring your patient, Jann Davidson, to the [facility]. Please see a copy of my visit note below.    Oncology Rooming Note    January 20, 2023 12:32 PM   Jann Davidson is a 77 year old female who presents for:    Chief Complaint   Patient presents with     Oncology Clinic Visit     Initial Vitals: /79   Pulse 86   Temp 98.2  F (36.8  C) (Oral)   Resp 16   Wt 60.7 kg (133 lb 12.8 oz)   SpO2 100%   BMI 23.70 kg/m   Estimated body mass index is 23.7 kg/m  as calculated from the following:    Height as of 12/9/22: 1.6 m (5' 3\").    Weight as of this encounter: 60.7 kg (133 lb 12.8 oz). Body surface area is 1.64 meters squared.  No Pain (1) Comment: Data Unavailable   No LMP recorded. Patient is postmenopausal.  Allergies reviewed: Yes  Medications reviewed: Yes    Medications: Medication refills not needed today.  Pharmacy name entered into EPIC:    [facility][id number] - [place], [place] - [address] [facility] - [place], [place] - [address] [place] [address]    Clinical concerns:  NP was notified.      Isabela Padron CMA              Oncology/Hematology Visit Note  Jan 20, 2023    Reason for Visit: follow up of triple negative left breast cancer  Stage IIb grade 2 invasive ductal carcinoma  Status post neoadjuvant chemotherapy with immunotherapy  Breast MRI showed no residual enhancement therefore patient did not get Adriamycin Cytoxan  -Status postlumpectomy and sentinel lymph node excision-near complete response to neoadjuvant therapy with residual LCIS and atypical lobular hyperplasia  -Dr. Mcmullen recommended pembrolizumab for 9 cycles for surgery  S/p adjuvant radiation  complete on 09/22/22 08/08-started Evista 5 mg given the findings of LCIS and ALH    Interval History:  Patient reports she had " lightheadedness with levothyroxine.  She reports she stopped taking levothyroxine and now she reports today no lightheadedness at all.  Patient denies headache dizziness seizures.  Denies nausea vomiting pain denies fever chills sweats cough shortness of breath    Review of Systems:  14 point ROS of systems including Constitutional, Eyes, Respiratory, Cardiovascular, Gastroenterology, Genitourinary, Integumentary, Muscularskeletal, Psychiatric were all negative except for pertinent positives noted in my HPI.      Physical Examination:  General: The patient is a pleasant female in no acute distress.  /79   Pulse 86   Temp 98.2  F (36.8  C) (Oral)   Resp 16   Wt 60.7 kg (133 lb 12.8 oz)   SpO2 100%   BMI 23.70 kg/m    HEENT: EOMI, PERRL. Sclerae are anicteric. Oral mucosa is pink and moist with no lesions or thrush.   Lymph: Neck is supple with no lymphadenopathy in the cervical or supraclavicular areas.   Heart: Regular rate and rhythm.   Lungs: Clear to auscultation bilaterally.   GI: Bowel sounds present, soft, nontender with no palpable hepatosplenomegaly or masses.   Extremities: No lower extremity edema noted bilaterally.   Skin: No rashes, petechiae, or bruising noted on exposed skin.    Laboratory Data:  CMP and TSH results reviewed    Assessment and Plan:  This is a 77-year-old female with    Triple negative left breast cancer  Status post neoadjuvant chemotherapy with pembrolizumab followed by lumpectomy and sentinel lymph node excision-showing near complete response  With residual LCIS and ALH  -Per Dr. Mcmullen patient recommendation patient started pembrolizumab with plan for 9 cycles   -08/08/22-started at this time 5 mg given  LCIS and ALH  -09/22/2022-completed adjuvant radiation  -Labs reviewed  -Continue with pembrolizumab  Continue with Evista 5 mg  -=01/13/2023-diagnostic mammogram i post radiation no suspicious findings plan for breast MRI sometime in July  Patient is scheduled with   [facility] next month with treatment      Chronic kidney disease  Continue follow-up with Dr. Luna    FDG uptake the anal canal  Patient will see colorectal for further evaluation    Right renal mass  Patient has follow-up appointment with urology    Pulmonary nodules  Stable continue to monitor    Hypothyroidism.   States with higher doses of hypothyroidism she experiences lightheadedness  Patient reports she stopped taking levothyroxine  I reviewed with patient and TSH is trending up  I recommend starting levothyroxine 25mcg      History of lightheadedness  Patient contributes this to the levothyroxine  -Patient reports she does not have any lightheadedness now  I Inform patient if lightheadedness persists then I would recommend MRI of the brain          Call clinic with any changes in health condition or questions      HUBER Damon CNP  [facility]     Chart documentation with Dragon Voice recognition Software. Although reviewed after completion, some words and grammatical errors may remain.            Again, thank you for allowing me to participate in the care of your patient.        Sincerely,        HUBER Damon CNP

## 2023-01-30 DIAGNOSIS — G47.00 PERSISTENT DISORDER OF INITIATING OR MAINTAINING SLEEP: Primary | ICD-10-CM

## 2023-02-01 RX ORDER — OXAZEPAM 15 MG/1
15 CAPSULE ORAL
Qty: 45 CAPSULE | Refills: 1 | Status: SHIPPED | OUTPATIENT
Start: 2023-02-01 | End: 2023-05-16

## 2023-02-10 ENCOUNTER — INFUSION THERAPY VISIT (OUTPATIENT)
Dept: INFUSION THERAPY | Facility: CLINIC | Age: 78
End: 2023-02-10
Attending: INTERNAL MEDICINE
Payer: MEDICARE

## 2023-02-10 ENCOUNTER — ONCOLOGY VISIT (OUTPATIENT)
Dept: ONCOLOGY | Facility: CLINIC | Age: 78
End: 2023-02-10
Attending: INTERNAL MEDICINE
Payer: MEDICARE

## 2023-02-10 ENCOUNTER — LAB (OUTPATIENT)
Dept: INFUSION THERAPY | Facility: CLINIC | Age: 78
End: 2023-02-10
Attending: NURSE PRACTITIONER
Payer: MEDICARE

## 2023-02-10 VITALS
BODY MASS INDEX: 23.67 KG/M2 | DIASTOLIC BLOOD PRESSURE: 81 MMHG | HEART RATE: 85 BPM | OXYGEN SATURATION: 99 % | TEMPERATURE: 98.2 F | RESPIRATION RATE: 16 BRPM | HEIGHT: 63 IN | SYSTOLIC BLOOD PRESSURE: 124 MMHG | WEIGHT: 133.6 LBS

## 2023-02-10 DIAGNOSIS — Z17.1 MALIGNANT NEOPLASM OF LOWER-OUTER QUADRANT OF LEFT BREAST OF FEMALE, ESTROGEN RECEPTOR NEGATIVE (H): ICD-10-CM

## 2023-02-10 DIAGNOSIS — Z17.1 MALIGNANT NEOPLASM OF LOWER-OUTER QUADRANT OF LEFT BREAST OF FEMALE, ESTROGEN RECEPTOR NEGATIVE (H): Primary | ICD-10-CM

## 2023-02-10 DIAGNOSIS — C50.512 MALIGNANT NEOPLASM OF LOWER-OUTER QUADRANT OF LEFT BREAST OF FEMALE, ESTROGEN RECEPTOR NEGATIVE (H): Primary | ICD-10-CM

## 2023-02-10 DIAGNOSIS — C50.512 MALIGNANT NEOPLASM OF LOWER-OUTER QUADRANT OF LEFT BREAST OF FEMALE, ESTROGEN RECEPTOR NEGATIVE (H): ICD-10-CM

## 2023-02-10 LAB
ALBUMIN SERPL BCG-MCNC: 4.2 G/DL (ref 3.5–5.2)
ALP SERPL-CCNC: 87 U/L (ref 35–104)
ALT SERPL W P-5'-P-CCNC: 33 U/L (ref 10–35)
ANION GAP SERPL CALCULATED.3IONS-SCNC: 11 MMOL/L (ref 7–15)
AST SERPL W P-5'-P-CCNC: 51 U/L (ref 10–35)
BILIRUB SERPL-MCNC: 0.4 MG/DL
BUN SERPL-MCNC: 37.4 MG/DL (ref 8–23)
CALCIUM SERPL-MCNC: 9 MG/DL (ref 8.8–10.2)
CHLORIDE SERPL-SCNC: 106 MMOL/L (ref 98–107)
CREAT SERPL-MCNC: 1.29 MG/DL (ref 0.51–0.95)
DEPRECATED HCO3 PLAS-SCNC: 23 MMOL/L (ref 22–29)
GFR SERPL CREATININE-BSD FRML MDRD: 43 ML/MIN/1.73M2
GLUCOSE SERPL-MCNC: 95 MG/DL (ref 70–99)
POTASSIUM SERPL-SCNC: 4 MMOL/L (ref 3.4–5.3)
PROT SERPL-MCNC: 6.8 G/DL (ref 6.4–8.3)
SODIUM SERPL-SCNC: 140 MMOL/L (ref 136–145)
T4 FREE SERPL-MCNC: 0.73 NG/DL (ref 0.9–1.7)
TSH SERPL DL<=0.005 MIU/L-ACNC: 44.11 UIU/ML (ref 0.3–4.2)

## 2023-02-10 PROCEDURE — 80051 ELECTROLYTE PANEL: CPT | Performed by: INTERNAL MEDICINE

## 2023-02-10 PROCEDURE — 96413 CHEMO IV INFUSION 1 HR: CPT

## 2023-02-10 PROCEDURE — G0463 HOSPITAL OUTPT CLINIC VISIT: HCPCS | Mod: 25 | Performed by: NURSE PRACTITIONER

## 2023-02-10 PROCEDURE — 99214 OFFICE O/P EST MOD 30 MIN: CPT | Performed by: NURSE PRACTITIONER

## 2023-02-10 PROCEDURE — 250N000011 HC RX IP 250 OP 636: Performed by: INTERNAL MEDICINE

## 2023-02-10 PROCEDURE — 84443 ASSAY THYROID STIM HORMONE: CPT | Performed by: INTERNAL MEDICINE

## 2023-02-10 PROCEDURE — 84450 TRANSFERASE (AST) (SGOT): CPT | Performed by: INTERNAL MEDICINE

## 2023-02-10 PROCEDURE — 84155 ASSAY OF PROTEIN SERUM: CPT | Performed by: INTERNAL MEDICINE

## 2023-02-10 PROCEDURE — 84439 ASSAY OF FREE THYROXINE: CPT | Performed by: INTERNAL MEDICINE

## 2023-02-10 PROCEDURE — 258N000003 HC RX IP 258 OP 636: Performed by: INTERNAL MEDICINE

## 2023-02-10 RX ORDER — LEVOTHYROXINE SODIUM 50 UG/1
100 TABLET ORAL DAILY
Qty: 60 TABLET | Refills: 3 | Status: SHIPPED | OUTPATIENT
Start: 2023-02-10 | End: 2023-03-02 | Stop reason: DRUGHIGH

## 2023-02-10 RX ORDER — HEPARIN SODIUM (PORCINE) LOCK FLUSH IV SOLN 100 UNIT/ML 100 UNIT/ML
5 SOLUTION INTRAVENOUS
Status: DISCONTINUED | OUTPATIENT
Start: 2023-02-10 | End: 2023-02-10 | Stop reason: HOSPADM

## 2023-02-10 RX ADMIN — SODIUM CHLORIDE 250 ML: 9 INJECTION, SOLUTION INTRAVENOUS at 10:31

## 2023-02-10 RX ADMIN — SODIUM CHLORIDE 200 MG: 9 INJECTION, SOLUTION INTRAVENOUS at 10:31

## 2023-02-10 RX ADMIN — Medication 5 ML: at 11:22

## 2023-02-10 ASSESSMENT — PAIN SCALES - GENERAL: PAINLEVEL: NO PAIN (0)

## 2023-02-10 NOTE — PROGRESS NOTES
Infusion Nursing Note:  Jann Davidson presents today for C9D1 Keytruda.    Patient seen by provider today: Yes: Tadeo Dugan NP   present during visit today: Not Applicable.    Note: Patient reports feeling well with no new concerns.  Tadeo Dugan NP did discuss elevated TSH level with patient in infusion today.    Intravenous Access:  Implanted Port.    Treatment Conditions:  Lab Results   Component Value Date    HGB 10.6 (L) 08/08/2022    WBC 4.1 08/08/2022    ANEU 1.8 05/06/2022    ANEUTAUTO 2.6 08/08/2022     08/08/2022      Lab Results   Component Value Date     02/10/2023    POTASSIUM 4.0 02/10/2023    MAG 1.7 05/23/2022    CR 1.29 (H) 02/10/2023    AMY 9.0 02/10/2023    BILITOTAL 0.4 02/10/2023    ALBUMIN 4.2 02/10/2023    ALT 33 02/10/2023    AST 51 (H) 02/10/2023     Results reviewed, labs MET treatment parameters, ok to proceed with treatment.    Post Infusion Assessment:  Patient tolerated infusion without incident.  Blood return noted pre and post infusion.  Site patent and intact, free from redness, edema or discomfort.  No evidence of extravasations.  Access discontinued per protocol.     Discharge Plan:   Patient declined prescription refills.  Discharge instructions reviewed with: Patient.  Patient and/or family verbalized understanding of discharge instructions and all questions answered.  AVS to patient via [employer].  Patient will return 3/10/23 to see Tadeo Dugan NP for her next appointment.   Patient discharged in stable condition accompanied by: self.  Departure Mode: Ambulatory.      Reba Carrion RN

## 2023-02-10 NOTE — PROGRESS NOTES
Nursing Note:  Jann Davidson presents today for port labs for treatment today.    Patient seen by provider today: Yes: Tadeo   present during visit today: Not Applicable.    Note: N/A.    Intravenous Access:  Labs drawn without difficulty.  Implanted Port.    Discharge Plan:   Patient was sent to [facility] for clinic appointment.    Tico Tyler RN

## 2023-02-10 NOTE — PROGRESS NOTES
"Oncology Rooming Note    February 10, 2023 9:27 AM   Jann Davidson is a 77 year old female who presents for:    Chief Complaint   Patient presents with     Oncology Clinic Visit     Malignant neoplasm of lower-outer quadrant of left breast of female, estrogen receptor negative (H)     Initial Vitals: /81   Pulse 85   Temp 98.2  F (36.8  C) (Oral)   Resp 16   Ht 1.6 m (5' 3\")   Wt 60.6 kg (133 lb 9.6 oz)   SpO2 99%   BMI 23.67 kg/m   Estimated body mass index is 23.67 kg/m  as calculated from the following:    Height as of this encounter: 1.6 m (5' 3\").    Weight as of this encounter: 60.6 kg (133 lb 9.6 oz). Body surface area is 1.64 meters squared.  No Pain (0) Comment: Data Unavailable   No LMP recorded. Patient is postmenopausal.  Allergies reviewed: Yes  Medications reviewed: Yes    Medications: Medication refills not needed today.  Pharmacy name entered into EPIC:    [facility] #[id number] - [place], [place] - [address]  [facility] - [place], [place] - [address]    Clinical concerns: None       Maryellen Ge MA              "

## 2023-02-10 NOTE — LETTER
"    2/10/2023         RE: Jann Davidson  [address] [facility] [address]        Dear Colleague,    Thank you for referring your patient, Jann Davidson, to the [facility]. Please see a copy of my visit note below.    Oncology Rooming Note    February 10, 2023 9:27 AM   Jann Davidson is a 77 year old female who presents for:    Chief Complaint   Patient presents with     Oncology Clinic Visit     Malignant neoplasm of lower-outer quadrant of left breast of female, estrogen receptor negative (H)     Initial Vitals: /81   Pulse 85   Temp 98.2  F (36.8  C) (Oral)   Resp 16   Ht 1.6 m (5' 3\")   Wt 60.6 kg (133 lb 9.6 oz)   SpO2 99%   BMI 23.67 kg/m   Estimated body mass index is 23.67 kg/m  as calculated from the following:    Height as of this encounter: 1.6 m (5' 3\").    Weight as of this encounter: 60.6 kg (133 lb 9.6 oz). Body surface area is 1.64 meters squared.  No Pain (0) Comment: Data Unavailable   No LMP recorded. Patient is postmenopausal.  Allergies reviewed: Yes  Medications reviewed: Yes    Medications: Medication refills not needed today.  Pharmacy name entered into EPIC:    [facility] #[id number] - [place], [place] - [address] [facility] - [place], [place] - [address] [place] [address]    Clinical concerns: None       Maryellen Ge MA                Oncology/Hematology Visit Note  Feb 10, 2023    Reason for Visit: follow up of triple negative left breast cancer  Stage IIb grade 2 invasive ductal carcinoma  Status post neoadjuvant chemotherapy with immunotherapy  Breast MRI showed no residual enhancement therefore patient did not get Adriamycin Cytoxan  -Status postlumpectomy and sentinel lymph node excision-near complete response to neoadjuvant therapy with residual LCIS and atypical lobular hyperplasia  -Dr. Mcmullen recommended pembrolizumab for 9 cycles for surgery  S/p adjuvant radiation  complete on " "09/22/22 08/08-started Evista 5 mg given the findings of LCIS and ALH    Interval History:  Overall reports feeling well.  Reports she has occasional dizziness from thyroid medication.  Denies headache seizures changes in gait or vision problems.  Denies fever chills sweats cough shortness of breath chest pain nausea vomiting diarrhea abdominal pain or bleeding        Review of Systems:  14 point ROS of systems including Constitutional, Eyes, Respiratory, Cardiovascular, Gastroenterology, Genitourinary, Integumentary, Muscularskeletal, Psychiatric were all negative except for pertinent positives noted in my HPI.      Physical Examination:  General: The patient is a pleasant female in no acute distress.  /81   Pulse 85   Temp 98.2  F (36.8  C) (Oral)   Resp 16   Ht 1.6 m (5' 3\")   Wt 60.6 kg (133 lb 9.6 oz)   SpO2 99%   BMI 23.67 kg/m    HEENT: EOMI, PERRL. Sclerae are anicteric. Oral mucosa is pink and moist with no lesions or thrush.   Lymph: Neck is supple with no lymphadenopathy in the cervical or supraclavicular areas.   Heart: Regular rate and rhythm.   Lungs: Clear to auscultation bilaterally.   GI: Bowel sounds present, soft, nontender with no palpable hepatosplenomegaly or masses.   Extremities: No lower extremity edema noted bilaterally.   Skin: No rashes, petechiae, or bruising noted on exposed skin.    Laboratory Data:  CMP and TSH results reviewed    Assessment and Plan:  This is a 77-year-old female with    Triple negative left breast cancer  Status post neoadjuvant chemotherapy with pembrolizumab followed by lumpectomy and sentinel lymph node excision-showing near complete response  With residual LCIS and ALH  -Per Dr. Mcmullen patient recommendation patient started pembrolizumab with plan for 9 cycles   -08/08/22-started at this time 5 mg given  LCIS and ALH  -09/22/2022-completed adjuvant radiation  -Labs reviewed  -Continue with pembrolizumab-today is last dose of pembrolizumab  Continue " with Evista 5 mg  -=01/13/2023-diagnostic mammogram - post radiation no suspicious findings plan for breast MRI sometime in July but patient would like to be scheduled in June  -Patient would like to see Dr. Mcmullen in March.  Schedule appointment with Dr. Mcmullen  -Schedule for MRI of the breast in June and follow-up with Dr. Mcmullen to review the results of the MRI        Chronic kidney disease  Overall stable creatinine level  Continue follow-up with Dr. Luna    FDG uptake the anal canal  Patient will see colorectal for further evaluation    Right renal mass  Patient has follow-up appointment with urology  Patient is scheduled for CT scan in May    Pulmonary nodules  Stable continue to monitor    Hypothyroidism.  Secondary to Keytruda  I recommend endocrinology referral  Patient does not want to see endocrinologist and wants us to manage hypothyroidism  -Start levothyroxine 100 mcg daily  Recheck TSH T4 in March      Mild elevation in AST  Possible due to Keytruda  Continue to monitor closely for now        Call clinic with any changes in health condition or questions      HUBER Damon CNP  [facility]     Chart documentation with Dragon Voice recognition Software. Although reviewed after completion, some words and grammatical errors may remain.            Again, thank you for allowing me to participate in the care of your patient.        Sincerely,        HUBER Damon CNP

## 2023-02-10 NOTE — PROGRESS NOTES
"Oncology/Hematology Visit Note  Feb 10, 2023    Reason for Visit: follow up of triple negative left breast cancer  Stage IIb grade 2 invasive ductal carcinoma  Status post neoadjuvant chemotherapy with immunotherapy  Breast MRI showed no residual enhancement therefore patient did not get Adriamycin Cytoxan  -Status postlumpectomy and sentinel lymph node excision-near complete response to neoadjuvant therapy with residual LCIS and atypical lobular hyperplasia  -Dr. Mcmullen recommended pembrolizumab for 9 cycles for surgery  S/p adjuvant radiation  complete on 09/22/22 08/08-started Evista 5 mg given the findings of LCIS and ALH    Interval History:  Overall reports feeling well.  Reports she has occasional dizziness from thyroid medication.  Denies headache seizures changes in gait or vision problems.  Denies fever chills sweats cough shortness of breath chest pain nausea vomiting diarrhea abdominal pain or bleeding        Review of Systems:  14 point ROS of systems including Constitutional, Eyes, Respiratory, Cardiovascular, Gastroenterology, Genitourinary, Integumentary, Muscularskeletal, Psychiatric were all negative except for pertinent positives noted in my HPI.      Physical Examination:  General: The patient is a pleasant female in no acute distress.  /81   Pulse 85   Temp 98.2  F (36.8  C) (Oral)   Resp 16   Ht 1.6 m (5' 3\")   Wt 60.6 kg (133 lb 9.6 oz)   SpO2 99%   BMI 23.67 kg/m    HEENT: EOMI, PERRL. Sclerae are anicteric. Oral mucosa is pink and moist with no lesions or thrush.   Lymph: Neck is supple with no lymphadenopathy in the cervical or supraclavicular areas.   Heart: Regular rate and rhythm.   Lungs: Clear to auscultation bilaterally.   GI: Bowel sounds present, soft, nontender with no palpable hepatosplenomegaly or masses.   Extremities: No lower extremity edema noted bilaterally.   Skin: No rashes, petechiae, or bruising noted on exposed skin.    Laboratory Data:  CMP and TSH results " reviewed    Assessment and Plan:  This is a 77-year-old female with    Triple negative left breast cancer  Status post neoadjuvant chemotherapy with pembrolizumab followed by lumpectomy and sentinel lymph node excision-showing near complete response  With residual LCIS and ALH  -Per Dr. Mcmullen patient recommendation patient started pembrolizumab with plan for 9 cycles   -08/08/22-started at this time 5 mg given  LCIS and ALH  -09/22/2022-completed adjuvant radiation  -Labs reviewed  -Continue with pembrolizumab-today is last dose of pembrolizumab  Continue with Evista 5 mg  -=01/13/2023-diagnostic mammogram - post radiation no suspicious findings plan for breast MRI sometime in July but patient would like to be scheduled in June  -Patient would like to see Dr. Mcmullen in March.  Schedule appointment with Dr. Mcmullen  -Schedule for MRI of the breast in June and follow-up with Dr. Mcmullen to review the results of the MRI        Chronic kidney disease  Overall stable creatinine level  Continue follow-up with Dr. Luna    FDG uptake the anal canal  Patient will see colorectal for further evaluation    Right renal mass  Patient has follow-up appointment with urology  Patient is scheduled for CT scan in May    Pulmonary nodules  Stable continue to monitor    Hypothyroidism.  Secondary to Keytruda  I recommend endocrinology referral  Patient does not want to see endocrinologist and wants us to manage hypothyroidism  -Start levothyroxine 100 mcg daily  Recheck TSH T4 in March      Mild elevation in AST  Possible due to Keytruda  Continue to monitor closely for now        Call clinic with any changes in health condition or questions      HUBER Damon [facility] [place]     Chart documentation with Dragon Voice recognition Software. Although reviewed after completion, some words and grammatical errors may remain.

## 2023-02-15 ENCOUNTER — TELEPHONE (OUTPATIENT)
Dept: INTERVENTIONAL RADIOLOGY/VASCULAR | Facility: CLINIC | Age: 78
End: 2023-02-15

## 2023-02-17 ENCOUNTER — APPOINTMENT (OUTPATIENT)
Dept: INTERVENTIONAL RADIOLOGY/VASCULAR | Facility: CLINIC | Age: 78
End: 2023-02-17
Attending: INTERNAL MEDICINE
Payer: MEDICARE

## 2023-02-17 ENCOUNTER — HOSPITAL ENCOUNTER (OUTPATIENT)
Facility: CLINIC | Age: 78
Discharge: HOME | End: 2023-02-17
Admitting: RADIOLOGY
Payer: MEDICARE

## 2023-02-17 VITALS
DIASTOLIC BLOOD PRESSURE: 69 MMHG | RESPIRATION RATE: 18 BRPM | OXYGEN SATURATION: 97 % | TEMPERATURE: 98 F | SYSTOLIC BLOOD PRESSURE: 139 MMHG | HEART RATE: 84 BPM

## 2023-02-17 DIAGNOSIS — C50.512 MALIGNANT NEOPLASM OF LOWER-OUTER QUADRANT OF LEFT BREAST OF FEMALE, ESTROGEN RECEPTOR NEGATIVE (H): ICD-10-CM

## 2023-02-17 DIAGNOSIS — Z17.1 MALIGNANT NEOPLASM OF LOWER-OUTER QUADRANT OF LEFT BREAST OF FEMALE, ESTROGEN RECEPTOR NEGATIVE (H): ICD-10-CM

## 2023-02-17 LAB
ERYTHROCYTE [DISTWIDTH] IN BLOOD BY AUTOMATED COUNT: 12.9 % (ref 10–15)
HCT VFR BLD AUTO: 36.8 % (ref 35–47)
HGB BLD-MCNC: 11.4 G/DL (ref 11.7–15.7)
MCH RBC QN AUTO: 31.9 PG (ref 26.5–33)
MCHC RBC AUTO-ENTMCNC: 31 G/DL (ref 31.5–36.5)
MCV RBC AUTO: 103 FL (ref 78–100)
PLATELET # BLD AUTO: 275 10E3/UL (ref 150–450)
RBC # BLD AUTO: 3.57 10E6/UL (ref 3.8–5.2)
WBC # BLD AUTO: 5.2 10E3/UL (ref 4–11)

## 2023-02-17 PROCEDURE — 36590 REMOVAL TUNNELED CV CATH: CPT

## 2023-02-17 PROCEDURE — 36415 COLL VENOUS BLD VENIPUNCTURE: CPT | Performed by: NURSE PRACTITIONER

## 2023-02-17 PROCEDURE — 250N000009 HC RX 250: Performed by: RADIOLOGY

## 2023-02-17 PROCEDURE — 272N000602 HC WOUND GLUE CR1

## 2023-02-17 PROCEDURE — 85027 COMPLETE CBC AUTOMATED: CPT | Performed by: NURSE PRACTITIONER

## 2023-02-17 PROCEDURE — 85041 AUTOMATED RBC COUNT: CPT | Performed by: NURSE PRACTITIONER

## 2023-02-17 PROCEDURE — 250N000011 HC RX IP 250 OP 636: Performed by: NURSE PRACTITIONER

## 2023-02-17 RX ORDER — ACETAMINOPHEN 325 MG/1
650 TABLET ORAL
Status: DISCONTINUED | OUTPATIENT
Start: 2023-02-17 | End: 2023-02-17 | Stop reason: HOSPADM

## 2023-02-17 RX ORDER — NALOXONE HYDROCHLORIDE 0.4 MG/ML
0.4 INJECTION, SOLUTION INTRAMUSCULAR; INTRAVENOUS; SUBCUTANEOUS
Status: DISCONTINUED | OUTPATIENT
Start: 2023-02-17 | End: 2023-02-17 | Stop reason: HOSPADM

## 2023-02-17 RX ORDER — FENTANYL CITRATE 50 UG/ML
25-50 INJECTION, SOLUTION INTRAMUSCULAR; INTRAVENOUS EVERY 5 MIN PRN
Status: DISCONTINUED | OUTPATIENT
Start: 2023-02-17 | End: 2023-02-17 | Stop reason: HOSPADM

## 2023-02-17 RX ORDER — FLUMAZENIL 0.1 MG/ML
0.2 INJECTION, SOLUTION INTRAVENOUS
Status: DISCONTINUED | OUTPATIENT
Start: 2023-02-17 | End: 2023-02-17 | Stop reason: HOSPADM

## 2023-02-17 RX ORDER — NALOXONE HYDROCHLORIDE 0.4 MG/ML
0.2 INJECTION, SOLUTION INTRAMUSCULAR; INTRAVENOUS; SUBCUTANEOUS
Status: DISCONTINUED | OUTPATIENT
Start: 2023-02-17 | End: 2023-02-17 | Stop reason: HOSPADM

## 2023-02-17 RX ADMIN — LIDOCAINE HYDROCHLORIDE 10 ML: 10; .005 INJECTION, SOLUTION EPIDURAL; INFILTRATION; INTRACAUDAL; PERINEURAL at 14:27

## 2023-02-17 RX ADMIN — MIDAZOLAM 2 MG: 1 INJECTION INTRAMUSCULAR; INTRAVENOUS at 14:27

## 2023-02-17 RX ADMIN — MIDAZOLAM 2 MG: 1 INJECTION INTRAMUSCULAR; INTRAVENOUS at 14:22

## 2023-02-17 RX ADMIN — FENTANYL CITRATE 50 MCG: 50 INJECTION, SOLUTION INTRAMUSCULAR; INTRAVENOUS at 14:28

## 2023-02-17 RX ADMIN — FENTANYL CITRATE 50 MCG: 50 INJECTION, SOLUTION INTRAMUSCULAR; INTRAVENOUS at 14:22

## 2023-02-17 ASSESSMENT — ACTIVITIES OF DAILY LIVING (ADL)
ADLS_ACUITY_SCORE: 35
ADLS_ACUITY_SCORE: 35

## 2023-02-17 NOTE — PROGRESS NOTES
Care Suites Discharge Nursing Note    Patient Information  Name: Jann Davidson  Age: 77 year old    Discharge Education:  Discharge instructions reviewed: Yes  Additional education/resources provided: NA  Patient/patient representative verbalizes understanding: Yes  Patient discharging on new medications: No  Medication education completed: N/A    Discharge Plans:   Discharge location: home  Discharge ride contacted: Yes  Approximate discharge time: 7334    Discharge Criteria:  Discharge criteria met and vital signs stable: Yes    Patient Belongs:  Patient belongings returned to patient: Yes    Ady Loya RN

## 2023-02-17 NOTE — PRE-PROCEDURE
GENERAL PRE-PROCEDURE:   Procedure:  Right port a catheter removal with moderate sedation   Date/Time:  2/17/2023 12:26 PM    Written consent obtained?: Yes    Risks and benefits: Risks, benefits and alternatives were discussed    Consent given by:  Patient  Patient states understanding of procedure being performed: Yes    Patient's understanding of procedure matches consent: Yes    Procedure consent matches procedure scheduled: Yes    Expected level of sedation:  Moderate  Appropriately NPO:  Yes  ASA Class:  3  Mallampati  :  Grade 1- soft palate, uvula, tonsillar pillars, and posterior pharyngeal wall visible  Lungs:  Lungs clear with good breath sounds bilaterally  Heart:  Normal heart sounds and rate  History & Physical reviewed:  History and physical reviewed and no updates needed  Statement of review:  I have reviewed the lab findings, diagnostic data, medications, and the plan for sedation

## 2023-02-17 NOTE — PROGRESS NOTES
Care Suites Post Procedure Note    Patient Information  Name: Jann Davidson  Age: 77 year old    Post Procedure  Time patient returned to Care Suites: 2346  Concerns/abnormal assessment: none at present  If abnormal assessment, provider notified: N/A  Plan/Other: monitor    William Treviño RN

## 2023-02-17 NOTE — DISCHARGE INSTRUCTIONS
Port Removal Discharge Instructions     After you go home:    Have an adult stay with you for the first 6 hours  You may resume your normal diet       For 24 hours - due to the sedation you received:  Relax and take it easy  Do NOT make any important or legal decisions  Do NOT drive or operate machines at home or at work  Do NOT drink alcohol    Care of Puncture Site:    Keep the dressings on your site clean & dry for 3 days. Change it only if it gets wet or dirty.  You may shower after the dressing comes off in 3 days  Do not remove the small white strips of tape, if present. Allow them to fall off on their own.   You may cover the wound with a bandaid after the dressing is removed if needed for comfort      Activity     Avoid heavy lifting (greater than 10 pounds) or the overuse of your shoulder for 3 days    Bleeding:    If you start bleeding from the incision site in your chest or have swelling in your neck, sit down and press on the site for 5-10 minutes.   If bleeding has not stopped after 10 minutes, call your provider.        Call 911 right away if you have heavy bleeding or bleeding that does not stop.      Medicines:    You may resume all medications  Resume your Warfarin/Coumadin at your regular dose today. Follow up with your provider to have your INR rechecked  Resume your Platelet Inhibitors and Aspirin tomorrow at your regular dose  For minor pain, you may take Acetaminophen (Tylenol) or Ibuprofen (Advil)          Call the provider who ordered this procedure if:    You have swelling in your neck or over your port site  The incision area is red, swollen, hot or tender  You have chills or a fever greater than 101 F (38 C)  Any questions or concerns    Call  911 or go to the Emergency Room if you have:    Severe chest pain or trouble breathing  Bleeding that you cannot control    If you have questions call:          [facility] @ [phone]    The provider who performed your  procedure was _________________.

## 2023-02-17 NOTE — IR NOTE
Patient Name: Jann Davidson  Medical Record Number: [id number]  Today's Date: 2/17/2023    Procedure: Port Removal  Proceduralist: Dr. Castillo  Pathology present: no    Procedure Start: 1422  Procedure end: 1437  Sedation medications administered: Last Dose: 1427, Fentanyl 100 mcg, Versed 4 mg, Lidocaine 10 ml's.    Report given to: NADYA Thomas RN  : no    Other Notes: Pt will require 1 bedrest post procedure. Pt arrived to IR room 2 from cs 13. Consent reviewed. Pt denies any questions or concerns regarding procedure. Pt positioned supine and monitored per protocol. Pt tolerated procedure without any noted complications.

## 2023-02-28 DIAGNOSIS — D70.1 CHEMOTHERAPY-INDUCED NEUTROPENIA (H): ICD-10-CM

## 2023-02-28 DIAGNOSIS — F41.9 ANXIETY: ICD-10-CM

## 2023-02-28 DIAGNOSIS — T45.1X5A CHEMOTHERAPY-INDUCED NEUTROPENIA (H): ICD-10-CM

## 2023-03-01 RX ORDER — LORAZEPAM 0.5 MG/1
0.5 TABLET ORAL EVERY 6 HOURS PRN
Qty: 45 TABLET | Refills: 0 | Status: SHIPPED | OUTPATIENT
Start: 2023-03-01 | End: 2023-04-17

## 2023-03-02 ENCOUNTER — TELEPHONE (OUTPATIENT)
Dept: ONCOLOGY | Facility: CLINIC | Age: 78
End: 2023-03-02
Payer: MEDICARE

## 2023-03-02 DIAGNOSIS — E03.2 HYPOTHYROIDISM DUE TO MEDICATION: Primary | ICD-10-CM

## 2023-03-02 RX ORDER — LEVOTHYROXINE SODIUM 25 UG/1
25 TABLET ORAL DAILY
Qty: 30 TABLET | Refills: 1 | Status: SHIPPED | OUTPATIENT
Start: 2023-03-02 | End: 2023-04-13 | Stop reason: DRUGHIGH

## 2023-03-02 NOTE — TELEPHONE ENCOUNTER
Pt called to cancel follow up with Dr Mcmullen today. Writer was asked to speak with her as she was crying and wasn't sure what was going on.    Spoke with Jann, she reports extreme lightheadedness and dizziness every since increasing the synthroid at her last appt with Tadeo Dugan. She is currently taking 100 mcg daily. She feels shaky, denies nausea/vomiting or visual issues.     She states that this same thing happened when the synthroid dose was increase previously.     Offered to do a video appt with Dr Mcmullen so she could stay at home.She declined this also.    Routed to Dr Mcmullen to advise.    Regina Arango RN

## 2023-03-03 NOTE — TELEPHONE ENCOUNTER
Writer called pt with recommendations from Dr. Mcmullen.    Neetu Mcmullen MD Meagher, Ann K RN 18 hours ago (3:52 PM)     JH  Likely too high of a starting dose (she's been on and off it but not following instructions).     Let's lower levothyroxine to 25 mcg/day.  She should really see endocrinology for this but I know she has refused.  Will recheck thyroid function tests in 4 weeks -- I'll place orders -- will slowly titrate up dose about every 4 weeks or so.  She should reschedule to see me in April.     Thank you,   Neetu        Pt would like to call back at a later time to make apt for 4 weeks out to see Dr. Mcmullen. Pt verbalized understanding of recommendations.

## 2023-03-06 ENCOUNTER — TELEPHONE (OUTPATIENT)
Dept: ONCOLOGY | Facility: CLINIC | Age: 78
End: 2023-03-06
Payer: MEDICARE

## 2023-03-06 PROBLEM — Z17.1 MALIGNANT NEOPLASM OF LOWER-OUTER QUADRANT OF LEFT BREAST OF FEMALE, ESTROGEN RECEPTOR NEGATIVE (H): Status: ACTIVE | Noted: 2021-12-16

## 2023-03-06 PROBLEM — C50.512 MALIGNANT NEOPLASM OF LOWER-OUTER QUADRANT OF LEFT BREAST OF FEMALE, ESTROGEN RECEPTOR NEGATIVE (H): Status: ACTIVE | Noted: 2021-12-16

## 2023-03-06 NOTE — TELEPHONE ENCOUNTER
"Left voicemail message for patient requesting a return call regarding scheduling lab draw and return visit with Dr Mcmullen.       ----- Message from Regina Arango RN sent at 3/3/2023 10:25 AM CST -----  Regarding: follow up  \"Pt would like to call back at a later time to make apt for 4 weeks out to see Dr. Mcmullen. Pt verbalized understanding of recommendations\" per triage    Thanks  Regina       "

## 2023-03-07 ENCOUNTER — TELEPHONE (OUTPATIENT)
Dept: ONCOLOGY | Facility: CLINIC | Age: 78
End: 2023-03-07
Payer: MEDICARE

## 2023-03-23 DIAGNOSIS — G47.00 PERSISTENT DISORDER OF INITIATING OR MAINTAINING SLEEP: Chronic | ICD-10-CM

## 2023-03-24 RX ORDER — ZOLPIDEM TARTRATE 5 MG/1
TABLET ORAL
Qty: 45 TABLET | Refills: 1 | Status: SHIPPED | OUTPATIENT
Start: 2023-03-24 | End: 2023-07-07

## 2023-04-02 ENCOUNTER — HEALTH MAINTENANCE LETTER (OUTPATIENT)
Age: 78
End: 2023-04-02

## 2023-04-05 ENCOUNTER — TELEPHONE (OUTPATIENT)
Dept: FAMILY MEDICINE | Facility: CLINIC | Age: 78
End: 2023-04-05

## 2023-04-05 DIAGNOSIS — Z13.220 SCREENING CHOLESTEROL LEVEL: ICD-10-CM

## 2023-04-05 DIAGNOSIS — N18.30 STAGE 3 CHRONIC KIDNEY DISEASE, UNSPECIFIED WHETHER STAGE 3A OR 3B CKD (H): Primary | ICD-10-CM

## 2023-04-05 NOTE — TELEPHONE ENCOUNTER
"Patient calling to review Care gaps:    - Albumin and Lipid is needed - Pt would like to have them run during her appt at [facility] on Monday 4/10     Orders T'd    - Annual Exam - States she has too many oncology and other speciality appts to come in for annual with PCP    - Colorectal Cancer screen had sigmoidoscopy a few months ago and doesn't need colonoscopy    Ok to use these visit types to fulfill \"care gap\"?    Thanks   Aliza ADAMS RN  [facility]    "

## 2023-04-07 NOTE — PROGRESS NOTES
[facility]    Hematology/Oncology Established Patient Note      Today's Date: 4/13/2023    Reason for follow-up:  Left breast cancer, triple negative.    HISTORY OF PRESENT ILLNESS: Jann Davidson is a 77 year old female who presents with the following oncologic history:  1. 10/26/2021: Mammogram showed calcifications in the left lateral breast; right breast negative.  2. 11/17/2021: Left diagnostic mammogram showed cluster of pleomorphic calcifications at 4:00, 6 cm from nipple, measuring 1.3 cm.  3. 12/3/2021: Stereotactic left breast biopsy at 4:00, 6 cm from nipple showed grade 2 invasive ductal carcinoma with micropapillary features, extensive lymphovascular invasion present, grade 2-3 focal DCIS, LCIS, ER negative, SD negative, HER-2/maxime FISH negative.  4. 12/10/2021: Breast MRI showed oval mass 2 x 2 x 1.5 cm at 4:00, 6 cm from nipple in left breast reflecting biopsy cavity with post-biopsy changes; prominent 2.5 cm low left level 1 axillary lymph node.  5.  12/28/2021: PET/CT scan showed FDG avid focus in left lower outer breast, hypermetabolic left axillary adenopathy, moderate FDG uptake at level off anus, exophytic right renal 1.1 cm mass, slowly increasing in size since 2016 concerning for growing renal cell carcinoma.  6. 1/18/2022: Started neoadjuvant chemotherapy with weekly paclitaxel + carboplatin and every 3-week pembrolizumab as per KEYNOTE-522 study.  7. 4/19/2022: Breast MRI showed no residual enhancement at site of primary malignancy in left breast at 4:00; no residual left axillary adenopathy. Right breast negative.  8. 5/6/2022: Completed cycle 12 paclitaxel with pembrolizumab. Surgery delayed due to 2 hospitalizations for rash and peripheral neuropathy.  9. 6/27/2022: Underwent left breast lumpectomy and left axillary sentinel lymph node excision under care of Dr. Tatianna Rai.  Pathology showed no residual invasive carcinoma in left breast; residual LCIS and atypical  lobular hyperplasia present; one of 2 lymph nodes with isolated tumor cells measuring 0.087 mm, focal fibrosis consistent with treatment effect; RCB-I, ypT0-pN0(i+).  10. 8/08/2022: Started raloxifene for LCIS and atypical lobular hyperplasia.  11. 8/10/2022-9/22/2022: Completion of adjuvant radiation therapy.  12. 8/25/2022-2/10/2023: Completed adjuvant pembrolizumab x 9 cycles.    INTERVAL HISTORY:  Jann reports feeling tired.  She denies dyspnea, cough, bowel or bladder issues.    REVIEW OF SYSTEMS:   14 point ROS was reviewed and is negative other than as noted above in HPI.       HOME MEDICATIONS:  Current Outpatient Medications   Medication Sig Dispense Refill     atorvastatin (LIPITOR) 10 MG tablet TAKE 1 TABLET (10 MG) BY MOUTH ONCE DAILY 90 tablet 3     candesartan (ATACAND) 4 MG tablet Take 0.5 tablets (2 mg) by mouth daily DISPENSE AS WRITTEN, Brand name only       levothyroxine (SYNTHROID/LEVOTHROID) 25 MCG tablet Take 1 tablet (25 mcg) by mouth daily 30 tablet 1     loratadine (CLARITIN) 10 MG tablet Take 10 mg by mouth daily       LORazepam (ATIVAN) 0.5 MG tablet TAKE 1 TABLET (0.5 MG) BY MOUTH EVERY 6 HOURS AS NEEDED FOR ANXIETY, NAUSEA OR SLEEP 45 tablet 0     Magnesium 400 MG TABS Take 800 mg by mouth daily       oxazepam (SERAX) 15 MG capsule TAKE 1 CAPSULE (15 MG) BY MOUTH NIGHTLY AS NEEDED FOR ANXIETY 45 capsule 1     PARNATE 10 MG tablet TAKE 1 TABLET BY MOUTH 3 TIMES DAILY 300 tablet 3     polyethylene glycol (MIRALAX/GLYCOLAX) packet Take 1 packet by mouth daily       raloxifene (EVISTA) 60 MG tablet Take 1 tablet (60 mg) by mouth daily 90 tablet 3     triamcinolone (KENALOG) 0.1 % external cream Apply  topically 2 times daily as needed. 15 g 1     zolpidem (AMBIEN) 5 MG tablet TAKE 1 TABLET (5 MG) BY MOUTH NIGHTLY AS NEEDED FOR SLEEP 45 tablet 1         ALLERGIES:  Allergies   Allergen Reactions     Tegaderm Transparent Dressing (Informational Only) Blisters     Lyrica [Pregabalin] Rash          PAST MEDICAL HISTORY:  Past Medical History:   Diagnosis Date     Breast cancer (H)      CKD (chronic kidney disease) stage 3, GFR 30-59 ml/min (H)      Depression with anxiety      Gout      Hypertension goal BP (blood pressure) < 140/90      Recurrent sinusitis 2013     Gynecologic history:  Age of menarche at 11; LMP ;  (one son), 1st pregnancy at age 19; no HRT.    PAST SURGICAL HISTORY:  Past Surgical History:   Procedure Laterality Date     BIOPSY NODE SENTINEL Left 2022    Procedure: left axillary sentinel lymph node biopsy;  Surgeon: Tatianna Rai MD;  Location: [facility] OR     BREAST BIOPSY, RT/LT      Breat Biopsy RT/LT     COLONOSCOPY  10/03     COLONOSCOPY  2014    Procedure: COLONOSCOPY;  COLONOSCOPY ;  Surgeon: Gaurav Shaffer MD;  Location: [facility]     IR CHEST PORT PLACEMENT > 5 YRS OF AGE  2021     IR PORT REMOVAL RIGHT  2023     LUMPECTOMY BREAST WITH SEED LOCALIZATION Left 2022    Procedure: Radiofrequency tag localized left breast lumpectomy with radiofrequency tag localized excision of left axillary lymph node;  Surgeon: Tatianna Rai MD;  Location: [facility] OR     RECONSTRUCT EYELID           SOCIAL HISTORY:  Social History     Socioeconomic History     Marital status:      Spouse name: Not on file     Number of children: Not on file     Years of education: Not on file     Highest education level: Not on file   Occupational History     Not on file   Tobacco Use     Smoking status: Former     Types: Cigarettes     Quit date: 10/30/1972     Years since quittin.4     Smokeless tobacco: Never   Vaping Use     Vaping status: Never Used   Substance and Sexual Activity     Alcohol use: Yes     Comment: couple glasses of wine daily      Drug use: No     Sexual activity: Never   Other Topics Concern     Parent/sibling w/ CABG, MI or angioplasty before 65F 55M? Not Asked   Social History Narrative     Not on file     Social Determinants of  Health     Financial Resource Strain: Not on file   Food Insecurity: Not on file   Transportation Needs: Not on file   Physical Activity: Not on file   Stress: Not on file   Social Connections: Not on file   Intimate Partner Violence: Not At Risk (10/7/2022)    Humiliation, Afraid, Rape, and Kick questionnaire      Fear of Current or Ex-Partner: No      Emotionally Abused: No      Physically Abused: No      Sexually Abused: No   Housing Stability: Not on file         FAMILY HISTORY:  Family History   Problem Relation Age of Onset     Cancer Mother         uterine     Breast Cancer Mother      Diabetes Paternal Grandmother          PHYSICAL EXAM:  Vital signs:  /77   Pulse 89   Resp 16   SpO2 98%    GENERAL: No acute distress.  EYES: No scleral icterus. No overt erythema.  LYMPH: No palpable lymphadenopathy in the cervical, supraclavicular, axillary regions.  BREAST: No palpable masses in either breast. Postlumpectomy changes in the right breast.  Nipples are everted bilaterally with no discharge. No erythema, ulceration, or dimpling of the skin.  RESPIRATORY: No audible cough or wheezing.  ABDOMEN: Soft, nontender, nondistended, with no palpable hepatosplenomegaly.   MUSCULOSKELETAL: No clubbing, cyanosis or edema.  SKIN: No rashes or jaundice.  NEUROLOGIC: Alert, answers questions appropriately; no focal deficits.  PSYCHIATRIC: Normal affect; anxious.    LABS:  CBC RESULTS: Recent Labs   Lab Test 04/10/23  1334   WBC 4.8   RBC 3.60*   HGB 11.6*   HCT 35.8   MCV 99   MCH 32.2   MCHC 32.4   RDW 12.4        Last Comprehensive Metabolic Panel:  Sodium   Date Value Ref Range Status   02/10/2023 140 136 - 145 mmol/L Final   06/05/2019 142 133 - 144 mmol/L Final     Potassium   Date Value Ref Range Status   02/10/2023 4.0 3.4 - 5.3 mmol/L Final   12/30/2022 4.0 3.4 - 5.3 mmol/L Final   06/05/2019 3.7 3.4 - 5.3 mmol/L Final     Chloride   Date Value Ref Range Status   02/10/2023 106 98 - 107 mmol/L Final    12/30/2022 110 (H) 94 - 109 mmol/L Final   06/05/2019 110 (H) 94 - 109 mmol/L Final     Carbon Dioxide   Date Value Ref Range Status   06/05/2019 24 20 - 32 mmol/L Final     Carbon Dioxide (CO2)   Date Value Ref Range Status   02/10/2023 23 22 - 29 mmol/L Final   12/30/2022 24 20 - 32 mmol/L Final     Anion Gap   Date Value Ref Range Status   02/10/2023 11 7 - 15 mmol/L Final   12/30/2022 7 3 - 14 mmol/L Final   06/05/2019 8 3 - 14 mmol/L Final     Glucose   Date Value Ref Range Status   02/10/2023 95 70 - 99 mg/dL Final   12/30/2022 103 (H) 70 - 99 mg/dL Final   06/05/2019 93 70 - 99 mg/dL Final     Comment:     Fasting specimen     Urea Nitrogen   Date Value Ref Range Status   02/10/2023 37.4 (H) 8.0 - 23.0 mg/dL Final   12/30/2022 29 7 - 30 mg/dL Final   06/05/2019 32 (H) 7 - 30 mg/dL Final     Creatinine   Date Value Ref Range Status   02/10/2023 1.29 (H) 0.51 - 0.95 mg/dL Final   06/05/2019 1.15 (H) 0.52 - 1.04 mg/dL Final     GFR Estimate   Date Value Ref Range Status   02/10/2023 43 (L) >60 mL/min/1.73m2 Final     Comment:     eGFR calculated using 2021 CKD-EPI equation.   06/05/2019 47 (L) >60 mL/min/[1.73_m2] Final     Comment:     Non  GFR Calc  Starting 12/18/2018, serum creatinine based estimated GFR (eGFR) will be   calculated using the Chronic Kidney Disease Epidemiology Collaboration   (CKD-EPI) equation.       GFR, ESTIMATED POCT   Date Value Ref Range Status   12/28/2021 33 (L) >60 mL/min/1.73m2 Final     Calcium   Date Value Ref Range Status   02/10/2023 9.0 8.8 - 10.2 mg/dL Final   06/05/2019 9.2 8.5 - 10.1 mg/dL Final     Bilirubin Total   Date Value Ref Range Status   02/10/2023 0.4 <=1.2 mg/dL Final   06/05/2019 0.4 0.2 - 1.3 mg/dL Final     Alkaline Phosphatase   Date Value Ref Range Status   02/10/2023 87 35 - 104 U/L Final   06/05/2019 85 40 - 150 U/L Final     ALT   Date Value Ref Range Status   02/10/2023 33 10 - 35 U/L Final   06/05/2019 33 0 - 50 U/L Final     AST    Date Value Ref Range Status   02/10/2023 51 (H) 10 - 35 U/L Final     Comment:     Specimen is hemolyzed which can falsely elevate AST. Analysis of a non-hemolyzed specimen may result in a lower value.   2019 33 0 - 45 U/L Final     4/10/2023: TSH = 29.92  Free T4 = 0.7    PATHOLOGY:  None new.    IMAGIN2022 Limited abdominal U/S showed:  Borderline dilated extrahepatic bile ducts, similar to previous.    11/10/2022: Renal CT showed 1.4 x 1.3 cm right kidney mass, increased from 1.1 x 1 cm.    ASSESSMENT/PLAN:  Jann Davidson is a 77 year old female with the following issues:  1. 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 Left breast LCIS and atypical lobular hyperplasia  --Jann completed neoadjuvant chemotherapy with paclitaxel and carboplatin for 12 weeks with every 3-week pembrolizumab. Given that her 2022 breast MRI showed no residual enhancement -- I had advised foregoing chemotherapy with Adriamycin and Cytoxan given her age and low likelihood of tolerating these drugs.  --2022 Final pathology from her lumpectomy and sentinel lymph node excision showed near-complete response to neoadjuvant chemotherapy with just isolated tumor cells in one lymph node.  She had residual LCIS and atypical lobular hyperplasia.  --She then completed adjuvant pembrolizumab on 2/10/2023 for 9 cycles as per the KEYNOTE-522 trial.  --She completed adjuvant radiation therapy on 2022 with Dr. Giles.  --Genetic testing negative.  --Jann has no clinical evidence for recurrent breast cancer by physical exam from today or mammogram reviewed from 2023.  --Plan to alternate mammogram with breast MRI for high risk surveillance given findings of LCIS and ALH, which are markers for increased risk of additional breast cancer in the future.  We will plan for  bilateral mammogram alternating with breast MRI. 6 months apart.  --Continue raloxifene for 5 years for risk reduction given the findings of LCIS and ALH. She is tolerating this well without side effects.    3. Chronic kidney disease, stage 3  --Creatinine stable.  --She follows with Dr. Luna.    4. FDG uptake at anal canal  --She has history of anal fissure from 4/8/2014 colonoscopy.  --PET scan showed uptake at level of anus and could represent hemorrhoid, fissure, malignancy, or be physiologic.  --She will be meeting with colorectal in 12/2022 with flex sig.    5. Right renal mass  --PET scan showed a 1.1 cm mass in the right kidney that has been reportedly slowly growing since 2016 concerning for renal cell carcinoma.  --Following with Dr. Guerrero, urology.  --11/10/2022 Renal CT showed slight increase in the renal mass concerning for renal cell carcinoma.  She is following with Dr. Guerrero.    6. Pulmonary nodules  --PET scan showed scattered small bilateral pulmonary nodules that are unchanged since 10/24/2018 and most likely benign. No further CT chest needed.    7. Depression/anxiety  --Stable.  --She would like to continue on Parnate and not change her antidepressant.  --She takes oxazepam together with zolpidem.    --She could use lorazepam (Ativan) as long as she takes this 6 hours from her other benzodiazepenes.    8. Anemia related to past chemotherapy  --Hgb stable at 11.6 from today.  --Recheck CBC in 2 months.    9. Hypothyroidism  --Likely related to pembrolizumab.    --TSH was elevated at 44.11 on 2/10/2023, now improved to 29.2 with free T4, low at 0.7.  --Advised increasing levothyroxine to 50 mcg daily.  --Repeat thyroid function tests in 2 months.    Return in 3 months.    Neetu Mcmullen MD  Hematology/Oncology  [facility]    Total time spent: 30 minutes in patient evaluation, counseling, documentation, and coordination of care.

## 2023-04-10 ENCOUNTER — LAB (OUTPATIENT)
Dept: INFUSION THERAPY | Facility: CLINIC | Age: 78
End: 2023-04-10
Attending: INTERNAL MEDICINE
Payer: MEDICARE

## 2023-04-10 DIAGNOSIS — Z17.1 MALIGNANT NEOPLASM OF LOWER-OUTER QUADRANT OF LEFT BREAST OF FEMALE, ESTROGEN RECEPTOR NEGATIVE (H): ICD-10-CM

## 2023-04-10 DIAGNOSIS — C50.512 MALIGNANT NEOPLASM OF LOWER-OUTER QUADRANT OF LEFT BREAST OF FEMALE, ESTROGEN RECEPTOR NEGATIVE (H): ICD-10-CM

## 2023-04-10 DIAGNOSIS — Z13.220 SCREENING CHOLESTEROL LEVEL: ICD-10-CM

## 2023-04-10 DIAGNOSIS — E03.2 HYPOTHYROIDISM DUE TO MEDICATION: ICD-10-CM

## 2023-04-10 LAB
BASOPHILS # BLD AUTO: 0 10E3/UL (ref 0–0.2)
BASOPHILS NFR BLD AUTO: 1 %
EOSINOPHIL # BLD AUTO: 0.1 10E3/UL (ref 0–0.7)
EOSINOPHIL NFR BLD AUTO: 2 %
ERYTHROCYTE [DISTWIDTH] IN BLOOD BY AUTOMATED COUNT: 12.4 % (ref 10–15)
HCT VFR BLD AUTO: 35.8 % (ref 35–47)
HGB BLD-MCNC: 11.6 G/DL (ref 11.7–15.7)
IMM GRANULOCYTES # BLD: 0 10E3/UL
IMM GRANULOCYTES NFR BLD: 1 %
LYMPHOCYTES # BLD AUTO: 1 10E3/UL (ref 0.8–5.3)
LYMPHOCYTES NFR BLD AUTO: 21 %
MCH RBC QN AUTO: 32.2 PG (ref 26.5–33)
MCHC RBC AUTO-ENTMCNC: 32.4 G/DL (ref 31.5–36.5)
MCV RBC AUTO: 99 FL (ref 78–100)
MONOCYTES # BLD AUTO: 0.4 10E3/UL (ref 0–1.3)
MONOCYTES NFR BLD AUTO: 9 %
NEUTROPHILS # BLD AUTO: 3.2 10E3/UL (ref 1.6–8.3)
NEUTROPHILS NFR BLD AUTO: 66 %
NRBC # BLD AUTO: 0 10E3/UL
NRBC BLD AUTO-RTO: 0 /100
PLATELET # BLD AUTO: 247 10E3/UL (ref 150–450)
RBC # BLD AUTO: 3.6 10E6/UL (ref 3.8–5.2)
T4 FREE SERPL-MCNC: 0.7 NG/DL (ref 0.9–1.7)
TSH SERPL DL<=0.005 MIU/L-ACNC: 29.92 UIU/ML (ref 0.3–4.2)
WBC # BLD AUTO: 4.8 10E3/UL (ref 4–11)

## 2023-04-10 PROCEDURE — 80061 LIPID PANEL: CPT | Performed by: INTERNAL MEDICINE

## 2023-04-10 PROCEDURE — 84439 ASSAY OF FREE THYROXINE: CPT | Performed by: INTERNAL MEDICINE

## 2023-04-10 PROCEDURE — 36415 COLL VENOUS BLD VENIPUNCTURE: CPT

## 2023-04-10 PROCEDURE — 84443 ASSAY THYROID STIM HORMONE: CPT | Performed by: INTERNAL MEDICINE

## 2023-04-10 PROCEDURE — 85025 COMPLETE CBC W/AUTO DIFF WBC: CPT | Performed by: INTERNAL MEDICINE

## 2023-04-10 NOTE — PROGRESS NOTES
Medical Assistant Note:  Jann Davidson presents today for blood draw.    Patient seen by provider today: No.   present during visit today: Not Applicable.    Concerns: No Concerns.    Procedure:  Lab draw site: lac, Needle type: bf, Gauge: 23.    Post Assessment:  Labs drawn without difficulty: Yes.    Discharge Plan:  Departure Mode: Ambulatory.    Face to Face Time: 5 min.    Isabela Padron, CMA

## 2023-04-11 LAB
CHOLEST SERPL-MCNC: 172 MG/DL
HDLC SERPL-MCNC: 94 MG/DL
LDLC SERPL CALC-MCNC: 62 MG/DL
NONHDLC SERPL-MCNC: 78 MG/DL
TRIGL SERPL-MCNC: 78 MG/DL

## 2023-04-11 NOTE — TELEPHONE ENCOUNTER
LMOM to callback to relay message below from Dr. Oliva.    Thanks!  Luis Angel Montiel RN   [facility]

## 2023-04-11 NOTE — TELEPHONE ENCOUNTER
Yes, OK to use for care gaps  Orders signed late- patient may still need to leave urine sample, please notify, thanks Mehran

## 2023-04-13 ENCOUNTER — ONCOLOGY VISIT (OUTPATIENT)
Dept: ONCOLOGY | Facility: CLINIC | Age: 78
End: 2023-04-13
Attending: INTERNAL MEDICINE
Payer: MEDICARE

## 2023-04-13 VITALS
HEART RATE: 89 BPM | RESPIRATION RATE: 16 BRPM | DIASTOLIC BLOOD PRESSURE: 77 MMHG | OXYGEN SATURATION: 98 % | SYSTOLIC BLOOD PRESSURE: 122 MMHG

## 2023-04-13 DIAGNOSIS — D05.02 NEOPLASM OF LEFT BREAST, PRIMARY TUMOR STAGING CATEGORY TIS: LOBULAR CARCINOMA IN SITU (LCIS): ICD-10-CM

## 2023-04-13 DIAGNOSIS — Z17.1 MALIGNANT NEOPLASM OF LOWER-OUTER QUADRANT OF LEFT BREAST OF FEMALE, ESTROGEN RECEPTOR NEGATIVE (H): Primary | ICD-10-CM

## 2023-04-13 DIAGNOSIS — E03.2 HYPOTHYROIDISM DUE TO MEDICATION: ICD-10-CM

## 2023-04-13 DIAGNOSIS — F41.9 ANXIETY: ICD-10-CM

## 2023-04-13 DIAGNOSIS — T45.1X5A ANEMIA DUE TO CHEMOTHERAPY: ICD-10-CM

## 2023-04-13 DIAGNOSIS — C50.512 MALIGNANT NEOPLASM OF LOWER-OUTER QUADRANT OF LEFT BREAST OF FEMALE, ESTROGEN RECEPTOR NEGATIVE (H): Primary | ICD-10-CM

## 2023-04-13 DIAGNOSIS — N28.89 RIGHT KIDNEY MASS: ICD-10-CM

## 2023-04-13 DIAGNOSIS — D64.81 ANEMIA DUE TO CHEMOTHERAPY: ICD-10-CM

## 2023-04-13 PROCEDURE — G0463 HOSPITAL OUTPT CLINIC VISIT: HCPCS | Performed by: INTERNAL MEDICINE

## 2023-04-13 PROCEDURE — 99214 OFFICE O/P EST MOD 30 MIN: CPT | Performed by: INTERNAL MEDICINE

## 2023-04-13 RX ORDER — LEVOTHYROXINE SODIUM 50 UG/1
50 TABLET ORAL DAILY
Qty: 60 TABLET | Refills: 1 | Status: SHIPPED | OUTPATIENT
Start: 2023-04-13 | End: 2023-07-10 | Stop reason: DRUGHIGH

## 2023-04-13 ASSESSMENT — PAIN SCALES - GENERAL: PAINLEVEL: NO PAIN (0)

## 2023-04-13 NOTE — LETTER
4/13/2023         RE: Jann Davidson  [address] [facility] [address]        Dear Colleague,    Thank you for referring your patient, Jann Davidson, to the [facility]. Please see a copy of my visit note below.    [facility], [place]    Hematology/Oncology Established Patient Note      Today's Date: 4/13/2023    Reason for follow-up:  Left breast cancer, triple negative.    HISTORY OF PRESENT ILLNESS: Jann Davidson is a 77 year old female who presents with the following oncologic history:  1. 10/26/2021: Mammogram showed calcifications in the left lateral breast; right breast negative.  2. 11/17/2021: Left diagnostic mammogram showed cluster of pleomorphic calcifications at 4:00, 6 cm from nipple, measuring 1.3 cm.  3. 12/3/2021: Stereotactic left breast biopsy at 4:00, 6 cm from nipple showed grade 2 invasive ductal carcinoma with micropapillary features, extensive lymphovascular invasion present, grade 2-3 focal DCIS, LCIS, ER negative, NV negative, HER-2/maxime FISH negative.  4. 12/10/2021: Breast MRI showed oval mass 2 x 2 x 1.5 cm at 4:00, 6 cm from nipple in left breast reflecting biopsy cavity with post-biopsy changes; prominent 2.5 cm low left level 1 axillary lymph node.  5.  12/28/2021: PET/CT scan showed FDG avid focus in left lower outer breast, hypermetabolic left axillary adenopathy, moderate FDG uptake at level off anus, exophytic right renal 1.1 cm mass, slowly increasing in size since 2016 concerning for growing renal cell carcinoma.  6. 1/18/2022: Started neoadjuvant chemotherapy with weekly paclitaxel + carboplatin and every 3-week pembrolizumab as per KEYNOTE-522 study.  7. 4/19/2022: Breast MRI showed no residual enhancement at site of primary malignancy in left breast at 4:00; no residual left axillary adenopathy. Right breast negative.  8. 5/6/2022: Completed cycle 12 paclitaxel with pembrolizumab. Surgery delayed due to 2 hospitalizations for rash  and peripheral neuropathy.  9. 6/27/2022: Underwent left breast lumpectomy and left axillary sentinel lymph node excision under care of Dr. Tatianna Rai.  Pathology showed no residual invasive carcinoma in left breast; residual LCIS and atypical lobular hyperplasia present; one of 2 lymph nodes with isolated tumor cells measuring 0.087 mm, focal fibrosis consistent with treatment effect; RCB-I, ypT0-pN0(i+).  10. 8/08/2022: Started raloxifene for LCIS and atypical lobular hyperplasia.  11. 8/10/2022-9/22/2022: Completion of adjuvant radiation therapy.  12. 8/25/2022-2/10/2023: Completed adjuvant pembrolizumab x 9 cycles.    INTERVAL HISTORY:  Jann reports feeling tired.  She denies dyspnea, cough, bowel or bladder issues.    REVIEW OF SYSTEMS:   14 point ROS was reviewed and is negative other than as noted above in HPI.       HOME MEDICATIONS:  Current Outpatient Medications   Medication Sig Dispense Refill     atorvastatin (LIPITOR) 10 MG tablet TAKE 1 TABLET (10 MG) BY MOUTH ONCE DAILY 90 tablet 3     candesartan (ATACAND) 4 MG tablet Take 0.5 tablets (2 mg) by mouth daily DISPENSE AS WRITTEN, Brand name only       levothyroxine (SYNTHROID/LEVOTHROID) 25 MCG tablet Take 1 tablet (25 mcg) by mouth daily 30 tablet 1     loratadine (CLARITIN) 10 MG tablet Take 10 mg by mouth daily       LORazepam (ATIVAN) 0.5 MG tablet TAKE 1 TABLET (0.5 MG) BY MOUTH EVERY 6 HOURS AS NEEDED FOR ANXIETY, NAUSEA OR SLEEP 45 tablet 0     Magnesium 400 MG TABS Take 800 mg by mouth daily       oxazepam (SERAX) 15 MG capsule TAKE 1 CAPSULE (15 MG) BY MOUTH NIGHTLY AS NEEDED FOR ANXIETY 45 capsule 1     PARNATE 10 MG tablet TAKE 1 TABLET BY MOUTH 3 TIMES DAILY 300 tablet 3     polyethylene glycol (MIRALAX/GLYCOLAX) packet Take 1 packet by mouth daily       raloxifene (EVISTA) 60 MG tablet Take 1 tablet (60 mg) by mouth daily 90 tablet 3     triamcinolone (KENALOG) 0.1 % external cream Apply  topically 2 times daily as needed. 15 g 1      zolpidem (AMBIEN) 5 MG tablet TAKE 1 TABLET (5 MG) BY MOUTH NIGHTLY AS NEEDED FOR SLEEP 45 tablet 1         ALLERGIES:  Allergies   Allergen Reactions     Tegaderm Transparent Dressing (Informational Only) Blisters     Lyrica [Pregabalin] Rash         PAST MEDICAL HISTORY:  Past Medical History:   Diagnosis Date     Breast cancer (H)      CKD (chronic kidney disease) stage 3, GFR 30-59 ml/min (H)      Depression with anxiety      Gout      Hypertension goal BP (blood pressure) < 140/90      Recurrent sinusitis 2013     Gynecologic history:  Age of menarche at 11; LMP ;  (one son), 1st pregnancy at age 19; no HRT.    PAST SURGICAL HISTORY:  Past Surgical History:   Procedure Laterality Date     BIOPSY NODE SENTINEL Left 2022    Procedure: left axillary sentinel lymph node biopsy;  Surgeon: Tatianna Rai MD;  Location: [facility] OR     BREAST BIOPSY, RT/LT      Breat Biopsy RT/LT     COLONOSCOPY  10/03     COLONOSCOPY  2014    Procedure: COLONOSCOPY;  COLONOSCOPY ;  Surgeon: Gaurav Shaffer MD;  Location: [facility] GI     IR CHEST PORT PLACEMENT > 5 YRS OF AGE  2021     IR PORT REMOVAL RIGHT  2023     LUMPECTOMY BREAST WITH SEED LOCALIZATION Left 2022    Procedure: Radiofrequency tag localized left breast lumpectomy with radiofrequency tag localized excision of left axillary lymph node;  Surgeon: Tatianna Rai MD;  Location: [facility] OR     RECONSTRUCT EYELID           SOCIAL HISTORY:  Social History     Socioeconomic History     Marital status:      Spouse name: Not on file     Number of children: Not on file     Years of education: Not on file     Highest education level: Not on file   Occupational History     Not on file   Tobacco Use     Smoking status: Former     Types: Cigarettes     Quit date: 10/30/1972     Years since quittin.4     Smokeless tobacco: Never   Vaping Use     Vaping status: Never Used   Substance and Sexual Activity     Alcohol use: Yes     Comment:  couple glasses of wine daily      Drug use: No     Sexual activity: Never   Other Topics Concern     Parent/sibling w/ CABG, MI or angioplasty before 65F 55M? Not Asked   Social History Narrative     Not on file     Social Determinants of Health     Financial Resource Strain: Not on file   Food Insecurity: Not on file   Transportation Needs: Not on file   Physical Activity: Not on file   Stress: Not on file   Social Connections: Not on file   Intimate Partner Violence: Not At Risk (10/7/2022)    Humiliation, Afraid, Rape, and Kick questionnaire      Fear of Current or Ex-Partner: No      Emotionally Abused: No      Physically Abused: No      Sexually Abused: No   Housing Stability: Not on file         FAMILY HISTORY:  Family History   Problem Relation Age of Onset     Cancer Mother         uterine     Breast Cancer Mother      Diabetes Paternal Grandmother          PHYSICAL EXAM:  Vital signs:  /77   Pulse 89   Resp 16   SpO2 98%    GENERAL: No acute distress.  EYES: No scleral icterus. No overt erythema.  LYMPH: No palpable lymphadenopathy in the cervical, supraclavicular, axillary regions.  BREAST: No palpable masses in either breast. Postlumpectomy changes in the right breast.  Nipples are everted bilaterally with no discharge. No erythema, ulceration, or dimpling of the skin.  RESPIRATORY: No audible cough or wheezing.  ABDOMEN: Soft, nontender, nondistended, with no palpable hepatosplenomegaly.   MUSCULOSKELETAL: No clubbing, cyanosis or edema.  SKIN: No rashes or jaundice.  NEUROLOGIC: Alert, answers questions appropriately; no focal deficits.  PSYCHIATRIC: Normal affect; anxious.    LABS:  CBC RESULTS: Recent Labs   Lab Test 04/10/23  1334   WBC 4.8   RBC 3.60*   HGB 11.6*   HCT 35.8   MCV 99   MCH 32.2   MCHC 32.4   RDW 12.4        Last Comprehensive Metabolic Panel:  Sodium   Date Value Ref Range Status   02/10/2023 140 136 - 145 mmol/L Final   06/05/2019 142 133 - 144 mmol/L Final      Potassium   Date Value Ref Range Status   02/10/2023 4.0 3.4 - 5.3 mmol/L Final   12/30/2022 4.0 3.4 - 5.3 mmol/L Final   06/05/2019 3.7 3.4 - 5.3 mmol/L Final     Chloride   Date Value Ref Range Status   02/10/2023 106 98 - 107 mmol/L Final   12/30/2022 110 (H) 94 - 109 mmol/L Final   06/05/2019 110 (H) 94 - 109 mmol/L Final     Carbon Dioxide   Date Value Ref Range Status   06/05/2019 24 20 - 32 mmol/L Final     Carbon Dioxide (CO2)   Date Value Ref Range Status   02/10/2023 23 22 - 29 mmol/L Final   12/30/2022 24 20 - 32 mmol/L Final     Anion Gap   Date Value Ref Range Status   02/10/2023 11 7 - 15 mmol/L Final   12/30/2022 7 3 - 14 mmol/L Final   06/05/2019 8 3 - 14 mmol/L Final     Glucose   Date Value Ref Range Status   02/10/2023 95 70 - 99 mg/dL Final   12/30/2022 103 (H) 70 - 99 mg/dL Final   06/05/2019 93 70 - 99 mg/dL Final     Comment:     Fasting specimen     Urea Nitrogen   Date Value Ref Range Status   02/10/2023 37.4 (H) 8.0 - 23.0 mg/dL Final   12/30/2022 29 7 - 30 mg/dL Final   06/05/2019 32 (H) 7 - 30 mg/dL Final     Creatinine   Date Value Ref Range Status   02/10/2023 1.29 (H) 0.51 - 0.95 mg/dL Final   06/05/2019 1.15 (H) 0.52 - 1.04 mg/dL Final     GFR Estimate   Date Value Ref Range Status   02/10/2023 43 (L) >60 mL/min/1.73m2 Final     Comment:     eGFR calculated using 2021 CKD-EPI equation.   06/05/2019 47 (L) >60 mL/min/[1.73_m2] Final     Comment:     Non  GFR Calc  Starting 12/18/2018, serum creatinine based estimated GFR (eGFR) will be   calculated using the Chronic Kidney Disease Epidemiology Collaboration   (CKD-EPI) equation.       GFR, ESTIMATED POCT   Date Value Ref Range Status   12/28/2021 33 (L) >60 mL/min/1.73m2 Final     Calcium   Date Value Ref Range Status   02/10/2023 9.0 8.8 - 10.2 mg/dL Final   06/05/2019 9.2 8.5 - 10.1 mg/dL Final     Bilirubin Total   Date Value Ref Range Status   02/10/2023 0.4 <=1.2 mg/dL Final   06/05/2019 0.4 0.2 - 1.3 mg/dL  Final     Alkaline Phosphatase   Date Value Ref Range Status   02/10/2023 87 35 - 104 U/L Final   2019 85 40 - 150 U/L Final     ALT   Date Value Ref Range Status   02/10/2023 33 10 - 35 U/L Final   2019 33 0 - 50 U/L Final     AST   Date Value Ref Range Status   02/10/2023 51 (H) 10 - 35 U/L Final     Comment:     Specimen is hemolyzed which can falsely elevate AST. Analysis of a non-hemolyzed specimen may result in a lower value.   2019 33 0 - 45 U/L Final     4/10/2023: TSH = 29.92  Free T4 = 0.7    PATHOLOGY:  None new.    IMAGIN2022 Limited abdominal U/S showed:  Borderline dilated extrahepatic bile ducts, similar to previous.    11/10/2022: Renal CT showed 1.4 x 1.3 cm right kidney mass, increased from 1.1 x 1 cm.    ASSESSMENT/PLAN:  Jann Davidson is a 77 year old female with the following issues:  1. 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 Left breast LCIS and atypical lobular hyperplasia  --Jann completed neoadjuvant chemotherapy with paclitaxel and carboplatin for 12 weeks with every 3-week pembrolizumab. Given that her 2022 breast MRI showed no residual enhancement -- I had advised foregoing chemotherapy with Adriamycin and Cytoxan given her age and low likelihood of tolerating these drugs.  --2022 Final pathology from her lumpectomy and sentinel lymph node excision showed near-complete response to neoadjuvant chemotherapy with just isolated tumor cells in one lymph node.  She had residual LCIS and atypical lobular hyperplasia.  --She then completed adjuvant pembrolizumab on 2/10/2023 for 9 cycles as per the KEYNOTE-522 trial.  --She completed adjuvant radiation therapy on 2022 with Dr. Giles.  --Genetic testing negative.  --Jann has no clinical evidence for recurrent breast cancer by physical exam from today  or mammogram reviewed from 1/13/2023.  --Plan to alternate mammogram with breast MRI for high risk surveillance given findings of LCIS and ALH, which are markers for increased risk of additional breast cancer in the future.  We will plan for bilateral mammogram alternating with breast MRI. 6 months apart.  --Continue raloxifene for 5 years for risk reduction given the findings of LCIS and ALH. She is tolerating this well without side effects.    3. Chronic kidney disease, stage 3  --Creatinine stable.  --She follows with Dr. Luna.    4. FDG uptake at anal canal  --She has history of anal fissure from 4/8/2014 colonoscopy.  --PET scan showed uptake at level of anus and could represent hemorrhoid, fissure, malignancy, or be physiologic.  --She will be meeting with colorectal in 12/2022 with flex sig.    5. Right renal mass  --PET scan showed a 1.1 cm mass in the right kidney that has been reportedly slowly growing since 2016 concerning for renal cell carcinoma.  --Following with Dr. Guerrero, urology.  --11/10/2022 Renal CT showed slight increase in the renal mass concerning for renal cell carcinoma.  She is following with Dr. Guerrero.    6. Pulmonary nodules  --PET scan showed scattered small bilateral pulmonary nodules that are unchanged since 10/24/2018 and most likely benign. No further CT chest needed.    7. Depression/anxiety  --Stable.  --She would like to continue on Parnate and not change her antidepressant.  --She takes oxazepam together with zolpidem.    --She could use lorazepam (Ativan) as long as she takes this 6 hours from her other benzodiazepenes.    8. Anemia related to past chemotherapy  --Hgb stable at 11.6 from today.  --Recheck CBC in 2 months.    9. Hypothyroidism  --Likely related to pembrolizumab.    --TSH was elevated at 44.11 on 2/10/2023, now improved to 29.2 with free T4, low at 0.7.  --Advised increasing levothyroxine to 50 mcg daily.  --Repeat thyroid function tests in 2 months.    Return  in 3 months.    Neetu Mcmullen MD  Hematology/Oncology  [facility]    Total time spent: 30 minutes in patient evaluation, counseling, documentation, and coordination of care.      Again, thank you for allowing me to participate in the care of your patient.        Sincerely,        Neetu Mcmullen MD

## 2023-04-13 NOTE — PATIENT INSTRUCTIONS
Lab draw in 2 months to recheck CBC and thyroid function tests.  Increase levothyroxine to 50 mcg daily.  RTC MD in 3 months (early July) with breast MRI prior to visit.

## 2023-04-17 DIAGNOSIS — F41.9 ANXIETY: ICD-10-CM

## 2023-04-17 DIAGNOSIS — T45.1X5A CHEMOTHERAPY-INDUCED NEUTROPENIA (H): ICD-10-CM

## 2023-04-17 DIAGNOSIS — D70.1 CHEMOTHERAPY-INDUCED NEUTROPENIA (H): ICD-10-CM

## 2023-04-17 RX ORDER — LORAZEPAM 0.5 MG/1
0.5 TABLET ORAL EVERY 6 HOURS PRN
Qty: 45 TABLET | Refills: 0 | Status: SHIPPED | OUTPATIENT
Start: 2023-04-17 | End: 2023-06-19

## 2023-04-22 NOTE — RESULT ENCOUNTER NOTE
Benjy Forte: Your cholesterol labs look good- continue atorvastatin. Recheck labs in 1 year.   Your other labs are stable; continue candesartan. Contact if questions; hope you continue to tolerate your treatments, and get well.    Mehran Oliva MD

## 2023-05-02 ENCOUNTER — TELEPHONE (OUTPATIENT)
Dept: ONCOLOGY | Facility: CLINIC | Age: 78
End: 2023-05-02
Payer: MEDICARE

## 2023-05-24 ENCOUNTER — ANCILLARY PROCEDURE (OUTPATIENT)
Dept: CT IMAGING | Facility: CLINIC | Age: 78
End: 2023-05-24
Attending: UROLOGY
Payer: MEDICARE

## 2023-05-24 ENCOUNTER — LAB (OUTPATIENT)
Dept: LAB | Facility: CLINIC | Age: 78
End: 2023-05-24
Payer: MEDICARE

## 2023-05-24 DIAGNOSIS — T45.1X5A ANEMIA DUE TO CHEMOTHERAPY: ICD-10-CM

## 2023-05-24 DIAGNOSIS — D64.81 ANEMIA DUE TO CHEMOTHERAPY: ICD-10-CM

## 2023-05-24 DIAGNOSIS — N28.89 RIGHT KIDNEY MASS: ICD-10-CM

## 2023-05-24 DIAGNOSIS — N18.30 STAGE 3 CHRONIC KIDNEY DISEASE, UNSPECIFIED WHETHER STAGE 3A OR 3B CKD (H): ICD-10-CM

## 2023-05-24 DIAGNOSIS — E03.2 HYPOTHYROIDISM DUE TO MEDICATION: ICD-10-CM

## 2023-05-24 LAB
BASOPHILS # BLD AUTO: 0 10E3/UL (ref 0–0.2)
BASOPHILS NFR BLD AUTO: 0 %
CREAT BLD-MCNC: 1.6 MG/DL (ref 0.5–1)
CREAT UR-MCNC: 240 MG/DL
EOSINOPHIL # BLD AUTO: 0.1 10E3/UL (ref 0–0.7)
EOSINOPHIL NFR BLD AUTO: 1 %
ERYTHROCYTE [DISTWIDTH] IN BLOOD BY AUTOMATED COUNT: 12.6 % (ref 10–15)
GFR SERPL CREATININE-BSD FRML MDRD: 33 ML/MIN/1.73M2
HCT VFR BLD AUTO: 40 % (ref 35–47)
HGB BLD-MCNC: 12.6 G/DL (ref 11.7–15.7)
IMM GRANULOCYTES # BLD: 0 10E3/UL
IMM GRANULOCYTES NFR BLD: 0 %
LYMPHOCYTES # BLD AUTO: 0.8 10E3/UL (ref 0.8–5.3)
LYMPHOCYTES NFR BLD AUTO: 12 %
MCH RBC QN AUTO: 31.7 PG (ref 26.5–33)
MCHC RBC AUTO-ENTMCNC: 31.5 G/DL (ref 31.5–36.5)
MCV RBC AUTO: 101 FL (ref 78–100)
MICROALBUMIN UR-MCNC: 48.8 MG/L
MICROALBUMIN/CREAT UR: 20.33 MG/G CR (ref 0–25)
MONOCYTES # BLD AUTO: 0.4 10E3/UL (ref 0–1.3)
MONOCYTES NFR BLD AUTO: 6 %
NEUTROPHILS # BLD AUTO: 5.3 10E3/UL (ref 1.6–8.3)
NEUTROPHILS NFR BLD AUTO: 80 %
PLATELET # BLD AUTO: 275 10E3/UL (ref 150–450)
RBC # BLD AUTO: 3.97 10E6/UL (ref 3.8–5.2)
T4 FREE SERPL-MCNC: 1.19 NG/DL (ref 0.9–1.7)
TSH SERPL DL<=0.005 MIU/L-ACNC: 6.46 UIU/ML (ref 0.3–4.2)
WBC # BLD AUTO: 6.7 10E3/UL (ref 4–11)

## 2023-05-24 PROCEDURE — 82565 ASSAY OF CREATININE: CPT

## 2023-05-24 PROCEDURE — 36415 COLL VENOUS BLD VENIPUNCTURE: CPT

## 2023-05-24 PROCEDURE — 255N000002 HC RX 255 OP 636: Performed by: UROLOGY

## 2023-05-24 PROCEDURE — 82570 ASSAY OF URINE CREATININE: CPT

## 2023-05-24 PROCEDURE — 84439 ASSAY OF FREE THYROXINE: CPT

## 2023-05-24 PROCEDURE — 84443 ASSAY THYROID STIM HORMONE: CPT

## 2023-05-24 PROCEDURE — 85025 COMPLETE CBC W/AUTO DIFF WBC: CPT

## 2023-05-24 PROCEDURE — 82043 UR ALBUMIN QUANTITATIVE: CPT

## 2023-05-24 PROCEDURE — G1010 CDSM STANSON: HCPCS

## 2023-05-24 RX ADMIN — IOHEXOL 100 ML: 350 INJECTION, SOLUTION INTRAVENOUS at 16:01

## 2023-05-31 ENCOUNTER — VIRTUAL VISIT (OUTPATIENT)
Dept: UROLOGY | Facility: CLINIC | Age: 78
End: 2023-05-31
Payer: MEDICARE

## 2023-05-31 VITALS — WEIGHT: 130 LBS | BODY MASS INDEX: 23.04 KG/M2 | HEIGHT: 63 IN

## 2023-05-31 DIAGNOSIS — N28.89 RIGHT KIDNEY MASS: Primary | ICD-10-CM

## 2023-05-31 PROCEDURE — 99214 OFFICE O/P EST MOD 30 MIN: CPT | Mod: VID | Performed by: UROLOGY

## 2023-05-31 NOTE — LETTER
"5/31/2023       RE: Jann Davidson  [address]  [facility] [address]     Dear Colleague,    Thank you for referring your patient, Jann Davidson, to the [facility] at [facility]. Please see a copy of my visit note below.    [place]  CHIEF COMPLAINT   It was my pleasure to see Jann Davidson who is a 77 year old female for follow-up of RIGHT renal mass.      HPI   Jann Davidson is a very pleasant 77 year old female     Initially seen 10/5/2022:  Jann Davidson is a 77 year old female who is being seen for evaluation of a small incidental right renal mass  Diagnosed with breast cancer last year  S/p Chemo/radiationa and surgery  She is doing very well from a breast cancer treatment standpoint and is now following up on these other findings  Incidental 1.1cm RIGHT complex renal mass vs cyst on PET from 12/2021 11/23/2022:  No significant changes  This is causing her some mental fatigue and worry  She also notes she is having a sigmoidoscopy in December    TODAY 5/31/2023:  Still having some thyroid issues  Doing well from a urology standpoint    PHYSICAL EXAM  Patient is a 77 year old  female   Vitals: Height 1.6 m (5' 3\"), weight 59 kg (130 lb).  Body mass index is 23.03 kg/m .  General Appearance Adult:   Alert, no acute distress, oriented  HENT: throat/mouth:normal, good dentition  Lungs: no respiratory distress, or pursed lip breathing  Heart: No obvious jugular venous distension present  Abdomen: non - distended  Musculoskeltal: extremities normal, no peripheral edema  Skin: no suspicious lesions or rashes  Neuro: Alert, oriented, speech and mentation normal  Psych: affect and mood normal    Creatinine   Date Value Ref Range Status   02/10/2023 1.29 (H) 0.51 - 0.95 mg/dL Final   06/05/2019 1.15 (H) 0.52 - 1.04 mg/dL Final     Creatinine POCT   Date Value Ref Range Status   05/24/2023 1.6 (H) 0.5 - 1.0 mg/dL Final      IMAGING:  All " pertinent imaging reviewed:     All imaging studies reviewed by me.  I personally reviewed these imaging films.  A formal report from radiology will follow.     PET SCAN 12/28/2021:  FINDINGS:      HEAD/NECK:  There is no  suspicious FDG uptake in the neck. FDG uptake at the  level of the vocal cords likely secondary to speech.     The paranasal sinuses are clear. The mastoid air cells are clear.  Bilateral pseudophakia.     The mucosal pharyngeal space, the , prevertebral and carotid  spaces are within normal limits.      No masses, mass effect or pathologically enlarged lymph nodes are  evident. The thyroid gland is within normal limits.     CHEST:  -Hypermetabolic left axillary adenopathy measuring up to 15 mm with a  max SUV of 4.64 (series 4, image 158).  -Moderately FDG avid focus in the inferior left lower quadrant of the  left breast with a max SUV of 2.60 (series 4, image 194).     Heart size is normal. No pericardial effusion. No significant coronary  artery calcifications. Thoracic aorta and main pulmonary arteries are  normal in caliber and patent with conventional three-vessel branching  pattern of the aortic arch.     No hypermetabolic mediastinal, hilar adenopathy. No hypermetabolic  right axillary adenopathy. Esophagus is normal.     Trachea and central airways are clear. No focal consolidation. No  pleural effusion or pneumothorax. Trace biapical scarring. Calcified  granuloma in the left upper lobe. Scattered solid and groundglass  pulmonary nodules, unchanged since at least 10/24/2018. For example:  -4 mm nodule in the right lower lobe, image 76).  -3 mm nodule in the right lower lobe (series 8, image 55).  -4 mm nodule in the left lower lobe (series 8, image 68).  -5 mm nodule in the left lower lobe (series 8, image 63).     ABDOMEN AND PELVIS:  Moderate FDG uptake at the level of the anus with a max SUV of 12.44  (series 4, image there are 98).     Liver is unremarkable. No focal  enhancing lesion. No intra or  extrahepatic biliary duct dilation. Gallbladder is normal. Spleen size  is within normal limits. Homogenous enhancement of the pancreas. The  main pancreatic and common bile ducts are not dilated.     No adrenal mass. Symmetric renal enhancement. No hydronephrosis or  calcified stone. Probable enhancement exophytic lesion involving the  superior right renal pole measuring 11 x 9 mm without significant FDG  uptake (series 4, image 258), similar in appearance since at least  1/10/2018 and increased in size since 7/7/2016. Bladder is  unremarkable. Uterus is unremarkable. No adnexal mass.     Stomach is unremarkable. No small or large bowel wall thickening or  dilation. Appendix is normal. No free intraperitoneal air. No  intra-abdominal or pelvic free fluid. No pneumatosis or portal venous  gas. Scattered colonic diverticulosis without additional evidence to  suggest acute diverticulitis.     Abdominal aorta and major branches are normal in caliber and patent  without significant atherosclerotic disease. Main portal vein and  major branches are patent.     No hypermetabolic mesenteric, retroperitoneal, or pelvic  lymphadenopathy.     LOWER EXTREMITIES:   No abnormal masses or hypermetabolic lesions. Muscle activation in the  bilateral lower extremities.     BONES:   There are no suspicious lytic or blastic osseous lesions.  There is no  abnormal FDG uptake in the skeleton. Mild degenerative changes of the  thoracolumbar spine     Context: patient with recently diagnosed left breast cancer                                                                      IMPRESSION: In summary left breast cancer, left axillary metastasis,  incidental small possible right renal cell carcinoma.  1.  Moderately FDG avid focus in the left breast outer lower quadrant  likely representing known primary malignancy.    1a. Hypermetabolic left axillary adenopathy compatible with krzysztof  metastasis.  1b. No  evidence for metastatic breast disease in the abdomen or  pelvis.  2. Moderate FDG uptake at the level of the anus differential  hemorrhoid, fissure, physiologic, cancer.   3. Indeterminate exophytic right renal 1.1 cm mass, slow increasing in  size since 2016, concerning for slow growing renal cell carcinoma.  Recommend MRI for definitive characterization.   (Although there isn't  significant FDG uptake, this doesn't change the risk as only about 50%  of renal cell carcinomas take up FDG)      CT ABD/PEL 11/10/2022:  FINDINGS:   RIGHT KIDNEY/URETER: 1.4 x 1.3 cm exophytic enhancing mass posterior cortex upper pole right kidney. This has minimally increased in size from the prior exam when it measured 1.1 x 1.0 cm. No hydronephrosis or stones.                                                             IMPRESSION:  1.  1.4 x 1.3 cm enhancing mass right kidney concerning for a renal cell carcinoma, slightly increased in size since 12/28/2021. Urology consultation recommended.      CT RENAL MASS 5/24/2023:  RIGHT KIDNEY: Enhancing exophytic mass of the posterior upper pole kidney measuring 1.5 x 1.4 x 1.4 cm in AP by transverse by craniocaudal dimensions unchanged since 11/10/2022 when it measured larger since 12/28/2021. Subcentimeter cyst in the lower   pole requires no follow-up. No stone or hydronephrosis.    IMPRESSION:  1.  Enhancing right renal mass, which is likely renal cell carcinoma, unchanged since 11/10/2022.  2.  No evidence of metastasis in the abdomen.    ASSESSMENT and PLAN  77-year-old female with history of breast cancer with an incidental small right renal mass    Incidental small right renal mass  - I reviewed her updated CT renal mass protocol and reviewed these images personally.  - I agree with radiologist interpretation, though I measured the mass at 1.8 cm  - Looking back to imaging from 2016 there was a possible 6 mm nodule at this site at that time  - We discussed that it is reassuring that  this still remains less than 2 cm, however she is quite concerned about this mass and given its growth rate notes that it would likely cross the 2 cm threshold in the next few months.  This is causing her fairly significant emotional distress and she is worrying about this quite a bit given her prior morales with breast cancer.  - She would like to plan for a robotic assisted partial nephrectomy in October or November with repeat CT scan prior  - We discussed the risks and benefits of a robotic assisted laparoscopic partial nephrectomy.  We discussed the risk of infection, bleeding requiring a blood transfusion, damage to surrounding structures, and the need for 1-2 nights of hospitalization.  We discussed the expected postoperative recovery process  - Orders for surgery placed  - This is a chronic problem with progression      Time spent: 30 minutes spent on the date of the encounter doing chart review, history and exam, documentation and further activities as noted above.    Frank Guerrero MD   Urology  [facility]  [facility] Phone: [phone]  [facility] Phone: [phone]      Jann is a 77 year old who is being evaluated via a billable video visit.      How would you like to obtain your AVS? MyChart  If the video visit is dropped, the invitation should be resent by: Text to cell phone:   Will anyone else be joining your video visit? No      Video-Visit Details    Type of service:  Video Visit   Video Start Time: 4:15 PM  Video End Time:4:40 PM    Originating Location (pt. Location): Home    Distant Location (provider location):  On-site  Platform used for Video Visit: Norma

## 2023-05-31 NOTE — PROGRESS NOTES
"[facility]  CHIEF COMPLAINT   It was my pleasure to see Jann Davidson who is a 77 year old female for follow-up of RIGHT renal mass.      HPI   Jann Davidson is a very pleasant 77 year old female     Initially seen 10/5/2022:  Jann Davidson is a 77 year old female who is being seen for evaluation of a small incidental right renal mass  Diagnosed with breast cancer last year  S/p Chemo/radiationa and surgery  She is doing very well from a breast cancer treatment standpoint and is now following up on these other findings  Incidental 1.1cm RIGHT complex renal mass vs cyst on PET from 12/2021 11/23/2022:  No significant changes  This is causing her some mental fatigue and worry  She also notes she is having a sigmoidoscopy in December    TODAY 5/31/2023:  Still having some thyroid issues  Doing well from a urology standpoint    PHYSICAL EXAM  Patient is a 77 year old  female   Vitals: Height 1.6 m (5' 3\"), weight 59 kg (130 lb).  Body mass index is 23.03 kg/m .  General Appearance Adult:   Alert, no acute distress, oriented  HENT: throat/mouth:normal, good dentition  Lungs: no respiratory distress, or pursed lip breathing  Heart: No obvious jugular venous distension present  Abdomen: non - distended  Musculoskeltal: extremities normal, no peripheral edema  Skin: no suspicious lesions or rashes  Neuro: Alert, oriented, speech and mentation normal  Psych: affect and mood normal    Creatinine   Date Value Ref Range Status   02/10/2023 1.29 (H) 0.51 - 0.95 mg/dL Final   06/05/2019 1.15 (H) 0.52 - 1.04 mg/dL Final     Creatinine POCT   Date Value Ref Range Status   05/24/2023 1.6 (H) 0.5 - 1.0 mg/dL Final      IMAGING:  All pertinent imaging reviewed:     All imaging studies reviewed by me.  I personally reviewed these imaging films.  A formal report from radiology will follow.     PET SCAN 12/28/2021:  FINDINGS:      HEAD/NECK:  There is no  suspicious FDG uptake in the neck. FDG uptake at the  level of the vocal cords " likely secondary to speech.     The paranasal sinuses are clear. The mastoid air cells are clear.  Bilateral pseudophakia.     The mucosal pharyngeal space, the , prevertebral and carotid  spaces are within normal limits.      No masses, mass effect or pathologically enlarged lymph nodes are  evident. The thyroid gland is within normal limits.     CHEST:  -Hypermetabolic left axillary adenopathy measuring up to 15 mm with a  max SUV of 4.64 (series 4, image 158).  -Moderately FDG avid focus in the inferior left lower quadrant of the  left breast with a max SUV of 2.60 (series 4, image 194).     Heart size is normal. No pericardial effusion. No significant coronary  artery calcifications. Thoracic aorta and main pulmonary arteries are  normal in caliber and patent with conventional three-vessel branching  pattern of the aortic arch.     No hypermetabolic mediastinal, hilar adenopathy. No hypermetabolic  right axillary adenopathy. Esophagus is normal.     Trachea and central airways are clear. No focal consolidation. No  pleural effusion or pneumothorax. Trace biapical scarring. Calcified  granuloma in the left upper lobe. Scattered solid and groundglass  pulmonary nodules, unchanged since at least 10/24/2018. For example:  -4 mm nodule in the right lower lobe, image 76).  -3 mm nodule in the right lower lobe (series 8, image 55).  -4 mm nodule in the left lower lobe (series 8, image 68).  -5 mm nodule in the left lower lobe (series 8, image 63).     ABDOMEN AND PELVIS:  Moderate FDG uptake at the level of the anus with a max SUV of 12.44  (series 4, image there are 98).     Liver is unremarkable. No focal enhancing lesion. No intra or  extrahepatic biliary duct dilation. Gallbladder is normal. Spleen size  is within normal limits. Homogenous enhancement of the pancreas. The  main pancreatic and common bile ducts are not dilated.     No adrenal mass. Symmetric renal enhancement. No hydronephrosis  or  calcified stone. Probable enhancement exophytic lesion involving the  superior right renal pole measuring 11 x 9 mm without significant FDG  uptake (series 4, image 258), similar in appearance since at least  1/10/2018 and increased in size since 7/7/2016. Bladder is  unremarkable. Uterus is unremarkable. No adnexal mass.     Stomach is unremarkable. No small or large bowel wall thickening or  dilation. Appendix is normal. No free intraperitoneal air. No  intra-abdominal or pelvic free fluid. No pneumatosis or portal venous  gas. Scattered colonic diverticulosis without additional evidence to  suggest acute diverticulitis.     Abdominal aorta and major branches are normal in caliber and patent  without significant atherosclerotic disease. Main portal vein and  major branches are patent.     No hypermetabolic mesenteric, retroperitoneal, or pelvic  lymphadenopathy.     LOWER EXTREMITIES:   No abnormal masses or hypermetabolic lesions. Muscle activation in the  bilateral lower extremities.     BONES:   There are no suspicious lytic or blastic osseous lesions.  There is no  abnormal FDG uptake in the skeleton. Mild degenerative changes of the  thoracolumbar spine     Context: patient with recently diagnosed left breast cancer                                                                      IMPRESSION: In summary left breast cancer, left axillary metastasis,  incidental small possible right renal cell carcinoma.  1.  Moderately FDG avid focus in the left breast outer lower quadrant  likely representing known primary malignancy.    1a. Hypermetabolic left axillary adenopathy compatible with krzysztof  metastasis.  1b. No evidence for metastatic breast disease in the abdomen or  pelvis.  2. Moderate FDG uptake at the level of the anus differential  hemorrhoid, fissure, physiologic, cancer.   3. Indeterminate exophytic right renal 1.1 cm mass, slow increasing in  size since 2016, concerning for slow growing renal cell  carcinoma.  Recommend MRI for definitive characterization.   (Although there isn't  significant FDG uptake, this doesn't change the risk as only about 50%  of renal cell carcinomas take up FDG)      CT ABD/PEL 11/10/2022:  FINDINGS:   RIGHT KIDNEY/URETER: 1.4 x 1.3 cm exophytic enhancing mass posterior cortex upper pole right kidney. This has minimally increased in size from the prior exam when it measured 1.1 x 1.0 cm. No hydronephrosis or stones.                                                             IMPRESSION:  1.  1.4 x 1.3 cm enhancing mass right kidney concerning for a renal cell carcinoma, slightly increased in size since 12/28/2021. Urology consultation recommended.      CT RENAL MASS 5/24/2023:  RIGHT KIDNEY: Enhancing exophytic mass of the posterior upper pole kidney measuring 1.5 x 1.4 x 1.4 cm in AP by transverse by craniocaudal dimensions unchanged since 11/10/2022 when it measured larger since 12/28/2021. Subcentimeter cyst in the lower   pole requires no follow-up. No stone or hydronephrosis.    IMPRESSION:  1.  Enhancing right renal mass, which is likely renal cell carcinoma, unchanged since 11/10/2022.  2.  No evidence of metastasis in the abdomen.    ASSESSMENT and PLAN  77-year-old female with history of breast cancer with an incidental small right renal mass    Incidental small right renal mass  - I reviewed her updated CT renal mass protocol and reviewed these images personally.  - I agree with radiologist interpretation, though I measured the mass at 1.8 cm  - Looking back to imaging from 2016 there was a possible 6 mm nodule at this site at that time  - We discussed that it is reassuring that this still remains less than 2 cm, however she is quite concerned about this mass and given its growth rate notes that it would likely cross the 2 cm threshold in the next few months.  This is causing her fairly significant emotional distress and she is worrying about this quite a bit given her  prior morales with breast cancer.  - She would like to plan for a robotic assisted partial nephrectomy in October or November with repeat CT scan prior  - We discussed the risks and benefits of a robotic assisted laparoscopic partial nephrectomy.  We discussed the risk of infection, bleeding requiring a blood transfusion, damage to surrounding structures, and the need for 1-2 nights of hospitalization.  We discussed the expected postoperative recovery process  - Orders for surgery placed  - This is a chronic problem with progression      Time spent: 30 minutes spent on the date of the encounter doing chart review, history and exam, documentation and further activities as noted above.    Frank Guerrero MD   Urology  [facility]  [facility] Phone: [phone]  [facility] Phone: [phone]      Jann is a 77 year old who is being evaluated via a billable video visit.      How would you like to obtain your AVS? MyChart  If the video visit is dropped, the invitation should be resent by: Text to cell phone:   Will anyone else be joining your video visit? No      Video-Visit Details    Type of service:  Video Visit   Video Start Time: 4:15 PM  Video End Time:4:40 PM    Originating Location (pt. Location): Home    Distant Location (provider location):  On-site  Platform used for Video Visit: Net[employer]

## 2023-06-04 NOTE — RESULT ENCOUNTER NOTE
Benjy Forte: Your recent urine albumen shows normal kidney function. Contact if questions. Hope thing are going OK    Mehran CARTER

## 2023-06-14 ENCOUNTER — TELEPHONE (OUTPATIENT)
Dept: ONCOLOGY | Facility: CLINIC | Age: 78
End: 2023-06-14
Payer: MEDICARE

## 2023-06-14 DIAGNOSIS — Z17.1 MALIGNANT NEOPLASM OF LOWER-OUTER QUADRANT OF LEFT BREAST OF FEMALE, ESTROGEN RECEPTOR NEGATIVE (H): Primary | ICD-10-CM

## 2023-06-14 DIAGNOSIS — C50.512 MALIGNANT NEOPLASM OF LOWER-OUTER QUADRANT OF LEFT BREAST OF FEMALE, ESTROGEN RECEPTOR NEGATIVE (H): Primary | ICD-10-CM

## 2023-06-14 DIAGNOSIS — R53.83 OTHER FATIGUE: ICD-10-CM

## 2023-06-14 DIAGNOSIS — E03.2 HYPOTHYROIDISM DUE TO MEDICATION: ICD-10-CM

## 2023-06-14 DIAGNOSIS — E55.9 VITAMIN D DEFICIENCY: ICD-10-CM

## 2023-06-14 NOTE — TELEPHONE ENCOUNTER
Neetu Mcmullen MD  You; Regina Arango, RN;  Cancer Triage 3 minutes ago (4:13 PM)     MIKALA  I entered labs for 7/5 (thyroid, CBC, CMP, vitamin D).     Thank you,   MIKALA      Called patient and updated her that labs were ordered.  Transferred patient to scheduling team at [facility] so she could schedule labs for the time that works best for her on 7/5/23.  Pt happy with plan.  No further questions or concerns at this time.    Reny Summers, RN, BSN    Triage Nurse Advisor  [facility]/Graciela/Chrystal Polanco  [phone]

## 2023-06-14 NOTE — TELEPHONE ENCOUNTER
Pt left  requesting lab orders for thyroid testing as she is still extremely fatigued.     Last tested on 5/24/23  TSH (high) and T4 (low).     Will route to triage to assess for other symptoms    Regina Arango RN

## 2023-06-14 NOTE — TELEPHONE ENCOUNTER
"Pt said that she has been fatigued for around a year and half - she said it started when she was first diagnosed with cancer and has been ongoing since then.  She said she has been sleeping for around 15 hours a night.  She feels that it is impacting the quality of her life.  She was very emotional/tearful on the phone because she said \"she has just been so tired\" and \"she \"just wants to feel good [place]  Writer offered emotional support. Pt denied chest pain, shortness of breath, dizziness, fevers, pain and any other concerning symptoms. Patient is requesting thyroid labs as well as \"whatever other labs Dr. Mcmullen wants to order\" before her visit with Dr. Mcmullen on 7/10/23, so that they can discuss the results together.  Pt would like the labs to be ordered and would like to have her labs drawn on 7/5/23 when she comes in for a breast MRI.  Writer will route message to Dr. Mcmullen and Dr. Benedict Tapia's RNCC for advise.  Pt requesting call back once labs are ordered.    Reny Summers, RN, BSN    Triage Nurse Advisor  [facility]/Graciela/Chrystal Polanco  [phone]      "

## 2023-06-17 DIAGNOSIS — T45.1X5A CHEMOTHERAPY-INDUCED NEUTROPENIA (H): ICD-10-CM

## 2023-06-17 DIAGNOSIS — F41.9 ANXIETY: ICD-10-CM

## 2023-06-17 DIAGNOSIS — D70.1 CHEMOTHERAPY-INDUCED NEUTROPENIA (H): ICD-10-CM

## 2023-06-19 RX ORDER — LORAZEPAM 0.5 MG/1
0.5 TABLET ORAL EVERY 6 HOURS PRN
Qty: 45 TABLET | Refills: 0 | Status: SHIPPED | OUTPATIENT
Start: 2023-06-19 | End: 2023-07-26

## 2023-06-23 ENCOUNTER — TELEPHONE (OUTPATIENT)
Dept: UROLOGY | Facility: CLINIC | Age: 78
End: 2023-06-23
Payer: MEDICARE

## 2023-06-23 NOTE — TELEPHONE ENCOUNTER
Type of surgery:Robotic partial Nephrectomy   [facility]  Date and time of surgery:10/23/23 12:00  Surgeon Dr. Guerrero  Pre-Op Appt Date: TBD Pt will go to PCP  Post-Op Appt Date:11/8/23   Packet sent out: yes

## 2023-07-04 NOTE — PROGRESS NOTES
[facility] [place]    Hematology/Oncology Established Patient Note      Today's Date: 7/10/2023    Reason for follow-up:  Left breast cancer, triple negative.    HISTORY OF PRESENT ILLNESS: Jann Davidson is a 78 year old female who presents with the following oncologic history:  1. 10/26/2021: Mammogram showed calcifications in the left lateral breast; right breast negative.  2. 11/17/2021: Left diagnostic mammogram showed cluster of pleomorphic calcifications at 4:00, 6 cm from nipple, measuring 1.3 cm.  3. 12/3/2021: Stereotactic left breast biopsy at 4:00, 6 cm from nipple showed grade 2 invasive ductal carcinoma with micropapillary features, extensive lymphovascular invasion present, grade 2-3 focal DCIS, LCIS, ER negative, WV negative, HER-2/maxime FISH negative.  4. 12/10/2021: Breast MRI showed oval mass 2 x 2 x 1.5 cm at 4:00, 6 cm from nipple in left breast reflecting biopsy cavity with post-biopsy changes; prominent 2.5 cm low left level 1 axillary lymph node.  5.  12/28/2021: PET/CT scan showed FDG avid focus in left lower outer breast, hypermetabolic left axillary adenopathy, moderate FDG uptake at level off anus, exophytic right renal 1.1 cm mass, slowly increasing in size since 2016 concerning for growing renal cell carcinoma.  6. 1/18/2022: Started neoadjuvant chemotherapy with weekly paclitaxel + carboplatin and every 3-week pembrolizumab as per KEYNOTE-522 study.  7. 4/19/2022: Breast MRI showed no residual enhancement at site of primary malignancy in left breast at 4:00; no residual left axillary adenopathy. Right breast negative.  8. 5/6/2022: Completed cycle 12 paclitaxel with pembrolizumab. Surgery delayed due to 2 hospitalizations for rash and peripheral neuropathy.  9. 6/27/2022: Underwent left breast lumpectomy and left axillary sentinel lymph node excision under care of Dr. Tatianna Rai.  Pathology showed no residual invasive carcinoma in left breast; residual LCIS and atypical  lobular hyperplasia present; one of 2 lymph nodes with isolated tumor cells measuring 0.087 mm, focal fibrosis consistent with treatment effect; RCB-I, ypT0-pN0(i+).  10. 8/08/2022: Started raloxifene for LCIS and atypical lobular hyperplasia.  11. 8/10/2022-9/22/2022: Completion of adjuvant radiation therapy.  12. 8/25/2022-2/10/2023: Completed adjuvant pembrolizumab x 9 cycles.  13. 5/24/2023: CT scan showed exophytic mass of posterior upper pole right kidney unchanged since 11/10/2022.    INTERVAL HISTORY:  Jann reports feeling exhausted.  Denies shortness of breath or chest pain.    REVIEW OF SYSTEMS:   14 point ROS was reviewed and is negative other than as noted above in HPI.       HOME MEDICATIONS:  Current Outpatient Medications   Medication Sig Dispense Refill     atorvastatin (LIPITOR) 10 MG tablet TAKE 1 TABLET (10 MG) BY MOUTH ONCE DAILY 90 tablet 3     candesartan (ATACAND) 4 MG tablet Take 0.5 tablets (2 mg) by mouth daily DISPENSE AS WRITTEN, Brand name only       levothyroxine (SYNTHROID/LEVOTHROID) 50 MCG tablet Take 1 tablet (50 mcg) by mouth daily 60 tablet 1     loratadine (CLARITIN) 10 MG tablet Take 10 mg by mouth daily       LORazepam (ATIVAN) 0.5 MG tablet TAKE 1 TABLET (0.5 MG) BY MOUTH EVERY 6 HOURS AS NEEDED FOR ANXIETY, NAUSEA OR SLEEP 45 tablet 0     Magnesium 400 MG TABS Take 800 mg by mouth daily       oxazepam (SERAX) 15 MG capsule TAKE 1 CAPSULE (15 MG) BY MOUTH NIGHTLY AS NEEDED FOR ANXIETY 45 capsule 1     PARNATE 10 MG tablet TAKE 1 TABLET BY MOUTH 3 TIMES DAILY 300 tablet 3     polyethylene glycol (MIRALAX/GLYCOLAX) packet Take 1 packet by mouth daily       raloxifene (EVISTA) 60 MG tablet Take 1 tablet (60 mg) by mouth daily 90 tablet 3     triamcinolone (KENALOG) 0.1 % external cream Apply  topically 2 times daily as needed. 15 g 1     zolpidem (AMBIEN) 5 MG tablet TAKE 1 TABLET (5 MG) BY MOUTH NIGHTLY AS NEEDED FOR SLEEP 45 tablet 1         ALLERGIES:  Allergies   Allergen  Reactions     Tegaderm Transparent Dressing (Informational Only) Blisters     Lyrica [Pregabalin] Rash         PAST MEDICAL HISTORY:  Past Medical History:   Diagnosis Date     Breast cancer (H)      CKD (chronic kidney disease) stage 3, GFR 30-59 ml/min (H)      Depression with anxiety      Gout      Hypertension goal BP (blood pressure) < 140/90      Recurrent sinusitis 2013     Gynecologic history:  Age of menarche at 11; LMP ;  (one son), 1st pregnancy at age 19; no HRT.    PAST SURGICAL HISTORY:  Past Surgical History:   Procedure Laterality Date     BIOPSY NODE SENTINEL Left 2022    Procedure: left axillary sentinel lymph node biopsy;  Surgeon: Tatianna Rai MD;  Location: [facility] OR     BREAST BIOPSY, RT/LT      Breat Biopsy RT/LT     COLONOSCOPY  10/03     COLONOSCOPY  2014    Procedure: COLONOSCOPY;  COLONOSCOPY ;  Surgeon: Gaurav Shaffer MD;  Location: [facility]     IR CHEST PORT PLACEMENT > 5 YRS OF AGE  2021     IR PORT REMOVAL RIGHT  2023     LUMPECTOMY BREAST WITH SEED LOCALIZATION Left 2022    Procedure: Radiofrequency tag localized left breast lumpectomy with radiofrequency tag localized excision of left axillary lymph node;  Surgeon: Tatianna Rai MD;  Location: [facility] OR     RECONSTRUCT EYELID           SOCIAL HISTORY:  Social History     Socioeconomic History     Marital status:      Spouse name: Not on file     Number of children: Not on file     Years of education: Not on file     Highest education level: Not on file   Occupational History     Not on file   Tobacco Use     Smoking status: Former     Types: Cigarettes     Quit date: 10/30/1972     Years since quittin.7     Smokeless tobacco: Never   Vaping Use     Vaping Use: Never used   Substance and Sexual Activity     Alcohol use: Yes     Comment: couple glasses of wine daily      Drug use: No     Sexual activity: Never   Other Topics Concern     Parent/sibling w/ CABG, MI or angioplasty  before 65F 55M? Not Asked   Social History Narrative     Not on file     Social Determinants of Health     Financial Resource Strain: Not on file   Food Insecurity: Not on file   Transportation Needs: Not on file   Physical Activity: Not on file   Stress: Not on file   Social Connections: Not on file   Intimate Partner Violence: Not At Risk (10/7/2022)    Humiliation, Afraid, Rape, and Kick questionnaire      Fear of Current or Ex-Partner: No      Emotionally Abused: No      Physically Abused: No      Sexually Abused: No   Housing Stability: Not on file         FAMILY HISTORY:  Family History   Problem Relation Age of Onset     Cancer Mother         uterine     Breast Cancer Mother      Diabetes Paternal Grandmother          PHYSICAL EXAM:  Vital signs:  There were no vitals taken for this visit.   GENERAL: No acute distress.  EYES: No scleral icterus. No overt erythema.  LYMPH: No palpable lymphadenopathy in the cervical, supraclavicular, axillary regions.  BREAST: No palpable masses in either breast. Postlumpectomy changes in the right breast.  Nipples are everted bilaterally with no discharge. No erythema, ulceration, or dimpling of the skin.  RESPIRATORY: No audible cough or wheezing.  ABDOMEN: Soft, nontender, nondistended, with no palpable hepatosplenomegaly.   MUSCULOSKELETAL: No clubbing, cyanosis or edema.  SKIN: No rashes or jaundice.  NEUROLOGIC: Alert, answers questions appropriately; no focal deficits.  PSYCHIATRIC: Normal affect; anxious.    LABS:  CBC RESULTS: Recent Labs   Lab Test 05/24/23  1523   WBC 6.7   RBC 3.97   HGB 12.6   HCT 40.0   *   MCH 31.7   MCHC 31.5   RDW 12.6        Last Comprehensive Metabolic Panel:  Sodium   Date Value Ref Range Status   02/10/2023 140 136 - 145 mmol/L Final   06/05/2019 142 133 - 144 mmol/L Final     Potassium   Date Value Ref Range Status   02/10/2023 4.0 3.4 - 5.3 mmol/L Final   12/30/2022 4.0 3.4 - 5.3 mmol/L Final   06/05/2019 3.7 3.4 - 5.3  mmol/L Final     Chloride   Date Value Ref Range Status   02/10/2023 106 98 - 107 mmol/L Final   12/30/2022 110 (H) 94 - 109 mmol/L Final   06/05/2019 110 (H) 94 - 109 mmol/L Final     Carbon Dioxide   Date Value Ref Range Status   06/05/2019 24 20 - 32 mmol/L Final     Carbon Dioxide (CO2)   Date Value Ref Range Status   02/10/2023 23 22 - 29 mmol/L Final   12/30/2022 24 20 - 32 mmol/L Final     Anion Gap   Date Value Ref Range Status   02/10/2023 11 7 - 15 mmol/L Final   12/30/2022 7 3 - 14 mmol/L Final   06/05/2019 8 3 - 14 mmol/L Final     Glucose   Date Value Ref Range Status   02/10/2023 95 70 - 99 mg/dL Final   12/30/2022 103 (H) 70 - 99 mg/dL Final   06/05/2019 93 70 - 99 mg/dL Final     Comment:     Fasting specimen     Urea Nitrogen   Date Value Ref Range Status   02/10/2023 37.4 (H) 8.0 - 23.0 mg/dL Final   12/30/2022 29 7 - 30 mg/dL Final   06/05/2019 32 (H) 7 - 30 mg/dL Final     Creatinine   Date Value Ref Range Status   02/10/2023 1.29 (H) 0.51 - 0.95 mg/dL Final   06/05/2019 1.15 (H) 0.52 - 1.04 mg/dL Final     Creatinine POCT   Date Value Ref Range Status   05/24/2023 1.6 (H) 0.5 - 1.0 mg/dL Final     GFR Estimate   Date Value Ref Range Status   06/05/2019 47 (L) >60 mL/min/[1.73_m2] Final     Comment:     Non  GFR Calc  Starting 12/18/2018, serum creatinine based estimated GFR (eGFR) will be   calculated using the Chronic Kidney Disease Epidemiology Collaboration   (CKD-EPI) equation.       GFR, ESTIMATED POCT   Date Value Ref Range Status   05/24/2023 33 (L) >60 mL/min/1.73m2 Final     Calcium   Date Value Ref Range Status   02/10/2023 9.0 8.8 - 10.2 mg/dL Final   06/05/2019 9.2 8.5 - 10.1 mg/dL Final     Bilirubin Total   Date Value Ref Range Status   02/10/2023 0.4 <=1.2 mg/dL Final   06/05/2019 0.4 0.2 - 1.3 mg/dL Final     Alkaline Phosphatase   Date Value Ref Range Status   02/10/2023 87 35 - 104 U/L Final   06/05/2019 85 40 - 150 U/L Final     ALT   Date Value Ref Range  Status   02/10/2023 33 10 - 35 U/L Final   2019 33 0 - 50 U/L Final     AST   Date Value Ref Range Status   02/10/2023 51 (H) 10 - 35 U/L Final     Comment:     Specimen is hemolyzed which can falsely elevate AST. Analysis of a non-hemolyzed specimen may result in a lower value.   2019 33 0 - 45 U/L Final     2023: TSH = 29.92 -> 6.46  Free T4 = 0.7 => 1.19    PATHOLOGY:  None new.    IMAGIN2022 Limited abdominal U/S showed:  Borderline dilated extrahepatic bile ducts, similar to previous.    11/10/2022: Renal CT showed 1.4 x 1.3 cm right kidney mass, increased from 1.1 x 1 cm.    ASSESSMENT/PLAN:  Jann Davidson is a 77 year old female with the following issues:  1. 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 Left breast LCIS and atypical lobular hyperplasia  --Jann completed neoadjuvant chemotherapy with paclitaxel and carboplatin for 12 weeks with every 3-week pembrolizumab. Given that her 2022 breast MRI showed no residual enhancement -- I had advised foregoing chemotherapy with Adriamycin and Cytoxan given her age and low likelihood of tolerating these drugs.  --2022 Final pathology from her lumpectomy and sentinel lymph node excision showed near-complete response to neoadjuvant chemotherapy with just isolated tumor cells in one lymph node.  She had residual LCIS and atypical lobular hyperplasia.  --She then completed adjuvant pembrolizumab on 2/10/2023 for 9 cycles as per the KEYNOTE-522 trial.  --She completed adjuvant radiation therapy on 2022 with Dr. Giles.  --Genetic testing negative.  --Jann has no clinical evidence for recurrent breast cancer by physical exam from today or breast MRI reviewed from 2023.  --Plan to alternate mammogram with breast MRI for high risk surveillance given findings of LCIS and ALH,  which are markers for increased risk of additional breast cancer in the future.  We will plan for bilateral mammogram alternating with breast MRI 6 months apart. Due for mammogram in 1/2024.  --Continue raloxifene for 5 years for risk reduction given the findings of LCIS and ALH. She is tolerating this well without side effects.    3. Chronic kidney disease, stage 3  --Creatinine stable.  --She follows with Dr. Luna.    4. FDG uptake at anal canal  --She has history of anal fissure from 4/8/2014 colonoscopy.  --PET scan showed uptake at level of anus and could represent hemorrhoid, fissure, malignancy, or be physiologic.  --She met with colorectal Dr. Cervantes on  12/16/2022 with flex sig that showed no evidence of neoplasia in rectum.  --Due for colonoscopy in 2025.    5. Right renal mass  --PET scan showed a 1.1 cm mass in the right kidney that has been reportedly slowly growing since 2016 concerning for renal cell carcinoma.  --Following with Dr. Guerrero, urology.  --11/10/2022 Renal CT showed slight increase in the renal mass concerning for renal cell carcinoma.    --5/24/2023: Renal CT showed right upper pole renal mass unchanged.  --She is following with Dr. Guerrero and will undergo right robotic assisted laparoscopic partial nephrectomyy on 10/23/2023 as she is quite concerned about leaving a cancer in her body to grow.    6. Pulmonary nodules  --PET scan showed scattered small bilateral pulmonary nodules that are unchanged since 10/24/2018 and most likely benign. No further CT chest needed.    7. Depression/anxiety  --Stable.  --She would like to continue on Parnate and not change her antidepressant.  --She takes oxazepam together with zolpidem.    --She could use lorazepam (Ativan) as long as she takes this 6 hours from her other benzodiazepenes.    8. Hypothyroidism  --Likely related to pembrolizumab.    --7/5/2023 TSH worsened from 6.46 to 10.  Free T4 is normal at 1.01.  --She reports feeling exhausted,  which is likely multifactorial.  --Will increase levothyroxine to 75 mcg daily.  --Repeat thyroid function tests in 3 months.    9. Vitamin D deficiency   --Discussed 7/5/2023 vitamin D level very low at 14.  --Advised vitamin D 2000 international unit(s) daily for 2 weeks then 1000 international unit(s) daily.    --Will recheck with next visit.    10. Leukopenia  --7/5/2023 WBC 3.7 with ANC 2.3.  --May be related to past chemo.   --WBC subsets are normal.  --Will repeat CBC w/diff next visit.    11. Anemia  --May be related to anemia of chronic kidney disease but overall stable at 11.2.    Return in 3 months.    Neetu Mcmullen MD  Hematology/Oncology  [facility]    Total time spent today: 40 minutes in chart review, patient evaluation, counseling, documentation, test and/or medication/prescription orders, and coordination of care.

## 2023-07-05 ENCOUNTER — TELEPHONE (OUTPATIENT)
Dept: MAMMOGRAPHY | Facility: CLINIC | Age: 78
End: 2023-07-05

## 2023-07-05 ENCOUNTER — LAB (OUTPATIENT)
Dept: LAB | Facility: CLINIC | Age: 78
End: 2023-07-05
Payer: MEDICARE

## 2023-07-05 ENCOUNTER — ANCILLARY PROCEDURE (OUTPATIENT)
Dept: MRI IMAGING | Facility: CLINIC | Age: 78
End: 2023-07-05
Attending: INTERNAL MEDICINE
Payer: MEDICARE

## 2023-07-05 DIAGNOSIS — Z17.1 MALIGNANT NEOPLASM OF LOWER-OUTER QUADRANT OF LEFT BREAST OF FEMALE, ESTROGEN RECEPTOR NEGATIVE (H): ICD-10-CM

## 2023-07-05 DIAGNOSIS — G47.00 PERSISTENT DISORDER OF INITIATING OR MAINTAINING SLEEP: Chronic | ICD-10-CM

## 2023-07-05 DIAGNOSIS — E03.2 HYPOTHYROIDISM DUE TO MEDICATION: ICD-10-CM

## 2023-07-05 DIAGNOSIS — E55.9 VITAMIN D DEFICIENCY: ICD-10-CM

## 2023-07-05 DIAGNOSIS — C50.512 MALIGNANT NEOPLASM OF LOWER-OUTER QUADRANT OF LEFT BREAST OF FEMALE, ESTROGEN RECEPTOR NEGATIVE (H): ICD-10-CM

## 2023-07-05 DIAGNOSIS — R53.83 OTHER FATIGUE: ICD-10-CM

## 2023-07-05 DIAGNOSIS — D05.02 NEOPLASM OF LEFT BREAST, PRIMARY TUMOR STAGING CATEGORY TIS: LOBULAR CARCINOMA IN SITU (LCIS): ICD-10-CM

## 2023-07-05 LAB
ALBUMIN SERPL BCG-MCNC: 4.3 G/DL (ref 3.5–5.2)
ALP SERPL-CCNC: 81 U/L (ref 35–104)
ALT SERPL W P-5'-P-CCNC: 21 U/L (ref 0–50)
ANION GAP SERPL CALCULATED.3IONS-SCNC: 11 MMOL/L (ref 7–15)
AST SERPL W P-5'-P-CCNC: 32 U/L (ref 0–45)
BASOPHILS # BLD AUTO: 0 10E3/UL (ref 0–0.2)
BASOPHILS NFR BLD AUTO: 1 %
BILIRUB SERPL-MCNC: 0.2 MG/DL
BUN SERPL-MCNC: 28.7 MG/DL (ref 8–23)
CALCIUM SERPL-MCNC: 9.5 MG/DL (ref 8.8–10.2)
CHLORIDE SERPL-SCNC: 107 MMOL/L (ref 98–107)
CREAT SERPL-MCNC: 1.3 MG/DL (ref 0.51–0.95)
DEPRECATED CALCIDIOL+CALCIFEROL SERPL-MC: 14 UG/L (ref 20–75)
DEPRECATED HCO3 PLAS-SCNC: 22 MMOL/L (ref 22–29)
EOSINOPHIL # BLD AUTO: 0.1 10E3/UL (ref 0–0.7)
EOSINOPHIL NFR BLD AUTO: 3 %
ERYTHROCYTE [DISTWIDTH] IN BLOOD BY AUTOMATED COUNT: 12.1 % (ref 10–15)
GFR SERPL CREATININE-BSD FRML MDRD: 42 ML/MIN/1.73M2
GLUCOSE SERPL-MCNC: 93 MG/DL (ref 70–99)
HCT VFR BLD AUTO: 35.4 % (ref 35–47)
HGB BLD-MCNC: 11.2 G/DL (ref 11.7–15.7)
IMM GRANULOCYTES # BLD: 0 10E3/UL
IMM GRANULOCYTES NFR BLD: 0 %
LYMPHOCYTES # BLD AUTO: 0.9 10E3/UL (ref 0.8–5.3)
LYMPHOCYTES NFR BLD AUTO: 25 %
MCH RBC QN AUTO: 32.2 PG (ref 26.5–33)
MCHC RBC AUTO-ENTMCNC: 31.6 G/DL (ref 31.5–36.5)
MCV RBC AUTO: 102 FL (ref 78–100)
MONOCYTES # BLD AUTO: 0.3 10E3/UL (ref 0–1.3)
MONOCYTES NFR BLD AUTO: 9 %
NEUTROPHILS # BLD AUTO: 2.3 10E3/UL (ref 1.6–8.3)
NEUTROPHILS NFR BLD AUTO: 62 %
PLATELET # BLD AUTO: 231 10E3/UL (ref 150–450)
POTASSIUM SERPL-SCNC: 4 MMOL/L (ref 3.4–5.3)
PROT SERPL-MCNC: 6.8 G/DL (ref 6.4–8.3)
RBC # BLD AUTO: 3.48 10E6/UL (ref 3.8–5.2)
SODIUM SERPL-SCNC: 140 MMOL/L (ref 136–145)
T4 FREE SERPL-MCNC: 1.01 NG/DL (ref 0.9–1.7)
TSH SERPL DL<=0.005 MIU/L-ACNC: 10 UIU/ML (ref 0.3–4.2)
WBC # BLD AUTO: 3.7 10E3/UL (ref 4–11)

## 2023-07-05 PROCEDURE — 84443 ASSAY THYROID STIM HORMONE: CPT

## 2023-07-05 PROCEDURE — 36415 COLL VENOUS BLD VENIPUNCTURE: CPT

## 2023-07-05 PROCEDURE — A9585 GADOBUTROL INJECTION: HCPCS | Mod: JZ | Performed by: INTERNAL MEDICINE

## 2023-07-05 PROCEDURE — G1010 CDSM STANSON: HCPCS

## 2023-07-05 PROCEDURE — 80053 COMPREHEN METABOLIC PANEL: CPT

## 2023-07-05 PROCEDURE — 85025 COMPLETE CBC W/AUTO DIFF WBC: CPT

## 2023-07-05 PROCEDURE — 82306 VITAMIN D 25 HYDROXY: CPT

## 2023-07-05 PROCEDURE — 255N000002 HC RX 255 OP 636: Mod: JZ | Performed by: INTERNAL MEDICINE

## 2023-07-05 PROCEDURE — 84439 ASSAY OF FREE THYROXINE: CPT

## 2023-07-05 RX ORDER — GADOBUTROL 604.72 MG/ML
6 INJECTION INTRAVENOUS ONCE
Status: COMPLETED | OUTPATIENT
Start: 2023-07-05 | End: 2023-07-05

## 2023-07-05 RX ADMIN — GADOBUTROL 6 ML: 604.72 INJECTION INTRAVENOUS at 09:07

## 2023-07-05 NOTE — TELEPHONE ENCOUNTER
Requested Prescriptions   Pending Prescriptions Disp Refills     zolpidem (AMBIEN) 5 MG tablet 45 tablet 1     Sig: Take 1 tablet (5 mg) by mouth nightly as needed for sleep       There is no refill protocol information for this order        Routing refill request to provider for review/approval because:  Drug not on the [facility] refill protocol     Rosemarie Del Valle RN  [facility]

## 2023-07-05 NOTE — TELEPHONE ENCOUNTER
Breast MRI:    I phoned Ms. Jann Davidson, confirmed her full name, date of birth and notified her of benign Bilateral Breast MRI (7/5/2023) results below.    Informed patient our breast radiologists recommendations for annual screening mammogram, which she will be due after 1/14/2024, and to speak with the ordering provider(Dr. Neetu Mcmullen) regarding future breast MRIs.  I will forward this note to ordering provider.  Patient states she has a follow up clinic visit scheduled with Dr. Mcmullen on 7/10/23.    I advised patient to contact her primary provider if any breast issues or symptoms arise.  Patient verbalized understanding and agrees with the plan of care.   Marcia Lyon RN BSN  Breast Care Nurse Coordinator  [facility]  [phone]      Study Result    Narrative & Impression   EXAM: Bilateral Breast MRI with and without contrast, and computer aided kinetic analysis.   LOCATION: [facility]  DATE: 7/5/2023     HISTORY/INDICATION: Left breast conserving therapy for malignancy. No current breast complaints.     COMPARISON: Bilateral diagnostic mammogram, 01/13/2023    SEDATION: None     FINDINGS:  Right Breast:  Breast composition: Heterogeneous fibroglandular tissue  Background parenchymal enhancement: Mild  No concerning areas of enhancement.      Right Axilla: No lymphadenopathy.   Right Internal Mammary Chain: No lymphadenopathy.      Left Breast:   Breast composition: Heterogeneous fibroglandular tissue  Background parenchymal enhancement: Mild  No concerning areas of enhancement.  Postsurgical change.     Left Axilla: No lymphadenopathy.   Left Internal Mammary Chain: No lymphadenopathy.                                                                       IMPRESSION:   No MRI evidence of malignancy.     Right Breast: BI-RADS CATEGORY: 1 -  NEGATIVE.   Left Breast: BI-RADS CATEGORY: 2 - Benign Finding(s).      RECOMMENDATION: Continue high risk  screening.

## 2023-07-07 RX ORDER — ZOLPIDEM TARTRATE 5 MG/1
5 TABLET ORAL
Qty: 45 TABLET | Refills: 1 | Status: SHIPPED | OUTPATIENT
Start: 2023-07-07 | End: 2023-10-12

## 2023-07-10 ENCOUNTER — ONCOLOGY VISIT (OUTPATIENT)
Dept: ONCOLOGY | Facility: CLINIC | Age: 78
End: 2023-07-10
Attending: INTERNAL MEDICINE
Payer: MEDICARE

## 2023-07-10 VITALS
WEIGHT: 133 LBS | SYSTOLIC BLOOD PRESSURE: 134 MMHG | BODY MASS INDEX: 23.56 KG/M2 | HEART RATE: 93 BPM | OXYGEN SATURATION: 100 % | RESPIRATION RATE: 16 BRPM | DIASTOLIC BLOOD PRESSURE: 81 MMHG

## 2023-07-10 DIAGNOSIS — T45.1X5A LEUKOPENIA DUE TO ANTINEOPLASTIC CHEMOTHERAPY (H): ICD-10-CM

## 2023-07-10 DIAGNOSIS — D63.1 ANEMIA OF CHRONIC RENAL FAILURE, STAGE 3 (MODERATE), UNSPECIFIED WHETHER STAGE 3A OR 3B CKD (H): ICD-10-CM

## 2023-07-10 DIAGNOSIS — C50.512 MALIGNANT NEOPLASM OF LOWER-OUTER QUADRANT OF LEFT BREAST OF FEMALE, ESTROGEN RECEPTOR NEGATIVE (H): Primary | ICD-10-CM

## 2023-07-10 DIAGNOSIS — E03.2 HYPOTHYROIDISM DUE TO MEDICATION: ICD-10-CM

## 2023-07-10 DIAGNOSIS — Z17.1 MALIGNANT NEOPLASM OF LOWER-OUTER QUADRANT OF LEFT BREAST OF FEMALE, ESTROGEN RECEPTOR NEGATIVE (H): Primary | ICD-10-CM

## 2023-07-10 DIAGNOSIS — D70.1 LEUKOPENIA DUE TO ANTINEOPLASTIC CHEMOTHERAPY (H): ICD-10-CM

## 2023-07-10 DIAGNOSIS — N28.89 RIGHT KIDNEY MASS: ICD-10-CM

## 2023-07-10 DIAGNOSIS — D05.02 NEOPLASM OF LEFT BREAST, PRIMARY TUMOR STAGING CATEGORY TIS: LOBULAR CARCINOMA IN SITU (LCIS): ICD-10-CM

## 2023-07-10 DIAGNOSIS — N18.30 ANEMIA OF CHRONIC RENAL FAILURE, STAGE 3 (MODERATE), UNSPECIFIED WHETHER STAGE 3A OR 3B CKD (H): ICD-10-CM

## 2023-07-10 DIAGNOSIS — F41.9 ANXIETY: ICD-10-CM

## 2023-07-10 DIAGNOSIS — E55.9 VITAMIN D DEFICIENCY: ICD-10-CM

## 2023-07-10 PROCEDURE — G0463 HOSPITAL OUTPT CLINIC VISIT: HCPCS | Performed by: INTERNAL MEDICINE

## 2023-07-10 PROCEDURE — 99215 OFFICE O/P EST HI 40 MIN: CPT | Performed by: INTERNAL MEDICINE

## 2023-07-10 RX ORDER — LEVOTHYROXINE SODIUM 75 UG/1
75 TABLET ORAL DAILY
Qty: 90 TABLET | Refills: 3 | Status: SHIPPED | OUTPATIENT
Start: 2023-07-10 | End: 2024-05-15

## 2023-07-10 ASSESSMENT — PAIN SCALES - GENERAL: PAINLEVEL: NO PAIN (0)

## 2023-07-10 NOTE — PROGRESS NOTES
"Oncology Rooming Note    July 10, 2023 4:12 PM   Jann Davidson is a 78 year old female who presents for:    Chief Complaint   Patient presents with     Oncology Clinic Visit     Initial Vitals: /81   Pulse 93   Resp 16   Wt 60.3 kg (133 lb)   SpO2 100%   BMI 23.56 kg/m   Estimated body mass index is 23.56 kg/m  as calculated from the following:    Height as of 5/31/23: 1.6 m (5' 3\").    Weight as of this encounter: 60.3 kg (133 lb). Body surface area is 1.64 meters squared.  No Pain (0) Comment: Data Unavailable   No LMP recorded. Patient is postmenopausal.  Allergies reviewed: Yes  Medications reviewed: Yes    Medications: Medication refills not needed today.  Pharmacy name entered into EPIC:    [id number] - [place], [place] - [address]  [facility] - [place], [place] - [address] [place], [place] [id number] [place]    Clinical concerns:  doctor was notified.      Isabela Padron CMA            "

## 2023-07-10 NOTE — LETTER
7/10/2023         RE: Jann Davidson  [address] [facility] [address]        Dear Colleague,    Thank you for referring your patient, Jann Davidson, to the [facility]. Please see a copy of my visit note below.    [facility]    Hematology/Oncology Established Patient Note      Today's Date: 7/10/2023    Reason for follow-up:  Left breast cancer, triple negative.    HISTORY OF PRESENT ILLNESS: Jann Davidson is a 78 year old female who presents with the following oncologic history:  1. 10/26/2021: Mammogram showed calcifications in the left lateral breast; right breast negative.  2. 11/17/2021: Left diagnostic mammogram showed cluster of pleomorphic calcifications at 4:00, 6 cm from nipple, measuring 1.3 cm.  3. 12/3/2021: Stereotactic left breast biopsy at 4:00, 6 cm from nipple showed grade 2 invasive ductal carcinoma with micropapillary features, extensive lymphovascular invasion present, grade 2-3 focal DCIS, LCIS, ER negative, TX negative, HER-2/maxime FISH negative.  4. 12/10/2021: Breast MRI showed oval mass 2 x 2 x 1.5 cm at 4:00, 6 cm from nipple in left breast reflecting biopsy cavity with post-biopsy changes; prominent 2.5 cm low left level 1 axillary lymph node.  5.  12/28/2021: PET/CT scan showed FDG avid focus in left lower outer breast, hypermetabolic left axillary adenopathy, moderate FDG uptake at level off anus, exophytic right renal 1.1 cm mass, slowly increasing in size since 2016 concerning for growing renal cell carcinoma.  6. 1/18/2022: Started neoadjuvant chemotherapy with weekly paclitaxel + carboplatin and every 3-week pembrolizumab as per KEYNOTE-522 study.  7. 4/19/2022: Breast MRI showed no residual enhancement at site of primary malignancy in left breast at 4:00; no residual left axillary adenopathy. Right breast negative.  8. 5/6/2022: Completed cycle 12 paclitaxel with pembrolizumab. Surgery delayed due to 2 hospitalizations for rash  and peripheral neuropathy.  9. 6/27/2022: Underwent left breast lumpectomy and left axillary sentinel lymph node excision under care of Dr. Tatianna Rai.  Pathology showed no residual invasive carcinoma in left breast; residual LCIS and atypical lobular hyperplasia present; one of 2 lymph nodes with isolated tumor cells measuring 0.087 mm, focal fibrosis consistent with treatment effect; RCB-I, ypT0-pN0(i+).  10. 8/08/2022: Started raloxifene for LCIS and atypical lobular hyperplasia.  11. 8/10/2022-9/22/2022: Completion of adjuvant radiation therapy.  12. 8/25/2022-2/10/2023: Completed adjuvant pembrolizumab x 9 cycles.  13. 5/24/2023: CT scan showed exophytic mass of posterior upper pole right kidney unchanged since 11/10/2022.    INTERVAL HISTORY:  Jann reports feeling exhausted.  Denies shortness of breath or chest pain.    REVIEW OF SYSTEMS:   14 point ROS was reviewed and is negative other than as noted above in HPI.       HOME MEDICATIONS:  Current Outpatient Medications   Medication Sig Dispense Refill     atorvastatin (LIPITOR) 10 MG tablet TAKE 1 TABLET (10 MG) BY MOUTH ONCE DAILY 90 tablet 3     candesartan (ATACAND) 4 MG tablet Take 0.5 tablets (2 mg) by mouth daily DISPENSE AS WRITTEN, Brand name only       levothyroxine (SYNTHROID/LEVOTHROID) 50 MCG tablet Take 1 tablet (50 mcg) by mouth daily 60 tablet 1     loratadine (CLARITIN) 10 MG tablet Take 10 mg by mouth daily       LORazepam (ATIVAN) 0.5 MG tablet TAKE 1 TABLET (0.5 MG) BY MOUTH EVERY 6 HOURS AS NEEDED FOR ANXIETY, NAUSEA OR SLEEP 45 tablet 0     Magnesium 400 MG TABS Take 800 mg by mouth daily       oxazepam (SERAX) 15 MG capsule TAKE 1 CAPSULE (15 MG) BY MOUTH NIGHTLY AS NEEDED FOR ANXIETY 45 capsule 1     PARNATE 10 MG tablet TAKE 1 TABLET BY MOUTH 3 TIMES DAILY 300 tablet 3     polyethylene glycol (MIRALAX/GLYCOLAX) packet Take 1 packet by mouth daily       raloxifene (EVISTA) 60 MG tablet Take 1 tablet (60 mg) by mouth daily 90 tablet 3      triamcinolone (KENALOG) 0.1 % external cream Apply  topically 2 times daily as needed. 15 g 1     zolpidem (AMBIEN) 5 MG tablet TAKE 1 TABLET (5 MG) BY MOUTH NIGHTLY AS NEEDED FOR SLEEP 45 tablet 1         ALLERGIES:  Allergies   Allergen Reactions     Tegaderm Transparent Dressing (Informational Only) Blisters     Lyrica [Pregabalin] Rash         PAST MEDICAL HISTORY:  Past Medical History:   Diagnosis Date     Breast cancer (H)      CKD (chronic kidney disease) stage 3, GFR 30-59 ml/min (H)      Depression with anxiety      Gout      Hypertension goal BP (blood pressure) < 140/90      Recurrent sinusitis 2013     Gynecologic history:  Age of menarche at 11; LMP ;  (one son), 1st pregnancy at age 19; no HRT.    PAST SURGICAL HISTORY:  Past Surgical History:   Procedure Laterality Date     BIOPSY NODE SENTINEL Left 2022    Procedure: left axillary sentinel lymph node biopsy;  Surgeon: Tatianna Rai MD;  Location: [facility] OR     BREAST BIOPSY, RT/LT      Breat Biopsy RT/LT     COLONOSCOPY  10/03     COLONOSCOPY  2014    Procedure: COLONOSCOPY;  COLONOSCOPY ;  Surgeon: Gaurav Shaffer MD;  Location: [facility] GI     IR CHEST PORT PLACEMENT > 5 YRS OF AGE  2021     IR PORT REMOVAL RIGHT  2023     LUMPECTOMY BREAST WITH SEED LOCALIZATION Left 2022    Procedure: Radiofrequency tag localized left breast lumpectomy with radiofrequency tag localized excision of left axillary lymph node;  Surgeon: Tatianna Rai MD;  Location: [facility] OR     RECONSTRUCT EYELID           SOCIAL HISTORY:  Social History     Socioeconomic History     Marital status:      Spouse name: Not on file     Number of children: Not on file     Years of education: Not on file     Highest education level: Not on file   Occupational History     Not on file   Tobacco Use     Smoking status: Former     Types: Cigarettes     Quit date: 10/30/1972     Years since quittin.7     Smokeless tobacco: Never    Vaping Use     Vaping Use: Never used   Substance and Sexual Activity     Alcohol use: Yes     Comment: couple glasses of wine daily      Drug use: No     Sexual activity: Never   Other Topics Concern     Parent/sibling w/ CABG, MI or angioplasty before 65F 55M? Not Asked   Social History Narrative     Not on file     Social Determinants of Health     Financial Resource Strain: Not on file   Food Insecurity: Not on file   Transportation Needs: Not on file   Physical Activity: Not on file   Stress: Not on file   Social Connections: Not on file   Intimate Partner Violence: Not At Risk (10/7/2022)    Humiliation, Afraid, Rape, and Kick questionnaire      Fear of Current or Ex-Partner: No      Emotionally Abused: No      Physically Abused: No      Sexually Abused: No   Housing Stability: Not on file         FAMILY HISTORY:  Family History   Problem Relation Age of Onset     Cancer Mother         uterine     Breast Cancer Mother      Diabetes Paternal Grandmother          PHYSICAL EXAM:  Vital signs:  There were no vitals taken for this visit.   GENERAL: No acute distress.  EYES: No scleral icterus. No overt erythema.  LYMPH: No palpable lymphadenopathy in the cervical, supraclavicular, axillary regions.  BREAST: No palpable masses in either breast. Postlumpectomy changes in the right breast.  Nipples are everted bilaterally with no discharge. No erythema, ulceration, or dimpling of the skin.  RESPIRATORY: No audible cough or wheezing.  ABDOMEN: Soft, nontender, nondistended, with no palpable hepatosplenomegaly.   MUSCULOSKELETAL: No clubbing, cyanosis or edema.  SKIN: No rashes or jaundice.  NEUROLOGIC: Alert, answers questions appropriately; no focal deficits.  PSYCHIATRIC: Normal affect; anxious.    LABS:  CBC RESULTS: Recent Labs   Lab Test 05/24/23  1523   WBC 6.7   RBC 3.97   HGB 12.6   HCT 40.0   *   MCH 31.7   MCHC 31.5   RDW 12.6        Last Comprehensive Metabolic Panel:  Sodium   Date Value Ref  Range Status   02/10/2023 140 136 - 145 mmol/L Final   06/05/2019 142 133 - 144 mmol/L Final     Potassium   Date Value Ref Range Status   02/10/2023 4.0 3.4 - 5.3 mmol/L Final   12/30/2022 4.0 3.4 - 5.3 mmol/L Final   06/05/2019 3.7 3.4 - 5.3 mmol/L Final     Chloride   Date Value Ref Range Status   02/10/2023 106 98 - 107 mmol/L Final   12/30/2022 110 (H) 94 - 109 mmol/L Final   06/05/2019 110 (H) 94 - 109 mmol/L Final     Carbon Dioxide   Date Value Ref Range Status   06/05/2019 24 20 - 32 mmol/L Final     Carbon Dioxide (CO2)   Date Value Ref Range Status   02/10/2023 23 22 - 29 mmol/L Final   12/30/2022 24 20 - 32 mmol/L Final     Anion Gap   Date Value Ref Range Status   02/10/2023 11 7 - 15 mmol/L Final   12/30/2022 7 3 - 14 mmol/L Final   06/05/2019 8 3 - 14 mmol/L Final     Glucose   Date Value Ref Range Status   02/10/2023 95 70 - 99 mg/dL Final   12/30/2022 103 (H) 70 - 99 mg/dL Final   06/05/2019 93 70 - 99 mg/dL Final     Comment:     Fasting specimen     Urea Nitrogen   Date Value Ref Range Status   02/10/2023 37.4 (H) 8.0 - 23.0 mg/dL Final   12/30/2022 29 7 - 30 mg/dL Final   06/05/2019 32 (H) 7 - 30 mg/dL Final     Creatinine   Date Value Ref Range Status   02/10/2023 1.29 (H) 0.51 - 0.95 mg/dL Final   06/05/2019 1.15 (H) 0.52 - 1.04 mg/dL Final     Creatinine POCT   Date Value Ref Range Status   05/24/2023 1.6 (H) 0.5 - 1.0 mg/dL Final     GFR Estimate   Date Value Ref Range Status   06/05/2019 47 (L) >60 mL/min/[1.73_m2] Final     Comment:     Non  GFR Calc  Starting 12/18/2018, serum creatinine based estimated GFR (eGFR) will be   calculated using the Chronic Kidney Disease Epidemiology Collaboration   (CKD-EPI) equation.       GFR, ESTIMATED POCT   Date Value Ref Range Status   05/24/2023 33 (L) >60 mL/min/1.73m2 Final     Calcium   Date Value Ref Range Status   02/10/2023 9.0 8.8 - 10.2 mg/dL Final   06/05/2019 9.2 8.5 - 10.1 mg/dL Final     Bilirubin Total   Date Value Ref  Range Status   02/10/2023 0.4 <=1.2 mg/dL Final   2019 0.4 0.2 - 1.3 mg/dL Final     Alkaline Phosphatase   Date Value Ref Range Status   02/10/2023 87 35 - 104 U/L Final   2019 85 40 - 150 U/L Final     ALT   Date Value Ref Range Status   02/10/2023 33 10 - 35 U/L Final   2019 33 0 - 50 U/L Final     AST   Date Value Ref Range Status   02/10/2023 51 (H) 10 - 35 U/L Final     Comment:     Specimen is hemolyzed which can falsely elevate AST. Analysis of a non-hemolyzed specimen may result in a lower value.   2019 33 0 - 45 U/L Final     2023: TSH = 29.92 -> 6.46  Free T4 = 0.7 => 1.19    PATHOLOGY:  None new.    IMAGIN2022 Limited abdominal U/S showed:  Borderline dilated extrahepatic bile ducts, similar to previous.    11/10/2022: Renal CT showed 1.4 x 1.3 cm right kidney mass, increased from 1.1 x 1 cm.    ASSESSMENT/PLAN:  Jann Davidson is a 77 year old female with the following issues:  1. 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 Left breast LCIS and atypical lobular hyperplasia  --Jann completed neoadjuvant chemotherapy with paclitaxel and carboplatin for 12 weeks with every 3-week pembrolizumab. Given that her 2022 breast MRI showed no residual enhancement -- I had advised foregoing chemotherapy with Adriamycin and Cytoxan given her age and low likelihood of tolerating these drugs.  --2022 Final pathology from her lumpectomy and sentinel lymph node excision showed near-complete response to neoadjuvant chemotherapy with just isolated tumor cells in one lymph node.  She had residual LCIS and atypical lobular hyperplasia.  --She then completed adjuvant pembrolizumab on 2/10/2023 for 9 cycles as per the KEYNOTE-522 trial.  --She completed adjuvant radiation therapy on 2022 with Dr. Giles.  --Genetic testing  negative.  --Jann has no clinical evidence for recurrent breast cancer by physical exam from today or breast MRI reviewed from 7/5/2023.  --Plan to alternate mammogram with breast MRI for high risk surveillance given findings of LCIS and ALH, which are markers for increased risk of additional breast cancer in the future.  We will plan for bilateral mammogram alternating with breast MRI 6 months apart. Due for mammogram in 1/2024.  --Continue raloxifene for 5 years for risk reduction given the findings of LCIS and ALH. She is tolerating this well without side effects.    3. Chronic kidney disease, stage 3  --Creatinine stable.  --She follows with Dr. Luna.    4. FDG uptake at anal canal  --She has history of anal fissure from 4/8/2014 colonoscopy.  --PET scan showed uptake at level of anus and could represent hemorrhoid, fissure, malignancy, or be physiologic.  --She met with colorectal Dr. Cervantes on  12/16/2022 with flex sig that showed no evidence of neoplasia in rectum.  --Due for colonoscopy in 2025.    5. Right renal mass  --PET scan showed a 1.1 cm mass in the right kidney that has been reportedly slowly growing since 2016 concerning for renal cell carcinoma.  --Following with Dr. Guerrero, urology.  --11/10/2022 Renal CT showed slight increase in the renal mass concerning for renal cell carcinoma.    --5/24/2023: Renal CT showed right upper pole renal mass unchanged.  --She is following with Dr. Guerrero and will undergo right robotic assisted laparoscopic partial nephrectomyy on 10/23/2023 as she is quite concerned about leaving a cancer in her body to grow.    6. Pulmonary nodules  --PET scan showed scattered small bilateral pulmonary nodules that are unchanged since 10/24/2018 and most likely benign. No further CT chest needed.    7. Depression/anxiety  --Stable.  --She would like to continue on Parnate and not change her antidepressant.  --She takes oxazepam together with zolpidem.    --She could use  "lorazepam (Ativan) as long as she takes this 6 hours from her other benzodiazepenes.    8. Hypothyroidism  --Likely related to pembrolizumab.    --7/5/2023 TSH worsened from 6.46 to 10.  Free T4 is normal at 1.01.  --She reports feeling exhausted, which is likely multifactorial.  --Will increase levothyroxine to 75 mcg daily.  --Repeat thyroid function tests in 3 months.    9. Vitamin D deficiency   --Discussed 7/5/2023 vitamin D level very low at 14.  --Advised vitamin D 2000 international unit(s) daily for 2 weeks then 1000 international unit(s) daily.    --Will recheck with next visit.    10. Leukopenia  --7/5/2023 WBC 3.7 with ANC 2.3.  --May be related to past chemo.   --WBC subsets are normal.  --Will repeat CBC w/diff next visit.    11. Anemia  --May be related to anemia of chronic kidney disease but overall stable at 11.2.    Return in 3 months.    Neetu Mcmullen MD  Hematology/Oncology  [facility]    Total time spent today: 40 minutes in chart review, patient evaluation, counseling, documentation, test and/or medication/prescription orders, and coordination of care.    Oncology Rooming Note    July 10, 2023 4:12 PM   Jann Davidson is a 78 year old female who presents for:    Chief Complaint   Patient presents with     Oncology Clinic Visit     Initial Vitals: /81   Pulse 93   Resp 16   Wt 60.3 kg (133 lb)   SpO2 100%   BMI 23.56 kg/m   Estimated body mass index is 23.56 kg/m  as calculated from the following:    Height as of 5/31/23: 1.6 m (5' 3\").    Weight as of this encounter: 60.3 kg (133 lb). Body surface area is 1.64 meters squared.  No Pain (0) Comment: Data Unavailable   No LMP recorded. Patient is postmenopausal.  Allergies reviewed: Yes  Medications reviewed: Yes    Medications: Medication refills not needed today.  Pharmacy name entered into EPIC:    [id number] - [facility] [address]  [facility] - [place], [place] - [address] [place] [address]    Clinical concerns:  doctor was notified.      Isabela Padron, [facility]                Again, thank you for allowing me to participate in the care of your patient.        Sincerely,        Neetu Mcmullen MD

## 2023-07-24 DIAGNOSIS — Z13.6 CARDIOVASCULAR SCREENING; LDL GOAL LESS THAN 130: ICD-10-CM

## 2023-07-24 RX ORDER — ATORVASTATIN CALCIUM 10 MG/1
TABLET, FILM COATED ORAL
Qty: 90 TABLET | Refills: 2 | Status: SHIPPED | OUTPATIENT
Start: 2023-07-24 | End: 2024-07-26

## 2023-07-24 NOTE — TELEPHONE ENCOUNTER
Prescription approved per [facility] Refill Protocol.     Denise Fuentes RN   [facility]

## 2023-07-26 DIAGNOSIS — N60.92 ATYPICAL LOBULAR HYPERPLASIA (ALH) OF LEFT BREAST: ICD-10-CM

## 2023-07-26 DIAGNOSIS — T45.1X5A CHEMOTHERAPY-INDUCED NEUTROPENIA (H): ICD-10-CM

## 2023-07-26 DIAGNOSIS — D05.02 NEOPLASM OF LEFT BREAST, PRIMARY TUMOR STAGING CATEGORY TIS: LOBULAR CARCINOMA IN SITU (LCIS): ICD-10-CM

## 2023-07-26 DIAGNOSIS — D70.1 CHEMOTHERAPY-INDUCED NEUTROPENIA (H): ICD-10-CM

## 2023-07-26 DIAGNOSIS — F41.9 ANXIETY: ICD-10-CM

## 2023-07-26 RX ORDER — LORAZEPAM 0.5 MG/1
0.5 TABLET ORAL EVERY 6 HOURS PRN
Qty: 45 TABLET | Refills: 0 | Status: SHIPPED | OUTPATIENT
Start: 2023-07-26 | End: 2023-09-25

## 2023-07-26 RX ORDER — RALOXIFENE HYDROCHLORIDE 60 MG/1
60 TABLET, FILM COATED ORAL DAILY
Qty: 90 TABLET | Refills: 3 | Status: SHIPPED | OUTPATIENT
Start: 2023-07-26 | End: 2024-09-17

## 2023-08-21 NOTE — TELEPHONE ENCOUNTER
Per Dr Mcmullen, no abx needed for dental work. Jann notified.    Regina Arango RN    
Received VM from Jann stating that she needs to have some dental work done and wondering if any abx are needed? Her last Keytruda was 2/10/23.    Preferred pharmacy is [facility].    Regina Arango RN    
Subjective:       Patient ID: Naresh Lancaster is a 52 y.o. male.    Chief Complaint: Post-op Evaluation    2-1/2 weeks postop:    Overall, the patient is feeling much better.  He is sleeping well at night; however, he does not have dreams.  He does feel much more rested in the mornings.  He has been eating almost a normal diet.  He is not having ear pain.  He is not having significant throat pain.  He no longer has regurgitation of fluids when he drinks.  His snoring has resolved.    Review of Systems   Constitutional: Positive for fatigue. Negative for fever.   HENT: Positive for sore throat and trouble swallowing. Negative for congestion, rhinorrhea, sinus pressure, sinus pain and voice change.    Eyes: Negative.    Respiratory: Negative for cough and choking.    Cardiovascular: Negative.    Gastrointestinal: Negative.    Genitourinary: Negative.    Musculoskeletal: Negative.    Skin: Negative.    Allergic/Immunologic: Positive for environmental allergies.   Neurological: Negative.    Psychiatric/Behavioral: Negative.        Objective:      Physical Exam  Constitutional:       General: He is not in acute distress.     Appearance: Normal appearance. He is obese.   HENT:      Head: Normocephalic.      Right Ear: Tympanic membrane, ear canal and external ear normal.      Left Ear: Tympanic membrane, ear canal and external ear normal.      Nose: Nose normal.      Mouth/Throat:      Comments: Oral cavity-soft palate incision healed, soft palatal defect of UPPP, tonsillar fossa small in size see with eschar bilaterally  Musculoskeletal:      Cervical back: Normal range of motion and neck supple.   Neurological:      Mental Status: He is alert.         Assessment:       1. Post-operative state    2. Allergic rhinitis, unspecified seasonality, unspecified trigger    3. Nasal septal deviation        Plan:       I will recheck the patient in 2 weeks.  He may gradually resume diet and activity per tolerance.      
no

## 2023-08-28 DIAGNOSIS — G47.00 PERSISTENT DISORDER OF INITIATING OR MAINTAINING SLEEP: ICD-10-CM

## 2023-08-28 RX ORDER — OXAZEPAM 15 MG/1
15 CAPSULE ORAL
Qty: 45 CAPSULE | Refills: 0 | Status: SHIPPED | OUTPATIENT
Start: 2023-08-28 | End: 2023-10-12

## 2023-09-25 DIAGNOSIS — D70.1 CHEMOTHERAPY-INDUCED NEUTROPENIA (H): ICD-10-CM

## 2023-09-25 DIAGNOSIS — T45.1X5A CHEMOTHERAPY-INDUCED NEUTROPENIA (H): ICD-10-CM

## 2023-09-25 DIAGNOSIS — F41.9 ANXIETY: ICD-10-CM

## 2023-09-25 RX ORDER — LORAZEPAM 0.5 MG/1
0.5 TABLET ORAL EVERY 6 HOURS PRN
Qty: 45 TABLET | Refills: 0 | Status: SHIPPED | OUTPATIENT
Start: 2023-09-25 | End: 2023-11-07

## 2023-10-02 ENCOUNTER — ANCILLARY PROCEDURE (OUTPATIENT)
Dept: CT IMAGING | Facility: CLINIC | Age: 78
End: 2023-10-02
Attending: UROLOGY
Payer: MEDICARE

## 2023-10-02 DIAGNOSIS — N28.89 RIGHT KIDNEY MASS: ICD-10-CM

## 2023-10-02 LAB
CREAT BLD-MCNC: 1.6 MG/DL (ref 0.5–1)
EGFRCR SERPLBLD CKD-EPI 2021: 33 ML/MIN/1.73M2

## 2023-10-02 PROCEDURE — 250N000011 HC RX IP 250 OP 636: Performed by: UROLOGY

## 2023-10-02 PROCEDURE — 74178 CT ABD&PLV WO CNTR FLWD CNTR: CPT | Mod: MG

## 2023-10-02 PROCEDURE — 250N000009 HC RX 250: Performed by: UROLOGY

## 2023-10-02 PROCEDURE — 82565 ASSAY OF CREATININE: CPT

## 2023-10-02 RX ORDER — IOPAMIDOL 755 MG/ML
100 INJECTION, SOLUTION INTRAVASCULAR ONCE
Status: COMPLETED | OUTPATIENT
Start: 2023-10-02 | End: 2023-10-02

## 2023-10-02 RX ADMIN — SODIUM CHLORIDE 69 ML: 9 INJECTION, SOLUTION INTRAVENOUS at 15:07

## 2023-10-02 RX ADMIN — IOPAMIDOL 100 ML: 755 INJECTION, SOLUTION INTRAVENOUS at 15:07

## 2023-10-03 ENCOUNTER — CARE COORDINATION (OUTPATIENT)
Dept: UROLOGY | Facility: CLINIC | Age: 78
End: 2023-10-03
Payer: MEDICARE

## 2023-10-04 ENCOUNTER — OFFICE VISIT (OUTPATIENT)
Dept: UROLOGY | Facility: CLINIC | Age: 78
End: 2023-10-04
Payer: MEDICARE

## 2023-10-04 VITALS
SYSTOLIC BLOOD PRESSURE: 110 MMHG | WEIGHT: 130 LBS | HEIGHT: 63 IN | BODY MASS INDEX: 23.04 KG/M2 | DIASTOLIC BLOOD PRESSURE: 70 MMHG | HEART RATE: 84 BPM | OXYGEN SATURATION: 99 %

## 2023-10-04 DIAGNOSIS — N28.89 RIGHT KIDNEY MASS: Primary | ICD-10-CM

## 2023-10-04 PROCEDURE — 99214 OFFICE O/P EST MOD 30 MIN: CPT | Performed by: UROLOGY

## 2023-10-04 ASSESSMENT — PAIN SCALES - GENERAL: PAINLEVEL: NO PAIN (0)

## 2023-10-04 NOTE — PROGRESS NOTES
"[facility]  CHIEF COMPLAINT   It was my pleasure to see Jann Davidson who is a 78 year old female for follow-up of RIGHT renal mass.      HPI   Jann Davidson is a very pleasant 78 year old female     Initially seen 10/5/2022:  Jann Davidson is a 77 year old female who is being seen for evaluation of a small incidental right renal mass  Diagnosed with breast cancer last year  S/p Chemo/radiationa and surgery  She is doing very well from a breast cancer treatment standpoint and is now following up on these other findings  Incidental 1.1cm RIGHT complex renal mass vs cyst on PET from 12/2021 11/23/2022:  No significant changes  This is causing her some mental fatigue and worry  She also notes she is having a sigmoidoscopy in December 5/31/2023:  Still having some thyroid issues  Doing well from a urology standpoint    TODAY 10/4/2023:  Still working to optimize thyroid dosing  Doing well from a urology standpoint   She is scheduled for her partial nephrectomy on 10/23/2023 and returns today for updated imaging and review of the surgical plan  She remains quite anxious about prospect of having kidney cancer    PHYSICAL EXAM  Patient is a 78 year old  female   Vitals: Blood pressure 110/70, pulse 84, height 1.6 m (5' 3\"), weight 59 kg (130 lb), SpO2 99 %.  Body mass index is 23.03 kg/m .  General Appearance Adult:   Alert, no acute distress, oriented  HENT: throat/mouth:normal, good dentition  Lungs: no respiratory distress, or pursed lip breathing  Heart: No obvious jugular venous distension present  Abdomen: non - distended  Musculoskeltal: extremities normal, no peripheral edema  Skin: no suspicious lesions or rashes  Neuro: Alert, oriented, speech and mentation normal  Psych: affect and mood normal    Creatinine   Date Value Ref Range Status   07/05/2023 1.30 (H) 0.51 - 0.95 mg/dL Final   06/05/2019 1.15 (H) 0.52 - 1.04 mg/dL Final     Creatinine POCT   Date Value Ref Range Status   10/02/2023 1.6 (H) 0.5 - 1.0 " mg/dL Final      IMAGING:  All pertinent imaging reviewed:     All imaging studies reviewed by me.  I personally reviewed these imaging films.  A formal report from radiology will follow.     CT RENAL MASS 10/2/2023:  FINDINGS:     LOWER CHEST: 5 mm subpleural nodule demonstrates long-term stability and is benign right lower lobe. No infiltrates or effusions.     HEPATOBILIARY: No significant mass or bile duct dilatation. No calcified gallstones.      PANCREAS: No significant mass, duct dilatation, or inflammatory change.     SPLEEN: Normal size.     ADRENAL GLANDS: No significant nodules.     RIGHT KIDNEY/URETER: Stable 1.7 cm enhancing lesion in the mid right kidney. No other renal lesions bilaterally. Tiny benign cyst requiring no specific follow-up. No hydronephrosis.     LEFT KIDNEY/URETER: No mass or hydronephrosis. Benign cyst requiring no follow-up. No stone disease.     BOWEL: No obstruction or inflammatory change.     LYMPH NODES: No lymphadenopathy.     VASCULATURE: No abdominal aortic aneurysm.     MUSCULOSKELETAL: No frankly destructive bony lesions.                                                                   IMPRESSION:  1.  Stable enhancing 1.7 cm renal mass.    ASSESSMENT and PLAN  78-year-old female with history of breast cancer with an incidental small right renal mass    Incidental small right renal mass  - I reviewed her updated CT renal mass protocol and reviewed these images personally.  - I agree with radiologist interpretation, though I measured the mass at 1.8-1.9 cm  - Looking back to imaging from 2016 there was a possible 6 mm nodule at this site at that time  - We discussed that it is reassuring that this still remains less than 2 cm, however she is quite concerned about this mass and given its growth rate notes that it would likely cross the 2 cm threshold in the next few months.  This is causing her fairly significant emotional distress and she is worrying about this quite a bit  given her prior morales with breast cancer.  - She is scheduled for robotic partial nephrectomy on 10/23/2023  - We discussed the risks and benefits of a robotic assisted laparoscopic partial nephrectomy.  We discussed the risk of infection, bleeding requiring a blood transfusion, damage to surrounding structures, and the need for 1-2 nights of hospitalization.  We discussed the expected postoperative recovery process      Time spent: 20 minutes spent on the date of the encounter doing chart review, history and exam, documentation and further activities as noted above.    Frank Guerrero MD   Urology  [facility]  [facility] Phone: [phone]  [facility] Phone: [phone]

## 2023-10-04 NOTE — NURSING NOTE
Chief Complaint   Patient presents with    kidney mass     Review of CT scan and surgical discussion    Jessica Abraham LPN

## 2023-10-04 NOTE — LETTER
"10/4/2023       RE: Jann Davidson  [address]  [facility] [address]     Dear Colleague,    Thank you for referring your patient, Jann Davidson, to the [facility] at [facility]. Please see a copy of my visit note below.    [place]  CHIEF COMPLAINT   It was my pleasure to see Jann Davidson who is a 78 year old female for follow-up of RIGHT renal mass.      HPI   Jann Davidson is a very pleasant 78 year old female     Initially seen 10/5/2022:  Jann Davidson is a 77 year old female who is being seen for evaluation of a small incidental right renal mass  Diagnosed with breast cancer last year  S/p Chemo/radiationa and surgery  She is doing very well from a breast cancer treatment standpoint and is now following up on these other findings  Incidental 1.1cm RIGHT complex renal mass vs cyst on PET from 12/2021 11/23/2022:  No significant changes  This is causing her some mental fatigue and worry  She also notes she is having a sigmoidoscopy in December 5/31/2023:  Still having some thyroid issues  Doing well from a urology standpoint    TODAY 10/4/2023:  Still working to optimize thyroid dosing  Doing well from a urology standpoint   She is scheduled for her partial nephrectomy on 10/23/2023 and returns today for updated imaging and review of the surgical plan  She remains quite anxious about prospect of having kidney cancer    PHYSICAL EXAM  Patient is a 78 year old  female   Vitals: Blood pressure 110/70, pulse 84, height 1.6 m (5' 3\"), weight 59 kg (130 lb), SpO2 99 %.  Body mass index is 23.03 kg/m .  General Appearance Adult:   Alert, no acute distress, oriented  HENT: throat/mouth:normal, good dentition  Lungs: no respiratory distress, or pursed lip breathing  Heart: No obvious jugular venous distension present  Abdomen: non - distended  Musculoskeltal: extremities normal, no peripheral edema  Skin: no suspicious lesions or " rashes  Neuro: Alert, oriented, speech and mentation normal  Psych: affect and mood normal    Creatinine   Date Value Ref Range Status   07/05/2023 1.30 (H) 0.51 - 0.95 mg/dL Final   06/05/2019 1.15 (H) 0.52 - 1.04 mg/dL Final     Creatinine POCT   Date Value Ref Range Status   10/02/2023 1.6 (H) 0.5 - 1.0 mg/dL Final      IMAGING:  All pertinent imaging reviewed:     All imaging studies reviewed by me.  I personally reviewed these imaging films.  A formal report from radiology will follow.     CT RENAL MASS 10/2/2023:  FINDINGS:     LOWER CHEST: 5 mm subpleural nodule demonstrates long-term stability and is benign right lower lobe. No infiltrates or effusions.     HEPATOBILIARY: No significant mass or bile duct dilatation. No calcified gallstones.      PANCREAS: No significant mass, duct dilatation, or inflammatory change.     SPLEEN: Normal size.     ADRENAL GLANDS: No significant nodules.     RIGHT KIDNEY/URETER: Stable 1.7 cm enhancing lesion in the mid right kidney. No other renal lesions bilaterally. Tiny benign cyst requiring no specific follow-up. No hydronephrosis.     LEFT KIDNEY/URETER: No mass or hydronephrosis. Benign cyst requiring no follow-up. No stone disease.     BOWEL: No obstruction or inflammatory change.     LYMPH NODES: No lymphadenopathy.     VASCULATURE: No abdominal aortic aneurysm.     MUSCULOSKELETAL: No frankly destructive bony lesions.                                                                   IMPRESSION:  1.  Stable enhancing 1.7 cm renal mass.    ASSESSMENT and PLAN  78-year-old female with history of breast cancer with an incidental small right renal mass    Incidental small right renal mass  - I reviewed her updated CT renal mass protocol and reviewed these images personally.  - I agree with radiologist interpretation, though I measured the mass at 1.8-1.9 cm  - Looking back to imaging from 2016 there was a possible 6 mm nodule at this site at that time  - We discussed that  it is reassuring that this still remains less than 2 cm, however she is quite concerned about this mass and given its growth rate notes that it would likely cross the 2 cm threshold in the next few months.  This is causing her fairly significant emotional distress and she is worrying about this quite a bit given her prior morales with breast cancer.  - She is scheduled for robotic partial nephrectomy on 10/23/2023  - We discussed the risks and benefits of a robotic assisted laparoscopic partial nephrectomy.  We discussed the risk of infection, bleeding requiring a blood transfusion, damage to surrounding structures, and the need for 1-2 nights of hospitalization.  We discussed the expected postoperative recovery process      Time spent: 20 minutes spent on the date of the encounter doing chart review, history and exam, documentation and further activities as noted above.    Frank Guerrero MD   Urology  [facility]  [facility] Phone: [phone]  [facility] Phone: [phone]

## 2023-10-05 ASSESSMENT — PATIENT HEALTH QUESTIONNAIRE - PHQ9: SUM OF ALL RESPONSES TO PHQ QUESTIONS 1-9: 0

## 2023-10-08 ASSESSMENT — PATIENT HEALTH QUESTIONNAIRE - PHQ9
SUM OF ALL RESPONSES TO PHQ QUESTIONS 1-9: 0
SUM OF ALL RESPONSES TO PHQ QUESTIONS 1-9: 0

## 2023-10-10 ENCOUNTER — OFFICE VISIT (OUTPATIENT)
Dept: FAMILY MEDICINE | Facility: CLINIC | Age: 78
End: 2023-10-10
Payer: MEDICARE

## 2023-10-10 VITALS
OXYGEN SATURATION: 98 % | TEMPERATURE: 98.2 F | HEIGHT: 63 IN | SYSTOLIC BLOOD PRESSURE: 114 MMHG | WEIGHT: 134.1 LBS | DIASTOLIC BLOOD PRESSURE: 76 MMHG | HEART RATE: 80 BPM | BODY MASS INDEX: 23.76 KG/M2 | RESPIRATION RATE: 17 BRPM

## 2023-10-10 DIAGNOSIS — E03.2 HYPOTHYROIDISM DUE TO MEDICATION: ICD-10-CM

## 2023-10-10 DIAGNOSIS — D63.1 ANEMIA OF CHRONIC RENAL FAILURE, STAGE 3 (MODERATE), UNSPECIFIED WHETHER STAGE 3A OR 3B CKD (H): ICD-10-CM

## 2023-10-10 DIAGNOSIS — T45.1X5A LEUKOPENIA DUE TO ANTINEOPLASTIC CHEMOTHERAPY (H): ICD-10-CM

## 2023-10-10 DIAGNOSIS — N28.89 RIGHT RENAL MASS: ICD-10-CM

## 2023-10-10 DIAGNOSIS — C50.512 MALIGNANT NEOPLASM OF LOWER-OUTER QUADRANT OF LEFT BREAST OF FEMALE, ESTROGEN RECEPTOR NEGATIVE (H): ICD-10-CM

## 2023-10-10 DIAGNOSIS — N18.30 STAGE 3 CHRONIC KIDNEY DISEASE, UNSPECIFIED WHETHER STAGE 3A OR 3B CKD (H): ICD-10-CM

## 2023-10-10 DIAGNOSIS — N18.30 ANEMIA OF CHRONIC RENAL FAILURE, STAGE 3 (MODERATE), UNSPECIFIED WHETHER STAGE 3A OR 3B CKD (H): ICD-10-CM

## 2023-10-10 DIAGNOSIS — D70.1 LEUKOPENIA DUE TO ANTINEOPLASTIC CHEMOTHERAPY (H): ICD-10-CM

## 2023-10-10 DIAGNOSIS — E55.9 VITAMIN D DEFICIENCY: ICD-10-CM

## 2023-10-10 DIAGNOSIS — I10 HYPERTENSION GOAL BP (BLOOD PRESSURE) < 140/90: ICD-10-CM

## 2023-10-10 DIAGNOSIS — F33.9 RECURRENT MAJOR DEPRESSIVE DISORDER, REMISSION STATUS UNSPECIFIED (H): ICD-10-CM

## 2023-10-10 DIAGNOSIS — Z17.1 MALIGNANT NEOPLASM OF LOWER-OUTER QUADRANT OF LEFT BREAST OF FEMALE, ESTROGEN RECEPTOR NEGATIVE (H): ICD-10-CM

## 2023-10-10 DIAGNOSIS — Z01.818 PREOP GENERAL PHYSICAL EXAM: Primary | ICD-10-CM

## 2023-10-10 LAB
BASO+EOS+MONOS # BLD AUTO: ABNORMAL 10*3/UL
BASO+EOS+MONOS NFR BLD AUTO: ABNORMAL %
BASOPHILS # BLD AUTO: 0 10E3/UL (ref 0–0.2)
BASOPHILS NFR BLD AUTO: 0 %
EOSINOPHIL # BLD AUTO: 0.1 10E3/UL (ref 0–0.7)
EOSINOPHIL NFR BLD AUTO: 1 %
ERYTHROCYTE [DISTWIDTH] IN BLOOD BY AUTOMATED COUNT: 12.7 % (ref 10–15)
HCT VFR BLD AUTO: 37.2 % (ref 35–47)
HGB BLD-MCNC: 11.3 G/DL (ref 11.7–15.7)
IMM GRANULOCYTES # BLD: 0 10E3/UL
IMM GRANULOCYTES NFR BLD: 0 %
LYMPHOCYTES # BLD AUTO: 0.8 10E3/UL (ref 0.8–5.3)
LYMPHOCYTES NFR BLD AUTO: 12 %
MCH RBC QN AUTO: 31.1 PG (ref 26.5–33)
MCHC RBC AUTO-ENTMCNC: 30.4 G/DL (ref 31.5–36.5)
MCV RBC AUTO: 103 FL (ref 78–100)
MONOCYTES # BLD AUTO: 0.4 10E3/UL (ref 0–1.3)
MONOCYTES NFR BLD AUTO: 7 %
NEUTROPHILS # BLD AUTO: 5.4 10E3/UL (ref 1.6–8.3)
NEUTROPHILS NFR BLD AUTO: 79 %
PLATELET # BLD AUTO: 249 10E3/UL (ref 150–450)
RBC # BLD AUTO: 3.63 10E6/UL (ref 3.8–5.2)
WBC # BLD AUTO: 6.8 10E3/UL (ref 4–11)

## 2023-10-10 PROCEDURE — 90480 ADMN SARSCOV2 VAC 1/ONLY CMP: CPT | Performed by: PHYSICIAN ASSISTANT

## 2023-10-10 PROCEDURE — 82306 VITAMIN D 25 HYDROXY: CPT | Performed by: PHYSICIAN ASSISTANT

## 2023-10-10 PROCEDURE — 80053 COMPREHEN METABOLIC PANEL: CPT | Performed by: PHYSICIAN ASSISTANT

## 2023-10-10 PROCEDURE — 84443 ASSAY THYROID STIM HORMONE: CPT | Performed by: PHYSICIAN ASSISTANT

## 2023-10-10 PROCEDURE — 90662 IIV NO PRSV INCREASED AG IM: CPT | Performed by: PHYSICIAN ASSISTANT

## 2023-10-10 PROCEDURE — G0008 ADMIN INFLUENZA VIRUS VAC: HCPCS | Mod: 59 | Performed by: PHYSICIAN ASSISTANT

## 2023-10-10 PROCEDURE — 93000 ELECTROCARDIOGRAM COMPLETE: CPT | Performed by: PHYSICIAN ASSISTANT

## 2023-10-10 PROCEDURE — 85025 COMPLETE CBC W/AUTO DIFF WBC: CPT | Performed by: PHYSICIAN ASSISTANT

## 2023-10-10 PROCEDURE — 36415 COLL VENOUS BLD VENIPUNCTURE: CPT | Performed by: PHYSICIAN ASSISTANT

## 2023-10-10 PROCEDURE — 99214 OFFICE O/P EST MOD 30 MIN: CPT | Mod: 25 | Performed by: PHYSICIAN ASSISTANT

## 2023-10-10 PROCEDURE — 91320 SARSCV2 VAC 30MCG TRS-SUC IM: CPT | Performed by: PHYSICIAN ASSISTANT

## 2023-10-10 RX ORDER — VITAMIN B COMPLEX
1 TABLET ORAL DAILY
COMMUNITY

## 2023-10-10 ASSESSMENT — PAIN SCALES - GENERAL: PAINLEVEL: NO PAIN (0)

## 2023-10-10 NOTE — PROGRESS NOTES
[facility]  [address] [place] [address], [address]  [facility] [address]  Phone: [phone]  Primary Provider: Mehran Oliva  Pre-op Performing Provider: DAYANA RUIZ      PREOPERATIVE EVALUATION:  Today's date: 10/10/2023    Jann is a 78 year old female who presents for a preoperative evaluation.      Surgical Information:  Surgery/Procedure: RIGHT ROBOTIC ASSISTED LAPAROSOCPIC PARTIAL NEPHRECTOMY WITH INTRA-OPERATIVE RENAL ULTRASOUND, POSSIBLE OPEN   Surgery Location: [facility]  Surgeon: Frank Guerrero MD   Surgery Date: 10/23/2023   Time of Surgery: 10:55 AM   Where patient plans to recover: At home alone  Fax number for surgical facility: Note does not need to be faxed, will be available electronically in Epic.    Assessment & Plan     The proposed surgical procedure is considered INTERMEDIATE risk    (Z01.818) Preop general physical exam  (primary encounter diagnosis)  Comment: chronic issues stable, not on blood thinners, has stopped parnate for now, able to do 4 METS  Plan: EKG 12-lead complete w/read - Clinics        Cleared for surgery  Avoid nephrotoxins, hypotension    (N28.89) Right renal mass  Comment: renal mass vs cyst, first seen on PET 2021  Plan: surgery as planned    (N18.30) Stage 3 chronic kidney disease, unspecified whether stage 3a or 3b CKD (H)  Comment: GFR in 30s  Plan: AVOID nephrotoxins, CMP    (N18.30,  D63.1) Anemia of chronic renal failure, stage 3 (moderate), unspecified whether stage 3a or 3b CKD (H)  Comment:   Plan: CBC today    (I10) Hypertension goal BP (blood pressure) < 140/90  Comment:   Plan:     (D70.1,  T45.1X5A) Leukopenia due to antineoplastic chemotherapy   Comment:   Plan:     (C50.512,  Z17.1) Malignant neoplasm of lower-outer quadrant of left breast of female, estrogen receptor negative (H)  Comment:   Plan:     (E03.2) Hypothyroidism due to medication  Comment:   Plan:     (E55.9) Vitamin D  deficiency  Comment:   Plan:     (F33.9) Recurrent major depressive disorder, remission status unspecified (H24)  Comment:   Plan:     Risks and Recommendations:  The patient has the following additional risks and recommendations for perioperative complications:   - No identified additional risk factors other than previously addressed    Antiplatelet or Anticoagulation Medication Instructions:   - Patient is on no antiplatelet or anticoagulation medications.    Additional Medication Instructions:  She is currently holding parnate  Stop all NSAIDs (motrin, ibuprofen, naproxen, aleve, advil, naprosyn, aspirin)  for at least 5 days prior to surgery.  Tylenol is safe to take prior to surgery.    Stay off parnate until after surgery.    Take synthroid, candesartan (atacand), loratadine (claritin), evista with a sip of water the am of surgery.  (Take vitamins and supplements after surgery.)    Take the rest of your evening medications as usual the night before.      RECOMMENDATION:  APPROVAL GIVEN to proceed with proposed procedure, without further diagnostic evaluation.    Jackie Cid PA-C  30 minutes on the day of the encounter doing chart review, history and exam, documentation and further activities as noted above.      Subjective       HPI related to upcoming procedure:   Jann is here for preop for right partial nephrectomy.    She generally feels well. She is not currently exercising regularly.  She is able to walk up a flight of stairs without chest pain or significant dyspnea.    She denies history of MI, CVA, DM, DVT or adverse reaction to anesthesia.       Answers submitted by the patient for this visit:  Patient Health Questionnaire (Submitted on 10/8/2023)  PHQ9 TOTAL SCORE: 0        10/8/2023     3:15 AM   Preop Questions   1. Have you ever had a heart attack or stroke? No   2. Have you ever had surgery on your heart or blood vessels, such as a stent placement, a coronary artery bypass, or  surgery on an artery in your head, neck, heart, or legs? No   3. Do you have chest pain with activity? No   4. Do you have a history of  heart failure? No   5. Do you currently have a cold, bronchitis or symptoms of other infection? No   6. Do you have a cough, shortness of breath, or wheezing? No   7. Do you or anyone in your family have previous history of blood clots? No   8. Do you or does anyone in your family have a serious bleeding problem such as prolonged bleeding following surgeries or cuts? No   9. Have you ever had problems with anemia or been told to take iron pills? No   10. Have you had any abnormal blood loss such as black, tarry or bloody stools, or abnormal vaginal bleeding? No   11. Have you ever had a blood transfusion? No   12. Are you willing to have a blood transfusion if it is medically needed before, during, or after your surgery? Yes   13. Have you or any of your relatives ever had problems with anesthesia? No   14. Do you have sleep apnea, excessive snoring or daytime drowsiness? No   15. Do you have any artifical heart valves or other implanted medical devices like a pacemaker, defibrillator, or continuous glucose monitor? No   16. Do you have artificial joints? No   17. Are you allergic to latex? No       Review of Systems  CONSTITUTIONAL: NEGATIVE for fever, chills, change in weight  INTEGUMENTARY/SKIN: NEGATIVE for worrisome rashes, moles or lesions  EYES: NEGATIVE for vision changes or irritation  ENT/MOUTH: NEGATIVE for ear, mouth and throat problems  RESP: NEGATIVE for significant cough or SOB  CV: NEGATIVE for chest pain, palpitations or peripheral edema  GI: NEGATIVE for nausea, abdominal pain, heartburn, or change in bowel habits  : NEGATIVE for frequency, dysuria, or hematuria  MUSCULOSKELETAL: NEGATIVE for significant arthralgias or myalgia  NEURO: NEGATIVE for weakness, dizziness or paresthesias  HEME: NEGATIVE for bleeding problems    Patient Active Problem List     Diagnosis Date Noted    Rash 05/31/2022     Priority: Medium    Fever and chills 05/31/2022     Priority: Medium    Status post chemotherapy 05/31/2022     Priority: Medium    Syncope and collapse 05/21/2022     Priority: Medium    Hyponatremia 05/21/2022     Priority: Medium    Nonintractable headache, unspecified chronicity pattern, unspecified headache type 05/21/2022     Priority: Medium    Pain in both lower legs 05/21/2022     Priority: Medium    Encounter for other specified aftercare 03/22/2022     Priority: Medium    Chemotherapy-induced neutropenia  01/17/2022     Priority: Medium    Malignant neoplasm of lower-outer quadrant of left breast of female, estrogen receptor negative (H) 12/16/2021     Priority: Medium     Check 10.23 for follow-up [facility]      Major depressive disorder, recurrent episode, moderate (H) 04/04/2018     Priority: Medium    Anxiety 06/28/2017     Priority: Medium    Multiple pulmonary nodules 07/18/2016     Priority: Medium     Incidentally noticed on CT abdominal 7/16: Several bibasilar nodules < 5 mm. Low risk- followup CT 12 months. If unchanged, no further followup.      Actinic keratosis 01/26/2016     Priority: Medium    CTS (carpal tunnel syndrome) 12/10/2014     Priority: Medium     Right         Persistent disorder of initiating or maintaining sleep 12/10/2014     Priority: Medium     Takes oxazepam 15 mg and zolpidem 5 mg every night for intractable insomnia      Recurrent sinusitis 12/02/2013     Priority: Medium     OK for zpac by phone once a winter; appointment if not better after Rx.       CARDIOVASCULAR SCREENING; LDL GOAL LESS THAN 130 05/15/2013     Priority: Medium    CKD (chronic kidney disease) stage 3, GFR 30-59 ml/min (H) 04/14/2012     Priority: Medium    Essential hypertension with goal blood pressure less than 130/80      Priority: Medium    Atopic rhinitis 02/02/2011     Priority: Medium      Past Medical History:   Diagnosis Date    Breast cancer (H)      CKD (chronic kidney disease) stage 3, GFR 30-59 ml/min (H)     Depression with anxiety     Gout     Hypertension goal BP (blood pressure) < 140/90     Recurrent sinusitis 12/02/2013     Past Surgical History:   Procedure Laterality Date    BIOPSY NODE SENTINEL Left 6/27/2022    Procedure: left axillary sentinel lymph node biopsy;  Surgeon: Tatianna Rai MD;  Location: [facility] OR    BREAST BIOPSY, RT/LT      Breat Biopsy RT/LT    COLONOSCOPY  10/03    COLONOSCOPY  4/8/2014    Procedure: COLONOSCOPY;  COLONOSCOPY ;  Surgeon: Gaurav Shaffer MD;  Location: [facility] GI    IR CHEST PORT PLACEMENT > 5 YRS OF AGE  12/29/2021    IR PORT REMOVAL RIGHT  2/17/2023    LUMPECTOMY BREAST WITH SEED LOCALIZATION Left 6/27/2022    Procedure: Radiofrequency tag localized left breast lumpectomy with radiofrequency tag localized excision of left axillary lymph node;  Surgeon: Tatianna Rai MD;  Location: [facility] OR    RECONSTRUCT EYELID       Current Outpatient Medications   Medication Sig Dispense Refill    atorvastatin (LIPITOR) 10 MG tablet TAKE 1 TABLET (10 MG) BY MOUTH ONCE DAILY 90 tablet 2    candesartan (ATACAND) 4 MG tablet Take 0.5 tablets (2 mg) by mouth daily DISPENSE AS WRITTEN, Brand name only      levothyroxine (SYNTHROID/LEVOTHROID) 75 MCG tablet Take 1 tablet (75 mcg) by mouth daily 90 tablet 3    loratadine (CLARITIN) 10 MG tablet Take 10 mg by mouth daily      LORazepam (ATIVAN) 0.5 MG tablet Take 1 tablet (0.5 mg) by mouth every 6 hours as needed for anxiety, nausea or sleep 45 tablet 0    Magnesium 400 MG TABS Take 800 mg by mouth daily      oxazepam (SERAX) 15 MG capsule Take 1 capsule (15 mg) by mouth nightly as needed for anxiety Ms Davidson, please schedule virtual appointment for annual medication review next month. Thanks 45 capsule 0    PARNATE 10 MG tablet TAKE 1 TABLET BY MOUTH 3 TIMES DAILY 300 tablet 3    polyethylene glycol (MIRALAX/GLYCOLAX) packet Take 1 packet by mouth daily      raloxifene (EVISTA) 60  "MG tablet Take 1 tablet (60 mg) by mouth daily 90 tablet 3    triamcinolone (KENALOG) 0.1 % external cream Apply  topically 2 times daily as needed. 15 g 1    UNABLE TO FIND Take 1 tablet by mouth daily MEDICATION NAME: Patient does not know how many units      zolpidem (AMBIEN) 5 MG tablet Take 1 tablet (5 mg) by mouth nightly as needed for sleep 45 tablet 1       Allergies   Allergen Reactions    Tegaderm Transparent Dressing (Informational Only) Blisters    Lyrica [Pregabalin] Rash        Social History     Tobacco Use    Smoking status: Former     Types: Cigarettes     Quit date: 10/30/1972     Years since quittin.9    Smokeless tobacco: Never   Substance Use Topics    Alcohol use: Yes     Comment: couple glasses of wine daily      Family History   Problem Relation Age of Onset    Cancer Mother         uterine    Breast Cancer Mother     Hypertension Mother     Diabetes Paternal Grandmother      History   Drug Use No         Objective     /76 (BP Location: Left arm, Patient Position: Sitting, Cuff Size: Adult Regular)   Pulse 80   Temp 98.2  F (36.8  C) (Oral)   Resp 17   Ht 1.6 m (5' 3\")   Wt 60.8 kg (134 lb 1.6 oz)   SpO2 98%   BMI 23.75 kg/m        Physical Exam      GENERAL APPEARANCE: in NAD     EYES: no scleral icterus     HENT: OP clear mouth without ulcers or lesions     NECK: supple, no bruits     RESP: lungs clear to auscultation - no rales, rhonchi or wheezes     CV: regular rates and rhythm, normal S1 S2, no S3 or S4 and no murmur, click or rub     ABDOMEN:  soft, nontender, no HSM or masses and bowel sounds normal     MS: extremities normal- no gross deformities noted, no edema     NEURO: Normal mentation, gait and speechnormal     PSYCH: mentation appears normal. and affect normal/bright      Recent Labs   Lab Test 10/02/23  1507 23  0959 23  1602 23  1523 23  1305 02/10/23  0858   HGB  --  11.2*  --  12.6   < >  --    PLT  --  231  --  275   < >  --    NA  " --  140  --   --   --  140   POTASSIUM  --  4.0  --   --   --  4.0   CR 1.6* 1.30*   < >  --   --  1.29*    < > = values in this interval not displayed.        Diagnostics:  Labs pending at this time.  Results will be reviewed when available.   EKG: NSR, rate 75    Revised Cardiac Risk Index (RCRI):  The patient has the following serious cardiovascular risks for perioperative complications:   - No serious cardiac risks = 0 points     RCRI Interpretation: 0 points: Class I (very low risk - 0.4% complication rate)         Signed Electronically by: Jackie Cid PA-C  Copy of this evaluation report is provided to requesting physician.

## 2023-10-10 NOTE — PATIENT INSTRUCTIONS
Stop all NSAIDs (motrin, ibuprofen, naproxen, aleve, advil, naprosyn, aspirin)  for at least 5 days prior to surgery.  Tylenol is safe to take prior to surgery.    Stay off parnate until after surgery.    Take synthroid, candesartan (atacand), loratadine (claritin), evista with a sip of water the am of surgery.  (Take vitamins and supplements after surgery.)    Take the rest of your evening medications as usual the night before.

## 2023-10-10 NOTE — H&P (VIEW-ONLY)
[facility]  [address] [place] [address], [address]  [facility] [address]  Phone: [phone]  Primary Provider: Mehran Oliva  Pre-op Performing Provider: DAYANA RUIZ      PREOPERATIVE EVALUATION:  Today's date: 10/10/2023    Jann is a 78 year old female who presents for a preoperative evaluation.      Surgical Information:  Surgery/Procedure: RIGHT ROBOTIC ASSISTED LAPAROSOCPIC PARTIAL NEPHRECTOMY WITH INTRA-OPERATIVE RENAL ULTRASOUND, POSSIBLE OPEN   Surgery Location: [facility]  Surgeon: Frank Guerrero MD   Surgery Date: 10/23/2023   Time of Surgery: 10:55 AM   Where patient plans to recover: At home alone  Fax number for surgical facility: Note does not need to be faxed, will be available electronically in Epic.    Assessment & Plan     The proposed surgical procedure is considered INTERMEDIATE risk    (Z01.818) Preop general physical exam  (primary encounter diagnosis)  Comment: chronic issues stable, not on blood thinners, has stopped parnate for now, able to do 4 METS  Plan: EKG 12-lead complete w/read - Clinics        Cleared for surgery  Avoid nephrotoxins, hypotension    (N28.89) Right renal mass  Comment: renal mass vs cyst, first seen on PET 2021  Plan: surgery as planned    (N18.30) Stage 3 chronic kidney disease, unspecified whether stage 3a or 3b CKD (H)  Comment: GFR in 30s  Plan: AVOID nephrotoxins, CMP    (N18.30,  D63.1) Anemia of chronic renal failure, stage 3 (moderate), unspecified whether stage 3a or 3b CKD (H)  Comment:   Plan: CBC today    (I10) Hypertension goal BP (blood pressure) < 140/90  Comment:   Plan:     (D70.1,  T45.1X5A) Leukopenia due to antineoplastic chemotherapy   Comment:   Plan:     (C50.512,  Z17.1) Malignant neoplasm of lower-outer quadrant of left breast of female, estrogen receptor negative (H)  Comment:   Plan:     (E03.2) Hypothyroidism due to medication  Comment:   Plan:     (E55.9) Vitamin D  deficiency  Comment:   Plan:     (F33.9) Recurrent major depressive disorder, remission status unspecified (H24)  Comment:   Plan:     Risks and Recommendations:  The patient has the following additional risks and recommendations for perioperative complications:   - No identified additional risk factors other than previously addressed    Antiplatelet or Anticoagulation Medication Instructions:   - Patient is on no antiplatelet or anticoagulation medications.    Additional Medication Instructions:  She is currently holding parnate  Stop all NSAIDs (motrin, ibuprofen, naproxen, aleve, advil, naprosyn, aspirin)  for at least 5 days prior to surgery.  Tylenol is safe to take prior to surgery.    Stay off parnate until after surgery.    Take synthroid, candesartan (atacand), loratadine (claritin), evista with a sip of water the am of surgery.  (Take vitamins and supplements after surgery.)    Take the rest of your evening medications as usual the night before.      RECOMMENDATION:  APPROVAL GIVEN to proceed with proposed procedure, without further diagnostic evaluation.    Jackie Cid PA-C  30 minutes on the day of the encounter doing chart review, history and exam, documentation and further activities as noted above.      Subjective       HPI related to upcoming procedure:   Jann is here for preop for right partial nephrectomy.    She generally feels well. She is not currently exercising regularly.  She is able to walk up a flight of stairs without chest pain or significant dyspnea.    She denies history of MI, CVA, DM, DVT or adverse reaction to anesthesia.       Answers submitted by the patient for this visit:  Patient Health Questionnaire (Submitted on 10/8/2023)  PHQ9 TOTAL SCORE: 0        10/8/2023     3:15 AM   Preop Questions   1. Have you ever had a heart attack or stroke? No   2. Have you ever had surgery on your heart or blood vessels, such as a stent placement, a coronary artery bypass, or  surgery on an artery in your head, neck, heart, or legs? No   3. Do you have chest pain with activity? No   4. Do you have a history of  heart failure? No   5. Do you currently have a cold, bronchitis or symptoms of other infection? No   6. Do you have a cough, shortness of breath, or wheezing? No   7. Do you or anyone in your family have previous history of blood clots? No   8. Do you or does anyone in your family have a serious bleeding problem such as prolonged bleeding following surgeries or cuts? No   9. Have you ever had problems with anemia or been told to take iron pills? No   10. Have you had any abnormal blood loss such as black, tarry or bloody stools, or abnormal vaginal bleeding? No   11. Have you ever had a blood transfusion? No   12. Are you willing to have a blood transfusion if it is medically needed before, during, or after your surgery? Yes   13. Have you or any of your relatives ever had problems with anesthesia? No   14. Do you have sleep apnea, excessive snoring or daytime drowsiness? No   15. Do you have any artifical heart valves or other implanted medical devices like a pacemaker, defibrillator, or continuous glucose monitor? No   16. Do you have artificial joints? No   17. Are you allergic to latex? No       Review of Systems  CONSTITUTIONAL: NEGATIVE for fever, chills, change in weight  INTEGUMENTARY/SKIN: NEGATIVE for worrisome rashes, moles or lesions  EYES: NEGATIVE for vision changes or irritation  ENT/MOUTH: NEGATIVE for ear, mouth and throat problems  RESP: NEGATIVE for significant cough or SOB  CV: NEGATIVE for chest pain, palpitations or peripheral edema  GI: NEGATIVE for nausea, abdominal pain, heartburn, or change in bowel habits  : NEGATIVE for frequency, dysuria, or hematuria  MUSCULOSKELETAL: NEGATIVE for significant arthralgias or myalgia  NEURO: NEGATIVE for weakness, dizziness or paresthesias  HEME: NEGATIVE for bleeding problems    Patient Active Problem List     Diagnosis Date Noted    Rash 05/31/2022     Priority: Medium    Fever and chills 05/31/2022     Priority: Medium    Status post chemotherapy 05/31/2022     Priority: Medium    Syncope and collapse 05/21/2022     Priority: Medium    Hyponatremia 05/21/2022     Priority: Medium    Nonintractable headache, unspecified chronicity pattern, unspecified headache type 05/21/2022     Priority: Medium    Pain in both lower legs 05/21/2022     Priority: Medium    Encounter for other specified aftercare 03/22/2022     Priority: Medium    Chemotherapy-induced neutropenia  01/17/2022     Priority: Medium    Malignant neoplasm of lower-outer quadrant of left breast of female, estrogen receptor negative (H) 12/16/2021     Priority: Medium     Check 10.23 for follow-up Mcmullen      Major depressive disorder, recurrent episode, moderate (H) 04/04/2018     Priority: Medium    Anxiety 06/28/2017     Priority: Medium    Multiple pulmonary nodules 07/18/2016     Priority: Medium     Incidentally noticed on CT abdominal 7/16: Several bibasilar nodules < 5 mm. Low risk- followup CT 12 months. If unchanged, no further followup.      Actinic keratosis 01/26/2016     Priority: Medium    CTS (carpal tunnel syndrome) 12/10/2014     Priority: Medium     Right         Persistent disorder of initiating or maintaining sleep 12/10/2014     Priority: Medium     Takes oxazepam 15 mg and zolpidem 5 mg every night for intractable insomnia      Recurrent sinusitis 12/02/2013     Priority: Medium     OK for zpac by phone once a winter; appointment if not better after Rx.       CARDIOVASCULAR SCREENING; LDL GOAL LESS THAN 130 05/15/2013     Priority: Medium    CKD (chronic kidney disease) stage 3, GFR 30-59 ml/min (H) 04/14/2012     Priority: Medium    Essential hypertension with goal blood pressure less than 130/80      Priority: Medium    Atopic rhinitis 02/02/2011     Priority: Medium      Past Medical History:   Diagnosis Date    Breast cancer (H)      CKD (chronic kidney disease) stage 3, GFR 30-59 ml/min (H)     Depression with anxiety     Gout     Hypertension goal BP (blood pressure) < 140/90     Recurrent sinusitis 12/02/2013     Past Surgical History:   Procedure Laterality Date    BIOPSY NODE SENTINEL Left 6/27/2022    Procedure: left axillary sentinel lymph node biopsy;  Surgeon: Tatianna Rai MD;  Location: [facility] OR    BREAST BIOPSY, RT/LT      Breat Biopsy RT/LT    COLONOSCOPY  10/03    COLONOSCOPY  4/8/2014    Procedure: COLONOSCOPY;  COLONOSCOPY ;  Surgeon: Gaurav Shaffer MD;  Location: [facility] GI    IR CHEST PORT PLACEMENT > 5 YRS OF AGE  12/29/2021    IR PORT REMOVAL RIGHT  2/17/2023    LUMPECTOMY BREAST WITH SEED LOCALIZATION Left 6/27/2022    Procedure: Radiofrequency tag localized left breast lumpectomy with radiofrequency tag localized excision of left axillary lymph node;  Surgeon: Tatianna Rai MD;  Location: [facility] OR    RECONSTRUCT EYELID       Current Outpatient Medications   Medication Sig Dispense Refill    atorvastatin (LIPITOR) 10 MG tablet TAKE 1 TABLET (10 MG) BY MOUTH ONCE DAILY 90 tablet 2    candesartan (ATACAND) 4 MG tablet Take 0.5 tablets (2 mg) by mouth daily DISPENSE AS WRITTEN, Brand name only      levothyroxine (SYNTHROID/LEVOTHROID) 75 MCG tablet Take 1 tablet (75 mcg) by mouth daily 90 tablet 3    loratadine (CLARITIN) 10 MG tablet Take 10 mg by mouth daily      LORazepam (ATIVAN) 0.5 MG tablet Take 1 tablet (0.5 mg) by mouth every 6 hours as needed for anxiety, nausea or sleep 45 tablet 0    Magnesium 400 MG TABS Take 800 mg by mouth daily      oxazepam (SERAX) 15 MG capsule Take 1 capsule (15 mg) by mouth nightly as needed for anxiety Ms Davidson, please schedule virtual appointment for annual medication review next month. Thanks 45 capsule 0    PARNATE 10 MG tablet TAKE 1 TABLET BY MOUTH 3 TIMES DAILY 300 tablet 3    polyethylene glycol (MIRALAX/GLYCOLAX) packet Take 1 packet by mouth daily      raloxifene (EVISTA) 60  "MG tablet Take 1 tablet (60 mg) by mouth daily 90 tablet 3    triamcinolone (KENALOG) 0.1 % external cream Apply  topically 2 times daily as needed. 15 g 1    UNABLE TO FIND Take 1 tablet by mouth daily MEDICATION NAME: Patient does not know how many units      zolpidem (AMBIEN) 5 MG tablet Take 1 tablet (5 mg) by mouth nightly as needed for sleep 45 tablet 1       Allergies   Allergen Reactions    Tegaderm Transparent Dressing (Informational Only) Blisters    Lyrica [Pregabalin] Rash        Social History     Tobacco Use    Smoking status: Former     Types: Cigarettes     Quit date: 10/30/1972     Years since quittin.9    Smokeless tobacco: Never   Substance Use Topics    Alcohol use: Yes     Comment: couple glasses of wine daily      Family History   Problem Relation Age of Onset    Cancer Mother         uterine    Breast Cancer Mother     Hypertension Mother     Diabetes Paternal Grandmother      History   Drug Use No         Objective     /76 (BP Location: Left arm, Patient Position: Sitting, Cuff Size: Adult Regular)   Pulse 80   Temp 98.2  F (36.8  C) (Oral)   Resp 17   Ht 1.6 m (5' 3\")   Wt 60.8 kg (134 lb 1.6 oz)   SpO2 98%   BMI 23.75 kg/m        Physical Exam      GENERAL APPEARANCE: in NAD     EYES: no scleral icterus     HENT: OP clear mouth without ulcers or lesions     NECK: supple, no bruits     RESP: lungs clear to auscultation - no rales, rhonchi or wheezes     CV: regular rates and rhythm, normal S1 S2, no S3 or S4 and no murmur, click or rub     ABDOMEN:  soft, nontender, no HSM or masses and bowel sounds normal     MS: extremities normal- no gross deformities noted, no edema     NEURO: Normal mentation, gait and speechnormal     PSYCH: mentation appears normal. and affect normal/bright      Recent Labs   Lab Test 10/02/23  1507 23  0959 23  1602 23  1523 23  1305 02/10/23  0858   HGB  --  11.2*  --  12.6   < >  --    PLT  --  231  --  275   < >  --    NA  " --  140  --   --   --  140   POTASSIUM  --  4.0  --   --   --  4.0   CR 1.6* 1.30*   < >  --   --  1.29*    < > = values in this interval not displayed.        Diagnostics:  Labs pending at this time.  Results will be reviewed when available.   EKG: NSR, rate 75    Revised Cardiac Risk Index (RCRI):  The patient has the following serious cardiovascular risks for perioperative complications:   - No serious cardiac risks = 0 points     RCRI Interpretation: 0 points: Class I (very low risk - 0.4% complication rate)         Signed Electronically by: Jackie Cid PA-C  Copy of this evaluation report is provided to requesting physician.

## 2023-10-10 NOTE — RESULT ENCOUNTER NOTE
Benjy Forte,    Your recent EKG looks fine.     Please let us know if you have any questions or concerns.    Regards,  Jackie Cid PA-C

## 2023-10-11 DIAGNOSIS — G47.00 PERSISTENT DISORDER OF INITIATING OR MAINTAINING SLEEP: Chronic | ICD-10-CM

## 2023-10-11 DIAGNOSIS — G47.00 PERSISTENT DISORDER OF INITIATING OR MAINTAINING SLEEP: ICD-10-CM

## 2023-10-11 LAB
ALBUMIN SERPL BCG-MCNC: 4.2 G/DL (ref 3.5–5.2)
ALP SERPL-CCNC: 84 U/L (ref 35–104)
ALT SERPL W P-5'-P-CCNC: 30 U/L (ref 0–50)
ANION GAP SERPL CALCULATED.3IONS-SCNC: 14 MMOL/L (ref 7–15)
AST SERPL W P-5'-P-CCNC: 40 U/L (ref 0–45)
BILIRUB SERPL-MCNC: 0.2 MG/DL
BUN SERPL-MCNC: 35.8 MG/DL (ref 8–23)
CALCIUM SERPL-MCNC: 9.4 MG/DL (ref 8.8–10.2)
CHLORIDE SERPL-SCNC: 106 MMOL/L (ref 98–107)
CREAT SERPL-MCNC: 1.22 MG/DL (ref 0.51–0.95)
DEPRECATED HCO3 PLAS-SCNC: 19 MMOL/L (ref 22–29)
EGFRCR SERPLBLD CKD-EPI 2021: 45 ML/MIN/1.73M2
GLUCOSE SERPL-MCNC: 87 MG/DL (ref 70–99)
POTASSIUM SERPL-SCNC: 4 MMOL/L (ref 3.4–5.3)
PROT SERPL-MCNC: 7 G/DL (ref 6.4–8.3)
SODIUM SERPL-SCNC: 139 MMOL/L (ref 135–145)
TSH SERPL DL<=0.005 MIU/L-ACNC: 3.81 UIU/ML (ref 0.3–4.2)
VIT D+METAB SERPL-MCNC: 29 NG/ML (ref 20–50)

## 2023-10-12 RX ORDER — OXAZEPAM 15 MG/1
CAPSULE ORAL
Qty: 45 CAPSULE | Refills: 0 | Status: SHIPPED | OUTPATIENT
Start: 2023-10-12 | End: 2023-11-27

## 2023-10-12 RX ORDER — ZOLPIDEM TARTRATE 5 MG/1
5 TABLET ORAL
Qty: 45 TABLET | Refills: 0 | Status: SHIPPED | OUTPATIENT
Start: 2023-10-12 | End: 2023-11-27

## 2023-10-20 RX ORDER — POLYETHYLENE GLYCOL 3350 17 G/17G
1 POWDER, FOR SOLUTION ORAL DAILY
COMMUNITY

## 2023-10-20 RX ORDER — ACETAMINOPHEN 500 MG
1-2 TABLET ORAL EVERY 6 HOURS PRN
COMMUNITY

## 2023-10-20 NOTE — PROGRESS NOTES
PTA medications updated by Medication Scribe prior to surgery via phone call with patient (last doses completed by Nurse)     Medication history sources: Patient, Surescripts, and H&P  In the past week, patient estimated taking medication this percent of the time: Greater than 90%      Significant changes made to the medication list:  None      Additional medication history information:   None    Medication reconciliation completed by provider prior to medication history? No    Time spent in this activity: 30 minutes    The information provided in this note is only as accurate as the sources available at the time of update(s)    Prior to Admission medications    Medication Sig Last Dose Taking? Auth Provider Long Term End Date   acetaminophen (TYLENOL) 500 MG tablet Take 1-2 tablets by mouth every 6 hours as needed for mild pain Unknown at prn Yes Reported, Patient     atorvastatin (LIPITOR) 10 MG tablet TAKE 1 TABLET (10 MG) BY MOUTH ONCE DAILY  at am Yes Mehran Oliva MD Yes    candesartan (ATACAND) 4 MG tablet Take 0.5 tablets by mouth daily (0.5 x 4 mg = 2 mg)  at am Yes Jackie Christianson PA-C Yes    levothyroxine (SYNTHROID/LEVOTHROID) 75 MCG tablet Take 1 tablet (75 mcg) by mouth daily  at am Yes Neetu Mcmullen MD Yes    loratadine (CLARITIN) 10 MG tablet Take 10 mg by mouth daily 10/18/2023 at am Yes Unknown, Entered By History     LORazepam (ATIVAN) 0.5 MG tablet Take 1 tablet (0.5 mg) by mouth every 6 hours as needed for anxiety, nausea or sleep Unknown at prn Yes Neetu Mcmullen MD     Magnesium 400 MG TABS Take 2 tablets by mouth daily  at am Yes Reported, Patient     oxazepam (SERAX) 15 MG capsule TAKE 1 CAPSULE (15 MG) BY MOUTH NIGHTLY AS NEEDED FOR ANXIETY. SCHEDULE VIRTUAL APPOINTMENT FOR ANNUAL MEDICATION REVIEW NEXT MONTH Unknown at prn Yes Mehran Oliva MD Yes    PARNATE 10 MG tablet TAKE 1 TABLET BY MOUTH 3 TIMES DAILY  Patient taking differently: Take 3 tablets by  mouth every morning (3 x 10 mg = 30 mg) 10/9/2023 at am Yes Mehran Oliva MD Yes    polyethylene glycol (MIRALAX) 17 GM/Dose powder Take 1 Capful by mouth daily  at am Yes Reported, Patient     raloxifene (EVISTA) 60 MG tablet Take 1 tablet (60 mg) by mouth daily  at am Yes Neetu Mcmullen MD Yes    triamcinolone (KENALOG) 0.1 % external cream Apply  topically 2 times daily as needed. Unknown at prn Yes Mehran Oliva MD     Vitamin D3 (CHOLECALCIFEROL) 25 mcg (1000 units) tablet Take 1 tablet by mouth daily  at am Yes Reported, Patient     zolpidem (AMBIEN) 5 MG tablet TAKE 1 TABLET (5 MG) BY MOUTH NIGHTLY AS NEEDED FOR SLEEP Unknown at prn Yes Mehran Oliva MD Yes      Medication history completed by:    Helder Alexis CPhT  Medication [facility]

## 2023-10-22 ENCOUNTER — ANESTHESIA EVENT (OUTPATIENT)
Dept: SURGERY | Facility: CLINIC | Age: 78
End: 2023-10-22
Payer: MEDICARE

## 2023-10-22 ASSESSMENT — LIFESTYLE VARIABLES: TOBACCO_USE: 1

## 2023-10-23 ENCOUNTER — ANESTHESIA (OUTPATIENT)
Dept: SURGERY | Facility: CLINIC | Age: 78
End: 2023-10-23
Payer: MEDICARE

## 2023-10-23 ENCOUNTER — HOSPITAL ENCOUNTER (OUTPATIENT)
Facility: CLINIC | Age: 78
Discharge: HOME | End: 2023-10-24
Attending: UROLOGY | Admitting: UROLOGY
Payer: MEDICARE

## 2023-10-23 DIAGNOSIS — N28.89 RIGHT RENAL MASS: Primary | ICD-10-CM

## 2023-10-23 LAB
ABO/RH(D): NORMAL
ANTIBODY SCREEN: NEGATIVE
GLUCOSE SERPL-MCNC: 96 MG/DL (ref 70–99)
LACTATE SERPL-SCNC: 2 MMOL/L (ref 0.7–2)
POTASSIUM SERPL-SCNC: 3.4 MMOL/L (ref 3.4–5.3)
SPECIMEN EXPIRATION DATE: NORMAL

## 2023-10-23 PROCEDURE — 250N000013 HC RX MED GY IP 250 OP 250 PS 637: Performed by: UROLOGY

## 2023-10-23 PROCEDURE — 258N000003 HC RX IP 258 OP 636: Performed by: NURSE ANESTHETIST, CERTIFIED REGISTERED

## 2023-10-23 PROCEDURE — 250N000009 HC RX 250: Performed by: NURSE ANESTHETIST, CERTIFIED REGISTERED

## 2023-10-23 PROCEDURE — 258N000003 HC RX IP 258 OP 636: Performed by: ANESTHESIOLOGY

## 2023-10-23 PROCEDURE — 84132 ASSAY OF SERUM POTASSIUM: CPT | Performed by: ANESTHESIOLOGY

## 2023-10-23 PROCEDURE — 250N000011 HC RX IP 250 OP 636: Performed by: UROLOGY

## 2023-10-23 PROCEDURE — 272N000001 HC OR GENERAL SUPPLY STERILE: Performed by: UROLOGY

## 2023-10-23 PROCEDURE — 370N000017 HC ANESTHESIA TECHNICAL FEE, PER MIN: Performed by: UROLOGY

## 2023-10-23 PROCEDURE — 50543 LAPARO PARTIAL NEPHRECTOMY: CPT | Mod: RT | Performed by: UROLOGY

## 2023-10-23 PROCEDURE — 36415 COLL VENOUS BLD VENIPUNCTURE: CPT | Performed by: ANESTHESIOLOGY

## 2023-10-23 PROCEDURE — 86901 BLOOD TYPING SEROLOGIC RH(D): CPT | Performed by: ANESTHESIOLOGY

## 2023-10-23 PROCEDURE — 76770 US EXAM ABDO BACK WALL COMP: CPT | Mod: 26 | Performed by: UROLOGY

## 2023-10-23 PROCEDURE — 710N000009 HC RECOVERY PHASE 1, LEVEL 1, PER MIN: Performed by: UROLOGY

## 2023-10-23 PROCEDURE — 360N000080 HC SURGERY LEVEL 7, PER MIN: Performed by: UROLOGY

## 2023-10-23 PROCEDURE — 36415 COLL VENOUS BLD VENIPUNCTURE: CPT | Performed by: UROLOGY

## 2023-10-23 PROCEDURE — 83605 ASSAY OF LACTIC ACID: CPT | Performed by: UROLOGY

## 2023-10-23 PROCEDURE — 86850 RBC ANTIBODY SCREEN: CPT | Performed by: ANESTHESIOLOGY

## 2023-10-23 PROCEDURE — 999N000141 HC STATISTIC PRE-PROCEDURE NURSING ASSESSMENT: Performed by: UROLOGY

## 2023-10-23 PROCEDURE — 250N000011 HC RX IP 250 OP 636: Performed by: NURSE ANESTHETIST, CERTIFIED REGISTERED

## 2023-10-23 PROCEDURE — 82947 ASSAY GLUCOSE BLOOD QUANT: CPT | Performed by: ANESTHESIOLOGY

## 2023-10-23 PROCEDURE — 250N000011 HC RX IP 250 OP 636: Performed by: ANESTHESIOLOGY

## 2023-10-23 PROCEDURE — 250N000013 HC RX MED GY IP 250 OP 250 PS 637: Performed by: STUDENT IN AN ORGANIZED HEALTH CARE EDUCATION/TRAINING PROGRAM

## 2023-10-23 PROCEDURE — 250N000009 HC RX 250: Performed by: UROLOGY

## 2023-10-23 PROCEDURE — 88331 PATH CONSLTJ SURG 1 BLK 1SPC: CPT | Mod: TC | Performed by: UROLOGY

## 2023-10-23 PROCEDURE — 250N000025 HC SEVOFLURANE, PER MIN: Performed by: UROLOGY

## 2023-10-23 PROCEDURE — 258N000003 HC RX IP 258 OP 636: Performed by: UROLOGY

## 2023-10-23 RX ORDER — CEFAZOLIN SODIUM/WATER 2 G/20 ML
2 SYRINGE (ML) INTRAVENOUS
Status: COMPLETED | OUTPATIENT
Start: 2023-10-23 | End: 2023-10-23

## 2023-10-23 RX ORDER — HYDROMORPHONE HCL IN WATER/PF 6 MG/30 ML
0.1 PATIENT CONTROLLED ANALGESIA SYRINGE INTRAVENOUS
Status: DISCONTINUED | OUTPATIENT
Start: 2023-10-23 | End: 2023-10-23

## 2023-10-23 RX ORDER — LORAZEPAM 0.5 MG/1
0.5 TABLET ORAL EVERY 6 HOURS PRN
Status: DISCONTINUED | OUTPATIENT
Start: 2023-10-23 | End: 2023-10-24 | Stop reason: HOSPADM

## 2023-10-23 RX ORDER — LEVOTHYROXINE SODIUM 75 UG/1
75 TABLET ORAL DAILY
Status: DISCONTINUED | OUTPATIENT
Start: 2023-10-24 | End: 2023-10-24 | Stop reason: HOSPADM

## 2023-10-23 RX ORDER — PROPOFOL 10 MG/ML
INJECTION, EMULSION INTRAVENOUS CONTINUOUS PRN
Status: DISCONTINUED | OUTPATIENT
Start: 2023-10-23 | End: 2023-10-23

## 2023-10-23 RX ORDER — OXYCODONE HYDROCHLORIDE 5 MG/1
5 TABLET ORAL EVERY 4 HOURS PRN
Status: DISCONTINUED | OUTPATIENT
Start: 2023-10-23 | End: 2023-10-24 | Stop reason: HOSPADM

## 2023-10-23 RX ORDER — HYDROMORPHONE HCL IN WATER/PF 6 MG/30 ML
0.4 PATIENT CONTROLLED ANALGESIA SYRINGE INTRAVENOUS EVERY 5 MIN PRN
Status: DISCONTINUED | OUTPATIENT
Start: 2023-10-23 | End: 2023-10-23 | Stop reason: HOSPADM

## 2023-10-23 RX ORDER — MAGNESIUM HYDROXIDE 1200 MG/15ML
LIQUID ORAL PRN
Status: DISCONTINUED | OUTPATIENT
Start: 2023-10-23 | End: 2023-10-23 | Stop reason: HOSPADM

## 2023-10-23 RX ORDER — TRANYLCYPROMINE 10 MG/1
30 TABLET ORAL EVERY MORNING
Status: DISCONTINUED | OUTPATIENT
Start: 2023-10-24 | End: 2023-10-24 | Stop reason: HOSPADM

## 2023-10-23 RX ORDER — NALOXONE HYDROCHLORIDE 0.4 MG/ML
0.2 INJECTION, SOLUTION INTRAMUSCULAR; INTRAVENOUS; SUBCUTANEOUS
Status: DISCONTINUED | OUTPATIENT
Start: 2023-10-23 | End: 2023-10-24 | Stop reason: HOSPADM

## 2023-10-23 RX ORDER — DEXAMETHASONE SODIUM PHOSPHATE 4 MG/ML
INJECTION, SOLUTION INTRA-ARTICULAR; INTRALESIONAL; INTRAMUSCULAR; INTRAVENOUS; SOFT TISSUE PRN
Status: DISCONTINUED | OUTPATIENT
Start: 2023-10-23 | End: 2023-10-23

## 2023-10-23 RX ORDER — POLYETHYLENE GLYCOL 3350 17 G/17G
17 POWDER, FOR SOLUTION ORAL DAILY
Status: DISCONTINUED | OUTPATIENT
Start: 2023-10-24 | End: 2023-10-24 | Stop reason: HOSPADM

## 2023-10-23 RX ORDER — ONDANSETRON 2 MG/ML
4 INJECTION INTRAMUSCULAR; INTRAVENOUS EVERY 30 MIN PRN
Status: DISCONTINUED | OUTPATIENT
Start: 2023-10-23 | End: 2023-10-23 | Stop reason: HOSPADM

## 2023-10-23 RX ORDER — FENTANYL CITRATE 50 UG/ML
INJECTION, SOLUTION INTRAMUSCULAR; INTRAVENOUS PRN
Status: DISCONTINUED | OUTPATIENT
Start: 2023-10-23 | End: 2023-10-23

## 2023-10-23 RX ORDER — FENTANYL CITRATE 50 UG/ML
50 INJECTION, SOLUTION INTRAMUSCULAR; INTRAVENOUS EVERY 5 MIN PRN
Status: DISCONTINUED | OUTPATIENT
Start: 2023-10-23 | End: 2023-10-23 | Stop reason: HOSPADM

## 2023-10-23 RX ORDER — BUPIVACAINE HYDROCHLORIDE 2.5 MG/ML
INJECTION, SOLUTION EPIDURAL; INFILTRATION; INTRACAUDAL PRN
Status: DISCONTINUED | OUTPATIENT
Start: 2023-10-23 | End: 2023-10-23 | Stop reason: HOSPADM

## 2023-10-23 RX ORDER — NALOXONE HYDROCHLORIDE 0.4 MG/ML
0.4 INJECTION, SOLUTION INTRAMUSCULAR; INTRAVENOUS; SUBCUTANEOUS
Status: DISCONTINUED | OUTPATIENT
Start: 2023-10-23 | End: 2023-10-24 | Stop reason: HOSPADM

## 2023-10-23 RX ORDER — CEFAZOLIN SODIUM/WATER 2 G/20 ML
2 SYRINGE (ML) INTRAVENOUS SEE ADMIN INSTRUCTIONS
Status: DISCONTINUED | OUTPATIENT
Start: 2023-10-23 | End: 2023-10-23 | Stop reason: HOSPADM

## 2023-10-23 RX ORDER — AMOXICILLIN 250 MG
1 CAPSULE ORAL 2 TIMES DAILY
Qty: 30 TABLET | Refills: 0 | Status: SHIPPED | OUTPATIENT
Start: 2023-10-23 | End: 2023-11-07

## 2023-10-23 RX ORDER — HYDROMORPHONE HCL IN WATER/PF 6 MG/30 ML
0.2 PATIENT CONTROLLED ANALGESIA SYRINGE INTRAVENOUS
Status: DISCONTINUED | OUTPATIENT
Start: 2023-10-23 | End: 2023-10-23

## 2023-10-23 RX ORDER — SIMETHICONE 80 MG
80 TABLET,CHEWABLE ORAL EVERY 6 HOURS PRN
Status: DISCONTINUED | OUTPATIENT
Start: 2023-10-23 | End: 2023-10-24 | Stop reason: HOSPADM

## 2023-10-23 RX ORDER — ZOLPIDEM TARTRATE 5 MG/1
5 TABLET ORAL
Status: DISCONTINUED | OUTPATIENT
Start: 2023-10-23 | End: 2023-10-24 | Stop reason: HOSPADM

## 2023-10-23 RX ORDER — HYDROMORPHONE HCL IN WATER/PF 6 MG/30 ML
0.2 PATIENT CONTROLLED ANALGESIA SYRINGE INTRAVENOUS EVERY 5 MIN PRN
Status: DISCONTINUED | OUTPATIENT
Start: 2023-10-23 | End: 2023-10-23 | Stop reason: HOSPADM

## 2023-10-23 RX ORDER — ONDANSETRON 2 MG/ML
4 INJECTION INTRAMUSCULAR; INTRAVENOUS EVERY 6 HOURS PRN
Status: DISCONTINUED | OUTPATIENT
Start: 2023-10-23 | End: 2023-10-24 | Stop reason: HOSPADM

## 2023-10-23 RX ORDER — HYDROMORPHONE HCL IN WATER/PF 6 MG/30 ML
0.2 PATIENT CONTROLLED ANALGESIA SYRINGE INTRAVENOUS
Status: DISCONTINUED | OUTPATIENT
Start: 2023-10-23 | End: 2023-10-24 | Stop reason: HOSPADM

## 2023-10-23 RX ORDER — OXYCODONE HYDROCHLORIDE 5 MG/1
5 TABLET ORAL EVERY 6 HOURS PRN
Qty: 9 TABLET | Refills: 0 | Status: SHIPPED | OUTPATIENT
Start: 2023-10-23 | End: 2023-10-26

## 2023-10-23 RX ORDER — SODIUM CHLORIDE, SODIUM LACTATE, POTASSIUM CHLORIDE, CALCIUM CHLORIDE 600; 310; 30; 20 MG/100ML; MG/100ML; MG/100ML; MG/100ML
INJECTION, SOLUTION INTRAVENOUS CONTINUOUS
Status: DISCONTINUED | OUTPATIENT
Start: 2023-10-23 | End: 2023-10-23 | Stop reason: HOSPADM

## 2023-10-23 RX ORDER — RALOXIFENE HYDROCHLORIDE 60 MG/1
60 TABLET, FILM COATED ORAL DAILY
Status: DISCONTINUED | OUTPATIENT
Start: 2023-10-24 | End: 2023-10-24 | Stop reason: HOSPADM

## 2023-10-23 RX ORDER — HYDROMORPHONE HYDROCHLORIDE 1 MG/ML
0.5 INJECTION, SOLUTION INTRAMUSCULAR; INTRAVENOUS; SUBCUTANEOUS
Status: DISCONTINUED | OUTPATIENT
Start: 2023-10-23 | End: 2023-10-24 | Stop reason: HOSPADM

## 2023-10-23 RX ORDER — FENTANYL CITRATE 50 UG/ML
25 INJECTION, SOLUTION INTRAMUSCULAR; INTRAVENOUS EVERY 5 MIN PRN
Status: DISCONTINUED | OUTPATIENT
Start: 2023-10-23 | End: 2023-10-23 | Stop reason: HOSPADM

## 2023-10-23 RX ORDER — ONDANSETRON 2 MG/ML
INJECTION INTRAMUSCULAR; INTRAVENOUS PRN
Status: DISCONTINUED | OUTPATIENT
Start: 2023-10-23 | End: 2023-10-23

## 2023-10-23 RX ORDER — DEXMEDETOMIDINE HYDROCHLORIDE 4 UG/ML
INJECTION, SOLUTION INTRAVENOUS PRN
Status: DISCONTINUED | OUTPATIENT
Start: 2023-10-23 | End: 2023-10-23

## 2023-10-23 RX ORDER — SODIUM CHLORIDE 9 MG/ML
INJECTION, SOLUTION INTRAVENOUS CONTINUOUS
Status: DISCONTINUED | OUTPATIENT
Start: 2023-10-23 | End: 2023-10-24 | Stop reason: HOSPADM

## 2023-10-23 RX ORDER — ONDANSETRON 4 MG/1
4 TABLET, ORALLY DISINTEGRATING ORAL EVERY 30 MIN PRN
Status: DISCONTINUED | OUTPATIENT
Start: 2023-10-23 | End: 2023-10-23 | Stop reason: HOSPADM

## 2023-10-23 RX ORDER — LIDOCAINE HYDROCHLORIDE 20 MG/ML
INJECTION, SOLUTION INFILTRATION; PERINEURAL PRN
Status: DISCONTINUED | OUTPATIENT
Start: 2023-10-23 | End: 2023-10-23

## 2023-10-23 RX ORDER — AMOXICILLIN 250 MG
1 CAPSULE ORAL 2 TIMES DAILY
Status: DISCONTINUED | OUTPATIENT
Start: 2023-10-23 | End: 2023-10-24 | Stop reason: HOSPADM

## 2023-10-23 RX ORDER — LIDOCAINE 40 MG/G
CREAM TOPICAL
Status: DISCONTINUED | OUTPATIENT
Start: 2023-10-23 | End: 2023-10-24 | Stop reason: HOSPADM

## 2023-10-23 RX ORDER — LORATADINE 10 MG/1
10 TABLET ORAL DAILY
Status: DISCONTINUED | OUTPATIENT
Start: 2023-10-24 | End: 2023-10-24 | Stop reason: HOSPADM

## 2023-10-23 RX ORDER — PROPOFOL 10 MG/ML
INJECTION, EMULSION INTRAVENOUS PRN
Status: DISCONTINUED | OUTPATIENT
Start: 2023-10-23 | End: 2023-10-23

## 2023-10-23 RX ORDER — ACETAMINOPHEN 325 MG/1
975 TABLET ORAL 3 TIMES DAILY
Status: DISCONTINUED | OUTPATIENT
Start: 2023-10-23 | End: 2023-10-24 | Stop reason: HOSPADM

## 2023-10-23 RX ORDER — ATORVASTATIN CALCIUM 10 MG/1
10 TABLET, FILM COATED ORAL DAILY
Status: DISCONTINUED | OUTPATIENT
Start: 2023-10-24 | End: 2023-10-24 | Stop reason: HOSPADM

## 2023-10-23 RX ORDER — ONDANSETRON 4 MG/1
4 TABLET, ORALLY DISINTEGRATING ORAL EVERY 6 HOURS PRN
Status: DISCONTINUED | OUTPATIENT
Start: 2023-10-23 | End: 2023-10-24 | Stop reason: HOSPADM

## 2023-10-23 RX ADMIN — PHENYLEPHRINE HYDROCHLORIDE 0.2 MCG/KG/MIN: 10 INJECTION INTRAVENOUS at 09:37

## 2023-10-23 RX ADMIN — FENTANYL CITRATE 50 MCG: 50 INJECTION INTRAMUSCULAR; INTRAVENOUS at 11:00

## 2023-10-23 RX ADMIN — SODIUM CHLORIDE, POTASSIUM CHLORIDE, SODIUM LACTATE AND CALCIUM CHLORIDE: 600; 310; 30; 20 INJECTION, SOLUTION INTRAVENOUS at 11:17

## 2023-10-23 RX ADMIN — DOCUSATE SODIUM 50 MG AND SENNOSIDES 8.6 MG 1 TABLET: 8.6; 5 TABLET, FILM COATED ORAL at 21:44

## 2023-10-23 RX ADMIN — ROCURONIUM BROMIDE 20 MG: 50 INJECTION, SOLUTION INTRAVENOUS at 09:30

## 2023-10-23 RX ADMIN — PHENYLEPHRINE HYDROCHLORIDE 100 MCG: 10 INJECTION INTRAVENOUS at 09:23

## 2023-10-23 RX ADMIN — PROPOFOL 160 MG: 10 INJECTION, EMULSION INTRAVENOUS at 07:43

## 2023-10-23 RX ADMIN — DEXMEDETOMIDINE HYDROCHLORIDE 8 MCG: 200 INJECTION INTRAVENOUS at 10:58

## 2023-10-23 RX ADMIN — PHENYLEPHRINE HYDROCHLORIDE 100 MCG: 10 INJECTION INTRAVENOUS at 07:51

## 2023-10-23 RX ADMIN — OXYCODONE HYDROCHLORIDE 5 MG: 5 TABLET ORAL at 21:43

## 2023-10-23 RX ADMIN — PHENYLEPHRINE HYDROCHLORIDE 100 MCG: 10 INJECTION INTRAVENOUS at 08:06

## 2023-10-23 RX ADMIN — MIDAZOLAM 2 MG: 1 INJECTION INTRAMUSCULAR; INTRAVENOUS at 07:40

## 2023-10-23 RX ADMIN — ONDANSETRON 4 MG: 2 INJECTION INTRAMUSCULAR; INTRAVENOUS at 10:29

## 2023-10-23 RX ADMIN — HYDROMORPHONE HYDROCHLORIDE 0.5 MG: 1 INJECTION, SOLUTION INTRAMUSCULAR; INTRAVENOUS; SUBCUTANEOUS at 10:09

## 2023-10-23 RX ADMIN — DEXMEDETOMIDINE HYDROCHLORIDE 8 MCG: 200 INJECTION INTRAVENOUS at 08:24

## 2023-10-23 RX ADMIN — FENTANYL CITRATE 100 MCG: 50 INJECTION INTRAMUSCULAR; INTRAVENOUS at 07:43

## 2023-10-23 RX ADMIN — HYDROMORPHONE HYDROCHLORIDE 0.5 MG: 1 INJECTION, SOLUTION INTRAMUSCULAR; INTRAVENOUS; SUBCUTANEOUS at 19:52

## 2023-10-23 RX ADMIN — HYDROMORPHONE HYDROCHLORIDE 0.2 MG: 0.2 INJECTION, SOLUTION INTRAMUSCULAR; INTRAVENOUS; SUBCUTANEOUS at 12:55

## 2023-10-23 RX ADMIN — SODIUM CHLORIDE, POTASSIUM CHLORIDE, SODIUM LACTATE AND CALCIUM CHLORIDE: 600; 310; 30; 20 INJECTION, SOLUTION INTRAVENOUS at 10:25

## 2023-10-23 RX ADMIN — ACETAMINOPHEN 975 MG: 325 TABLET, FILM COATED ORAL at 21:43

## 2023-10-23 RX ADMIN — HYDROMORPHONE HYDROCHLORIDE 0.5 MG: 1 INJECTION, SOLUTION INTRAMUSCULAR; INTRAVENOUS; SUBCUTANEOUS at 15:30

## 2023-10-23 RX ADMIN — LORAZEPAM 0.5 MG: 0.5 TABLET ORAL at 23:51

## 2023-10-23 RX ADMIN — ACETAMINOPHEN 975 MG: 325 TABLET, FILM COATED ORAL at 15:30

## 2023-10-23 RX ADMIN — DEXMEDETOMIDINE HYDROCHLORIDE 12 MCG: 200 INJECTION INTRAVENOUS at 08:17

## 2023-10-23 RX ADMIN — SUGAMMADEX 200 MG: 100 INJECTION, SOLUTION INTRAVENOUS at 10:51

## 2023-10-23 RX ADMIN — ROCURONIUM BROMIDE 10 MG: 50 INJECTION, SOLUTION INTRAVENOUS at 09:06

## 2023-10-23 RX ADMIN — ROCURONIUM BROMIDE 10 MG: 50 INJECTION, SOLUTION INTRAVENOUS at 07:51

## 2023-10-23 RX ADMIN — FENTANYL CITRATE 50 MCG: 50 INJECTION, SOLUTION INTRAMUSCULAR; INTRAVENOUS at 11:30

## 2023-10-23 RX ADMIN — SODIUM CHLORIDE: 9 INJECTION, SOLUTION INTRAVENOUS at 21:43

## 2023-10-23 RX ADMIN — ROCURONIUM BROMIDE 40 MG: 50 INJECTION, SOLUTION INTRAVENOUS at 07:43

## 2023-10-23 RX ADMIN — DEXAMETHASONE SODIUM PHOSPHATE 4 MG: 4 INJECTION, SOLUTION INTRA-ARTICULAR; INTRALESIONAL; INTRAMUSCULAR; INTRAVENOUS; SOFT TISSUE at 08:00

## 2023-10-23 RX ADMIN — SIMETHICONE 80 MG: 80 TABLET, CHEWABLE ORAL at 21:43

## 2023-10-23 RX ADMIN — HYDROMORPHONE HYDROCHLORIDE 0.3 MG: 1 INJECTION, SOLUTION INTRAMUSCULAR; INTRAVENOUS; SUBCUTANEOUS at 13:31

## 2023-10-23 RX ADMIN — Medication 2 G: at 07:40

## 2023-10-23 RX ADMIN — PROPOFOL 30 MCG/KG/MIN: 10 INJECTION, EMULSION INTRAVENOUS at 07:59

## 2023-10-23 RX ADMIN — SODIUM CHLORIDE, POTASSIUM CHLORIDE, SODIUM LACTATE AND CALCIUM CHLORIDE: 600; 310; 30; 20 INJECTION, SOLUTION INTRAVENOUS at 07:17

## 2023-10-23 RX ADMIN — LIDOCAINE HYDROCHLORIDE 80 MG: 20 INJECTION, SOLUTION INFILTRATION; PERINEURAL at 07:43

## 2023-10-23 RX ADMIN — ROCURONIUM BROMIDE 10 MG: 50 INJECTION, SOLUTION INTRAVENOUS at 10:06

## 2023-10-23 RX ADMIN — DEXMEDETOMIDINE HYDROCHLORIDE 12 MCG: 200 INJECTION INTRAVENOUS at 11:00

## 2023-10-23 ASSESSMENT — ACTIVITIES OF DAILY LIVING (ADL)
ADLS_ACUITY_SCORE: 20

## 2023-10-23 NOTE — ANESTHESIA CARE TRANSFER NOTE
Patient: Jann Davidson    Procedure: Procedure(s):  RIGHT ROBOTIC ASSISTED LAPAROSOCPIC PARTIAL NEPHRECTOMY WITH INTRA-OPERATIVE RENAL ULTRASOUND       Diagnosis: Right kidney mass [N28.89]  Diagnosis Additional Information: No value filed.    Anesthesia Type:   General     Note:    Oropharynx: oropharynx clear of all foreign objects and spontaneously breathing  Level of Consciousness: awake and drowsy  Oxygen Supplementation: nasal cannula  Level of Supplemental Oxygen (L/min / FiO2): 3  Independent Airway: airway patency satisfactory and stable  Dentition: dentition unchanged  Vital Signs Stable: post-procedure vital signs reviewed and stable  Report to RN Given: handoff report given  Patient transferred to: PACU  Comments: Neuromuscular blockade reversed with sugammadex, spontaneous respirations, adequate tidal volumes, followed commands to voice, oropharynx suctioned with soft flexible catheter, extubated atraumatically, extubated with suction, airway patent after extubation.  Oxygen via nasal cannula at 3 liters per minute to PACU. Oxygen tubing connected to wall O2 in PACU, SpO2, NiBP, and EKG monitors and alarms on and functioning, Archana Hugger warmer connected to patient gown, report on patient's clinical status given to PACU RN, RN questions answered.         Handoff Report: Identifed the Patient, Identified the Reponsible Provider, Reviewed the pertinent medical history, Discussed the surgical course, Reviewed Intra-OP anesthesia mangement and issues during anesthesia, Set expectations for post-procedure period and Allowed opportunity for questions and acknowledgement of understanding      Vitals:  Vitals Value Taken Time   /53 10/23/23 1108   Temp     Pulse 59 10/23/23 1110   Resp 7 10/23/23 1110   SpO2 100 % 10/23/23 1110   Vitals shown include unfiled device data.    Electronically Signed By: HUBER Armendariz CRNA  October 23, 2023  11:12 AM

## 2023-10-23 NOTE — PROVIDER NOTIFICATION
"MD Notification    Notified Person: MD    Notified Person Name: Yolanda    Notification Date/Time: 10/23 1323    Notification Interaction: amcom    Purpose of Notification: \"Pt with 9/10 pain after giving 0.2 of dilaudid. only 0.1-0.2 ordered. Pt was crying out in pain upon arrival to the unit. Could we give additional dose or increase the amount ordered? Please advise:\"    Orders Received: 0.2-0.5 mg IV dilaudid Q2    Comments: 0.2 giving upon arrival to unit at 12:55. 0.3 given to fulfill new 0.5 dose at 13:31    "

## 2023-10-23 NOTE — OP NOTE
OPERATIVE REPORT  DATE OF SURGERY: 10/23/23  LOCATION OF SURGERY: [facility] OR  PREOPERATIVE DIAGNOSIS:  (N28.89) Right renal mass  (primary encounter diagnosis)  POSTOPERATIVE DIAGNOSIS: (N28.89) Right renal mass  (primary encounter diagnosis)    START TIME: 8:09 AM  END TIME: 10:48 AM    PROCEDURE PERFORMED:   ROBOTIC ASSISTED LAPAROSCOPIC PARTIAL NEPHRECTOMY   INTRA-OPERATIVE RENAL ULTRASOUND     STAFF SURGEON: Frank Guerrero MD  RESIDENT SURGEON: Carly Servin MD  ASSISTANT: Jacqueline Benitez  ANESTHESIA: General.   ESTIMATED BLOOD LOSS: 10 mL.   DRAINS AND TUBES: 16fr handley catheter  COMPLICATIONS: None.   DISPOSITION: PACU.   SPECIMENS OBTAINED:   ID Type Source Tests Collected by Time Destination   1 : Right renal neoplasm Tissue Kidney, Right SURGICAL PATHOLOGY EXAM Frank Guerrero MD 10/23/2023 10:29 AM    2 : Right kidney mass deep margin Tissue Kidney, Right SURGICAL PATHOLOGY EXAM Frank Guerrero MD 10/23/2023  9:59 AM      SIGNIFICANT FINDINGS: Right partial nephrectomy completed with grossly negative margins and negative intra-operative deep margin.      HISTORY OF PRESENT ILLNESS: Jann Davidson is a 78 year old female with history of breast cancer and now with incidental small right renal mass concerning for neoplasm. She was counseled on treatment options and initially managed with active surveillance. Given the tumor growth she elected to proceed with the above surgery.      OPERATION PERFORMED:   Informed consent was obtained and the patient was brought to the operating room where general anesthesia was induced. The patient was given appropriate preoperative antibiotics and positioned supine. We then performed a timeout, verifying the correct patient's site and procedure to be performed.    The patient was then placed in the lateral flank position with RIGHT side up in preparation for a RIGHT-sided partial nephrectomy. We prepped and draped in the usual sterile fashion, and then after being  prepped and draped in the usual sterile fashion, we gained access to the abdomen for CO2 insufflation with a Veress needle of the lateral border of the rectus muscle in line with the 11th rib. After access was obtained, the 8-mm port was inserted and the camera was inserted. No adhesions were noted. An 8-mm port was placed one hand's breath cranial, 1-2 fingers below the costal margin. A finder needle was used to identify the optimal site and angle for the liver retractor and a 5mm port was placed medial and cranial to the robotic port for this. Two 8-mm ports were placed in the RIGHT lower quadrant, medial and anterior to the anterior iliac spine.  We then placed a 12-mm assistant port. The robot was then docked, and the ascending colon was dropped off the lateral wall. The liver was retracted and held in place with a locking Marian clamp. The plane was found between the mesentery and Gerota's fascia. The duodenum was partially kocherized and the IVC was identified. The ureter was identified and the gonadal vein was identified.  We were able to follow this up to the renal vein. Dissection was carried to the hilum and both the renal artery and vein were exposed. Care was taken to ensure the artery was dissected adequately for clamp placement. The kidney was then defatted and a bulge was noted at the upper posterior pole.  The renal ultrasound was used to delineate the mass and the renal capsule was scored, marking the outline of the tumors. A single bulldog clamp was placed on the artery. The partial nephrectomy was then performed, removing the tumor with grossly negative margin.  Hemostasis was maintained in the nephrectomy bed was running 2-0 SH vicryl suture. The renorrhaphy was closed with a 0 CT-1 Vicryl suture through the capsule. Capsular sutures were secured with Weck clips. The final end was doubly clipped. The bulldog clamp was then removed and hemostasis was checked, which was excellent. Ischemia time was  23 minutes. Pneumoperitoneum was lowered and there was a small ooze from the renorrhaphy site which was controlled with an additional 0 Vicryl suture. Following this, no bleeding from the renorrhaphy site. The specimen was placed in an endocatch bag.  The robot was undocked. The 12-mm assistant port was decided to be our extraction site. The assistant port was then extended. Extraction incision was closed with 0-PDS. The remainder of the skin incisions were irrigated out and closed subcuticularly using 4-0 Vicryl suture and covered with skin glue. Dressings were applied.     The patient was awoken from anesthesia and taken to PACU in stable condition.      Frank Guerrero MD   Urology  [facility]  Clinic Phone [phone]

## 2023-10-23 NOTE — DISCHARGE INSTRUCTIONS
POSTOPERATIVE INSTRUCTIONS    Diagnosis-------------------------------   Right renal mass    Procedure-------------------------------  Procedure(s) (LRB):  RIGHT ROBOTIC ASSISTED LAPAROSOCPIC PARTIAL NEPHRECTOMY WITH INTRA-OPERATIVE RENAL ULTRASOUND (Right)      Findings--------------------------------  RIGHT renal mass removed with negative margins.    Home-going instructions-----------------         Activity Limitation:     - No driving or operating heavy machinery while on narcotic pain medication.   - No strenuous exercise for 6 weeks.   - No lifting, pushing, pulling more than 10 pounds for 6 weeks. Take care when pushing with your arms to stand up.   - Do not strain your belly area. When you bend, sit up or twice, you could strain the area around your incision.   - Do not strain with bowel movements.   - Do not drive until you can press the brake pedal quickly and fully without pain.   - Do not operate a motor vehicle while taking narcotic pain medications    FOLLOW THESE INSTRUCTIONS AS INDICATED BELOW:  - Observe operative area for signs of excessive bleeding.  - You may shower.  - Increase fluid intake to promote clear urine.  - Resume usual diet as tolerated      What to expect while recovering----------- WOUND CARE:  - You may shower and get incisions wet starting 48 hrs after surgery.  - Do not scrub incisions or submerge wounds (aka, bath, pool, hot tub, etc.) for 2 weeks or until wounds have healed   - Remove wound dressing 48 hours after surgery if already not removed.   - If purple dermabond glue was used, avoid applying any lotions or ointments.   - Leave incision open to air. Cover with gauze only if needed for comfort or to protect clothing from drainage.    Discharge Medications/instructions:   1) PAIN: Oxycodone is a narcotic medication that has been prescribed for pain. Narcotics will cause sleepiness and constipation, therefore it is best to stop or reduce them as soon as you can and switch to  "using acetaminophen (Tylenol) and/or ibuprofen (Advil/Motrin), taken as directed on the packaging. Keep in mind that certain narcotics can contain acetaminophen, also called \"APAP\" on prescription bottles. Do not take more than 4,000mg of Tylenol (acetaminophen/ APAP) from all sources in any 24 hour period since this can cause liver damage. Never drive, operate machinery or drink alcoholic beverages while you are taking narcotic pain medications.     2) CONSTIPATION: Pericolace (senna/docusate sodium) can be taken twice daily for prevention of constipation since surgery, pain medications and bladder spasm medications can all make you constipated. Please reduce or stop pericolace if you develop loose stools. Other over the counter solutions such as prune juice, miralax, fiber products, senna, and dulcolax can also be used. If you are taking the pericolace but still have not had a bowel movement in 3 days, start over-the-counter Milk of Magnesia taken twice daily until you have a nice bowel movement. Call the office with any concerns.         Questions/concerns------------------------  [facility]: [phone]  [facility]: [phone]    Future appointments  You are scheduled for follow up on 11/8/2023.    Frank Guerrero MD        "

## 2023-10-23 NOTE — ANESTHESIA POSTPROCEDURE EVALUATION
Patient: Jann Davidson    Procedure: Procedure(s):  RIGHT ROBOTIC ASSISTED LAPAROSOCPIC PARTIAL NEPHRECTOMY WITH INTRA-OPERATIVE RENAL ULTRASOUND       Anesthesia Type:  General    Note:  Disposition: Admission   Postop Pain Control: Uneventful            Sign Out: Well controlled pain   PONV: No   Neuro/Psych: Uneventful            Sign Out: Acceptable/Baseline neuro status   Airway/Respiratory: Uneventful            Sign Out: Acceptable/Baseline resp. status   CV/Hemodynamics: Uneventful            Sign Out: Acceptable CV status   Other NRE: NONE   DID A NON-ROUTINE EVENT OCCUR? No           Last vitals:  Vitals Value Taken Time   /75 10/23/23 1215   Temp 36.1  C (96.9  F) 10/23/23 1200   Pulse 60 10/23/23 1229   Resp 21 10/23/23 1229   SpO2 100 % 10/23/23 1227   Vitals shown include unfiled device data.    Electronically Signed By: Donavan Walker MD  October 23, 2023  4:33 PM

## 2023-10-23 NOTE — PROGRESS NOTES
Date & Time: 10/23/23 at 2259-2823  Surgery/POD#: 0 of R partial nephrectomy  Behavior & Aggression: green  Fall Risk: yes  Orientation:A&O x4  ABNL VS/O2:VSS on RA  Pain Management: iv dilaudid x1  Bowel/Bladder: content  Drains: handley with adequate output, will come out tomorrow at 6 am.  Diet:full liquid, poor intake  Activity Level: SBA, walked in the liu once  Tests/Procedures: as above  Anticipated  DC Date: possibly tomorrow  Significant Information: 4 lab sites intact.

## 2023-10-23 NOTE — ANESTHESIA PREPROCEDURE EVALUATION
Anesthesia Pre-Procedure Evaluation    Patient: Jann Davidson   MRN: [id number] : 1945        Procedure : Procedure(s):  RIGHT ROBOTIC ASSISTED LAPAROSOCPIC PARTIAL NEPHRECTOMY WITH INTRA-OPERATIVE RENAL ULTRASOUND, POSSIBLE OPEN          Past Medical History:   Diagnosis Date    Breast cancer ([facility])     CKD (chronic kidney disease) stage 3, GFR 30-59 ml/min (H)     Depression with anxiety     Depressive disorder 1985    Been on Parnate antidepressant for 35 years    Gout     Hypertension goal BP (blood pressure) < 140/90     Recurrent sinusitis 2013    Thyroid disease     Keytruda induced      Past Surgical History:   Procedure Laterality Date    BIOPSY NODE SENTINEL Left 2022    Procedure: left axillary sentinel lymph node biopsy;  Surgeon: Tatianna Rai MD;  Location: [facility] OR    BREAST BIOPSY, RT/LT      Breat Biopsy RT/LT    BREAST SURGERY  2022    Tumor removed    COLONOSCOPY  10/01/2003    COLONOSCOPY  2014    Procedure: COLONOSCOPY;  COLONOSCOPY ;  Surgeon: Gaurav Shaffer MD;  Location: [facility]    IR CHEST PORT PLACEMENT > 5 YRS OF AGE  2021    IR PORT REMOVAL RIGHT  2023    LUMPECTOMY BREAST WITH SEED LOCALIZATION Left 2022    Procedure: Radiofrequency tag localized left breast lumpectomy with radiofrequency tag localized excision of left axillary lymph node;  Surgeon: Ttaianna Rai MD;  Location: [facility] OR    RECONSTRUCT EYELID        Allergies   Allergen Reactions    Tegaderm Transparent Dressing (Informational Only) Blisters    Lyrica [Pregabalin] Rash      Social History     Tobacco Use    Smoking status: Former     Packs/day: 1.00     Years: 10.00     Additional pack years: 0.00     Total pack years: 10.00     Types: Cigarettes     Start date: 9/15/1963     Quit date: 10/30/1972     Years since quittin.0    Smokeless tobacco: Never    Tobacco comments:     I have not smoked since 10/30/1972   Substance Use Topics    Alcohol use: Yes      Comment: I drink white wine with dinner      Wt Readings from Last 1 Encounters:   10/10/23 60.8 kg (134 lb 1.6 oz)        Anesthesia Evaluation            ROS/MED HX  ENT/Pulmonary:     (+)           allergic rhinitis,     tobacco use, Past use,                   (-) sleep apnea   Neurologic:       Cardiovascular:     (+)  hypertension- -   -  - -             fainting (syncope).                    Previous cardiac testing   Echo: Date: 5/23/22 Results:  Interpretation Summary     Left ventricular systolic function is normal. The visual ejection fraction is  65-70%.  Right ventricle is normal in structure, function and size.  No significant valvular abnormalities.  The inferior vena cava was normal in size with preserved respiratory  variability.  There is a catheter/pacemaker lead seen in the right atrium.  There is no pericardial effusion.     Global biventricular function is stable when compared to 1/18/2022.     ______________________________________________________________________________  Left Ventricle  The left ventricle is normal in size. There is normal left ventricular wall  thickness. Left ventricular systolic function is normal. The visual ejection  fraction is 65-70%. Left ventricular diastolic function is normal. No regional  wall motion abnormalities noted.     Right Ventricle  The right ventricle is normal in structure, function and size.     Atria  Normal left atrial size. Right atrial size is normal. There is a  catheter/pacemaker lead seen in the right atrium. There is no color Doppler  evidence of an atrial shunt.     Mitral Valve  There is trace mitral regurgitation.     Tricuspid Valve  There is trace tricuspid regurgitation. The right ventricular systolic  pressure is approximated at 25.9 mmHg plus the right atrial pressure. Right  ventricle systolic pressure estimate normal.     Aortic Valve  There is mild trileaflet aortic sclerosis.     Pulmonic Valve  The pulmonic valve is not well  "visualized.     Vessels  The aortic root is normal size. Normal size ascending aorta. The inferior vena  cava was normal in size with preserved respiratory variability.     Pericardium  There is no pericardial effusion.       Stress Test:  Date: Results:    ECG Reviewed:  Date: Results:    Cath:  Date: Results:      METS/Exercise Tolerance:     Hematologic:       Musculoskeletal:       GI/Hepatic:    (-) GERD   Renal/Genitourinary: Comment: Right Kidney Mass    (+) renal disease, type: CRI,            Endo:     (+)          thyroid problem, hypothyroidism,           Psychiatric/Substance Use:     (+) psychiatric history depression and anxiety       Infectious Disease:       Malignancy:   (+) Malignancy, History of Breast.    Other:            Physical Exam    Airway        Mallampati: III   TM distance: > 3 FB   Neck ROM: full   Mouth opening: > 3 cm    Respiratory Devices and Support         Dental       (+) Multiple crowns, permanant bridges      Cardiovascular   cardiovascular exam normal          Pulmonary   pulmonary exam normal                OUTSIDE LABS:  CBC:   Lab Results   Component Value Date    WBC 6.8 10/10/2023    WBC 3.7 (L) 07/05/2023    HGB 11.3 (L) 10/10/2023    HGB 11.2 (L) 07/05/2023    HCT 37.2 10/10/2023    HCT 35.4 07/05/2023     10/10/2023     07/05/2023     BMP:   Lab Results   Component Value Date     10/10/2023     07/05/2023    POTASSIUM 4.0 10/10/2023    POTASSIUM 4.0 07/05/2023    CHLORIDE 106 10/10/2023    CHLORIDE 107 07/05/2023    CO2 19 (L) 10/10/2023    CO2 22 07/05/2023    BUN 35.8 (H) 10/10/2023    BUN 28.7 (H) 07/05/2023    CR 1.22 (H) 10/10/2023    CR 1.6 (H) 10/02/2023    GLC 87 10/10/2023    GLC 93 07/05/2023     COAGS: No results found for: \"PTT\", \"INR\", \"FIBR\"  POC: No results found for: \"BGM\", \"HCG\", \"HCGS\"  HEPATIC:   Lab Results   Component Value Date    ALBUMIN 4.2 10/10/2023    PROTTOTAL 7.0 10/10/2023    ALT 30 10/10/2023    AST 40 " 10/10/2023    ALKPHOS 84 10/10/2023    BILITOTAL 0.2 10/10/2023     OTHER:   Lab Results   Component Value Date    PH 7.36 05/30/2022    LACT 0.8 05/30/2022    AMY 9.4 10/10/2023    MAG 1.7 05/23/2022    LIPASE 246 05/21/2022    TSH 3.81 10/10/2023    T4 1.01 07/05/2023       Anesthesia Plan    ASA Status:  2    NPO Status:  NPO Appropriate    Anesthesia Type: General.     - Airway: ETT   Induction: Intravenous, Propofol.   Maintenance: Balanced.   Techniques and Equipment:     - Airway: Video-Laryngoscope       Consents    Anesthesia Plan(s) and associated risks, benefits, and realistic alternatives discussed. Questions answered and patient/representative(s) expressed understanding.     - Discussed:     - Discussed with:  Patient            Postoperative Care    Pain management: IV analgesics, Oral pain medications.   PONV prophylaxis: Ondansetron (or other 5HT-3), Dexamethasone or Solumedrol     Comments:                Donavan Walker MD

## 2023-10-23 NOTE — ANESTHESIA PROCEDURE NOTES
Airway       Patient location during procedure: OR       Procedure Start/Stop Times: 10/23/2023 7:45 AM  Staff -        Anesthesiologist:  Donavan Walker MD       CRNA: Shana Johnson APRN CRNA       Performed By: CRNA  Consent for Airway        Urgency: elective  Indications and Patient Condition       Indications for airway management: malachi-procedural       Induction type:intravenous       Mask difficulty assessment: 1 - vent by mask    Final Airway Details       Final airway type: endotracheal airway       Successful airway: ETT - single  Endotracheal Airway Details        ETT size (mm): 7.0       Cuffed: yes       Successful intubation technique: video laryngoscopy       VL Blade Size: Hinojosa 3       Grade View of Cords: 1       Adjucts: stylet       Position: Right       Measured from: gums/teeth       Secured at (cm): 20       Bite block used: None    Post intubation assessment        Placement verified by: capnometry, equal breath sounds and chest rise        Number of attempts at approach: 1       Secured with: silk tape       Ease of procedure: easy       Dentition: Intact and Unchanged    Medication(s) Administered   Medication Administration Time: 10/23/2023 7:45 AM

## 2023-10-23 NOTE — PLAN OF CARE
POD 0 from a nephrectomy. A&O. Crying out upon arrival to unit due to pain - see provider notification note. CMS intact. Bowel sounds hypoactive, - flatus, tolerating full liquid diet. VSS, weaned to RA. 4 abdominal lap sites WDL, approximated with liquid bandage. Up with A1, GB - stood at bedside. George patent. C/o moderate to severe pain, decreased with IV dilaudid.

## 2023-10-24 VITALS
BODY MASS INDEX: 23.32 KG/M2 | OXYGEN SATURATION: 100 % | WEIGHT: 131.6 LBS | RESPIRATION RATE: 16 BRPM | HEART RATE: 80 BPM | TEMPERATURE: 99.1 F | SYSTOLIC BLOOD PRESSURE: 93 MMHG | HEIGHT: 63 IN | DIASTOLIC BLOOD PRESSURE: 57 MMHG

## 2023-10-24 LAB
ANION GAP SERPL CALCULATED.3IONS-SCNC: 14 MMOL/L (ref 7–15)
BUN SERPL-MCNC: 25.7 MG/DL (ref 8–23)
CALCIUM SERPL-MCNC: 9.1 MG/DL (ref 8.8–10.2)
CHLORIDE SERPL-SCNC: 103 MMOL/L (ref 98–107)
CREAT SERPL-MCNC: 1.4 MG/DL (ref 0.51–0.95)
DEPRECATED HCO3 PLAS-SCNC: 20 MMOL/L (ref 22–29)
EGFRCR SERPLBLD CKD-EPI 2021: 38 ML/MIN/1.73M2
ERYTHROCYTE [DISTWIDTH] IN BLOOD BY AUTOMATED COUNT: 12 % (ref 10–15)
GLUCOSE BLDC GLUCOMTR-MCNC: 109 MG/DL (ref 70–99)
GLUCOSE SERPL-MCNC: 86 MG/DL (ref 70–99)
HCT VFR BLD AUTO: 31.6 % (ref 35–47)
HGB BLD-MCNC: 9.8 G/DL (ref 11.7–15.7)
MCH RBC QN AUTO: 31.4 PG (ref 26.5–33)
MCHC RBC AUTO-ENTMCNC: 31 G/DL (ref 31.5–36.5)
MCV RBC AUTO: 101 FL (ref 78–100)
PLATELET # BLD AUTO: 260 10E3/UL (ref 150–450)
POTASSIUM SERPL-SCNC: 3.9 MMOL/L (ref 3.4–5.3)
RBC # BLD AUTO: 3.12 10E6/UL (ref 3.8–5.2)
SODIUM SERPL-SCNC: 137 MMOL/L (ref 135–145)
WBC # BLD AUTO: 9.9 10E3/UL (ref 4–11)

## 2023-10-24 PROCEDURE — 85041 AUTOMATED RBC COUNT: CPT | Performed by: UROLOGY

## 2023-10-24 PROCEDURE — 250N000013 HC RX MED GY IP 250 OP 250 PS 637: Performed by: UROLOGY

## 2023-10-24 PROCEDURE — 36415 COLL VENOUS BLD VENIPUNCTURE: CPT | Performed by: UROLOGY

## 2023-10-24 PROCEDURE — 82962 GLUCOSE BLOOD TEST: CPT

## 2023-10-24 PROCEDURE — 80048 BASIC METABOLIC PNL TOTAL CA: CPT | Performed by: UROLOGY

## 2023-10-24 RX ADMIN — ACETAMINOPHEN 975 MG: 325 TABLET, FILM COATED ORAL at 08:28

## 2023-10-24 RX ADMIN — OXYCODONE HYDROCHLORIDE 5 MG: 5 TABLET ORAL at 05:46

## 2023-10-24 RX ADMIN — RALOXIFENE HYDROCHLORIDE 60 MG: 60 TABLET, FILM COATED ORAL at 08:28

## 2023-10-24 RX ADMIN — POLYETHYLENE GLYCOL 3350 17 G: 17 POWDER, FOR SOLUTION ORAL at 08:28

## 2023-10-24 RX ADMIN — ATORVASTATIN CALCIUM 10 MG: 10 TABLET, FILM COATED ORAL at 08:28

## 2023-10-24 RX ADMIN — LEVOTHYROXINE SODIUM 75 MCG: 75 TABLET ORAL at 05:29

## 2023-10-24 ASSESSMENT — ACTIVITIES OF DAILY LIVING (ADL)
ADLS_ACUITY_SCORE: 20

## 2023-10-24 NOTE — PLAN OF CARE
~ Patient vital signs are at baseline, - pending, BP slightly elevated due to pain  ~ Patient able to ambulate as they were prior to admission or with assist devices provided by therapies during their stay. - met - ambulated SBA in liu  ~ Patient MUST void prior to discharge, - George in place, clear yellow urine, adequate output.  ~ Patient able to tolerate oral intake to maintain hydration status, - met, drinking and eating regular diet, denies n/v  ~ Patient has adequate pain control using Oral analgesics, - pending, reports gas discomfort, also R shoulder pain, slight relief with dilaudid, oxy, and simethicone  ~ Hypercapnia, hypoventilation or hypoxia resolved for at least 2 hours without supplemental oxygen, - met (noted, refused capno)  ~ Deficits in sensation, mobility or coordination have resolved if spinal or regional anesthesia was used , - met   ~ Patient has returned to baseline mental status, - met     Pt is AOx4, very anxious, reassurances given. IV infiltrated however pt refused replacement, pt very tearful and stated afraid of not able to sleep with IVF running. Edu provided, pt stated will try to hydrate herself orally. Lap sites wnl. Possible discharge in am.

## 2023-10-24 NOTE — PROGRESS NOTES
"Urology Daily Progress Note    24 hour events/Subjective:     - No acute events overnight   - Ate applesauce, crackers, peanut butter, no nausea/vomiting   - Walking without issue   - Pain controlled   - Wants to go home    O:  Vitals: BP 93/57   Pulse 80   Temp 99.1  F (37.3  C) (Oral)   Resp 16   Ht 1.6 m (5' 3\")   Wt 59.7 kg (131 lb 9.6 oz)   SpO2 100%   BMI 23.31 kg/m      General: Alert, interactive, in NAD  Resp: Non-labored breathing on RA  Abdomen: Soft, appropriately tender, incisions c/d/I, belly soft  Ext: Warm and well perfused   George: Clear yellow    I/O  UOP  1325/NR    Labs    Heme:  Recent Labs   Lab 10/24/23  0717   WBC 9.9   HGB 9.8*        Chem:  Recent Labs   Lab 10/24/23  0717 10/23/23  0623     --    POTASSIUM 3.9 3.4   CR 1.40*  --        Assessment/Plan  78F status post robotic right px nephrectomy on 10/23/23. Convalescing appropriately.    - George removed this AM.  - Discharge.    Discussed with Dr. Guerrero.    --    Isi Servin MD  Urology Resident       "

## 2023-10-24 NOTE — PLAN OF CARE
Goal Outcome Evaluation:    Behavior & Aggression: GREEN, anxious & frustrated at times  Orientation: A&Ox4  ABNL VS/O2: VSS ex soft BP  Pain Management: C/o abdominal pain, controlled with scheduled tylenol & PRN oxycodone  Bowel/Bladder: BS active, no gas  Drains: George removed, voided 300ml w/ PVR of 13ml  IV/incisions: Loss IV access - refused IV replacement. 6 port sites dressed w/ liquid bandage & CDI.  Diet: Regular diet, fair intake - very particular with meals, has an appetite but hospital food is not appetizing to her  Activity Level: Ind/SBA - refused bed alarm, wanted to be able to freely walk in room - pt steady - education provided on fall risk, able to call appropriately  Anticipated DC Date: Plan to discharge home today

## 2023-10-24 NOTE — PLAN OF CARE
Date & Time: 10/24/23 0000    Surgery/POD#: POD 1 R partial nephrectomy    Behavior & Aggression: GREEN, anxious & frustrated  Fall Risk: no  Orientation: A&Ox4  ABNL VS/O2: VSS ex HTN on RA   ABNL Labs: hgb 11.3  Pain Management: PRN oxy. PRN ativan given for anxiety  Bowel/Bladder: No BM this shift  Drains: George in place w/ adequate output  IV: Loss IV access - refused IV replacement  Diet: Regular  Activity Level: Ind/SBA - refused bed alarm, wanted to be able to freely walk in room - pt steady - education provided on fall risk, able to call appropriately  Tests/Procedures: n/a  Anticipated DC Date: Pending improvement  Significant Information: No concerns overnight

## 2023-10-25 ENCOUNTER — CARE COORDINATION (OUTPATIENT)
Dept: UROLOGY | Facility: CLINIC | Age: 78
End: 2023-10-25
Payer: MEDICARE

## 2023-10-25 LAB
PATH REPORT.COMMENTS IMP SPEC: ABNORMAL
PATH REPORT.COMMENTS IMP SPEC: YES
PATH REPORT.FINAL DX SPEC: ABNORMAL
PATH REPORT.GROSS SPEC: ABNORMAL
PATH REPORT.INTRAOP OBS SPEC DOC: ABNORMAL
PATH REPORT.MICROSCOPIC SPEC OTHER STN: ABNORMAL
PATH REPORT.RELEVANT HX SPEC: ABNORMAL
PATHOLOGY SYNOPTIC REPORT: ABNORMAL
PHOTO IMAGE: ABNORMAL

## 2023-10-25 PROCEDURE — 88307 TISSUE EXAM BY PATHOLOGIST: CPT | Mod: 26 | Performed by: PATHOLOGY

## 2023-10-25 PROCEDURE — 88331 PATH CONSLTJ SURG 1 BLK 1SPC: CPT | Mod: 26 | Performed by: PATHOLOGY

## 2023-10-25 PROCEDURE — 88341 IMHCHEM/IMCYTCHM EA ADD ANTB: CPT | Mod: 26 | Performed by: PATHOLOGY

## 2023-10-25 PROCEDURE — 88342 IMHCHEM/IMCYTCHM 1ST ANTB: CPT | Mod: 26 | Performed by: PATHOLOGY

## 2023-11-04 DIAGNOSIS — F41.3 OTHER MIXED ANXIETY DISORDERS: ICD-10-CM

## 2023-11-04 DIAGNOSIS — F33.1 MAJOR DEPRESSIVE DISORDER, RECURRENT EPISODE, MODERATE (H): ICD-10-CM

## 2023-11-05 RX ORDER — TRANYLCYPROMINE SULFATE 10 MG/1
30 TABLET, FILM COATED ORAL EVERY MORNING
Qty: 270 TABLET | Refills: 3 | Status: SHIPPED | OUTPATIENT
Start: 2023-11-05 | End: 2024-08-07

## 2023-11-07 DIAGNOSIS — F41.9 ANXIETY: ICD-10-CM

## 2023-11-07 DIAGNOSIS — D70.1 CHEMOTHERAPY-INDUCED NEUTROPENIA (H): ICD-10-CM

## 2023-11-07 DIAGNOSIS — T45.1X5A CHEMOTHERAPY-INDUCED NEUTROPENIA (H): ICD-10-CM

## 2023-11-08 ENCOUNTER — OFFICE VISIT (OUTPATIENT)
Dept: UROLOGY | Facility: CLINIC | Age: 78
End: 2023-11-08
Payer: MEDICARE

## 2023-11-08 VITALS
HEIGHT: 63 IN | SYSTOLIC BLOOD PRESSURE: 96 MMHG | BODY MASS INDEX: 23.04 KG/M2 | HEART RATE: 84 BPM | OXYGEN SATURATION: 99 % | WEIGHT: 130 LBS | DIASTOLIC BLOOD PRESSURE: 61 MMHG

## 2023-11-08 DIAGNOSIS — C64.1 RENAL CELL CARCINOMA, RIGHT (H): Primary | ICD-10-CM

## 2023-11-08 PROCEDURE — 99024 POSTOP FOLLOW-UP VISIT: CPT | Performed by: UROLOGY

## 2023-11-08 RX ORDER — LORAZEPAM 0.5 MG/1
0.5 TABLET ORAL EVERY 6 HOURS PRN
Qty: 45 TABLET | Refills: 0 | Status: SHIPPED | OUTPATIENT
Start: 2023-11-08 | End: 2024-01-11

## 2023-11-08 ASSESSMENT — PAIN SCALES - GENERAL: PAINLEVEL: NO PAIN (0)

## 2023-11-08 NOTE — NURSING NOTE
Chief Complaint   Patient presents with    hx rt kidney mass     Post op 10-   Everything is going well   No blood in the urine   Today no pain   Parvin Ferguson, CMA

## 2023-11-08 NOTE — LETTER
11/8/2023       RE: Jann Davidson  [address]  [facility] [place] [address]     Dear Colleague,    Thank you for referring your patient, Jann Davidson, to the [facility] at [facility]. Please see a copy of my visit note below.    [place]  CHIEF COMPLAINT   It was my pleasure to see Jann Davidson who is a 78 year old female for follow-up of RIGHT renal mass.      HPI   Jann Davidson is a very pleasant 78 year old female     Initially seen 10/5/2022:  Jann Davidson is a 77 year old female who is being seen for evaluation of a small incidental right renal mass  Diagnosed with breast cancer last year  S/p Chemo/radiationa and surgery  She is doing very well from a breast cancer treatment standpoint and is now following up on these other findings  Incidental 1.1cm RIGHT complex renal mass vs cyst on PET from 12/2021 11/23/2022:  No significant changes  This is causing her some mental fatigue and worry  She also notes she is having a sigmoidoscopy in December 5/31/2023:  Still having some thyroid issues  Doing well from a urology standpoint    10/4/2023:  Still working to optimize thyroid dosing  Doing well from a urology standpoint   She is scheduled for her partial nephrectomy on 10/23/2023 and returns today for updated imaging and review of the surgical plan  She remains quite anxious about prospect of having kidney cancer    TODAY 11/8/2023:  ##Kidney Cancer Follow up##  Patient is status post Robotic Partial Nephrectomy on 10/23/2023.   Tumor was on the right side.   Maximal tumor dimension was 1.6 cm.    The histologic subtype was Clear Cell.    ISUP Grade 1.    Pathologic Stage T1a.    Tumor thrombus level  not applicable.    Tumor necrosis present: No.  Sarcomatoid features present: No.  Lymph Nodes Removed No.   Number of nodes removed N/A, number of node positive for cancer N/A.   Was the ipsilateral adrenal gland removed?  "No.  Neoadjuvant Chemotherapy? No.  Was Margin Positive? No.    The patient was not readmitted to the hospital since post-operative discharge.    Follow-up today for postop check  She is doing very well with no issues after surgery  Incisions are healing well    PHYSICAL EXAM  Patient is a 78 year old  female   Vitals: Blood pressure 96/61, pulse 84, height 1.6 m (5' 3\"), weight 59 kg (130 lb), SpO2 99%.  Body mass index is 23.03 kg/m .  General Appearance Adult:   Alert, no acute distress, oriented  HENT: throat/mouth:normal, good dentition  Lungs: no respiratory distress, or pursed lip breathing  Heart: No obvious jugular venous distension present  Abdomen: non - distended  Incisions: clean, dry and intact with peeling skin glue, no evidence of hernia or infection  Musculoskeltal: extremities normal, no peripheral edema  Skin: no suspicious lesions or rashes  Neuro: Alert, oriented, speech and mentation normal  Psych: affect and mood normal    Creatinine   Date Value Ref Range Status   10/24/2023 1.40 (H) 0.51 - 0.95 mg/dL Final   06/05/2019 1.15 (H) 0.52 - 1.04 mg/dL Final      PATHOLOGY:  10/23/2023:  Final Diagnosis   A.  Kidney, right, deep margin, excision-  Benign renal parenchyma and associated stroma, no evidence of malignancy     B.  Kidney, right, partial nephrectomy-  Clear-cell papillary renal cell carcinoma       IMAGING:  All pertinent imaging reviewed:     All imaging studies reviewed by me.  I personally reviewed these imaging films.  A formal report from radiology will follow.     CT RENAL MASS 10/2/2023:  FINDINGS:     LOWER CHEST: 5 mm subpleural nodule demonstrates long-term stability and is benign right lower lobe. No infiltrates or effusions.     HEPATOBILIARY: No significant mass or bile duct dilatation. No calcified gallstones.      PANCREAS: No significant mass, duct dilatation, or inflammatory change.     SPLEEN: Normal size.     ADRENAL GLANDS: No significant nodules.     RIGHT " KIDNEY/URETER: Stable 1.7 cm enhancing lesion in the mid right kidney. No other renal lesions bilaterally. Tiny benign cyst requiring no specific follow-up. No hydronephrosis.     LEFT KIDNEY/URETER: No mass or hydronephrosis. Benign cyst requiring no follow-up. No stone disease.     BOWEL: No obstruction or inflammatory change.     LYMPH NODES: No lymphadenopathy.     VASCULATURE: No abdominal aortic aneurysm.     MUSCULOSKELETAL: No frankly destructive bony lesions.                                                                   IMPRESSION:  1.  Stable enhancing 1.7 cm renal mass.    ASSESSMENT and PLAN  78-year-old female with history of breast cancer with an incidental small right renal mass now status post a right robotic partial nephrectomy on 10/23/2023    Clear-cell renal cell carcinoma, pT1a  - I reviewed her pathology from surgery  - She is recovering very well  - Follow-up in 1 year for surveillance imaging      Time spent: 10 minutes spent on the date of the encounter doing chart review, history and exam, documentation and further activities as noted above.    Frank Guerrero MD   Urology  [facility]  [facility] Phone: [phone]  [facility] Phone: [phone]

## 2023-11-08 NOTE — PROGRESS NOTES
MENDEL  CHIEF COMPLAINT   It was my pleasure to see Jann Davidson who is a 78 year old female for follow-up of RIGHT renal mass.      HPI   Jann Davidson is a very pleasant 78 year old female     Initially seen 10/5/2022:  Jann Davidson is a 77 year old female who is being seen for evaluation of a small incidental right renal mass  Diagnosed with breast cancer last year  S/p Chemo/radiationa and surgery  She is doing very well from a breast cancer treatment standpoint and is now following up on these other findings  Incidental 1.1cm RIGHT complex renal mass vs cyst on PET from 12/2021 11/23/2022:  No significant changes  This is causing her some mental fatigue and worry  She also notes she is having a sigmoidoscopy in December 5/31/2023:  Still having some thyroid issues  Doing well from a urology standpoint    10/4/2023:  Still working to optimize thyroid dosing  Doing well from a urology standpoint   She is scheduled for her partial nephrectomy on 10/23/2023 and returns today for updated imaging and review of the surgical plan  She remains quite anxious about prospect of having kidney cancer    TODAY 11/8/2023:  ##Kidney Cancer Follow up##  Patient is status post Robotic Partial Nephrectomy on 10/23/2023.   Tumor was on the right side.   Maximal tumor dimension was 1.6 cm.    The histologic subtype was Clear Cell.    ISUP Grade 1.    Pathologic Stage T1a.    Tumor thrombus level  not applicable.    Tumor necrosis present: No.  Sarcomatoid features present: No.  Lymph Nodes Removed No.   Number of nodes removed N/A, number of node positive for cancer N/A.   Was the ipsilateral adrenal gland removed? No.  Neoadjuvant Chemotherapy? No.  Was Margin Positive? No.    The patient was not readmitted to the hospital since post-operative discharge.    Follow-up today for postop check  She is doing very well with no issues after surgery  Incisions are healing well    PHYSICAL EXAM  Patient is a 78 year old  female  "  Vitals: Blood pressure 96/61, pulse 84, height 1.6 m (5' 3\"), weight 59 kg (130 lb), SpO2 99%.  Body mass index is 23.03 kg/m .  General Appearance Adult:   Alert, no acute distress, oriented  HENT: throat/mouth:normal, good dentition  Lungs: no respiratory distress, or pursed lip breathing  Heart: No obvious jugular venous distension present  Abdomen: non - distended  Incisions: clean, dry and intact with peeling skin glue, no evidence of hernia or infection  Musculoskeltal: extremities normal, no peripheral edema  Skin: no suspicious lesions or rashes  Neuro: Alert, oriented, speech and mentation normal  Psych: affect and mood normal    Creatinine   Date Value Ref Range Status   10/24/2023 1.40 (H) 0.51 - 0.95 mg/dL Final   06/05/2019 1.15 (H) 0.52 - 1.04 mg/dL Final      PATHOLOGY:  10/23/2023:  Final Diagnosis   A.  Kidney, right, deep margin, excision-  Benign renal parenchyma and associated stroma, no evidence of malignancy     B.  Kidney, right, partial nephrectomy-  Clear-cell papillary renal cell carcinoma       IMAGING:  All pertinent imaging reviewed:     All imaging studies reviewed by me.  I personally reviewed these imaging films.  A formal report from radiology will follow.     CT RENAL MASS 10/2/2023:  FINDINGS:     LOWER CHEST: 5 mm subpleural nodule demonstrates long-term stability and is benign right lower lobe. No infiltrates or effusions.     HEPATOBILIARY: No significant mass or bile duct dilatation. No calcified gallstones.      PANCREAS: No significant mass, duct dilatation, or inflammatory change.     SPLEEN: Normal size.     ADRENAL GLANDS: No significant nodules.     RIGHT KIDNEY/URETER: Stable 1.7 cm enhancing lesion in the mid right kidney. No other renal lesions bilaterally. Tiny benign cyst requiring no specific follow-up. No hydronephrosis.     LEFT KIDNEY/URETER: No mass or hydronephrosis. Benign cyst requiring no follow-up. No stone disease.     BOWEL: No obstruction or " inflammatory change.     LYMPH NODES: No lymphadenopathy.     VASCULATURE: No abdominal aortic aneurysm.     MUSCULOSKELETAL: No frankly destructive bony lesions.                                                                   IMPRESSION:  1.  Stable enhancing 1.7 cm renal mass.    ASSESSMENT and PLAN  78-year-old female with history of breast cancer with an incidental small right renal mass now status post a right robotic partial nephrectomy on 10/23/2023    Clear-cell renal cell carcinoma, pT1a  - I reviewed her pathology from surgery  - She is recovering very well  - Follow-up in 1 year for surveillance imaging      Time spent: 10 minutes spent on the date of the encounter doing chart review, history and exam, documentation and further activities as noted above.    Frank Guerrero MD   Urology  [facility]  [facility] Phone: [phone]  [facility] Phone: [phone]

## 2023-11-27 DIAGNOSIS — G47.00 PERSISTENT DISORDER OF INITIATING OR MAINTAINING SLEEP: ICD-10-CM

## 2023-11-27 DIAGNOSIS — G47.00 PERSISTENT DISORDER OF INITIATING OR MAINTAINING SLEEP: Chronic | ICD-10-CM

## 2023-11-27 RX ORDER — OXAZEPAM 15 MG/1
CAPSULE ORAL
Qty: 45 CAPSULE | Refills: 0 | Status: SHIPPED | OUTPATIENT
Start: 2023-11-27 | End: 2024-01-08

## 2023-11-27 RX ORDER — ZOLPIDEM TARTRATE 5 MG/1
5 TABLET ORAL
Qty: 45 TABLET | Refills: 0 | Status: SHIPPED | OUTPATIENT
Start: 2023-11-27 | End: 2024-01-08

## 2023-11-27 NOTE — TELEPHONE ENCOUNTER
Pending Prescriptions:                       Disp   Refills    oxazepam (SERAX) 15 MG capsule            45 cap*0

## 2023-12-01 DIAGNOSIS — Z17.1 MALIGNANT NEOPLASM OF LOWER-OUTER QUADRANT OF LEFT BREAST OF FEMALE, ESTROGEN RECEPTOR NEGATIVE (H): Primary | ICD-10-CM

## 2023-12-01 DIAGNOSIS — Z12.31 ENCOUNTER FOR SCREENING MAMMOGRAM FOR BREAST CANCER: ICD-10-CM

## 2023-12-01 DIAGNOSIS — D05.02 NEOPLASM OF LEFT BREAST, PRIMARY TUMOR STAGING CATEGORY TIS: LOBULAR CARCINOMA IN SITU (LCIS): Primary | ICD-10-CM

## 2023-12-01 DIAGNOSIS — C50.512 MALIGNANT NEOPLASM OF LOWER-OUTER QUADRANT OF LEFT BREAST OF FEMALE, ESTROGEN RECEPTOR NEGATIVE (H): Primary | ICD-10-CM

## 2023-12-04 ENCOUNTER — TELEPHONE (OUTPATIENT)
Dept: ONCOLOGY | Facility: CLINIC | Age: 78
End: 2023-12-04
Payer: MEDICARE

## 2023-12-04 DIAGNOSIS — Z17.1 MALIGNANT NEOPLASM OF LOWER-OUTER QUADRANT OF LEFT BREAST OF FEMALE, ESTROGEN RECEPTOR NEGATIVE (H): Primary | ICD-10-CM

## 2023-12-04 DIAGNOSIS — C50.512 MALIGNANT NEOPLASM OF LOWER-OUTER QUADRANT OF LEFT BREAST OF FEMALE, ESTROGEN RECEPTOR NEGATIVE (H): Primary | ICD-10-CM

## 2023-12-04 NOTE — TELEPHONE ENCOUNTER
Jann requesting bilateral diagnostic Mammo for exam in Jan. She does not want to go thru the process of a screening mammo only to be called back for additional testing.    Routed to Dr Mcmullen to change if appropriate.    Regina Arango RN

## 2023-12-06 NOTE — TELEPHONE ENCOUNTER
Called to notify Jann that new order was placed. Message sent to scheduling to call pt    Regina Arango RN

## 2023-12-20 ENCOUNTER — TELEPHONE (OUTPATIENT)
Dept: ONCOLOGY | Facility: CLINIC | Age: 78
End: 2023-12-20
Payer: MEDICARE

## 2023-12-20 DIAGNOSIS — C50.512 MALIGNANT NEOPLASM OF LOWER-OUTER QUADRANT OF LEFT BREAST OF FEMALE, ESTROGEN RECEPTOR NEGATIVE (H): Primary | ICD-10-CM

## 2023-12-20 DIAGNOSIS — E03.2 HYPOTHYROIDISM DUE TO MEDICATION: ICD-10-CM

## 2023-12-20 DIAGNOSIS — Z17.1 MALIGNANT NEOPLASM OF LOWER-OUTER QUADRANT OF LEFT BREAST OF FEMALE, ESTROGEN RECEPTOR NEGATIVE (H): Primary | ICD-10-CM

## 2023-12-20 DIAGNOSIS — E55.9 VITAMIN D DEFICIENCY: ICD-10-CM

## 2023-12-20 NOTE — TELEPHONE ENCOUNTER
"Pt is calling.  She has an appt for mammogram on 1/24/2024, and then follow up appt with Dr Mcmullen on 1/31/2024.  Pt has not been scheduled for any labs before the appt with Dr Mcmullen, but pt states that she wants to have the \"full panel\" of testing completed, including CBC, CMP, vitamin D, and thyroid testing.  Pt states that she has been more tired recently and would like to make sure that her thyroid is checked. She continues to take levothyroxine 75 mcg daily.   Pt plans to have the labs drawn at [facility] on 1/24/2024 when she has her mammogram done.    Pt is requesting phone call when lab orders have been entered.  Pt states that she will call to schedule lab appt at [facility] clinic.  OK to leave a detailed message if unable to reach pt.    Will route to Dr Mcmullen for lab orders. Orders  have been pended.     Patient verbalized understanding and agreement with plan.  Patient was instructed to call the clinic with any questions, concerns, or worsening symptoms.    Ivana Meneses RN on 12/20/2023 at 11:13 AM   "

## 2023-12-22 NOTE — TELEPHONE ENCOUNTER
Dr Mcmullen has entered orders for CBC, CMP, vitamin D, and TSH.  Pt was notified that orders have been entered and that she can call [facility] clinic to set up lab appt for January 2024.    Patient verbalized understanding and agreement with plan.  Patient was instructed to call the clinic with any questions or concerns.    Ivana Meneses RN on 12/22/2023 at 8:26 AM

## 2024-01-08 DIAGNOSIS — G47.00 PERSISTENT DISORDER OF INITIATING OR MAINTAINING SLEEP: ICD-10-CM

## 2024-01-08 DIAGNOSIS — G47.00 PERSISTENT DISORDER OF INITIATING OR MAINTAINING SLEEP: Chronic | ICD-10-CM

## 2024-01-08 RX ORDER — OXAZEPAM 15 MG/1
CAPSULE ORAL
Qty: 30 CAPSULE | Refills: 0 | Status: SHIPPED | OUTPATIENT
Start: 2024-01-08 | End: 2024-02-19

## 2024-01-08 RX ORDER — ZOLPIDEM TARTRATE 5 MG/1
5 TABLET ORAL
Qty: 30 TABLET | Refills: 0 | Status: SHIPPED | OUTPATIENT
Start: 2024-01-08 | End: 2024-02-19

## 2024-01-11 DIAGNOSIS — L57.8 SUN-DAMAGED SKIN: ICD-10-CM

## 2024-01-11 DIAGNOSIS — T45.1X5A CHEMOTHERAPY-INDUCED NEUTROPENIA (H): ICD-10-CM

## 2024-01-11 DIAGNOSIS — D70.1 CHEMOTHERAPY-INDUCED NEUTROPENIA (H): ICD-10-CM

## 2024-01-11 DIAGNOSIS — F41.9 ANXIETY: ICD-10-CM

## 2024-01-11 RX ORDER — LORAZEPAM 0.5 MG/1
0.5 TABLET ORAL EVERY 6 HOURS PRN
Qty: 45 TABLET | Refills: 0 | Status: SHIPPED | OUTPATIENT
Start: 2024-01-11 | End: 2024-02-20

## 2024-01-11 NOTE — TELEPHONE ENCOUNTER
Pending Prescriptions:                       Disp   Refills    LORazepam (ATIVAN) 0.5 MG tablet          45 tab*0            Sig: Take 1 tablet (0.5 mg) by mouth every 6 hours as           needed for anxiety, nausea or sleep          Last Written Prescription Date:  11/8/2023  Last Fill Quantity: 45,   # refills: 0  Last Office Visit: 7/10/2023  Future Office visit:   1/31/2024    Routing refill request to provider for review/approval.  Pt states that she is out of medication and needs refill today.     Ivana Meneses RN on 1/11/2024 at 9:36 AM

## 2024-01-12 RX ORDER — TRIAMCINOLONE ACETONIDE 1 MG/G
CREAM TOPICAL
Qty: 15 G | Refills: 1 | Status: SHIPPED | OUTPATIENT
Start: 2024-01-12 | End: 2024-09-05

## 2024-01-24 ENCOUNTER — HOSPITAL ENCOUNTER (OUTPATIENT)
Dept: MAMMOGRAPHY | Facility: CLINIC | Age: 79
Discharge: HOME | End: 2024-01-24
Attending: INTERNAL MEDICINE | Admitting: INTERNAL MEDICINE
Payer: MEDICARE

## 2024-01-24 ENCOUNTER — LAB (OUTPATIENT)
Dept: LAB | Facility: CLINIC | Age: 79
End: 2024-01-24
Payer: MEDICARE

## 2024-01-24 DIAGNOSIS — E55.9 VITAMIN D DEFICIENCY: ICD-10-CM

## 2024-01-24 DIAGNOSIS — Z17.1 MALIGNANT NEOPLASM OF LOWER-OUTER QUADRANT OF LEFT BREAST OF FEMALE, ESTROGEN RECEPTOR NEGATIVE (H): ICD-10-CM

## 2024-01-24 DIAGNOSIS — C50.512 MALIGNANT NEOPLASM OF LOWER-OUTER QUADRANT OF LEFT BREAST OF FEMALE, ESTROGEN RECEPTOR NEGATIVE (H): ICD-10-CM

## 2024-01-24 DIAGNOSIS — E03.2 HYPOTHYROIDISM DUE TO MEDICATION: ICD-10-CM

## 2024-01-24 LAB
BASOPHILS # BLD AUTO: 0 10E3/UL (ref 0–0.2)
BASOPHILS NFR BLD AUTO: 1 %
EOSINOPHIL # BLD AUTO: 0.2 10E3/UL (ref 0–0.7)
EOSINOPHIL NFR BLD AUTO: 3 %
ERYTHROCYTE [DISTWIDTH] IN BLOOD BY AUTOMATED COUNT: 12.3 % (ref 10–15)
HCT VFR BLD AUTO: 42.7 % (ref 35–47)
HGB BLD-MCNC: 12.8 G/DL (ref 11.7–15.7)
IMM GRANULOCYTES # BLD: 0 10E3/UL
IMM GRANULOCYTES NFR BLD: 0 %
LYMPHOCYTES # BLD AUTO: 1 10E3/UL (ref 0.8–5.3)
LYMPHOCYTES NFR BLD AUTO: 18 %
MCH RBC QN AUTO: 30.3 PG (ref 26.5–33)
MCHC RBC AUTO-ENTMCNC: 30 G/DL (ref 31.5–36.5)
MCV RBC AUTO: 101 FL (ref 78–100)
MONOCYTES # BLD AUTO: 0.4 10E3/UL (ref 0–1.3)
MONOCYTES NFR BLD AUTO: 7 %
NEUTROPHILS # BLD AUTO: 3.9 10E3/UL (ref 1.6–8.3)
NEUTROPHILS NFR BLD AUTO: 71 %
PLATELET # BLD AUTO: 260 10E3/UL (ref 150–450)
RBC # BLD AUTO: 4.23 10E6/UL (ref 3.8–5.2)
WBC # BLD AUTO: 5.5 10E3/UL (ref 4–11)

## 2024-01-24 PROCEDURE — 84443 ASSAY THYROID STIM HORMONE: CPT

## 2024-01-24 PROCEDURE — 36415 COLL VENOUS BLD VENIPUNCTURE: CPT

## 2024-01-24 PROCEDURE — 85025 COMPLETE CBC W/AUTO DIFF WBC: CPT

## 2024-01-24 PROCEDURE — 82306 VITAMIN D 25 HYDROXY: CPT

## 2024-01-24 PROCEDURE — 80053 COMPREHEN METABOLIC PANEL: CPT

## 2024-01-24 PROCEDURE — 77062 BREAST TOMOSYNTHESIS BI: CPT

## 2024-01-25 LAB
ALBUMIN SERPL BCG-MCNC: 4.5 G/DL (ref 3.5–5.2)
ALP SERPL-CCNC: 73 U/L (ref 40–150)
ALT SERPL W P-5'-P-CCNC: 35 U/L (ref 0–50)
ANION GAP SERPL CALCULATED.3IONS-SCNC: 11 MMOL/L (ref 7–15)
AST SERPL W P-5'-P-CCNC: 45 U/L (ref 0–45)
BILIRUB SERPL-MCNC: 0.2 MG/DL
BUN SERPL-MCNC: 29 MG/DL (ref 8–23)
CALCIUM SERPL-MCNC: 10.3 MG/DL (ref 8.8–10.2)
CHLORIDE SERPL-SCNC: 104 MMOL/L (ref 98–107)
CREAT SERPL-MCNC: 1.71 MG/DL (ref 0.51–0.95)
DEPRECATED HCO3 PLAS-SCNC: 25 MMOL/L (ref 22–29)
EGFRCR SERPLBLD CKD-EPI 2021: 30 ML/MIN/1.73M2
GLUCOSE SERPL-MCNC: 112 MG/DL (ref 70–99)
POTASSIUM SERPL-SCNC: 4.4 MMOL/L (ref 3.4–5.3)
PROT SERPL-MCNC: 7.3 G/DL (ref 6.4–8.3)
SODIUM SERPL-SCNC: 140 MMOL/L (ref 135–145)
TSH SERPL DL<=0.005 MIU/L-ACNC: 2.7 UIU/ML (ref 0.3–4.2)
VIT D+METAB SERPL-MCNC: 40 NG/ML (ref 20–50)

## 2024-01-31 DIAGNOSIS — I10 HYPERTENSION GOAL BP (BLOOD PRESSURE) < 140/90: ICD-10-CM

## 2024-02-01 RX ORDER — TRIAMTERENE/HYDROCHLOROTHIAZID 37.5-25 MG
0.5 TABLET ORAL DAILY
Qty: 45 TABLET | Refills: 3 | Status: SHIPPED | OUTPATIENT
Start: 2024-02-01

## 2024-02-19 DIAGNOSIS — G47.00 PERSISTENT DISORDER OF INITIATING OR MAINTAINING SLEEP: Chronic | ICD-10-CM

## 2024-02-19 DIAGNOSIS — G47.00 PERSISTENT DISORDER OF INITIATING OR MAINTAINING SLEEP: ICD-10-CM

## 2024-02-19 RX ORDER — ZOLPIDEM TARTRATE 5 MG/1
TABLET ORAL
Qty: 30 TABLET | Refills: 0 | Status: SHIPPED | OUTPATIENT
Start: 2024-02-19 | End: 2024-03-23

## 2024-02-19 RX ORDER — OXAZEPAM 15 MG/1
CAPSULE ORAL
Qty: 30 CAPSULE | Refills: 0 | Status: SHIPPED | OUTPATIENT
Start: 2024-02-19 | End: 2024-03-23

## 2024-02-20 DIAGNOSIS — T45.1X5A CHEMOTHERAPY-INDUCED NEUTROPENIA (H): ICD-10-CM

## 2024-02-20 DIAGNOSIS — F41.9 ANXIETY: ICD-10-CM

## 2024-02-20 DIAGNOSIS — D70.1 CHEMOTHERAPY-INDUCED NEUTROPENIA (H): ICD-10-CM

## 2024-02-20 RX ORDER — LORAZEPAM 0.5 MG/1
0.5 TABLET ORAL EVERY 6 HOURS PRN
Qty: 45 TABLET | Refills: 0 | Status: SHIPPED | OUTPATIENT
Start: 2024-02-20 | End: 2024-05-08

## 2024-03-23 DIAGNOSIS — G47.00 PERSISTENT DISORDER OF INITIATING OR MAINTAINING SLEEP: Chronic | ICD-10-CM

## 2024-03-23 DIAGNOSIS — G47.00 PERSISTENT DISORDER OF INITIATING OR MAINTAINING SLEEP: ICD-10-CM

## 2024-03-23 RX ORDER — OXAZEPAM 15 MG/1
CAPSULE ORAL
Qty: 30 CAPSULE | Refills: 0 | Status: SHIPPED | OUTPATIENT
Start: 2024-03-23 | End: 2024-06-27

## 2024-03-23 RX ORDER — ZOLPIDEM TARTRATE 5 MG/1
5 TABLET ORAL
Qty: 30 TABLET | Refills: 5 | Status: SHIPPED | OUTPATIENT
Start: 2024-03-23 | End: 2024-08-07

## 2024-04-26 NOTE — PROGRESS NOTES
[facility]    Hematology/Oncology Established Patient Note      Today's Date: 5/1/2024    Reason for follow-up:  Left breast cancer, triple negative.    HISTORY OF PRESENT ILLNESS: Jann Davidson is a 78 year old female who presents with the following oncologic history:  1. 10/26/2021: Mammogram showed calcifications in the left lateral breast; right breast negative.  2. 11/17/2021: Left diagnostic mammogram showed cluster of pleomorphic calcifications at 4:00, 6 cm from nipple, measuring 1.3 cm.  3. 12/3/2021: Stereotactic left breast biopsy at 4:00, 6 cm from nipple showed grade 2 invasive ductal carcinoma with micropapillary features, extensive lymphovascular invasion present, grade 2-3 focal DCIS, LCIS, ER negative, MN negative, HER-2/maxime FISH negative.  4. 12/10/2021: Breast MRI showed oval mass 2 x 2 x 1.5 cm at 4:00, 6 cm from nipple in left breast reflecting biopsy cavity with post-biopsy changes; prominent 2.5 cm low left level 1 axillary lymph node.  5.  12/28/2021: PET/CT scan showed FDG avid focus in left lower outer breast, hypermetabolic left axillary adenopathy, moderate FDG uptake at level off anus, exophytic right renal 1.1 cm mass, slowly increasing in size since 2016 concerning for growing renal cell carcinoma.  6. 1/18/2022: Started neoadjuvant chemotherapy with weekly paclitaxel + carboplatin and every 3-week pembrolizumab as per KEYNOTE-522 study.  7. 4/19/2022: Breast MRI showed no residual enhancement at site of primary malignancy in left breast at 4:00; no residual left axillary adenopathy. Right breast negative.  8. 5/6/2022: Completed cycle 12 paclitaxel with pembrolizumab. Surgery delayed due to 2 hospitalizations for rash and peripheral neuropathy.  9. 6/27/2022: Underwent left breast lumpectomy and left axillary sentinel lymph node excision under care of Dr. Tatianna Rai.  Pathology showed no residual invasive carcinoma in left breast; residual LCIS and atypical  lobular hyperplasia present; one of 2 lymph nodes with isolated tumor cells measuring 0.087 mm, focal fibrosis consistent with treatment effect; RCB-I, ypT0-pN0(i+).  10. 8/08/2022: Started raloxifene for LCIS and atypical lobular hyperplasia.  11. 8/10/2022-9/22/2022: Completion of adjuvant radiation therapy.  12. 8/25/2022-2/10/2023: Completed adjuvant pembrolizumab x 9 cycles.  13. 5/24/2023: CT scan showed exophytic mass of posterior upper pole right kidney unchanged since 11/10/2022.  14. 10/23/2023: Right partial nephrectomy with Dr. Frank Guerrero.  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    INTERVAL HISTORY:  Jann reports some residual effects from neuropathy with legs not as strong and occasional left breast twinges.    REVIEW OF SYSTEMS:   14 point ROS was reviewed and is negative other than as noted above in HPI.       HOME MEDICATIONS:  Current Outpatient Medications   Medication Sig Dispense Refill    acetaminophen (TYLENOL) 500 MG tablet Take 1-2 tablets by mouth every 6 hours as needed for mild pain      atorvastatin (LIPITOR) 10 MG tablet TAKE 1 TABLET (10 MG) BY MOUTH ONCE DAILY 90 tablet 2    candesartan (ATACAND) 4 MG tablet Take 0.5 tablets by mouth daily (0.5 x 4 mg = 2 mg)      levothyroxine (SYNTHROID/LEVOTHROID) 75 MCG tablet Take 1 tablet (75 mcg) by mouth daily 90 tablet 3    loratadine (CLARITIN) 10 MG tablet Take 10 mg by mouth daily      LORazepam (ATIVAN) 0.5 MG tablet Take 1 tablet (0.5 mg) by mouth every 6 hours as needed for anxiety, nausea or sleep 45 tablet 0    Magnesium 400 MG TABS Take 2 tablets by mouth daily      oxazepam (SERAX) 15 MG capsule TAKE 1 CAPSULE (15 MG) BY MOUTH NIGHTLY AS NEEDED FOR ANXIETY. 30 capsule 0    PARNATE 10 MG tablet Take 3 tablets (30 mg) by mouth every morning (3 x 10 mg = 30 mg) 270 tablet 3    polyethylene glycol (MIRALAX) 17 GM/Dose powder Take 1 Capful  by mouth daily      raloxifene (EVISTA) 60 MG tablet Take 1 tablet (60 mg) by mouth daily 90 tablet 3    triamcinolone (KENALOG) 0.1 % external cream APPLY TOPICALLY 2 TIMES DAILY AS NEEDED. 15 g 1    triamterene-HCTZ (MAXZIDE-25) 37.5-25 MG tablet Take 0.5 tablets by mouth daily 45 tablet 3    Vitamin D3 (CHOLECALCIFEROL) 25 mcg (1000 units) tablet Take 1 tablet by mouth daily      zolpidem (AMBIEN) 5 MG tablet Take 1 tablet (5 mg) by mouth nightly as needed for sleep 30 tablet 5         ALLERGIES:  Allergies   Allergen Reactions    Tegaderm Transparent Dressing (Informational Only) Blisters    Lyrica [Pregabalin] Rash         PAST MEDICAL HISTORY:  Past Medical History:   Diagnosis Date    Breast cancer (H)     CKD (chronic kidney disease) stage 3, GFR 30-59 ml/min (H)     Depression with anxiety     Depressive disorder 1985    Been on Parnate antidepressant for 35 years    Gout     Hypertension goal BP (blood pressure) < 140/90     Recurrent sinusitis 2013    Thyroid disease     Keytruda induced     Gynecologic history:  Age of menarche at 11; LMP ;  (one son), 1st pregnancy at age 19; no HRT.    PAST SURGICAL HISTORY:  Past Surgical History:   Procedure Laterality Date    BIOPSY NODE SENTINEL Left 2022    Procedure: left axillary sentinel lymph node biopsy;  Surgeon: Tatianna Rai MD;  Location:  OR    BREAST BIOPSY, RT/LT      Breat Biopsy RT/LT    BREAST SURGERY  2022    Tumor removed    COLONOSCOPY  10/01/2003    COLONOSCOPY  2014    Procedure: COLONOSCOPY;  COLONOSCOPY ;  Surgeon: Gaurav Shaffer MD;  Location:  GI    DAVINCI NEPHRECTOMY PARTIAL Right 10/23/2023    Procedure: RIGHT ROBOTIC ASSISTED LAPAROSOCPIC PARTIAL NEPHRECTOMY WITH INTRA-OPERATIVE RENAL ULTRASOUND;  Surgeon: Frank Guerrero MD;  Location:  OR    IR CHEST PORT PLACEMENT > 5 YRS OF AGE  2021    IR PORT REMOVAL RIGHT  2023    LUMPECTOMY BREAST WITH SEED LOCALIZATION  Left 2022    Procedure: Radiofrequency tag localized left breast lumpectomy with radiofrequency tag localized excision of left axillary lymph node;  Surgeon: Tatianna Rai MD;  Location: [facility] OR    RECONSTRUCT EYELID           SOCIAL HISTORY:  Social History     Socioeconomic History    Marital status:      Spouse name: Not on file    Number of children: Not on file    Years of education: Not on file    Highest education level: Not on file   Occupational History    Not on file   Tobacco Use    Smoking status: Former     Current packs/day: 0.00     Average packs/day: 1 pack/day for 10.0 years (10.0 ttl pk-yrs)     Types: Cigarettes     Start date: 9/15/1963     Quit date: 10/30/1972     Years since quittin.5     Passive exposure: Never    Smokeless tobacco: Never    Tobacco comments:     I have not smoked since 10/30/1972   Vaping Use    Vaping status: Never Used   Substance and Sexual Activity    Alcohol use: Yes     Comment: I drink white wine with dinner    Drug use: No    Sexual activity: Not Currently     Partners: Male     Birth control/protection: I.U.D., Pill     Comment: No birth control used since .   Other Topics Concern    Parent/sibling w/ CABG, MI or angioplasty before 65F 55M? No   Social History Narrative    Not on file     Social Determinants of Health     Financial Resource Strain: Low Risk  (10/8/2023)    Financial Resource Strain     Within the past 12 months, have you or your family members you live with been unable to get utilities (heat, electricity) when it was really needed?: No   Food Insecurity: Low Risk  (10/8/2023)    Food Insecurity     Within the past 12 months, did you worry that your food would run out before you got money to buy more?: No     Within the past 12 months, did the food you bought just not last and you didn t have money to get more?: No   Transportation Needs: Low Risk  (10/8/2023)    Transportation Needs     Within the past 12 months, has lack of  "transportation kept you from medical appointments, getting your medicines, non-medical meetings or appointments, work, or from getting things that you need?: No   Physical Activity: Not on file   Stress: Not on file   Social Connections: Not on file   Interpersonal Safety: Low Risk  (10/10/2023)    Interpersonal Safety     Do you feel physically and emotionally safe where you currently live?: Yes     Within the past 12 months, have you been hit, slapped, kicked or otherwise physically hurt by someone?: No     Within the past 12 months, have you been humiliated or emotionally abused in other ways by your partner or ex-partner?: No   Housing Stability: Low Risk  (10/8/2023)    Housing Stability     Do you have housing? : Yes     Are you worried about losing your housing?: No         FAMILY HISTORY:  Family History   Problem Relation Age of Onset    Cancer Mother         uterine    Breast Cancer Mother     Hypertension Mother     Diabetes Paternal Grandmother          PHYSICAL EXAM:  Vital signs:  /58   Pulse 88   Resp 16   Ht 1.6 m (5' 3\")   Wt 61.4 kg (135 lb 6.4 oz)   SpO2 99%   BMI 23.99 kg/m     GENERAL: No acute distress.  EYES: No scleral icterus. No overt erythema.  LYMPH: No palpable lymphadenopathy in the cervical, supraclavicular, axillary regions.  BREAST: No palpable masses in either breast. Postlumpectomy stable changes in the right breast.  Nipples are everted bilaterally with no discharge. No erythema, ulceration, or dimpling of the skin.  RESPIRATORY: No audible cough or wheezing.  ABDOMEN: Soft, nontender, nondistended, with no palpable hepatosplenomegaly.   MUSCULOSKELETAL: No clubbing, cyanosis or edema.  SKIN: No rashes or jaundice.  NEUROLOGIC: Alert, answers questions appropriately; no focal deficits.  PSYCHIATRIC: Normal affect; anxious.    LABS:  CBC RESULTS:   Recent Labs   Lab Test 01/24/24  1556   WBC 5.5   RBC 4.23   HGB 12.8   HCT 42.7   *   MCH 30.3   MCHC 30.0*   RDW " 12.3         Last Comprehensive Metabolic Panel:  Sodium   Date Value Ref Range Status   01/24/2024 140 135 - 145 mmol/L Final     Comment:     Reference intervals for this test were updated on 09/26/2023 to more accurately reflect our healthy population. There may be differences in the flagging of prior results with similar values performed with this method. Interpretation of those prior results can be made in the context of the updated reference intervals.    06/05/2019 142 133 - 144 mmol/L Final     Potassium   Date Value Ref Range Status   01/24/2024 4.4 3.4 - 5.3 mmol/L Final   12/30/2022 4.0 3.4 - 5.3 mmol/L Final   06/05/2019 3.7 3.4 - 5.3 mmol/L Final     Chloride   Date Value Ref Range Status   01/24/2024 104 98 - 107 mmol/L Final   12/30/2022 110 (H) 94 - 109 mmol/L Final   06/05/2019 110 (H) 94 - 109 mmol/L Final     Carbon Dioxide   Date Value Ref Range Status   06/05/2019 24 20 - 32 mmol/L Final     Carbon Dioxide (CO2)   Date Value Ref Range Status   01/24/2024 25 22 - 29 mmol/L Final   12/30/2022 24 20 - 32 mmol/L Final     Anion Gap   Date Value Ref Range Status   01/24/2024 11 7 - 15 mmol/L Final   12/30/2022 7 3 - 14 mmol/L Final   06/05/2019 8 3 - 14 mmol/L Final     Glucose   Date Value Ref Range Status   01/24/2024 112 (H) 70 - 99 mg/dL Final   12/30/2022 103 (H) 70 - 99 mg/dL Final   06/05/2019 93 70 - 99 mg/dL Final     Comment:     Fasting specimen     GLUCOSE BY METER POCT   Date Value Ref Range Status   10/24/2023 109 (H) 70 - 99 mg/dL Final     Urea Nitrogen   Date Value Ref Range Status   01/24/2024 29.0 (H) 8.0 - 23.0 mg/dL Final   12/30/2022 29 7 - 30 mg/dL Final   06/05/2019 32 (H) 7 - 30 mg/dL Final     Creatinine   Date Value Ref Range Status   01/24/2024 1.71 (H) 0.51 - 0.95 mg/dL Final   06/05/2019 1.15 (H) 0.52 - 1.04 mg/dL Final     GFR Estimate   Date Value Ref Range Status   01/24/2024 30 (L) >60 mL/min/1.73m2 Final   06/05/2019 47 (L) >60 mL/min/[1.73_m2] Final      Comment:     Non  GFR Calc  Starting 12/18/2018, serum creatinine based estimated GFR (eGFR) will be   calculated using the Chronic Kidney Disease Epidemiology Collaboration   (CKD-EPI) equation.       GFR, ESTIMATED POCT   Date Value Ref Range Status   10/02/2023 33 (L) >60 mL/min/1.73m2 Final     Calcium   Date Value Ref Range Status   01/24/2024 10.3 (H) 8.8 - 10.2 mg/dL Final   06/05/2019 9.2 8.5 - 10.1 mg/dL Final     Bilirubin Total   Date Value Ref Range Status   01/24/2024 0.2 <=1.2 mg/dL Final   06/05/2019 0.4 0.2 - 1.3 mg/dL Final     Alkaline Phosphatase   Date Value Ref Range Status   01/24/2024 73 40 - 150 U/L Final     Comment:     Reference intervals for this test were updated on 11/14/2023 to more accurately reflect our healthy population. There may be differences in the flagging of prior results with similar values performed with this method. Interpretation of those prior results can be made in the context of the updated reference intervals.   06/05/2019 85 40 - 150 U/L Final     ALT   Date Value Ref Range Status   01/24/2024 35 0 - 50 U/L Final     Comment:     Reference intervals for this test were updated on 6/12/2023 to more accurately reflect our healthy population. There may be differences in the flagging of prior results with similar values performed with this method. Interpretation of those prior results can be made in the context of the updated reference intervals.     06/05/2019 33 0 - 50 U/L Final     AST   Date Value Ref Range Status   01/24/2024 45 0 - 45 U/L Final     Comment:     Reference intervals for this test were updated on 6/12/2023 to more accurately reflect our healthy population. There may be differences in the flagging of prior results with similar values performed with this method. Interpretation of those prior results can be made in the context of the updated reference intervals.   06/05/2019 33 0 - 45 U/L Final     5/24/2023: TSH = 29.92 -> 6.46  Free T4 =  0.7 => 1.19    PATHOLOGY:  Reviewed as per HPI.    ASSESSMENT/PLAN:  Jann Davidson is a 78 year old female with the following issues:  1. Clinical prognostic stage IIB, [id number], grade 2 invasive ductal carcinoma of the left lower outer breast, ER negative, [place] negative, HER-2/maxime FISH negative (triple negative), ypT0-pN0(i+)  2. Left breast LCIS and atypical lobular hyperplasia  --Jann completed neoadjuvant chemotherapy with paclitaxel and carboplatin for 12 weeks with every 3-week pembrolizumab. Given that her 4/19/2022 breast MRI showed no residual enhancement -- I had advised foregoing chemotherapy with Adriamycin and Cytoxan given her age and low likelihood of tolerating these drugs.  --6/27/2022 Final pathology from her lumpectomy and sentinel lymph node excision showed near-complete response to neoadjuvant chemotherapy with just isolated tumor cells in one lymph node.  She had residual LCIS and atypical lobular hyperplasia.  --She then completed adjuvant pembrolizumab on 2/10/2023 for 9 cycles as per the KEYNOTE-522 trial.  --She completed adjuvant radiation therapy on 9/22/2022 with Dr. Giles.  --Genetic testing negative.  --Jann has no clinical evidence for recurrent breast cancer by physical exam from today or diagnostic bilateral mammogram reviewed from 1/24/2024.  --Plan to alternate mammogram with breast MRI for high risk surveillance given findings of LCIS and ALH, which are markers for increased risk of additional breast cancer in the future.  Explained this again today.  We will plan for bilateral mammogram alternating with breast MRI 6 months apart. Due for breast MRI in 7/2024.  --Continue raloxifene for 5 years for risk reduction given the findings of LCIS and ALH. She is tolerating this well without side effects.    3. Chronic kidney disease, stage 3  --Creatinine stable.  --She follows with Dr. Luna.    4. FDG uptake at anal canal  --She has history of anal fissure from 4/8/2014  colonoscopy.  --PET scan showed uptake at level of anus and could represent hemorrhoid, fissure, malignancy, or be physiologic.  --She met with colorectal Dr. Cervantes on  12/16/2022 with flex sig that showed no evidence of neoplasia in rectum.  --Due for colonoscopy in 2025.    5. Stage I, [id number], clear cell papillary renal cell carcinoma  --She is s/p right robotic assisted laparoscopic partial nephrectomy on 10/23/2023. Margins negative  --She will follow-up with Dr. Guerrero in a year with surveillance imaging.    6. Pulmonary nodules  --PET scan showed scattered small bilateral pulmonary nodules that are unchanged since 10/24/2018 and most likely benign. No further CT chest needed.    7. Depression/anxiety  --Stable.  --She would like to continue on Parnate and not change her antidepressant.  --She takes oxazepam together with zolpidem.    --She could use lorazepam (Ativan) as long as she takes this 6 hours from her other benzodiazepenes.    8. Hypothyroidism  --Likely related to pembrolizumab.    --1/24/24 TSH 2.7.  --She reports feeling exhausted, which is likely multifactorial.  --Continue levothyroxine 75 mcg daily.  --She will have her thyroid function followed with her PCP, whom she sees on 5/15/2024.    9. Vitamin D deficiency   --7/5/2023 vitamin D level very low at 14.  --Advised vitamin D 2000 international unit(s) daily for 2 weeks then 1000 international unit(s) daily.    --1/24/24 Vitamin D level now improved at 40. Continue supplementation.    10. Mild hypercalcemia  --Advised keeping calcium intake around 1200 mg daily and keep adequate hydration.  --Recheck calcium with nephrology.    Return in 4 months.    Total time spent today: 30 minutes in chart review, patient evaluation, counseling, documentation, test and/or medication/prescription orders, and coordination of care.

## 2024-04-29 DIAGNOSIS — E55.9 VITAMIN D DEFICIENCY: ICD-10-CM

## 2024-04-29 DIAGNOSIS — N28.89 RIGHT RENAL MASS: ICD-10-CM

## 2024-04-29 DIAGNOSIS — E03.2 HYPOTHYROIDISM DUE TO MEDICATION: ICD-10-CM

## 2024-04-29 DIAGNOSIS — Z17.1 MALIGNANT NEOPLASM OF LOWER-OUTER QUADRANT OF LEFT BREAST OF FEMALE, ESTROGEN RECEPTOR NEGATIVE (H): Primary | ICD-10-CM

## 2024-04-29 DIAGNOSIS — C50.512 MALIGNANT NEOPLASM OF LOWER-OUTER QUADRANT OF LEFT BREAST OF FEMALE, ESTROGEN RECEPTOR NEGATIVE (H): Primary | ICD-10-CM

## 2024-05-01 ENCOUNTER — ONCOLOGY VISIT (OUTPATIENT)
Dept: ONCOLOGY | Facility: CLINIC | Age: 79
End: 2024-05-01
Attending: INTERNAL MEDICINE
Payer: MEDICARE

## 2024-05-01 VITALS
SYSTOLIC BLOOD PRESSURE: 112 MMHG | DIASTOLIC BLOOD PRESSURE: 58 MMHG | HEART RATE: 88 BPM | WEIGHT: 135.4 LBS | BODY MASS INDEX: 23.99 KG/M2 | RESPIRATION RATE: 16 BRPM | OXYGEN SATURATION: 99 % | HEIGHT: 63 IN

## 2024-05-01 DIAGNOSIS — E03.2 HYPOTHYROIDISM DUE TO MEDICATION: ICD-10-CM

## 2024-05-01 DIAGNOSIS — Z17.1 MALIGNANT NEOPLASM OF LOWER-OUTER QUADRANT OF LEFT BREAST OF FEMALE, ESTROGEN RECEPTOR NEGATIVE (H): Primary | ICD-10-CM

## 2024-05-01 DIAGNOSIS — C50.512 MALIGNANT NEOPLASM OF LOWER-OUTER QUADRANT OF LEFT BREAST OF FEMALE, ESTROGEN RECEPTOR NEGATIVE (H): Primary | ICD-10-CM

## 2024-05-01 DIAGNOSIS — D05.02 NEOPLASM OF LEFT BREAST, PRIMARY TUMOR STAGING CATEGORY TIS: LOBULAR CARCINOMA IN SITU (LCIS): ICD-10-CM

## 2024-05-01 DIAGNOSIS — N60.92 ATYPICAL LOBULAR HYPERPLASIA (ALH) OF LEFT BREAST: ICD-10-CM

## 2024-05-01 PROCEDURE — G0463 HOSPITAL OUTPT CLINIC VISIT: HCPCS | Performed by: INTERNAL MEDICINE

## 2024-05-01 PROCEDURE — 99214 OFFICE O/P EST MOD 30 MIN: CPT | Performed by: INTERNAL MEDICINE

## 2024-05-01 ASSESSMENT — PAIN SCALES - GENERAL: PAINLEVEL: NO PAIN (0)

## 2024-05-01 NOTE — LETTER
5/1/2024         RE: Jann Davidson  [address] [facility] [address]        Dear Colleague,    Thank you for referring your patient, Jann Davidson, to the [facility]. Please see a copy of my visit note below.    [facility]    Hematology/Oncology Established Patient Note      Today's Date: 5/1/2024    Reason for follow-up:  Left breast cancer, triple negative.    HISTORY OF PRESENT ILLNESS: Jann Davidson is a 78 year old female who presents with the following oncologic history:  1. 10/26/2021: Mammogram showed calcifications in the left lateral breast; right breast negative.  2. 11/17/2021: Left diagnostic mammogram showed cluster of pleomorphic calcifications at 4:00, 6 cm from nipple, measuring 1.3 cm.  3. 12/3/2021: Stereotactic left breast biopsy at 4:00, 6 cm from nipple showed grade 2 invasive ductal carcinoma with micropapillary features, extensive lymphovascular invasion present, grade 2-3 focal DCIS, LCIS, ER negative, MI negative, HER-2/maxime FISH negative.  4. 12/10/2021: Breast MRI showed oval mass 2 x 2 x 1.5 cm at 4:00, 6 cm from nipple in left breast reflecting biopsy cavity with post-biopsy changes; prominent 2.5 cm low left level 1 axillary lymph node.  5.  12/28/2021: PET/CT scan showed FDG avid focus in left lower outer breast, hypermetabolic left axillary adenopathy, moderate FDG uptake at level off anus, exophytic right renal 1.1 cm mass, slowly increasing in size since 2016 concerning for growing renal cell carcinoma.  6. 1/18/2022: Started neoadjuvant chemotherapy with weekly paclitaxel + carboplatin and every 3-week pembrolizumab as per KEYNOTE-522 study.  7. 4/19/2022: Breast MRI showed no residual enhancement at site of primary malignancy in left breast at 4:00; no residual left axillary adenopathy. Right breast negative.  8. 5/6/2022: Completed cycle 12 paclitaxel with pembrolizumab. Surgery delayed due to 2 hospitalizations for rash and  peripheral neuropathy.  9. 6/27/2022: Underwent left breast lumpectomy and left axillary sentinel lymph node excision under care of Dr. Tatianna Rai.  Pathology showed no residual invasive carcinoma in left breast; residual LCIS and atypical lobular hyperplasia present; one of 2 lymph nodes with isolated tumor cells measuring 0.087 mm, focal fibrosis consistent with treatment effect; RCB-I, ypT0-pN0(i+).  10. 8/08/2022: Started raloxifene for LCIS and atypical lobular hyperplasia.  11. 8/10/2022-9/22/2022: Completion of adjuvant radiation therapy.  12. 8/25/2022-2/10/2023: Completed adjuvant pembrolizumab x 9 cycles.  13. 5/24/2023: CT scan showed exophytic mass of posterior upper pole right kidney unchanged since 11/10/2022.  14. 10/23/2023: Right partial nephrectomy with Dr. Frank Guerrero.  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    INTERVAL HISTORY:  Jann reports some residual effects from neuropathy with legs not as strong and occasional left breast twinges.    REVIEW OF SYSTEMS:   14 point ROS was reviewed and is negative other than as noted above in HPI.       HOME MEDICATIONS:  Current Outpatient Medications   Medication Sig Dispense Refill     acetaminophen (TYLENOL) 500 MG tablet Take 1-2 tablets by mouth every 6 hours as needed for mild pain       atorvastatin (LIPITOR) 10 MG tablet TAKE 1 TABLET (10 MG) BY MOUTH ONCE DAILY 90 tablet 2     candesartan (ATACAND) 4 MG tablet Take 0.5 tablets by mouth daily (0.5 x 4 mg = 2 mg)       levothyroxine (SYNTHROID/LEVOTHROID) 75 MCG tablet Take 1 tablet (75 mcg) by mouth daily 90 tablet 3     loratadine (CLARITIN) 10 MG tablet Take 10 mg by mouth daily       LORazepam (ATIVAN) 0.5 MG tablet Take 1 tablet (0.5 mg) by mouth every 6 hours as needed for anxiety, nausea or sleep 45 tablet 0     Magnesium 400 MG TABS Take 2 tablets by mouth daily       oxazepam (SERAX) 15 MG  capsule TAKE 1 CAPSULE (15 MG) BY MOUTH NIGHTLY AS NEEDED FOR ANXIETY. 30 capsule 0     PARNATE 10 MG tablet Take 3 tablets (30 mg) by mouth every morning (3 x 10 mg = 30 mg) 270 tablet 3     polyethylene glycol (MIRALAX) 17 GM/Dose powder Take 1 Capful by mouth daily       raloxifene (EVISTA) 60 MG tablet Take 1 tablet (60 mg) by mouth daily 90 tablet 3     triamcinolone (KENALOG) 0.1 % external cream APPLY TOPICALLY 2 TIMES DAILY AS NEEDED. 15 g 1     triamterene-HCTZ (MAXZIDE-25) 37.5-25 MG tablet Take 0.5 tablets by mouth daily 45 tablet 3     Vitamin D3 (CHOLECALCIFEROL) 25 mcg (1000 units) tablet Take 1 tablet by mouth daily       zolpidem (AMBIEN) 5 MG tablet Take 1 tablet (5 mg) by mouth nightly as needed for sleep 30 tablet 5         ALLERGIES:  Allergies   Allergen Reactions     Tegaderm Transparent Dressing (Informational Only) Blisters     Lyrica [Pregabalin] Rash         PAST MEDICAL HISTORY:  Past Medical History:   Diagnosis Date     Breast cancer (H)      CKD (chronic kidney disease) stage 3, GFR 30-59 ml/min (H)      Depression with anxiety      Depressive disorder 1985    Been on Parnate antidepressant for 35 years     Gout      Hypertension goal BP (blood pressure) < 140/90      Recurrent sinusitis 2013     Thyroid disease     Keytruda induced     Gynecologic history:  Age of menarche at 11; LMP ;  (one son), 1st pregnancy at age 19; no HRT.    PAST SURGICAL HISTORY:  Past Surgical History:   Procedure Laterality Date     BIOPSY NODE SENTINEL Left 2022    Procedure: left axillary sentinel lymph node biopsy;  Surgeon: Tatianna Rai MD;  Location:  OR     BREAST BIOPSY, RT/LT      Breat Biopsy RT/LT     BREAST SURGERY  2022    Tumor removed     COLONOSCOPY  10/01/2003     COLONOSCOPY  2014    Procedure: COLONOSCOPY;  COLONOSCOPY ;  Surgeon: Gaurav Shaffer MD;  Location:  GI     DAVINCI NEPHRECTOMY PARTIAL Right 10/23/2023    Procedure:  RIGHT ROBOTIC ASSISTED LAPAROSOCPIC PARTIAL NEPHRECTOMY WITH INTRA-OPERATIVE RENAL ULTRASOUND;  Surgeon: Frank Guerrero MD;  Location: [facility] OR     IR CHEST PORT PLACEMENT > 5 YRS OF AGE  2021     IR PORT REMOVAL RIGHT  2023     LUMPECTOMY BREAST WITH SEED LOCALIZATION Left 2022    Procedure: Radiofrequency tag localized left breast lumpectomy with radiofrequency tag localized excision of left axillary lymph node;  Surgeon: Tatianna Rai MD;  Location: [facility] OR     RECONSTRUCT EYELID           SOCIAL HISTORY:  Social History     Socioeconomic History     Marital status:      Spouse name: Not on file     Number of children: Not on file     Years of education: Not on file     Highest education level: Not on file   Occupational History     Not on file   Tobacco Use     Smoking status: Former     Current packs/day: 0.00     Average packs/day: 1 pack/day for 10.0 years (10.0 ttl pk-yrs)     Types: Cigarettes     Start date: 9/15/1963     Quit date: 10/30/1972     Years since quittin.5     Passive exposure: Never     Smokeless tobacco: Never     Tobacco comments:     I have not smoked since 10/30/1972   Vaping Use     Vaping status: Never Used   Substance and Sexual Activity     Alcohol use: Yes     Comment: I drink white wine with dinner     Drug use: No     Sexual activity: Not Currently     Partners: Male     Birth control/protection: I.U.D., Pill     Comment: No birth control used since .   Other Topics Concern     Parent/sibling w/ CABG, MI or angioplasty before 65F 55M? No   Social History Narrative     Not on file     Social Determinants of Health     Financial Resource Strain: Low Risk  (10/8/2023)    Financial Resource Strain      Within the past 12 months, have you or your family members you live with been unable to get utilities (heat, electricity) when it was really needed?: No   Food Insecurity: Low Risk  (10/8/2023)    Food Insecurity      Within the past 12 months, did you  "worry that your food would run out before you got money to buy more?: No      Within the past 12 months, did the food you bought just not last and you didn t have money to get more?: No   Transportation Needs: Low Risk  (10/8/2023)    Transportation Needs      Within the past 12 months, has lack of transportation kept you from medical appointments, getting your medicines, non-medical meetings or appointments, work, or from getting things that you need?: No   Physical Activity: Not on file   Stress: Not on file   Social Connections: Not on file   Interpersonal Safety: Low Risk  (10/10/2023)    Interpersonal Safety      Do you feel physically and emotionally safe where you currently live?: Yes      Within the past 12 months, have you been hit, slapped, kicked or otherwise physically hurt by someone?: No      Within the past 12 months, have you been humiliated or emotionally abused in other ways by your partner or ex-partner?: No   Housing Stability: Low Risk  (10/8/2023)    Housing Stability      Do you have housing? : Yes      Are you worried about losing your housing?: No         FAMILY HISTORY:  Family History   Problem Relation Age of Onset     Cancer Mother         uterine     Breast Cancer Mother      Hypertension Mother      Diabetes Paternal Grandmother          PHYSICAL EXAM:  Vital signs:  /58   Pulse 88   Resp 16   Ht 1.6 m (5' 3\")   Wt 61.4 kg (135 lb 6.4 oz)   SpO2 99%   BMI 23.99 kg/m     GENERAL: No acute distress.  EYES: No scleral icterus. No overt erythema.  LYMPH: No palpable lymphadenopathy in the cervical, supraclavicular, axillary regions.  BREAST: No palpable masses in either breast. Postlumpectomy stable changes in the right breast.  Nipples are everted bilaterally with no discharge. No erythema, ulceration, or dimpling of the skin.  RESPIRATORY: No audible cough or wheezing.  ABDOMEN: Soft, nontender, nondistended, with no palpable hepatosplenomegaly.   MUSCULOSKELETAL: No " clubbing, cyanosis or edema.  SKIN: No rashes or jaundice.  NEUROLOGIC: Alert, answers questions appropriately; no focal deficits.  PSYCHIATRIC: Normal affect; anxious.    LABS:  CBC RESULTS:   Recent Labs   Lab Test 01/24/24  1556   WBC 5.5   RBC 4.23   HGB 12.8   HCT 42.7   *   MCH 30.3   MCHC 30.0*   RDW 12.3         Last Comprehensive Metabolic Panel:  Sodium   Date Value Ref Range Status   01/24/2024 140 135 - 145 mmol/L Final     Comment:     Reference intervals for this test were updated on 09/26/2023 to more accurately reflect our healthy population. There may be differences in the flagging of prior results with similar values performed with this method. Interpretation of those prior results can be made in the context of the updated reference intervals.    06/05/2019 142 133 - 144 mmol/L Final     Potassium   Date Value Ref Range Status   01/24/2024 4.4 3.4 - 5.3 mmol/L Final   12/30/2022 4.0 3.4 - 5.3 mmol/L Final   06/05/2019 3.7 3.4 - 5.3 mmol/L Final     Chloride   Date Value Ref Range Status   01/24/2024 104 98 - 107 mmol/L Final   12/30/2022 110 (H) 94 - 109 mmol/L Final   06/05/2019 110 (H) 94 - 109 mmol/L Final     Carbon Dioxide   Date Value Ref Range Status   06/05/2019 24 20 - 32 mmol/L Final     Carbon Dioxide (CO2)   Date Value Ref Range Status   01/24/2024 25 22 - 29 mmol/L Final   12/30/2022 24 20 - 32 mmol/L Final     Anion Gap   Date Value Ref Range Status   01/24/2024 11 7 - 15 mmol/L Final   12/30/2022 7 3 - 14 mmol/L Final   06/05/2019 8 3 - 14 mmol/L Final     Glucose   Date Value Ref Range Status   01/24/2024 112 (H) 70 - 99 mg/dL Final   12/30/2022 103 (H) 70 - 99 mg/dL Final   06/05/2019 93 70 - 99 mg/dL Final     Comment:     Fasting specimen     GLUCOSE BY METER POCT   Date Value Ref Range Status   10/24/2023 109 (H) 70 - 99 mg/dL Final     Urea Nitrogen   Date Value Ref Range Status   01/24/2024 29.0 (H) 8.0 - 23.0 mg/dL Final   12/30/2022 29 7 - 30 mg/dL Final    06/05/2019 32 (H) 7 - 30 mg/dL Final     Creatinine   Date Value Ref Range Status   01/24/2024 1.71 (H) 0.51 - 0.95 mg/dL Final   06/05/2019 1.15 (H) 0.52 - 1.04 mg/dL Final     GFR Estimate   Date Value Ref Range Status   01/24/2024 30 (L) >60 mL/min/1.73m2 Final   06/05/2019 47 (L) >60 mL/min/[1.73_m2] Final     Comment:     Non  GFR Calc  Starting 12/18/2018, serum creatinine based estimated GFR (eGFR) will be   calculated using the Chronic Kidney Disease Epidemiology Collaboration   (CKD-EPI) equation.       GFR, ESTIMATED POCT   Date Value Ref Range Status   10/02/2023 33 (L) >60 mL/min/1.73m2 Final     Calcium   Date Value Ref Range Status   01/24/2024 10.3 (H) 8.8 - 10.2 mg/dL Final   06/05/2019 9.2 8.5 - 10.1 mg/dL Final     Bilirubin Total   Date Value Ref Range Status   01/24/2024 0.2 <=1.2 mg/dL Final   06/05/2019 0.4 0.2 - 1.3 mg/dL Final     Alkaline Phosphatase   Date Value Ref Range Status   01/24/2024 73 40 - 150 U/L Final     Comment:     Reference intervals for this test were updated on 11/14/2023 to more accurately reflect our healthy population. There may be differences in the flagging of prior results with similar values performed with this method. Interpretation of those prior results can be made in the context of the updated reference intervals.   06/05/2019 85 40 - 150 U/L Final     ALT   Date Value Ref Range Status   01/24/2024 35 0 - 50 U/L Final     Comment:     Reference intervals for this test were updated on 6/12/2023 to more accurately reflect our healthy population. There may be differences in the flagging of prior results with similar values performed with this method. Interpretation of those prior results can be made in the context of the updated reference intervals.     06/05/2019 33 0 - 50 U/L Final     AST   Date Value Ref Range Status   01/24/2024 45 0 - 45 U/L Final     Comment:     Reference intervals for this test were updated on 6/12/2023 to more accurately  reflect our healthy population. There may be differences in the flagging of prior results with similar values performed with this method. Interpretation of those prior results can be made in the context of the updated reference intervals.   06/05/2019 33 0 - 45 U/L Final     5/24/2023: TSH = 29.92 -> 6.46  Free T4 = 0.7 => 1.19    PATHOLOGY:  Reviewed as per HPI.    ASSESSMENT/PLAN:  Jann Davidson is a 78 year old female with the following issues:  1. Clinical prognostic stage IIB, tP2j-U2-P0, grade 2 invasive ductal carcinoma of the left lower outer breast, ER negative, WA negative, HER-2/maxime FISH negative (triple negative), ypT0-pN0(i+)  2. Left breast LCIS and atypical lobular hyperplasia  --Jann completed neoadjuvant chemotherapy with paclitaxel and carboplatin for 12 weeks with every 3-week pembrolizumab. Given that her 4/19/2022 breast MRI showed no residual enhancement -- I had advised foregoing chemotherapy with Adriamycin and Cytoxan given her age and low likelihood of tolerating these drugs.  --6/27/2022 Final pathology from her lumpectomy and sentinel lymph node excision showed near-complete response to neoadjuvant chemotherapy with just isolated tumor cells in one lymph node.  She had residual LCIS and atypical lobular hyperplasia.  --She then completed adjuvant pembrolizumab on 2/10/2023 for 9 cycles as per the KEYNOTE-522 trial.  --She completed adjuvant radiation therapy on 9/22/2022 with Dr. Giles.  --Genetic testing negative.  --Jann has no clinical evidence for recurrent breast cancer by physical exam from today or diagnostic bilateral mammogram reviewed from 1/24/2024.  --Plan to alternate mammogram with breast MRI for high risk surveillance given findings of LCIS and ALH, which are markers for increased risk of additional breast cancer in the future.  Explained this again today.  We will plan for bilateral mammogram alternating with breast MRI 6 months apart. Due for breast MRI in  7/2024.  --Continue raloxifene for 5 years for risk reduction given the findings of LCIS and ALH. She is tolerating this well without side effects.    3. Chronic kidney disease, stage 3  --Creatinine stable.  --She follows with Dr. Luna.    4. FDG uptake at anal canal  --She has history of anal fissure from 4/8/2014 colonoscopy.  --PET scan showed uptake at level of anus and could represent hemorrhoid, fissure, malignancy, or be physiologic.  --She met with colorectal Dr. Cervantes on  12/16/2022 with flex sig that showed no evidence of neoplasia in rectum.  --Due for colonoscopy in 2025.    5. Stage I, [id number], clear cell papillary renal cell carcinoma  --She is s/p right robotic assisted laparoscopic partial nephrectomy on 10/23/2023. Margins negative  --She will follow-up with Dr. Guerrero in a year with surveillance imaging.    6. Pulmonary nodules  --PET scan showed scattered small bilateral pulmonary nodules that are unchanged since 10/24/2018 and most likely benign. No further CT chest needed.    7. Depression/anxiety  --Stable.  --She would like to continue on Parnate and not change her antidepressant.  --She takes oxazepam together with zolpidem.    --She could use lorazepam (Ativan) as long as she takes this 6 hours from her other benzodiazepenes.    8. Hypothyroidism  --Likely related to pembrolizumab.    --1/24/24 TSH 2.7.  --She reports feeling exhausted, which is likely multifactorial.  --Continue levothyroxine 75 mcg daily.  --She will have her thyroid function followed with her PCP, whom she sees on 5/15/2024.    9. Vitamin D deficiency   --7/5/2023 vitamin D level very low at 14.  --Advised vitamin D 2000 international unit(s) daily for 2 weeks then 1000 international unit(s) daily.    --1/24/24 Vitamin D level now improved at 40. Continue supplementation.    10. Mild hypercalcemia  --Advised keeping calcium intake around 1200 mg daily and keep adequate hydration.  --Recheck calcium with  "nephrology.    Return in 4 months.    Total time spent today: 30 minutes in chart review, patient evaluation, counseling, documentation, test and/or medication/prescription orders, and coordination of care.     Oncology Rooming Note    May 1, 2024 3:48 PM   Jann Davidson is a 78 year old female who presents for:    Chief Complaint   Patient presents with     Oncology Clinic Visit     Initial Vitals: Resp 16   Ht 1.6 m (5' 3\")   Wt 61.4 kg (135 lb 6.4 oz)   BMI 23.99 kg/m   Estimated body mass index is 23.99 kg/m  as calculated from the following:    Height as of this encounter: 1.6 m (5' 3\").    Weight as of this encounter: 61.4 kg (135 lb 6.4 oz). Body surface area is 1.65 meters squared.  No Pain (0) Comment: Data Unavailable   No LMP recorded. Patient is postmenopausal.  Allergies reviewed: Yes  Medications reviewed: Yes    Medications: Medication refills not needed today.  Pharmacy name entered into EPIC:    [facility][id number] [place], [place] - [address] [facility] - [place], [place] - [address] [place] [address]    Frailty Screening:   Is the patient here for a new oncology consult visit in cancer care? 2. No          Viviane Colby MA              Again, thank you for allowing me to participate in the care of your patient.        Sincerely,        Neetu Mcmullen MD  "

## 2024-05-01 NOTE — PROGRESS NOTES
"Oncology Rooming Note    May 1, 2024 3:48 PM   Jann Davidson is a 78 year old female who presents for:    Chief Complaint   Patient presents with    Oncology Clinic Visit     Initial Vitals: Resp 16   Ht 1.6 m (5' 3\")   Wt 61.4 kg (135 lb 6.4 oz)   BMI 23.99 kg/m   Estimated body mass index is 23.99 kg/m  as calculated from the following:    Height as of this encounter: 1.6 m (5' 3\").    Weight as of this encounter: 61.4 kg (135 lb 6.4 oz). Body surface area is 1.65 meters squared.  No Pain (0) Comment: Data Unavailable   No LMP recorded. Patient is postmenopausal.  Allergies reviewed: Yes  Medications reviewed: Yes    Medications: Medication refills not needed today.  Pharmacy name entered into EPIC:    [facility][id number] - [place], [place] - [address]  [facility] - [place], [place] - [address] [place] [address]    Frailty Screening:   Is the patient here for a new oncology consult visit in cancer care? 2. No          Viivane Colby MA            "

## 2024-05-08 DIAGNOSIS — T45.1X5A CHEMOTHERAPY-INDUCED NEUTROPENIA (H): ICD-10-CM

## 2024-05-08 DIAGNOSIS — D70.1 CHEMOTHERAPY-INDUCED NEUTROPENIA (H): ICD-10-CM

## 2024-05-08 DIAGNOSIS — F41.9 ANXIETY: ICD-10-CM

## 2024-05-08 RX ORDER — LORAZEPAM 0.5 MG/1
0.5 TABLET ORAL EVERY 6 HOURS PRN
Qty: 45 TABLET | Refills: 0 | Status: SHIPPED | OUTPATIENT
Start: 2024-05-08 | End: 2024-07-03

## 2024-05-08 NOTE — TELEPHONE ENCOUNTER
"Last prescribing provider: Dr. Mcmullen    Last clinic visit date: 5/1/24    Any missed appointments or no-shows since last clinic visit?: No    Recommendations for requested medication (if none, N/A): Per Dr. Mcmullen on 5/1  \"She would like to continue on Parnate and not change her antidepressant.  --She takes oxazepam together with zolpidem.  --She could use lorazepam (Ativan) as long as she takes this 6 hours from her other benzodiazepenes. \"    Next clinic visit date: 9/11/24    Any other pertinent information (if none, N/A): N/A    "

## 2024-05-10 SDOH — HEALTH STABILITY: PHYSICAL HEALTH: ON AVERAGE, HOW MANY MINUTES DO YOU ENGAGE IN EXERCISE AT THIS LEVEL?: 0 MIN

## 2024-05-10 SDOH — HEALTH STABILITY: PHYSICAL HEALTH: ON AVERAGE, HOW MANY DAYS PER WEEK DO YOU ENGAGE IN MODERATE TO STRENUOUS EXERCISE (LIKE A BRISK WALK)?: 0 DAYS

## 2024-05-10 ASSESSMENT — SOCIAL DETERMINANTS OF HEALTH (SDOH): HOW OFTEN DO YOU GET TOGETHER WITH FRIENDS OR RELATIVES?: PATIENT DECLINED

## 2024-05-10 NOTE — COMMUNITY RESOURCES LIST (ENGLISH)
May 10, 2024           YOUR PERSONALIZED LIST OF SERVICES & PROGRAMS               Medical Transportation, (NEMT)      - Transportation to medical appointments  [address] [facility] [address] [place] [address] (Distance: 5.1 miles)  Phone: [phone]) [phone]  Website: https://www.DriverTech/  Language: English  Fee: Self pay      [facility] Nurse Program - Transportation to medical appointments  [address] [place] [address] (Distance: 6.4 miles)  Phone: [phone]) [phone]  Website: http://FriendCode.org/  Language: English, Ghanaian, Azerbaijani  Fee: Sliding scale  Accessibility: Translation services      Social Service [facility] - Neighbor to Neighbor Program  Phone: (970) 749-9643  Email: osbaldo@[facility].org  Website: https://www.[facility].org/services/older-adults/-services/neighbor-to-neighbor  Language: English  Hours: Mon 8:00 AM - 5:00 PM Tue 8:00 AM - 5:00 PM Wed 8:00 AM - 5:00 PM Thu 8:00 AM - 5:00 PM Fri 8:00 AM - 5:00 PM  Fee: Insurance, Self pay  Accessibility: Deaf or hard of hearing, Blind accommodation, Translation services    Expense Assistance      Transit - [place] - Transit Assistance Program (TAP) - Transportation expense assistance  [address] [place] [address] (Distance: 10.7 miles)  Language: English, Ghanaian  Fee: Free, Sliding scale, Self pay  Accessibility: Translation services      School - [employer] for Women  [address] [place] [address] (Distance: 7.3 miles)  Phone: ([phone]) [phone]  Website: http://www.[employer].org  Language: English  Fee: Free  Accessibility: Translation services  Transportation Options: Free transportation      - Dislocated Worker/Adult [employer] Employment Program  Phone: (900) 993-2911  Email: alina@[employer].Com  Website: https://[employer].org/services/employment-services/dislocated-worker-program/  Language: English, Azerbaijani  Hours: Mon 8:00 AM - 4:30 PM Tue 8:00 AM - 4:30 PM Wed  8:00 AM - 4:30 PM Thu 8:00 AM - 4:30 PM Fri 8:00 AM - 4:30 PM  Fee: Free  Accessibility: Ada accessible    Coordination      [facility] - Bus Passes - Free or low-cost transportation  [address] [place] [address] (Distance: 4.6 miles)  Phone: [phone]) [phone]  Language: English  Fee: Free  Accessibility: Deaf or hard of hearing, Ada accessible      [employer] - Free or low-cost transportation  [address] [place] [address] (Distance: 4.8 miles)  Phone: [phone]) [phone]  Website: http://www.saintolaf.org/  Language: English  Fee: Free  Accessibility: Ada accessible      - Services  Phone: (903) 385-2325  Website: https://[employer]/#howitworks-section  Hours: Sun 12:00 AM - 11:45 PM Mon 12:00 AM - 11:45 PM Tue 12:00 AM - 11:45 PM Wed 12:00 AM - 11:45 PM Thu 12:00 AM - 11:45 PM Fri 12:00 AM - 11:45 PM Sat 12:00 AM - 11:45 PM  Fee: Self pay  Accessibility: Ada accessible, Blind accommodation, Deaf or hard of hearing               IMPORTANT NUMBERS & WEBSITES        Emergency Services  911  .   [facility]  211 http://211unitedway.org  .   Poison Control  (643) 900-9950 http://[place]poison.org http://wisconsinpoison.org  .     Suicide and Crisis Lifeline  988 http://988lifeline.org  .   Childhelp National Child Abuse Hotline  987.538.6101 http://Childhelphotline.org   .   National Sexual Assault Hotline  (909) 481-7777 (HOPE) http://Rainn.org   .     National Runaway Safeline  (843) 145-6425 (RUNAWAY) http://1800runaway.org  .   Pregnancy & Postpartum Support  Call/text 289-474-8682  [place]: http://ppsupportmn.org  [place]: http://Econodata.com/wi  .   Substance Abuse National Helpline ([facility])  311-010-HELP (1272) http://Findtreatment.gov   .                DISCLAIMER: These resources have been generated via the [employer]. [employer] does not endorse any service providers mentioned in this resource list. [employer] does not guarantee that the services mentioned in this  resource list will be available to you or will improve your health or wellness.    [facility]

## 2024-05-15 ENCOUNTER — OFFICE VISIT (OUTPATIENT)
Dept: FAMILY MEDICINE | Facility: CLINIC | Age: 79
End: 2024-05-15
Payer: MEDICARE

## 2024-05-15 VITALS
HEIGHT: 63 IN | WEIGHT: 138 LBS | TEMPERATURE: 98 F | BODY MASS INDEX: 24.45 KG/M2 | DIASTOLIC BLOOD PRESSURE: 80 MMHG | OXYGEN SATURATION: 98 % | HEART RATE: 79 BPM | RESPIRATION RATE: 22 BRPM | SYSTOLIC BLOOD PRESSURE: 122 MMHG

## 2024-05-15 DIAGNOSIS — Z78.0 ASYMPTOMATIC MENOPAUSAL STATE: ICD-10-CM

## 2024-05-15 DIAGNOSIS — Z00.00 ENCOUNTER FOR PREVENTIVE HEALTH EXAMINATION: Primary | ICD-10-CM

## 2024-05-15 DIAGNOSIS — R25.3 EYELID TWITCH: ICD-10-CM

## 2024-05-15 DIAGNOSIS — E55.9 VITAMIN D DEFICIENCY: ICD-10-CM

## 2024-05-15 DIAGNOSIS — Z12.11 SCREEN FOR COLON CANCER: ICD-10-CM

## 2024-05-15 DIAGNOSIS — Z23 NEED FOR VACCINATION: ICD-10-CM

## 2024-05-15 DIAGNOSIS — Z13.6 CARDIOVASCULAR SCREENING; LDL GOAL LESS THAN 130: ICD-10-CM

## 2024-05-15 DIAGNOSIS — N18.30 STAGE 3 CHRONIC KIDNEY DISEASE, UNSPECIFIED WHETHER STAGE 3A OR 3B CKD (H): ICD-10-CM

## 2024-05-15 DIAGNOSIS — E03.2 HYPOTHYROIDISM DUE TO MEDICATION: ICD-10-CM

## 2024-05-15 LAB
BASOPHILS # BLD AUTO: 0 10E3/UL (ref 0–0.2)
BASOPHILS NFR BLD AUTO: 1 %
EOSINOPHIL # BLD AUTO: 0.1 10E3/UL (ref 0–0.7)
EOSINOPHIL NFR BLD AUTO: 2 %
ERYTHROCYTE [DISTWIDTH] IN BLOOD BY AUTOMATED COUNT: 12.3 % (ref 10–15)
HCT VFR BLD AUTO: 36.7 % (ref 35–47)
HGB BLD-MCNC: 11.6 G/DL (ref 11.7–15.7)
IMM GRANULOCYTES # BLD: 0 10E3/UL
IMM GRANULOCYTES NFR BLD: 0 %
LYMPHOCYTES # BLD AUTO: 1.2 10E3/UL (ref 0.8–5.3)
LYMPHOCYTES NFR BLD AUTO: 22 %
MCH RBC QN AUTO: 30.9 PG (ref 26.5–33)
MCHC RBC AUTO-ENTMCNC: 31.6 G/DL (ref 31.5–36.5)
MCV RBC AUTO: 98 FL (ref 78–100)
MONOCYTES # BLD AUTO: 0.4 10E3/UL (ref 0–1.3)
MONOCYTES NFR BLD AUTO: 8 %
NEUTROPHILS # BLD AUTO: 3.6 10E3/UL (ref 1.6–8.3)
NEUTROPHILS NFR BLD AUTO: 68 %
PLATELET # BLD AUTO: 222 10E3/UL (ref 150–450)
RBC # BLD AUTO: 3.75 10E6/UL (ref 3.8–5.2)
WBC # BLD AUTO: 5.4 10E3/UL (ref 4–11)

## 2024-05-15 PROCEDURE — 99214 OFFICE O/P EST MOD 30 MIN: CPT | Mod: 25 | Performed by: FAMILY MEDICINE

## 2024-05-15 PROCEDURE — G0439 PPPS, SUBSEQ VISIT: HCPCS | Performed by: FAMILY MEDICINE

## 2024-05-15 PROCEDURE — 36415 COLL VENOUS BLD VENIPUNCTURE: CPT | Performed by: FAMILY MEDICINE

## 2024-05-15 PROCEDURE — 85025 COMPLETE CBC W/AUTO DIFF WBC: CPT | Performed by: FAMILY MEDICINE

## 2024-05-15 PROCEDURE — 80053 COMPREHEN METABOLIC PANEL: CPT | Performed by: FAMILY MEDICINE

## 2024-05-15 PROCEDURE — 82306 VITAMIN D 25 HYDROXY: CPT | Performed by: FAMILY MEDICINE

## 2024-05-15 PROCEDURE — 84443 ASSAY THYROID STIM HORMONE: CPT | Performed by: FAMILY MEDICINE

## 2024-05-15 PROCEDURE — 80061 LIPID PANEL: CPT | Performed by: FAMILY MEDICINE

## 2024-05-15 RX ORDER — LEVOTHYROXINE SODIUM 75 UG/1
75 TABLET ORAL DAILY
Qty: 90 TABLET | Refills: 3 | Status: SHIPPED | OUTPATIENT
Start: 2024-05-15

## 2024-05-15 ASSESSMENT — PATIENT HEALTH QUESTIONNAIRE - PHQ9: SUM OF ALL RESPONSES TO PHQ QUESTIONS 1-9: 0

## 2024-05-15 ASSESSMENT — PAIN SCALES - GENERAL: PAINLEVEL: NO PAIN (0)

## 2024-05-15 NOTE — PROGRESS NOTES
Preventive Care Visit  [facility]  Mehran Oliva MD, Family Medicine  May 15, 2024      Assessment & Plan     Encounter for preventive health examination  due    Eyelid twitch  persistent    Hypothyroidism due to medication  due  - TSH with free T4 reflex; Future  - levothyroxine (SYNTHROID/LEVOTHROID) 75 MCG tablet; Take 1 tablet (75 mcg) by mouth daily  - Vitamin D Deficiency  - TSH with free T4 reflex    Stage 3 chronic kidney disease, unspecified whether stage 3a or 3b CKD (H)  stable    Asymptomatic menopausal state  Due for DEXA  - DEXA HIP/PELVIS/SPINE - Future; Future    Vitamin D deficiency  Due   - DEXA HIP/PELVIS/SPINE - Future; Future  - Vitamin D Deficiency  - TSH with free T4 reflex    CARDIOVASCULAR SCREENING; LDL GOAL LESS THAN 130  due  - Lipid panel reflex to direct LDL Non-fasting; Future  - Lipid panel reflex to direct LDL Non-fasting    Need for vaccination  due  - COVID-19 12+ () (PFIZER)    Screen for colon cancer  due      Counseling  Appropriate preventive services were discussed with this patient, including applicable screening as appropriate for fall prevention, nutrition, physical activity, Tobacco-use cessation, weight loss and cognition.  Checklist reviewing preventive services available has been given to the patient.      Patient Instructions   Call [phone] radiology to sched DEXA scan    RSV next fall    Diphenhydramine cream to help with rash/itch    COVID booster at pharmacy as needed        Subjective   Jann is a 78 year old, presenting for the following:  Wellness Visit        5/15/2024     4:02 PM   Additional Questions   Roomed by Ruthy FABIAN         Health Care Directive  Patient does not have a Health Care Directive or Living Will: Discussed advance care planning with patient; however, patient declined at this time.    HPI  Cancer treatment going well; grieving elderly dog  that  this past year    Has annoying  lid twitching ongoing    Chronic Kidney Disease Follow-up    Do you take any over the counter pain medicine?: Yes  What over the counter medicine are you taking for your pain?:  Acetaminophen   How often do you take this medicine?:  A few times a week    Hypothyroidism Follow-up    Since last visit, patient describes the following symptoms: Weight stable, no hair loss, no skin changes, no constipation, no loose stools        5/10/2024   General Health   How would you rate your overall physical health? Good   Feel stress (tense, anxious, or unable to sleep) To some extent   (!) STRESS CONCERN      5/10/2024   Nutrition   Diet: Regular (no restrictions)         5/10/2024   Exercise   Days per week of moderate/strenous exercise 0 days   Average minutes spent exercising at this level 0 min   (!) EXERCISE CONCERN      5/10/2024   Social Factors   Frequency of gathering with friends or relatives Patient declined   Worry food won't last until get money to buy more No   Food not last or not have enough money for food? No   Do you have housing?  Yes   Are you worried about losing your housing? No   Lack of transportation? Yes   Unable to get utilities (heat,electricity)? No    (!) TRANSPORTATION CONCERN PRESENT      5/10/2024   Fall Risk   Fallen 2 or more times in the past year? No   Trouble with walking or balance? No          5/10/2024   Activities of Daily Living- Home Safety   Needs help with the following daily activites None of the above   Safety concerns in the home No grab bars in the bathroom         5/10/2024   Dental   Dentist two times every year? Yes         5/10/2024   Hearing Screening   Hearing concerns? None of the above         5/10/2024   Driving Risk Screening   Patient/family members have concerns about driving No         5/10/2024   General Alertness/Fatigue Screening   Have you been more tired than usual lately? (!) DECLINE         5/10/2024   Urinary Incontinence Screening   Bothered by leaking urine  in past 6 months No         5/10/2024   TB Screening   Were you born outside of the [place]? No       Today's PHQ-9 Score:       5/15/2024     4:04 PM   PHQ-9 SCORE   PHQ-9 Total Score 0         5/10/2024   Substance Use   Alcohol more than 3/day or more than 7/wk No   Do you have a current opioid prescription? No   How severe/bad is pain from 1 to 10? 0/10 (No Pain)   Do you use any other substances recreationally? (!) ALCOHOL     Social History     Tobacco Use    Smoking status: Former     Current packs/day: 0.00     Average packs/day: 1 pack/day for 10.0 years (10.0 ttl pk-yrs)     Types: Cigarettes     Start date: 9/15/1963     Quit date: 10/30/1972     Years since quittin.5     Passive exposure: Never    Smokeless tobacco: Never    Tobacco comments:     I have not smoked since 10/30/1972   Vaping Use    Vaping status: Never Used   Substance Use Topics    Alcohol use: Yes     Comment: I drink white wine with dinner    Drug use: No           2023   LAST FHS-7 RESULTS   1st degree relative breast or ovarian cancer Yes   Any relative bilateral breast cancer No   Any male have breast cancer No   Any ONE woman have BOTH breast AND ovarian cancer No   Any woman with breast cancer before 50yrs No   2 or more relatives with breast AND/OR ovarian cancer No   2 or more relatives with breast AND/OR bowel cancer No        Mammogram Screening - Mammography discussed, not appropriate due to mastectomy.  Surgical history and/or SOGI form updated.    ASCVD Risk   The 10-year ASCVD risk score (Sangeeta MONCADA, et al., 2019) is: 27.9%    Values used to calculate the score:      Age: 78 years      Sex: Female      Is Non- : No      Diabetic: No      Tobacco smoker: No      Systolic Blood Pressure: 122 mmHg      Is BP treated: Yes      HDL Cholesterol: 94 mg/dL      Total Cholesterol: 172 mg/dL        Reviewed and updated as needed this visit by Provider                    Lab work is in  process  Labs reviewed in EPIC  Current providers sharing in care for this patient include:  Patient Care Team:  Mehran Oliva MD as PCP - General (Family Practice)  Mehran Oliva MD as Assigned PCP  Anthony Quiñonez as MD (Endocrinology, Diabetes, and Metabolism)  Neetu Mcmullen MD as Referring Physician (Hematology & Oncology)  Frank Guerrero MD as MD (Urology)  Olamide Marlow MD as MD (Endocrinology, Diabetes, and Metabolism)  Frank Guerrero MD as Assigned Surgical Provider  Neetu Mcmullen MD as Assigned Cancer Care Provider    The following health maintenance items are reviewed in Epic and correct as of today:  Health Maintenance   Topic Date Due    RSV VACCINE (Pregnancy & 60+) (1 - 1-dose 60+ series) Never done    DEXA  10/06/2015    MEDICARE ANNUAL WELLNESS VISIT  12/11/2021    COLORECTAL CANCER SCREENING  12/17/2022    ANNUAL REVIEW OF HM ORDERS  06/20/2023    COVID-19 Vaccine (6 - 2023-24 season) 12/05/2023    LIPID  04/10/2024    MICROALBUMIN  05/24/2024    DTAP/TDAP/TD IMMUNIZATION (2 - Td or Tdap) 10/08/2024    PHQ-9  11/15/2024    BMP  01/24/2025    TSH W/FREE T4 REFLEX  01/24/2025    MAMMO SCREENING  01/24/2025    HEMOGLOBIN  01/24/2025    FALL RISK ASSESSMENT  05/15/2025    GLUCOSE  01/24/2027    ADVANCE CARE PLANNING  06/20/2027    HEPATITIS C SCREENING  Completed    DEPRESSION ACTION PLAN  Completed    INFLUENZA VACCINE  Completed    Pneumococcal Vaccine: 65+ Years  Completed    URINALYSIS  Completed    ZOSTER IMMUNIZATION  Completed    IPV IMMUNIZATION  Aged Out    HPV IMMUNIZATION  Aged Out    MENINGITIS IMMUNIZATION  Aged Out    RSV MONOCLONAL ANTIBODY  Aged Out         Review of Systems  Constitutional, neuro, ENT, endocrine, pulmonary, cardiac, gastrointestinal, genitourinary, musculoskeletal, integument and psychiatric systems are negative, except as otherwise noted.     Objective    Exam  /80 (BP Location: Right arm, Patient Position: Sitting, Cuff Size: Adult  "Regular)   Pulse 79   Temp 98  F (36.7  C) (Temporal)   Resp 22   Ht 1.604 m (5' 3.15\")   Wt 62.6 kg (138 lb)   SpO2 98%   BMI 24.33 kg/m     Estimated body mass index is 24.33 kg/m  as calculated from the following:    Height as of this encounter: 1.604 m (5' 3.15\").    Weight as of this encounter: 62.6 kg (138 lb).    Physical Exam  GENERAL: alert and no distress  EYES: Eyes grossly normal to inspection, PERRL and conjunctivae and sclerae normal  HENT: ear canals and TM's normal, nose and mouth without ulcers or lesions  NECK: no adenopathy, no asymmetry, masses, or scars  RESP: lungs clear to auscultation - no rales, rhonchi or wheezes  CV: regular rate and rhythm, normal S1 S2, no S3 or S4, no murmur, click or rub, no peripheral edema  ABDOMEN: soft, nontender, no hepatosplenomegaly, no masses and bowel sounds normal  MS: no gross musculoskeletal defects noted, no edema  SKIN: no suspicious lesions or rashes  NEURO: Normal strength and tone, mentation intact and speech normal  PSYCH: mentation appears normal, affect normal/bright        5/15/2024   Mini Cog   Clock Draw Score 0 Abnormal   3 Item Recall 3 objects recalled   Mini Cog Total Score 3              Signed Electronically by: Mehran Oliva MD    "

## 2024-05-15 NOTE — PATIENT INSTRUCTIONS
Call [phone] radiology to sched DEXA scan    RSV next fall    Diphenhydramine cream to help with rash/itch    COVID booster at pharmacy as needed

## 2024-05-17 LAB
ALBUMIN SERPL BCG-MCNC: 4.4 G/DL (ref 3.5–5.2)
ALP SERPL-CCNC: 63 U/L (ref 40–150)
ALT SERPL W P-5'-P-CCNC: 18 U/L (ref 0–50)
ANION GAP SERPL CALCULATED.3IONS-SCNC: 13 MMOL/L (ref 7–15)
AST SERPL W P-5'-P-CCNC: 39 U/L (ref 0–45)
BILIRUB SERPL-MCNC: 0.2 MG/DL
BUN SERPL-MCNC: 27.5 MG/DL (ref 8–23)
CALCIUM SERPL-MCNC: 9.3 MG/DL (ref 8.8–10.2)
CHLORIDE SERPL-SCNC: 105 MMOL/L (ref 98–107)
CHOLEST SERPL-MCNC: 173 MG/DL
CREAT SERPL-MCNC: 1.53 MG/DL (ref 0.51–0.95)
DEPRECATED HCO3 PLAS-SCNC: 20 MMOL/L (ref 22–29)
EGFRCR SERPLBLD CKD-EPI 2021: 34 ML/MIN/1.73M2
FASTING STATUS PATIENT QL REPORTED: YES
FASTING STATUS PATIENT QL REPORTED: YES
GLUCOSE SERPL-MCNC: 97 MG/DL (ref 70–99)
HDLC SERPL-MCNC: 98 MG/DL
LDLC SERPL CALC-MCNC: 66 MG/DL
NONHDLC SERPL-MCNC: 75 MG/DL
POTASSIUM SERPL-SCNC: 4.3 MMOL/L (ref 3.4–5.3)
PROT SERPL-MCNC: 7.1 G/DL (ref 6.4–8.3)
SODIUM SERPL-SCNC: 138 MMOL/L (ref 135–145)
TRIGL SERPL-MCNC: 47 MG/DL
TSH SERPL DL<=0.005 MIU/L-ACNC: 3.79 UIU/ML (ref 0.3–4.2)
VIT D+METAB SERPL-MCNC: 41 NG/ML (ref 20–50)

## 2024-05-25 NOTE — RESULT ENCOUNTER NOTE
Benjy Forte, your recent cholesterol panel looks good too. Please contact if any questions.   Mehran CARTER

## 2024-06-26 DIAGNOSIS — G47.00 PERSISTENT DISORDER OF INITIATING OR MAINTAINING SLEEP: ICD-10-CM

## 2024-06-27 RX ORDER — OXAZEPAM 15 MG/1
CAPSULE ORAL
Qty: 30 CAPSULE | Refills: 5 | Status: SHIPPED | OUTPATIENT
Start: 2024-06-27 | End: 2024-08-07

## 2024-07-03 DIAGNOSIS — D70.1 CHEMOTHERAPY-INDUCED NEUTROPENIA (H): ICD-10-CM

## 2024-07-03 DIAGNOSIS — T45.1X5A CHEMOTHERAPY-INDUCED NEUTROPENIA (H): ICD-10-CM

## 2024-07-03 DIAGNOSIS — F41.9 ANXIETY: ICD-10-CM

## 2024-07-03 RX ORDER — LORAZEPAM 0.5 MG/1
0.5 TABLET ORAL EVERY 6 HOURS PRN
Qty: 45 TABLET | Refills: 0 | Status: SHIPPED | OUTPATIENT
Start: 2024-07-03 | End: 2024-07-16

## 2024-07-03 NOTE — TELEPHONE ENCOUNTER
Pending Prescriptions:                       Disp   Refills    LORazepam (ATIVAN) 0.5 MG tablet          45 tab*0            Sig: Take 1 tablet (0.5 mg) by mouth every 6 hours as           needed for anxiety, nausea or sleep          Last Written Prescription Date:  5/8/2024  Last Fill Quantity: 45,   # refills: 0  Last Office Visit: 5/1/2024  Future Office visit:   10/2/2024    Routing refill request to provider for review/approval.  Pt states that she requested refill from pharmacy on 6/26/2024.  She only has one tablet left and is requesting refill today.    Ivana Meneses RN on 7/3/2024 at 9:14 AM

## 2024-07-16 DIAGNOSIS — D70.1 CHEMOTHERAPY-INDUCED NEUTROPENIA (H): ICD-10-CM

## 2024-07-16 DIAGNOSIS — F41.9 ANXIETY: ICD-10-CM

## 2024-07-16 DIAGNOSIS — T45.1X5A CHEMOTHERAPY-INDUCED NEUTROPENIA (H): ICD-10-CM

## 2024-07-16 RX ORDER — LORAZEPAM 0.5 MG/1
0.5 TABLET ORAL EVERY 6 HOURS PRN
Qty: 45 TABLET | Refills: 0 | Status: SHIPPED | OUTPATIENT
Start: 2024-07-16

## 2024-07-16 NOTE — TELEPHONE ENCOUNTER
Paged Dr Mcmullen at 1:47 pm today, but no reply yet.  Will route to Tadeo Dugan NP, for review.  Ivana Meneses RN on 7/16/2024 at 3:12 PM

## 2024-07-16 NOTE — TELEPHONE ENCOUNTER
Pending Prescriptions:                       Disp   Refills    LORazepam (ATIVAN) 0.5 MG tablet          45 tab*0            Sig: Take 1 tablet (0.5 mg) by mouth every 6 hours as           needed for anxiety, nausea or sleep          Last Written Prescription Date:  7/3/2024  Last Fill Quantity: 45,   # refills: 0  Last Office Visit: 5/1/2024 with Dr Mcmullen   Future Office visit:   10/2/2024    Routing refill request to provider for review/approval.  Pt states that the Rx that was approved on 7/3/2024 was mailed out to her home. However, she still has not received it. She states that she has been tracking it and it is now in [place]. Pt reports it has been all over the country.  She has been out of medication for almost one week. She has breast MRI scheduled tomorrow and is very anxious. States that she hardly slept at all last night. She is requesting new Rx for ativan to help her sleep and also to take before breast MRI tomorrow.  Writer attempted to reach pharmacy to confirm, but they are closed now.  Will call pharmacy again after 9 am.    Ivana Meneses RN on 7/16/2024 at 8:16 AM

## 2024-07-16 NOTE — TELEPHONE ENCOUNTER
Writer called [facility] to follow up on Rx for ativan. Talked with Lavon.  Per Lavon, Rx for ativan was mailed to pt on 7/10/2024. It was delivered to her mailbox on 7/12/2024. This was for 45 tablets.  Writer attempted to reach pt again to let her know that Rx was delivered to her home on 7/12, but pt did not answer.    Will route to Dr Mcmullen for review and recommendations.    Ivana Meneses RN on 7/16/2024 at 9:34 AM

## 2024-07-17 ENCOUNTER — ANCILLARY PROCEDURE (OUTPATIENT)
Dept: MRI IMAGING | Facility: CLINIC | Age: 79
End: 2024-07-17
Attending: INTERNAL MEDICINE
Payer: MEDICARE

## 2024-07-17 ENCOUNTER — LAB (OUTPATIENT)
Dept: LAB | Facility: CLINIC | Age: 79
End: 2024-07-17
Payer: MEDICARE

## 2024-07-17 DIAGNOSIS — Z17.1 MALIGNANT NEOPLASM OF LOWER-OUTER QUADRANT OF LEFT BREAST OF FEMALE, ESTROGEN RECEPTOR NEGATIVE (H): ICD-10-CM

## 2024-07-17 DIAGNOSIS — D05.02 NEOPLASM OF LEFT BREAST, PRIMARY TUMOR STAGING CATEGORY TIS: LOBULAR CARCINOMA IN SITU (LCIS): ICD-10-CM

## 2024-07-17 DIAGNOSIS — N18.30 CKD (CHRONIC KIDNEY DISEASE) STAGE 3, GFR 30-59 ML/MIN (H): Primary | ICD-10-CM

## 2024-07-17 DIAGNOSIS — N60.92 ATYPICAL LOBULAR HYPERPLASIA (ALH) OF LEFT BREAST: ICD-10-CM

## 2024-07-17 DIAGNOSIS — R53.83 TIRED: ICD-10-CM

## 2024-07-17 DIAGNOSIS — C50.512 MALIGNANT NEOPLASM OF LOWER-OUTER QUADRANT OF LEFT BREAST OF FEMALE, ESTROGEN RECEPTOR NEGATIVE (H): ICD-10-CM

## 2024-07-17 PROCEDURE — 82570 ASSAY OF URINE CREATININE: CPT

## 2024-07-17 PROCEDURE — 80053 COMPREHEN METABOLIC PANEL: CPT

## 2024-07-17 PROCEDURE — 77049 MRI BREAST C-+ W/CAD BI: CPT | Mod: MG

## 2024-07-17 PROCEDURE — 255N000002 HC RX 255 OP 636: Performed by: INTERNAL MEDICINE

## 2024-07-17 PROCEDURE — 82043 UR ALBUMIN QUANTITATIVE: CPT

## 2024-07-17 PROCEDURE — 36415 COLL VENOUS BLD VENIPUNCTURE: CPT

## 2024-07-17 PROCEDURE — A9585 GADOBUTROL INJECTION: HCPCS | Performed by: INTERNAL MEDICINE

## 2024-07-17 PROCEDURE — 84443 ASSAY THYROID STIM HORMONE: CPT

## 2024-07-17 RX ORDER — GADOBUTROL 604.72 MG/ML
6.5 INJECTION INTRAVENOUS ONCE
Status: COMPLETED | OUTPATIENT
Start: 2024-07-17 | End: 2024-07-17

## 2024-07-17 RX ADMIN — GADOBUTROL 6.5 ML: 604.72 INJECTION INTRAVENOUS at 15:56

## 2024-07-18 LAB
ALBUMIN SERPL BCG-MCNC: 4.3 G/DL (ref 3.5–5.2)
ALP SERPL-CCNC: 70 U/L (ref 40–150)
ALT SERPL W P-5'-P-CCNC: 21 U/L (ref 0–50)
ANION GAP SERPL CALCULATED.3IONS-SCNC: 9 MMOL/L (ref 7–15)
AST SERPL W P-5'-P-CCNC: 30 U/L (ref 0–45)
BILIRUB SERPL-MCNC: <0.2 MG/DL
BUN SERPL-MCNC: 38.9 MG/DL (ref 8–23)
CALCIUM SERPL-MCNC: 9.6 MG/DL (ref 8.8–10.4)
CHLORIDE SERPL-SCNC: 104 MMOL/L (ref 98–107)
CREAT SERPL-MCNC: 1.7 MG/DL (ref 0.51–0.95)
CREAT UR-MCNC: 109 MG/DL
EGFRCR SERPLBLD CKD-EPI 2021: 30 ML/MIN/1.73M2
GLUCOSE SERPL-MCNC: 107 MG/DL (ref 70–99)
HCO3 SERPL-SCNC: 25 MMOL/L (ref 22–29)
MICROALBUMIN UR-MCNC: 53.3 MG/L
MICROALBUMIN/CREAT UR: 48.9 MG/G CR (ref 0–25)
POTASSIUM SERPL-SCNC: 5 MMOL/L (ref 3.4–5.3)
PROT SERPL-MCNC: 6.9 G/DL (ref 6.4–8.3)
SODIUM SERPL-SCNC: 138 MMOL/L (ref 135–145)
TSH SERPL DL<=0.005 MIU/L-ACNC: 1.69 UIU/ML (ref 0.3–4.2)

## 2024-07-19 DIAGNOSIS — N18.31 STAGE 3A CHRONIC KIDNEY DISEASE (H): Primary | ICD-10-CM

## 2024-07-26 DIAGNOSIS — Z13.6 CARDIOVASCULAR SCREENING; LDL GOAL LESS THAN 130: ICD-10-CM

## 2024-07-26 RX ORDER — ATORVASTATIN CALCIUM 10 MG/1
TABLET, FILM COATED ORAL
Qty: 90 TABLET | Refills: 2 | Status: SHIPPED | OUTPATIENT
Start: 2024-07-26

## 2024-07-26 NOTE — TELEPHONE ENCOUNTER
Clinic RN: Please contact patient because patient should have run out of this medication on 4/24/24. Confirm patient is taking this medication as prescribed. Document findings and route refill encounter to provider for approval or denial.     Virginie Leon RN, BSN  [facility]

## 2024-07-30 NOTE — RESULT ENCOUNTER NOTE
Benjy Forte, your labs show slightly decreased kidney function from a year ago with a small amount of albumin leaking from the kidneys.  This is not alarming and recommend you stay on the same medication.  Your blood pressure control looks good.  Recommend repeating kidney function tests in 6 months.  Contact if questions; hope you are having a good summer.  Mehran CARTER

## 2024-08-07 ENCOUNTER — TELEPHONE (OUTPATIENT)
Dept: FAMILY MEDICINE | Facility: CLINIC | Age: 79
End: 2024-08-07
Payer: MEDICARE

## 2024-08-07 DIAGNOSIS — G47.00 PERSISTENT DISORDER OF INITIATING OR MAINTAINING SLEEP: ICD-10-CM

## 2024-08-07 DIAGNOSIS — G47.00 PERSISTENT DISORDER OF INITIATING OR MAINTAINING SLEEP: Chronic | ICD-10-CM

## 2024-08-07 DIAGNOSIS — F33.1 MAJOR DEPRESSIVE DISORDER, RECURRENT EPISODE, MODERATE (H): ICD-10-CM

## 2024-08-07 DIAGNOSIS — F41.3 OTHER MIXED ANXIETY DISORDERS: ICD-10-CM

## 2024-08-07 RX ORDER — OXAZEPAM 15 MG/1
CAPSULE ORAL
Qty: 30 CAPSULE | Refills: 5 | Status: SHIPPED | OUTPATIENT
Start: 2024-08-07

## 2024-08-07 RX ORDER — TRANYLCYPROMINE SULFATE 10 MG/1
30 TABLET, FILM COATED ORAL EVERY MORNING
Qty: 270 TABLET | Refills: 3 | Status: SHIPPED | OUTPATIENT
Start: 2024-08-07

## 2024-08-07 RX ORDER — ZOLPIDEM TARTRATE 5 MG/1
5 TABLET ORAL
Qty: 30 TABLET | Refills: 5 | Status: SHIPPED | OUTPATIENT
Start: 2024-08-07

## 2024-08-07 NOTE — TELEPHONE ENCOUNTER
Patient calling to get early fills for oxazepam, zolpidem, and parnate     8/13 -  [place] and will run out of medication in 2 wks.   Last time attempted to send rx up to [facility] it took 2 wks to mail and patient states she went into withdrawal because of delay    Are you able to authorize early fill ? Patient gets rx mailed to home so would need to be sent in the next few days.     Please advise   Thanks   CHARMAINE Abrams

## 2024-08-07 NOTE — TELEPHONE ENCOUNTER
Jann calling to see if Dr. Oliva can help coordinate her being able to get her oxazepam, zolpidem, and parnate early. She said she is leaving next week and she going to be gone for three weeks up [place]. She said previously she tried to get them refilled at the [facility] but she couldn't get them in time. Jann is wondering how she can get these medications. She said she would pay out of pocket if necessary.    Jann requested to speak directly with [place] nurses.     Aliza Gaona RN

## 2024-08-09 NOTE — PROGRESS NOTES
"Oncology Rooming Note    October 28, 2022 12:35 PM   Jann Davidson is a 77 year old female who presents for:    Chief Complaint   Patient presents with     Oncology Clinic Visit     Malignant neoplasm of lower-outer quadrant of left breast of female, estrogen receptor negative (H)     Initial Vitals: /67   Pulse 91   Temp 98.3  F (36.8  C) (Oral)   Resp 16   Ht 1.6 m (5' 3\")   Wt 59.8 kg (131 lb 12.8 oz)   SpO2 100%   BMI 23.35 kg/m   Estimated body mass index is 23.35 kg/m  as calculated from the following:    Height as of this encounter: 1.6 m (5' 3\").    Weight as of this encounter: 59.8 kg (131 lb 12.8 oz). Body surface area is 1.63 meters squared.  No Pain (0) Comment: Data Unavailable   No LMP recorded. Patient is postmenopausal.  Allergies reviewed: Yes  Medications reviewed: Yes    Medications: Medication refills not needed today.  Pharmacy name entered into EPIC:    [id number] - [place], [place] - [address]  [facility] - [place], [place] - [address]    Clinical concerns: None       Maryellen Ge MA              " 98.4

## 2024-09-05 DIAGNOSIS — L57.8 SUN-DAMAGED SKIN: ICD-10-CM

## 2024-09-05 RX ORDER — TRIAMCINOLONE ACETONIDE 1 MG/G
CREAM TOPICAL
Qty: 15 G | Refills: 0 | Status: SHIPPED | OUTPATIENT
Start: 2024-09-05

## 2024-09-12 ENCOUNTER — TELEPHONE (OUTPATIENT)
Dept: UROLOGY | Facility: CLINIC | Age: 79
End: 2024-09-12
Payer: MEDICARE

## 2024-09-17 DIAGNOSIS — D05.02 NEOPLASM OF LEFT BREAST, PRIMARY TUMOR STAGING CATEGORY TIS: LOBULAR CARCINOMA IN SITU (LCIS): ICD-10-CM

## 2024-09-17 DIAGNOSIS — N60.92 ATYPICAL LOBULAR HYPERPLASIA (ALH) OF LEFT BREAST: ICD-10-CM

## 2024-09-17 RX ORDER — RALOXIFENE HYDROCHLORIDE 60 MG/1
60 TABLET, FILM COATED ORAL DAILY
Qty: 90 TABLET | Refills: 3 | Status: SHIPPED | OUTPATIENT
Start: 2024-09-17

## 2024-09-17 NOTE — CONFIDENTIAL NOTE
Last prescribing provider: Dr. Mcmullen    Last clinic visit date: 5/1/24    Any missed appointments or no-shows since last clinic visit?: none    Recommendations for requested medication (if none, N/A): Take 1 tablet (60 mg) by mouth daily     Next clinic visit date: 10/2/24    Any other pertinent information (if none, N/A): N/A

## 2024-09-28 NOTE — PROGRESS NOTES
[facility]    Hematology/Oncology Established Patient Note      Today's Date: 5/1/2024    Reason for follow-up:  Left breast cancer, triple negative.    HISTORY OF PRESENT ILLNESS: Jann Davidson is a 79 year old female who presents with the following oncologic history:  1. 10/26/2021: Mammogram showed calcifications in the left lateral breast; right breast negative.  2. 11/17/2021: Left diagnostic mammogram showed cluster of pleomorphic calcifications at 4:00, 6 cm from nipple, measuring 1.3 cm.  3. 12/3/2021: Stereotactic left breast biopsy at 4:00, 6 cm from nipple showed grade 2 invasive ductal carcinoma with micropapillary features, extensive lymphovascular invasion present, grade 2-3 focal DCIS, LCIS, ER negative, OR negative, HER-2/maxime FISH negative.  4. 12/10/2021: Breast MRI showed oval mass 2 x 2 x 1.5 cm at 4:00, 6 cm from nipple in left breast reflecting biopsy cavity with post-biopsy changes; prominent 2.5 cm low left level 1 axillary lymph node.  5.  12/28/2021: PET/CT scan showed FDG avid focus in left lower outer breast, hypermetabolic left axillary adenopathy, moderate FDG uptake at level off anus, exophytic right renal 1.1 cm mass, slowly increasing in size since 2016 concerning for growing renal cell carcinoma.  6. 1/18/2022: Started neoadjuvant chemotherapy with weekly paclitaxel + carboplatin and every 3-week pembrolizumab as per KEYNOTE-522 study.  7. 4/19/2022: Breast MRI showed no residual enhancement at site of primary malignancy in left breast at 4:00; no residual left axillary adenopathy. Right breast negative.  8. 5/6/2022: Completed cycle 12 paclitaxel with pembrolizumab. Surgery delayed due to 2 hospitalizations for rash and peripheral neuropathy.  9. 6/27/2022: Underwent left breast lumpectomy and left axillary sentinel lymph node excision under care of Dr. Tatianna Rai.  Pathology showed no residual invasive carcinoma in left breast; residual LCIS and atypical  lobular hyperplasia present; one of 2 lymph nodes with isolated tumor cells measuring 0.087 mm, focal fibrosis consistent with treatment effect; RCB-I, ypT0-pN0(i+).  10. 8/08/2022: Started raloxifene for LCIS and atypical lobular hyperplasia.  11. 8/10/2022-9/22/2022: Completion of adjuvant radiation therapy.  12. 8/25/2022-2/10/2023: Completed adjuvant pembrolizumab x 9 cycles.  13. 5/24/2023: CT scan showed exophytic mass of posterior upper pole right kidney unchanged since 11/10/2022.  14. 10/23/2023: Right partial nephrectomy with Dr. Frank Guerrero.  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    INTERVAL HISTORY:  Jann reports developing multiple skin lesions that are pruritic, upper and lower body. No changes in skincare products or bedding.      REVIEW OF SYSTEMS:   14 point ROS was reviewed and is negative other than as noted above in HPI.       HOME MEDICATIONS:  Current Outpatient Medications   Medication Sig Dispense Refill    acetaminophen (TYLENOL) 500 MG tablet Take 1-2 tablets by mouth every 6 hours as needed for mild pain      atorvastatin (LIPITOR) 10 MG tablet TAKE 1 TABLET (10 MG) BY MOUTH ONCE DAILY 90 tablet 2    candesartan (ATACAND) 4 MG tablet Take 0.5 tablets by mouth daily (0.5 x 4 mg = 2 mg)      levothyroxine (SYNTHROID/LEVOTHROID) 75 MCG tablet Take 1 tablet (75 mcg) by mouth daily 90 tablet 3    loratadine (CLARITIN) 10 MG tablet Take 10 mg by mouth daily      LORazepam (ATIVAN) 0.5 MG tablet Take 1 tablet (0.5 mg) by mouth every 6 hours as needed for anxiety, nausea or sleep 45 tablet 0    Magnesium 400 MG TABS Take 2 tablets by mouth daily      oxazepam (SERAX) 15 MG capsule TAKE 1 CAPSULE (15 MG) BY MOUTH NIGHTLY AS NEEDED FOR ANXIETY. 30 capsule 5    PARNATE 10 MG tablet Take 3 tablets (30 mg) by mouth every morning (3 x 10 mg = 30 mg) 270 tablet 3    polyethylene glycol (MIRALAX) 17 GM/Dose  powder Take 1 Capful by mouth daily      raloxifene (EVISTA) 60 MG tablet Take 1 tablet (60 mg) by mouth daily. 90 tablet 3    triamcinolone (KENALOG) 0.1 % external cream APPLY TOPICALLY 2 TIMES DAILY AS NEEDED. 15 g 0    triamterene-HCTZ (MAXZIDE-25) 37.5-25 MG tablet Take 0.5 tablets by mouth daily 45 tablet 3    Vitamin D3 (CHOLECALCIFEROL) 25 mcg (1000 units) tablet Take 1 tablet by mouth daily      zolpidem (AMBIEN) 5 MG tablet Take 1 tablet (5 mg) by mouth nightly as needed for sleep 30 tablet 5         ALLERGIES:  Allergies   Allergen Reactions    Tegaderm Transparent Dressing (Informational Only) Blisters    Lyrica [Pregabalin] Rash         PAST MEDICAL HISTORY:  Past Medical History:   Diagnosis Date    Breast cancer (H)     CKD (chronic kidney disease) stage 3, GFR 30-59 ml/min (H)     Depression with anxiety     Depressive disorder 1985    Been on Parnate antidepressant for 35 years    Gout     Hypertension goal BP (blood pressure) < 140/90     Recurrent sinusitis 2013    Thyroid disease     Keytruda induced     Gynecologic history:  Age of menarche at 11; LMP ;  (one son), 1st pregnancy at age 19; no HRT.    PAST SURGICAL HISTORY:  Past Surgical History:   Procedure Laterality Date    BIOPSY NODE SENTINEL Left 2022    Procedure: left axillary sentinel lymph node biopsy;  Surgeon: Tatianna Rai MD;  Location:  OR    BREAST BIOPSY, RT/LT      Breat Biopsy RT/LT    BREAST SURGERY  2022    Tumor removed    COLONOSCOPY  10/01/2003    COLONOSCOPY  2014    Procedure: COLONOSCOPY;  COLONOSCOPY ;  Surgeon: Gaurav Shaffer MD;  Location:  GI    DAVINCI NEPHRECTOMY PARTIAL Right 10/23/2023    Procedure: RIGHT ROBOTIC ASSISTED LAPAROSOCPIC PARTIAL NEPHRECTOMY WITH INTRA-OPERATIVE RENAL ULTRASOUND;  Surgeon: Frank Guerrero MD;  Location:  OR    IR CHEST PORT PLACEMENT > 5 YRS OF AGE  2021    IR PORT REMOVAL RIGHT  2023    LUMPECTOMY BREAST  WITH SEED LOCALIZATION Left 2022    Procedure: Radiofrequency tag localized left breast lumpectomy with radiofrequency tag localized excision of left axillary lymph node;  Surgeon: Tatianna Rai MD;  Location: [facility] OR    RECONSTRUCT EYELID           SOCIAL HISTORY:  Social History     Socioeconomic History    Marital status:      Spouse name: Not on file    Number of children: Not on file    Years of education: Not on file    Highest education level: Not on file   Occupational History    Not on file   Tobacco Use    Smoking status: Former     Current packs/day: 0.00     Average packs/day: 1 pack/day for 10.0 years (10.0 ttl pk-yrs)     Types: Cigarettes     Start date: 9/15/1963     Quit date: 10/30/1972     Years since quittin.9     Passive exposure: Never    Smokeless tobacco: Never    Tobacco comments:     I have not smoked since 10/30/1972   Vaping Use    Vaping status: Never Used   Substance and Sexual Activity    Alcohol use: Yes     Comment: I drink white wine with dinner    Drug use: No    Sexual activity: Not Currently     Partners: Male     Birth control/protection: I.U.D., Pill     Comment: No birth control used since .   Other Topics Concern    Parent/sibling w/ CABG, MI or angioplasty before 65F 55M? No   Social History Narrative    Not on file     Social Determinants of Health     Financial Resource Strain: Low Risk  (5/10/2024)    Financial Resource Strain     Within the past 12 months, have you or your family members you live with been unable to get utilities (heat, electricity) when it was really needed?: No   Food Insecurity: Low Risk  (5/10/2024)    Food Insecurity     Within the past 12 months, did you worry that your food would run out before you got money to buy more?: No     Within the past 12 months, did the food you bought just not last and you didn t have money to get more?: No   Transportation Needs: High Risk (5/10/2024)    Transportation Needs     Within the past  "12 months, has lack of transportation kept you from medical appointments, getting your medicines, non-medical meetings or appointments, work, or from getting things that you need?: Yes   Physical Activity: Inactive (5/10/2024)    Exercise Vital Sign     Days of Exercise per Week: 0 days     Minutes of Exercise per Session: 0 min   Stress: Stress Concern Present (5/10/2024)    [employer] [place] [employer] - Occupational Stress Questionnaire     Feeling of Stress : To some extent   Social Connections: Unknown (5/10/2024)    Social Connection and Isolation Panel [NHANES]     Frequency of Communication with Friends and Family: Not on file     Frequency of Social Gatherings with Friends and Family: Patient declined     Attends Sabianism Services: Not on file     Active Member of Clubs or Organizations: Not on file     Attends Club or Organization Meetings: Not on file     Marital Status: Not on file   Interpersonal Safety: Low Risk  (5/15/2024)    Interpersonal Safety     Do you feel physically and emotionally safe where you currently live?: Yes     Within the past 12 months, have you been hit, slapped, kicked or otherwise physically hurt by someone?: No     Within the past 12 months, have you been humiliated or emotionally abused in other ways by your partner or ex-partner?: No   Housing Stability: Low Risk  (5/10/2024)    Housing Stability     Do you have housing? : Yes     Are you worried about losing your housing?: No         FAMILY HISTORY:  Family History   Problem Relation Age of Onset    Cancer Mother         uterine    Breast Cancer Mother     Hypertension Mother     Diabetes Paternal Grandmother          PHYSICAL EXAM:  Vital signs:  BP (!) 146/84   Pulse 103   Resp 16   Ht 1.6 m (5' 3\")   Wt 60.3 kg (133 lb)   SpO2 100%   BMI 23.56 kg/m     GENERAL: No acute distress.  EYES: No scleral icterus. No overt erythema.  LYMPH: No palpable lymphadenopathy in the cervical, supraclavicular, axillary " regions.  BREAST: No palpable masses in either breast. Postlumpectomy stable changes in the right breast.  Nipples are everted bilaterally with no discharge. No erythema, ulceration, or dimpling of the skin.  RESPIRATORY: No audible cough or wheezing.  ABDOMEN: Soft, nontender, nondistended, with no palpable hepatosplenomegaly.   MUSCULOSKELETAL: No clubbing, cyanosis or edema.  SKIN: No rashes or jaundice.  NEUROLOGIC: Alert, answers questions appropriately; no focal deficits.  PSYCHIATRIC: Normal affect; anxious.    LABS:  CBC RESULTS:   Recent Labs   Lab Test 01/24/24  1556   WBC 5.5   RBC 4.23   HGB 12.8   HCT 42.7   *   MCH 30.3   MCHC 30.0*   RDW 12.3         Last Comprehensive Metabolic Panel:  Sodium   Date Value Ref Range Status   07/17/2024 138 135 - 145 mmol/L Final   06/05/2019 142 133 - 144 mmol/L Final     Potassium   Date Value Ref Range Status   07/17/2024 5.0 3.4 - 5.3 mmol/L Final   12/30/2022 4.0 3.4 - 5.3 mmol/L Final   06/05/2019 3.7 3.4 - 5.3 mmol/L Final     Chloride   Date Value Ref Range Status   07/17/2024 104 98 - 107 mmol/L Final   12/30/2022 110 (H) 94 - 109 mmol/L Final   06/05/2019 110 (H) 94 - 109 mmol/L Final     Carbon Dioxide   Date Value Ref Range Status   06/05/2019 24 20 - 32 mmol/L Final     Carbon Dioxide (CO2)   Date Value Ref Range Status   07/17/2024 25 22 - 29 mmol/L Final   12/30/2022 24 20 - 32 mmol/L Final     Anion Gap   Date Value Ref Range Status   07/17/2024 9 7 - 15 mmol/L Final   12/30/2022 7 3 - 14 mmol/L Final   06/05/2019 8 3 - 14 mmol/L Final     Glucose   Date Value Ref Range Status   07/17/2024 107 (H) 70 - 99 mg/dL Final   12/30/2022 103 (H) 70 - 99 mg/dL Final   06/05/2019 93 70 - 99 mg/dL Final     Comment:     Fasting specimen     GLUCOSE BY METER POCT   Date Value Ref Range Status   10/24/2023 109 (H) 70 - 99 mg/dL Final     Urea Nitrogen   Date Value Ref Range Status   07/17/2024 38.9 (H) 8.0 - 23.0 mg/dL Final   12/30/2022 29 7 - 30  mg/dL Final   06/05/2019 32 (H) 7 - 30 mg/dL Final     Creatinine   Date Value Ref Range Status   07/17/2024 1.70 (H) 0.51 - 0.95 mg/dL Final   06/05/2019 1.15 (H) 0.52 - 1.04 mg/dL Final     GFR Estimate   Date Value Ref Range Status   07/17/2024 30 (L) >60 mL/min/1.73m2 Final     Comment:     eGFR calculated using 2021 CKD-EPI equation.   06/05/2019 47 (L) >60 mL/min/[1.73_m2] Final     Comment:     Non  GFR Calc  Starting 12/18/2018, serum creatinine based estimated GFR (eGFR) will be   calculated using the Chronic Kidney Disease Epidemiology Collaboration   (CKD-EPI) equation.       GFR, ESTIMATED POCT   Date Value Ref Range Status   10/02/2023 33 (L) >60 mL/min/1.73m2 Final     Calcium   Date Value Ref Range Status   07/17/2024 9.6 8.8 - 10.4 mg/dL Final     Comment:     Reference intervals for this test were updated on 7/16/2024 to reflect our healthy population more accurately. There may be differences in the flagging of prior results with similar values performed with this method. Those prior results can be interpreted in the context of the updated reference intervals.   06/05/2019 9.2 8.5 - 10.1 mg/dL Final     Bilirubin Total   Date Value Ref Range Status   07/17/2024 <0.2 <=1.2 mg/dL Final   06/05/2019 0.4 0.2 - 1.3 mg/dL Final     Alkaline Phosphatase   Date Value Ref Range Status   07/17/2024 70 40 - 150 U/L Final   06/05/2019 85 40 - 150 U/L Final     ALT   Date Value Ref Range Status   07/17/2024 21 0 - 50 U/L Final   06/05/2019 33 0 - 50 U/L Final     AST   Date Value Ref Range Status   07/17/2024 30 0 - 45 U/L Final   06/05/2019 33 0 - 45 U/L Final     5/24/2023: TSH = 29.92 -> 6.46  Free T4 = 0.7 => 1.19    PATHOLOGY:  Reviewed as per HPI.    ASSESSMENT/PLAN:  Jann Davidson is a 78 year old female with the following issues:  1. Clinical prognostic stage IIB, [id number], grade 2 invasive ductal carcinoma of the left lower outer breast, ER negative, IN negative, HER-2/maxime FISH  negative (triple negative), ypT0-pN0(i+)  2. Left breast LCIS and atypical lobular hyperplasia  --Jann completed neoadjuvant chemotherapy with paclitaxel and carboplatin for 12 weeks with every 3-week pembrolizumab. Given that her 4/19/2022 breast MRI showed no residual enhancement -- I had advised foregoing chemotherapy with Adriamycin and Cytoxan given her age and low likelihood of tolerating these drugs.  --6/27/2022 Final pathology from her lumpectomy and sentinel lymph node excision showed near-complete response to neoadjuvant chemotherapy with just isolated tumor cells in one lymph node.  She had residual LCIS and atypical lobular hyperplasia.  --She then completed adjuvant pembrolizumab on 2/10/2023 for 9 cycles as per the KEYNOTE-522 trial.  --She completed adjuvant radiation therapy on 9/22/2022 with Dr. Giles.  --Genetic testing negative.  --Jann has no clinical evidence for recurrent breast cancer by physical exam from today or diagnostic bilateral mammogram reviewed from 1/24/2024.  --Plan to alternate mammogram with breast MRI for high risk surveillance given findings of LCIS and ALH, which are markers for increased risk of additional breast cancer in the future.  Explained this again today.  We will plan for bilateral mammogram alternating with breast MRI 6 months apart. Due for breast MRI in 7/2025 and mammogram end of 1/2025.  --Continue raloxifene for 5 years for risk reduction given the findings of LCIS and ALH. She is tolerating this well without side effects.    3. Chronic kidney disease, stage 3  --Creatinine stable.  --She follows with Dr. Luna.    4. FDG uptake at anal canal  --She has history of anal fissure from 4/8/2014 colonoscopy.  --PET scan showed uptake at level of anus and could represent hemorrhoid, fissure, malignancy, or be physiologic.  --She met with colorectal Dr. Cervantes on  12/16/2022 with flex sig that showed no evidence of neoplasia in rectum.  --Due for colonoscopy in  2025.    5. Stage I, [id number], clear cell papillary renal cell carcinoma  --She is s/p right robotic assisted laparoscopic partial nephrectomy on 10/23/2023. Margins negative  --She will follow-up with Dr. Guerrero next month with surveillance imaging.    6. Pulmonary nodules  --PET scan showed scattered small bilateral pulmonary nodules that are unchanged since 10/24/2018 and most likely benign. No further CT chest needed.    7. Depression/anxiety  --Stable.  --She would like to continue on Parnate and not change her antidepressant.  --She takes oxazepam together with zolpidem.    --She could use lorazepam (Ativan) as long as she takes this 6 hours from her other benzodiazepenes.    8. Hypothyroidism  --Likely related to pembrolizumab.    --1/24/24 TSH 2.7.  --Continue levothyroxine 75 mcg daily.  --She will have her thyroid function followed with her PCP.    9. Vitamin D deficiency   --7/5/2023 vitamin D level very low at 14.  --Advised vitamin D 2000 international unit(s) daily for 2 weeks then 1000 international unit(s) daily.    --1/24/24 Vitamin D level now improved at 40. Continue supplementation.    10. Multiple skin lesions  --Unclear etiology.  She will see dermatology for evaluation 12/23/24.    Return in 4 months.    Total time spent today: 30 minutes in chart review, patient evaluation, counseling, documentation, test and/or medication/prescription orders, and coordination of care.     The longitudinal plan of care for the diagnosis(es)/condition(s) as documented were addressed during this visit. Due to the added complexity in care, I will continue to support Jann in the subsequent management and with ongoing continuity of care.

## 2024-10-02 ENCOUNTER — ONCOLOGY VISIT (OUTPATIENT)
Dept: ONCOLOGY | Facility: CLINIC | Age: 79
End: 2024-10-02
Attending: INTERNAL MEDICINE
Payer: MEDICARE

## 2024-10-02 VITALS
HEIGHT: 63 IN | SYSTOLIC BLOOD PRESSURE: 146 MMHG | OXYGEN SATURATION: 100 % | BODY MASS INDEX: 23.57 KG/M2 | RESPIRATION RATE: 16 BRPM | WEIGHT: 133 LBS | HEART RATE: 103 BPM | DIASTOLIC BLOOD PRESSURE: 84 MMHG

## 2024-10-02 DIAGNOSIS — Z17.1 MALIGNANT NEOPLASM OF LOWER-OUTER QUADRANT OF LEFT BREAST OF FEMALE, ESTROGEN RECEPTOR NEGATIVE (H): Primary | ICD-10-CM

## 2024-10-02 DIAGNOSIS — C50.512 MALIGNANT NEOPLASM OF LOWER-OUTER QUADRANT OF LEFT BREAST OF FEMALE, ESTROGEN RECEPTOR NEGATIVE (H): Primary | ICD-10-CM

## 2024-10-02 DIAGNOSIS — D05.02 NEOPLASM OF LEFT BREAST, PRIMARY TUMOR STAGING CATEGORY TIS: LOBULAR CARCINOMA IN SITU (LCIS): ICD-10-CM

## 2024-10-02 DIAGNOSIS — Z12.31 ENCOUNTER FOR SCREENING MAMMOGRAM FOR BREAST CANCER: ICD-10-CM

## 2024-10-02 DIAGNOSIS — F41.9 ANXIETY: ICD-10-CM

## 2024-10-02 DIAGNOSIS — N60.92 ATYPICAL LOBULAR HYPERPLASIA (ALH) OF LEFT BREAST: ICD-10-CM

## 2024-10-02 DIAGNOSIS — E55.9 VITAMIN D DEFICIENCY: ICD-10-CM

## 2024-10-02 PROCEDURE — G2211 COMPLEX E/M VISIT ADD ON: HCPCS | Performed by: INTERNAL MEDICINE

## 2024-10-02 PROCEDURE — 99214 OFFICE O/P EST MOD 30 MIN: CPT | Performed by: INTERNAL MEDICINE

## 2024-10-02 PROCEDURE — G0463 HOSPITAL OUTPT CLINIC VISIT: HCPCS | Performed by: INTERNAL MEDICINE

## 2024-10-02 ASSESSMENT — PAIN SCALES - GENERAL: PAINLEVEL: NO PAIN (0)

## 2024-10-02 NOTE — LETTER
10/2/2024      Jann Davidson  [address] [facility] [address]      Dear Colleague,    Thank you for referring your patient, Jann Davidson, to the [facility]. Please see a copy of my visit note below.    [facility]    Hematology/Oncology Established Patient Note      Today's Date: 5/1/2024    Reason for follow-up:  Left breast cancer, triple negative.    HISTORY OF PRESENT ILLNESS: Jann Davidson is a 79 year old female who presents with the following oncologic history:  1. 10/26/2021: Mammogram showed calcifications in the left lateral breast; right breast negative.  2. 11/17/2021: Left diagnostic mammogram showed cluster of pleomorphic calcifications at 4:00, 6 cm from nipple, measuring 1.3 cm.  3. 12/3/2021: Stereotactic left breast biopsy at 4:00, 6 cm from nipple showed grade 2 invasive ductal carcinoma with micropapillary features, extensive lymphovascular invasion present, grade 2-3 focal DCIS, LCIS, ER negative, TX negative, HER-2/maxime FISH negative.  4. 12/10/2021: Breast MRI showed oval mass 2 x 2 x 1.5 cm at 4:00, 6 cm from nipple in left breast reflecting biopsy cavity with post-biopsy changes; prominent 2.5 cm low left level 1 axillary lymph node.  5.  12/28/2021: PET/CT scan showed FDG avid focus in left lower outer breast, hypermetabolic left axillary adenopathy, moderate FDG uptake at level off anus, exophytic right renal 1.1 cm mass, slowly increasing in size since 2016 concerning for growing renal cell carcinoma.  6. 1/18/2022: Started neoadjuvant chemotherapy with weekly paclitaxel + carboplatin and every 3-week pembrolizumab as per KEYNOTE-522 study.  7. 4/19/2022: Breast MRI showed no residual enhancement at site of primary malignancy in left breast at 4:00; no residual left axillary adenopathy. Right breast negative.  8. 5/6/2022: Completed cycle 12 paclitaxel with pembrolizumab. Surgery delayed due to 2 hospitalizations for rash and peripheral  neuropathy.  9. 6/27/2022: Underwent left breast lumpectomy and left axillary sentinel lymph node excision under care of Dr. Tatianna Rai.  Pathology showed no residual invasive carcinoma in left breast; residual LCIS and atypical lobular hyperplasia present; one of 2 lymph nodes with isolated tumor cells measuring 0.087 mm, focal fibrosis consistent with treatment effect; RCB-I, ypT0-pN0(i+).  10. 8/08/2022: Started raloxifene for LCIS and atypical lobular hyperplasia.  11. 8/10/2022-9/22/2022: Completion of adjuvant radiation therapy.  12. 8/25/2022-2/10/2023: Completed adjuvant pembrolizumab x 9 cycles.  13. 5/24/2023: CT scan showed exophytic mass of posterior upper pole right kidney unchanged since 11/10/2022.  14. 10/23/2023: Right partial nephrectomy with Dr. Frank Guerrero.  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    INTERVAL HISTORY:  Jann reports developing multiple skin lesions that are pruritic, upper and lower body. No changes in skincare products or bedding.      REVIEW OF SYSTEMS:   14 point ROS was reviewed and is negative other than as noted above in HPI.       HOME MEDICATIONS:  Current Outpatient Medications   Medication Sig Dispense Refill     acetaminophen (TYLENOL) 500 MG tablet Take 1-2 tablets by mouth every 6 hours as needed for mild pain       atorvastatin (LIPITOR) 10 MG tablet TAKE 1 TABLET (10 MG) BY MOUTH ONCE DAILY 90 tablet 2     candesartan (ATACAND) 4 MG tablet Take 0.5 tablets by mouth daily (0.5 x 4 mg = 2 mg)       levothyroxine (SYNTHROID/LEVOTHROID) 75 MCG tablet Take 1 tablet (75 mcg) by mouth daily 90 tablet 3     loratadine (CLARITIN) 10 MG tablet Take 10 mg by mouth daily       LORazepam (ATIVAN) 0.5 MG tablet Take 1 tablet (0.5 mg) by mouth every 6 hours as needed for anxiety, nausea or sleep 45 tablet 0     Magnesium 400 MG TABS Take 2 tablets by mouth daily       oxazepam  (SERAX) 15 MG capsule TAKE 1 CAPSULE (15 MG) BY MOUTH NIGHTLY AS NEEDED FOR ANXIETY. 30 capsule 5     PARNATE 10 MG tablet Take 3 tablets (30 mg) by mouth every morning (3 x 10 mg = 30 mg) 270 tablet 3     polyethylene glycol (MIRALAX) 17 GM/Dose powder Take 1 Capful by mouth daily       raloxifene (EVISTA) 60 MG tablet Take 1 tablet (60 mg) by mouth daily. 90 tablet 3     triamcinolone (KENALOG) 0.1 % external cream APPLY TOPICALLY 2 TIMES DAILY AS NEEDED. 15 g 0     triamterene-HCTZ (MAXZIDE-25) 37.5-25 MG tablet Take 0.5 tablets by mouth daily 45 tablet 3     Vitamin D3 (CHOLECALCIFEROL) 25 mcg (1000 units) tablet Take 1 tablet by mouth daily       zolpidem (AMBIEN) 5 MG tablet Take 1 tablet (5 mg) by mouth nightly as needed for sleep 30 tablet 5         ALLERGIES:  Allergies   Allergen Reactions     Tegaderm Transparent Dressing (Informational Only) Blisters     Lyrica [Pregabalin] Rash         PAST MEDICAL HISTORY:  Past Medical History:   Diagnosis Date     Breast cancer (H)      CKD (chronic kidney disease) stage 3, GFR 30-59 ml/min (H)      Depression with anxiety      Depressive disorder 1985    Been on Parnate antidepressant for 35 years     Gout      Hypertension goal BP (blood pressure) < 140/90      Recurrent sinusitis 2013     Thyroid disease     Keytruda induced     Gynecologic history:  Age of menarche at 11; LMP ;  (one son), 1st pregnancy at age 19; no HRT.    PAST SURGICAL HISTORY:  Past Surgical History:   Procedure Laterality Date     BIOPSY NODE SENTINEL Left 2022    Procedure: left axillary sentinel lymph node biopsy;  Surgeon: Tatianna Rai MD;  Location:  OR     BREAST BIOPSY, RT/LT      Breat Biopsy RT/LT     BREAST SURGERY  2022    Tumor removed     COLONOSCOPY  10/01/2003     COLONOSCOPY  2014    Procedure: COLONOSCOPY;  COLONOSCOPY ;  Surgeon: Gaurav Shaffer MD;  Location:  GI     DAVINCI NEPHRECTOMY PARTIAL Right 10/23/2023     Procedure: RIGHT ROBOTIC ASSISTED LAPAROSOCPIC PARTIAL NEPHRECTOMY WITH INTRA-OPERATIVE RENAL ULTRASOUND;  Surgeon: Frank Guerrero MD;  Location: [facility] OR     IR CHEST PORT PLACEMENT > 5 YRS OF AGE  2021     IR PORT REMOVAL RIGHT  2023     LUMPECTOMY BREAST WITH SEED LOCALIZATION Left 2022    Procedure: Radiofrequency tag localized left breast lumpectomy with radiofrequency tag localized excision of left axillary lymph node;  Surgeon: Tatianna Rai MD;  Location: [facility] OR     RECONSTRUCT EYELID           SOCIAL HISTORY:  Social History     Socioeconomic History     Marital status:      Spouse name: Not on file     Number of children: Not on file     Years of education: Not on file     Highest education level: Not on file   Occupational History     Not on file   Tobacco Use     Smoking status: Former     Current packs/day: 0.00     Average packs/day: 1 pack/day for 10.0 years (10.0 ttl pk-yrs)     Types: Cigarettes     Start date: 9/15/1963     Quit date: 10/30/1972     Years since quittin.9     Passive exposure: Never     Smokeless tobacco: Never     Tobacco comments:     I have not smoked since 10/30/1972   Vaping Use     Vaping status: Never Used   Substance and Sexual Activity     Alcohol use: Yes     Comment: I drink white wine with dinner     Drug use: No     Sexual activity: Not Currently     Partners: Male     Birth control/protection: I.U.D., Pill     Comment: No birth control used since .   Other Topics Concern     Parent/sibling w/ CABG, MI or angioplasty before 65F 55M? No   Social History Narrative     Not on file     Social Determinants of Health     Financial Resource Strain: Low Risk  (5/10/2024)    Financial Resource Strain      Within the past 12 months, have you or your family members you live with been unable to get utilities (heat, electricity) when it was really needed?: No   Food Insecurity: Low Risk  (5/10/2024)    Food Insecurity      Within the past 12  months, did you worry that your food would run out before you got money to buy more?: No      Within the past 12 months, did the food you bought just not last and you didn t have money to get more?: No   Transportation Needs: High Risk (5/10/2024)    Transportation Needs      Within the past 12 months, has lack of transportation kept you from medical appointments, getting your medicines, non-medical meetings or appointments, work, or from getting things that you need?: Yes   Physical Activity: Inactive (5/10/2024)    Exercise Vital Sign      Days of Exercise per Week: 0 days      Minutes of Exercise per Session: 0 min   Stress: Stress Concern Present (5/10/2024)    [employer] - Occupational Stress Questionnaire      Feeling of Stress : To some extent   Social Connections: Unknown (5/10/2024)    Social Connection and Isolation Panel [NHANES]      Frequency of Communication with Friends and Family: Not on file      Frequency of Social Gatherings with Friends and Family: Patient declined      Attends Synagogue Services: Not on file      Active Member of Clubs or Organizations: Not on file      Attends Club or Organization Meetings: Not on file      Marital Status: Not on file   Interpersonal Safety: Low Risk  (5/15/2024)    Interpersonal Safety      Do you feel physically and emotionally safe where you currently live?: Yes      Within the past 12 months, have you been hit, slapped, kicked or otherwise physically hurt by someone?: No      Within the past 12 months, have you been humiliated or emotionally abused in other ways by your partner or ex-partner?: No   Housing Stability: Low Risk  (5/10/2024)    Housing Stability      Do you have housing? : Yes      Are you worried about losing your housing?: No         FAMILY HISTORY:  Family History   Problem Relation Age of Onset     Cancer Mother         uterine     Breast Cancer Mother      Hypertension Mother      Diabetes Paternal  "Grandmother          PHYSICAL EXAM:  Vital signs:  BP (!) 146/84   Pulse 103   Resp 16   Ht 1.6 m (5' 3\")   Wt 60.3 kg (133 lb)   SpO2 100%   BMI 23.56 kg/m     GENERAL: No acute distress.  EYES: No scleral icterus. No overt erythema.  LYMPH: No palpable lymphadenopathy in the cervical, supraclavicular, axillary regions.  BREAST: No palpable masses in either breast. Postlumpectomy stable changes in the right breast.  Nipples are everted bilaterally with no discharge. No erythema, ulceration, or dimpling of the skin.  RESPIRATORY: No audible cough or wheezing.  ABDOMEN: Soft, nontender, nondistended, with no palpable hepatosplenomegaly.   MUSCULOSKELETAL: No clubbing, cyanosis or edema.  SKIN: No rashes or jaundice.  NEUROLOGIC: Alert, answers questions appropriately; no focal deficits.  PSYCHIATRIC: Normal affect; anxious.    LABS:  CBC RESULTS:   Recent Labs   Lab Test 01/24/24  1556   WBC 5.5   RBC 4.23   HGB 12.8   HCT 42.7   *   MCH 30.3   MCHC 30.0*   RDW 12.3         Last Comprehensive Metabolic Panel:  Sodium   Date Value Ref Range Status   07/17/2024 138 135 - 145 mmol/L Final   06/05/2019 142 133 - 144 mmol/L Final     Potassium   Date Value Ref Range Status   07/17/2024 5.0 3.4 - 5.3 mmol/L Final   12/30/2022 4.0 3.4 - 5.3 mmol/L Final   06/05/2019 3.7 3.4 - 5.3 mmol/L Final     Chloride   Date Value Ref Range Status   07/17/2024 104 98 - 107 mmol/L Final   12/30/2022 110 (H) 94 - 109 mmol/L Final   06/05/2019 110 (H) 94 - 109 mmol/L Final     Carbon Dioxide   Date Value Ref Range Status   06/05/2019 24 20 - 32 mmol/L Final     Carbon Dioxide (CO2)   Date Value Ref Range Status   07/17/2024 25 22 - 29 mmol/L Final   12/30/2022 24 20 - 32 mmol/L Final     Anion Gap   Date Value Ref Range Status   07/17/2024 9 7 - 15 mmol/L Final   12/30/2022 7 3 - 14 mmol/L Final   06/05/2019 8 3 - 14 mmol/L Final     Glucose   Date Value Ref Range Status   07/17/2024 107 (H) 70 - 99 mg/dL Final "   12/30/2022 103 (H) 70 - 99 mg/dL Final   06/05/2019 93 70 - 99 mg/dL Final     Comment:     Fasting specimen     GLUCOSE BY METER POCT   Date Value Ref Range Status   10/24/2023 109 (H) 70 - 99 mg/dL Final     Urea Nitrogen   Date Value Ref Range Status   07/17/2024 38.9 (H) 8.0 - 23.0 mg/dL Final   12/30/2022 29 7 - 30 mg/dL Final   06/05/2019 32 (H) 7 - 30 mg/dL Final     Creatinine   Date Value Ref Range Status   07/17/2024 1.70 (H) 0.51 - 0.95 mg/dL Final   06/05/2019 1.15 (H) 0.52 - 1.04 mg/dL Final     GFR Estimate   Date Value Ref Range Status   07/17/2024 30 (L) >60 mL/min/1.73m2 Final     Comment:     eGFR calculated using 2021 CKD-EPI equation.   06/05/2019 47 (L) >60 mL/min/[1.73_m2] Final     Comment:     Non  GFR Calc  Starting 12/18/2018, serum creatinine based estimated GFR (eGFR) will be   calculated using the Chronic Kidney Disease Epidemiology Collaboration   (CKD-EPI) equation.       GFR, ESTIMATED POCT   Date Value Ref Range Status   10/02/2023 33 (L) >60 mL/min/1.73m2 Final     Calcium   Date Value Ref Range Status   07/17/2024 9.6 8.8 - 10.4 mg/dL Final     Comment:     Reference intervals for this test were updated on 7/16/2024 to reflect our healthy population more accurately. There may be differences in the flagging of prior results with similar values performed with this method. Those prior results can be interpreted in the context of the updated reference intervals.   06/05/2019 9.2 8.5 - 10.1 mg/dL Final     Bilirubin Total   Date Value Ref Range Status   07/17/2024 <0.2 <=1.2 mg/dL Final   06/05/2019 0.4 0.2 - 1.3 mg/dL Final     Alkaline Phosphatase   Date Value Ref Range Status   07/17/2024 70 40 - 150 U/L Final   06/05/2019 85 40 - 150 U/L Final     ALT   Date Value Ref Range Status   07/17/2024 21 0 - 50 U/L Final   06/05/2019 33 0 - 50 U/L Final     AST   Date Value Ref Range Status   07/17/2024 30 0 - 45 U/L Final   06/05/2019 33 0 - 45 U/L Final     5/24/2023:  TSH = 29.92 -> 6.46  Free T4 = 0.7 => 1.19    PATHOLOGY:  Reviewed as per HPI.    ASSESSMENT/PLAN:  Jann Davidson is a 78 year old female with the following issues:  1. Clinical prognostic stage IIB, [id number], grade 2 invasive ductal carcinoma of the left lower outer breast, ER negative, LA negative, HER-2/maxime FISH negative (triple negative), ypT0-pN0(i+)  2. Left breast LCIS and atypical lobular hyperplasia  --Jann completed neoadjuvant chemotherapy with paclitaxel and carboplatin for 12 weeks with every 3-week pembrolizumab. Given that her 4/19/2022 breast MRI showed no residual enhancement -- I had advised foregoing chemotherapy with Adriamycin and Cytoxan given her age and low likelihood of tolerating these drugs.  --6/27/2022 Final pathology from her lumpectomy and sentinel lymph node excision showed near-complete response to neoadjuvant chemotherapy with just isolated tumor cells in one lymph node.  She had residual LCIS and atypical lobular hyperplasia.  --She then completed adjuvant pembrolizumab on 2/10/2023 for 9 cycles as per the KEYNOTE-522 trial.  --She completed adjuvant radiation therapy on 9/22/2022 with Dr. Giles.  --Genetic testing negative.  --Jann has no clinical evidence for recurrent breast cancer by physical exam from today or diagnostic bilateral mammogram reviewed from 1/24/2024.  --Plan to alternate mammogram with breast MRI for high risk surveillance given findings of LCIS and ALH, which are markers for increased risk of additional breast cancer in the future.  Explained this again today.  We will plan for bilateral mammogram alternating with breast MRI 6 months apart. Due for breast MRI in 7/2025 and mammogram end of 1/2025.  --Continue raloxifene for 5 years for risk reduction given the findings of LCIS and ALH. She is tolerating this well without side effects.    3. Chronic kidney disease, stage 3  --Creatinine stable.  --She follows with Dr. Luna.    4. FDG uptake at anal  canal  --She has history of anal fissure from 4/8/2014 colonoscopy.  --PET scan showed uptake at level of anus and could represent hemorrhoid, fissure, malignancy, or be physiologic.  --She met with colorectal Dr. Cervantes on  12/16/2022 with flex sig that showed no evidence of neoplasia in rectum.  --Due for colonoscopy in 2025.    5. Stage I, [id number], clear cell papillary renal cell carcinoma  --She is s/p right robotic assisted laparoscopic partial nephrectomy on 10/23/2023. Margins negative  --She will follow-up with Dr. Guerrero next month with surveillance imaging.    6. Pulmonary nodules  --PET scan showed scattered small bilateral pulmonary nodules that are unchanged since 10/24/2018 and most likely benign. No further CT chest needed.    7. Depression/anxiety  --Stable.  --She would like to continue on Parnate and not change her antidepressant.  --She takes oxazepam together with zolpidem.    --She could use lorazepam (Ativan) as long as she takes this 6 hours from her other benzodiazepenes.    8. Hypothyroidism  --Likely related to pembrolizumab.    --1/24/24 TSH 2.7.  --Continue levothyroxine 75 mcg daily.  --She will have her thyroid function followed with her PCP.    9. Vitamin D deficiency   --7/5/2023 vitamin D level very low at 14.  --Advised vitamin D 2000 international unit(s) daily for 2 weeks then 1000 international unit(s) daily.    --1/24/24 Vitamin D level now improved at 40. Continue supplementation.    10. Multiple skin lesions  --Unclear etiology.  She will see dermatology for evaluation 12/23/24.    Return in 4 months.    Total time spent today: 30 minutes in chart review, patient evaluation, counseling, documentation, test and/or medication/prescription orders, and coordination of care.     The longitudinal plan of care for the diagnosis(es)/condition(s) as documented were addressed during this visit. Due to the added complexity in care, I will continue to support Jann in the subsequent  "management and with ongoing continuity of care.    Oncology Rooming Note    October 2, 2024 3:48 PM   Jann Davidson is a 79 year old female who presents for:    Chief Complaint   Patient presents with     Oncology Clinic Visit     Initial Vitals: There were no vitals taken for this visit. Estimated body mass index is 24.33 kg/m  as calculated from the following:    Height as of 5/15/24: 1.604 m (5' 3.15\").    Weight as of 5/15/24: 62.6 kg (138 lb). There is no height or weight on file to calculate BSA.  Data Unavailable Comment: Data Unavailable   No LMP recorded. Patient is postmenopausal.  Allergies reviewed: Yes  Medications reviewed: Yes    Medications: Medication refills not needed today.  Pharmacy name entered into EPIC:    [id number] - [place], [place] - [address] [facility] - [place], [place] - [address] [place] [address]    Frailty Screening:   Is the patient here for a new oncology consult visit in cancer care? 2. No          Viviane Colby MA              Again, thank you for allowing me to participate in the care of your patient.        Sincerely,        Neetu Mcmullen MD  "

## 2024-10-02 NOTE — PROGRESS NOTES
"Oncology Rooming Note    October 2, 2024 3:48 PM   Jann Davidson is a 79 year old female who presents for:    Chief Complaint   Patient presents with    Oncology Clinic Visit     Initial Vitals: There were no vitals taken for this visit. Estimated body mass index is 24.33 kg/m  as calculated from the following:    Height as of 5/15/24: 1.604 m (5' 3.15\").    Weight as of 5/15/24: 62.6 kg (138 lb). There is no height or weight on file to calculate BSA.  Data Unavailable Comment: Data Unavailable   No LMP recorded. Patient is postmenopausal.  Allergies reviewed: Yes  Medications reviewed: Yes    Medications: Medication refills not needed today.  Pharmacy name entered into EPIC:    "[facility] - [id number] - [place], [place] - [address]  [facility] - [place], [place] - [address] [place] [address]    Frailty Screening:   Is the patient here for a new oncology consult visit in cancer care? 2. No          Viviane Colby MA            "

## 2024-10-09 ENCOUNTER — TELEPHONE (OUTPATIENT)
Dept: ONCOLOGY | Facility: CLINIC | Age: 79
End: 2024-10-09
Payer: MEDICARE

## 2024-10-09 DIAGNOSIS — Z17.1 MALIGNANT NEOPLASM OF LOWER-OUTER QUADRANT OF LEFT BREAST OF FEMALE, ESTROGEN RECEPTOR NEGATIVE (H): Primary | ICD-10-CM

## 2024-10-09 DIAGNOSIS — C50.512 MALIGNANT NEOPLASM OF LOWER-OUTER QUADRANT OF LEFT BREAST OF FEMALE, ESTROGEN RECEPTOR NEGATIVE (H): Primary | ICD-10-CM

## 2024-10-09 NOTE — TELEPHONE ENCOUNTER
Patient is calling requesting her mammogram order be changed from a screening mammogram to a diagnostic mammogram. Patient states this was discussed with Provider already.    She would like a call back once this has been completed to she can scheduled. If she does not answer she would like a detailed message to be left.     Routing to care team for further review and follow up.    Franca Solitario RN, BSN, PHN, OCN  M[facility]

## 2024-10-09 NOTE — TELEPHONE ENCOUNTER
Called patient and notified her order has been changed. She appreciated provider changing order. No further questions or concerns at this time.     Franca Solitario, RN, BSN, PHN, OCN  [facility]    Neetu Mcmullen MD  You; Regina Arango, RN24 minutes ago (10:09 AM)       She didn't discuss doing diagnostic views but I can change the order.

## 2024-10-24 DIAGNOSIS — T45.1X5A CHEMOTHERAPY-INDUCED NEUTROPENIA (H): ICD-10-CM

## 2024-10-24 DIAGNOSIS — D70.1 CHEMOTHERAPY-INDUCED NEUTROPENIA (H): ICD-10-CM

## 2024-10-24 DIAGNOSIS — F41.9 ANXIETY: ICD-10-CM

## 2024-10-24 RX ORDER — LORAZEPAM 0.5 MG/1
0.5 TABLET ORAL EVERY 6 HOURS PRN
Qty: 45 TABLET | Refills: 0 | Status: SHIPPED | OUTPATIENT
Start: 2024-10-24

## 2024-11-07 ENCOUNTER — HOSPITAL ENCOUNTER (OUTPATIENT)
Dept: CT IMAGING | Facility: CLINIC | Age: 79
Discharge: HOME | End: 2024-11-07
Attending: UROLOGY
Payer: MEDICARE

## 2024-11-07 ENCOUNTER — HOSPITAL ENCOUNTER (EMERGENCY)
Facility: CLINIC | Age: 79
Discharge: HOME | End: 2024-11-07
Attending: PHYSICIAN ASSISTANT | Admitting: PHYSICIAN ASSISTANT
Payer: MEDICARE

## 2024-11-07 ENCOUNTER — HOSPITAL ENCOUNTER (OUTPATIENT)
Facility: CLINIC | Age: 79
Discharge: HOME | End: 2024-11-07
Payer: MEDICARE

## 2024-11-07 VITALS
SYSTOLIC BLOOD PRESSURE: 150 MMHG | WEIGHT: 130 LBS | RESPIRATION RATE: 18 BRPM | TEMPERATURE: 98.2 F | OXYGEN SATURATION: 100 % | BODY MASS INDEX: 23.04 KG/M2 | DIASTOLIC BLOOD PRESSURE: 79 MMHG | HEIGHT: 63 IN | HEART RATE: 82 BPM

## 2024-11-07 DIAGNOSIS — T80.1XXA IV INFILTRATION, INITIAL ENCOUNTER: ICD-10-CM

## 2024-11-07 DIAGNOSIS — C64.1 RENAL CELL CARCINOMA, RIGHT (H): ICD-10-CM

## 2024-11-07 LAB
CREAT BLD-MCNC: 2.2 MG/DL (ref 0.5–1)
EGFRCR SERPLBLD CKD-EPI 2021: 22 ML/MIN/1.73M2

## 2024-11-07 PROCEDURE — 250N000011 HC RX IP 250 OP 636: Performed by: UROLOGY

## 2024-11-07 PROCEDURE — 999N000154 HC STATISTIC RADIOLOGY XRAY, US, CT, MAR, NM

## 2024-11-07 PROCEDURE — 99282 EMERGENCY DEPT VISIT SF MDM: CPT

## 2024-11-07 PROCEDURE — 250N000009 HC RX 250: Performed by: UROLOGY

## 2024-11-07 PROCEDURE — 82565 ASSAY OF CREATININE: CPT

## 2024-11-07 PROCEDURE — 74178 CT ABD&PLV WO CNTR FLWD CNTR: CPT | Mod: MG

## 2024-11-07 RX ORDER — IOPAMIDOL 755 MG/ML
65 INJECTION, SOLUTION INTRAVASCULAR ONCE
Status: COMPLETED | OUTPATIENT
Start: 2024-11-07 | End: 2024-11-07

## 2024-11-07 RX ADMIN — SODIUM CHLORIDE 62 ML: 9 INJECTION, SOLUTION INTRAVENOUS at 16:51

## 2024-11-07 RX ADMIN — IOPAMIDOL 65 ML: 755 INJECTION, SOLUTION INTRAVENOUS at 16:51

## 2024-11-07 ASSESSMENT — COLUMBIA-SUICIDE SEVERITY RATING SCALE - C-SSRS
1. IN THE PAST MONTH, HAVE YOU WISHED YOU WERE DEAD OR WISHED YOU COULD GO TO SLEEP AND NOT WAKE UP?: NO
2. HAVE YOU ACTUALLY HAD ANY THOUGHTS OF KILLING YOURSELF IN THE PAST MONTH?: NO
6. HAVE YOU EVER DONE ANYTHING, STARTED TO DO ANYTHING, OR PREPARED TO DO ANYTHING TO END YOUR LIFE?: NO

## 2024-11-07 ASSESSMENT — ACTIVITIES OF DAILY LIVING (ADL)
ADLS_ACUITY_SCORE: 0

## 2024-11-08 NOTE — DISCHARGE INSTRUCTIONS
Please apply heating pad to your arm and keep your arm elevated.  Motrin Tylenol for pain control.    If you develop increasing pain redness blackness coolness or worsening condition of the left upper extremity please return back to the ER.

## 2024-11-08 NOTE — ED PROVIDER NOTES
"    History     Chief Complaint:  Wound Check       HPI   Jann Davidson is a 79 year old female presents with concerns regarding increasing left arm pain and swelling following IV infiltration earlier today when she had a CT scan for evaluation of renal cell carcinoma.  She reports the pain was quite painful earlier on but after she has been sitting here in the emergency department putting heat on the area it is improved and she reports\" that it is not that bad, right now\".  No fevers numbness or coolness distally of her arm.    Independent Historian:        Review of External Notes:        Medications:    acetaminophen (TYLENOL) 500 MG tablet  atorvastatin (LIPITOR) 10 MG tablet  candesartan (ATACAND) 4 MG tablet  levothyroxine (SYNTHROID/LEVOTHROID) 75 MCG tablet  loratadine (CLARITIN) 10 MG tablet  LORazepam (ATIVAN) 0.5 MG tablet  Magnesium 400 MG TABS  oxazepam (SERAX) 15 MG capsule  PARNATE 10 MG tablet  polyethylene glycol (MIRALAX) 17 GM/Dose powder  raloxifene (EVISTA) 60 MG tablet  triamcinolone (KENALOG) 0.1 % external cream  triamterene-HCTZ (MAXZIDE-25) 37.5-25 MG tablet  Vitamin D3 (CHOLECALCIFEROL) 25 mcg (1000 units) tablet  zolpidem (AMBIEN) 5 MG tablet        Past Medical History:    Past Medical History:   Diagnosis Date    Breast cancer (H)     CKD (chronic kidney disease) stage 3, GFR 30-59 ml/min (H)     Depression with anxiety     Depressive disorder 1985    Gout     Hypertension goal BP (blood pressure) < 140/90     Recurrent sinusitis 12/02/2013    Thyroid disease January, 2023       Past Surgical History:    Past Surgical History:   Procedure Laterality Date    BIOPSY NODE SENTINEL Left 06/27/2022    Procedure: left axillary sentinel lymph node biopsy;  Surgeon: Tatianna Rai MD;  Location: [facility]    BREAST BIOPSY, RT/LT      Breat Biopsy RT/LT    BREAST SURGERY  06/27/2022    Tumor removed    COLONOSCOPY  10/01/2003    COLONOSCOPY  04/08/2014    Procedure: COLONOSCOPY;  COLONOSCOPY ;  " "Surgeon: Gaurav Shaffer MD;  Location:  GI    DAVINCI NEPHRECTOMY PARTIAL Right 10/23/2023    Procedure: RIGHT ROBOTIC ASSISTED LAPAROSOCPIC PARTIAL NEPHRECTOMY WITH INTRA-OPERATIVE RENAL ULTRASOUND;  Surgeon: Frank Guerrero MD;  Location:  OR    IR CHEST PORT PLACEMENT > 5 YRS OF AGE  12/29/2021    IR PORT REMOVAL RIGHT  02/17/2023    LUMPECTOMY BREAST WITH SEED LOCALIZATION Left 06/27/2022    Procedure: Radiofrequency tag localized left breast lumpectomy with radiofrequency tag localized excision of left axillary lymph node;  Surgeon: Tatianna Rai MD;  Location:  OR    RECONSTRUCT EYELID            Physical Exam   Patient Vitals for the past 24 hrs:   BP Temp Temp src Pulse Resp SpO2 Height Weight   11/07/24 1929 (!) 150/79 98.2  F (36.8  C) Oral 82 18 100 % 1.6 m (5' 3\") 59 kg (130 lb)        Physical Exam  Vitals and nursing note reviewed.   Eyes:      General: No scleral icterus.     Conjunctiva/sclera: Conjunctivae normal.   Pulmonary:      Effort: Pulmonary effort is normal.   Musculoskeletal:      Left shoulder: No swelling or deformity. Normal range of motion.      Left upper arm: Swelling present. No deformity or tenderness.      Left elbow: No swelling or deformity. Normal range of motion.      Left forearm: No swelling, deformity or tenderness.      Left wrist: No swelling, deformity or tenderness. Normal range of motion. Normal pulse.      Left hand: No swelling, deformity or tenderness. Normal range of motion. Normal strength. Normal sensation.   Skin:     General: Skin is warm.      Capillary Refill: Capillary refill takes less than 2 seconds.      Coloration: Skin is not jaundiced or pale.      Findings: No bruising, erythema or rash.   Neurological:      Mental Status: She is alert and oriented to person, place, and time. Mental status is at baseline.   Psychiatric:         Mood and Affect: Mood normal.         Behavior: Behavior normal.           Emergency Department Course "     Imaging:  No orders to display       Laboratory:  Labs Ordered and Resulted from Time of ED Arrival to Time of ED Departure - No data to display     Procedures       Emergency Department Course & Assessments:    Interventions:  Medications - No data to display       Independent Interpretation (X-rays, CTs, rhythm strip):  None    Consultations/Discussion of Management or Tests:  None       Social Drivers of Health affecting care:  None     Disposition:  The patient was discharged.    Impression & Plan    CMS Diagnoses: None       Medical Decision Making:    This is a 79-year-old female who presents with concerns regarding IV infiltration of the left arm following CT scan that she had earlier today.  She reports after this infiltrated her arm she had significant pain.  The pain has improved and barely bothers her at this time because she has been utilizing heat packs to the area.  She denies any numbness or weakness distally.  Physical exam findings are not concerning for necrosis or infection.  She has good pulses and is neurovascularly intact distally.  Although I considered other differential such as DVT, necrosis, infection, among others I do not feel these are present I think this is clearly related to the IV infiltration.  It is reassuring that her symptoms are improving.  She will continue to eat use heat packs and anti-inflammatories she understands to return back to the emergency department if she develops increasing swelling pain coolness numbness weakness or worsening condition.      Diagnosis:    ICD-10-CM    1. IV infiltration, initial encounter  T80.1XXA            Discharge Medications:  Discharge Medication List as of 11/7/2024  9:02 PM           11/7/2024   Rakan Kirkland PA-C Kruger, Jacob C, PA-C  11/08/24 1035

## 2024-11-08 NOTE — ED TRIAGE NOTES
Patient coming in ambulatory to triage.  Today she had an outpatient CT scan and had an IV infiltrate on the left arm.  IV was in left AC when it infiltrated.  Patient is having severe pain after infiltration so came to the ER to get check out.

## 2024-11-14 DIAGNOSIS — N18.32 CHRONIC KIDNEY DISEASE (CKD) STAGE G3B/A1, MODERATELY DECREASED GLOMERULAR FILTRATION RATE (GFR) BETWEEN 30-44 ML/MIN/1.73 SQUARE METER AND ALBUMINURIA CREATININE RATIO LESS THAN 30 MG/G (H): Primary | ICD-10-CM

## 2024-11-14 DIAGNOSIS — N25.81 SECONDARY HYPERPARATHYROIDISM, RENAL (H): ICD-10-CM

## 2024-11-18 ENCOUNTER — VIRTUAL VISIT (OUTPATIENT)
Dept: UROLOGY | Facility: CLINIC | Age: 79
End: 2024-11-18
Payer: MEDICARE

## 2024-11-18 DIAGNOSIS — C64.1 RENAL CELL CARCINOMA, RIGHT (H): Primary | ICD-10-CM

## 2024-11-18 DIAGNOSIS — Z90.5 H/O PARTIAL NEPHRECTOMY: ICD-10-CM

## 2024-11-18 PROCEDURE — 99214 OFFICE O/P EST MOD 30 MIN: CPT | Mod: 95 | Performed by: UROLOGY

## 2024-11-18 NOTE — NURSING NOTE
Current patient location: [place]    Is the patient currently in the state of [place]? YES    Visit mode:VIDEO    If the visit is dropped, the patient can be reconnected by:VIDEO VISIT: Text to cell phone:   Telephone Information:   Mobile [phone]       Will anyone else be joining the visit? NO  (If patient encounters technical issues they should call [phone]    Are changes needed to the allergy or medication list?    Patient reviewed e-check in information prior to appointment, so VF did not review e-check in information again with patient.    Are refills needed on medications prescribed by this physician? Discuss with provider    Rooming Documentation:  Not applicable    Reason for visit: RECHECK    Benita GUZMÁN

## 2024-11-18 NOTE — PROGRESS NOTES
MENDEL  CHIEF COMPLAINT   It was my pleasure to see Jann Davidson who is a 79 year old female for follow-up of RIGHT renal mass.      HPI   Jann Davidson is a very pleasant 79 year old female     Initially seen 10/5/2022:  Jann Davidson is a 77 year old female who is being seen for evaluation of a small incidental right renal mass  Diagnosed with breast cancer last year  S/p Chemo/radiationa and surgery  She is doing very well from a breast cancer treatment standpoint and is now following up on these other findings  Incidental 1.1cm RIGHT complex renal mass vs cyst on PET from 12/2021 11/23/2022:  No significant changes  This is causing her some mental fatigue and worry  She also notes she is having a sigmoidoscopy in December 5/31/2023:  Still having some thyroid issues  Doing well from a urology standpoint    10/4/2023:  Still working to optimize thyroid dosing  Doing well from a urology standpoint   She is scheduled for her partial nephrectomy on 10/23/2023 and returns today for updated imaging and review of the surgical plan  She remains quite anxious about prospect of having kidney cancer    11/8/2023:  ##Kidney Cancer Follow up##  Patient is status post Robotic Partial Nephrectomy on 10/23/2023.   Tumor was on the right side.   Maximal tumor dimension was 1.6 cm.    The histologic subtype was Clear Cell.    ISUP Grade 1.    Pathologic Stage T1a.    Tumor thrombus level  not applicable.    Tumor necrosis present: No.  Sarcomatoid features present: No.  Lymph Nodes Removed No.   Number of nodes removed N/A, number of node positive for cancer N/A.   Was the ipsilateral adrenal gland removed? No.  Neoadjuvant Chemotherapy? No.  Was Margin Positive? No.    The patient was not readmitted to the hospital since post-operative discharge.    Follow-up today for postop check  She is doing very well with no issues after surgery  Incisions are healing well    TODAY 11/18/2024:  Had an IV infiltration during the  CT  Otherwise doing well    PHYSICAL EXAM  Patient is a 79 year old  female   Vitals: There were no vitals taken for this visit.  There is no height or weight on file to calculate BMI.  General Appearance Adult:   Alert, no acute distress, oriented  Neuro: Alert, oriented, speech and mentation normal  Psych: affect and mood normal       Creatinine   Date Value Ref Range Status   07/17/2024 1.70 (H) 0.51 - 0.95 mg/dL Final   06/05/2019 1.15 (H) 0.52 - 1.04 mg/dL Final     Creatinine POCT   Date Value Ref Range Status   11/07/2024 2.2 (H) 0.5 - 1.0 mg/dL Final      PATHOLOGY:  10/23/2023:  Final Diagnosis   A.  Kidney, right, deep margin, excision-  Benign renal parenchyma and associated stroma, no evidence of malignancy     B.  Kidney, right, partial nephrectomy-  Clear-cell papillary renal cell carcinoma       IMAGING:  All pertinent imaging reviewed:     All imaging studies reviewed by me.  I personally reviewed these imaging films.  A formal report from radiology will follow.     CT RENAL MASS 10/2/2023:  FINDINGS:     LOWER CHEST: 5 mm subpleural nodule demonstrates long-term stability and is benign right lower lobe. No infiltrates or effusions.     HEPATOBILIARY: No significant mass or bile duct dilatation. No calcified gallstones.      PANCREAS: No significant mass, duct dilatation, or inflammatory change.     SPLEEN: Normal size.     ADRENAL GLANDS: No significant nodules.     RIGHT KIDNEY/URETER: Stable 1.7 cm enhancing lesion in the mid right kidney. No other renal lesions bilaterally. Tiny benign cyst requiring no specific follow-up. No hydronephrosis.     LEFT KIDNEY/URETER: No mass or hydronephrosis. Benign cyst requiring no follow-up. No stone disease.     BOWEL: No obstruction or inflammatory change.     LYMPH NODES: No lymphadenopathy.     VASCULATURE: No abdominal aortic aneurysm.     MUSCULOSKELETAL: No frankly destructive bony lesions.                                                                    IMPRESSION:  1.  Stable enhancing 1.7 cm renal mass.      CT ABD/PEL 11/7/2024  FINDINGS:     LOWER CHEST: A 4 mm right lower lobe nodule on series 13 image 8 is unchanged for over one year and can be considered benign. No follow-up needed per Fleischner Society guidelines.     HEPATOBILIARY: Normal.     PANCREAS: Normal.     SPLEEN: Normal.     ADRENAL GLANDS: Normal.     RIGHT KIDNEY/URETER: Normal right renal size. Few cortical scars. Interval resection of the previously identified enhancing right renal mass. No residual suspicious enhancement at the resection margins. No other enhancing right renal masses. A   subcentimeter benign cyst in the lower pole of the right kidney is unchanged. No hydronephrosis. No intrarenal calculi.     LEFT KIDNEY/URETER: Normal left renal size. Few cortical scars. No enhancing masses. There are 2 benign subcentimeter simple cysts which are unchanged. No hydronephrosis. No intrarenal calculi.     BOWEL: Tiny sliding-type hiatal hernia of doubtful clinical significance. Visualized bowel loops are normal caliber.     LYMPH NODES: Normal.     VASCULATURE: Renal veins are patent. Minimal calcified plaque in the abdominal aorta.     MUSCULOSKELETAL: Degenerative disc disease and facet arthritis in the lumbar spine.                                                   IMPRESSION:  1.  Interval resection of the previously identified enhancing right renal mass. No evidence of residual or recurrent neoplasm.  2.  No evidence of metastatic disease in the abdomen.  3.  Benign bilateral renal cysts. No follow-up needed.  4.  A 4 mm right lower lobe pulmonary nodule is unchanged for over one year and can be considered benign. No follow-up needed per Fleischner Society guidelines.      ASSESSMENT and PLAN  79-year-old female with history of breast cancer with an incidental small right renal mass now status post a right robotic partial nephrectomy on 10/23/2023    Clear-cell renal cell carcinoma,  pT1a  -Again reviewed her pathology from surgery which was removed with negative margins  - I reviewed her serum creatinine which had been fairly stable, however at the time of her CT scan was noted to be elevated at 2.2.  She was not drinking any water that day and has repeat labs ordered from Dr. Luna, her nephrologist  - I reviewed her updated CT scan and reviewed these images personally.  We discussed that this is very reassuring with no evidence of local recurrence or metastatic disease  - We discussed the need for continued surveillance imaging for 3 to 5 years  - Follow-up in 1 year with a CT chest abdomen pelvis  - We discussed the importance of maintaining good hydration      Time spent: 11 minutes spent on the date of the encounter doing chart review, history and exam, documentation and further activities as noted above.    Note copy attestation: the elements have been reviewed, edited as needed, and remain pertinent to today's visit.     Frank Guerrero MD   Urology  [facility]  [facility] Phone: [phone]  [facility] Phone: [phone]         Virtual Visit Details    Type of service:  Video Visit   Video Start Time: 8:14 AM  Video End Time:8:21 AM    Originating Location (pt. Location): Home    Distant Location (provider location):  On-site  Platform used for Video Visit: alaTest

## 2024-11-18 NOTE — LETTER
11/18/2024       RE: Jann Davidson  [address]  [facility] [address]     Dear Colleague,    Thank you for referring your patient, Jann Davidson, to the [facility] at [facility]. Please see a copy of my visit note below.    [place]  CHIEF COMPLAINT   It was my pleasure to see Jann Davidson who is a 79 year old female for follow-up of RIGHT renal mass.      HPI   Jann Davidson is a very pleasant 79 year old female     Initially seen 10/5/2022:  Jann Davidson is a 77 year old female who is being seen for evaluation of a small incidental right renal mass  Diagnosed with breast cancer last year  S/p Chemo/radiationa and surgery  She is doing very well from a breast cancer treatment standpoint and is now following up on these other findings  Incidental 1.1cm RIGHT complex renal mass vs cyst on PET from 12/2021 11/23/2022:  No significant changes  This is causing her some mental fatigue and worry  She also notes she is having a sigmoidoscopy in December 5/31/2023:  Still having some thyroid issues  Doing well from a urology standpoint    10/4/2023:  Still working to optimize thyroid dosing  Doing well from a urology standpoint   She is scheduled for her partial nephrectomy on 10/23/2023 and returns today for updated imaging and review of the surgical plan  She remains quite anxious about prospect of having kidney cancer    11/8/2023:  ##Kidney Cancer Follow up##  Patient is status post Robotic Partial Nephrectomy on 10/23/2023.   Tumor was on the right side.   Maximal tumor dimension was 1.6 cm.    The histologic subtype was Clear Cell.    ISUP Grade 1.    Pathologic Stage T1a.    Tumor thrombus level  not applicable.    Tumor necrosis present: No.  Sarcomatoid features present: No.  Lymph Nodes Removed No.   Number of nodes removed N/A, number of node positive for cancer N/A.   Was the ipsilateral adrenal gland removed? No.  Neoadjuvant  Chemotherapy? No.  Was Margin Positive? No.    The patient was not readmitted to the hospital since post-operative discharge.    Follow-up today for postop check  She is doing very well with no issues after surgery  Incisions are healing well    TODAY 11/18/2024:  Had an IV infiltration during the CT  Otherwise doing well    PHYSICAL EXAM  Patient is a 79 year old  female   Vitals: There were no vitals taken for this visit.  There is no height or weight on file to calculate BMI.  General Appearance Adult:   Alert, no acute distress, oriented  Neuro: Alert, oriented, speech and mentation normal  Psych: affect and mood normal       Creatinine   Date Value Ref Range Status   07/17/2024 1.70 (H) 0.51 - 0.95 mg/dL Final   06/05/2019 1.15 (H) 0.52 - 1.04 mg/dL Final     Creatinine POCT   Date Value Ref Range Status   11/07/2024 2.2 (H) 0.5 - 1.0 mg/dL Final      PATHOLOGY:  10/23/2023:  Final Diagnosis   A.  Kidney, right, deep margin, excision-  Benign renal parenchyma and associated stroma, no evidence of malignancy     B.  Kidney, right, partial nephrectomy-  Clear-cell papillary renal cell carcinoma       IMAGING:  All pertinent imaging reviewed:     All imaging studies reviewed by me.  I personally reviewed these imaging films.  A formal report from radiology will follow.     CT RENAL MASS 10/2/2023:  FINDINGS:     LOWER CHEST: 5 mm subpleural nodule demonstrates long-term stability and is benign right lower lobe. No infiltrates or effusions.     HEPATOBILIARY: No significant mass or bile duct dilatation. No calcified gallstones.      PANCREAS: No significant mass, duct dilatation, or inflammatory change.     SPLEEN: Normal size.     ADRENAL GLANDS: No significant nodules.     RIGHT KIDNEY/URETER: Stable 1.7 cm enhancing lesion in the mid right kidney. No other renal lesions bilaterally. Tiny benign cyst requiring no specific follow-up. No hydronephrosis.     LEFT KIDNEY/URETER: No mass or hydronephrosis. Benign cyst  requiring no follow-up. No stone disease.     BOWEL: No obstruction or inflammatory change.     LYMPH NODES: No lymphadenopathy.     VASCULATURE: No abdominal aortic aneurysm.     MUSCULOSKELETAL: No frankly destructive bony lesions.                                                                   IMPRESSION:  1.  Stable enhancing 1.7 cm renal mass.      CT ABD/PEL 11/7/2024  FINDINGS:     LOWER CHEST: A 4 mm right lower lobe nodule on series 13 image 8 is unchanged for over one year and can be considered benign. No follow-up needed per Fleischner Society guidelines.     HEPATOBILIARY: Normal.     PANCREAS: Normal.     SPLEEN: Normal.     ADRENAL GLANDS: Normal.     RIGHT KIDNEY/URETER: Normal right renal size. Few cortical scars. Interval resection of the previously identified enhancing right renal mass. No residual suspicious enhancement at the resection margins. No other enhancing right renal masses. A   subcentimeter benign cyst in the lower pole of the right kidney is unchanged. No hydronephrosis. No intrarenal calculi.     LEFT KIDNEY/URETER: Normal left renal size. Few cortical scars. No enhancing masses. There are 2 benign subcentimeter simple cysts which are unchanged. No hydronephrosis. No intrarenal calculi.     BOWEL: Tiny sliding-type hiatal hernia of doubtful clinical significance. Visualized bowel loops are normal caliber.     LYMPH NODES: Normal.     VASCULATURE: Renal veins are patent. Minimal calcified plaque in the abdominal aorta.     MUSCULOSKELETAL: Degenerative disc disease and facet arthritis in the lumbar spine.                                                   IMPRESSION:  1.  Interval resection of the previously identified enhancing right renal mass. No evidence of residual or recurrent neoplasm.  2.  No evidence of metastatic disease in the abdomen.  3.  Benign bilateral renal cysts. No follow-up needed.  4.  A 4 mm right lower lobe pulmonary nodule is unchanged for over one year and can  be considered benign. No follow-up needed per Fleischner Society guidelines.      ASSESSMENT and PLAN  79-year-old female with history of breast cancer with an incidental small right renal mass now status post a right robotic partial nephrectomy on 10/23/2023    Clear-cell renal cell carcinoma, pT1a  -Again reviewed her pathology from surgery which was removed with negative margins  - I reviewed her serum creatinine which had been fairly stable, however at the time of her CT scan was noted to be elevated at 2.2.  She was not drinking any water that day and has repeat labs ordered from Dr. Luna, her nephrologist  - I reviewed her updated CT scan and reviewed these images personally.  We discussed that this is very reassuring with no evidence of local recurrence or metastatic disease  - We discussed the need for continued surveillance imaging for 3 to 5 years  - Follow-up in 1 year with a CT chest abdomen pelvis  - We discussed the importance of maintaining good hydration      Time spent: 11 minutes spent on the date of the encounter doing chart review, history and exam, documentation and further activities as noted above.    Note copy attestation: the elements have been reviewed, edited as needed, and remain pertinent to today's visit.     Frank Guerrero MD   Urology  [facility]  [facility] Phone: [phone]  [facility] Phone: [phone]         Virtual Visit Details    Type of service:  Video Visit   Video Start Time: 8:14 AM  Video End Time:8:21 AM    Originating Location (pt. Location): Home    Distant Location (provider location):  On-site  Platform used for Video Visit: Norma      Again, thank you for allowing me to participate in the care of your patient.      Sincerely,    Frank Guerrero MD

## 2024-11-23 DIAGNOSIS — I10 HYPERTENSION GOAL BP (BLOOD PRESSURE) < 140/90: ICD-10-CM

## 2024-11-25 RX ORDER — TRIAMTERENE AND HYDROCHLOROTHIAZIDE 37.5; 25 MG/1; MG/1
0.5 TABLET ORAL DAILY
Qty: 45 TABLET | Refills: 3 | OUTPATIENT
Start: 2024-11-25

## 2024-11-27 DIAGNOSIS — I10 HYPERTENSION GOAL BP (BLOOD PRESSURE) < 140/90: ICD-10-CM

## 2024-11-27 RX ORDER — TRIAMTERENE AND HYDROCHLOROTHIAZIDE 37.5; 25 MG/1; MG/1
0.5 TABLET ORAL DAILY
Qty: 45 TABLET | Refills: 3 | OUTPATIENT
Start: 2024-11-27

## 2024-12-02 DIAGNOSIS — I10 HYPERTENSION GOAL BP (BLOOD PRESSURE) < 140/90: ICD-10-CM

## 2024-12-02 RX ORDER — TRIAMTERENE AND HYDROCHLOROTHIAZIDE 37.5; 25 MG/1; MG/1
0.5 TABLET ORAL DAILY
Qty: 45 TABLET | Refills: 3 | OUTPATIENT
Start: 2024-12-02

## 2024-12-11 ENCOUNTER — LAB (OUTPATIENT)
Dept: LAB | Facility: CLINIC | Age: 79
End: 2024-12-11
Payer: MEDICARE

## 2024-12-11 DIAGNOSIS — N18.32 CHRONIC KIDNEY DISEASE (CKD) STAGE G3B/A1, MODERATELY DECREASED GLOMERULAR FILTRATION RATE (GFR) BETWEEN 30-44 ML/MIN/1.73 SQUARE METER AND ALBUMINURIA CREATININE RATIO LESS THAN 30 MG/G (H): ICD-10-CM

## 2024-12-11 DIAGNOSIS — N25.81 SECONDARY HYPERPARATHYROIDISM, RENAL (H): ICD-10-CM

## 2024-12-11 PROCEDURE — 80069 RENAL FUNCTION PANEL: CPT

## 2024-12-11 PROCEDURE — 83970 ASSAY OF PARATHORMONE: CPT

## 2024-12-11 PROCEDURE — 82306 VITAMIN D 25 HYDROXY: CPT

## 2024-12-11 PROCEDURE — 36415 COLL VENOUS BLD VENIPUNCTURE: CPT

## 2024-12-12 DIAGNOSIS — D70.1 CHEMOTHERAPY-INDUCED NEUTROPENIA (H): ICD-10-CM

## 2024-12-12 DIAGNOSIS — T45.1X5A CHEMOTHERAPY-INDUCED NEUTROPENIA (H): ICD-10-CM

## 2024-12-12 DIAGNOSIS — F41.9 ANXIETY: ICD-10-CM

## 2024-12-12 LAB
ALBUMIN SERPL BCG-MCNC: 4.2 G/DL (ref 3.5–5.2)
ANION GAP SERPL CALCULATED.3IONS-SCNC: 12 MMOL/L (ref 7–15)
BUN SERPL-MCNC: 38 MG/DL (ref 8–23)
CALCIUM SERPL-MCNC: 10.1 MG/DL (ref 8.8–10.4)
CHLORIDE SERPL-SCNC: 100 MMOL/L (ref 98–107)
CREAT SERPL-MCNC: 1.84 MG/DL (ref 0.51–0.95)
EGFRCR SERPLBLD CKD-EPI 2021: 27 ML/MIN/1.73M2
GLUCOSE SERPL-MCNC: 96 MG/DL (ref 70–99)
HCO3 SERPL-SCNC: 24 MMOL/L (ref 22–29)
PHOSPHATE SERPL-MCNC: 3.8 MG/DL (ref 2.5–4.5)
POTASSIUM SERPL-SCNC: 4.5 MMOL/L (ref 3.4–5.3)
PTH-INTACT SERPL-MCNC: 41 PG/ML (ref 15–65)
SODIUM SERPL-SCNC: 136 MMOL/L (ref 135–145)
VIT D+METAB SERPL-MCNC: 44 NG/ML (ref 20–50)

## 2024-12-12 RX ORDER — LORAZEPAM 0.5 MG/1
0.5 TABLET ORAL EVERY 6 HOURS PRN
Qty: 45 TABLET | Refills: 0 | Status: SHIPPED | OUTPATIENT
Start: 2024-12-12

## 2024-12-12 NOTE — TELEPHONE ENCOUNTER
Patient is calling to check on status of Lorazepam 0.5 mg refill. [facility] pharmacy sent in refill request a couple of days ago. She only has one tab left and would like to have this filled today.    Lorazepam 0.5 mg  Last Written Prescription Date:  10/24/24  Last Fill Quantity: 45,  # refills: 0   Last office visit: 5/1/24   Future Office Visit:  2/12/25    Routing to care team for review and approval.     Franca Solitario, RN, BSN, PHN, OCN  [facility]

## 2025-01-22 DIAGNOSIS — F41.9 ANXIETY: ICD-10-CM

## 2025-01-22 DIAGNOSIS — T45.1X5A CHEMOTHERAPY-INDUCED NEUTROPENIA: ICD-10-CM

## 2025-01-22 DIAGNOSIS — D70.1 CHEMOTHERAPY-INDUCED NEUTROPENIA: ICD-10-CM

## 2025-01-22 RX ORDER — LORAZEPAM 0.5 MG/1
0.5 TABLET ORAL EVERY 6 HOURS PRN
Qty: 45 TABLET | Refills: 0 | Status: SHIPPED | OUTPATIENT
Start: 2025-01-22

## 2025-01-29 ENCOUNTER — HOSPITAL ENCOUNTER (OUTPATIENT)
Dept: MAMMOGRAPHY | Facility: CLINIC | Age: 80
Discharge: HOME | End: 2025-01-29
Attending: INTERNAL MEDICINE
Payer: MEDICARE

## 2025-01-29 ENCOUNTER — LAB (OUTPATIENT)
Dept: LAB | Facility: CLINIC | Age: 80
End: 2025-01-29
Payer: MEDICARE

## 2025-01-29 DIAGNOSIS — C50.512 MALIGNANT NEOPLASM OF LOWER-OUTER QUADRANT OF LEFT BREAST OF FEMALE, ESTROGEN RECEPTOR NEGATIVE (H): ICD-10-CM

## 2025-01-29 DIAGNOSIS — Z17.1 MALIGNANT NEOPLASM OF LOWER-OUTER QUADRANT OF LEFT BREAST OF FEMALE, ESTROGEN RECEPTOR NEGATIVE (H): ICD-10-CM

## 2025-01-29 DIAGNOSIS — R80.9 PROTEINURIA: ICD-10-CM

## 2025-01-29 DIAGNOSIS — N18.32 STAGE 3B CHRONIC KIDNEY DISEASE (H): ICD-10-CM

## 2025-01-29 PROCEDURE — 84156 ASSAY OF PROTEIN URINE: CPT

## 2025-01-29 PROCEDURE — 36415 COLL VENOUS BLD VENIPUNCTURE: CPT

## 2025-01-29 PROCEDURE — 82570 ASSAY OF URINE CREATININE: CPT

## 2025-01-29 PROCEDURE — 77066 DX MAMMO INCL CAD BI: CPT

## 2025-01-29 PROCEDURE — 82043 UR ALBUMIN QUANTITATIVE: CPT

## 2025-01-29 PROCEDURE — 80069 RENAL FUNCTION PANEL: CPT

## 2025-01-30 LAB
ALBUMIN MFR UR ELPH: 9.1 MG/DL
ALBUMIN SERPL BCG-MCNC: 4.4 G/DL (ref 3.5–5.2)
ANION GAP SERPL CALCULATED.3IONS-SCNC: 11 MMOL/L (ref 7–15)
BUN SERPL-MCNC: 37.4 MG/DL (ref 8–23)
CALCIUM SERPL-MCNC: 10 MG/DL (ref 8.8–10.4)
CHLORIDE SERPL-SCNC: 99 MMOL/L (ref 98–107)
CREAT SERPL-MCNC: 1.57 MG/DL (ref 0.51–0.95)
CREAT UR-MCNC: 126 MG/DL
CREAT UR-MCNC: 126 MG/DL
EGFRCR SERPLBLD CKD-EPI 2021: 33 ML/MIN/1.73M2
GLUCOSE SERPL-MCNC: 105 MG/DL (ref 70–99)
HCO3 SERPL-SCNC: 26 MMOL/L (ref 22–29)
MICROALBUMIN UR-MCNC: 13.1 MG/L
MICROALBUMIN/CREAT UR: 10.4 MG/G CR (ref 0–25)
PHOSPHATE SERPL-MCNC: 4 MG/DL (ref 2.5–4.5)
POTASSIUM SERPL-SCNC: 4 MMOL/L (ref 3.4–5.3)
PROT/CREAT 24H UR: 0.07 MG/MG CR (ref 0–0.2)
SODIUM SERPL-SCNC: 136 MMOL/L (ref 135–145)

## 2025-02-14 DIAGNOSIS — G47.00 PERSISTENT DISORDER OF INITIATING OR MAINTAINING SLEEP: Chronic | ICD-10-CM

## 2025-02-14 NOTE — TELEPHONE ENCOUNTER
Pt calling regarding refill request. Pt informed that was have received a request from the pharmacy and this has been sent to the provider. Thanks    Rosemarie Del Valle RN  [facility]

## 2025-02-15 RX ORDER — ZOLPIDEM TARTRATE 5 MG/1
5 TABLET ORAL
Qty: 30 TABLET | Refills: 5 | Status: SHIPPED | OUTPATIENT
Start: 2025-02-15

## 2025-03-11 ENCOUNTER — TELEPHONE (OUTPATIENT)
Dept: ONCOLOGY | Facility: CLINIC | Age: 80
End: 2025-03-11
Payer: MEDICARE

## 2025-03-11 DIAGNOSIS — T45.1X5A CHEMOTHERAPY-INDUCED NEUTROPENIA: ICD-10-CM

## 2025-03-11 DIAGNOSIS — F41.9 ANXIETY: ICD-10-CM

## 2025-03-11 DIAGNOSIS — D70.1 CHEMOTHERAPY-INDUCED NEUTROPENIA: ICD-10-CM

## 2025-03-11 RX ORDER — LORAZEPAM 0.5 MG/1
0.5 TABLET ORAL EVERY 6 HOURS PRN
Qty: 45 TABLET | Refills: 0 | Status: SHIPPED | OUTPATIENT
Start: 2025-03-11

## 2025-03-11 NOTE — CONFIDENTIAL NOTE
[facility] calling to request refill of lorazepam for pt. Will send to provider to address refill for lorazepam.

## 2025-03-12 NOTE — TELEPHONE ENCOUNTER
Per chart review, Dr Mcmullen sent in Rx for lorazepam yesterday.    LORazepam (ATIVAN) 0.5 MG tablet 45 tablet 0 3/11/2025 -- No   Sig - Route: Take 1 tablet (0.5 mg) by mouth every 6 hours as needed for anxiety, nausea or sleep. - Oral   Sent to pharmacy as: LORazepam 0.5 MG Oral Tablet (ATIVAN)   Class: E-Prescribe   Order: [id number]   E-Prescribing Status: Receipt confirmed by pharmacy (3/11/2025  3:42 PM CDT)     Ivana Meneses RN on 3/12/2025 at 9:56 AM

## 2025-03-18 DIAGNOSIS — F41.3 OTHER MIXED ANXIETY DISORDERS: ICD-10-CM

## 2025-03-18 DIAGNOSIS — F33.1 MAJOR DEPRESSIVE DISORDER, RECURRENT EPISODE, MODERATE (H): ICD-10-CM

## 2025-03-18 DIAGNOSIS — G47.00 PERSISTENT DISORDER OF INITIATING OR MAINTAINING SLEEP: ICD-10-CM

## 2025-03-18 NOTE — TELEPHONE ENCOUNTER
Medication Question or Refill    Contacts       Contact Date/Time Type Contact Phone/Fax    03/18/2025 01:28 PM CDT Phone (Incoming) Jann Davidson (Self) [phone] (H)            What medication are you calling about (include dose and sig)?: Parnate 10 mg    Preferred Pharmacy:   [facility]  User for all [place] all locations.  [place]  [place] pt must call [phone] to  verify address.  Phone: [phone] Fax: [phone]    [id number] - [place], [place] - [address]  [address] [facility] [address]  Phone: [phone] Fax: [phone]    [facility] [address]  [address] [place] [address]  Phone: [phone] Fax: [phone]      Controlled Substance Agreement on file:   CSA -- Patient Level:    CSA: None found at the patient level.       Who prescribed the medication?: Dr Oliva    Do you need a refill? Yes    When did you use the medication last? NA    Patient offered an appointment? Yes    Do you have any questions or concerns?  Yes: Patient called and stated they will not manufacturing this medication anymore and a new prescription needs to be sent over for the generic to [facility].       Could we send this information to you in [place] or would you prefer to receive a phone call?:   Patient would prefer a phone call   Okay to leave a detailed message?: Yes at Home number on file [phone] (home)

## 2025-03-19 RX ORDER — TRANYLCYPROMINE SULFATE 10 MG/1
30 TABLET, FILM COATED ORAL EVERY MORNING
Qty: 270 TABLET | Refills: 3 | Status: SHIPPED | OUTPATIENT
Start: 2025-03-19

## 2025-03-19 RX ORDER — OXAZEPAM 15 MG/1
CAPSULE ORAL
Qty: 30 CAPSULE | Refills: 5 | Status: SHIPPED | OUTPATIENT
Start: 2025-03-19

## 2025-04-13 ENCOUNTER — HEALTH MAINTENANCE LETTER (OUTPATIENT)
Age: 80
End: 2025-04-13

## 2025-04-18 DIAGNOSIS — F41.9 ANXIETY: ICD-10-CM

## 2025-04-18 DIAGNOSIS — T45.1X5A CHEMOTHERAPY-INDUCED NEUTROPENIA: ICD-10-CM

## 2025-04-18 DIAGNOSIS — D70.1 CHEMOTHERAPY-INDUCED NEUTROPENIA: ICD-10-CM

## 2025-04-21 RX ORDER — LORAZEPAM 0.5 MG/1
0.5 TABLET ORAL EVERY 6 HOURS PRN
Qty: 45 TABLET | Refills: 0 | Status: SHIPPED | OUTPATIENT
Start: 2025-04-21

## 2025-05-20 NOTE — PROGRESS NOTES
[facility] [place]    Hematology/Oncology Established Patient Note      Today's Date: 5/28/2025    Reason for follow-up:  Left breast cancer, triple negative.    HISTORY OF PRESENT ILLNESS: Jann Davidson is a 79 year old female who presents with the following oncologic history:  1. 10/26/2021: Mammogram showed calcifications in the left lateral breast; right breast negative.  2. 11/17/2021: Left diagnostic mammogram showed cluster of pleomorphic calcifications at 4:00, 6 cm from nipple, measuring 1.3 cm.  3. 12/3/2021: Stereotactic left breast biopsy at 4:00, 6 cm from nipple showed grade 2 invasive ductal carcinoma with micropapillary features, extensive lymphovascular invasion present, grade 2-3 focal DCIS, LCIS, ER negative, WI negative, HER-2/maxime FISH negative.  4. 12/10/2021: Breast MRI showed oval mass 2 x 2 x 1.5 cm at 4:00, 6 cm from nipple in left breast reflecting biopsy cavity with post-biopsy changes; prominent 2.5 cm low left level 1 axillary lymph node.  5.  12/28/2021: PET/CT scan showed FDG avid focus in left lower outer breast, hypermetabolic left axillary adenopathy, moderate FDG uptake at level off anus, exophytic right renal 1.1 cm mass, slowly increasing in size since 2016 concerning for growing renal cell carcinoma.  6. 1/18/2022: Started neoadjuvant chemotherapy with weekly paclitaxel + carboplatin and every 3-week pembrolizumab as per KEYNOTE-522 study.  7. 4/19/2022: Breast MRI showed no residual enhancement at site of primary malignancy in left breast at 4:00; no residual left axillary adenopathy. Right breast negative.  8. 5/6/2022: Completed cycle 12 paclitaxel with pembrolizumab. Surgery delayed due to 2 hospitalizations for rash and peripheral neuropathy.  9. 6/27/2022: Underwent left breast lumpectomy and left axillary sentinel lymph node excision under care of Dr. Tatianna Rai.  Pathology showed no residual invasive carcinoma in left breast; residual LCIS and atypical  lobular hyperplasia present; one of 2 lymph nodes with isolated tumor cells measuring 0.087 mm, focal fibrosis consistent with treatment effect; RCB-I, ypT0-pN0(i+).  10. 8/08/2022: Started raloxifene for LCIS and atypical lobular hyperplasia.  11. 8/10/2022-9/22/2022: Completion of adjuvant radiation therapy.  12. 8/25/2022-2/10/2023: Completed adjuvant pembrolizumab x 9 cycles.  13. 5/24/2023: CT scan showed exophytic mass of posterior upper pole right kidney unchanged since 11/10/2022.  14. 10/23/2023: Right partial nephrectomy with Dr. Frank Guerrero.  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    INTERVAL HISTORY:  Jann reports she has developed eczema and had a precancerous skin lesion removed as well.      REVIEW OF SYSTEMS:   14 point ROS was reviewed and is negative other than as noted above in HPI.       HOME MEDICATIONS:  Current Outpatient Medications   Medication Sig Dispense Refill    acetaminophen (TYLENOL) 500 MG tablet Take 1-2 tablets by mouth every 6 hours as needed for mild pain      atorvastatin (LIPITOR) 10 MG tablet TAKE 1 TABLET (10 MG) BY MOUTH ONCE DAILY 90 tablet 2    levothyroxine (SYNTHROID/LEVOTHROID) 75 MCG tablet TAKE 1 TABLET (75 MCG) BY MOUTH DAILY 90 tablet 3    loratadine (CLARITIN) 10 MG tablet Take 10 mg by mouth daily      LORazepam (ATIVAN) 0.5 MG tablet Take 1 tablet (0.5 mg) by mouth every 6 hours as needed for anxiety, nausea or sleep. 45 tablet 0    Magnesium 400 MG TABS Take 2 tablets by mouth daily      oxazepam (SERAX) 15 MG capsule TAKE 1 CAPSULE (15 MG) BY MOUTH NIGHTLY AS NEEDED FOR ANXIETY. 30 capsule 5    PARNATE 10 MG tablet Take 3 tablets (30 mg) by mouth every morning. (3 x 10 mg = 30 mg) 270 tablet 3    polyethylene glycol (MIRALAX) 17 GM/Dose powder Take 1 Capful by mouth daily      raloxifene (EVISTA) 60 MG tablet Take 1 tablet (60 mg) by mouth daily. 90 tablet 3     triamcinolone (KENALOG) 0.1 % external cream APPLY TOPICALLY 2 TIMES DAILY AS NEEDED. 15 g 0    triamterene-HCTZ (MAXZIDE-25) 37.5-25 MG tablet Take 0.5 tablets by mouth daily. 45 tablet 1    Vitamin D3 (CHOLECALCIFEROL) 25 mcg (1000 units) tablet Take 1 tablet by mouth daily      zolpidem (AMBIEN) 5 MG tablet TAKE 1 TABLET (5 MG) BY MOUTH NIGHTLY AS NEEDED FOR SLEEP 30 tablet 5         ALLERGIES:  Allergies   Allergen Reactions    Tegaderm Transparent Dressing (Informational Only) Blisters    Lyrica [Pregabalin] Rash         PAST MEDICAL HISTORY:  Past Medical History:   Diagnosis Date    Breast cancer (H)     CKD (chronic kidney disease) stage 3, GFR 30-59 ml/min (H)     Depression with anxiety     Depressive disorder 1985    Been on Parnate antidepressant for 35 years    Gout     Hypertension goal BP (blood pressure) < 140/90     Recurrent sinusitis 2013    Thyroid disease     Keytruda induced     Gynecologic history:  Age of menarche at 11; LMP ;  (one son), 1st pregnancy at age 19; no HRT.    PAST SURGICAL HISTORY:  Past Surgical History:   Procedure Laterality Date    BIOPSY NODE SENTINEL Left 2022    Procedure: left axillary sentinel lymph node biopsy;  Surgeon: Tatianna Rai MD;  Location:  OR    BREAST BIOPSY, RT/LT      Breat Biopsy RT/LT    BREAST SURGERY  2022    Tumor removed    COLONOSCOPY  10/01/2003    COLONOSCOPY  2014    Procedure: COLONOSCOPY;  COLONOSCOPY ;  Surgeon: Gaurav Shaffer MD;  Location:  GI    DAVINCI NEPHRECTOMY PARTIAL Right 10/23/2023    Procedure: RIGHT ROBOTIC ASSISTED LAPAROSOCPIC PARTIAL NEPHRECTOMY WITH INTRA-OPERATIVE RENAL ULTRASOUND;  Surgeon: Frank Guerrero MD;  Location:  OR    IR CHEST PORT PLACEMENT > 5 YRS OF AGE  2021    IR PORT REMOVAL RIGHT  2023    LUMPECTOMY BREAST WITH SEED LOCALIZATION Left 2022    Procedure: Radiofrequency tag localized left breast lumpectomy with radiofrequency tag  localized excision of left axillary lymph node;  Surgeon: Tatianna Rai MD;  Location: [facility] OR    RECONSTRUCT EYELID           SOCIAL HISTORY:  Social History     Socioeconomic History    Marital status:      Spouse name: Not on file    Number of children: Not on file    Years of education: Not on file    Highest education level: Not on file   Occupational History    Not on file   Tobacco Use    Smoking status: Former     Current packs/day: 0.00     Average packs/day: 1 pack/day for 10.0 years (10.0 ttl pk-yrs)     Types: Cigarettes     Start date: 9/15/1963     Quit date: 10/30/1972     Years since quittin.5     Passive exposure: Never    Smokeless tobacco: Never    Tobacco comments:     I have not smoked since 10/30/1972   Vaping Use    Vaping status: Never Used   Substance and Sexual Activity    Alcohol use: Yes     Comment: I drink white wine with dinner    Drug use: No    Sexual activity: Not Currently     Partners: Male     Birth control/protection: I.U.D., Pill     Comment: No birth control used since .   Other Topics Concern    Parent/sibling w/ CABG, MI or angioplasty before 65F 55M? No   Social History Narrative    Not on file     Social Drivers of Health     Financial Resource Strain: Low Risk  (5/10/2024)    Financial Resource Strain     Within the past 12 months, have you or your family members you live with been unable to get utilities (heat, electricity) when it was really needed?: No   Food Insecurity: Low Risk  (5/10/2024)    Food Insecurity     Within the past 12 months, did you worry that your food would run out before you got money to buy more?: No     Within the past 12 months, did the food you bought just not last and you didn t have money to get more?: No   Transportation Needs: High Risk (5/10/2024)    Transportation Needs     Within the past 12 months, has lack of transportation kept you from medical appointments, getting your medicines, non-medical meetings or  "appointments, work, or from getting things that you need?: Yes   Physical Activity: Inactive (5/10/2024)    Exercise Vital Sign     Days of Exercise per Week: 0 days     Minutes of Exercise per Session: 0 min   Stress: Stress Concern Present (5/10/2024)    [place] [employer] - Occupational Stress Questionnaire     Feeling of Stress : To some extent   Social Connections: Unknown (5/10/2024)    Social Connection and Isolation Panel [NHANES]     Frequency of Communication with Friends and Family: Not on file     Frequency of Social Gatherings with Friends and Family: Patient declined     Attends L[employer] day Services: Not on file     Active Member of Clubs or Organizations: Not on file     Attends Club or Organization Meetings: Not on file     Marital Status: Not on file   Interpersonal Safety: Low Risk  (5/15/2024)    Interpersonal Safety     Do you feel physically and emotionally safe where you currently live?: Yes     Within the past 12 months, have you been hit, slapped, kicked or otherwise physically hurt by someone?: No     Within the past 12 months, have you been humiliated or emotionally abused in other ways by your partner or ex-partner?: No   Housing Stability: Low Risk  (5/10/2024)    Housing Stability     Do you have housing? : Yes     Are you worried about losing your housing?: No         FAMILY HISTORY:  Family History   Problem Relation Age of Onset    Cancer Mother         uterine    Breast Cancer Mother     Hypertension Mother     Diabetes Paternal Grandmother          PHYSICAL EXAM:  Vital signs:  /83   Pulse 87   Resp 16   Ht 1.6 m (5' 3\")   Wt 61.2 kg (135 lb)   SpO2 100%   BMI 23.91 kg/m     GENERAL: No acute distress.  EYES: No scleral icterus. No overt erythema.  LYMPH: No palpable lymphadenopathy in the cervical, supraclavicular, axillary regions.  BREAST: No palpable masses in either breast. Postlumpectomy stable changes in the right breast.  Nipples are everted " bilaterally with no discharge. No erythema, ulceration, or dimpling of the skin.  RESPIRATORY: No audible cough or wheezing.  ABDOMEN: Soft, nontender, nondistended, with no palpable hepatosplenomegaly.   MUSCULOSKELETAL: No clubbing, cyanosis or edema.  SKIN: No rashes or jaundice.  NEUROLOGIC: Alert, answers questions appropriately; no focal deficits.  PSYCHIATRIC: Normal affect; anxious.    LABS:  CBC RESULTS:   Recent Labs   Lab Test 05/15/24  1733   WBC 5.4   RBC 3.75*   HGB 11.6*   HCT 36.7   MCV 98   MCH 30.9   MCHC 31.6   RDW 12.3         Last Comprehensive Metabolic Panel:  Sodium   Date Value Ref Range Status   01/29/2025 136 135 - 145 mmol/L Final   06/05/2019 142 133 - 144 mmol/L Final     Potassium   Date Value Ref Range Status   01/29/2025 4.0 3.4 - 5.3 mmol/L Final   12/30/2022 4.0 3.4 - 5.3 mmol/L Final   06/05/2019 3.7 3.4 - 5.3 mmol/L Final     Chloride   Date Value Ref Range Status   01/29/2025 99 98 - 107 mmol/L Final   12/30/2022 110 (H) 94 - 109 mmol/L Final   06/05/2019 110 (H) 94 - 109 mmol/L Final     Carbon Dioxide   Date Value Ref Range Status   06/05/2019 24 20 - 32 mmol/L Final     Carbon Dioxide (CO2)   Date Value Ref Range Status   01/29/2025 26 22 - 29 mmol/L Final   12/30/2022 24 20 - 32 mmol/L Final     Anion Gap   Date Value Ref Range Status   01/29/2025 11 7 - 15 mmol/L Final   12/30/2022 7 3 - 14 mmol/L Final   06/05/2019 8 3 - 14 mmol/L Final     Glucose   Date Value Ref Range Status   01/29/2025 105 (H) 70 - 99 mg/dL Final   12/30/2022 103 (H) 70 - 99 mg/dL Final   06/05/2019 93 70 - 99 mg/dL Final     Comment:     Fasting specimen     GLUCOSE BY METER POCT   Date Value Ref Range Status   10/24/2023 109 (H) 70 - 99 mg/dL Final     Urea Nitrogen   Date Value Ref Range Status   01/29/2025 37.4 (H) 8.0 - 23.0 mg/dL Final   12/30/2022 29 7 - 30 mg/dL Final   06/05/2019 32 (H) 7 - 30 mg/dL Final     Creatinine   Date Value Ref Range Status   01/29/2025 1.57 (H) 0.51 - 0.95  mg/dL Final   06/05/2019 1.15 (H) 0.52 - 1.04 mg/dL Final     GFR Estimate   Date Value Ref Range Status   01/29/2025 33 (L) >60 mL/min/1.73m2 Final     Comment:     eGFR calculated using 2021 CKD-EPI equation.   06/05/2019 47 (L) >60 mL/min/[1.73_m2] Final     Comment:     Non  GFR Calc  Starting 12/18/2018, serum creatinine based estimated GFR (eGFR) will be   calculated using the Chronic Kidney Disease Epidemiology Collaboration   (CKD-EPI) equation.       GFR, ESTIMATED POCT   Date Value Ref Range Status   11/07/2024 22 (L) >60 mL/min/1.73m2 Final     Calcium   Date Value Ref Range Status   01/29/2025 10.0 8.8 - 10.4 mg/dL Final   06/05/2019 9.2 8.5 - 10.1 mg/dL Final     Bilirubin Total   Date Value Ref Range Status   07/17/2024 <0.2 <=1.2 mg/dL Final   06/05/2019 0.4 0.2 - 1.3 mg/dL Final     Alkaline Phosphatase   Date Value Ref Range Status   07/17/2024 70 40 - 150 U/L Final   06/05/2019 85 40 - 150 U/L Final     ALT   Date Value Ref Range Status   07/17/2024 21 0 - 50 U/L Final   06/05/2019 33 0 - 50 U/L Final     AST   Date Value Ref Range Status   07/17/2024 30 0 - 45 U/L Final   06/05/2019 33 0 - 45 U/L Final     5/24/2023: TSH = 29.92 -> 6.46  Free T4 = 0.7 => 1.19    PATHOLOGY:  Reviewed as per HPI.    ASSESSMENT/PLAN:  Jann Davidson is a 79 year old female with the following issues:  1. 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 Left breast LCIS and atypical lobular hyperplasia  --Jann completed neoadjuvant chemotherapy with paclitaxel and carboplatin for 12 weeks with every 3-week pembrolizumab. Given that her 4/19/2022 breast MRI showed no residual enhancement -- I had advised foregoing chemotherapy with Adriamycin and Cytoxan given her age and low likelihood of tolerating these drugs.  --6/27/2022 Final pathology from her lumpectomy  and sentinel lymph node excision showed near-complete response to neoadjuvant chemotherapy with just isolated tumor cells in one lymph node.  She had residual LCIS and atypical lobular hyperplasia.  --She then completed adjuvant pembrolizumab on 2/10/2023 for 9 cycles as per the KEYNOTE-522 trial.  --She completed adjuvant radiation therapy on 9/22/2022 with Dr. Giles.  --Genetic testing negative.  --Jann has no clinical evidence for recurrent breast cancer by physical exam from today or diagnostic bilateral mammogram reviewed from 1/29/2025.  --Continue to alternate mammogram with breast MRI for high risk surveillance given findings of LCIS and ALH, which are markers for increased risk of additional breast cancer in the future.  Due for breast MRI in 7/2025 and mammogram end of 1/2026. However, her son will be visiting this summer so she would like to push her breast MRI to end of 9/2025 which is fine.  She also requests only diagnostic mammograms to reduce stress of callbacks for diagnostic views.  --Continue raloxifene for 5 years for risk reduction given the findings of LCIS and ALH. She is tolerating this well without side effects.    3. Chronic kidney disease, stage 3  --Creatinine stable.  --She follows with Dr. Luna.    4. FDG uptake at anal canal  --She has history of anal fissure from 4/8/2014 colonoscopy.  --PET scan showed uptake at level of anus and could represent hemorrhoid, fissure, malignancy, or be physiologic.  --She met with colorectal Dr. Cervantes on  12/16/2022 with flex sig that showed no evidence of neoplasia in rectum.  --Due for colonoscopy in 2025.    5. Stage I, [id number], clear cell papillary renal cell carcinoma  --She is s/p right robotic assisted laparoscopic partial nephrectomy on 10/23/2023. Margins negative  --She will follow-up with Dr. Guerrero next month with surveillance imaging.    6. Pulmonary nodules  --PET scan showed scattered small bilateral pulmonary nodules that are  unchanged since 10/24/2018 and most likely benign. No further CT chest needed.    7. Depression/anxiety  --Stable.  --She would like to continue on Parnate and not change her antidepressant.  --She takes oxazepam together with zolpidem.    --She could use lorazepam (Ativan) as long as she takes this 6 hours from her other benzodiazepenes.    8. Hypothyroidism  --Likely related to pembrolizumab.    --1/24/24 TSH 2.7.  --Continue levothyroxine 75 mcg daily.  --She will have her thyroid function followed with her PCP.    9. Vitamin D deficiency   --7/5/2023 vitamin D level very low at 14.  --Advised vitamin D 2000 international unit(s) daily for 2 weeks then 1000 international unit(s) daily.    --1/24/24 Vitamin D level now improved at 40. Continue supplementation.    10. Multiple skin lesions  --Unclear etiology.  She will see dermatology for evaluation 12/23/24.    Return in 6 months.    Total time spent today: 30 minutes in chart review, patient evaluation, counseling, documentation, test and/or medication/prescription orders, and coordination of care.     The longitudinal plan of care for the diagnosis(es)/condition(s) as documented were addressed during this visit. Due to the added complexity in care, I will continue to support Jann in the subsequent management and with ongoing continuity of care.

## 2025-05-24 DIAGNOSIS — E03.2 HYPOTHYROIDISM DUE TO MEDICATION: ICD-10-CM

## 2025-05-27 RX ORDER — LEVOTHYROXINE SODIUM 75 UG/1
75 TABLET ORAL DAILY
Qty: 90 TABLET | Refills: 3 | Status: SHIPPED | OUTPATIENT
Start: 2025-05-27

## 2025-05-28 ENCOUNTER — ONCOLOGY VISIT (OUTPATIENT)
Dept: ONCOLOGY | Facility: CLINIC | Age: 80
End: 2025-05-28
Attending: INTERNAL MEDICINE
Payer: MEDICARE

## 2025-05-28 VITALS
BODY MASS INDEX: 23.92 KG/M2 | HEIGHT: 63 IN | HEART RATE: 87 BPM | WEIGHT: 135 LBS | SYSTOLIC BLOOD PRESSURE: 132 MMHG | OXYGEN SATURATION: 100 % | RESPIRATION RATE: 16 BRPM | DIASTOLIC BLOOD PRESSURE: 83 MMHG

## 2025-05-28 DIAGNOSIS — D05.02 NEOPLASM OF LEFT BREAST, PRIMARY TUMOR STAGING CATEGORY TIS: LOBULAR CARCINOMA IN SITU (LCIS): ICD-10-CM

## 2025-05-28 DIAGNOSIS — N60.92 ATYPICAL LOBULAR HYPERPLASIA (ALH) OF LEFT BREAST: ICD-10-CM

## 2025-05-28 DIAGNOSIS — Z17.1 MALIGNANT NEOPLASM OF LOWER-OUTER QUADRANT OF LEFT BREAST OF FEMALE, ESTROGEN RECEPTOR NEGATIVE (H): Primary | ICD-10-CM

## 2025-05-28 DIAGNOSIS — C50.512 MALIGNANT NEOPLASM OF LOWER-OUTER QUADRANT OF LEFT BREAST OF FEMALE, ESTROGEN RECEPTOR NEGATIVE (H): Primary | ICD-10-CM

## 2025-05-28 PROCEDURE — 99214 OFFICE O/P EST MOD 30 MIN: CPT | Performed by: INTERNAL MEDICINE

## 2025-05-28 PROCEDURE — G2211 COMPLEX E/M VISIT ADD ON: HCPCS | Performed by: INTERNAL MEDICINE

## 2025-05-28 PROCEDURE — G0463 HOSPITAL OUTPT CLINIC VISIT: HCPCS | Performed by: INTERNAL MEDICINE

## 2025-05-28 ASSESSMENT — PAIN SCALES - GENERAL: PAINLEVEL_OUTOF10: NO PAIN (0)

## 2025-05-28 NOTE — LETTER
5/28/2025      Jann Davidson  [address] [facility] [address]      Dear Colleague,    Thank you for referring your patient, Jann Davidson, to the [facility]. Please see a copy of my visit note below.    [facility]    Hematology/Oncology Established Patient Note      Today's Date: 5/28/2025    Reason for follow-up:  Left breast cancer, triple negative.    HISTORY OF PRESENT ILLNESS: Jann Davidson is a 79 year old female who presents with the following oncologic history:  1. 10/26/2021: Mammogram showed calcifications in the left lateral breast; right breast negative.  2. 11/17/2021: Left diagnostic mammogram showed cluster of pleomorphic calcifications at 4:00, 6 cm from nipple, measuring 1.3 cm.  3. 12/3/2021: Stereotactic left breast biopsy at 4:00, 6 cm from nipple showed grade 2 invasive ductal carcinoma with micropapillary features, extensive lymphovascular invasion present, grade 2-3 focal DCIS, LCIS, ER negative, SD negative, HER-2/maxime FISH negative.  4. 12/10/2021: Breast MRI showed oval mass 2 x 2 x 1.5 cm at 4:00, 6 cm from nipple in left breast reflecting biopsy cavity with post-biopsy changes; prominent 2.5 cm low left level 1 axillary lymph node.  5.  12/28/2021: PET/CT scan showed FDG avid focus in left lower outer breast, hypermetabolic left axillary adenopathy, moderate FDG uptake at level off anus, exophytic right renal 1.1 cm mass, slowly increasing in size since 2016 concerning for growing renal cell carcinoma.  6. 1/18/2022: Started neoadjuvant chemotherapy with weekly paclitaxel + carboplatin and every 3-week pembrolizumab as per KEYNOTE-522 study.  7. 4/19/2022: Breast MRI showed no residual enhancement at site of primary malignancy in left breast at 4:00; no residual left axillary adenopathy. Right breast negative.  8. 5/6/2022: Completed cycle 12 paclitaxel with pembrolizumab. Surgery delayed due to 2 hospitalizations for rash and peripheral  neuropathy.  9. 6/27/2022: Underwent left breast lumpectomy and left axillary sentinel lymph node excision under care of Dr. Tatianna Rai.  Pathology showed no residual invasive carcinoma in left breast; residual LCIS and atypical lobular hyperplasia present; one of 2 lymph nodes with isolated tumor cells measuring 0.087 mm, focal fibrosis consistent with treatment effect; RCB-I, ypT0-pN0(i+).  10. 8/08/2022: Started raloxifene for LCIS and atypical lobular hyperplasia.  11. 8/10/2022-9/22/2022: Completion of adjuvant radiation therapy.  12. 8/25/2022-2/10/2023: Completed adjuvant pembrolizumab x 9 cycles.  13. 5/24/2023: CT scan showed exophytic mass of posterior upper pole right kidney unchanged since 11/10/2022.  14. 10/23/2023: Right partial nephrectomy with Dr. Frank Guerrero.  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    INTERVAL HISTORY:  Jann reports she has developed eczema and had a precancerous skin lesion removed as well.      REVIEW OF SYSTEMS:   14 point ROS was reviewed and is negative other than as noted above in HPI.       HOME MEDICATIONS:  Current Outpatient Medications   Medication Sig Dispense Refill     acetaminophen (TYLENOL) 500 MG tablet Take 1-2 tablets by mouth every 6 hours as needed for mild pain       atorvastatin (LIPITOR) 10 MG tablet TAKE 1 TABLET (10 MG) BY MOUTH ONCE DAILY 90 tablet 2     levothyroxine (SYNTHROID/LEVOTHROID) 75 MCG tablet TAKE 1 TABLET (75 MCG) BY MOUTH DAILY 90 tablet 3     loratadine (CLARITIN) 10 MG tablet Take 10 mg by mouth daily       LORazepam (ATIVAN) 0.5 MG tablet Take 1 tablet (0.5 mg) by mouth every 6 hours as needed for anxiety, nausea or sleep. 45 tablet 0     Magnesium 400 MG TABS Take 2 tablets by mouth daily       oxazepam (SERAX) 15 MG capsule TAKE 1 CAPSULE (15 MG) BY MOUTH NIGHTLY AS NEEDED FOR ANXIETY. 30 capsule 5     PARNATE 10 MG tablet Take 3 tablets  (30 mg) by mouth every morning. (3 x 10 mg = 30 mg) 270 tablet 3     polyethylene glycol (MIRALAX) 17 GM/Dose powder Take 1 Capful by mouth daily       raloxifene (EVISTA) 60 MG tablet Take 1 tablet (60 mg) by mouth daily. 90 tablet 3     triamcinolone (KENALOG) 0.1 % external cream APPLY TOPICALLY 2 TIMES DAILY AS NEEDED. 15 g 0     triamterene-HCTZ (MAXZIDE-25) 37.5-25 MG tablet Take 0.5 tablets by mouth daily. 45 tablet 1     Vitamin D3 (CHOLECALCIFEROL) 25 mcg (1000 units) tablet Take 1 tablet by mouth daily       zolpidem (AMBIEN) 5 MG tablet TAKE 1 TABLET (5 MG) BY MOUTH NIGHTLY AS NEEDED FOR SLEEP 30 tablet 5         ALLERGIES:  Allergies   Allergen Reactions     Tegaderm Transparent Dressing (Informational Only) Blisters     Lyrica [Pregabalin] Rash         PAST MEDICAL HISTORY:  Past Medical History:   Diagnosis Date     Breast cancer (H)      CKD (chronic kidney disease) stage 3, GFR 30-59 ml/min (H)      Depression with anxiety      Depressive disorder 1985    Been on Parnate antidepressant for 35 years     Gout      Hypertension goal BP (blood pressure) < 140/90      Recurrent sinusitis 2013     Thyroid disease     Keytruda induced     Gynecologic history:  Age of menarche at 11; LMP ;  (one son), 1st pregnancy at age 19; no HRT.    PAST SURGICAL HISTORY:  Past Surgical History:   Procedure Laterality Date     BIOPSY NODE SENTINEL Left 2022    Procedure: left axillary sentinel lymph node biopsy;  Surgeon: Tatianna Rai MD;  Location:  OR     BREAST BIOPSY, RT/LT      Breat Biopsy RT/LT     BREAST SURGERY  2022    Tumor removed     COLONOSCOPY  10/01/2003     COLONOSCOPY  2014    Procedure: COLONOSCOPY;  COLONOSCOPY ;  Surgeon: Gaurav Shaffer MD;  Location:  GI     DAVINCI NEPHRECTOMY PARTIAL Right 10/23/2023    Procedure: RIGHT ROBOTIC ASSISTED LAPAROSOCPIC PARTIAL NEPHRECTOMY WITH INTRA-OPERATIVE RENAL ULTRASOUND;  Surgeon: Frank Guerrero MD;   Location: [facility] OR     IR CHEST PORT PLACEMENT > 5 YRS OF AGE  2021     IR PORT REMOVAL RIGHT  2023     LUMPECTOMY BREAST WITH SEED LOCALIZATION Left 2022    Procedure: Radiofrequency tag localized left breast lumpectomy with radiofrequency tag localized excision of left axillary lymph node;  Surgeon: Tatianna Rai MD;  Location: [facility] OR     RECONSTRUCT EYELID           SOCIAL HISTORY:  Social History     Socioeconomic History     Marital status:      Spouse name: Not on file     Number of children: Not on file     Years of education: Not on file     Highest education level: Not on file   Occupational History     Not on file   Tobacco Use     Smoking status: Former     Current packs/day: 0.00     Average packs/day: 1 pack/day for 10.0 years (10.0 ttl pk-yrs)     Types: Cigarettes     Start date: 9/15/1963     Quit date: 10/30/1972     Years since quittin.5     Passive exposure: Never     Smokeless tobacco: Never     Tobacco comments:     I have not smoked since 10/30/1972   Vaping Use     Vaping status: Never Used   Substance and Sexual Activity     Alcohol use: Yes     Comment: I drink white wine with dinner     Drug use: No     Sexual activity: Not Currently     Partners: Male     Birth control/protection: I.U.D., Pill     Comment: No birth control used since .   Other Topics Concern     Parent/sibling w/ CABG, MI or angioplasty before 65F 55M? No   Social History Narrative     Not on file     Social Drivers of Health     Financial Resource Strain: Low Risk  (5/10/2024)    Financial Resource Strain      Within the past 12 months, have you or your family members you live with been unable to get utilities (heat, electricity) when it was really needed?: No   Food Insecurity: Low Risk  (5/10/2024)    Food Insecurity      Within the past 12 months, did you worry that your food would run out before you got money to buy more?: No      Within the past 12 months, did the food you bought  "just not last and you didn t have money to get more?: No   Transportation Needs: High Risk (5/10/2024)    Transportation Needs      Within the past 12 months, has lack of transportation kept you from medical appointments, getting your medicines, non-medical meetings or appointments, work, or from getting things that you need?: Yes   Physical Activity: Inactive (5/10/2024)    Exercise Vital Sign      Days of Exercise per Week: 0 days      Minutes of Exercise per Session: 0 min   Stress: Stress Concern Present (5/10/2024)    [employer] - Occupational Stress Questionnaire      Feeling of Stress : To some extent   Social Connections: Unknown (5/10/2024)    Social Connection and Isolation Panel [NHANES]      Frequency of Communication with Friends and Family: Not on file      Frequency of Social Gatherings with Friends and Family: Patient declined      Attends [employer] Services: Not on file      Active Member of Clubs or Organizations: Not on file      Attends Club or Organization Meetings: Not on file      Marital Status: Not on file   Interpersonal Safety: Low Risk  (5/15/2024)    Interpersonal Safety      Do you feel physically and emotionally safe where you currently live?: Yes      Within the past 12 months, have you been hit, slapped, kicked or otherwise physically hurt by someone?: No      Within the past 12 months, have you been humiliated or emotionally abused in other ways by your partner or ex-partner?: No   Housing Stability: Low Risk  (5/10/2024)    Housing Stability      Do you have housing? : Yes      Are you worried about losing your housing?: No         FAMILY HISTORY:  Family History   Problem Relation Age of Onset     Cancer Mother         uterine     Breast Cancer Mother      Hypertension Mother      Diabetes Paternal Grandmother          PHYSICAL EXAM:  Vital signs:  /83   Pulse 87   Resp 16   Ht 1.6 m (5' 3\")   Wt 61.2 kg (135 lb)   SpO2 100%   BMI 23.91 " kg/m     GENERAL: No acute distress.  EYES: No scleral icterus. No overt erythema.  LYMPH: No palpable lymphadenopathy in the cervical, supraclavicular, axillary regions.  BREAST: No palpable masses in either breast. Postlumpectomy stable changes in the right breast.  Nipples are everted bilaterally with no discharge. No erythema, ulceration, or dimpling of the skin.  RESPIRATORY: No audible cough or wheezing.  ABDOMEN: Soft, nontender, nondistended, with no palpable hepatosplenomegaly.   MUSCULOSKELETAL: No clubbing, cyanosis or edema.  SKIN: No rashes or jaundice.  NEUROLOGIC: Alert, answers questions appropriately; no focal deficits.  PSYCHIATRIC: Normal affect; anxious.    LABS:  CBC RESULTS:   Recent Labs   Lab Test 05/15/24  1733   WBC 5.4   RBC 3.75*   HGB 11.6*   HCT 36.7   MCV 98   MCH 30.9   MCHC 31.6   RDW 12.3         Last Comprehensive Metabolic Panel:  Sodium   Date Value Ref Range Status   01/29/2025 136 135 - 145 mmol/L Final   06/05/2019 142 133 - 144 mmol/L Final     Potassium   Date Value Ref Range Status   01/29/2025 4.0 3.4 - 5.3 mmol/L Final   12/30/2022 4.0 3.4 - 5.3 mmol/L Final   06/05/2019 3.7 3.4 - 5.3 mmol/L Final     Chloride   Date Value Ref Range Status   01/29/2025 99 98 - 107 mmol/L Final   12/30/2022 110 (H) 94 - 109 mmol/L Final   06/05/2019 110 (H) 94 - 109 mmol/L Final     Carbon Dioxide   Date Value Ref Range Status   06/05/2019 24 20 - 32 mmol/L Final     Carbon Dioxide (CO2)   Date Value Ref Range Status   01/29/2025 26 22 - 29 mmol/L Final   12/30/2022 24 20 - 32 mmol/L Final     Anion Gap   Date Value Ref Range Status   01/29/2025 11 7 - 15 mmol/L Final   12/30/2022 7 3 - 14 mmol/L Final   06/05/2019 8 3 - 14 mmol/L Final     Glucose   Date Value Ref Range Status   01/29/2025 105 (H) 70 - 99 mg/dL Final   12/30/2022 103 (H) 70 - 99 mg/dL Final   06/05/2019 93 70 - 99 mg/dL Final     Comment:     Fasting specimen     GLUCOSE BY METER POCT   Date Value Ref Range Status    10/24/2023 109 (H) 70 - 99 mg/dL Final     Urea Nitrogen   Date Value Ref Range Status   01/29/2025 37.4 (H) 8.0 - 23.0 mg/dL Final   12/30/2022 29 7 - 30 mg/dL Final   06/05/2019 32 (H) 7 - 30 mg/dL Final     Creatinine   Date Value Ref Range Status   01/29/2025 1.57 (H) 0.51 - 0.95 mg/dL Final   06/05/2019 1.15 (H) 0.52 - 1.04 mg/dL Final     GFR Estimate   Date Value Ref Range Status   01/29/2025 33 (L) >60 mL/min/1.73m2 Final     Comment:     eGFR calculated using 2021 CKD-EPI equation.   06/05/2019 47 (L) >60 mL/min/[1.73_m2] Final     Comment:     Non  GFR Calc  Starting 12/18/2018, serum creatinine based estimated GFR (eGFR) will be   calculated using the Chronic Kidney Disease Epidemiology Collaboration   (CKD-EPI) equation.       GFR, ESTIMATED POCT   Date Value Ref Range Status   11/07/2024 22 (L) >60 mL/min/1.73m2 Final     Calcium   Date Value Ref Range Status   01/29/2025 10.0 8.8 - 10.4 mg/dL Final   06/05/2019 9.2 8.5 - 10.1 mg/dL Final     Bilirubin Total   Date Value Ref Range Status   07/17/2024 <0.2 <=1.2 mg/dL Final   06/05/2019 0.4 0.2 - 1.3 mg/dL Final     Alkaline Phosphatase   Date Value Ref Range Status   07/17/2024 70 40 - 150 U/L Final   06/05/2019 85 40 - 150 U/L Final     ALT   Date Value Ref Range Status   07/17/2024 21 0 - 50 U/L Final   06/05/2019 33 0 - 50 U/L Final     AST   Date Value Ref Range Status   07/17/2024 30 0 - 45 U/L Final   06/05/2019 33 0 - 45 U/L Final     5/24/2023: TSH = 29.92 -> 6.46  Free T4 = 0.7 => 1.19    PATHOLOGY:  Reviewed as per HPI.    ASSESSMENT/PLAN:  Jann Davidson is a 79 year old female with the following issues:  1. Clinical prognostic stage IIB, [id number], grade 2 invasive ductal carcinoma of the left lower outer breast, ER negative, ME negative, HER-2/maxime FISH negative (triple negative), ypT0-pN0(i+)  2. Left breast LCIS and atypical lobular hyperplasia  --Jann completed neoadjuvant chemotherapy with paclitaxel and carboplatin for  12 weeks with every 3-week pembrolizumab. Given that her 4/19/2022 breast MRI showed no residual enhancement -- I had advised foregoing chemotherapy with Adriamycin and Cytoxan given her age and low likelihood of tolerating these drugs.  --6/27/2022 Final pathology from her lumpectomy and sentinel lymph node excision showed near-complete response to neoadjuvant chemotherapy with just isolated tumor cells in one lymph node.  She had residual LCIS and atypical lobular hyperplasia.  --She then completed adjuvant pembrolizumab on 2/10/2023 for 9 cycles as per the KEYNOTE-522 trial.  --She completed adjuvant radiation therapy on 9/22/2022 with Dr. Giles.  --Genetic testing negative.  --Jann has no clinical evidence for recurrent breast cancer by physical exam from today or diagnostic bilateral mammogram reviewed from 1/29/2025.  --Continue to alternate mammogram with breast MRI for high risk surveillance given findings of LCIS and ALH, which are markers for increased risk of additional breast cancer in the future.  Due for breast MRI in 7/2025 and mammogram end of 1/2026. However, her son will be visiting this summer so she would like to push her breast MRI to end of 9/2025 which is fine.  She also requests only diagnostic mammograms to reduce stress of callbacks for diagnostic views.  --Continue raloxifene for 5 years for risk reduction given the findings of LCIS and ALH. She is tolerating this well without side effects.    3. Chronic kidney disease, stage 3  --Creatinine stable.  --She follows with Dr. Luna.    4. FDG uptake at anal canal  --She has history of anal fissure from 4/8/2014 colonoscopy.  --PET scan showed uptake at level of anus and could represent hemorrhoid, fissure, malignancy, or be physiologic.  --She met with colorectal Dr. Cervantes on  12/16/2022 with flex sig that showed no evidence of neoplasia in rectum.  --Due for colonoscopy in 2025.    5. Stage I, [id number], clear cell papillary renal  "cell carcinoma  --She is s/p right robotic assisted laparoscopic partial nephrectomy on 10/23/2023. Margins negative  --She will follow-up with Dr. Guerrero next month with surveillance imaging.    6. Pulmonary nodules  --PET scan showed scattered small bilateral pulmonary nodules that are unchanged since 10/24/2018 and most likely benign. No further CT chest needed.    7. Depression/anxiety  --Stable.  --She would like to continue on Parnate and not change her antidepressant.  --She takes oxazepam together with zolpidem.    --She could use lorazepam (Ativan) as long as she takes this 6 hours from her other benzodiazepenes.    8. Hypothyroidism  --Likely related to pembrolizumab.    --1/24/24 TSH 2.7.  --Continue levothyroxine 75 mcg daily.  --She will have her thyroid function followed with her PCP.    9. Vitamin D deficiency   --7/5/2023 vitamin D level very low at 14.  --Advised vitamin D 2000 international unit(s) daily for 2 weeks then 1000 international unit(s) daily.    --1/24/24 Vitamin D level now improved at 40. Continue supplementation.    10. Multiple skin lesions  --Unclear etiology.  She will see dermatology for evaluation 12/23/24.    Return in 6 months.    Total time spent today: 30 minutes in chart review, patient evaluation, counseling, documentation, test and/or medication/prescription orders, and coordination of care.     The longitudinal plan of care for the diagnosis(es)/condition(s) as documented were addressed during this visit. Due to the added complexity in care, I will continue to support Jann in the subsequent management and with ongoing continuity of care.    Oncology Rooming Note    May 28, 2025 3:56 PM   Jann Davidson is a 79 year old female who presents for:    Chief Complaint   Patient presents with     Oncology Clinic Visit     Initial Vitals: /83   Pulse 87   Resp 16   Ht 1.6 m (5' 3\")   Wt 61.2 kg (135 lb)   SpO2 100%   BMI 23.91 kg/m   Estimated body mass index is 23.91 " "kg/m  as calculated from the following:    Height as of this encounter: 1.6 m (5' 3\").    Weight as of this encounter: 61.2 kg (135 lb). Body surface area is 1.65 meters squared.  No Pain (0) Comment: Data Unavailable   No LMP recorded. Patient is postmenopausal.  Allergies reviewed: Yes  Medications reviewed: Yes    Medications: Medication refills not needed today.  Pharmacy name entered into EPIC:    [email or web address]  [facility] - [place], [place] - [address] [place] [address]    Frailty Screening:   Is the patient here for a new oncology consult visit in cancer care? 2. No    PHQ9:  Did this patient require a PHQ9?: No          Viviane Colby MA              Again, thank you for allowing me to participate in the care of your patient.        Sincerely,        Neetu Mcmullen MD    Electronically signed"

## 2025-05-28 NOTE — PROGRESS NOTES
"Oncology Rooming Note    May 28, 2025 3:56 PM   Jann Davidson is a 79 year old female who presents for:    Chief Complaint   Patient presents with    Oncology Clinic Visit     Initial Vitals: /83   Pulse 87   Resp 16   Ht 1.6 m (5' 3\")   Wt 61.2 kg (135 lb)   SpO2 100%   BMI 23.91 kg/m   Estimated body mass index is 23.91 kg/m  as calculated from the following:    Height as of this encounter: 1.6 m (5' 3\").    Weight as of this encounter: 61.2 kg (135 lb). Body surface area is 1.65 meters squared.  No Pain (0) Comment: Data Unavailable   No LMP recorded. Patient is postmenopausal.  Allergies reviewed: Yes  Medications reviewed: Yes    Medications: Medication refills not needed today.  Pharmacy name entered into EPIC:    [facility]  [facility] - [place], [place] - [address] [place] [address]    Frailty Screening:   Is the patient here for a new oncology consult visit in cancer care? 2. No    PHQ9:  Did this patient require a PHQ9?: No          Viviane Colby MA            "

## 2025-05-31 DIAGNOSIS — Z13.6 CARDIOVASCULAR SCREENING; LDL GOAL LESS THAN 130: ICD-10-CM

## 2025-06-02 RX ORDER — ATORVASTATIN CALCIUM 10 MG/1
10 TABLET, FILM COATED ORAL
Qty: 90 TABLET | Refills: 3 | Status: SHIPPED | OUTPATIENT
Start: 2025-06-02

## 2025-06-04 ENCOUNTER — VIRTUAL VISIT (OUTPATIENT)
Dept: FAMILY MEDICINE | Facility: CLINIC | Age: 80
End: 2025-06-04
Payer: MEDICARE

## 2025-06-04 DIAGNOSIS — C50.512 MALIGNANT NEOPLASM OF LOWER-OUTER QUADRANT OF LEFT BREAST OF FEMALE, ESTROGEN RECEPTOR NEGATIVE (H): ICD-10-CM

## 2025-06-04 DIAGNOSIS — Z17.1 MALIGNANT NEOPLASM OF LOWER-OUTER QUADRANT OF LEFT BREAST OF FEMALE, ESTROGEN RECEPTOR NEGATIVE (H): ICD-10-CM

## 2025-06-04 DIAGNOSIS — F33.1 MAJOR DEPRESSIVE DISORDER, RECURRENT EPISODE, MODERATE (H): Primary | ICD-10-CM

## 2025-06-04 PROBLEM — Z00.00 ENCOUNTER FOR ROUTINE ADULT HEALTH EXAMINATION: Status: ACTIVE | Noted: 2025-06-04

## 2025-06-04 ASSESSMENT — PATIENT HEALTH QUESTIONNAIRE - PHQ9
SUM OF ALL RESPONSES TO PHQ QUESTIONS 1-9: 0
SUM OF ALL RESPONSES TO PHQ QUESTIONS 1-9: 0

## 2025-06-04 NOTE — PROGRESS NOTES
Answers submitted by the patient for this visit:  Patient Health Questionnaire (Submitted on 6/4/2025)  PHQ9 TOTAL SCORE: 0  General Questionnaire (Submitted on 6/4/2025)  Chief Complaint: Chronic problems general questions HPI Form  What is the reason for your visit today? : Yearly check in.  How many days per week do you miss taking your medication?: 0  Questionnaire about: Chronic problems general questions HPI Form (Submitted on 6/4/2025)  Chief Complaint: Chronic problems general questions HPI Form  Jann is a 79 year old who is being evaluated via a billable video visit.    How would you like to obtain your AVS? MyChart  If the video visit is dropped, the invitation should be resent by: Text to cell phone: [phone]  Will anyone else be joining your video visit? No      Assessment & Plan     Encounter for routine adult health examination  Doing well.  She was advised to get the current covid shot at a pharmacy.   Discussed pros and cons of DEXA scan, Bisphosphonate, and long term benefits, she would like to defer for now.  Positive reinforcement provided and encouraged continuing exercises.   She has made future appointment to her [facility] PCP at [facility].     Subjective   Jann is a 79 year old, presenting for the following health issues:  No chief complaint on file.    Video Start Time: 3:28 PM    FAMILIA    Recently was told by her Dermatologist that she has developed eczema. She is getting treated with medications-    History of breast cancer  Right side non-malignant, tumor and two lymph nodes were removed from the left breast. She gets a diagnostic mammogram or MRI every 6 months.   She has difficulty sleeping since the chemo treatment. She uses Ambien 5 mg with about 3-4 hours of sleep. She also uses Tylenol and Ativan to help with sleep. Other times she sleeps for 16 hours. She is not losing unintentional weight.    Bone density test:   She is not interested in taking the Bisphosphonate medication, and  wonders if she should still get the screening.   She does not have a history of fractures. She smoked cigarettes in the past but quit in 1972.   She does practice exercises to improve her balance at home for 10-15 minutes daily.     Medications  She takes the Parnate 30 mg daily in the morning with relief. She has been on it for 30+ years as nothing else has worked for her. She has tried going off of it multiple times but ultimately it is the only medication that works for her depression. She follows the dietary restrictions to avoid MAO-I inhibitor interactions such as fermented foods, cheese, raisins etc.   She is up to date labs for her thyroid medications.          Review of Systems  Constitutional, HEENT, cardiovascular, pulmonary, gi and gu systems are negative, except as otherwise noted.      Objective           Vitals:  No vitals were obtained today due to virtual visit.    Physical Exam   GENERAL: alert and no distress  EYES: Eyes grossly normal to inspection.  No discharge or erythema, or obvious scleral/conjunctival abnormalities.  RESP: No audible wheeze, cough, or visible cyanosis.    SKIN: Visible skin clear. No significant rash, abnormal pigmentation or lesions.  NEURO: Cranial nerves grossly intact.  Mentation and speech appropriate for age.  PSYCH: Appropriate affect, tone, and pace of words      Video-Visit Details    Type of service:  Video Visit   Video End Time:4:03 PM  Originating Location (pt. Location): Home    Distant Location (provider location):  On-site  Platform used for Video Visit: Norma  Signed Electronically by: Mehran Oliva MD

## 2025-06-15 ENCOUNTER — HEALTH MAINTENANCE LETTER (OUTPATIENT)
Age: 80
End: 2025-06-15

## 2025-07-08 DIAGNOSIS — D70.1 CHEMOTHERAPY-INDUCED NEUTROPENIA: ICD-10-CM

## 2025-07-08 DIAGNOSIS — F41.9 ANXIETY: ICD-10-CM

## 2025-07-08 DIAGNOSIS — T45.1X5A CHEMOTHERAPY-INDUCED NEUTROPENIA: ICD-10-CM

## 2025-07-08 RX ORDER — LORAZEPAM 0.5 MG/1
0.5 TABLET ORAL EVERY 6 HOURS PRN
Qty: 45 TABLET | Refills: 0 | Status: SHIPPED | OUTPATIENT
Start: 2025-07-08

## 2025-08-16 DIAGNOSIS — G47.00 PERSISTENT DISORDER OF INITIATING OR MAINTAINING SLEEP: Chronic | ICD-10-CM

## 2025-08-17 RX ORDER — ZOLPIDEM TARTRATE 5 MG/1
5 TABLET ORAL
Qty: 30 TABLET | Refills: 0 | Status: SHIPPED | OUTPATIENT
Start: 2025-08-17

## (undated) DEVICE — ESU GROUND PAD ADULT W/CORD E7507

## (undated) DEVICE — TUBING CONMED AIRSEAL SMOKE EVAC INSUFFLATION ASM-EVAC

## (undated) DEVICE — PAD CHUX UNDERPAD 23X24" 7136

## (undated) DEVICE — SOL NACL 0.9% INJ 1000ML BAG 2B1324X

## (undated) DEVICE — SOL WATER IRRIG 1000ML BOTTLE 2F7114

## (undated) DEVICE — SU VICRYL 3-0 SH 27" J316H

## (undated) DEVICE — CATH TRAY FOLEY SURESTEP 16FR WDRAIN BAG STLK LATEX A300316A

## (undated) DEVICE — ESU ELEC BLADE NN-STCK PLUMEPEN PRO 360D 10FT PLPRO4020

## (undated) DEVICE — GLOVE BIOGEL PI MICRO SZ 8.0 48580

## (undated) DEVICE — ULTRASOUND

## (undated) DEVICE — ENDO TROCAR FIRST ENTRY KII FIOS Z-THRD 05X100MM CTF03

## (undated) DEVICE — GLOVE BIOGEL PI MICRO INDICATOR UNDERGLOVE SZ 8.0 48980

## (undated) DEVICE — DAVINCI HOT SHEARS TIP COVER  400180

## (undated) DEVICE — SYR 10ML FINGER CONTROL W/O NDL 309695

## (undated) DEVICE — LINEN TOWEL PACK X5 5464

## (undated) DEVICE — ENDO OBTURATOR ACCESS PORT BLADELESS 12X150MM IAS12-150

## (undated) DEVICE — ENDO POUCH UNIV RETRIEVAL SYSTEM INZII 10MM CD001

## (undated) DEVICE — DAVINCI XI NDL DRIVER LARGE 470006

## (undated) DEVICE — PACK DAVINCI UROLOGY SBA15UDFSG

## (undated) DEVICE — DRAPE BREAST/CHEST 29420

## (undated) DEVICE — BLADE KNIFE SURG 10 371110

## (undated) DEVICE — SLEEVE PROTECTIVE BREAST BIOPSY  GMSLV001-10

## (undated) DEVICE — SU VICRYL 2-0 CT-2 27" UND J269H

## (undated) DEVICE — SUCTION CANISTER MEDIVAC LINER 3000ML W/LID 65651-530

## (undated) DEVICE — DECANTER BAG 2002S

## (undated) DEVICE — DRAIN JACKSON PRATT CHANNEL 19FR ROUND HUBLESS SIL JP-2230

## (undated) DEVICE — SU VICRYL 0 TIE 12X18" J906G

## (undated) DEVICE — BNDG ELASTIC 6" DBL LENGTH UNSTERILE 6611-16

## (undated) DEVICE — PREP CHLORAPREP 26ML TINTED ORANGE  260815

## (undated) DEVICE — ESU ELEC BLADE 2.75" COATED/INSULATED E1455

## (undated) DEVICE — DAVINCI XI DRAPE ARM 470015

## (undated) DEVICE — DAVINCI XI MONOPOLAR SCISSORS HOT SHEARS 8MM 470179

## (undated) DEVICE — SET BREAST BIOPSY LOCALIZER 20 PROBE 8MM PENCIL 09-0006

## (undated) DEVICE — DAVINCI XI DRAPE COLUMN 470341

## (undated) DEVICE — DRAIN JACKSON PRATT RESERVOIR 100ML SU130-1305

## (undated) DEVICE — GLOVE PROTEXIS W/NEU-THERA 6.5  2D73TE65

## (undated) DEVICE — SU MONOCRYL 4-0 PS-2 18" UND Y496G

## (undated) DEVICE — TROCAR AIRSEAL BLADELESS 12X120MM IAS12-120

## (undated) DEVICE — TAPE DURAPORE 3" SILK 1538-3

## (undated) DEVICE — SOL NACL 0.9% IRRIG 1000ML BOTTLE 2F7124

## (undated) DEVICE — DRAPE MAYO STAND 23X54 8337

## (undated) DEVICE — SU VICRYL 0 CT-1 27" J340H

## (undated) DEVICE — NDL 19GA 1.5"

## (undated) DEVICE — CLIP ETHICON LIGACLIP SM BLUE LT100

## (undated) DEVICE — DECANTER VIAL 2006S

## (undated) DEVICE — MARKER MARGIN MARKER STD 6 COLOR SGL USE MMS6

## (undated) DEVICE — NDL INSUFFLATION 13GA 120MM C2201

## (undated) DEVICE — PACK MINOR SBA15MIFSE

## (undated) DEVICE — DAVINCI XI SEAL UNIVERSAL 5-8MM 470361

## (undated) DEVICE — CLIP ENDO HEMO-LOC PURPLE LG 544240

## (undated) DEVICE — DAVINCI XI GRASPER ENDOWRIST PROGRASP 470093

## (undated) DEVICE — SYSTEM LAPAROVUE VISIBILITY LAPVUE10

## (undated) DEVICE — SUCTION IRR STRYKERFLOW II W/TIP 250-070-520

## (undated) DEVICE — ESU GROUND PAD UNIVERSAL W/O CORD

## (undated) DEVICE — SU PDS II 0 CT-2 27" Z334H

## (undated) DEVICE — SYR BULB IRRIG 50ML LATEX FREE 0035280

## (undated) DEVICE — NDL 22GA 1.5"

## (undated) RX ORDER — FENTANYL CITRATE 0.05 MG/ML
INJECTION, SOLUTION INTRAMUSCULAR; INTRAVENOUS
Status: DISPENSED
Start: 2023-10-23

## (undated) RX ORDER — FENTANYL CITRATE 50 UG/ML
INJECTION, SOLUTION INTRAMUSCULAR; INTRAVENOUS
Status: DISPENSED
Start: 2023-02-17

## (undated) RX ORDER — FENTANYL CITRATE 50 UG/ML
INJECTION, SOLUTION INTRAMUSCULAR; INTRAVENOUS
Status: DISPENSED
Start: 2023-10-23

## (undated) RX ORDER — DEXAMETHASONE SODIUM PHOSPHATE 4 MG/ML
INJECTION, SOLUTION INTRA-ARTICULAR; INTRALESIONAL; INTRAMUSCULAR; INTRAVENOUS; SOFT TISSUE
Status: DISPENSED
Start: 2022-06-27

## (undated) RX ORDER — DEXAMETHASONE SODIUM PHOSPHATE 4 MG/ML
INJECTION, SOLUTION INTRA-ARTICULAR; INTRALESIONAL; INTRAMUSCULAR; INTRAVENOUS; SOFT TISSUE
Status: DISPENSED
Start: 2023-10-23

## (undated) RX ORDER — ONDANSETRON 2 MG/ML
INJECTION INTRAMUSCULAR; INTRAVENOUS
Status: DISPENSED
Start: 2023-10-23

## (undated) RX ORDER — CEFAZOLIN SODIUM 2 G/100ML
INJECTION, SOLUTION INTRAVENOUS
Status: DISPENSED
Start: 2021-12-29

## (undated) RX ORDER — LIDOCAINE HYDROCHLORIDE 20 MG/ML
INJECTION, SOLUTION EPIDURAL; INFILTRATION; INTRACAUDAL; PERINEURAL
Status: DISPENSED
Start: 2022-06-27

## (undated) RX ORDER — FENTANYL CITRATE 50 UG/ML
INJECTION, SOLUTION INTRAMUSCULAR; INTRAVENOUS
Status: DISPENSED
Start: 2022-06-27

## (undated) RX ORDER — PROPOFOL 10 MG/ML
INJECTION, EMULSION INTRAVENOUS
Status: DISPENSED
Start: 2023-10-23

## (undated) RX ORDER — CEFAZOLIN SODIUM/WATER 2 G/20 ML
SYRINGE (ML) INTRAVENOUS
Status: DISPENSED
Start: 2023-10-23

## (undated) RX ORDER — HYDROMORPHONE HYDROCHLORIDE 1 MG/ML
INJECTION, SOLUTION INTRAMUSCULAR; INTRAVENOUS; SUBCUTANEOUS
Status: DISPENSED
Start: 2023-10-23

## (undated) RX ORDER — PROPOFOL 10 MG/ML
INJECTION, EMULSION INTRAVENOUS
Status: DISPENSED
Start: 2022-06-27

## (undated) RX ORDER — ACETAMINOPHEN 500 MG
TABLET ORAL
Status: DISPENSED
Start: 2022-06-27

## (undated) RX ORDER — HEPARIN SODIUM (PORCINE) LOCK FLUSH IV SOLN 100 UNIT/ML 100 UNIT/ML
SOLUTION INTRAVENOUS
Status: DISPENSED
Start: 2022-04-19

## (undated) RX ORDER — BUPIVACAINE HYDROCHLORIDE 2.5 MG/ML
INJECTION, SOLUTION EPIDURAL; INFILTRATION; INTRACAUDAL
Status: DISPENSED
Start: 2023-10-23

## (undated) RX ORDER — HEPARIN SODIUM (PORCINE) LOCK FLUSH IV SOLN 100 UNIT/ML 100 UNIT/ML
SOLUTION INTRAVENOUS
Status: DISPENSED
Start: 2021-12-29

## (undated) RX ORDER — HYDROXYZINE HYDROCHLORIDE 25 MG/1
TABLET, FILM COATED ORAL
Status: DISPENSED
Start: 2022-06-27

## (undated) RX ORDER — ONDANSETRON 2 MG/ML
INJECTION INTRAMUSCULAR; INTRAVENOUS
Status: DISPENSED
Start: 2022-06-27

## (undated) RX ORDER — FENTANYL CITRATE 50 UG/ML
INJECTION, SOLUTION INTRAMUSCULAR; INTRAVENOUS
Status: DISPENSED
Start: 2021-12-29